# Patient Record
Sex: FEMALE | Race: WHITE | NOT HISPANIC OR LATINO | Employment: OTHER | URBAN - METROPOLITAN AREA
[De-identification: names, ages, dates, MRNs, and addresses within clinical notes are randomized per-mention and may not be internally consistent; named-entity substitution may affect disease eponyms.]

---

## 2017-01-03 ENCOUNTER — PATIENT MESSAGE (OUTPATIENT)
Dept: ENDOCRINOLOGY | Facility: CLINIC | Age: 52
End: 2017-01-03

## 2017-01-03 DIAGNOSIS — N18.30 CHRONIC KIDNEY DISEASE, STAGE III (MODERATE): Primary | ICD-10-CM

## 2017-01-24 ENCOUNTER — LAB VISIT (OUTPATIENT)
Dept: LAB | Facility: HOSPITAL | Age: 52
End: 2017-01-24
Attending: INTERNAL MEDICINE
Payer: COMMERCIAL

## 2017-01-24 DIAGNOSIS — N18.30 CHRONIC KIDNEY DISEASE, STAGE III (MODERATE): ICD-10-CM

## 2017-01-24 LAB
ALBUMIN SERPL BCP-MCNC: 3.4 G/DL
ANION GAP SERPL CALC-SCNC: 10 MMOL/L
BUN SERPL-MCNC: 21 MG/DL
CALCIUM SERPL-MCNC: 9.2 MG/DL
CHLORIDE SERPL-SCNC: 106 MMOL/L
CO2 SERPL-SCNC: 24 MMOL/L
CREAT SERPL-MCNC: 1.3 MG/DL
EST. GFR  (AFRICAN AMERICAN): 55 ML/MIN/1.73 M^2
EST. GFR  (NON AFRICAN AMERICAN): 48 ML/MIN/1.73 M^2
GLUCOSE SERPL-MCNC: 94 MG/DL
PHOSPHATE SERPL-MCNC: 3.2 MG/DL
POTASSIUM SERPL-SCNC: 4.3 MMOL/L
SODIUM SERPL-SCNC: 140 MMOL/L

## 2017-01-24 PROCEDURE — 80069 RENAL FUNCTION PANEL: CPT

## 2017-01-24 PROCEDURE — 36415 COLL VENOUS BLD VENIPUNCTURE: CPT

## 2017-02-01 ENCOUNTER — OFFICE VISIT (OUTPATIENT)
Dept: NEPHROLOGY | Facility: CLINIC | Age: 52
End: 2017-02-01
Payer: COMMERCIAL

## 2017-02-01 VITALS
HEART RATE: 106 BPM | HEIGHT: 68 IN | DIASTOLIC BLOOD PRESSURE: 60 MMHG | BODY MASS INDEX: 19.88 KG/M2 | OXYGEN SATURATION: 96 % | SYSTOLIC BLOOD PRESSURE: 110 MMHG | WEIGHT: 131.19 LBS

## 2017-02-01 DIAGNOSIS — E27.49 SECONDARY ADRENAL INSUFFICIENCY: ICD-10-CM

## 2017-02-01 DIAGNOSIS — E23.0 PANHYPOPITUITARISM: ICD-10-CM

## 2017-02-01 DIAGNOSIS — G40.909 SEIZURE DISORDER: ICD-10-CM

## 2017-02-01 DIAGNOSIS — E87.5 HYPERKALEMIA: ICD-10-CM

## 2017-02-01 DIAGNOSIS — N18.30 CHRONIC KIDNEY DISEASE (CKD), STAGE III (MODERATE): Primary | ICD-10-CM

## 2017-02-01 PROCEDURE — 99999 PR PBB SHADOW E&M-EST. PATIENT-LVL III: CPT | Mod: PBBFAC,,, | Performed by: INTERNAL MEDICINE

## 2017-02-01 PROCEDURE — 99213 OFFICE O/P EST LOW 20 MIN: CPT | Mod: S$GLB,,, | Performed by: INTERNAL MEDICINE

## 2017-02-01 NOTE — PROGRESS NOTES
Patient is here today for follow up evaluation of CKD III. Last seen in renal office 9/26/17.Baseline Cr 1.3-1.5 Her most recent lab (1/24/17) Cr 1.3 mg/dl eGFR 48 ml/min; potassium 4.3 mmol/L There is a hx of panhypopit and secondary adrenal insufficiency. Today she has no new complaints    ROS;   Mood and Affect;  Appropriate  Otherwise non-contributory      Exam  Pleasant woman; no acute distress; oriented x 3  HEENT; WNL  CHEST; Clear P&A  HEART; RR; S1&S2 no murmur rub gallop  ABD; Benign  EXT; (-)Edema    Impression  CKD III Stable  Repeat lab 3 mo    Plan  Return Visit 6 mo; pending above

## 2017-02-07 ENCOUNTER — OFFICE VISIT (OUTPATIENT)
Dept: PAIN MEDICINE | Facility: CLINIC | Age: 52
End: 2017-02-07
Attending: ANESTHESIOLOGY
Payer: COMMERCIAL

## 2017-02-07 VITALS
WEIGHT: 131 LBS | BODY MASS INDEX: 19.85 KG/M2 | TEMPERATURE: 100 F | SYSTOLIC BLOOD PRESSURE: 140 MMHG | DIASTOLIC BLOOD PRESSURE: 77 MMHG | HEIGHT: 68 IN | HEART RATE: 95 BPM | RESPIRATION RATE: 20 BRPM

## 2017-02-07 DIAGNOSIS — M79.18 MYOFASCIAL MUSCLE PAIN: ICD-10-CM

## 2017-02-07 DIAGNOSIS — M53.3 SACROILIAC JOINT PAIN: Primary | ICD-10-CM

## 2017-02-07 PROCEDURE — 99999 PR PBB SHADOW E&M-EST. PATIENT-LVL III: CPT | Mod: PBBFAC,,, | Performed by: ANESTHESIOLOGY

## 2017-02-07 PROCEDURE — 99213 OFFICE O/P EST LOW 20 MIN: CPT | Mod: S$GLB,,, | Performed by: ANESTHESIOLOGY
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
## 2017-02-22 ENCOUNTER — PATIENT MESSAGE (OUTPATIENT)
Dept: ENDOCRINOLOGY | Facility: CLINIC | Age: 52
End: 2017-02-22

## 2017-02-22 DIAGNOSIS — M81.0 OSTEOPOROSIS: ICD-10-CM

## 2017-03-08 RX ORDER — DIPHENOXYLATE HYDROCHLORIDE AND ATROPINE SULFATE 2.5; .025 MG/1; MG/1
TABLET ORAL
Qty: 30 TABLET | Refills: 3 | Status: SHIPPED | OUTPATIENT
Start: 2017-03-08 | End: 2019-06-10

## 2017-03-22 RX ORDER — ALENDRONATE SODIUM 70 MG/1
70 TABLET ORAL
Qty: 12 TABLET | Refills: 1 | Status: SHIPPED | OUTPATIENT
Start: 2017-03-22 | End: 2017-12-06 | Stop reason: SDUPTHER

## 2017-06-15 DIAGNOSIS — G40.909 EPILEPSY WITHOUT STATUS EPILEPTICUS, NOT INTRACTABLE: ICD-10-CM

## 2017-06-15 RX ORDER — PHENYTOIN SODIUM 100 MG/1
CAPSULE, EXTENDED RELEASE ORAL
Qty: 360 CAPSULE | Refills: 4 | Status: SHIPPED | OUTPATIENT
Start: 2017-06-15 | End: 2018-09-02 | Stop reason: SDUPTHER

## 2017-08-07 DIAGNOSIS — N18.30 CHRONIC KIDNEY DISEASE (CKD), STAGE III (MODERATE): Primary | ICD-10-CM

## 2017-08-08 ENCOUNTER — TELEPHONE (OUTPATIENT)
Dept: NEPHROLOGY | Facility: CLINIC | Age: 52
End: 2017-08-08

## 2017-08-08 RX ORDER — HYDROCORTISONE 10 MG/1
10 TABLET ORAL 2 TIMES DAILY
Qty: 60 TABLET | Refills: 6 | Status: SHIPPED | OUTPATIENT
Start: 2017-08-08 | End: 2018-01-11 | Stop reason: SDUPTHER

## 2017-08-08 NOTE — TELEPHONE ENCOUNTER
----- Message from Sharon Almaguer sent at 8/7/2017 10:28 AM CDT -----  Contact: Self [phone]  If needed, pls add orders for labs prior to Pt's appointment on 8/30 at [facility] Schedule and called pt

## 2017-08-14 RX ORDER — RIVAROXABAN 20 MG/1
TABLET, FILM COATED ORAL
Qty: 90 TABLET | Refills: 3 | Status: SHIPPED | OUTPATIENT
Start: 2017-08-14 | End: 2018-10-14 | Stop reason: SDUPTHER

## 2017-08-22 DIAGNOSIS — Z12.11 COLON CANCER SCREENING: ICD-10-CM

## 2017-08-28 ENCOUNTER — LAB VISIT (OUTPATIENT)
Dept: LAB | Facility: HOSPITAL | Age: 52
End: 2017-08-28
Attending: INTERNAL MEDICINE
Payer: COMMERCIAL

## 2017-08-28 DIAGNOSIS — N18.30 CHRONIC KIDNEY DISEASE (CKD), STAGE III (MODERATE): ICD-10-CM

## 2017-08-28 LAB
ALBUMIN SERPL BCP-MCNC: 3.2 G/DL
ANION GAP SERPL CALC-SCNC: 7 MMOL/L
BUN SERPL-MCNC: 33 MG/DL
CALCIUM SERPL-MCNC: 8.6 MG/DL
CHLORIDE SERPL-SCNC: 108 MMOL/L
CO2 SERPL-SCNC: 26 MMOL/L
CREAT SERPL-MCNC: 1.4 MG/DL
EST. GFR  (AFRICAN AMERICAN): 51 ML/MIN/1.73 M^2
EST. GFR  (NON AFRICAN AMERICAN): 44 ML/MIN/1.73 M^2
GLUCOSE SERPL-MCNC: 158 MG/DL
PHOSPHATE SERPL-MCNC: 2.3 MG/DL
POTASSIUM SERPL-SCNC: 4.2 MMOL/L
SODIUM SERPL-SCNC: 141 MMOL/L

## 2017-08-28 PROCEDURE — 36415 COLL VENOUS BLD VENIPUNCTURE: CPT

## 2017-08-28 PROCEDURE — 80069 RENAL FUNCTION PANEL: CPT

## 2017-08-30 ENCOUNTER — OFFICE VISIT (OUTPATIENT)
Dept: NEPHROLOGY | Facility: CLINIC | Age: 52
End: 2017-08-30
Payer: COMMERCIAL

## 2017-08-30 VITALS
OXYGEN SATURATION: 99 % | SYSTOLIC BLOOD PRESSURE: 120 MMHG | HEIGHT: 68 IN | HEART RATE: 102 BPM | WEIGHT: 128.5 LBS | DIASTOLIC BLOOD PRESSURE: 80 MMHG | BODY MASS INDEX: 19.48 KG/M2

## 2017-08-30 DIAGNOSIS — E23.0 PANHYPOPITUITARISM: ICD-10-CM

## 2017-08-30 DIAGNOSIS — G40.909 SEIZURE DISORDER: ICD-10-CM

## 2017-08-30 DIAGNOSIS — I10 ESSENTIAL HYPERTENSION: ICD-10-CM

## 2017-08-30 DIAGNOSIS — Z86.39: ICD-10-CM

## 2017-08-30 DIAGNOSIS — N18.30 CHRONIC KIDNEY DISEASE (CKD), STAGE III (MODERATE): Primary | ICD-10-CM

## 2017-08-30 PROCEDURE — 99213 OFFICE O/P EST LOW 20 MIN: CPT | Mod: S$GLB,,, | Performed by: INTERNAL MEDICINE

## 2017-08-30 PROCEDURE — 3008F BODY MASS INDEX DOCD: CPT | Mod: S$GLB,,, | Performed by: INTERNAL MEDICINE

## 2017-08-30 PROCEDURE — 99999 PR PBB SHADOW E&M-EST. PATIENT-LVL III: CPT | Mod: PBBFAC,,, | Performed by: INTERNAL MEDICINE

## 2017-08-30 NOTE — PROGRESS NOTES
Patient is here today for follow evaluation of CKD III. Last seen in renal office 2/1/17. Baseline Cr 1.3-1.5 mg/dl. Her most recent lab (8/28/17) Cr 1.4 mg/dl; eGFR 44 ml/min; potassium 4.2 mmol/LToday she has no new complaints    ROS;  Pleasant woman; no acute distress; oriented x 3  Mood and Affect; Appropriate  Otherwise non-contributory    Exam  HEENT; Grossly intact  CHEST; Clear P&A; no rales or rhonchi  HEART; RR; S1&S2 no murmur rub gallop  ABD; BS(+)  EXT; (-)Edema    Impression  CKD III. Stable  Hypertension Satisfactory control    Plan    Return Visit; 6 mo

## 2017-09-25 ENCOUNTER — OFFICE VISIT (OUTPATIENT)
Dept: NEUROLOGY | Facility: CLINIC | Age: 52
End: 2017-09-25
Payer: COMMERCIAL

## 2017-09-25 VITALS
HEIGHT: 68 IN | SYSTOLIC BLOOD PRESSURE: 125 MMHG | BODY MASS INDEX: 19.51 KG/M2 | WEIGHT: 128.75 LBS | HEART RATE: 94 BPM | DIASTOLIC BLOOD PRESSURE: 76 MMHG

## 2017-09-25 DIAGNOSIS — G40.909 SEIZURE DISORDER: Primary | ICD-10-CM

## 2017-09-25 DIAGNOSIS — C71.9 GLIOBLASTOMA MULTIFORME OF BRAIN: ICD-10-CM

## 2017-09-25 DIAGNOSIS — E23.0 PANHYPOPITUITARISM: ICD-10-CM

## 2017-09-25 PROCEDURE — 99999 PR PBB SHADOW E&M-EST. PATIENT-LVL III: CPT | Mod: PBBFAC,,, | Performed by: PSYCHIATRY & NEUROLOGY

## 2017-09-25 PROCEDURE — 3008F BODY MASS INDEX DOCD: CPT | Mod: S$GLB,,, | Performed by: PSYCHIATRY & NEUROLOGY

## 2017-09-25 PROCEDURE — 99215 OFFICE O/P EST HI 40 MIN: CPT | Mod: S$GLB,,, | Performed by: PSYCHIATRY & NEUROLOGY

## 2017-09-25 NOTE — ASSESSMENT & PLAN NOTE
50yo F with hx of seizures related to GBM (surgery in 80s)  No seizures > 20 years  - on PHT 400mg q pm    Check levels today and q yearly  DEXA scan (done by Endo)    Plan of care was discussed in detail with patient

## 2017-09-25 NOTE — PROGRESS NOTES
"EPILEPSY CLINIC:   FOLLOW UP VISIT    Name: Thelma Nguyen  MRN:[id number]   [facility]: [id number]  Date of service: 9/25/2017    Last (1st) clinic visit: 9/19/16    Age:51 y.o.   Gender:female     The patient is here today alone  History obtained from  The patient    CHIEF COMPLAINT:  - yearly follow-up for seizures    INTERVAL HISTORY (Since last visit):      This is a 51 y.o.  right handed female who presents for yearly follow-up  Chief Complaint   Patient presents with    Follow-up    who was last seen by me on 9/19/2016        Hx of seizures related to GBM (1985)     No seizures since last visit; last sz > 21 years ago; states she is at home with her  now - children in college   Compliant with AED: PHT 400mg q pm; no levels done    Doing well  Has Endo f/u for panhypopituitarism s/p surgery  MRI Brain due   - will refer to [facility] prn    Any other relevant history since last visit: none    SEIZURE HISTORY: Notes from last visit, 9/19/16  This is a 50 y.o.  year old right handed female who presents with a chief complaint evaluation of seizures  .     The history of seizures x 01/1985 prior diagnosis of GBM     No hx of seizures x 20 years     Onset as a teenager with "hearing voices" - a buzzing noise followed by her mother's voice in the left ear, lasting a few minutes with loss of awareness of unclear duration. Witnesses reportedly described patient as being 'zoned out' during the period. Events would occur randomly and after an event where she 'ran away' from a friend during the event that she was taken for evaluation. One episode of 'shaking' occurred (no details available - patient cannot recall) that triggered the visit to the [facility] and was referred to neurosurgeon.       Patient had brain surgery within a week after diagnosis: Left GBM (s/p XRT and Chemotx) - in [place] ([facility]) - and was started on PHT (no hx of sz post-op) and continued for 4 years and tapered off. She was " seizure-free for a total of 10 years and then had a GTC seizure with no triggers. She was re-started on PHT and after 2-3 years she reports starting to have 'petit mal' seizures: staring episodes with loss of awareness followed by slurred speech; unclear duration. She was then changed from PHT to PB - but she was having side-effects - cognitive difficulties and was changed to Neurontin. However, she developed significant sensation of pins and needles in her hands and feet and was changed back to PHT ~ 20 years ago and has been on it since that time with no further complaints or seizures.     Last seizure was ~ 20 years ago. Recalls one episode of waking up one morning ~ 17 years ago with tongue bite - nil after. Doing well.     Has panhypopituitarism s/p surgery and has endocrine f/u.   Recently saw NSG as her primary MSG is .     No new complaints at this time.       Any other relevant information:  Has Factor V Leiden syndrome as well as IBS.     PREVIOUS EVALUATIONS:     Previous EEGs: no recent    Previous MRI Brain:  Results for orders placed or performed during the hospital encounter of 16   MRI Brain Without Contrast    Narrative    MRI BRAIN WITHOUT CONTRAST    Date: 16 09:57:28    History:  Seizures., epilepsy and history of brain tumor    Technique:  Standard multiplanar noncontrast sequences of the brain no previous for comparison.    Findings:  The ventricles are nonenlarged.  There is minor periventricular and pontine white matter hyperintensity.    There are postoperative changes consistent with a left craniotomy site.    There is a small cystic area of encephalomalacia involving the posterior left temporal lobe cortex grossly measuring approximately 1.5 x 3.0 cm in size.  The marginal signal is hyperintense.  No restricted diffusion is seen.  Without contrast difficult to assess for recurrent or residual tumor mass.  Please correlate with surgical history.    No additional  findings.    Impression        No acute intracranial process.  Evidence of left craniotomy with focal area of postoperative encephalomalacia left posterior temporal lobe convexity.  Minor residual marginal T2 signal hyperintensity.    Minor periventricular and pontine white matter hyperintensity, usually related to small vessel ischemic degeneration.  ______________________________________     Electronically signed by: FRANSICO GRANT MD  Date:     05/19/16  Time:    11:08       Results for orders placed or performed during the hospital encounter of 05/19/16   MRI Brain Without Contrast    Narrative    MRI BRAIN WITHOUT CONTRAST    Date: 05/19/16 09:57:28    History:  Seizures., epilepsy and history of brain tumor    Technique:  Standard multiplanar noncontrast sequences of the brain no previous for comparison.    Findings:  The ventricles are nonenlarged.  There is minor periventricular and pontine white matter hyperintensity.    There are postoperative changes consistent with a left craniotomy site.    There is a small cystic area of encephalomalacia involving the posterior left temporal lobe cortex grossly measuring approximately 1.5 x 3.0 cm in size.  The marginal signal is hyperintense.  No restricted diffusion is seen.  Without contrast difficult to assess for recurrent or residual tumor mass.  Please correlate with surgical history.    No additional findings.    Impression        No acute intracranial process.  Evidence of left craniotomy with focal area of postoperative encephalomalacia left posterior temporal lobe convexity.  Minor residual marginal T2 signal hyperintensity.    Minor periventricular and pontine white matter hyperintensity, usually related to small vessel ischemic degeneration.  ______________________________________     Electronically signed by: FRANSICO GRANT MD  Date:     05/19/16  Time:    11:08       Additional tests:  1)CT Scan: no recent  2) EEG\Video Monitoring: no  3) PET Scan:  no  4) Neuropsychological evaluation: no  5) DEXA Scan: ~2 yrs ago; scheduled for one now  6) Others: no     RISK FACTORS FOR SEIZURES:    1. Head Trauma:  No    2. CNS Infections:  No  3. CNS Tumors: Yes     4. CNS Vascular Disease: No     5. Febrile Seizures: No    6. Developmental Delay: No       7. Family History of Seizures: No    8. Birth history:  FTNVD     Pregnancy/Labor/Delivery: 1 adult child; 1 child through IVF    CURRENT MEDICATIONS:   Current Outpatient Prescriptions   Medication Sig Dispense Refill    acetaminophen (TYLENOL) 325 MG tablet Take 325 mg by mouth every 6 (six) hours as needed for Pain.      acetaminophen-codeine 300-30mg (TYLENOL #3) 300-30 mg Tab Take 1 tablet by mouth every 6 (six) hours as needed.  0    alendronate (FOSAMAX) 70 MG tablet Take 1 tablet (70 mg total) by mouth every 7 days. 12 tablet 1    diclofenac sodium 1 % Gel Apply 2 g topically 4 (four) times daily. 1 Tube 2    diphenoxylate-atropine 2.5-0.025 mg (LOMOTIL) 2.5-0.025 mg per tablet TAKE 1 TABLET BY MOUTH AS NEEDED LOOSE STOOL 30 tablet 3    ERGOCALCIFEROL, VITAMIN D2, (VITAMIN D ORAL) Take 1 capsule by mouth once daily.      ferrous sulfate 324 mg (65 mg iron) TbEC Take 325 mg by mouth once daily.      hydrocortisone (CORTEF) 10 MG Tab Take 1 tablet (10 mg total) by mouth 2 (two) times daily. 60 tablet 6    levothyroxine (SYNTHROID) 137 MCG Tab tablet Take one tablet daily before breakfast except on sundays take 1 1/2 pills. 32 tablet 11    loratadine-pseudoephedrine  mg (CLARITIN-D 24-HOUR)  mg per 24 hr tablet Take 1 tablet by mouth as needed.       phenytoin (DILANTIN) 100 MG ER capsule TAKE 4 CAPSULES EVERY  capsule 4    XARELTO 20 mg Tab TAKE 1 TABLET BY MOUTH DAILY 90 tablet 3     No current facility-administered medications for this visit.         CURRENT ANTI EPILEPTIC MEDICATIONS: PHT 400mg q pm    VAGAL NERVE STIMULATOR: n/a     PRIOR ANTICONVULSANT HISTORY:   1) Acetazolamide  (Diamox, AZM): medication not used  2) Carbamazepine (Tegretol, CBZ): medication not used  3) Ethosuximide (Zarontin, ESM): medication not used  4) Gabapentin(Neurontin, GPN): tried, but had side-effects  5) Eslicarbazepine:  medication not used  6) Lacosamide (Vimpat, LCS): medication not used  7) Lamotrigine (Lamictal, LTG): medication not used  8) Levatiracetam (Keppra, LEV): medication not used  9) Methosuximide (Celontin, MSM): medication not used  10) Methylphenytoin (Mesantoin, MHT):medication not used  11) Oxcarbazepine (Trileptal, OXC): medication not used  12) Phenobarbital (PB): used but not effective  13) Phenytoin (Dilantin, PHT):  current  14) Pregabalin (Lyrica, PGB): medication not used  15) Primidone (Mysoline, PRM): medication not used  16) Retigabine (Potega, RTG): medication not used  17) Rufinamide (Banzel, RUF): medication not used  18) Tiagabine (Gabatril, TGB): medication not used  19) Topiramate (Topamax, TPM):  medication not used  20) Vigabatrin (Sabril, VGB): medication not used  21) Valproic acid (Depakote, VPA): medication not used  22) Zonisamide (Zonegran, ZNA): medication not used     Benzodiazepines:  1) Diazepam: Rectal (Diastat): medication not used  2) Diazepam: Oral (Valium, DZ): medication not used  3) Clorazepate (Tranxene, CLZ):  medication not used  4) Clobazem (Omfi, Frisium, CLB): medication not used     Vagal Nerve Stimulator: medication not used      PAST MEDICAL HISTORY:   Active Ambulatory Problems     Diagnosis Date Noted    Panhypopituitarism 03/25/2015    IBS (irritable bowel syndrome) 03/25/2015    Seizure disorder 03/25/2015    Allergic rhinitis due to pollen 04/28/2015    Peripheral polyneuropathy 03/16/2016    Osteoporosis 03/16/2016    Sacroiliac joint pain 04/25/2016    Myofascial muscle pain 04/25/2016    Secondary adrenal insufficiency 05/05/2016    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/2016     Resolved Ambulatory Problems     Diagnosis Date  Noted    Left elbow pain 2015    Left shoulder pain 2015    Thrombophilia 2015    Open wound of right great toe 2016    Secondary hypothyroidism 2016     Past Medical History:   Diagnosis Date    Anemia 1987    Arthritis     Broken ankle     Cancer 1985    Clotting disorder     Disorder of kidney and ureter     Fracture of elbow     Hypothyroidism     IBS (irritable bowel syndrome) 1975    Seizures     Thyroid disease       PAST PSYCHIATRIC HISTORY:  none    PAST SURGICAL HISTORY including Epilepsy surgery:   Past Surgical History:   Procedure Laterality Date    APPENDECTOMY      BRAIN SURGERY      for glioblastoma      SECTION      CHOLECYSTECTOMY      HYSTERECTOMY      TUBAL LIGATION          FAMILY HISTORY:   Family History   Problem Relation Age of Onset    Heart disease Mother          SOCIAL HISTORY:   Social History     Social History    Marital status:      Spouse name: N/A    Number of children: N/A    Years of education: N/A     Occupational History    Not on file.     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   HENT: Negative for dental problem and sore throat.    Eyes:  Negative for photophobia and pain.   Respiratory: Negative for cough and shortness of breath.    Cardiovascular: Negative for chest pain and palpitations.   Gastrointestinal: Negative for nausea and vomiting.   Endocrine: Negative for polydipsia and polyuria.   Genitourinary: Negative for menstrual problem and vaginal bleeding.   Musculoskeletal: Negative for arthralgias and joint swelling.   Skin: Negative for rash and wound.   Allergic/Immunologic: Negative for immunocompromised state.   All other systems reviewed and are negative.       GENERAL EXAMINATION:  LMP 10/23/1997 (Exact Date)      GENERAL EXAMINATION:  General Appearance:  This is a normal built slim female who appears well  HEENT: There are no dysmorphic features  Skin: There is  No obvious stigmata for neurocutaneous disorders. Has several skin moles (pt has dermatologist f/u)  Neck: supple  Cardiovascular:  regular rate and rhythm  Lungs: clear  Abdomen: deferred  The spine is non-tender.  Kyphosis:  NoScoliosis: No   Extremities: Warm and well perfused     NEUROLOGICAL EXAMINATION:  Mental status: Alert and oriented x 4; Pleasant and cooperative  Memory: Recent memory intact, Remote memory intact, Age correct, Month correct  Attention and concentration: intact  Fund of knowledge: appropriate  Speech: normal  Dysarthria: No   Eyes: PERRL  EOM intact  No nystagmus  The visual pursuits  were smooth with normal saccadic eye movements.  Fundoscopic Exam: deferred   Normal facial sensation bilaterally with no facial asymmetry  Intact hearing bilaterally to finger rub  Midline tongue and palate  Intact SCM and trapezii bilaterally     Motor examination:  Generalized decreased bulk with normal tone.  Muscle strength: No pronater drift; 5/5 bilaterally symmetric UE and LE  Abnormal movements: N.   Deep tendon reflexes: 2+ bilaterally symmetric UE and bilateral patellar.   Dysmetria: No      Sensory examination: reported intact bilaterally symmetric touch,  temp     Gait: normal    OTHER RELEVANT LABS AND TESTS: none    IMPRESSION:   Seizure disorder  52yo F with hx of seizures related to GBM (surgery in 80s)  No seizures > 20 years  - on PHT 400mg q pm    Check levels today and q yearly  DEXA scan (done by Endo)    Plan of care was discussed in detail with patient    Glioblastoma multiforme of brain  S/p tx - no NSGY follow-up currently    Yearly MRI - due now      Panhypopituitarism  - followed up by Endo    The patient was asked to call me/the clinic 1 week after the test(s) are done to obtain results.    More than 50% of the time with the patient (as well as family/caregiver(s) was spent on face-to-face counseling about:  1. Diagnosis, plans, prognosis, medications and possible side-effects, risks and benefits of treatment, other alternatives to AEDs.  2. Risks related to continued seizures, status epilepticus, SUDEP, driving restrictions and seizure precautions ( no baths but showers are ok, no swimming unsupervised, no use of heavy machinery, no use of sharp moving objects, avoid heights).   3. Issues related to pregnancy, OCP and breast feeding as it relates to epilepsy (in female patients).  4. The potential of teratogenicity (for female patients) and suicidal risks of anti-epileptic medications.  5.Avoid any activity that compromise patient safety related to seizures.    Questions and concerns raised by the patient and family/care-giver(s) were addressed and they indicated understanding of everything discussed and agreed to plans as above.    Return in 1 year or earlier kai Marie MD, MBA(), FACNS.  Neurology-Epilepsy.

## 2017-09-25 NOTE — LETTER
September 28, 2017      Isaías Muñiz MD  [address]  [address]  [place] [address]           [facility] - Neurology Epilepsy  [address]  [facility] [place] [address]  Phone: [phone]  Fax: [phone]          Patient: Thelma Nguyen   MR Number: [id number]   YOB: 1965   Date of Visit: 9/25/2017       Dear Dr. Isaías Muñiz:    Thank you for referring Thelma Nguyen to me for evaluation. Attached you will find relevant portions of my assessment and plan of care.    If you have questions, please do not hesitate to call me. I look forward to following Thelma Nguyen along with you.    Sincerely,        Enclosure  CC:  No Recipients    If you would like to receive this communication electronically, please contact [email or web address] or [phone] to request more information on [employer] Link access.    For providers and/or their staff who would like to refer a patient to [facility], please contact us through our one-stop-shop provider referral line, [facility], at [phone].    If you feel you have received this communication in error or would no longer like to receive these types of communications, please e-mail [email or web address]

## 2017-11-03 ENCOUNTER — PATIENT MESSAGE (OUTPATIENT)
Dept: NEUROLOGY | Facility: CLINIC | Age: 52
End: 2017-11-03

## 2017-11-09 ENCOUNTER — PATIENT MESSAGE (OUTPATIENT)
Dept: NEPHROLOGY | Facility: CLINIC | Age: 52
End: 2017-11-09

## 2017-11-17 DIAGNOSIS — N18.30 CHRONIC KIDNEY DISEASE, STAGE III (MODERATE): Primary | ICD-10-CM

## 2017-11-20 ENCOUNTER — PATIENT MESSAGE (OUTPATIENT)
Dept: NEPHROLOGY | Facility: CLINIC | Age: 52
End: 2017-11-20

## 2017-11-20 ENCOUNTER — HOSPITAL ENCOUNTER (OUTPATIENT)
Dept: RADIOLOGY | Facility: HOSPITAL | Age: 52
Discharge: HOME | End: 2017-11-20
Attending: PSYCHIATRY & NEUROLOGY
Payer: COMMERCIAL

## 2017-11-20 DIAGNOSIS — C71.9 GLIOBLASTOMA MULTIFORME OF BRAIN: ICD-10-CM

## 2017-11-20 PROCEDURE — 25500020 PHARM REV CODE 255: Mod: PO

## 2017-11-20 PROCEDURE — 70553 MRI BRAIN STEM W/O & W/DYE: CPT | Mod: TC,PO

## 2017-11-20 PROCEDURE — A9585 GADOBUTROL INJECTION: HCPCS | Mod: PO

## 2017-11-20 RX ORDER — GADOBUTROL 604.72 MG/ML
INJECTION INTRAVENOUS
Status: COMPLETED
Start: 2017-11-20 | End: 2017-11-20

## 2017-11-20 RX ORDER — GADOBUTROL 604.72 MG/ML
6 INJECTION INTRAVENOUS
Status: DISCONTINUED | OUTPATIENT
Start: 2017-11-20 | End: 2017-11-21 | Stop reason: HOSPADM

## 2017-11-20 RX ADMIN — GADOBUTROL 6 ML: 604.72 INJECTION INTRAVENOUS at 02:11

## 2017-12-02 RX ORDER — LEVOTHYROXINE SODIUM 137 UG/1
TABLET ORAL
Qty: 96 TABLET | Refills: 3 | Status: SHIPPED | OUTPATIENT
Start: 2017-12-02 | End: 2018-11-14 | Stop reason: SDUPTHER

## 2017-12-06 ENCOUNTER — OFFICE VISIT (OUTPATIENT)
Dept: NEPHROLOGY | Facility: CLINIC | Age: 52
End: 2017-12-06
Payer: COMMERCIAL

## 2017-12-06 VITALS
BODY MASS INDEX: 19.05 KG/M2 | HEIGHT: 68 IN | OXYGEN SATURATION: 100 % | HEART RATE: 106 BPM | WEIGHT: 125.69 LBS | DIASTOLIC BLOOD PRESSURE: 70 MMHG | SYSTOLIC BLOOD PRESSURE: 150 MMHG

## 2017-12-06 DIAGNOSIS — N18.30 CHRONIC KIDNEY DISEASE (CKD), STAGE III (MODERATE): Primary | ICD-10-CM

## 2017-12-06 DIAGNOSIS — M81.0 OSTEOPOROSIS: ICD-10-CM

## 2017-12-06 DIAGNOSIS — E87.5 HYPERKALEMIA: ICD-10-CM

## 2017-12-06 DIAGNOSIS — E23.0 PANHYPOPITUITARISM: ICD-10-CM

## 2017-12-06 PROCEDURE — 99999 PR PBB SHADOW E&M-EST. PATIENT-LVL III: CPT | Mod: PBBFAC,,, | Performed by: INTERNAL MEDICINE

## 2017-12-06 PROCEDURE — 99499 UNLISTED E&M SERVICE: CPT | Mod: S$GLB,,, | Performed by: INTERNAL MEDICINE

## 2017-12-06 RX ORDER — ALENDRONATE SODIUM 70 MG/1
70 TABLET ORAL
Qty: 12 TABLET | Refills: 1 | Status: SHIPPED | OUTPATIENT
Start: 2017-12-06 | End: 2018-08-13 | Stop reason: SDUPTHER

## 2017-12-06 NOTE — PROGRESS NOTES
Patient is here today for follow up evaluation oflab. Her most recent lab potassium 5.7  Repeat lab today; potassium 4.4 mmol/L    Plan    Return Visit; 3 mo

## 2018-01-12 RX ORDER — HYDROCORTISONE 10 MG/1
TABLET ORAL
Qty: 60 TABLET | Refills: 2 | Status: SHIPPED | OUTPATIENT
Start: 2018-01-12 | End: 2018-07-17 | Stop reason: SDUPTHER

## 2018-02-08 ENCOUNTER — TELEPHONE (OUTPATIENT)
Dept: ENDOCRINOLOGY | Facility: CLINIC | Age: 53
End: 2018-02-08

## 2018-03-01 ENCOUNTER — TELEPHONE (OUTPATIENT)
Dept: ENDOCRINOLOGY | Facility: CLINIC | Age: 53
End: 2018-03-01

## 2018-03-02 ENCOUNTER — TELEPHONE (OUTPATIENT)
Dept: ADMINISTRATIVE | Facility: HOSPITAL | Age: 53
End: 2018-03-02

## 2018-03-02 ENCOUNTER — PATIENT MESSAGE (OUTPATIENT)
Dept: ADMINISTRATIVE | Facility: HOSPITAL | Age: 53
End: 2018-03-02

## 2018-03-02 ENCOUNTER — TELEPHONE (OUTPATIENT)
Dept: ENDOCRINOLOGY | Facility: CLINIC | Age: 53
End: 2018-03-02

## 2018-03-04 DIAGNOSIS — E23.0 PANHYPOPITUITARISM: Primary | ICD-10-CM

## 2018-03-04 DIAGNOSIS — M81.0 OSTEOPOROSIS, UNSPECIFIED OSTEOPOROSIS TYPE, UNSPECIFIED PATHOLOGICAL FRACTURE PRESENCE: ICD-10-CM

## 2018-03-05 ENCOUNTER — TELEPHONE (OUTPATIENT)
Dept: ENDOCRINOLOGY | Facility: CLINIC | Age: 53
End: 2018-03-05

## 2018-03-05 ENCOUNTER — LAB VISIT (OUTPATIENT)
Dept: LAB | Facility: HOSPITAL | Age: 53
End: 2018-03-05
Attending: INTERNAL MEDICINE
Payer: COMMERCIAL

## 2018-03-05 DIAGNOSIS — E23.0 PANHYPOPITUITARISM: ICD-10-CM

## 2018-03-05 DIAGNOSIS — M81.0 OSTEOPOROSIS, UNSPECIFIED OSTEOPOROSIS TYPE, UNSPECIFIED PATHOLOGICAL FRACTURE PRESENCE: ICD-10-CM

## 2018-03-05 LAB
25(OH)D3+25(OH)D2 SERPL-MCNC: 27 NG/ML
ANION GAP SERPL CALC-SCNC: 7 MMOL/L
BUN SERPL-MCNC: 35 MG/DL
CALCIUM SERPL-MCNC: 9.2 MG/DL
CHLORIDE SERPL-SCNC: 104 MMOL/L
CO2 SERPL-SCNC: 25 MMOL/L
CREAT SERPL-MCNC: 1.2 MG/DL
EST. GFR  (AFRICAN AMERICAN): >60 ML/MIN/1.73 M^2
EST. GFR  (NON AFRICAN AMERICAN): 52 ML/MIN/1.73 M^2
GLUCOSE SERPL-MCNC: 121 MG/DL
POTASSIUM SERPL-SCNC: 5.7 MMOL/L
SODIUM SERPL-SCNC: 136 MMOL/L
T4 FREE SERPL-MCNC: 1.02 NG/DL

## 2018-03-05 PROCEDURE — 82523 COLLAGEN CROSSLINKS: CPT

## 2018-03-05 PROCEDURE — 80048 BASIC METABOLIC PNL TOTAL CA: CPT

## 2018-03-05 PROCEDURE — 36415 COLL VENOUS BLD VENIPUNCTURE: CPT

## 2018-03-05 PROCEDURE — 84439 ASSAY OF FREE THYROXINE: CPT

## 2018-03-05 PROCEDURE — 82306 VITAMIN D 25 HYDROXY: CPT

## 2018-03-05 NOTE — TELEPHONE ENCOUNTER
Called and spoke with patient.  Patient is requesting her labs scheduled for today prior to seeing Dr. Anglin on 3/7/18.  Labs scheduled to be done this afternoon at [facility] as ordered per Dr. Anglin.

## 2018-03-07 ENCOUNTER — OFFICE VISIT (OUTPATIENT)
Dept: ENDOCRINOLOGY | Facility: CLINIC | Age: 53
End: 2018-03-07
Payer: COMMERCIAL

## 2018-03-07 ENCOUNTER — PATIENT MESSAGE (OUTPATIENT)
Dept: ENDOCRINOLOGY | Facility: CLINIC | Age: 53
End: 2018-03-07

## 2018-03-07 ENCOUNTER — PATIENT MESSAGE (OUTPATIENT)
Dept: ADMINISTRATIVE | Facility: HOSPITAL | Age: 53
End: 2018-03-07

## 2018-03-07 VITALS
DIASTOLIC BLOOD PRESSURE: 80 MMHG | SYSTOLIC BLOOD PRESSURE: 140 MMHG | WEIGHT: 134.94 LBS | HEIGHT: 68 IN | BODY MASS INDEX: 20.45 KG/M2 | HEART RATE: 92 BPM

## 2018-03-07 DIAGNOSIS — E23.0 PANHYPOPITUITARISM: ICD-10-CM

## 2018-03-07 DIAGNOSIS — E27.49 SECONDARY ADRENAL INSUFFICIENCY: ICD-10-CM

## 2018-03-07 DIAGNOSIS — E55.9 VITAMIN D DEFICIENCY: ICD-10-CM

## 2018-03-07 DIAGNOSIS — M81.0 OSTEOPOROSIS, UNSPECIFIED OSTEOPOROSIS TYPE, UNSPECIFIED PATHOLOGICAL FRACTURE PRESENCE: Primary | ICD-10-CM

## 2018-03-07 LAB — COLLAGEN CTX SERPL-MCNC: 204 PG/ML

## 2018-03-07 PROCEDURE — 3079F DIAST BP 80-89 MM HG: CPT | Mod: S$GLB,,, | Performed by: INTERNAL MEDICINE

## 2018-03-07 PROCEDURE — 99214 OFFICE O/P EST MOD 30 MIN: CPT | Mod: S$GLB,,, | Performed by: INTERNAL MEDICINE

## 2018-03-07 PROCEDURE — 3077F SYST BP >= 140 MM HG: CPT | Mod: S$GLB,,, | Performed by: INTERNAL MEDICINE

## 2018-03-07 PROCEDURE — 99999 PR PBB SHADOW E&M-EST. PATIENT-LVL III: CPT | Mod: PBBFAC,,, | Performed by: INTERNAL MEDICINE

## 2018-03-07 NOTE — PROGRESS NOTES
Subjective:     Patient ID: Thelma Nguyen is a 52 y.o. female.    Chief Complaint: No chief complaint on file.    HPI:   Ms. Nguyen is a 52 y.o. female who is here for a follow-up visit for evaluation of panhypopituitarism, she was initially seen with Dr. Humphrey.   Diagnosed with a glioblastoma multiforme diagnosed [place] 1985, with subsequent panhypopituitarism.   Previous regimen included: decadron and dilantin.     Current regimen:   Hydrocortisone 15 mg daily, 5 mg in the afternoon. Skips several times a week, more often than not.    Taking fosamax 70 mg, weekly. She puts pill in her pill box.   Takes vitamin D daily.     Levothyroxine 137 mcg in the morning. Does not skip or miss, occasionally may take it closer to food because waking up late. Denies palpitations, increased sweating. occasinally has difficulty falling asleep --> this is unchanged. Denies tremulousness or irritability or anxiety. Reports increased frustrated.      Supplements:  Vitamin D tablet at night   No other supplements.      Dietary:  Almonds occasional  Lactose free milk  yogurt  Broccoli    2016: Fractured right ankle.     Review of Systems   Constitutional: Negative for chills and fever.   HENT: Positive for congestion. Negative for sinus pressure.    Eyes: Negative for visual disturbance.   Respiratory: Positive for cough. Negative for chest tightness and shortness of breath.    Cardiovascular: Negative for chest pain, palpitations and leg swelling.   Gastrointestinal: Positive for diarrhea ( IBS). Negative for abdominal pain and vomiting.   Genitourinary: Negative for dysuria.   Musculoskeletal: Negative for arthralgias.   Skin: Negative for rash.   Neurological: Negative for weakness.   Hematological: Does not bruise/bleed easily.   Psychiatric/Behavioral: Negative for sleep disturbance.        Objective:     Physical Exam   Constitutional: She appears well-developed and well-nourished.   HENT:   Head:  "Normocephalic and atraumatic.   Eyes: Conjunctivae and EOM are normal. Pupils are equal, round, and reactive to light.   Neck: No tracheal deviation present.   Small thyroid gland   Cardiovascular: Normal rate and regular rhythm.    Pulmonary/Chest: Effort normal and breath sounds normal.   Abdominal: Soft.   Lymphadenopathy:     She has no cervical adenopathy.   Skin: Skin is warm and dry.       Vitals:    03/07/18 1108   BP: (!) 140/80   BP Location: Left arm   Patient Position: Sitting   BP Method: Medium (Manual)   Pulse: 92   Weight: 61.2 kg (134 lb 14.7 oz)   Height: 5' 8" (1.727 m)     Results for BRENDA SANCHEZ (MRN [id number]) as of 3/7/2018 15:34   Ref. Range 3/5/2018 14:49   Sodium Latest Ref Range: 136 - 145 mmol/L 136   Potassium Latest Ref Range: 3.5 - 5.1 mmol/L 5.7 (H)   Chloride Latest Ref Range: 95 - 110 mmol/L 104   CO2 Latest Ref Range: 23 - 29 mmol/L 25   Anion Gap Latest Ref Range: 8 - 16 mmol/L 7 (L)   BUN, Bld Latest Ref Range: 6 - 20 mg/dL 35 (H)   Creatinine Latest Ref Range: 0.5 - 1.4 mg/dL 1.2   eGFR if non African American Latest Ref Range: >60 mL/min/1.73 m^2 52 (A)   eGFR if African American Latest Ref Range: >60 mL/min/1.73 m^2 >60   Glucose Latest Ref Range: 70 - 110 mg/dL 121 (H)   Calcium Latest Ref Range: 8.7 - 10.5 mg/dL 9.2   Vit D, 25-Hydroxy Latest Ref Range: 30 - 96 ng/mL 27 (L)   Free T4 Latest Ref Range: 0.71 - 1.51 ng/dL 1.02   C Telopeptide (CTX), Serum Latest Units: pg/mL 204       Assessment/Plan:     1. Osteoporosis, unspecified osteoporosis type, unspecified pathological fracture presence  - DXA Bone Density Spine And Hip; Future  - Vitamin D; Future  - walking and strength training  - if not improvement on improved compliance with fosamax consider IV reclast.     2. Panhypopituitarism  - free T4 wnl range   - on HC for replacement  - no E due to personal history of blood clots, early surgical menopause secondary to endometriosis    3. Secondary adrenal " insufficiency  Recommended medical alert tag for AI  Reviewed sick day rules.   represcribed dex  Recommended to use pill box, phone alert for afternoon dosing.     4. Vitamin D deficiency  - will notify us of dose of vitamin D, needs a little more. Would like to have levels closer to 32.   - Vitamin D; Future

## 2018-03-08 ENCOUNTER — PATIENT MESSAGE (OUTPATIENT)
Dept: ADMINISTRATIVE | Facility: HOSPITAL | Age: 53
End: 2018-03-08

## 2018-03-08 DIAGNOSIS — Z12.31 ENCOUNTER FOR SCREENING MAMMOGRAM FOR BREAST CANCER: Primary | ICD-10-CM

## 2018-03-20 ENCOUNTER — LAB VISIT (OUTPATIENT)
Dept: LAB | Facility: HOSPITAL | Age: 53
End: 2018-03-20
Attending: INTERNAL MEDICINE
Payer: COMMERCIAL

## 2018-03-20 DIAGNOSIS — N18.30 CHRONIC KIDNEY DISEASE, STAGE III (MODERATE): ICD-10-CM

## 2018-03-20 LAB
ALBUMIN SERPL BCP-MCNC: 4 G/DL
ANION GAP SERPL CALC-SCNC: 9 MMOL/L
BUN SERPL-MCNC: 34 MG/DL
CALCIUM SERPL-MCNC: 9 MG/DL
CHLORIDE SERPL-SCNC: 102 MMOL/L
CO2 SERPL-SCNC: 30 MMOL/L
CREAT SERPL-MCNC: 1.33 MG/DL
EST. GFR  (AFRICAN AMERICAN): 53.4 ML/MIN/1.73 M^2
EST. GFR  (NON AFRICAN AMERICAN): 46.3 ML/MIN/1.73 M^2
GLUCOSE SERPL-MCNC: 109 MG/DL
PHOSPHATE SERPL-MCNC: 3 MG/DL
POTASSIUM SERPL-SCNC: 4.9 MMOL/L
SODIUM SERPL-SCNC: 141 MMOL/L

## 2018-03-20 PROCEDURE — 36415 COLL VENOUS BLD VENIPUNCTURE: CPT | Mod: PO

## 2018-03-20 PROCEDURE — 80069 RENAL FUNCTION PANEL: CPT | Mod: PO

## 2018-03-27 ENCOUNTER — PATIENT MESSAGE (OUTPATIENT)
Dept: ADMINISTRATIVE | Facility: HOSPITAL | Age: 53
End: 2018-03-27

## 2018-03-28 ENCOUNTER — HOSPITAL ENCOUNTER (OUTPATIENT)
Dept: RADIOLOGY | Facility: HOSPITAL | Age: 53
Discharge: HOME | End: 2018-03-28
Attending: INTERNAL MEDICINE
Payer: COMMERCIAL

## 2018-03-28 ENCOUNTER — OFFICE VISIT (OUTPATIENT)
Dept: NEPHROLOGY | Facility: CLINIC | Age: 53
End: 2018-03-28
Payer: COMMERCIAL

## 2018-03-28 VITALS
BODY MASS INDEX: 34.08 KG/M2 | DIASTOLIC BLOOD PRESSURE: 80 MMHG | HEART RATE: 105 BPM | SYSTOLIC BLOOD PRESSURE: 120 MMHG | WEIGHT: 224.88 LBS | OXYGEN SATURATION: 99 % | HEIGHT: 68 IN

## 2018-03-28 DIAGNOSIS — N18.30 CHRONIC KIDNEY DISEASE (CKD), STAGE III (MODERATE): Primary | ICD-10-CM

## 2018-03-28 DIAGNOSIS — I10 ESSENTIAL HYPERTENSION: ICD-10-CM

## 2018-03-28 DIAGNOSIS — G40.909 SEIZURE DISORDER: ICD-10-CM

## 2018-03-28 DIAGNOSIS — Z12.31 ENCOUNTER FOR SCREENING MAMMOGRAM FOR BREAST CANCER: ICD-10-CM

## 2018-03-28 DIAGNOSIS — Z86.39: ICD-10-CM

## 2018-03-28 DIAGNOSIS — E23.0 PANHYPOPITUITARISM: ICD-10-CM

## 2018-03-28 PROCEDURE — 99213 OFFICE O/P EST LOW 20 MIN: CPT | Mod: S$GLB,,, | Performed by: INTERNAL MEDICINE

## 2018-03-28 PROCEDURE — 3079F DIAST BP 80-89 MM HG: CPT | Mod: CPTII,S$GLB,, | Performed by: INTERNAL MEDICINE

## 2018-03-28 PROCEDURE — 77067 SCR MAMMO BI INCL CAD: CPT | Mod: 26,,, | Performed by: RADIOLOGY

## 2018-03-28 PROCEDURE — 77067 SCR MAMMO BI INCL CAD: CPT | Mod: TC

## 2018-03-28 PROCEDURE — 99999 PR PBB SHADOW E&M-EST. PATIENT-LVL III: CPT | Mod: PBBFAC,,, | Performed by: INTERNAL MEDICINE

## 2018-03-28 PROCEDURE — 3074F SYST BP LT 130 MM HG: CPT | Mod: CPTII,S$GLB,, | Performed by: INTERNAL MEDICINE

## 2018-03-28 NOTE — PROGRESS NOTES
Patient is here today for evaluation of CKD III. Baseline Cr 1.3-1.5 mg/dl There is a hx of panhypopit and hypoadrenalism. She admits to non-compliance with the pm dose of fludrocortisone and has had recurrent episodes of hyperkalemia  She is followed by endocrine for this problem  Today she has no new  Complaints    Exam  Pleasant woman; no acute distress; oriented x 3  HEENT; Grossly Intact  CHEST; Clear P&A; no rales or rhonci  HEART; RR; S1&S2 no murmur rub gallop  ABD; BS(+) non-tender; (-)CVAT  EXT; (-)Edema    Impression  CKD III Lab today; Cr 1.3 mg/dl; eGFR; 48 ml/min; potassium 5.4 mmol/L   Hyperkalemia; see above      Plan  Return Visit; 3 mo

## 2018-04-05 ENCOUNTER — TELEPHONE (OUTPATIENT)
Dept: ADMINISTRATIVE | Facility: HOSPITAL | Age: 53
End: 2018-04-05

## 2018-04-05 NOTE — TELEPHONE ENCOUNTER
Pt. Called regarding Fit/kit follow up. Patient states she is out of town and will have to complete upon return.

## 2018-04-12 ENCOUNTER — OFFICE VISIT (OUTPATIENT)
Dept: ORTHOPEDICS | Facility: CLINIC | Age: 53
End: 2018-04-12
Payer: COMMERCIAL

## 2018-04-12 VITALS — WEIGHT: 135 LBS | HEIGHT: 68 IN | BODY MASS INDEX: 20.46 KG/M2

## 2018-04-12 DIAGNOSIS — M67.432 GANGLION CYST OF DORSUM OF LEFT WRIST: ICD-10-CM

## 2018-04-12 PROCEDURE — 20612 ASPIRATE/INJ GANGLION CYST: CPT | Mod: LT,S$GLB,, | Performed by: ORTHOPAEDIC SURGERY

## 2018-04-12 PROCEDURE — 99999 PR PBB SHADOW E&M-EST. PATIENT-LVL II: CPT | Mod: PBBFAC,,, | Performed by: ORTHOPAEDIC SURGERY

## 2018-04-12 PROCEDURE — 99203 OFFICE O/P NEW LOW 30 MIN: CPT | Mod: 25,S$GLB,, | Performed by: ORTHOPAEDIC SURGERY

## 2018-04-12 RX ORDER — MULTIVITAMIN
1 TABLET ORAL DAILY
COMMUNITY
End: 2018-09-27

## 2018-04-12 RX ORDER — TRIAMCINOLONE ACETONIDE 40 MG/ML
40 INJECTION, SUSPENSION INTRA-ARTICULAR; INTRAMUSCULAR
Status: COMPLETED | OUTPATIENT
Start: 2018-04-12 | End: 2018-04-12

## 2018-04-12 RX ADMIN — TRIAMCINOLONE ACETONIDE 40 MG: 40 INJECTION, SUSPENSION INTRA-ARTICULAR; INTRAMUSCULAR at 08:04

## 2018-04-12 NOTE — PROGRESS NOTES
INITIAL VISIT HISTORY:  A 52-year-old female presents for evaluation of painful   mass, left wrist.  Symptoms have been present for the past several months.  She   had a previous ganglion cyst in the same area, which was removed about 10 years   ago, but seems to have recurred recently.  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    It has gotten a little bit larger recently, a little bit painful.  No numbness   or tingling.    PAST MEDICAL HISTORY:  Significant for previous brain tumor, pituitary problems,   hypothyroidism, irritable bowel, seizures, thyroid disease and anemia.    PAST SURGICAL HISTORY:  Includes hysterectomy, brain surgery, appendectomy,   , tubal ligation, cholecystectomy.    FAMILY HISTORY:  Positive for heart disease.    SOCIAL HISTORY:  The patient does not smoke or drink.    REVIEW OF SYSTEMS:  Negative fever, chills, rashes.    CURRENT MEDICATIONS:  Reviewed on chart.  She is on Xarelto.    ALLERGIES:  Neurontin.    PHYSICAL EXAMINATION:  GENERAL:  Well-developed, well-nourished female in no acute distress, alert and   oriented x3.  MUSCULOSKELETAL:  Examination of upper extremities significant for the left hand   and wrist, demonstrating a small cyst dorsally over the wrist joint arising   from the midline near the scapholunate interval measuring about 1 x 1 cm in   size, a little bit tender to touch.  No involvement of the extensor tendons.    Range of motion of wrist is full, little bit more prominent cyst noted with   wrist flexion.    No x-rays.    IMPRESSION:  Ganglion cyst, recurrent left wrist dorsal.    PLAN:  We discussed options for treatment including injection versus surgery.    She would like to try an injection and aspiration.    After pause for timeout, the patient identified the left wrist.  The cyst was   injected with Xylocaine initially then aspirated, removing small amount of   gelatinous fluid  with decompression manually and followed by injection of   Kenalog 20 mg, 0.5 mL Xylocaine, sterile technique.  She tolerated the procedure   well.  A compressive bandage applied.  Recommended light activities, Tylenol by   mouth and follow up in 3-4 weeks if symptoms fail to resolve.      IVAN/KAMRON  dd: 04/12/2018 08:48:59 (CDT)  td: 04/13/2018 06:04:45 (CDT)  Doc ID   #[id number]  Job ID #[id number]    CC:

## 2018-04-12 NOTE — LETTER
April 12, 2018        Isaías Muñiz MD  [address] [facility] [address] [facility]  [address]  [facility] [address]             [facility]  [address] [place] [address] [address]  [place] [address]  Phone: [phone]   Patient: Thelma Nguyen   MR Number: [id number]   YOB: 1965   Date of Visit: 4/12/2018       Dear Dr. Muñiz:    Thank you for referring Thelma Nguyen to me for evaluation. Below are the relevant portions of my assessment and plan of care.            If you have questions, please do not hesitate to call me. I look forward to following Thelma along with you.    Sincerely,      Ross Drew Jr., MD           CC  No Recipients

## 2018-04-24 ENCOUNTER — PATIENT MESSAGE (OUTPATIENT)
Dept: INTERNAL MEDICINE | Facility: CLINIC | Age: 53
End: 2018-04-24

## 2018-04-30 ENCOUNTER — LAB VISIT (OUTPATIENT)
Dept: LAB | Facility: HOSPITAL | Age: 53
End: 2018-04-30
Attending: INTERNAL MEDICINE
Payer: COMMERCIAL

## 2018-04-30 DIAGNOSIS — N18.30 CHRONIC KIDNEY DISEASE, STAGE III (MODERATE): ICD-10-CM

## 2018-04-30 LAB
ALBUMIN SERPL BCP-MCNC: 4 G/DL
ANION GAP SERPL CALC-SCNC: 11 MMOL/L
BUN SERPL-MCNC: 36 MG/DL
CALCIUM SERPL-MCNC: 8.9 MG/DL
CHLORIDE SERPL-SCNC: 104 MMOL/L
CO2 SERPL-SCNC: 28 MMOL/L
CREAT SERPL-MCNC: 1.32 MG/DL
EST. GFR  (AFRICAN AMERICAN): 53.9 ML/MIN/1.73 M^2
EST. GFR  (NON AFRICAN AMERICAN): 46.7 ML/MIN/1.73 M^2
GLUCOSE SERPL-MCNC: 108 MG/DL
PHOSPHATE SERPL-MCNC: 3 MG/DL
POTASSIUM SERPL-SCNC: 5.4 MMOL/L
SODIUM SERPL-SCNC: 143 MMOL/L

## 2018-04-30 PROCEDURE — 80069 RENAL FUNCTION PANEL: CPT | Mod: PO

## 2018-04-30 PROCEDURE — 36415 COLL VENOUS BLD VENIPUNCTURE: CPT | Mod: PO

## 2018-05-01 ENCOUNTER — PATIENT MESSAGE (OUTPATIENT)
Dept: NEPHROLOGY | Facility: CLINIC | Age: 53
End: 2018-05-01

## 2018-05-17 NOTE — ASSESSMENT & PLAN NOTE
Fluoro time = 5.7 minutes. Total dose = 2525 mGy. S/p tx - no NSGY follow-up currently    Yearly MRI - due now

## 2018-05-31 ENCOUNTER — LAB VISIT (OUTPATIENT)
Dept: LAB | Facility: HOSPITAL | Age: 53
End: 2018-05-31
Attending: INTERNAL MEDICINE
Payer: COMMERCIAL

## 2018-05-31 DIAGNOSIS — N18.30 CHRONIC KIDNEY DISEASE, STAGE III (MODERATE): ICD-10-CM

## 2018-05-31 LAB
ALBUMIN SERPL BCP-MCNC: 3.5 G/DL
ANION GAP SERPL CALC-SCNC: 10 MMOL/L
BUN SERPL-MCNC: 29 MG/DL
CALCIUM SERPL-MCNC: 9.3 MG/DL
CHLORIDE SERPL-SCNC: 104 MMOL/L
CO2 SERPL-SCNC: 24 MMOL/L
CREAT SERPL-MCNC: 1.3 MG/DL
EST. GFR  (AFRICAN AMERICAN): 55 ML/MIN/1.73 M^2
EST. GFR  (NON AFRICAN AMERICAN): 48 ML/MIN/1.73 M^2
GLUCOSE SERPL-MCNC: 96 MG/DL
PHOSPHATE SERPL-MCNC: 3.3 MG/DL
POTASSIUM SERPL-SCNC: 5.3 MMOL/L
SODIUM SERPL-SCNC: 138 MMOL/L

## 2018-05-31 PROCEDURE — 80069 RENAL FUNCTION PANEL: CPT

## 2018-05-31 PROCEDURE — 36415 COLL VENOUS BLD VENIPUNCTURE: CPT

## 2018-07-16 ENCOUNTER — OFFICE VISIT (OUTPATIENT)
Dept: NEPHROLOGY | Facility: CLINIC | Age: 53
End: 2018-07-16
Payer: COMMERCIAL

## 2018-07-16 ENCOUNTER — LAB VISIT (OUTPATIENT)
Dept: LAB | Facility: HOSPITAL | Age: 53
End: 2018-07-16
Attending: INTERNAL MEDICINE
Payer: COMMERCIAL

## 2018-07-16 VITALS
OXYGEN SATURATION: 99 % | WEIGHT: 134.25 LBS | BODY MASS INDEX: 20.34 KG/M2 | DIASTOLIC BLOOD PRESSURE: 60 MMHG | SYSTOLIC BLOOD PRESSURE: 100 MMHG | HEIGHT: 68 IN | HEART RATE: 108 BPM

## 2018-07-16 DIAGNOSIS — N18.30 CHRONIC KIDNEY DISEASE (CKD), STAGE III (MODERATE): Primary | ICD-10-CM

## 2018-07-16 DIAGNOSIS — N18.30 CHRONIC KIDNEY DISEASE (CKD), STAGE III (MODERATE): ICD-10-CM

## 2018-07-16 DIAGNOSIS — I10 ESSENTIAL HYPERTENSION: ICD-10-CM

## 2018-07-16 DIAGNOSIS — G40.909 SEIZURE DISORDER: ICD-10-CM

## 2018-07-16 DIAGNOSIS — E23.0 PANHYPOPITUITARISM: ICD-10-CM

## 2018-07-16 LAB
ALBUMIN SERPL BCP-MCNC: 3.4 G/DL
ANION GAP SERPL CALC-SCNC: 8 MMOL/L
BUN SERPL-MCNC: 36 MG/DL
CALCIUM SERPL-MCNC: 8.9 MG/DL
CHLORIDE SERPL-SCNC: 106 MMOL/L
CO2 SERPL-SCNC: 25 MMOL/L
CREAT SERPL-MCNC: 1.2 MG/DL
EST. GFR  (AFRICAN AMERICAN): >60 ML/MIN/1.73 M^2
EST. GFR  (NON AFRICAN AMERICAN): 52.5 ML/MIN/1.73 M^2
GLUCOSE SERPL-MCNC: 86 MG/DL
PHOSPHATE SERPL-MCNC: 2.6 MG/DL
POTASSIUM SERPL-SCNC: 4.7 MMOL/L
SODIUM SERPL-SCNC: 139 MMOL/L

## 2018-07-16 PROCEDURE — 36415 COLL VENOUS BLD VENIPUNCTURE: CPT

## 2018-07-16 PROCEDURE — 3074F SYST BP LT 130 MM HG: CPT | Mod: CPTII,S$GLB,, | Performed by: INTERNAL MEDICINE

## 2018-07-16 PROCEDURE — 3078F DIAST BP <80 MM HG: CPT | Mod: CPTII,S$GLB,, | Performed by: INTERNAL MEDICINE

## 2018-07-16 PROCEDURE — 99213 OFFICE O/P EST LOW 20 MIN: CPT | Mod: S$GLB,,, | Performed by: INTERNAL MEDICINE

## 2018-07-16 PROCEDURE — 3008F BODY MASS INDEX DOCD: CPT | Mod: CPTII,S$GLB,, | Performed by: INTERNAL MEDICINE

## 2018-07-16 PROCEDURE — 99999 PR PBB SHADOW E&M-EST. PATIENT-LVL III: CPT | Mod: PBBFAC,,, | Performed by: INTERNAL MEDICINE

## 2018-07-16 PROCEDURE — 80069 RENAL FUNCTION PANEL: CPT

## 2018-07-16 NOTE — PROGRESS NOTES
Patient is here today for follow up evaluation of CKD III.  Last seen in renal office 3/28/18 Baseline Cr 1.3-1.5 mg/dl Her most recent lab shown below There is hx of recurrent hyperkalemia (panhypo-pit; hypoadrenalism) compliance issue with cortisone Today she has no new complaints  :  Lab Results   Component Value Date    K 5.3 (H) 05/31/2018    CREATININE 1.3 05/31/2018    ESTGFRAFRICA 55 (A) 05/31/2018    EGFRNONAA 48 (A) 05/31/2018       Physical Exam   Pleasant woman; no acute distress; oriented x 3  BP; 100/60  HEENT; Grossly Intact  CHEST; Clear P&A; no rales or rhonchi  HEART; RR; S1&S2 no murmur rub gallop  ABD; BS(+) non-tender (-)CVAT  EXT; (-)Edema    Impression  CKD III Lab today Cr 1.2 mg/dl; eGFR; 52 ml/min; potassium 4.7 mmol/L; At baseline Stable  Hyperkalemia Improved with compliance    Plan  Return Visit; 6 mo

## 2018-07-17 RX ORDER — HYDROCORTISONE 10 MG/1
TABLET ORAL
Qty: 60 TABLET | Refills: 11 | Status: SHIPPED | OUTPATIENT
Start: 2018-07-17 | End: 2019-07-14 | Stop reason: SDUPTHER

## 2018-08-13 DIAGNOSIS — M81.0 OSTEOPOROSIS: ICD-10-CM

## 2018-08-13 RX ORDER — ALENDRONATE SODIUM 70 MG/1
TABLET ORAL
Qty: 12 TABLET | Refills: 1 | Status: SHIPPED | OUTPATIENT
Start: 2018-08-13 | End: 2019-01-21 | Stop reason: SDUPTHER

## 2018-09-02 DIAGNOSIS — G40.909 EPILEPSY WITHOUT STATUS EPILEPTICUS, NOT INTRACTABLE: ICD-10-CM

## 2018-09-04 RX ORDER — PHENYTOIN SODIUM 100 MG/1
CAPSULE, EXTENDED RELEASE ORAL
Qty: 360 CAPSULE | Refills: 4 | Status: SHIPPED | OUTPATIENT
Start: 2018-09-04 | End: 2019-11-29 | Stop reason: SDUPTHER

## 2018-09-27 ENCOUNTER — LAB VISIT (OUTPATIENT)
Dept: LAB | Facility: HOSPITAL | Age: 53
End: 2018-09-27
Attending: INTERNAL MEDICINE
Payer: COMMERCIAL

## 2018-09-27 ENCOUNTER — OFFICE VISIT (OUTPATIENT)
Dept: INTERNAL MEDICINE | Facility: CLINIC | Age: 53
End: 2018-09-27
Payer: COMMERCIAL

## 2018-09-27 VITALS
DIASTOLIC BLOOD PRESSURE: 64 MMHG | WEIGHT: 134.25 LBS | BODY MASS INDEX: 20.34 KG/M2 | HEIGHT: 68 IN | HEART RATE: 92 BPM | SYSTOLIC BLOOD PRESSURE: 110 MMHG

## 2018-09-27 DIAGNOSIS — G40.909 SEIZURE DISORDER: ICD-10-CM

## 2018-09-27 DIAGNOSIS — E23.0 PANHYPOPITUITARISM: ICD-10-CM

## 2018-09-27 DIAGNOSIS — G62.9 PERIPHERAL POLYNEUROPATHY: ICD-10-CM

## 2018-09-27 DIAGNOSIS — D68.2 FACTOR V DEFICIENCY: ICD-10-CM

## 2018-09-27 DIAGNOSIS — Z00.00 ROUTINE GENERAL MEDICAL EXAMINATION AT A HEALTH CARE FACILITY: Primary | ICD-10-CM

## 2018-09-27 DIAGNOSIS — Z00.00 ROUTINE GENERAL MEDICAL EXAMINATION AT A HEALTH CARE FACILITY: ICD-10-CM

## 2018-09-27 DIAGNOSIS — M81.0 OSTEOPOROSIS, UNSPECIFIED OSTEOPOROSIS TYPE, UNSPECIFIED PATHOLOGICAL FRACTURE PRESENCE: ICD-10-CM

## 2018-09-27 LAB
ALBUMIN SERPL BCP-MCNC: 4.2 G/DL
ALP SERPL-CCNC: 74 U/L
ALT SERPL W/O P-5'-P-CCNC: 17 U/L
ANION GAP SERPL CALC-SCNC: 7 MMOL/L
AST SERPL-CCNC: 26 U/L
BASOPHILS # BLD AUTO: 0.01 K/UL
BASOPHILS NFR BLD: 0.2 %
BILIRUB SERPL-MCNC: 0.2 MG/DL
BUN SERPL-MCNC: 27 MG/DL
CALCIUM SERPL-MCNC: 8.8 MG/DL
CHLORIDE SERPL-SCNC: 105 MMOL/L
CHOLEST SERPL-MCNC: 232 MG/DL
CHOLEST/HDLC SERPL: 4.5 {RATIO}
CO2 SERPL-SCNC: 27 MMOL/L
CREAT SERPL-MCNC: 1.3 MG/DL
DIFFERENTIAL METHOD: ABNORMAL
EOSINOPHIL # BLD AUTO: 0 K/UL
EOSINOPHIL NFR BLD: 0.8 %
ERYTHROCYTE [DISTWIDTH] IN BLOOD BY AUTOMATED COUNT: 13 %
EST. GFR  (AFRICAN AMERICAN): 54.9 ML/MIN/1.73 M^2
EST. GFR  (NON AFRICAN AMERICAN): 47.6 ML/MIN/1.73 M^2
GLUCOSE SERPL-MCNC: 121 MG/DL
HCT VFR BLD AUTO: 35.3 %
HDLC SERPL-MCNC: 52 MG/DL
HDLC SERPL: 22.4 %
HGB BLD-MCNC: 11.3 G/DL
LDLC SERPL CALC-MCNC: 117 MG/DL
LYMPHOCYTES # BLD AUTO: 1.6 K/UL
LYMPHOCYTES NFR BLD: 30.9 %
MCH RBC QN AUTO: 29.8 PG
MCHC RBC AUTO-ENTMCNC: 32 G/DL
MCV RBC AUTO: 93 FL
MONOCYTES # BLD AUTO: 0.3 K/UL
MONOCYTES NFR BLD: 6.7 %
NEUTROPHILS # BLD AUTO: 3.1 K/UL
NEUTROPHILS NFR BLD: 61.4 %
NONHDLC SERPL-MCNC: 180 MG/DL
PLATELET # BLD AUTO: 239 K/UL
PMV BLD AUTO: 9.4 FL
POTASSIUM SERPL-SCNC: 5.3 MMOL/L
PROT SERPL-MCNC: 7.5 G/DL
RBC # BLD AUTO: 3.79 M/UL
SODIUM SERPL-SCNC: 139 MMOL/L
TRIGL SERPL-MCNC: 315 MG/DL
WBC # BLD AUTO: 5.11 K/UL

## 2018-09-27 PROCEDURE — 90471 IMMUNIZATION ADMIN: CPT | Mod: S$GLB,,, | Performed by: INTERNAL MEDICINE

## 2018-09-27 PROCEDURE — 80061 LIPID PANEL: CPT

## 2018-09-27 PROCEDURE — 3078F DIAST BP <80 MM HG: CPT | Mod: CPTII,S$GLB,, | Performed by: INTERNAL MEDICINE

## 2018-09-27 PROCEDURE — 90686 IIV4 VACC NO PRSV 0.5 ML IM: CPT | Mod: S$GLB,,, | Performed by: INTERNAL MEDICINE

## 2018-09-27 PROCEDURE — 99396 PREV VISIT EST AGE 40-64: CPT | Mod: 25,S$GLB,, | Performed by: INTERNAL MEDICINE

## 2018-09-27 PROCEDURE — 36415 COLL VENOUS BLD VENIPUNCTURE: CPT | Mod: PO

## 2018-09-27 PROCEDURE — 90670 PCV13 VACCINE IM: CPT | Mod: S$GLB,,, | Performed by: INTERNAL MEDICINE

## 2018-09-27 PROCEDURE — 90472 IMMUNIZATION ADMIN EACH ADD: CPT | Mod: S$GLB,,, | Performed by: INTERNAL MEDICINE

## 2018-09-27 PROCEDURE — 85025 COMPLETE CBC W/AUTO DIFF WBC: CPT | Mod: PO

## 2018-09-27 PROCEDURE — 3074F SYST BP LT 130 MM HG: CPT | Mod: CPTII,S$GLB,, | Performed by: INTERNAL MEDICINE

## 2018-09-27 PROCEDURE — 80053 COMPREHEN METABOLIC PANEL: CPT | Mod: PO

## 2018-09-27 PROCEDURE — 99999 PR PBB SHADOW E&M-EST. PATIENT-LVL III: CPT | Mod: PBBFAC,,, | Performed by: INTERNAL MEDICINE

## 2018-09-27 NOTE — PROGRESS NOTES
Subjective:       Patient ID: Thelma Nguyen is a 52 y.o. female.    Chief Complaint: Annual Exam    HPI  Wellness check.  Frequently lightheaded.  C/O neuropathic pain in feet L>R.  Still with some diarrhea.  No other complaints.  Review of Systems   All other systems reviewed and are negative.      Objective:      Physical Exam   Constitutional: She appears well-developed. No distress.   HENT:   Head: Normocephalic.   Eyes: EOM are normal.   Neck: Normal range of motion. No tracheal deviation present.   Cardiovascular: Normal rate, regular rhythm, normal heart sounds and intact distal pulses.   Pulmonary/Chest: Effort normal and breath sounds normal. No respiratory distress.   Abdominal: Soft. Bowel sounds are normal. She exhibits no distension.   Musculoskeletal: Normal range of motion. She exhibits no edema.   Neurological: She is alert. No cranial nerve deficit. She exhibits normal muscle tone. Coordination normal.   Skin: Skin is warm and dry. No rash noted. She is not diaphoretic. No erythema.   Psychiatric: She has a normal mood and affect. Her behavior is normal.   Vitals reviewed.      Assessment:       1. Routine general medical examination at a health care facility    2. Peripheral polyneuropathy    3. Seizure disorder    4. Factor V deficiency with DVT x 3    5. Panhypopituitarism    6. Osteoporosis, unspecified osteoporosis type, unspecified pathological fracture presence        Plan:       Thelma was seen today for annual exam.    Diagnoses and all orders for this visit:    Routine general medical examination at a health care facility  -     CBC auto differential; Future  -     Comprehensive metabolic panel; Future  -     Lipid panel; Future  -     Influenza - Quadrivalent (3 years & older) (PF)  -     Pneumococcal Conjugate Vaccine (13 Valent) (IM)    Peripheral polyneuropathy   Cont rx    Seizure disorder   Well-cont    Factor V deficiency with DVT x 3   Cont  rx    Panhypopituitarism   Cont rx    Osteoporosis, unspecified osteoporosis type, unspecified pathological fracture presence   Cont rx    Follow-up in about 1 year (around 9/27/2019).

## 2018-09-28 DIAGNOSIS — Z12.11 COLON CANCER SCREENING: ICD-10-CM

## 2018-10-15 ENCOUNTER — HOSPITAL ENCOUNTER (OUTPATIENT)
Dept: RADIOLOGY | Facility: HOSPITAL | Age: 53
Discharge: HOME | End: 2018-10-15
Attending: INTERNAL MEDICINE
Payer: COMMERCIAL

## 2018-10-15 ENCOUNTER — TELEPHONE (OUTPATIENT)
Dept: DERMATOLOGY | Facility: CLINIC | Age: 53
End: 2018-10-15

## 2018-10-15 DIAGNOSIS — M81.0 OSTEOPOROSIS, UNSPECIFIED OSTEOPOROSIS TYPE, UNSPECIFIED PATHOLOGICAL FRACTURE PRESENCE: ICD-10-CM

## 2018-10-15 PROCEDURE — 77080 DXA BONE DENSITY AXIAL: CPT | Mod: TC,PO

## 2018-10-15 RX ORDER — RIVAROXABAN 20 MG/1
TABLET, FILM COATED ORAL
Qty: 90 TABLET | Refills: 3 | Status: SHIPPED | OUTPATIENT
Start: 2018-10-15 | End: 2019-10-18 | Stop reason: SDUPTHER

## 2018-10-15 NOTE — TELEPHONE ENCOUNTER
Pt was informed of bx proven BCC left medial frontal scalp. Pt was scheduled for consult on 10/17/18, at 3:20. Pt verbally confirmed that this will only be a consult appointment.

## 2018-10-17 ENCOUNTER — INITIAL CONSULT (OUTPATIENT)
Dept: DERMATOLOGY | Facility: CLINIC | Age: 53
End: 2018-10-17
Payer: COMMERCIAL

## 2018-10-17 VITALS — BODY MASS INDEX: 20.31 KG/M2 | HEIGHT: 68 IN | WEIGHT: 134 LBS

## 2018-10-17 DIAGNOSIS — C44.41 BASAL CELL CARCINOMA, SCALP/NECK: Primary | ICD-10-CM

## 2018-10-17 PROCEDURE — 99242 OFF/OP CONSLTJ NEW/EST SF 20: CPT | Mod: S$GLB,,, | Performed by: DERMATOLOGY

## 2018-10-17 PROCEDURE — 99999 PR PBB SHADOW E&M-EST. PATIENT-LVL III: CPT | Mod: PBBFAC,,, | Performed by: DERMATOLOGY

## 2018-11-06 ENCOUNTER — PROCEDURE VISIT (OUTPATIENT)
Dept: DERMATOLOGY | Facility: CLINIC | Age: 53
End: 2018-11-06
Payer: COMMERCIAL

## 2018-11-06 VITALS
BODY MASS INDEX: 20.31 KG/M2 | SYSTOLIC BLOOD PRESSURE: 137 MMHG | DIASTOLIC BLOOD PRESSURE: 85 MMHG | WEIGHT: 134 LBS | HEIGHT: 68 IN | HEART RATE: 105 BPM

## 2018-11-06 DIAGNOSIS — C44.41 BASAL CELL CARCINOMA OF SCALP: Primary | ICD-10-CM

## 2018-11-06 PROCEDURE — 99499 UNLISTED E&M SERVICE: CPT | Mod: S$GLB,,, | Performed by: DERMATOLOGY

## 2018-11-06 PROCEDURE — 17311 MOHS 1 STAGE H/N/HF/G: CPT | Mod: S$GLB,,, | Performed by: DERMATOLOGY

## 2018-11-06 PROCEDURE — 17312 MOHS ADDL STAGE: CPT | Mod: S$GLB,,, | Performed by: DERMATOLOGY

## 2018-11-06 RX ORDER — OXYCODONE AND ACETAMINOPHEN 5; 325 MG/1; MG/1
1 TABLET ORAL
Qty: 20 TABLET | Refills: 0 | Status: ON HOLD | OUTPATIENT
Start: 2018-11-06 | End: 2018-11-08 | Stop reason: HOSPADM

## 2018-11-06 NOTE — PROGRESS NOTES
PROCEDURE: Mohs' Micrographic Surgery    INDICATION: Biopsy-proven skin cancer of cosmetically and functionally important areas, including head, neck, genital, hand, foot, or areas known for having difficulty in healing, such as the lower anterior legs. Tumors with aggressive clinical behavior (rapidly growing, greater than 1 cm in diameter). Tumor with ill-defined borders.    REFERRING MD: Carlos Wan M.D.    CASE NUMBER:     ANESTHETIC: 12 cc 0.5% Lidocaine with Epi 1:200,000 mixed 1:1 with 0.5% Bupivacaine    SURGICAL PREP: Hibiclens    SURGEON: Karie Rubin MD    ASSISTANTS: Alexandra Frye PA-C and Maria Fernanda Lemon, Surg Tech    PREOPERATIVE DIAGNOSIS: basal cell carcinoma    POSTOPERATIVE DIAGNOSIS: basal cell carcinoma    PATHOLOGIC DIAGNOSIS: basal cell carcinoma- nodular, infiltrating, superficial    HISTOLOGY OF SPECIMENS IN FIRST STAGE:   Tumor Type: Tumor seen. Nodular basal cell carcinoma: Nodular tumor in dermis composed of basaloid cells exhibiting peripheral palisading and retraction artifact.  Infiltrative basal cell carcinoma: Basaloid tumor in dermis characterized by thin elongated strands of basaloid cells infiltrating between dermal collagen bundles.   Depth of Invasion: epidermis, dermis and subcutaneous tissue  Perineural Invasion: No  Perivascular Invasion:  Perivascular disease present.    HISTOLOGY OF SPECIMENS IN SUBSEQUENT STAGES:  Tumor Type: Tumor seen. Same as in first stage.  Superficial basal cell carcinoma: Foci of basaloid cells with peripheral palisading and focal retraction artifact arising along the dermoepidermal junction and extending into the papillary dermis.   Depth of Invasion: epidermis, dermis and subcutaneous tissue  Perineural Invasion: No    STAGES OF MOHS' SURGERY PERFORMED: 5    TUMOR-FREE PLANE ACHIEVED: Yes. Perivascular disease noted on piece 2 of stage A and then cleared with deeper tissue removal in stage B (periosteum removal, down to bone).    HEMOSTASIS:  "electrocoagulation     SPECIMENS: 11 (2 in stage A, 3 in stage B, 3 in stage C, 2 in stage D and 1 in stage E)    LOCATION: left medial frontal scalp. Patient verified location by looking at photo taken prior to procedure.     INITIAL LESION SIZE: 1.0 x 1.1 cm    FINAL DEFECT SIZE: 2.5 x 3.5 cm    WOUND REPAIR/DISPOSITION: The patient tolerated Mohs' Micrographic Surgery for a basal cell carcinoma very well. When the tumor was completely removed, the patient was referred to Dr. Novak in Plastics for repair of the surgical defect. Patient was placed on Percocet 5 prn postop pain.    Vitals:    11/06/18 0739 11/06/18 1352   BP: 127/78 137/85   BP Location: Right arm    Pulse: 108 105   Weight: 60.8 kg (134 lb)    Height: 5' 8" (1.727 m)            "

## 2018-11-07 ENCOUNTER — ANESTHESIA EVENT (OUTPATIENT)
Dept: SURGERY | Facility: HOSPITAL | Age: 53
End: 2018-11-07
Payer: COMMERCIAL

## 2018-11-07 ENCOUNTER — OFFICE VISIT (OUTPATIENT)
Dept: PLASTIC SURGERY | Facility: CLINIC | Age: 53
End: 2018-11-07
Payer: COMMERCIAL

## 2018-11-07 VITALS
HEIGHT: 68 IN | HEART RATE: 101 BPM | SYSTOLIC BLOOD PRESSURE: 129 MMHG | TEMPERATURE: 98 F | DIASTOLIC BLOOD PRESSURE: 82 MMHG | BODY MASS INDEX: 20.37 KG/M2

## 2018-11-07 DIAGNOSIS — C44.41 BASAL CELL CARCINOMA, SCALP/NECK: Primary | ICD-10-CM

## 2018-11-07 DIAGNOSIS — Z41.1 ENCOUNTER FOR COSMETIC PROCEDURE: Primary | ICD-10-CM

## 2018-11-07 PROCEDURE — 3074F SYST BP LT 130 MM HG: CPT | Mod: CPTII,S$GLB,, | Performed by: SURGERY

## 2018-11-07 PROCEDURE — 99999 PR PBB SHADOW E&M-EST. PATIENT-LVL III: CPT | Mod: PBBFAC,,, | Performed by: SURGERY

## 2018-11-07 PROCEDURE — 3079F DIAST BP 80-89 MM HG: CPT | Mod: CPTII,S$GLB,, | Performed by: SURGERY

## 2018-11-07 PROCEDURE — 3008F BODY MASS INDEX DOCD: CPT | Mod: CPTII,S$GLB,, | Performed by: SURGERY

## 2018-11-07 PROCEDURE — 99202 OFFICE O/P NEW SF 15 MIN: CPT | Mod: S$GLB,,, | Performed by: SURGERY

## 2018-11-07 NOTE — ANESTHESIA PREPROCEDURE EVALUATION
[facility]  Anesthesia Pre-Operative Evaluation         Patient Name: Thelma Nguyen  YOB: 1965  MRN: [id number]    SUBJECTIVE:     Pre-operative evaluation for Procedure(s) (LRB):  CLOSURE, MOHS PROCEDURE DEFECT SCALP (N/A)     11/07/2018    Thelma Nguyen is a 52 y.o. 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    This morning patient reports taking her hydrocortisone and synthroid.  Denies any chest pain, dyspnea or reflux symptoms.  Last PO intake approximately 21:00 the previous night.     LDA: None documented.    Prev airway: None documented.    Drips: None documented.    Patient Active Problem List   Diagnosis    Panhypopituitarism    IBS (irritable bowel syndrome)    Seizure disorder    Allergic rhinitis due to pollen    Peripheral polyneuropathy    Osteoporosis    Sacroiliac joint pain    Myofascial muscle pain    Secondary adrenal insufficiency    Factor V deficiency with DVT x 3    Glioblastoma multiforme of brain    Ganglion cyst of dorsum of left wrist       Review of patient's allergies indicates:   Allergen Reactions    Neurontin [gabapentin]        Current Outpatient Medications:  No current facility-administered medications for this encounter.     Current Outpatient Medications:     acetaminophen (TYLENOL) 325 MG tablet, Take 650 mg by mouth every 6 (six) hours as needed for Pain. , Disp: , Rfl:     alendronate (FOSAMAX) 70 MG tablet, TAKE 1 TAB EVERY 7 DAYS (Patient taking differently: TAKE 1 TAB EVERY 7 DAYS, Takes on Mondays), Disp: 12 tablet, Rfl: 1    diphenoxylate-atropine 2.5-0.025 mg (LOMOTIL) 2.5-0.025 mg per tablet, TAKE  1 TABLET BY MOUTH AS NEEDED LOOSE STOOL, Disp: 30 tablet, Rfl: 3    ERGOCALCIFEROL, VITAMIN D2, (VITAMIN D ORAL), Take 1 capsule by mouth nightly. , Disp: , Rfl:     ferrous sulfate 324 mg (65 mg iron) TbEC, Take 325 mg by mouth nightly. , Disp: , Rfl:     hydrocortisone (CORTEF) 10 MG Tab, TAKE 1 TABLET TWICE A DAY, Disp: 60 tablet, Rfl: 11    levothyroxine (SYNTHROID) 137 MCG Tab tablet, TAKE 1 TABLET DAILY EXCEPT ON , TAKE 1 & 1/2 TABLET, Disp: 96 tablet, Rfl: 3    loratadine-pseudoephedrine  mg (CLARITIN-D 24-HOUR)  mg per 24 hr tablet, Take 1 tablet by mouth as needed. , Disp: , Rfl:     oxyCODONE-acetaminophen (PERCOCET) 5-325 mg per tablet, Take 1 tablet by mouth every 4 to 6 hours as needed for Pain., Disp: 20 tablet, Rfl: 0    phenytoin (DILANTIN) 100 MG ER capsule, TAKE 4 CAPSULES EVERY DAY (Patient taking differently: TAKE 4 CAPSULES (400 mg) EVERY bedtime), Disp: 360 capsule, Rfl: 4    XARELTO 20 mg Tab, TAKE 1 TABLET BY MOUTH DAILY (Patient taking differently: TAKE 1 TABLET BY MOUTH DAILY, bedtime), Disp: 90 tablet, Rfl: 3    Past Surgical History:   Procedure Laterality Date    APPENDECTOMY      BRAIN SURGERY      for glioblastoma      SECTION      CHOLECYSTECTOMY      HYSTERECTOMY      TUBAL LIGATION         Social History     Socioeconomic History    Marital status:      Spouse name: Not on file    Number of children: Not on file    Years of education: Not on file    Highest education level: Not on file   Social Needs    Financial resource strain: Not on file    Food insecurity - worry: Not on file    Food insecurity - inability: Not on file    Transportation needs - medical: Not on file    Transportation needs - non-medical: Not on file   Occupational History    Not on file   Tobacco Use    Smoking status: Never Smoker    Smokeless tobacco: Never Used   Substance and Sexual Activity    Alcohol use: No    Drug use: No     Sexual activity: Not Currently   Other Topics Concern    Are you pregnant or think you may be? Not Asked    Breast-feeding Not Asked   Social History Narrative    Not on file       OBJECTIVE:     Vital Signs Range (Last 24H):  Temp:  [36.6 °C (97.9 °F)]   Pulse:  [101]   BP: (129)/(82)       Significant Labs:  Lab Results   Component Value Date    WBC 5.11 09/27/2018    HGB 11.3 (L) 09/27/2018    HCT 35.3 (L) 09/27/2018     09/27/2018    CHOL 232 (H) 09/27/2018    TRIG 315 (H) 09/27/2018    HDL 52 09/27/2018    ALT 17 09/27/2018    AST 26 09/27/2018     09/27/2018    K 5.3 (H) 09/27/2018     09/27/2018    CREATININE 1.30 09/27/2018    BUN 27 (H) 09/27/2018    CO2 27 09/27/2018    TSH <0.010 (L) 10/27/2016       Diagnostic Studies: No relevant studies.    EKG: No recent studies available.    ASSESSMENT/PLAN:         Anesthesia Evaluation    I have reviewed the Patient Summary Reports.    I have reviewed the Nursing Notes.      Review of Systems  Anesthesia Hx:  History of prior surgery of interest to airway management or planning:   Hematology/Oncology:        Current/Recent Cancer. -- Cancer in past history:  Oncology Comments: Glioblastoma multiforme s/p resection with chemo/radiation     Cardiovascular:  Cardiovascular Normal Exercise tolerance: good     Renal/:   Chronic Renal Disease, CRI    Hepatic/GI:  Hepatic/GI Normal    Musculoskeletal:  Musculoskeletal Normal    Neurological:   Neuromuscular Disease, Seizures    Endocrine:   Panhypopituitarism    Dermatological:  Skin Normal    Psych:  Psychiatric Normal           Physical Exam  General:  Well nourished    Airway/Jaw/Neck:  Airway Findings: Mouth Opening: Small, but > 3cm General Airway Assessment: Adult  Mallampati: III  TM Distance: Normal, at least 6 cm  Jaw/Neck Findings:     Neck ROM: Normal ROM     Eyes/Ears/Nose:  Eyes/Ears/Nose Findings: EOMI, mucus membranes moist    Dental:  Dental Findings: In tact, molar caps    Chest/Lungs:  Chest/Lungs Findings: Clear to auscultation, Normal Respiratory Rate     Heart/Vascular:  Heart Findings: Rate: Normal  Rhythm: Regular Rhythm  Heart murmur: negative    Abdomen:  Abdomen Findings: Normal    Musculoskeletal:  Musculoskeletal Findings: Normal   Skin:  Skin Findings: Normal    Mental Status:  Mental Status Findings:  Cooperative, Alert and Oriented         Anesthesia Plan  Type of Anesthesia, risks & benefits discussed:  Anesthesia Type:  general  Patient's Preference:   Intra-op Monitoring Plan: standard ASA monitors  Intra-op Monitoring Plan Comments:   Post Op Pain Control Plan: per primary service following discharge from PACU, IV/PO Opioids PRN and multimodal analgesia  Post Op Pain Control Plan Comments:   Induction:   IV  Beta Blocker:  Patient is not currently on a Beta-Blocker (No further documentation required).       Informed Consent: Patient understands risks and agrees with Anesthesia plan.  Questions answered. Anesthesia consent signed with patient.  ASA Score: 2     Day of Surgery Review of History & Physical:    H&P update referred to the surgeon.     Anesthesia Plan Notes:       Secondary adrenal insufficiency (E27.49)     POA: Yes  Need to continue steroid & thyroid hormone supplementation; patient took hydrocortisone today      Peripheral polyneuropathy (G62.9)  Cognizance of autonomic instability; adjust induction dose     POA: Yes      Panhypopituitarism (E23.0)     POA: Yes  Prepare to supplement steroid intraop with IV hydrocortisone    Seizure disorder (G40.909)     POA: Yes  Plan to avoid hyperventilation; use ambu bag facilitated transfer to PACU      Resolved Hospital Problems  No resolved problems to display.            Ready For Surgery From Anesthesia Perspective.

## 2018-11-07 NOTE — PROGRESS NOTES
CC:  Consult for closure s/p moh's procedure    HPI:  52-year-old female s/p removal of basal cell carcinoma over left frontal scalp by Dr. Rubin on 18 now presenting to clinic for complex wound closure. Patient had tumor removed down to bone. No other issues. Denies fevers, chills, nausea, vomiting, or any other complaints. Wound has been dressed with bacitracin and a wrap. No active bleeding. Importantly, patient takes xarelto for factor V leiden deficiency and hydrocortisone.     ROS:  See HPI    PE:  Gen-well appearing adult  HENT- 2.5cmx3.5cm defect left frontal scalp down to bone with no active bleeding.     Past Medical History:   Diagnosis Date    Anemia     Arthritis     Basal cell carcinoma     Broken ankle     Cancer     Clotting disorder     Disorder of kidney and ureter     Fracture of elbow     left    Hypothyroidism     IBS (irritable bowel syndrome)     Seizures     Thyroid disease        Past Surgical History:   Procedure Laterality Date    APPENDECTOMY      BRAIN SURGERY      for glioblastoma      SECTION      CHOLECYSTECTOMY      HYSTERECTOMY      TUBAL LIGATION         Social History     Socioeconomic History    Marital status:      Spouse name: Not on file    Number of children: Not on file    Years of education: Not on file    Highest education level: Not on file   Social Needs    Financial resource strain: Not on file    Food insecurity - worry: Not on file    Food insecurity - inability: Not on file    Transportation needs - medical: Not on file    Transportation needs - non-medical: Not on file   Occupational History    Not on file   Tobacco Use    Smoking status: Never Smoker    Smokeless tobacco: Never Used   Substance and Sexual Activity    Alcohol use: No    Drug use: No    Sexual activity: Not Currently   Other Topics Concern    Are you pregnant or think you may be? Not Asked     Breast-feeding Not Asked   Social History Narrative    Not on file       Review of patient's allergies indicates:   Allergen Reactions    Neurontin [gabapentin]          Current Outpatient Medications:     acetaminophen (TYLENOL) 325 MG tablet, Take 325 mg by mouth every 6 (six) hours as needed for Pain., Disp: , Rfl:     alendronate (FOSAMAX) 70 MG tablet, TAKE 1 TAB EVERY 7 DAYS, Disp: 12 tablet, Rfl: 1    diphenoxylate-atropine 2.5-0.025 mg (LOMOTIL) 2.5-0.025 mg per tablet, TAKE 1 TABLET BY MOUTH AS NEEDED LOOSE STOOL, Disp: 30 tablet, Rfl: 3    ERGOCALCIFEROL, VITAMIN D2, (VITAMIN D ORAL), Take 1 capsule by mouth once daily., Disp: , Rfl:     ferrous sulfate 324 mg (65 mg iron) TbEC, Take 325 mg by mouth once daily., Disp: , Rfl:     hydrocortisone (CORTEF) 10 MG Tab, TAKE 1 TABLET TWICE A DAY, Disp: 60 tablet, Rfl: 11    levothyroxine (SYNTHROID) 137 MCG Tab tablet, TAKE 1 TABLET DAILY EXCEPT ON SUNDAY, TAKE 1 & 1/2 TABLET, Disp: 96 tablet, Rfl: 3    loratadine-pseudoephedrine  mg (CLARITIN-D 24-HOUR)  mg per 24 hr tablet, Take 1 tablet by mouth as needed. , Disp: , Rfl:     oxyCODONE-acetaminophen (PERCOCET) 5-325 mg per tablet, Take 1 tablet by mouth every 4 to 6 hours as needed for Pain., Disp: 20 tablet, Rfl: 0    phenytoin (DILANTIN) 100 MG ER capsule, TAKE 4 CAPSULES EVERY DAY, Disp: 360 capsule, Rfl: 4    XARELTO 20 mg Tab, TAKE 1 TABLET BY MOUTH DAILY, Disp: 90 tablet, Rfl: 3      A/P:  52-year-old female s/p moh's surgery for basal cell carcinoma done 11/06/18 now needing complex wound closure.   -scheduled for 7:30am tomorrow in OR for closure.   -will need doppler to map out arterial supply.   - advised to hold xarelto  - advised to hold steroids until the AM after speaking with anesthesia.

## 2018-11-07 NOTE — LETTER
November 8, 2018      Karie Rubin MD  [address]  [facility] [address]           [facility]  [address]  [facility] [address]  Phone: [phone]  Fax: [phone]          Patient: Thelma Nguyen   MR Number: [id number]   YOB: 1965   Date of Visit: 11/7/2018       Dear Dr. Karie Rubin:    Thank you for referring Thelma Nguyen to me for evaluation. Attached you will find relevant portions of my assessment and plan of care.    If you have questions, please do not hesitate to call me. I look forward to following Thelma Nguyen along with you.    Sincerely,    Yrn Novak MD    Enclosure  CC:  No Recipients    If you would like to receive this communication electronically, please contact [email or web address] or [phone] to request more information on [employer] Link access.    For providers and/or their staff who would like to refer a patient to [facility], please contact us through our one-stop-shop provider referral line, [facility], at [phone].    If you feel you have received this communication in error or would no longer like to receive these types of communications, please e-mail [email or web address]

## 2018-11-07 NOTE — PROGRESS NOTES
CC:  Repair from moh's surgery    HPI:  52-year-old female with recent basal cell carcinoma over left frontal scalp that was removed by Dr. Rubin on 18 and referred here for closure. Depth of invasion included epidermis, dermis, and subcutaneous tissue.     ROS:  See HPI    PE:  Gen-***  Abd- ***  Resp- ***  Cardiac- ***    Past Medical History:   Diagnosis Date    Anemia     Arthritis     Basal cell carcinoma     Broken ankle     Cancer 1985    Clotting disorder     Disorder of kidney and ureter     Fracture of elbow     left    Hypothyroidism     IBS (irritable bowel syndrome)     Seizures     Thyroid disease        Past Surgical History:   Procedure Laterality Date    APPENDECTOMY      BRAIN SURGERY      for glioblastoma      SECTION      CHOLECYSTECTOMY      HYSTERECTOMY      TUBAL LIGATION         Social History     Socioeconomic History    Marital status:      Spouse name: Not on file    Number of children: Not on file    Years of education: Not on file    Highest education level: Not on file   Social Needs    Financial resource strain: Not on file    Food insecurity - worry: Not on file    Food insecurity - inability: Not on file    Transportation needs - medical: Not on file    Transportation needs - non-medical: Not on file   Occupational History    Not on file   Tobacco Use    Smoking status: Never Smoker    Smokeless tobacco: Never Used   Substance and Sexual Activity    Alcohol use: No    Drug use: No    Sexual activity: Not Currently   Other Topics Concern    Are you pregnant or think you may be? Not Asked    Breast-feeding Not Asked   Social History Narrative    Not on file       Review of patient's allergies indicates:   Allergen Reactions    Neurontin [gabapentin]          Current Outpatient Medications:     acetaminophen (TYLENOL) 325 MG tablet, Take 325 mg by mouth every 6 (six) hours as needed  for Pain., Disp: , Rfl:     alendronate (FOSAMAX) 70 MG tablet, TAKE 1 TAB EVERY 7 DAYS, Disp: 12 tablet, Rfl: 1    diphenoxylate-atropine 2.5-0.025 mg (LOMOTIL) 2.5-0.025 mg per tablet, TAKE 1 TABLET BY MOUTH AS NEEDED LOOSE STOOL, Disp: 30 tablet, Rfl: 3    ERGOCALCIFEROL, VITAMIN D2, (VITAMIN D ORAL), Take 1 capsule by mouth once daily., Disp: , Rfl:     ferrous sulfate 324 mg (65 mg iron) TbEC, Take 325 mg by mouth once daily., Disp: , Rfl:     hydrocortisone (CORTEF) 10 MG Tab, TAKE 1 TABLET TWICE A DAY, Disp: 60 tablet, Rfl: 11    levothyroxine (SYNTHROID) 137 MCG Tab tablet, TAKE 1 TABLET DAILY EXCEPT ON SUNDAY, TAKE 1 & 1/2 TABLET, Disp: 96 tablet, Rfl: 3    loratadine-pseudoephedrine  mg (CLARITIN-D 24-HOUR)  mg per 24 hr tablet, Take 1 tablet by mouth as needed. , Disp: , Rfl:     oxyCODONE-acetaminophen (PERCOCET) 5-325 mg per tablet, Take 1 tablet by mouth every 4 to 6 hours as needed for Pain., Disp: 20 tablet, Rfl: 0    phenytoin (DILANTIN) 100 MG ER capsule, TAKE 4 CAPSULES EVERY DAY, Disp: 360 capsule, Rfl: 4    XARELTO 20 mg Tab, TAKE 1 TABLET BY MOUTH DAILY, Disp: 90 tablet, Rfl: 3    Imaging:  ***    Pathology:  ***    A/P:  ***        Narciso Ojeda, PGY1  General Surgery  [phone]    \

## 2018-11-07 NOTE — LETTER
[facility]  [address]  [facility] [address]  Phone: [phone]  Fax: [phone] November 12, 2018      Karie Rubin MD  [address]  [facility] [address]    Patient: Thelma Nguyen   MR Number: [id number]   YOB: 1965   Date of Visit: 11/7/2018     Dear Dr. Rubin:    Thank you for referring Thelma Nguyen to me for evaluation. Below are the relevant portions of my assessment and plan of care.    Ms. Nguyen is a 52-year-old female status post Moh's surgery for basal cell carcinoma done 11/06/18 now needing complex wound closure. She is scheduled for 7:30 a.m. tomorrow in OR for closure.  A doppler will be needed to map out arterial supply. She was advised to hold off on taking Xarelto and steroids until the morning after speaking with anesthesia.      If you have questions, please do not hesitate to call me. I look forward to greciaAvon along with you.    Sincerely,      Yrn Novak MD  Section of Plastic Surgery  Department of Surgery  [facility]     CRB/afw    CC  Isaías Muñiz MD

## 2018-11-07 NOTE — PROGRESS NOTES
Spoke to pt directly in clinic regarding preop instructions, call back info, location and arrival time for surgery with verbal understanding. Pt instructed per preop/ anesthesia to bring meds in the AM and hold until further instructed. ADRIANNE GOMEZ

## 2018-11-08 ENCOUNTER — HOSPITAL ENCOUNTER (OUTPATIENT)
Facility: HOSPITAL | Age: 53
Discharge: HOME | End: 2018-11-08
Attending: SURGERY | Admitting: SURGERY
Payer: COMMERCIAL

## 2018-11-08 ENCOUNTER — ANESTHESIA (OUTPATIENT)
Dept: SURGERY | Facility: HOSPITAL | Age: 53
End: 2018-11-08
Payer: COMMERCIAL

## 2018-11-08 VITALS
TEMPERATURE: 97 F | OXYGEN SATURATION: 99 % | HEIGHT: 68 IN | WEIGHT: 134 LBS | SYSTOLIC BLOOD PRESSURE: 136 MMHG | BODY MASS INDEX: 20.31 KG/M2 | HEART RATE: 105 BPM | DIASTOLIC BLOOD PRESSURE: 65 MMHG | RESPIRATION RATE: 20 BRPM

## 2018-11-08 DIAGNOSIS — M95.2 MOHS DEFECT OF LEFT SCALP: ICD-10-CM

## 2018-11-08 DIAGNOSIS — L98.8 MOHS DEFECT: Primary | ICD-10-CM

## 2018-11-08 DIAGNOSIS — Z98.890 MOHS DEFECT OF LEFT SCALP: ICD-10-CM

## 2018-11-08 DIAGNOSIS — Z98.890 MOHS DEFECT: Primary | ICD-10-CM

## 2018-11-08 PROCEDURE — 71000015 HC POSTOP RECOV 1ST HR: Performed by: SURGERY

## 2018-11-08 PROCEDURE — 94761 N-INVAS EAR/PLS OXIMETRY MLT: CPT

## 2018-11-08 PROCEDURE — 63600175 PHARM REV CODE 636 W HCPCS: Performed by: SURGERY

## 2018-11-08 PROCEDURE — 25000003 PHARM REV CODE 250: Performed by: SURGERY

## 2018-11-08 PROCEDURE — 37000008 HC ANESTHESIA 1ST 15 MINUTES: Performed by: SURGERY

## 2018-11-08 PROCEDURE — 63600175 PHARM REV CODE 636 W HCPCS: Performed by: STUDENT IN AN ORGANIZED HEALTH CARE EDUCATION/TRAINING PROGRAM

## 2018-11-08 PROCEDURE — 37000009 HC ANESTHESIA EA ADD 15 MINS: Performed by: SURGERY

## 2018-11-08 PROCEDURE — 15004 WOUND PREP F/N/HF/G: CPT | Mod: ,,, | Performed by: SURGERY

## 2018-11-08 PROCEDURE — 63600175 PHARM REV CODE 636 W HCPCS

## 2018-11-08 PROCEDURE — 36000707: Performed by: SURGERY

## 2018-11-08 PROCEDURE — D9220A PRA ANESTHESIA: Mod: ,,, | Performed by: ANESTHESIOLOGY

## 2018-11-08 PROCEDURE — 71000039 HC RECOVERY, EACH ADD'L HOUR: Performed by: SURGERY

## 2018-11-08 PROCEDURE — 27201423 OPTIME MED/SURG SUP & DEVICES STERILE SUPPLY: Performed by: SURGERY

## 2018-11-08 PROCEDURE — 71000016 HC POSTOP RECOV ADDL HR: Performed by: SURGERY

## 2018-11-08 PROCEDURE — 25000003 PHARM REV CODE 250: Performed by: STUDENT IN AN ORGANIZED HEALTH CARE EDUCATION/TRAINING PROGRAM

## 2018-11-08 PROCEDURE — 15733 MUSC MYOQ/FSCQ FLP H&N PEDCL: CPT | Mod: ,,, | Performed by: SURGERY

## 2018-11-08 PROCEDURE — 71000033 HC RECOVERY, INTIAL HOUR: Performed by: SURGERY

## 2018-11-08 PROCEDURE — 36000706: Performed by: SURGERY

## 2018-11-08 RX ORDER — HYDROCODONE BITARTRATE AND ACETAMINOPHEN 5; 325 MG/1; MG/1
1 TABLET ORAL EVERY 4 HOURS PRN
Status: DISCONTINUED | OUTPATIENT
Start: 2018-11-08 | End: 2018-11-08 | Stop reason: HOSPADM

## 2018-11-08 RX ORDER — FENTANYL CITRATE 50 UG/ML
25 INJECTION, SOLUTION INTRAMUSCULAR; INTRAVENOUS EVERY 5 MIN PRN
Status: DISCONTINUED | OUTPATIENT
Start: 2018-11-08 | End: 2018-11-08 | Stop reason: HOSPADM

## 2018-11-08 RX ORDER — ONDANSETRON 8 MG/1
8 TABLET, ORALLY DISINTEGRATING ORAL EVERY 8 HOURS PRN
Status: DISCONTINUED | OUTPATIENT
Start: 2018-11-08 | End: 2018-11-08 | Stop reason: HOSPADM

## 2018-11-08 RX ORDER — BACITRACIN ZINC 500 UNIT/G
OINTMENT (GRAM) TOPICAL
Status: DISCONTINUED | OUTPATIENT
Start: 2018-11-08 | End: 2018-11-08 | Stop reason: HOSPADM

## 2018-11-08 RX ORDER — LIDOCAINE HCL/PF 100 MG/5ML
SYRINGE (ML) INTRAVENOUS
Status: DISCONTINUED | OUTPATIENT
Start: 2018-11-08 | End: 2018-11-08

## 2018-11-08 RX ORDER — ROCURONIUM BROMIDE 10 MG/ML
INJECTION, SOLUTION INTRAVENOUS
Status: DISCONTINUED | OUTPATIENT
Start: 2018-11-08 | End: 2018-11-08

## 2018-11-08 RX ORDER — GLYCOPYRROLATE 0.2 MG/ML
INJECTION INTRAMUSCULAR; INTRAVENOUS
Status: DISCONTINUED | OUTPATIENT
Start: 2018-11-08 | End: 2018-11-08

## 2018-11-08 RX ORDER — ONDANSETRON 2 MG/ML
4 INJECTION INTRAMUSCULAR; INTRAVENOUS ONCE
Status: COMPLETED | OUTPATIENT
Start: 2018-11-08 | End: 2018-11-08

## 2018-11-08 RX ORDER — MIDAZOLAM HYDROCHLORIDE 1 MG/ML
INJECTION, SOLUTION INTRAMUSCULAR; INTRAVENOUS
Status: DISCONTINUED | OUTPATIENT
Start: 2018-11-08 | End: 2018-11-08

## 2018-11-08 RX ORDER — LIDOCAINE HYDROCHLORIDE 10 MG/ML
1 INJECTION, SOLUTION EPIDURAL; INFILTRATION; INTRACAUDAL; PERINEURAL ONCE
Status: DISCONTINUED | OUTPATIENT
Start: 2018-11-08 | End: 2018-11-08 | Stop reason: HOSPADM

## 2018-11-08 RX ORDER — DEXAMETHASONE SODIUM PHOSPHATE 4 MG/ML
INJECTION, SOLUTION INTRA-ARTICULAR; INTRALESIONAL; INTRAMUSCULAR; INTRAVENOUS; SOFT TISSUE
Status: DISCONTINUED | OUTPATIENT
Start: 2018-11-08 | End: 2018-11-08

## 2018-11-08 RX ORDER — OXYCODONE HYDROCHLORIDE 5 MG/1
TABLET ORAL
Status: DISCONTINUED
Start: 2018-11-08 | End: 2018-11-08 | Stop reason: HOSPADM

## 2018-11-08 RX ORDER — ONDANSETRON 2 MG/ML
INJECTION INTRAMUSCULAR; INTRAVENOUS
Status: COMPLETED
Start: 2018-11-08 | End: 2018-11-08

## 2018-11-08 RX ORDER — SODIUM CHLORIDE 0.9 % (FLUSH) 0.9 %
3 SYRINGE (ML) INJECTION
Status: DISCONTINUED | OUTPATIENT
Start: 2018-11-08 | End: 2018-11-08 | Stop reason: HOSPADM

## 2018-11-08 RX ORDER — MUPIROCIN 20 MG/G
OINTMENT TOPICAL
Status: DISCONTINUED | OUTPATIENT
Start: 2018-11-08 | End: 2018-11-08 | Stop reason: HOSPADM

## 2018-11-08 RX ORDER — CEFAZOLIN SODIUM 1 G/3ML
2 INJECTION, POWDER, FOR SOLUTION INTRAMUSCULAR; INTRAVENOUS
Status: COMPLETED | OUTPATIENT
Start: 2018-11-08 | End: 2018-11-08

## 2018-11-08 RX ORDER — NEOSTIGMINE METHYLSULFATE 1 MG/ML
INJECTION, SOLUTION INTRAVENOUS
Status: DISCONTINUED | OUTPATIENT
Start: 2018-11-08 | End: 2018-11-08

## 2018-11-08 RX ORDER — LIDOCAINE HYDROCHLORIDE AND EPINEPHRINE 10; 10 MG/ML; UG/ML
INJECTION, SOLUTION INFILTRATION; PERINEURAL
Status: DISCONTINUED | OUTPATIENT
Start: 2018-11-08 | End: 2018-11-08 | Stop reason: HOSPADM

## 2018-11-08 RX ORDER — LIDOCAINE HYDROCHLORIDE 10 MG/ML
1 INJECTION, SOLUTION EPIDURAL; INFILTRATION; INTRACAUDAL; PERINEURAL ONCE
Status: COMPLETED | OUTPATIENT
Start: 2018-11-08 | End: 2018-11-08

## 2018-11-08 RX ORDER — PROPOFOL 10 MG/ML
VIAL (ML) INTRAVENOUS
Status: DISCONTINUED | OUTPATIENT
Start: 2018-11-08 | End: 2018-11-08

## 2018-11-08 RX ORDER — PHENYLEPHRINE HYDROCHLORIDE 10 MG/ML
INJECTION INTRAVENOUS
Status: DISCONTINUED | OUTPATIENT
Start: 2018-11-08 | End: 2018-11-08

## 2018-11-08 RX ORDER — SODIUM CHLORIDE 9 MG/ML
INJECTION, SOLUTION INTRAVENOUS CONTINUOUS PRN
Status: DISCONTINUED | OUTPATIENT
Start: 2018-11-08 | End: 2018-11-08

## 2018-11-08 RX ORDER — FENTANYL CITRATE 50 UG/ML
INJECTION, SOLUTION INTRAMUSCULAR; INTRAVENOUS
Status: DISCONTINUED | OUTPATIENT
Start: 2018-11-08 | End: 2018-11-08

## 2018-11-08 RX ORDER — HYDROCODONE BITARTRATE AND ACETAMINOPHEN 5; 325 MG/1; MG/1
1 TABLET ORAL EVERY 6 HOURS PRN
Qty: 15 TABLET | Refills: 0 | Status: SHIPPED | OUTPATIENT
Start: 2018-11-08 | End: 2019-03-21

## 2018-11-08 RX ORDER — ONDANSETRON 2 MG/ML
INJECTION INTRAMUSCULAR; INTRAVENOUS
Status: DISCONTINUED | OUTPATIENT
Start: 2018-11-08 | End: 2018-11-08

## 2018-11-08 RX ORDER — SODIUM CHLORIDE 0.9 % (FLUSH) 0.9 %
5 SYRINGE (ML) INJECTION
Status: DISCONTINUED | OUTPATIENT
Start: 2018-11-08 | End: 2018-11-08 | Stop reason: HOSPADM

## 2018-11-08 RX ORDER — ACETAMINOPHEN 10 MG/ML
INJECTION, SOLUTION INTRAVENOUS
Status: DISCONTINUED | OUTPATIENT
Start: 2018-11-08 | End: 2018-11-08

## 2018-11-08 RX ORDER — OXYCODONE HYDROCHLORIDE 5 MG/1
10 TABLET ORAL EVERY 4 HOURS PRN
Status: DISCONTINUED | OUTPATIENT
Start: 2018-11-08 | End: 2018-11-08 | Stop reason: HOSPADM

## 2018-11-08 RX ADMIN — ONDANSETRON 4 MG: 2 INJECTION INTRAMUSCULAR; INTRAVENOUS at 09:11

## 2018-11-08 RX ADMIN — PHENYLEPHRINE HYDROCHLORIDE 200 MCG: 10 INJECTION INTRAVENOUS at 07:11

## 2018-11-08 RX ADMIN — FENTANYL CITRATE 50 MCG: 50 INJECTION, SOLUTION INTRAMUSCULAR; INTRAVENOUS at 07:11

## 2018-11-08 RX ADMIN — OXYCODONE HYDROCHLORIDE 10 MG: 5 TABLET ORAL at 09:11

## 2018-11-08 RX ADMIN — PROPOFOL 150 MG: 10 INJECTION, EMULSION INTRAVENOUS at 07:11

## 2018-11-08 RX ADMIN — MIDAZOLAM HYDROCHLORIDE 2 MG: 1 INJECTION, SOLUTION INTRAMUSCULAR; INTRAVENOUS at 07:11

## 2018-11-08 RX ADMIN — NEOSTIGMINE METHYLSULFATE 3 MG: 1 INJECTION INTRAVENOUS at 09:11

## 2018-11-08 RX ADMIN — LIDOCAINE HYDROCHLORIDE 100 MG: 20 INJECTION, SOLUTION INTRAVENOUS at 07:11

## 2018-11-08 RX ADMIN — HYDROCORTISONE SODIUM SUCCINATE 100 MG: 100 INJECTION, POWDER, FOR SOLUTION INTRAMUSCULAR; INTRAVENOUS at 08:11

## 2018-11-08 RX ADMIN — FENTANYL CITRATE 25 MCG: 50 INJECTION INTRAMUSCULAR; INTRAVENOUS at 10:11

## 2018-11-08 RX ADMIN — GLYCOPYRROLATE 0.6 MG: 0.2 INJECTION, SOLUTION INTRAMUSCULAR; INTRAVENOUS at 09:11

## 2018-11-08 RX ADMIN — FENTANYL CITRATE 25 MCG: 50 INJECTION, SOLUTION INTRAMUSCULAR; INTRAVENOUS at 08:11

## 2018-11-08 RX ADMIN — SODIUM CHLORIDE, SODIUM GLUCONATE, SODIUM ACETATE, POTASSIUM CHLORIDE, MAGNESIUM CHLORIDE, SODIUM PHOSPHATE, DIBASIC, AND POTASSIUM PHOSPHATE: .53; .5; .37; .037; .03; .012; .00082 INJECTION, SOLUTION INTRAVENOUS at 09:11

## 2018-11-08 RX ADMIN — PHENYLEPHRINE HYDROCHLORIDE 200 MCG: 10 INJECTION INTRAVENOUS at 08:11

## 2018-11-08 RX ADMIN — LIDOCAINE HYDROCHLORIDE 10 MG: 10 INJECTION, SOLUTION EPIDURAL; INFILTRATION; INTRACAUDAL; PERINEURAL at 07:11

## 2018-11-08 RX ADMIN — FENTANYL CITRATE 25 MCG: 50 INJECTION, SOLUTION INTRAMUSCULAR; INTRAVENOUS at 09:11

## 2018-11-08 RX ADMIN — MUPIROCIN: 20 OINTMENT TOPICAL at 07:11

## 2018-11-08 RX ADMIN — PROPOFOL 10 MG: 10 INJECTION, EMULSION INTRAVENOUS at 08:11

## 2018-11-08 RX ADMIN — PROMETHAZINE HYDROCHLORIDE 6.25 MG: 25 INJECTION INTRAMUSCULAR; INTRAVENOUS at 10:11

## 2018-11-08 RX ADMIN — SODIUM CHLORIDE: 0.9 INJECTION, SOLUTION INTRAVENOUS at 06:11

## 2018-11-08 RX ADMIN — ROCURONIUM BROMIDE 20 MG: 10 INJECTION, SOLUTION INTRAVENOUS at 07:11

## 2018-11-08 RX ADMIN — CEFAZOLIN 2 G: 330 INJECTION, POWDER, FOR SOLUTION INTRAMUSCULAR; INTRAVENOUS at 07:11

## 2018-11-08 RX ADMIN — ACETAMINOPHEN 1000 MG: 10 INJECTION, SOLUTION INTRAVENOUS at 07:11

## 2018-11-08 NOTE — SUBJECTIVE & OBJECTIVE
No current facility-administered medications on file prior to encounter.      Current Outpatient Medications on File Prior to Encounter   Medication Sig    acetaminophen (TYLENOL) 325 MG tablet Take 650 mg by mouth every 6 (six) hours as needed for Pain.     alendronate (FOSAMAX) 70 MG tablet TAKE 1 TAB EVERY 7 DAYS (Patient taking differently: TAKE 1 TAB EVERY 7 DAYS, Takes on Mondays)    diphenoxylate-atropine 2.5-0.025 mg (LOMOTIL) 2.5-0.025 mg per tablet TAKE 1 TABLET BY MOUTH AS NEEDED LOOSE STOOL    ERGOCALCIFEROL, VITAMIN D2, (VITAMIN D ORAL) Take 1 capsule by mouth nightly.     ferrous sulfate 324 mg (65 mg iron) TbEC Take 325 mg by mouth nightly.     hydrocortisone (CORTEF) 10 MG Tab TAKE 1 TABLET TWICE A DAY    levothyroxine (SYNTHROID) 137 MCG Tab tablet TAKE 1 TABLET DAILY EXCEPT ON SUNDAY, TAKE 1 & 1/2 TABLET (Patient taking differently: TAKE 1 TABLET DAILY EXCEPT ON SUNDAY, TAKE 1 & 1/2 TABLET, morning)    loratadine-pseudoephedrine  mg (CLARITIN-D 24-HOUR)  mg per 24 hr tablet Take 1 tablet by mouth as needed.     oxyCODONE-acetaminophen (PERCOCET) 5-325 mg per tablet Take 1 tablet by mouth every 4 to 6 hours as needed for Pain.    phenytoin (DILANTIN) 100 MG ER capsule TAKE 4 CAPSULES EVERY DAY (Patient taking differently: TAKE 4 CAPSULES (400 mg) EVERY bedtime)    XARELTO 20 mg Tab TAKE 1 TABLET BY MOUTH DAILY (Patient taking differently: TAKE 1 TABLET BY MOUTH DAILY, bedtime)       Review of patient's allergies indicates:   Allergen Reactions    Neurontin [gabapentin]        Past Medical History:   Diagnosis Date    Allergic rhinitis     Anemia 1987    Arthritis 2000    Basal cell carcinoma     Broken ankle     Cancer 1985    Clotting disorder 1993    Disorder of kidney and ureter     DVT (deep venous thrombosis)     Factor V deficiency     Fracture of elbow     left    Glioblastoma multiforme of brain     Hypothyroidism     IBS (irritable bowel syndrome) 1975     Osteoporosis     Panhypopituitarism     Peripheral polyneuropathy     Secondary adrenal insufficiency     Seizures     Thyroid disease      Past Surgical History:   Procedure Laterality Date    APPENDECTOMY      BRAIN SURGERY      for glioblastoma      SECTION      CHOLECYSTECTOMY      HYSTERECTOMY      TUBAL LIGATION       Family History     Problem Relation (Age of Onset)    Heart disease Mother        Tobacco Use    Smoking status: Never Smoker    Smokeless tobacco: Never Used   Substance and Sexual Activity    Alcohol use: No    Drug use: No    Sexual activity: Not Currently     Review of Systems   All other systems reviewed and are negative.    Objective:     Vital Signs (Most Recent):    Vital Signs (24h Range):  Temp:  [97.9 °F (36.6 °C)] 97.9 °F (36.6 °C)  Pulse:  [101] 101  BP: (129)/(82) 129/82        There is no height or weight on file to calculate BMI.    Physical Exam   Constitutional: She appears well-developed and well-nourished.   HENT:   Head: Normocephalic.   3.5x2.5cm open wound down to bone from moh's surgery   Eyes: EOM are normal.   Neck: Normal range of motion.   Cardiovascular: Normal rate.   Pulmonary/Chest: Effort normal.   Abdominal: Soft.   Neurological: She is alert.   Skin: Skin is warm and dry.   Psychiatric: She has a normal mood and affect. Her behavior is normal.   Nursing note and vitals reviewed.      Significant Labs:  none    Significant Diagnostics:  none

## 2018-11-08 NOTE — ANESTHESIA RELEASE NOTE
"Anesthesia Release from PACU Note    Patient: Thelma Nguyen    Procedure(s) Performed: Procedure(s) (LRB):  CLOSURE, MOHS PROCEDURE DEFECT SCALP  Flap closure (Left)    Anesthesia type: general    Post pain: Adequate analgesia    Post assessment: no apparent anesthetic complications    Last Vitals:   Visit Vitals  /60   Pulse 95   Temp 36.1 °C (97 °F) (Temporal)   Resp 18   Ht 5' 8" (1.727 m)   Wt 60.8 kg (134 lb)   LMP 10/23/1997 (Exact Date)   SpO2 100%   Breastfeeding? No   BMI 20.37 kg/m²       Post vital signs: stable    Level of consciousness: awake and comatose    Nausea/Vomiting: no nausea/no vomiting    Complications: none    Airway Patency: patent    Respiratory: unassisted    Cardiovascular: stable and blood pressure at baseline    Hydration: euvolemic  "

## 2018-11-08 NOTE — BRIEF OP NOTE
[facility]  Brief Operative Note     SUMMARY     Surgery Date: 11/8/2018     Surgeon(s) and Role:     * Yrn Novak MD - Primary    Assisting Surgeon: MD Narciso Lucia MD    Pre-op Diagnosis:  Basal cell carcinoma, scalp/neck [C44.41]    Post-op Diagnosis:  Post-Op Diagnosis Codes:     * Basal cell carcinoma, scalp/neck [C44.41]    Procedure(s) (LRB):  CLOSURE, MOHS PROCEDURE DEFECT SCALP  Flap closure (Left)    Anesthesia: General    Description of the findings of the procedure: moh's defect closed, bone burred    Findings/Key Components: moh's defect closed with yin-yang flap without complication    Estimated Blood Loss: minimal  Specimens:   Specimen (12h ago, onward)    None          Discharge Note    SUMMARY     Admit Date: 11/8/2018    Discharge Date and Time:  11/08/2018 9:59 AM    Hospital Course (synopsis of major diagnoses, care, treatment, and services provided during the course of the hospital stay): patient underwent above operation which she tolerated very well. She was advanced on a surgically progressive diet and discharged home on PO pain medication with close follow up.      Final Diagnosis: Post-Op Diagnosis Codes:     * Basal cell carcinoma, scalp/neck [C44.41]    Disposition: Home or Self Care    Medications:  Reconciled Home Medications:      Medication List      START taking these medications    HYDROcodone-acetaminophen 5-325 mg per tablet  Commonly known as:  NORCO  Take 1 tablet by mouth every 6 (six) hours as needed for Pain.        CHANGE how you take these medications    alendronate 70 MG tablet  Commonly known as:  FOSAMAX  TAKE 1 TAB EVERY 7 DAYS  What changed:  See the new instructions.     levothyroxine 137 MCG Tab tablet  Commonly known as:  SYNTHROID  TAKE 1 TABLET DAILY EXCEPT ON SUNDAY, TAKE 1 & 1/2 TABLET  What changed:  See the new instructions.     phenytoin 100 MG ER capsule  Commonly known as:  DILANTIN  TAKE 4 CAPSULES EVERY  DAY  What changed:    · how much to take  · how to take this  · when to take this     XARELTO 20 mg Tab  Generic drug:  rivaroxaban  TAKE 1 TABLET BY MOUTH DAILY  What changed:    · how much to take  · how to take this  · when to take this  · additional instructions        CONTINUE taking these medications    acetaminophen 325 MG tablet  Commonly known as:  TYLENOL  Take 650 mg by mouth every 6 (six) hours as needed for Pain.     diphenoxylate-atropine 2.5-0.025 mg 2.5-0.025 mg per tablet  Commonly known as:  LOMOTIL  TAKE 1 TABLET BY MOUTH AS NEEDED LOOSE STOOL     ferrous sulfate 324 mg (65 mg iron) Tbec  Take 325 mg by mouth nightly.     hydrocortisone 10 MG Tab  Commonly known as:  CORTEF  TAKE 1 TABLET TWICE A DAY     loratadine-pseudoephedrine  mg  mg per 24 hr tablet  Commonly known as:  CLARITIN-D 24-hour  Take 1 tablet by mouth as needed.     VITAMIN D ORAL  Take 1 capsule by mouth nightly.        STOP taking these medications    oxyCODONE-acetaminophen 5-325 mg per tablet  Commonly known as:  PERCOCET          Discharge Procedure Orders   Diet general     Other restrictions (specify):   Order Comments: Do not drive for 2 weeks and do not drive while taking narcotics.     Call MD for:  extreme fatigue     Call MD for:  persistent dizziness or light-headedness     Call MD for:  hives     Call MD for:  redness, tenderness, or signs of infection (pain, swelling, redness, odor or green/yellow discharge around incision site)     Call MD for:  difficulty breathing, headache or visual disturbances     Call MD for:  severe uncontrolled pain     Call MD for:  persistent nausea and vomiting     Call MD for:  temperature >100.4     Remove dressing in 48 hours   Order Comments: Keep the dressing on your head loose. The key is not to apply too much pressure to your wound. After 48 hours you can shower and let warm soapy water rinse over your wounds. You can apply bacitracin over the counter ointment over  your wounds thereafter.     Activity as tolerated     Follow-up Information     Yrn Novak MD In 1 week.    Specialty:  Plastic Surgery  Contact information:  [facility] [address] [facility] [address] [phone]

## 2018-11-08 NOTE — HPI
52-year-old female with recent basal cell carcinoma over left frontal scalp that was removed by Dr. Rubin on 11/06/18 and referred here for closure. Depth of invasion included epidermis, dermis, and subcutaneous tissue.

## 2018-11-08 NOTE — PLAN OF CARE
Problem: Patient Care Overview  Goal: Plan of Care Review  Outcome: Ongoing (interventions implemented as appropriate)  Vital signs stable. Afebrile. Alert, oriented and following commands. Pain controlled with PRN pain meds. Nausea resolved with PRN meds. Tolerating sips of water. Voiding spontaneously. Dressing to head remains loosely intact with small amount of drainage

## 2018-11-08 NOTE — ANESTHESIA POSTPROCEDURE EVALUATION
"Anesthesia Post Evaluation    Patient: Thelma Nguyen    Procedure(s) Performed: Procedure(s) (LRB):  CLOSURE, MOHS PROCEDURE DEFECT SCALP  Flap closure (Left)    Final Anesthesia Type: general  Patient location during evaluation: [facility]  Patient participation: Yes- Able to Participate  Level of consciousness: awake and alert  Post-procedure vital signs: reviewed and stable  Pain management: adequate  Airway patency: patent  PONV status at discharge: No PONV  Anesthetic complications: no      Cardiovascular status: blood pressure returned to baseline  Respiratory status: unassisted  Hydration status: euvolemic  Follow-up not needed.        Visit Vitals  /60   Pulse 95   Temp 36.1 °C (97 °F) (Temporal)   Resp 18   Ht 5' 8" (1.727 m)   Wt 60.8 kg (134 lb)   LMP 10/23/1997 (Exact Date)   SpO2 100%   Breastfeeding? No   BMI 20.37 kg/m²       Pain/Jose De Jesus Score: Pain Assessment Performed: Yes (11/8/2018 10:00 AM)  Presence of Pain: denies (11/8/2018 12:15 PM)  Pain Rating Prior to Med Admin: 6 (11/8/2018 10:20 AM)  Jose De Jesus Score: 9 (11/8/2018 10:00 AM)        "

## 2018-11-08 NOTE — H&P
[facility]  Plastic Surgery  History & Physical    Patient Name: Thelma Nguyen  MRN: [id number]  Admission Date: 11/8/2018  Attending Physician: Yrn Novak, *  Primary Care Provider: Isaías Muñiz MD    Patient information was obtained from patient.     Subjective:     Chief Complaint/Reason for Admission: moh's defect    History of Present Illness: 52-year-old female with recent basal cell carcinoma over left frontal scalp that was removed by Dr. Rubin on 11/06/18 and referred here for closure. Depth of invasion included epidermis, dermis, and subcutaneous tissue.      No current facility-administered medications on file prior to encounter.      Current Outpatient Medications on File Prior to Encounter   Medication Sig    acetaminophen (TYLENOL) 325 MG tablet Take 650 mg by mouth every 6 (six) hours as needed for Pain.     alendronate (FOSAMAX) 70 MG tablet TAKE 1 TAB EVERY 7 DAYS (Patient taking differently: TAKE 1 TAB EVERY 7 DAYS, Takes on Mondays)    diphenoxylate-atropine 2.5-0.025 mg (LOMOTIL) 2.5-0.025 mg per tablet TAKE 1 TABLET BY MOUTH AS NEEDED LOOSE STOOL    ERGOCALCIFEROL, VITAMIN D2, (VITAMIN D ORAL) Take 1 capsule by mouth nightly.     ferrous sulfate 324 mg (65 mg iron) TbEC Take 325 mg by mouth nightly.     hydrocortisone (CORTEF) 10 MG Tab TAKE 1 TABLET TWICE A DAY    levothyroxine (SYNTHROID) 137 MCG Tab tablet TAKE 1 TABLET DAILY EXCEPT ON SUNDAY, TAKE 1 & 1/2 TABLET (Patient taking differently: TAKE 1 TABLET DAILY EXCEPT ON SUNDAY, TAKE 1 & 1/2 TABLET, morning)    loratadine-pseudoephedrine  mg (CLARITIN-D 24-HOUR)  mg per 24 hr tablet Take 1 tablet by mouth as needed.     oxyCODONE-acetaminophen (PERCOCET) 5-325 mg per tablet Take 1 tablet by mouth every 4 to 6 hours as needed for Pain.    phenytoin (DILANTIN) 100 MG ER capsule TAKE 4 CAPSULES EVERY DAY (Patient taking differently: TAKE 4 CAPSULES (400 mg) EVERY bedtime)     XARELTO 20 mg Tab TAKE 1 TABLET BY MOUTH DAILY (Patient taking differently: TAKE 1 TABLET BY MOUTH DAILY, bedtime)       Review of patient's allergies indicates:   Allergen Reactions    Neurontin [gabapentin]        Past Medical History:   Diagnosis Date    Allergic rhinitis     Anemia     Arthritis     Basal cell carcinoma     Broken ankle     Cancer 1985    Clotting disorder     Disorder of kidney and ureter     DVT (deep venous thrombosis)     Factor V deficiency     Fracture of elbow     left    Glioblastoma multiforme of brain     Hypothyroidism     IBS (irritable bowel syndrome)     Osteoporosis     Panhypopituitarism     Peripheral polyneuropathy     Secondary adrenal insufficiency     Seizures     Thyroid disease      Past Surgical History:   Procedure Laterality Date    APPENDECTOMY      BRAIN SURGERY      for glioblastoma      SECTION      CHOLECYSTECTOMY      HYSTERECTOMY      TUBAL LIGATION       Family History     Problem Relation (Age of Onset)    Heart disease Mother        Tobacco Use    Smoking status: Never Smoker    Smokeless tobacco: Never Used   Substance and Sexual Activity    Alcohol use: No    Drug use: No    Sexual activity: Not Currently     Review of Systems   All other systems reviewed and are negative.    Objective:     Vital Signs (Most Recent):    Vital Signs (24h Range):  Temp:  [97.9 °F (36.6 °C)] 97.9 °F (36.6 °C)  Pulse:  [101] 101  BP: (129)/(82) 129/82        There is no height or weight on file to calculate BMI.    Physical Exam   Constitutional: She appears well-developed and well-nourished.   HENT:   Head: Normocephalic.   3.5x2.5cm open wound down to bone from moh's surgery   Eyes: EOM are normal.   Neck: Normal range of motion.   Cardiovascular: Normal rate.   Pulmonary/Chest: Effort normal.   Abdominal: Soft.   Neurological: She is alert.   Skin: Skin is warm and dry.   Psychiatric: She has  a normal mood and affect. Her behavior is normal.   Nursing note and vitals reviewed.      Significant Labs:  none    Significant Diagnostics:  none    Assessment/Plan:     Mohs defect    52-year-old female with moh's defect over left scalp approximately 3.5x2.5cm.   -plan to close today in OR.        VTE Risk Mitigation (From admission, onward)    None          Narciso Ojeda MD  Plastic Surgery  [facility]

## 2018-11-08 NOTE — TRANSFER OF CARE
"Anesthesia Transfer of Care Note    Patient: Thelma Nguyen    Procedure(s) Performed: Procedure(s) (LRB):  CLOSURE, MOHS PROCEDURE DEFECT SCALP  Flap closure (Left)    Patient location: PACU    Anesthesia Type: general    Transport from OR: Transported from OR on 6-10 L/min O2 by face mask with adequate spontaneous ventilation    Post pain: adequate analgesia    Post assessment: no apparent anesthetic complications    Post vital signs: stable    Level of consciousness: awake and alert    Nausea/Vomiting: no nausea/vomiting    Complications: none    Transfer of care protocol was followed      Last vitals:   Visit Vitals  BP (!) 144/65   Pulse 91   Temp 36.4 °C (97.5 °F) (Temporal)   Resp 17   Ht 5' 8" (1.727 m)   Wt 60.8 kg (134 lb)   LMP 10/23/1997 (Exact Date)   SpO2 100%   Breastfeeding? No   BMI 20.37 kg/m²     "

## 2018-11-08 NOTE — PRE-PROCEDURE INSTRUCTIONS
"Spoke with Patient.  NPO, medication, and pre-op instructions reviewed.  Denies previous problems with Anesthesia.  Stated that she has problems with her Pituitary Gland and that her Adrenal Gland is "damages."  Xarelto is on Hold. Has a scalp dressing.  Verbalized understanding of instructions.    "

## 2018-11-08 NOTE — DISCHARGE INSTRUCTIONS
Keep the dressing on your head loose. The key is not to apply too much pressure to your wound. After 48 hours you can shower and let warm soapy water rinse over your wounds. You can apply bacitracin over the counter ointment over your wounds thereafter.       Discharge Instructions: Caring for Your Incision  You are going home with stitches (sutures), surgical staples, special strips of surgical tape called Steri-Strips, or surgical skin glue. One of these items was used to close your incision, help stop bleeding, and speed healing. Follow the tips on this sheet to help your incision heal.  Home care  · Always wash your hands before touching your incision.  · Keep your incision clean and dry.  · Avoid doing things that could cause dirt or sweat to get on your incision.  · Dont pick at scabs. They help protect the wound.  · Keep your incision out of water.  · Take a sponge bath to avoid getting your incision wet, unless your healthcare provider tells you otherwise.  · Ask your provider when can you take a shower or bathe.  · Ask your provider about the best way to keep your incision dry when bathing or showering.  · Pat sutures dry if they get wet. Dont rub.  · Leave the dressing (bandage) in place until you are told to remove it or change it. Change it only as directed, using clean hands.  · After the first 12 hours, change your dressing every 24 hours, or as directed by your healthcare provider.  · Change your dressing if it gets wet or soiled.  Care for specific closures  Follow these guidelines unless your child's healthcare provider tells you otherwise:  · Sutures or staples. Once you no longer need to keep these dry, clean the incision or wound daily. First remove the bandage using clean hands. Then wash the area gently with soap and warm water. Use a wet cotton swab to loosen and remove any blood or crust that forms. After cleaning, put a thin layer of antibiotic ointment on. Then put on a new  bandage.  · Skin glue. Dont put liquid, ointment, or cream on your incision or wound while the glue is in place. Avoid activities that cause heavy sweating. Protect the incision or wound from sunlight. Do not scratch, rub, or pick at the glue. Do not put tape directly over the glue. The glue should peel off within 5 to 10 days.  · Surgical tape. Keep your incision or wound dry. If it gets wet, blot the area dry with a clean towel. Surgical tape usually falls off within 7 to 10 days. If it has not fallen off after 10 days, contact your healthcare provider before taking it off yourself. If you are told to remove the tape, put mineral oil or petroleum jelly on a cotton ball. Gently rub the tape until it is removed.  Follow-up care  Follow up with your healthcare provider to ask how long sutures or staples should be left in place. Be sure to return for suture or staple removal as directed. If dissolving stitches were used in your mouth, these will not need to be removed. They should fall out or dissolve on their own.  If tape closures were used, remove them yourself when your provider recommends if they have not fallen off on their own. If skin glue was used, the glue will wear off by itself.      When to seek medical care  Call your healthcare provider right away if you have any of the following:  · More pain, redness, swelling, bleeding, or foul-smelling discharge around the incision area  · Fever of 100.4°F (38°C) or higher, or as directed by your healthcare provider  · Shaking chills  · Vomiting or nausea that doesnt go away  · Numbness, coldness, or tingling around the incision area, or changes in skin color  · Opening of the sutures or wound  · Stitches or staples come apart or fall out or surgical tape falls off before 7 days, or as directed by your provider   Date Last Reviewed: 10/22/2014  © 7690-1688 [employer]. [address] [place] [address], [place] [address]. All rights reserved. This information  is not intended as a substitute for professional medical care. Always follow your healthcare professional's instructions.      Anesthesia: General Anesthesia     You are watched continuously during your procedure by your anesthesia provider.     Youre due to have surgery. During surgery, youll be given medicine called anesthesia or anesthetic. This will keep you comfortable and pain-free. Your anesthesia provider will use general anesthesia.  What is general anesthesia?  General anesthesia puts you into a state like deep sleep. It goes into the bloodstream (IV anesthetics), into the lungs (gas anesthetics), or both. You feel nothing during the procedure. You will not remember it. During the procedure, the anesthesia provider monitors you continuously. He or she checks your heart rate and rhythm, blood pressure, breathing, and blood oxygen.  · IV anesthetics. IV anesthetics are given through an IV line in your arm. Theyre often given first. This is so you are asleep before a gas anesthetic is started. Some kinds of IV anesthetics relieve pain. Others relax you. Your doctor will decide which kind is best in your case.  · Gas anesthetics. Gas anesthetics are breathed into the lungs. They are often used to keep you asleep. They can be given through a facemask or a tube placed in your larynx or trachea (breathing tube).  ¨ If you have a facemask, your anesthesia provider will most likely place it over your nose and mouth while youre still awake. Youll breathe oxygen through the mask as your IV anesthetic is started. Gas anesthetic may be added through the mask.  ¨ If you have a tube in the larynx or trachea, it will be inserted into your throat after youre asleep.  Anesthesia tools and medicines  You will likely have:  · IV anesthetics. These are put into an IV line into your bloodstream.  · Gas anesthetics. You breathe these anesthetics into your lungs, where they pass into your bloodstream.  · Pulse oximeter.  This is a small clip that is attached to the end of your finger. This measures your blood oxygen level.  · Electrocardiography leads (electrodes). These are small sticky pads that are placed on your chest. They record your heart rate and rhythm.  · Blood pressure cuff. This reads your blood pressure.  Risks and possible complications  General anesthesia has some risks. These include:  · Breathing problems  · Nausea and vomiting  · Sore throat or hoarseness (usually temporary)  · Allergic reaction to the anesthetic  · Irregular heartbeat (rare)  · Cardiac arrest (rare)   Anesthesia safety  · Follow all instructions you are given for how long not to eat or drink before your procedure.  · Be sure your doctor knows what medicines and drugs you take. This includes over-the-counter medicines, herbs, supplements, alcohol or other drugs. You will be asked when those were last taken.  · Have an adult family member or friend drive you home after the procedure.  · For the first 24 hours after your surgery:  ¨ Do not drive or use heavy equipment.  ¨ Do not make important decisions or sign legal documents. If important decisions or signing legal documents is necessary during the first 24 hours after surgery, have a trusted family member or spouse act on your behalf.  ¨ Avoid alcohol.  ¨ Have a responsible adult stay with you. He or she can watch for problems and help keep you safe.  Date Last Reviewed: 12/1/2016  © 8497-4488 [facility]. [address] [place] [address], [place] [address]. All rights reserved. This information is not intended as a substitute for professional medical care. Always follow your healthcare professional's instructions.

## 2018-11-08 NOTE — ASSESSMENT & PLAN NOTE
52-year-old female with moh's defect over right scalp approximately 3.5x2.5cm.   -plan to close today in OR.

## 2018-11-09 ENCOUNTER — TELEPHONE (OUTPATIENT)
Dept: INTERNAL MEDICINE | Facility: CLINIC | Age: 53
End: 2018-11-09

## 2018-11-09 NOTE — OP NOTE
DATE OF PROCEDURE:  11/08/2018    PREOPERATIVE DIAGNOSIS:  Mohs defect of the skull.    POSTOPERATIVE DIAGNOSIS:  Mohs defect of the scalp.    PROCEDURES PERFORMED:  1.  Burring of the outer table of the skull.  2.  Closure of a scalp wound using a fasciocutaneous flap.  3.  Closure of scalp wound using a fasciocutaneous flap based on the superficial   temporal artery.    SURGEON:  Yrn Novak M.D., FAriA.CAriS.    ANESTHESIA:  General.    DESCRIPTION OF PROCEDURE:  The patient was placed in the supine position after   adequate general endotracheal anesthesia, was prepped and draped in the normal   sterile fashion.  The tumor had invaded into the periosteum, but not through it.    Discussion with the dermatologist determined that we would johanne down the outer   table.  This was done.  Next, the defect was approximately after debriding the   edges was approximately 4 x 4 cm.  There was a central area of exposed bone.    Next, due to the fact that the patient had a previous brain surgery, there were   incisions in the area, which made the reconstruction much more difficult.  A   fasciocutaneous flap was marked in a type of Yin-Yang fashion.  The larger flap   on the left was a random fasciocutaneous flap.  This was elevated and rotated   into the defect.  On the other side anteriorly by the forehead, the patient had   a scar, which would be in the area of the base of the flaps  may be   approximately 4 to 5 cm.  We dopplered out a superficial temporal vessel all the   way out into the marked flap.  Thus, even though this scar was there, there was   actually a branch of the superficial temporal artery within this flap itself.    The flap was elevated, so the two fasciocutaneous flaps met in the midline was   closed under no tension using interrupted 3-0 nylon sutures.  There were no   complications with this procedure.  Blood loss was minimal.  The patient was   transferred to the Recovery Room in stable  condition.      CRB/IN  dd: 11/09/2018 10:31:55 (CST)  td: 11/09/2018 13:01:03 (CST)  Doc ID   #[id number]  Job ID #[id number]    CC:

## 2018-11-09 NOTE — TELEPHONE ENCOUNTER
----- Message from Amparo Larson RN sent at 11/9/2018  2:02 PM CST -----  Contact: Patient   AHSAN, patient stopped Xarelto on 11/5/18 for MOH's surgery and MOH's repair.  Patient would like to know when to re-start the medication.  Please advise.  Patient requesting a return call.  Thank you.    Amparo Larson RN      ----- Message -----  From: Boo Mendenhall  Sent: 11/9/2018   1:15 PM  To: Scarlet Mitchell    When does she needs to start her XARELTO 20 mg Tab again    Callback: [phone]

## 2018-11-14 ENCOUNTER — OFFICE VISIT (OUTPATIENT)
Dept: PLASTIC SURGERY | Facility: CLINIC | Age: 53
End: 2018-11-14
Payer: COMMERCIAL

## 2018-11-14 VITALS
DIASTOLIC BLOOD PRESSURE: 73 MMHG | HEART RATE: 115 BPM | SYSTOLIC BLOOD PRESSURE: 128 MMHG | WEIGHT: 136.69 LBS | TEMPERATURE: 98 F | BODY MASS INDEX: 20.78 KG/M2

## 2018-11-14 DIAGNOSIS — Z09 SURGERY FOLLOW-UP EXAMINATION: Primary | ICD-10-CM

## 2018-11-14 PROCEDURE — 99999 PR PBB SHADOW E&M-EST. PATIENT-LVL III: CPT | Mod: PBBFAC,,, | Performed by: SURGERY

## 2018-11-14 PROCEDURE — 99024 POSTOP FOLLOW-UP VISIT: CPT | Mod: S$GLB,,, | Performed by: SURGERY

## 2018-11-14 RX ORDER — LEVOTHYROXINE SODIUM 137 UG/1
TABLET ORAL
Qty: 96 TABLET | Refills: 3 | Status: SHIPPED | OUTPATIENT
Start: 2018-11-14 | End: 2019-11-08 | Stop reason: SDUPTHER

## 2018-11-14 NOTE — PROGRESS NOTES
History & Physical  Plastic Surgery Clinic    SUBJECTIVE:     Chief complaint: follow up scalp flap     History of Present Illness:  Patient is a 52 y.o. female presents for 1 week follow s/p pietro lowe flap closure of mohs defect. Denies fevers, chills, nausea or emesis.  Denies drainage from incisions.  Pain is well controlled.    Past Medical History:  Past Medical History:   Diagnosis Date    Allergic rhinitis     Anemia     Arthritis     Basal cell carcinoma     Broken ankle     Cancer     Clotting disorder     Disorder of kidney and ureter     DVT (deep venous thrombosis)     Factor V deficiency     Fracture of elbow     left    Glioblastoma multiforme of brain     Hypothyroidism     IBS (irritable bowel syndrome)     Osteoporosis     Panhypopituitarism     Peripheral polyneuropathy     Secondary adrenal insufficiency     Seizures     Thyroid disease        Past Surgical History:  Past Surgical History:   Procedure Laterality Date    APPENDECTOMY      BRAIN SURGERY      for glioblastoma      SECTION      CHOLECYSTECTOMY      CLOSURE OF DEFECT OF MOHS PROCEDURE Left 2018    Procedure: CLOSURE, MOHS PROCEDURE DEFECT SCALP  Flap closure;  Surgeon: Yrn Novak MD;  Location: [facility] OR [address] [place] [address];  Service: Plastics;  Laterality: Left;    CLOSURE, MOHS PROCEDURE DEFECT SCALP  Flap closure Left 2018    Performed by Yrn Novak MD at [facility] OR [address] [place] [address]    HYSTERECTOMY      TUBAL LIGATION         Family History:  Family History   Problem Relation Age of Onset    Heart disease Mother        Social History:  Social History     Socioeconomic History    Marital status:      Spouse name: None    Number of children: None    Years of education: None    Highest education level: None   Social Needs    Financial resource strain: None    Food insecurity - worry: None    Food insecurity - inability: None     Transportation needs - medical: None    Transportation needs - non-medical: None   Occupational History    None   Tobacco Use    Smoking status: Never Smoker    Smokeless tobacco: Never Used   Substance and Sexual Activity    Alcohol use: No    Drug use: No    Sexual activity: Not Currently   Other Topics Concern    Are you pregnant or think you may be? Not Asked    Breast-feeding Not Asked   Social History Narrative    None       Medications:  Current Outpatient Medications   Medication Sig Dispense Refill    alendronate (FOSAMAX) 70 MG tablet TAKE 1 TAB EVERY 7 DAYS (Patient taking differently: TAKE 1 TAB EVERY 7 DAYS, Takes on Mondays) 12 tablet 1    diphenoxylate-atropine 2.5-0.025 mg (LOMOTIL) 2.5-0.025 mg per tablet TAKE 1 TABLET BY MOUTH AS NEEDED LOOSE STOOL 30 tablet 3    ERGOCALCIFEROL, VITAMIN D2, (VITAMIN D ORAL) Take 1 capsule by mouth nightly.       ferrous sulfate 324 mg (65 mg iron) TbEC Take 325 mg by mouth nightly.       acetaminophen (TYLENOL) 325 MG tablet Take 650 mg by mouth every 6 (six) hours as needed for Pain.       HYDROcodone-acetaminophen (NORCO) 5-325 mg per tablet Take 1 tablet by mouth every 6 (six) hours as needed for Pain. 15 tablet 0    hydrocortisone (CORTEF) 10 MG Tab TAKE 1 TABLET TWICE A DAY 60 tablet 11    levothyroxine (SYNTHROID) 137 MCG Tab tablet TAKE 1 TABLET DAILY EXCEPT ON SUNDAY, TAKE 1 & 1/2 TABLET 96 tablet 3    loratadine-pseudoephedrine  mg (CLARITIN-D 24-HOUR)  mg per 24 hr tablet Take 1 tablet by mouth as needed.       phenytoin (DILANTIN) 100 MG ER capsule TAKE 4 CAPSULES EVERY DAY (Patient taking differently: TAKE 4 CAPSULES (400 mg) EVERY bedtime) 360 capsule 4    XARELTO 20 mg Tab TAKE 1 TABLET BY MOUTH DAILY (Patient taking differently: TAKE 1 TABLET BY MOUTH DAILY, bedtime) 90 tablet 3     No current facility-administered medications for this visit.        Allergies:  Review of patient's allergies indicates:   Allergen  Reactions    Neurontin [gabapentin]        Review of Systems:  Negative except for HPI.      OBJECTIVE:     /73   Pulse (!) 115   Temp 97.5 °F (36.4 °C)   Wt 62 kg (136 lb 11 oz)   LMP 10/23/1997 (Exact Date)   BMI 20.78 kg/m²     Physical Exam:  Constitutional: Oriented to person, place, and time. Appears well-developed and well-nourished.   Anterior flap with slight congestion but viable, posterior flap healthy and viable.          ASSESSMENT/PLAN:     52 y.o. female as above.  Flap closure of scalp defect is healing well.    --follow up in 2 weeks for partial suture removal  ROSEANN Jordan MD, FACS  Plastic Surgery Fellow

## 2018-11-28 ENCOUNTER — OFFICE VISIT (OUTPATIENT)
Dept: PLASTIC SURGERY | Facility: CLINIC | Age: 53
End: 2018-11-28
Payer: COMMERCIAL

## 2018-11-28 VITALS
HEIGHT: 68 IN | SYSTOLIC BLOOD PRESSURE: 121 MMHG | TEMPERATURE: 98 F | WEIGHT: 136 LBS | BODY MASS INDEX: 20.61 KG/M2 | HEART RATE: 130 BPM | DIASTOLIC BLOOD PRESSURE: 77 MMHG

## 2018-11-28 DIAGNOSIS — Z09 SURGERY FOLLOW-UP EXAMINATION: Primary | ICD-10-CM

## 2018-11-28 PROCEDURE — 99024 POSTOP FOLLOW-UP VISIT: CPT | Mod: S$GLB,,, | Performed by: SURGERY

## 2018-11-28 PROCEDURE — 99999 PR PBB SHADOW E&M-EST. PATIENT-LVL III: CPT | Mod: PBBFAC,,, | Performed by: SURGERY

## 2018-11-28 NOTE — PROGRESS NOTES
History & Physical  Plastic Surgery Clinic    SUBJECTIVE:     Chief complaint: s/p mohs defect reconstruction of the scalp      History of Present Illness:  Patient is a 52 y.o. female is 3 weeks s/p pietro yang flap reconstruction of a left sided scalp defect.  Doing well.  No issues at this time.    Past Medical History:  Past Medical History:   Diagnosis Date    Allergic rhinitis     Anemia     Arthritis     Basal cell carcinoma     Broken ankle     Cancer     Clotting disorder     Disorder of kidney and ureter     DVT (deep venous thrombosis)     Factor V deficiency     Fracture of elbow     left    Glioblastoma multiforme of brain     Hypothyroidism     IBS (irritable bowel syndrome)     Osteoporosis     Panhypopituitarism     Peripheral polyneuropathy     Secondary adrenal insufficiency     Seizures     Thyroid disease        Past Surgical History:  Past Surgical History:   Procedure Laterality Date    APPENDECTOMY      BRAIN SURGERY      for glioblastoma      SECTION      CHOLECYSTECTOMY      CLOSURE OF DEFECT OF MOHS PROCEDURE Left 2018    Procedure: CLOSURE, MOHS PROCEDURE DEFECT SCALP  Flap closure;  Surgeon: Yrn Novak MD;  Location: [facility] OR [address] [place] [address];  Service: Plastics;  Laterality: Left;    CLOSURE, MOHS PROCEDURE DEFECT SCALP  Flap closure Left 2018    Performed by Yrn Novak MD at [facility] OR [address] [place] [address]    HYSTERECTOMY      TUBAL LIGATION         Family History:  Family History   Problem Relation Age of Onset    Heart disease Mother        Social History:  Social History     Socioeconomic History    Marital status:      Spouse name: None    Number of children: None    Years of education: None    Highest education level: None   Social Needs    Financial resource strain: None    Food insecurity - worry: None    Food insecurity - inability: None    Transportation needs - medical:  None    Transportation needs - non-medical: None   Occupational History    None   Tobacco Use    Smoking status: Never Smoker    Smokeless tobacco: Never Used   Substance and Sexual Activity    Alcohol use: No    Drug use: No    Sexual activity: Not Currently   Other Topics Concern    Are you pregnant or think you may be? Not Asked    Breast-feeding Not Asked   Social History Narrative    None       Medications:  Current Outpatient Medications   Medication Sig Dispense Refill    acetaminophen (TYLENOL) 325 MG tablet Take 650 mg by mouth every 6 (six) hours as needed for Pain.       alendronate (FOSAMAX) 70 MG tablet TAKE 1 TAB EVERY 7 DAYS (Patient taking differently: TAKE 1 TAB EVERY 7 DAYS, Takes on Mondays) 12 tablet 1    diphenoxylate-atropine 2.5-0.025 mg (LOMOTIL) 2.5-0.025 mg per tablet TAKE 1 TABLET BY MOUTH AS NEEDED LOOSE STOOL 30 tablet 3    ERGOCALCIFEROL, VITAMIN D2, (VITAMIN D ORAL) Take 1 capsule by mouth nightly.       ferrous sulfate 324 mg (65 mg iron) TbEC Take 325 mg by mouth nightly.       hydrocortisone (CORTEF) 10 MG Tab TAKE 1 TABLET TWICE A DAY 60 tablet 11    levothyroxine (SYNTHROID) 137 MCG Tab tablet TAKE 1 TABLET DAILY EXCEPT ON SUNDAY, TAKE 1 & 1/2 TABLET 96 tablet 3    loratadine-pseudoephedrine  mg (CLARITIN-D 24-HOUR)  mg per 24 hr tablet Take 1 tablet by mouth as needed.       phenytoin (DILANTIN) 100 MG ER capsule TAKE 4 CAPSULES EVERY DAY (Patient taking differently: TAKE 4 CAPSULES (400 mg) EVERY bedtime) 360 capsule 4    XARELTO 20 mg Tab TAKE 1 TABLET BY MOUTH DAILY (Patient taking differently: TAKE 1 TABLET BY MOUTH DAILY, bedtime) 90 tablet 3    HYDROcodone-acetaminophen (NORCO) 5-325 mg per tablet Take 1 tablet by mouth every 6 (six) hours as needed for Pain. 15 tablet 0     No current facility-administered medications for this visit.        Allergies:  Review of patient's allergies indicates:   Allergen Reactions    Neurontin [gabapentin]   "      Review of Systems:  Negative except for HPI.      OBJECTIVE:     /77   Pulse (!) 130   Temp 97.9 °F (36.6 °C)   Ht 5' 8" (1.727 m)   Wt 61.7 kg (136 lb 0.4 oz)   LMP 10/23/1997 (Exact Date)   BMI 20.68 kg/m²     Physical Exam:  Constitutional: Oriented to person, place, and time. Appears well-developed and well-nourished.   Flaps are viable and healthy appearing.          ASSESSMENT/PLAN:     52 y.o. female overall doing well.  --1/2 of the sutures were removed today  --follow up in one week for removal of the remainder of the sutures.     ROSEANN Jordan MD, FACS  Plastic Surgery Fellow  .      "

## 2018-12-05 ENCOUNTER — OFFICE VISIT (OUTPATIENT)
Dept: PLASTIC SURGERY | Facility: CLINIC | Age: 53
End: 2018-12-05
Payer: COMMERCIAL

## 2018-12-05 VITALS
HEART RATE: 103 BPM | WEIGHT: 136.13 LBS | DIASTOLIC BLOOD PRESSURE: 88 MMHG | BODY MASS INDEX: 20.63 KG/M2 | HEIGHT: 68 IN | SYSTOLIC BLOOD PRESSURE: 149 MMHG

## 2018-12-05 DIAGNOSIS — Z09 SURGERY FOLLOW-UP EXAMINATION: Primary | ICD-10-CM

## 2018-12-05 PROCEDURE — 99999 PR PBB SHADOW E&M-EST. PATIENT-LVL III: CPT | Mod: PBBFAC,,, | Performed by: PHYSICIAN ASSISTANT

## 2018-12-05 PROCEDURE — 99024 POSTOP FOLLOW-UP VISIT: CPT | Mod: S$GLB,,, | Performed by: PHYSICIAN ASSISTANT

## 2018-12-05 NOTE — PROGRESS NOTES
Thelma Nguyen presents to Plastic Surgery Clinic on 12/5/2018 for a follow up visit status post repair of mohs defect of scalp with fasciocutaneous flap on 11/08/2018. Doing well today with no issues since her last follow up with Dr. Yrn Novak. She denies pain, fever, chills, N/V or other systemic signs of infection.     Review of patient's allergies indicates:   Allergen Reactions    Neurontin [gabapentin]        Current Outpatient Medications on File Prior to Visit   Medication Sig Dispense Refill    acetaminophen (TYLENOL) 325 MG tablet Take 650 mg by mouth every 6 (six) hours as needed for Pain.       alendronate (FOSAMAX) 70 MG tablet TAKE 1 TAB EVERY 7 DAYS (Patient taking differently: TAKE 1 TAB EVERY 7 DAYS, Takes on Mondays) 12 tablet 1    diphenoxylate-atropine 2.5-0.025 mg (LOMOTIL) 2.5-0.025 mg per tablet TAKE 1 TABLET BY MOUTH AS NEEDED LOOSE STOOL 30 tablet 3    ERGOCALCIFEROL, VITAMIN D2, (VITAMIN D ORAL) Take 1 capsule by mouth nightly.       ferrous sulfate 324 mg (65 mg iron) TbEC Take 325 mg by mouth nightly.       HYDROcodone-acetaminophen (NORCO) 5-325 mg per tablet Take 1 tablet by mouth every 6 (six) hours as needed for Pain. 15 tablet 0    hydrocortisone (CORTEF) 10 MG Tab TAKE 1 TABLET TWICE A DAY 60 tablet 11    levothyroxine (SYNTHROID) 137 MCG Tab tablet TAKE 1 TABLET DAILY EXCEPT ON SUNDAY, TAKE 1 & 1/2 TABLET 96 tablet 3    loratadine-pseudoephedrine  mg (CLARITIN-D 24-HOUR)  mg per 24 hr tablet Take 1 tablet by mouth as needed.       phenytoin (DILANTIN) 100 MG ER capsule TAKE 4 CAPSULES EVERY DAY (Patient taking differently: TAKE 4 CAPSULES (400 mg) EVERY bedtime) 360 capsule 4    XARELTO 20 mg Tab TAKE 1 TABLET BY MOUTH DAILY (Patient taking differently: TAKE 1 TABLET BY MOUTH DAILY, bedtime) 90 tablet 3     No current facility-administered medications on file prior to visit.      Patient Active Problem List   Diagnosis     Panhypopituitarism    IBS (irritable bowel syndrome)    Seizure disorder    Allergic rhinitis due to pollen    Peripheral polyneuropathy    Osteoporosis    Sacroiliac joint pain    Myofascial muscle pain    Secondary adrenal insufficiency    Factor V deficiency with DVT x 3    Glioblastoma multiforme of brain    Ganglion cyst of dorsum of left wrist    Mohs defect    Mohs defect of left scalp     Past Surgical History:   Procedure Laterality Date    APPENDECTOMY      BRAIN SURGERY      for glioblastoma      SECTION      CHOLECYSTECTOMY      CLOSURE OF DEFECT OF MOHS PROCEDURE Left 2018    Procedure: CLOSURE, MOHS PROCEDURE DEFECT SCALP  Flap closure;  Surgeon: Yrn Novak MD;  Location: [facility] OR [address] [place] [address];  Service: Plastics;  Laterality: Left;    CLOSURE, MOHS PROCEDURE DEFECT SCALP  Flap closure Left 2018    Performed by Yrn Novak MD at [facility] OR [address] [place] [address]    HYSTERECTOMY      TUBAL LIGATION       Social History     Socioeconomic History    Marital status:      Spouse name: Not on file    Number of children: Not on file    Years of education: Not on file    Highest education level: Not on file   Social Needs    Financial resource strain: Not on file    Food insecurity - worry: Not on file    Food insecurity - inability: Not on file    Transportation needs - medical: Not on file    Transportation needs - non-medical: Not on file   Occupational History    Not on file   Tobacco Use    Smoking status: Never Smoker    Smokeless tobacco: Never Used   Substance and Sexual Activity    Alcohol use: No    Drug use: No    Sexual activity: Not Currently   Other Topics Concern    Are you pregnant or think you may be? Not Asked    Breast-feeding Not Asked   Social History Narrative    Not on file     PHYSICAL EXAMINATION  Vitals:    18 1202   BP: (!) 149/88   Pulse: 103     WD WN NAD  VSS  Normal reps effort  Scalp -  flaps viable, incision CDI, no erythema/drainage, sutures intact    ASSESSMENT/PLAN  52 y.o. F s/p repair of mohs defect of scalp  - Doing well, no issues. Denies pain.   - All remaining sutures were removed.   - Advised to RTC x 6 weeks, appointment scheduled    All questions were answered. The patient was advised to call the clinic with any questions or concerns prior to their next visit.

## 2019-01-16 ENCOUNTER — OFFICE VISIT (OUTPATIENT)
Dept: PLASTIC SURGERY | Facility: CLINIC | Age: 54
End: 2019-01-16
Payer: COMMERCIAL

## 2019-01-16 VITALS
BODY MASS INDEX: 20.47 KG/M2 | WEIGHT: 135.06 LBS | HEART RATE: 111 BPM | HEIGHT: 68 IN | SYSTOLIC BLOOD PRESSURE: 136 MMHG | DIASTOLIC BLOOD PRESSURE: 60 MMHG

## 2019-01-16 DIAGNOSIS — Z09 SURGERY FOLLOW-UP EXAMINATION: Primary | ICD-10-CM

## 2019-01-16 PROCEDURE — 99024 POSTOP FOLLOW-UP VISIT: CPT | Mod: S$GLB,,, | Performed by: PHYSICIAN ASSISTANT

## 2019-01-16 PROCEDURE — 99999 PR PBB SHADOW E&M-EST. PATIENT-LVL III: ICD-10-PCS | Mod: PBBFAC,,, | Performed by: PHYSICIAN ASSISTANT

## 2019-01-16 PROCEDURE — 99999 PR PBB SHADOW E&M-EST. PATIENT-LVL III: CPT | Mod: PBBFAC,,, | Performed by: PHYSICIAN ASSISTANT

## 2019-01-16 PROCEDURE — 99024 PR POST-OP FOLLOW-UP VISIT: ICD-10-PCS | Mod: S$GLB,,, | Performed by: PHYSICIAN ASSISTANT

## 2019-01-16 NOTE — PROGRESS NOTES
Subjective:      Thelma Nguyen is a 53 y.o. year old female who presents [facility] on 01/16/2019 for follow up visit status post repair of Mohs defect of scalp on 11/08/2018. Denies fever, chills, nausea, vomiting, or other systemic signs of infection.    Vitals:    01/16/19 0911   BP: 136/60   Pulse: (!) 111        Review of patient's allergies indicates:   Allergen Reactions    Neurontin [gabapentin]        Current Outpatient Medications on File Prior to Visit   Medication Sig Dispense Refill    acetaminophen (TYLENOL) 325 MG tablet Take 650 mg by mouth every 6 (six) hours as needed for Pain.       alendronate (FOSAMAX) 70 MG tablet TAKE 1 TAB EVERY 7 DAYS (Patient taking differently: TAKE 1 TAB EVERY 7 DAYS, Takes on Mondays) 12 tablet 1    diphenoxylate-atropine 2.5-0.025 mg (LOMOTIL) 2.5-0.025 mg per tablet TAKE 1 TABLET BY MOUTH AS NEEDED LOOSE STOOL 30 tablet 3    ERGOCALCIFEROL, VITAMIN D2, (VITAMIN D ORAL) Take 1 capsule by mouth nightly.       ferrous sulfate 324 mg (65 mg iron) TbEC Take 325 mg by mouth nightly.       HYDROcodone-acetaminophen (NORCO) 5-325 mg per tablet Take 1 tablet by mouth every 6 (six) hours as needed for Pain. 15 tablet 0    hydrocortisone (CORTEF) 10 MG Tab TAKE 1 TABLET TWICE A DAY 60 tablet 11    levothyroxine (SYNTHROID) 137 MCG Tab tablet TAKE 1 TABLET DAILY EXCEPT ON SUNDAY, TAKE 1 & 1/2 TABLET 96 tablet 3    loratadine-pseudoephedrine  mg (CLARITIN-D 24-HOUR)  mg per 24 hr tablet Take 1 tablet by mouth as needed.       phenytoin (DILANTIN) 100 MG ER capsule TAKE 4 CAPSULES EVERY DAY (Patient taking differently: TAKE 4 CAPSULES (400 mg) EVERY bedtime) 360 capsule 4    XARELTO 20 mg Tab TAKE 1 TABLET BY MOUTH DAILY (Patient taking differently: TAKE 1 TABLET BY MOUTH DAILY, bedtime) 90 tablet 3     No current facility-administered medications on file prior to visit.        Patient Active Problem List   Diagnosis     Panhypopituitarism    IBS (irritable bowel syndrome)    Seizure disorder    Allergic rhinitis due to pollen    Peripheral polyneuropathy    Osteoporosis    Sacroiliac joint pain    Myofascial muscle pain    Secondary adrenal insufficiency    Factor V deficiency with DVT x 3    Glioblastoma multiforme of brain    Ganglion cyst of dorsum of left wrist    Mohs defect    Mohs defect of left scalp       Past Surgical History:   Procedure Laterality Date    APPENDECTOMY      BRAIN SURGERY      for glioblastoma      SECTION      CHOLECYSTECTOMY  2007    CLOSURE, MOHS PROCEDURE DEFECT SCALP  Flap closure Left 2018    Performed by Yrn Novak MD at [facility] OR [address] [place] [address]    HYSTERECTOMY      TUBAL LIGATION           Social History     Socioeconomic History    Marital status:      Spouse name: Not on file    Number of children: Not on file    Years of education: Not on file    Highest education level: Not on file   Social Needs    Financial resource strain: Not on file    Food insecurity - worry: Not on file    Food insecurity - inability: Not on file    Transportation needs - medical: Not on file    Transportation needs - non-medical: Not on file   Occupational History    Not on file   Tobacco Use    Smoking status: Never Smoker    Smokeless tobacco: Never Used   Substance and Sexual Activity    Alcohol use: No    Drug use: No    Sexual activity: Not Currently   Other Topics Concern    Are you pregnant or think you may be? Not Asked    Breast-feeding Not Asked   Social History Narrative    Not on file           Review of Systems: negative    Objective:     Physical Exam:  Vitals:    19 0911   BP: 136/60   Pulse: (!) 111       WD WN NAD  VSS  Normal resp effort  Scalp - incision healed, no erythema/drainage, flaps viable        Assessment:       1. Surgery follow-up examination        Plan:   53 y.o. female status post repair of Mohs defect  of scalp  - Doing well, no issues  - Scalp incision completely healed  - Recommend she follow up with Dermatology on a regular basis.     All questions were answered. The patient was advised to call the clinic with any questions or concerns prior to their next visit.

## 2019-01-21 DIAGNOSIS — M81.0 OSTEOPOROSIS: ICD-10-CM

## 2019-01-21 RX ORDER — ALENDRONATE SODIUM 70 MG/1
TABLET ORAL
Qty: 12 TABLET | Refills: 1 | Status: SHIPPED | OUTPATIENT
Start: 2019-01-21 | End: 2019-09-07 | Stop reason: SDUPTHER

## 2019-02-04 ENCOUNTER — PATIENT MESSAGE (OUTPATIENT)
Dept: PAIN MEDICINE | Facility: CLINIC | Age: 54
End: 2019-02-04

## 2019-02-15 ENCOUNTER — LAB VISIT (OUTPATIENT)
Dept: LAB | Facility: HOSPITAL | Age: 54
End: 2019-02-15
Attending: INTERNAL MEDICINE
Payer: COMMERCIAL

## 2019-02-15 DIAGNOSIS — Z12.11 COLON CANCER SCREENING: ICD-10-CM

## 2019-02-15 PROCEDURE — 82274 ASSAY TEST FOR BLOOD FECAL: CPT

## 2019-02-16 LAB — HEMOCCULT STL QL IA: POSITIVE

## 2019-02-18 ENCOUNTER — TELEPHONE (OUTPATIENT)
Dept: INTERNAL MEDICINE | Facility: CLINIC | Age: 54
End: 2019-02-18

## 2019-02-18 DIAGNOSIS — Z12.11 COLON CANCER SCREENING: Primary | ICD-10-CM

## 2019-02-19 ENCOUNTER — TELEPHONE (OUTPATIENT)
Dept: GASTROENTEROLOGY | Facility: CLINIC | Age: 54
End: 2019-02-19

## 2019-02-21 ENCOUNTER — TELEPHONE (OUTPATIENT)
Dept: GASTROENTEROLOGY | Facility: CLINIC | Age: 54
End: 2019-02-21

## 2019-02-22 ENCOUNTER — TELEPHONE (OUTPATIENT)
Dept: GASTROENTEROLOGY | Facility: CLINIC | Age: 54
End: 2019-02-22

## 2019-02-22 NOTE — TELEPHONE ENCOUNTER
Spoke with patient about scheduling a Colonoscopy. Patient stated that she has Anemia during the screening questions. Informed patient that due to her Anemia an office visit is needed first before scheduling procedure. Patient scheduled an appointment with Isabelle Morales on 3/21/2019.

## 2019-03-07 ENCOUNTER — OFFICE VISIT (OUTPATIENT)
Dept: INTERNAL MEDICINE | Facility: CLINIC | Age: 54
End: 2019-03-07
Payer: COMMERCIAL

## 2019-03-07 VITALS
DIASTOLIC BLOOD PRESSURE: 78 MMHG | SYSTOLIC BLOOD PRESSURE: 122 MMHG | BODY MASS INDEX: 20.48 KG/M2 | HEART RATE: 97 BPM | OXYGEN SATURATION: 98 % | HEIGHT: 68 IN | WEIGHT: 135.13 LBS

## 2019-03-07 DIAGNOSIS — G40.909 SEIZURE DISORDER: ICD-10-CM

## 2019-03-07 DIAGNOSIS — C71.9 GLIOBLASTOMA MULTIFORME OF BRAIN: ICD-10-CM

## 2019-03-07 DIAGNOSIS — M81.0 OSTEOPOROSIS, UNSPECIFIED OSTEOPOROSIS TYPE, UNSPECIFIED PATHOLOGICAL FRACTURE PRESENCE: ICD-10-CM

## 2019-03-07 DIAGNOSIS — N30.01 ACUTE CYSTITIS WITH HEMATURIA: ICD-10-CM

## 2019-03-07 DIAGNOSIS — Z00.00 ROUTINE GENERAL MEDICAL EXAMINATION AT A HEALTH CARE FACILITY: Primary | ICD-10-CM

## 2019-03-07 DIAGNOSIS — D68.2 FACTOR V DEFICIENCY: ICD-10-CM

## 2019-03-07 DIAGNOSIS — E23.0 PANHYPOPITUITARISM: ICD-10-CM

## 2019-03-07 LAB
BILIRUB SERPL-MCNC: NEGATIVE MG/DL
BLOOD URINE, POC: ABNORMAL
COLOR, POC UA: YELLOW
GLUCOSE UR QL STRIP: NEGATIVE
KETONES UR QL STRIP: NEGATIVE
LEUKOCYTE ESTERASE URINE, POC: ABNORMAL
NITRITE, POC UA: NEGATIVE
PH, POC UA: ABNORMAL
PROTEIN, POC: NEGATIVE
SPECIFIC GRAVITY, POC UA: 1.01
UROBILINOGEN, POC UA: 0.2

## 2019-03-07 PROCEDURE — 99396 PREV VISIT EST AGE 40-64: CPT | Mod: 25,S$GLB,, | Performed by: INTERNAL MEDICINE

## 2019-03-07 PROCEDURE — 99999 PR PBB SHADOW E&M-EST. PATIENT-LVL III: ICD-10-PCS | Mod: PBBFAC,,, | Performed by: INTERNAL MEDICINE

## 2019-03-07 PROCEDURE — 99396 PR PREVENTIVE VISIT,EST,40-64: ICD-10-PCS | Mod: 25,S$GLB,, | Performed by: INTERNAL MEDICINE

## 2019-03-07 PROCEDURE — 3074F PR MOST RECENT SYSTOLIC BLOOD PRESSURE < 130 MM HG: ICD-10-PCS | Mod: CPTII,S$GLB,, | Performed by: INTERNAL MEDICINE

## 2019-03-07 PROCEDURE — 3078F PR MOST RECENT DIASTOLIC BLOOD PRESSURE < 80 MM HG: ICD-10-PCS | Mod: CPTII,S$GLB,, | Performed by: INTERNAL MEDICINE

## 2019-03-07 PROCEDURE — 81002 URINALYSIS NONAUTO W/O SCOPE: CPT | Mod: S$GLB,,, | Performed by: INTERNAL MEDICINE

## 2019-03-07 PROCEDURE — 90471 TDAP VACCINE GREATER THAN OR EQUAL TO 7YO IM: ICD-10-PCS | Mod: S$GLB,,, | Performed by: INTERNAL MEDICINE

## 2019-03-07 PROCEDURE — 99999 PR PBB SHADOW E&M-EST. PATIENT-LVL III: CPT | Mod: PBBFAC,,, | Performed by: INTERNAL MEDICINE

## 2019-03-07 PROCEDURE — 3074F SYST BP LT 130 MM HG: CPT | Mod: CPTII,S$GLB,, | Performed by: INTERNAL MEDICINE

## 2019-03-07 PROCEDURE — 90715 TDAP VACCINE 7 YRS/> IM: CPT | Mod: S$GLB,,, | Performed by: INTERNAL MEDICINE

## 2019-03-07 PROCEDURE — 90471 IMMUNIZATION ADMIN: CPT | Mod: S$GLB,,, | Performed by: INTERNAL MEDICINE

## 2019-03-07 PROCEDURE — 3078F DIAST BP <80 MM HG: CPT | Mod: CPTII,S$GLB,, | Performed by: INTERNAL MEDICINE

## 2019-03-07 PROCEDURE — 81002 POCT URINE DIPSTICK WITHOUT MICROSCOPE: ICD-10-PCS | Mod: S$GLB,,, | Performed by: INTERNAL MEDICINE

## 2019-03-07 PROCEDURE — 90715 TDAP VACCINE GREATER THAN OR EQUAL TO 7YO IM: ICD-10-PCS | Mod: S$GLB,,, | Performed by: INTERNAL MEDICINE

## 2019-03-07 RX ORDER — SULFAMETHOXAZOLE AND TRIMETHOPRIM 800; 160 MG/1; MG/1
1 TABLET ORAL 2 TIMES DAILY
Qty: 6 TABLET | Refills: 0 | Status: SHIPPED | OUTPATIENT
Start: 2019-03-07 | End: 2019-03-10

## 2019-03-07 NOTE — PROGRESS NOTES
Subjective:       Patient ID: Thelma Nguyen is a 53 y.o. female.    Chief Complaint: Urinary Tract Infection (possible)    HPI  Wellness check.  Pt c/o urinary urgency, no dysuria.  No bleeding, no T-c activity. Colonoscopy pending.  Review of Systems   All other systems reviewed and are negative.      Objective:      Physical Exam   Constitutional: She appears well-developed. No distress.   HENT:   Head: Normocephalic.   Eyes: EOM are normal.   Neck: Normal range of motion. No tracheal deviation present.   Cardiovascular: Normal rate, regular rhythm, normal heart sounds and intact distal pulses.   Pulmonary/Chest: Effort normal and breath sounds normal. No respiratory distress.   Abdominal: Soft. Bowel sounds are normal. She exhibits no distension. There is no tenderness.   Musculoskeletal: Normal range of motion. She exhibits no edema.   Neurological: She is alert. No cranial nerve deficit. She exhibits normal muscle tone. Coordination normal.   Skin: Skin is warm and dry. No rash noted. She is not diaphoretic. No erythema.   Psychiatric: She has a normal mood and affect. Her behavior is normal.   Vitals reviewed.      Assessment:       1. Routine general medical examination at a health care facility    2. Acute cystitis with hematuria    3. Seizure disorder    4. Factor V deficiency with DVT x 3    5. Panhypopituitarism    6. Osteoporosis, unspecified osteoporosis type, unspecified pathological fracture presence    7. Glioblastoma multiforme of brain        Plan:       Thelma was seen today for urinary tract infection.    Diagnoses and all orders for this visit:    Routine general medical examination at a health care facility  -     Tdap Vaccine    Acute cystitis with hematuria  -     POCT urine dipstick without microscope  -     sulfamethoxazole-trimethoprim 800-160mg (BACTRIM DS) 800-160 mg Tab; Take 1 tablet by mouth 2 (two) times daily. for 3 days    Seizure disorder   Quiescent    Factor V  deficiency with DVT x 3   Cont rx    Panhypopituitarism   Cont rx    Osteoporosis, unspecified osteoporosis type, unspecified pathological fracture presence   Cont rx    Glioblastoma multiforme of brain   In long-term remission    Follow-up if symptoms worsen or fail to improve.

## 2019-03-11 ENCOUNTER — TELEPHONE (OUTPATIENT)
Dept: INTERNAL MEDICINE | Facility: CLINIC | Age: 54
End: 2019-03-11

## 2019-03-11 DIAGNOSIS — N30.00 ACUTE CYSTITIS WITHOUT HEMATURIA: Primary | ICD-10-CM

## 2019-03-11 RX ORDER — CIPROFLOXACIN 500 MG/1
500 TABLET ORAL 2 TIMES DAILY
Qty: 6 TABLET | Refills: 0 | Status: SHIPPED | OUTPATIENT
Start: 2019-03-11 | End: 2019-03-14

## 2019-03-11 NOTE — TELEPHONE ENCOUNTER
----- Message from Yury Sunshine sent at 3/11/2019 10:18 AM CDT -----  Contact: self/[phone]  Patient called to speak with the nurse or the doctor about her current UTI.      She has taken all her medication and symptoms are still present as well as she is now cramping in her abdomen.    Please call and advise.    Calling in Cipro

## 2019-03-19 ENCOUNTER — TELEPHONE (OUTPATIENT)
Dept: GASTROENTEROLOGY | Facility: CLINIC | Age: 54
End: 2019-03-19

## 2019-03-19 NOTE — TELEPHONE ENCOUNTER
----- Message from Jen Berg sent at 3/19/2019 11:02 AM CDT -----  Contact: Pamela [phone]  Pamela would like to speak with you about a the labs not being in the patient's chart and the patient is there now. Please advise

## 2019-03-21 ENCOUNTER — TELEPHONE (OUTPATIENT)
Dept: GASTROENTEROLOGY | Facility: CLINIC | Age: 54
End: 2019-03-21

## 2019-03-21 ENCOUNTER — OFFICE VISIT (OUTPATIENT)
Dept: GASTROENTEROLOGY | Facility: CLINIC | Age: 54
End: 2019-03-21
Payer: COMMERCIAL

## 2019-03-21 VITALS
DIASTOLIC BLOOD PRESSURE: 69 MMHG | BODY MASS INDEX: 20 KG/M2 | SYSTOLIC BLOOD PRESSURE: 101 MMHG | HEIGHT: 68 IN | WEIGHT: 132 LBS

## 2019-03-21 DIAGNOSIS — R10.13 ABDOMINAL PAIN, EPIGASTRIC: ICD-10-CM

## 2019-03-21 DIAGNOSIS — D64.9 ANEMIA, UNSPECIFIED TYPE: ICD-10-CM

## 2019-03-21 DIAGNOSIS — R11.0 NAUSEA: ICD-10-CM

## 2019-03-21 DIAGNOSIS — R19.5 POSITIVE FIT (FECAL IMMUNOCHEMICAL TEST): Primary | ICD-10-CM

## 2019-03-21 PROCEDURE — 3074F SYST BP LT 130 MM HG: CPT | Mod: CPTII,S$GLB,, | Performed by: NURSE PRACTITIONER

## 2019-03-21 PROCEDURE — 3078F DIAST BP <80 MM HG: CPT | Mod: CPTII,S$GLB,, | Performed by: NURSE PRACTITIONER

## 2019-03-21 PROCEDURE — 3008F BODY MASS INDEX DOCD: CPT | Mod: CPTII,S$GLB,, | Performed by: NURSE PRACTITIONER

## 2019-03-21 PROCEDURE — 3074F PR MOST RECENT SYSTOLIC BLOOD PRESSURE < 130 MM HG: ICD-10-PCS | Mod: CPTII,S$GLB,, | Performed by: NURSE PRACTITIONER

## 2019-03-21 PROCEDURE — 99999 PR PBB SHADOW E&M-EST. PATIENT-LVL III: ICD-10-PCS | Mod: PBBFAC,,, | Performed by: NURSE PRACTITIONER

## 2019-03-21 PROCEDURE — 3078F PR MOST RECENT DIASTOLIC BLOOD PRESSURE < 80 MM HG: ICD-10-PCS | Mod: CPTII,S$GLB,, | Performed by: NURSE PRACTITIONER

## 2019-03-21 PROCEDURE — 99204 PR OFFICE/OUTPT VISIT, NEW, LEVL IV, 45-59 MIN: ICD-10-PCS | Mod: S$GLB,,, | Performed by: NURSE PRACTITIONER

## 2019-03-21 PROCEDURE — 3008F PR BODY MASS INDEX (BMI) DOCUMENTED: ICD-10-PCS | Mod: CPTII,S$GLB,, | Performed by: NURSE PRACTITIONER

## 2019-03-21 PROCEDURE — 99204 OFFICE O/P NEW MOD 45 MIN: CPT | Mod: S$GLB,,, | Performed by: NURSE PRACTITIONER

## 2019-03-21 PROCEDURE — 99999 PR PBB SHADOW E&M-EST. PATIENT-LVL III: CPT | Mod: PBBFAC,,, | Performed by: NURSE PRACTITIONER

## 2019-03-21 RX ORDER — DICYCLOMINE HYDROCHLORIDE 20 MG/1
20 TABLET ORAL EVERY 6 HOURS
Qty: 120 TABLET | Refills: 6 | Status: SHIPPED | OUTPATIENT
Start: 2019-03-21 | End: 2019-04-20

## 2019-03-21 RX ORDER — SODIUM, POTASSIUM,MAG SULFATES 17.5-3.13G
SOLUTION, RECONSTITUTED, ORAL ORAL
Qty: 1 KIT | Refills: 0 | Status: ON HOLD | OUTPATIENT
Start: 2019-03-21 | End: 2019-04-23 | Stop reason: HOSPADM

## 2019-03-21 NOTE — PATIENT INSTRUCTIONS
SUPREP Instructions    You are scheduled for a colonoscopy with Dr. Mosher on 4/23/19 at [facility] located at [address] [place] [address].  Check in at the admit desk, first floor of the hospital (which is the building on the left).     You will receive a call 2-3 days before your colonoscopy to tell you the time to arrive.  If you have not received a call by the day before your procedure, call the Endoscopy Lab at [phone].    To ensure that your test is accurate and complete, you MUST follow these instructions listed below.  If you have any questions, please call our office at [phone].  Plan on being at the hospital for your procedure for 3-4 hours.    1.  Follow a CLEAR LIQUID DIET for the entire day before your scheduled colonoscopy.  This means no solid food the entire day starting when you wake.  You may have as much of the clear liquids as you want throughout the day.   CLEAR LIQUID DIET:   - Avoid Red, Orange, Purple, and/or Blue food coloring   - NO DAIRY   - You can have:  Coffee with sugar (no creamer), tea, water, soda, apple or white grape juice, chicken or beef broth/bouillon (no meat, noodles, or veggies), green/yellow popsicles, green/yellow Jell-O, lemonade.    2.  AT 5 pm the evening before your colonoscopy, POUR ONE (1) BOTTLE OF SUPREP INTO THE MIXING CONTAINER, PROVIDED INSIDE THE BOX.  ADD WATER TO THE LINE ON THE CONTAINER AND MIX IT WELL.  DRINK THE ENTIRE CONTAINER AND THEN DRINK TWO (2) MORE CONTAINERS OF WATER OVER THE NEXT 1 HOUR.  This is sometimes easier to drink if this solution is cold, so you can mix the solution a few hours ahead of time and place in the refrigerator prior to drinking.  You have to drink the solution within 24 hours of mixing it.  Do NOT put this solution over ice.  It IS ok to drink with a straw.    3.  The endoscopy department will call you 2 days before your colonoscopy to tell you the exact time to arrive, AND to tell you the exact time to  drink the 2nd portion of your prep (which will be FIVE HOURS BEFORE YOUR ARRIVAL TIME).  At this time given to you, POUR ONE (1) BOTTLE OF SUPREP INTO THE MIXING CONTAINER, PROVIDED INSIDE THE BOX.  ADD WATER TO THE LINE ON THE CONTAINER AND MIX IT WELL.  DRINK THE ENTIRE CONTAINER AND THEN DRINK TWO (2) MORE CONTAINERS OF WATER OVER THE NEXT 1 HOUR.  This is sometimes easier to drink if this solution is cold, so you can mix the solution a few hours ahead of time and place in the refrigerator prior to drinking.  You have to drink the solution within 24 hours of mixing it.  Do NOT put this solution over ice.  It IS ok to drink with a straw. Once this is complete, you may not have ANYTHING else by mouth!    4.  You must have someone with you to DRIVE YOU HOME since you will be receiving IV sedation for the colonoscopy.    5.  It is ok to take your heart, blood pressure, and seizure medications in the morning of your test with a SIP of water.  Hold other medications until after your procedure.  Do NOT have anything else to eat or drink the morning of your colonoscopy.  It is ok to brush your teeth.    6.  If you are on blood thinners THAT YOU HAVE BEEN INSTRUCTED TO HOLD BY YOUR DOCTOR FOR THIS PROCEDURE, then do NOT take this the morning of your colonoscopy.  Do NOT stop these medications on your own, they must be approved to be held by your doctor.  Your colonoscopy can NOT be done if you are on these medications.  Examples of blood thinners include: Coumadin, Aggrenox, Plavix, Pradaxa, Reapro, Pletal, Xarelto, Ticagrelor, Brilinta, Eliquis, and high dose aspirin (325 mg).  You do not have to stop baby aspirin 81 mg.    7.  IF YOU ARE DIABETIC:  NO INSULIN OR ORAL MEDICATIONS THE MORNING OF THE COLONOSCOPY.  TAKE ONLY HALF THE DOSE OF YOUR INSULIN THE DAY BEFORE THE COLONOSCOPY.  DO NOT TAKE ANY ORAL DIABETIC MEDICATIONS THE DAY BEFORE THE COLONOSCOPY.  IF YOU ARE AN INSULIN DEPENDENT DIABETIC WITH UNSTABLE BLOOD  MARY, NOTIFY YOUR PRIMARY CARE PHYSICIAN FOR INSTRUCTIONS.

## 2019-03-21 NOTE — PROGRESS NOTES
Subjective:       Patient ID: Thelma Nguyen is a 53 y.o. female.    Chief Complaint: Colonoscopy (Positive FIT test) and Anemia    HPI  Recent positive FIT test.  She denies obvious blood with bowel movements or black stools.  She has no family history of colon cancer or colon polyps.  She has had colonoscopy in the distant past and reports diagnosis of IBS  In 1993.  She describes need for bowel movement within one hour of eating.  Can have associated urgency.  At times urgent bowel movement will occur without eating and can occur at night.  Stool may be loose or formed.  Has increased gas and bloating and at times lower abdominal pain bilaterally.  Certain foods are sure to cause complaints including spicy, beans and dairy.  Has used levsin in the past without results.  Uses lomotil prn if she is travelling or needs to have some assurance that she will not need a bathroom.  Reports this pattern affects her social activities, eating and travelling.  She had EGD in the distant past also.  Reports current complaints of frequent nausea.  Occasional reflux.  Denies dysphagia.     Has had chronic anemia.  Cancer treatment with chemo and radiation in 1985 for glioblastoma. Skin cancer on scalp last year.  History of DVT with Factor V deficiency and takes xarelto.    Review of Systems   Constitutional: Negative.  Negative for activity change, appetite change, fatigue, fever and unexpected weight change.   HENT: Negative for trouble swallowing.    Respiratory: Negative for shortness of breath.    Cardiovascular: Negative for chest pain.   Gastrointestinal: Positive for abdominal distention, abdominal pain and nausea. Negative for blood in stool.   Genitourinary: Positive for dysuria (recent UTI treated).   Musculoskeletal: Positive for back pain.   Skin: Negative.    Neurological: Positive for light-headedness (at times with standing too fast).   Psychiatric/Behavioral: Negative.        Objective:       Physical Exam   Constitutional: She is oriented to person, place, and time. She appears well-developed and well-nourished. No distress.   HENT:   Head: Normocephalic.   Eyes: No scleral icterus.   Neck: Neck supple.   Cardiovascular: Normal rate.   Pulmonary/Chest: Effort normal. No respiratory distress.   Abdominal: Soft. Bowel sounds are normal. She exhibits no distension and no mass. There is tenderness in the epigastric area, left upper quadrant and left lower quadrant. There is no guarding.   Musculoskeletal: Normal range of motion.   Neurological: She is alert and oriented to person, place, and time.   Skin: Skin is warm and dry. She is not diaphoretic.   Psychiatric: She has a normal mood and affect. Her behavior is normal. Judgment and thought content normal.   Vitals reviewed.      Assessment:       1. Positive FIT (fecal immunochemical test)    2. Nausea    3. Anemia, unspecified type    4. Abdominal pain, epigastric        Plan:     Thelma was seen today for colonoscopy and anemia.    Diagnoses and all orders for this visit:    Positive FIT (fecal immunochemical test)  -     Case request GI: COLONOSCOPY Suprep, EGD (ESOPHAGOGASTRODUODENOSCOPY)    Nausea  -     Case request GI: COLONOSCOPY Suprep, EGD (ESOPHAGOGASTRODUODENOSCOPY)    Anemia, unspecified type  -     Case request GI: COLONOSCOPY Suprep, EGD (ESOPHAGOGASTRODUODENOSCOPY)    Abdominal pain, epigastric  -     Case request GI: COLONOSCOPY Suprep, EGD (ESOPHAGOGASTRODUODENOSCOPY)    Other orders  -     dicyclomine (BENTYL) 20 mg tablet; Take 1 tablet (20 mg total) by mouth every 6 (six) hours.  -     sodium,potassium,mag sulfates (SUPREP BOWEL PREP KIT) 17.5-3.13-1.6 gram SolR; Use as directed         Add probiotic.  Can use bentyl prn.

## 2019-03-25 ENCOUNTER — TELEPHONE (OUTPATIENT)
Dept: NEPHROLOGY | Facility: CLINIC | Age: 54
End: 2019-03-25

## 2019-03-25 DIAGNOSIS — N18.30 CHRONIC KIDNEY DISEASE, STAGE III (MODERATE): Primary | ICD-10-CM

## 2019-03-27 ENCOUNTER — LAB VISIT (OUTPATIENT)
Dept: LAB | Facility: HOSPITAL | Age: 54
End: 2019-03-27
Attending: INTERNAL MEDICINE
Payer: COMMERCIAL

## 2019-03-27 DIAGNOSIS — N18.30 CHRONIC KIDNEY DISEASE, STAGE III (MODERATE): ICD-10-CM

## 2019-03-27 LAB
ALBUMIN SERPL BCP-MCNC: 3.6 G/DL (ref 3.5–5.2)
ANION GAP SERPL CALC-SCNC: 9 MMOL/L (ref 8–16)
BACTERIA #/AREA URNS HPF: NORMAL /HPF
BILIRUB UR QL STRIP: NEGATIVE
BUN SERPL-MCNC: 30 MG/DL (ref 6–20)
CALCIUM SERPL-MCNC: 8.9 MG/DL (ref 8.7–10.5)
CHLORIDE SERPL-SCNC: 108 MMOL/L (ref 95–110)
CLARITY UR: ABNORMAL
CO2 SERPL-SCNC: 24 MMOL/L (ref 23–29)
COLOR UR: YELLOW
CREAT SERPL-MCNC: 1.4 MG/DL (ref 0.5–1.4)
CREAT UR-MCNC: 260.9 MG/DL (ref 15–325)
EST. GFR  (AFRICAN AMERICAN): 50 ML/MIN/1.73 M^2
EST. GFR  (NON AFRICAN AMERICAN): 43 ML/MIN/1.73 M^2
GLUCOSE SERPL-MCNC: 114 MG/DL (ref 70–110)
GLUCOSE UR QL STRIP: NEGATIVE
HGB UR QL STRIP: NEGATIVE
HYALINE CASTS #/AREA URNS LPF: 0 /LPF
KETONES UR QL STRIP: ABNORMAL
LEUKOCYTE ESTERASE UR QL STRIP: NEGATIVE
MICROSCOPIC COMMENT: NORMAL
NITRITE UR QL STRIP: NEGATIVE
PH UR STRIP: 6 [PH] (ref 5–8)
PHOSPHATE SERPL-MCNC: 2.2 MG/DL (ref 2.7–4.5)
POTASSIUM SERPL-SCNC: 4.1 MMOL/L (ref 3.5–5.1)
PROT UR QL STRIP: ABNORMAL
PROT UR-MCNC: 38 MG/DL (ref 0–15)
PROT/CREAT UR: 0.15 MG/G{CREAT} (ref 0–0.2)
RBC #/AREA URNS HPF: 0 /HPF (ref 0–4)
SODIUM SERPL-SCNC: 141 MMOL/L (ref 136–145)
SP GR UR STRIP: 1.02 (ref 1–1.03)
URN SPEC COLLECT METH UR: ABNORMAL
UROBILINOGEN UR STRIP-ACNC: NEGATIVE EU/DL
WBC #/AREA URNS HPF: 2 /HPF (ref 0–5)

## 2019-03-27 PROCEDURE — 36415 COLL VENOUS BLD VENIPUNCTURE: CPT

## 2019-03-27 PROCEDURE — 80069 RENAL FUNCTION PANEL: CPT

## 2019-03-27 PROCEDURE — 81000 URINALYSIS NONAUTO W/SCOPE: CPT

## 2019-03-27 PROCEDURE — 82570 ASSAY OF URINE CREATININE: CPT

## 2019-03-28 ENCOUNTER — OFFICE VISIT (OUTPATIENT)
Dept: NEPHROLOGY | Facility: CLINIC | Age: 54
End: 2019-03-28
Payer: COMMERCIAL

## 2019-03-28 VITALS
SYSTOLIC BLOOD PRESSURE: 116 MMHG | BODY MASS INDEX: 20.38 KG/M2 | WEIGHT: 134.5 LBS | DIASTOLIC BLOOD PRESSURE: 62 MMHG | HEART RATE: 97 BPM | HEIGHT: 68 IN | OXYGEN SATURATION: 99 %

## 2019-03-28 DIAGNOSIS — N18.30 CKD (CHRONIC KIDNEY DISEASE), STAGE III: ICD-10-CM

## 2019-03-28 PROCEDURE — 99999 PR PBB SHADOW E&M-EST. PATIENT-LVL IV: ICD-10-PCS | Mod: PBBFAC,,, | Performed by: INTERNAL MEDICINE

## 2019-03-28 PROCEDURE — 3074F PR MOST RECENT SYSTOLIC BLOOD PRESSURE < 130 MM HG: ICD-10-PCS | Mod: CPTII,S$GLB,, | Performed by: INTERNAL MEDICINE

## 2019-03-28 PROCEDURE — 99213 PR OFFICE/OUTPT VISIT, EST, LEVL III, 20-29 MIN: ICD-10-PCS | Mod: S$GLB,,, | Performed by: INTERNAL MEDICINE

## 2019-03-28 PROCEDURE — 99213 OFFICE O/P EST LOW 20 MIN: CPT | Mod: S$GLB,,, | Performed by: INTERNAL MEDICINE

## 2019-03-28 PROCEDURE — 3008F PR BODY MASS INDEX (BMI) DOCUMENTED: ICD-10-PCS | Mod: CPTII,S$GLB,, | Performed by: INTERNAL MEDICINE

## 2019-03-28 PROCEDURE — 99999 PR PBB SHADOW E&M-EST. PATIENT-LVL IV: CPT | Mod: PBBFAC,,, | Performed by: INTERNAL MEDICINE

## 2019-03-28 PROCEDURE — 3078F PR MOST RECENT DIASTOLIC BLOOD PRESSURE < 80 MM HG: ICD-10-PCS | Mod: CPTII,S$GLB,, | Performed by: INTERNAL MEDICINE

## 2019-03-28 PROCEDURE — 3078F DIAST BP <80 MM HG: CPT | Mod: CPTII,S$GLB,, | Performed by: INTERNAL MEDICINE

## 2019-03-28 PROCEDURE — 3074F SYST BP LT 130 MM HG: CPT | Mod: CPTII,S$GLB,, | Performed by: INTERNAL MEDICINE

## 2019-03-28 PROCEDURE — 3008F BODY MASS INDEX DOCD: CPT | Mod: CPTII,S$GLB,, | Performed by: INTERNAL MEDICINE

## 2019-03-28 NOTE — PATIENT INSTRUCTIONS
1) no NSAIDs  2) follow up 8-10 months with labs as ordered  3) stool noted to be positive for blood, please follow up with GI / PCP as ordered for work up / management

## 2019-03-28 NOTE — ASSESSMENT & PLAN NOTE
1. CKD: stable over at least the past 5 years, stage IIIa w/o protein, underlying etiology probably related to significant NSAID use several years ago, she has now stopped this     Lab Results   Component Value Date    CREATININE 1.4 03/27/2019     Protein Creatinine Ratios: current UA done right after getting over a UTI, repeating for next time, but likely negative, based on previous results    Prot/Creat Ratio, Ur   Date Value Ref Range Status   03/27/2019 0.15 0.00 - 0.20 Final   04/30/2018 Unable to calculate 0.00 - 0.20 Final   03/20/2018 Unable to calculate 0.00 - 0.20 Final     ·   ·   Acid-Base: reviewed  Lab Results   Component Value Date     03/27/2019    K 4.1 03/27/2019    CO2 24 03/27/2019     2. HTN: Blood pressures controlled    3. Renal osteodystrophy: reviewed  Lab Results   Component Value Date    CALCIUM 8.9 03/27/2019    PHOS 2.2 (L) 03/27/2019       4. Anemia:   Lab Results   Component Value Date    HGB 11.3 (L) 09/27/2018        5. DM:  Not a diabetic    6. Lipid management: reviewed  Lab Results   Component Value Date    LDLCALC 117.0 09/27/2018       7. ESRD planing: do not anticipate at this time    Follow up in 8 months    Patient seen with Dr escamilla

## 2019-03-28 NOTE — PROGRESS NOTES
Subjective:       Patient ID: Thelma Nguyen is a 53 y.o. White female who presents for follow-up evaluation of Chronic Kidney Disease    Patient is here today for follow up evaluation of CKD III.  Last seen in renal office 3/28/18 Baseline Cr 1.3-1.5 mg/dl Her most recent lab shown below There is hx of recurrent hyperkalemia (panhypo-pit; hypoadrenalism) compliance issue with cortisone Today she has no new complaints    Review of Systems   Constitutional: Negative for chills, fatigue and fever.   Respiratory: Negative for chest tightness and shortness of breath.    Cardiovascular: Negative for chest pain and leg swelling.   Gastrointestinal: Negative for abdominal distention, abdominal pain, diarrhea and nausea.   Genitourinary: Negative for decreased urine volume, difficulty urinating, flank pain, frequency, hematuria and urgency.   Skin: Negative for rash.   Neurological: Negative for tremors, speech difficulty and weakness.       Objective:      Physical Exam   Constitutional: She is oriented to person, place, and time.   HENT:   Head: Normocephalic and atraumatic.   Eyes: EOM are normal.   Neck: No JVD present.   Cardiovascular: Normal rate and regular rhythm.   Pulmonary/Chest: Effort normal and breath sounds normal.   Abdominal: Soft. She exhibits no distension.   Musculoskeletal: She exhibits no edema or tenderness.   Neurological: She is alert and oriented to person, place, and time.   Skin: Skin is warm.   Psychiatric: She has a normal mood and affect. Her behavior is normal.       Assessment & Plan:       CKD (chronic kidney disease), stage III  1. CKD: stable over at least the past 5 years, stage IIIa w/o protein, underlying etiology probably related to significant NSAID use several years ago, she has now stopped this     Lab Results   Component Value Date    CREATININE 1.4 03/27/2019     Protein Creatinine Ratios: current UA done right after getting over a UTI, repeating for next time, but  likely negative, based on previous results    Prot/Creat Ratio, Ur   Date Value Ref Range Status   03/27/2019 0.15 0.00 - 0.20 Final   04/30/2018 Unable to calculate 0.00 - 0.20 Final   03/20/2018 Unable to calculate 0.00 - 0.20 Final     ·   ·   Acid-Base: reviewed  Lab Results   Component Value Date     03/27/2019    K 4.1 03/27/2019    CO2 24 03/27/2019     2. HTN: Blood pressures controlled    3. Renal osteodystrophy: reviewed  Lab Results   Component Value Date    CALCIUM 8.9 03/27/2019    PHOS 2.2 (L) 03/27/2019       4. Anemia:   Lab Results   Component Value Date    HGB 11.3 (L) 09/27/2018        5. DM:  Not a diabetic    6. Lipid management: reviewed  Lab Results   Component Value Date    LDLCALC 117.0 09/27/2018       7. ESRD planing: do not anticipate at this time    Follow up in 8 months    Patient seen with Dr escamilla

## 2019-04-03 NOTE — PROGRESS NOTES
[facility]    The note from the fellow/resident was reviewed. I have personally interviewed and examined the patient. There were no additional findings with regards to the history or physical exam.    I agree with the assessment and plan of  Dr. Jairo Hugo

## 2019-04-18 ENCOUNTER — TELEPHONE (OUTPATIENT)
Dept: ENDOSCOPY | Facility: HOSPITAL | Age: 54
End: 2019-04-18

## 2019-04-18 NOTE — TELEPHONE ENCOUNTER
Spoke with patient about arrival time @.1015     Prep instructions reviewed: the day before the procedure, follow a clear liquid diet all day, then start the first 1/2 of prep at 5pm and take 2nd 1/2 of prep @. 0415 Pt must be completely NPO when prep completed @. 0515             Medications: Do not take Insulin or oral diabetic medications the day of the procedure.  Take as prescribed: heart, seizure and blood pressure medication in the morning with a sip of water (less than an ounce).  Take any breathing medications and bring inhalers to hospital with you Leave all valuables and jewelry at home.     Wear comfortable clothes to procedure to change into hospital gown You cannot drive for 24 hours after your procedure because you will receive sedation for your procedure to make you comfortable.  A ride must be provided at discharge.

## 2019-04-23 ENCOUNTER — HOSPITAL ENCOUNTER (OUTPATIENT)
Facility: HOSPITAL | Age: 54
Discharge: HOME | End: 2019-04-23
Attending: INTERNAL MEDICINE | Admitting: INTERNAL MEDICINE
Payer: COMMERCIAL

## 2019-04-23 ENCOUNTER — ANESTHESIA (OUTPATIENT)
Dept: ENDOSCOPY | Facility: HOSPITAL | Age: 54
End: 2019-04-23
Payer: COMMERCIAL

## 2019-04-23 ENCOUNTER — ANESTHESIA EVENT (OUTPATIENT)
Dept: ENDOSCOPY | Facility: HOSPITAL | Age: 54
End: 2019-04-23
Payer: COMMERCIAL

## 2019-04-23 VITALS
SYSTOLIC BLOOD PRESSURE: 136 MMHG | RESPIRATION RATE: 18 BRPM | TEMPERATURE: 97 F | BODY MASS INDEX: 20.46 KG/M2 | DIASTOLIC BLOOD PRESSURE: 69 MMHG | OXYGEN SATURATION: 100 % | HEIGHT: 68 IN | HEART RATE: 88 BPM | WEIGHT: 135 LBS

## 2019-04-23 DIAGNOSIS — R19.5 POSITIVE FIT (FECAL IMMUNOCHEMICAL TEST): ICD-10-CM

## 2019-04-23 PROCEDURE — 43239 PR EGD, FLEX, W/BIOPSY, SGL/MULTI: ICD-10-PCS | Mod: 51,,, | Performed by: INTERNAL MEDICINE

## 2019-04-23 PROCEDURE — 88305 TISSUE EXAM BY PATHOLOGIST: CPT | Mod: 26,,, | Performed by: PATHOLOGY

## 2019-04-23 PROCEDURE — 25000003 PHARM REV CODE 250: Performed by: INTERNAL MEDICINE

## 2019-04-23 PROCEDURE — 43239 EGD BIOPSY SINGLE/MULTIPLE: CPT | Mod: 51,,, | Performed by: INTERNAL MEDICINE

## 2019-04-23 PROCEDURE — 63600175 PHARM REV CODE 636 W HCPCS: Performed by: NURSE ANESTHETIST, CERTIFIED REGISTERED

## 2019-04-23 PROCEDURE — 43239 EGD BIOPSY SINGLE/MULTIPLE: CPT | Performed by: INTERNAL MEDICINE

## 2019-04-23 PROCEDURE — 88305 TISSUE SPECIMEN TO PATHOLOGY - SURGERY: ICD-10-PCS | Mod: 26,,, | Performed by: PATHOLOGY

## 2019-04-23 PROCEDURE — 45385 PR COLONOSCOPY,REMV LESN,SNARE: ICD-10-PCS | Mod: ,,, | Performed by: INTERNAL MEDICINE

## 2019-04-23 PROCEDURE — 27201012 HC FORCEPS, HOT/COLD, DISP: Performed by: INTERNAL MEDICINE

## 2019-04-23 PROCEDURE — 37000009 HC ANESTHESIA EA ADD 15 MINS: Performed by: INTERNAL MEDICINE

## 2019-04-23 PROCEDURE — 45385 COLONOSCOPY W/LESION REMOVAL: CPT | Mod: ,,, | Performed by: INTERNAL MEDICINE

## 2019-04-23 PROCEDURE — 45380 COLONOSCOPY AND BIOPSY: CPT | Performed by: INTERNAL MEDICINE

## 2019-04-23 PROCEDURE — 27201089 HC SNARE, DISP (ANY): Performed by: INTERNAL MEDICINE

## 2019-04-23 PROCEDURE — 88305 TISSUE EXAM BY PATHOLOGIST: CPT | Performed by: PATHOLOGY

## 2019-04-23 PROCEDURE — 37000008 HC ANESTHESIA 1ST 15 MINUTES: Performed by: INTERNAL MEDICINE

## 2019-04-23 PROCEDURE — 45385 COLONOSCOPY W/LESION REMOVAL: CPT | Performed by: INTERNAL MEDICINE

## 2019-04-23 RX ORDER — LIDOCAINE HCL/PF 100 MG/5ML
SYRINGE (ML) INTRAVENOUS
Status: DISCONTINUED | OUTPATIENT
Start: 2019-04-23 | End: 2019-04-23

## 2019-04-23 RX ORDER — PROPOFOL 10 MG/ML
VIAL (ML) INTRAVENOUS
Status: DISCONTINUED | OUTPATIENT
Start: 2019-04-23 | End: 2019-04-23

## 2019-04-23 RX ORDER — PROPOFOL 10 MG/ML
VIAL (ML) INTRAVENOUS CONTINUOUS PRN
Status: DISCONTINUED | OUTPATIENT
Start: 2019-04-23 | End: 2019-04-23

## 2019-04-23 RX ORDER — SODIUM CHLORIDE 0.9 % (FLUSH) 0.9 %
10 SYRINGE (ML) INJECTION
Status: DISCONTINUED | OUTPATIENT
Start: 2019-04-23 | End: 2019-04-23 | Stop reason: HOSPADM

## 2019-04-23 RX ORDER — SODIUM CHLORIDE 9 MG/ML
INJECTION, SOLUTION INTRAVENOUS CONTINUOUS
Status: DISCONTINUED | OUTPATIENT
Start: 2019-04-23 | End: 2019-04-23 | Stop reason: HOSPADM

## 2019-04-23 RX ADMIN — PROPOFOL 100 MG: 10 INJECTION, EMULSION INTRAVENOUS at 11:04

## 2019-04-23 RX ADMIN — LIDOCAINE HYDROCHLORIDE 60 MG: 20 INJECTION, SOLUTION INTRAVENOUS at 11:04

## 2019-04-23 RX ADMIN — SODIUM CHLORIDE: 0.9 INJECTION, SOLUTION INTRAVENOUS at 10:04

## 2019-04-23 RX ADMIN — PROPOFOL 200 MCG/KG/MIN: 10 INJECTION, EMULSION INTRAVENOUS at 11:04

## 2019-04-23 NOTE — ANESTHESIA POSTPROCEDURE EVALUATION
Anesthesia Post Evaluation    Patient: Thelma Nguyen    Procedure(s) Performed: Procedure(s) (LRB):  EGD/colon (N/A)  COLONOSCOPY Suprep (N/A)    Final Anesthesia Type: MAC  Patient location during evaluation: GI PACU  Patient participation: Yes- Able to Participate  Level of consciousness: awake and alert and oriented  Post-procedure vital signs: reviewed and stable  Pain management: adequate  Airway patency: patent  PONV status at discharge: No PONV  Anesthetic complications: no      Cardiovascular status: blood pressure returned to baseline and hemodynamically stable  Respiratory status: unassisted, room air and spontaneous ventilation  Hydration status: euvolemic  Follow-up not needed.          Vitals Value Taken Time   /65 4/23/2019 10:35 AM   Temp 36.5 °C (97.7 °F) 4/23/2019 10:35 AM   Pulse 93 4/23/2019 10:35 AM   Resp 17 4/23/2019 10:35 AM   SpO2 100 % 4/23/2019 10:35 AM         No case tracking events are documented in the log.      Pain/Jose De Jesus Score: No data recorded

## 2019-04-23 NOTE — PROVATION PATIENT INSTRUCTIONS
Discharge Summary/Instructions after an Endoscopic Procedure  Patient Name: Thelma Nguyen  Patient MRN: [id number]  Patient YOB: 1965 Tuesday, April 23, 2019  Tony Mosher MD  RESTRICTIONS:  During your procedure today, you received medications for sedation.  These   medications may affect your judgment, balance and coordination.  Therefore,   for 24 hours, you have the following restrictions:   - DO NOT drive a car, operate machinery, make legal/financial decisions,   sign important papers or drink alcohol.    ACTIVITY:  Today: no heavy lifting, straining or running due to procedural   sedation/anesthesia.  The following day: return to full activity including work.  DIET:  Eat and drink normally unless instructed otherwise.     TREATMENT FOR COMMON SIDE EFFECTS:  - Mild abdominal pain, nausea, belching, bloating or excessive gas:  rest,   eat lightly and use a heating pad.  - Sore Throat: treat with throat lozenges and/or gargle with warm salt   water.  - Because air was used during the procedure, expelling large amounts of air   from your rectum or belching is normal.  - If a bowel prep was taken, you may not have a bowel movement for 1-3 days.    This is normal.  SYMPTOMS TO WATCH FOR AND REPORT TO YOUR PHYSICIAN:  1. Abdominal pain or bloating, other than gas cramps.  2. Chest pain.  3. Back pain.  4. Signs of infection such as: chills or fever occurring within 24 hours   after the procedure.  5. Rectal bleeding, which would show as bright red, maroon, or black stools.   (A tablespoon of blood from the rectum is not serious, especially if   hemorrhoids are present.)  6. Vomiting.  7. Weakness or dizziness.  GO DIRECTLY TO THE NEAREST EMERGENCY ROOM IF YOU HAVE ANY OF THE FOLLOWING:      Difficulty breathing              Chills and/or fever over 101 F   Persistent vomiting and/or vomiting blood   Severe abdominal pain   Severe chest pain   Black, tarry stools   Bleeding- more than one  tablespoon   Any other symptom or condition that you feel may need urgent attention  Your doctor recommends these additional instructions:  If any biopsies were taken, your doctors clinic will contact you in 1 to 2   weeks with any results.  - Discharge patient to home (via wheelchair).   - Patient has a contact number available for emergencies.  The signs and   symptoms of potential delayed complications were discussed with the   patient.  Return to normal activities tomorrow.  Written discharge   instructions were provided to the patient.   - Resume previous diet.   - Resume Xarelto (rivaroxaban) at prior dose in 3 days.   - Await pathology results.   - Repeat colonoscopy date to be determined after pending pathology results   are reviewed for surveillance.  For questions, problems or results please call your physician - Tony Mosher MD at Work:  [phone].  EMERGENCY PHONE NUMBER: [phone],  LAB RESULTS: [phone]  IF A COMPLICATION OR EMERGENCY SITUATION ARISES AND YOU ARE UNABLE TO REACH   YOUR PHYSICIAN - GO DIRECTLY TO THE EMERGENCY ROOM.  Tony Mosher MD  4/23/2019 12:38:43 PM  This report has been verified and signed electronically.  PROVATION

## 2019-04-23 NOTE — TRANSFER OF CARE
"Anesthesia Transfer of Care Note    Patient: Thelma Nguyen    Procedure(s) Performed: Procedure(s) (LRB):  EGD/colon (N/A)  COLONOSCOPY Suprep (N/A)    Patient location: GI    Anesthesia Type: MAC    Transport from OR: Transported from OR on room air with adequate spontaneous ventilation    Post pain: adequate analgesia    Post assessment: no apparent anesthetic complications and tolerated procedure well    Post vital signs: stable    Level of consciousness: awake, alert and oriented    Nausea/Vomiting: no nausea/vomiting    Complications: none    Transfer of care protocol was followed      Last vitals:   Visit Vitals  /65 (BP Location: Left arm, Patient Position: Lying)   Pulse 93   Temp 36.5 °C (97.7 °F) (Temporal)   Resp 17   Ht 5' 8" (1.727 m)   Wt 61.2 kg (135 lb)   LMP 10/23/1997 (Exact Date)   SpO2 100%   Breastfeeding? No   BMI 20.53 kg/m²     "

## 2019-04-23 NOTE — ANESTHESIA PREPROCEDURE EVALUATION
[facility]  Anesthesia Pre-Operative Evaluation         Patient Name: Thelma Nguyen  YOB: 1965  MRN: [id number]    SUBJECTIVE:       Drips: None documented.    Patient Active Problem List   Diagnosis    Panhypopituitarism    IBS (irritable bowel syndrome)    Seizure disorder    Allergic rhinitis due to pollen    Peripheral polyneuropathy    Osteoporosis    Sacroiliac joint pain    Myofascial muscle pain    Secondary adrenal insufficiency    Factor V deficiency with DVT x 3    Glioblastoma multiforme of brain    Ganglion cyst of dorsum of left wrist    Mohs defect    Mohs defect of left scalp    CKD (chronic kidney disease), stage III    Positive FIT (fecal immunochemical test)       Review of patient's allergies indicates:   Allergen Reactions    Neurontin [gabapentin]        Current Outpatient Medications:  No current facility-administered medications for this visit.   No current outpatient medications on file.    Facility-Administered Medications Ordered in Other Visits:     0.9%  NaCl infusion, , Intravenous, Continuous, Tony Mosher MD    sodium chloride 0.9% flush 10 mL, 10 mL, Intravenous, PRN, Tony Mosher MD    Past Surgical History:   Procedure Laterality Date    APPENDECTOMY      BRAIN SURGERY      for glioblastoma      SECTION      CHOLECYSTECTOMY  2007    CLOSURE, MOHS PROCEDURE DEFECT SCALP  Flap closure Left 2018    Performed by [facility] at [facility] OR [address] [place] [address]    HYSTERECTOMY      TUBAL LIGATION         Social History     Socioeconomic History    Marital status:      Spouse name: Not on file    Number of children: Not on file    Years of education: Not on file    Highest education level: Not on file   Occupational History    Not on file   Social Needs    Financial resource strain: Not on file    Food  insecurity:     Worry: Not on file     Inability: Not on file    Transportation needs:     Medical: Not on file     Non-medical: Not on file   Tobacco Use    Smoking status: Never Smoker    Smokeless tobacco: Never Used   Substance and Sexual Activity    Alcohol use: No    Drug use: No    Sexual activity: Not Currently   Lifestyle    Physical activity:     Days per week: Not on file     Minutes per session: Not on file    Stress: Not on file   Relationships    Social connections:     Talks on phone: Not on file     Gets together: Not on file     Attends [employer] service: Not on file     Active member of club or organization: Not on file     Attends meetings of clubs or organizations: Not on file     Relationship status: Not on file   Other Topics Concern    Are you pregnant or think you may be? Not Asked    Breast-feeding Not Asked   Social History Narrative    Not on file       OBJECTIVE:     Vital Signs Range (Last 24H):  Temp:  [36.5 °C (97.7 °F)]   Pulse:  [93]   Resp:  [17]   BP: (127)/(65)   SpO2:  [100 %]       Significant Labs:  Lab Results   Component Value Date    WBC 5.11 09/27/2018    HGB 11.3 (L) 09/27/2018    HCT 35.3 (L) 09/27/2018     09/27/2018    CHOL 232 (H) 09/27/2018    TRIG 315 (H) 09/27/2018    HDL 52 09/27/2018    ALT 17 09/27/2018    AST 26 09/27/2018     03/27/2019    K 4.1 03/27/2019     03/27/2019    CREATININE 1.4 03/27/2019    BUN 30 (H) 03/27/2019    CO2 24 03/27/2019    TSH <0.010 (L) 10/27/2016       Diagnostic Studies: No relevant studies.    EKG: No recent studies available.    ASSESSMENT/PLAN:         Pre-op Assessment    I have reviewed the Patient Summary Reports.     I have reviewed the Nursing Notes.      Review of Systems  Anesthesia Hx:  History of prior surgery of interest to airway management or planning:   Hematology/Oncology:        Current/Recent Cancer. -- Cancer in past history:  Oncology Comments: Glioblastoma multiforme s/p resection  with chemo/radiation     Cardiovascular:  Cardiovascular Normal Exercise tolerance: good     Renal/:   Chronic Renal Disease, CRI    Hepatic/GI:  Hepatic/GI Normal    Musculoskeletal:  Musculoskeletal Normal    Neurological:   Neuromuscular Disease, Seizures    Endocrine:   Panhypopituitarism    Dermatological:  Skin Normal    Psych:  Psychiatric Normal           Physical Exam  General:  Well nourished    Airway/Jaw/Neck:  Airway Findings: Mouth Opening: Small, but > 3cm General Airway Assessment: Adult  Mallampati: III  TM Distance: Normal, at least 6 cm  Jaw/Neck Findings:     Neck ROM: Normal ROM     Eyes/Ears/Nose:  Eyes/Ears/Nose Findings: EOMI, mucus membranes moist    Dental:  Dental Findings: In tact, molar caps   Chest/Lungs:  Chest/Lungs Findings: Clear to auscultation, Normal Respiratory Rate     Heart/Vascular:  Heart Findings: Rate: Normal  Rhythm: Regular Rhythm  Heart murmur: negative    Abdomen:  Abdomen Findings: Normal    Musculoskeletal:  Musculoskeletal Findings: Normal   Skin:  Skin Findings: Normal    Mental Status:  Mental Status Findings:  Cooperative, Alert and Oriented         Anesthesia Plan  Type of Anesthesia, risks & benefits discussed:  Anesthesia Type:  general  Patient's Preference:   Intra-op Monitoring Plan: standard ASA monitors  Intra-op Monitoring Plan Comments:   Post Op Pain Control Plan: per primary service following discharge from PACU, IV/PO Opioids PRN and multimodal analgesia  Post Op Pain Control Plan Comments:   Induction:   IV  Beta Blocker:  Patient is not currently on a Beta-Blocker (No further documentation required).       Informed Consent: Patient understands risks and agrees with Anesthesia plan.  Questions answered. Anesthesia consent signed with patient.  ASA Score: 3     Day of Surgery Review of History & Physical:    H&P update referred to the surgeon.     Anesthesia Plan Notes:       Secondary adrenal insufficiency (E27.49)     POA: Yes  Need to continue  steroid & thyroid hormone supplementation; patient took hydrocortisone today      Peripheral polyneuropathy (G62.9)  Cognizance of autonomic instability; adjust induction dose     POA: Yes      Panhypopituitarism (E23.0)     POA: Yes  Prepare to supplement steroid intraop with IV hydrocortisone    Seizure disorder (G40.909)     POA: Yes  Plan to avoid hyperventilation; use ambu bag facilitated transfer to PACU      Resolved Hospital Problems  No resolved problems to display.            Ready For Surgery From Anesthesia Perspective.

## 2019-04-23 NOTE — H&P
[facility]  Gastroenterology  H&P    Patient Name: Thelma Nguyen  MRN: [id number]  Admission Date: 2019  Code Status: Full Code    Attending Provider: Tony Mosher MD   Primary Care Physician: Isaías Muñiz MD  Principal Problem:<principal problem not specified>    Subjective:     History of Present Illness: Positive Fit    Past Medical History:   Diagnosis Date    Allergic rhinitis     Anemia     Arthritis     Basal cell carcinoma     Broken ankle     Cancer 1985    Clotting disorder     Disorder of kidney and ureter     DVT (deep venous thrombosis)     Factor V deficiency     Fracture of elbow     left    Glioblastoma multiforme of brain     Hypothyroidism     IBS (irritable bowel syndrome)     Osteoporosis     Panhypopituitarism     Peripheral polyneuropathy     Secondary adrenal insufficiency     Seizures     Thyroid disease        Past Surgical History:   Procedure Laterality Date    APPENDECTOMY      BRAIN SURGERY      for glioblastoma      SECTION      CHOLECYSTECTOMY  2007    CLOSURE, MOHS PROCEDURE DEFECT SCALP  Flap closure Left 2018    Performed [facility] at [facility] OR [address] [place] [address]    HYSTERECTOMY      TUBAL LIGATION         Review of patient's allergies indicates:   Allergen Reactions    Neurontin [gabapentin]      Family History     Problem Relation (Age of Onset)    Heart disease Mother        Tobacco Use    Smoking status: Never Smoker    Smokeless tobacco: Never Used   Substance and Sexual Activity    Alcohol use: No    Drug use: No    Sexual activity: Not Currently     Review of Systems   Constitutional: Negative for appetite change and unexpected weight change.   Respiratory: Negative for apnea and chest tightness.    Cardiovascular: Negative for chest pain and palpitations.   Gastrointestinal: Positive for blood in stool. Negative for abdominal distention.      Objective:     Vital Signs (Most Recent):  Temp: 97.7 °F (36.5 °C) (04/23/19 1035)  Pulse: 93 (04/23/19 1035)  Resp: 17 (04/23/19 1035)  BP: 127/65 (04/23/19 1035)  SpO2: 100 % (04/23/19 1035) Vital Signs (24h Range):  Temp:  [97.7 °F (36.5 °C)] 97.7 °F (36.5 °C)  Pulse:  [93] 93  Resp:  [17] 17  SpO2:  [100 %] 100 %  BP: (127)/(65) 127/65     Weight: 61.2 kg (135 lb) (04/23/19 1035)  Body mass index is 20.53 kg/m².    No intake or output data in the 24 hours ending 04/23/19 1058    Lines/Drains/Airways     Peripheral Intravenous Line                 Peripheral IV - Single Lumen 04/23/19 1056 22 G Right Hand less than 1 day                Physical Exam   Constitutional: She is oriented to person, place, and time. She appears well-developed and well-nourished. No distress.   HENT:   Head: Normocephalic and atraumatic.   Eyes: Conjunctivae are normal. No scleral icterus.   Neck: Normal range of motion. Neck supple. No tracheal deviation present. No thyromegaly present.   Cardiovascular: Normal rate and regular rhythm. Exam reveals no gallop and no friction rub.   No murmur heard.  Pulmonary/Chest: Effort normal and breath sounds normal. No respiratory distress. She has no wheezes.   Abdominal: Soft. Bowel sounds are normal. She exhibits no distension. There is no tenderness.   Musculoskeletal:        Right wrist: She exhibits normal range of motion and no tenderness.        Left wrist: She exhibits normal range of motion and no tenderness.   Lymphadenopathy:        Head (right side): No submental and no submandibular adenopathy present.        Head (left side): No submental and no submandibular adenopathy present.   Neurological: She is alert and oriented to person, place, and time.   Skin: Skin is warm and dry. No rash noted. She is not diaphoretic. No erythema.   Psychiatric: She has a normal mood and affect. Her behavior is normal.   Nursing note and vitals reviewed.        Assessment/Plan:     Active  Diagnoses:    Diagnosis Date Noted POA    Positive FIT (fecal immunochemical test) [R19.5] 04/23/2019 Yes      Problems Resolved During this Admission:     Nausea/Positive Fit - EGD/Colonoscopy    Tony Mosher MD  Gastroenterology  [facility]

## 2019-04-23 NOTE — PROVATION PATIENT INSTRUCTIONS
Discharge Summary/Instructions after an Endoscopic Procedure  Patient Name: Thelma Nguyen  Patient MRN: [id number]  Patient YOB: 1965 Tuesday, April 23, 2019  Tony Mosher MD  RESTRICTIONS:  During your procedure today, you received medications for sedation.  These   medications may affect your judgment, balance and coordination.  Therefore,   for 24 hours, you have the following restrictions:   - DO NOT drive a car, operate machinery, make legal/financial decisions,   sign important papers or drink alcohol.    ACTIVITY:  Today: no heavy lifting, straining or running due to procedural   sedation/anesthesia.  The following day: return to full activity including work.  DIET:  Eat and drink normally unless instructed otherwise.     TREATMENT FOR COMMON SIDE EFFECTS:  - Mild abdominal pain, nausea, belching, bloating or excessive gas:  rest,   eat lightly and use a heating pad.  - Sore Throat: treat with throat lozenges and/or gargle with warm salt   water.  - Because air was used during the procedure, expelling large amounts of air   from your rectum or belching is normal.  - If a bowel prep was taken, you may not have a bowel movement for 1-3 days.    This is normal.  SYMPTOMS TO WATCH FOR AND REPORT TO YOUR PHYSICIAN:  1. Abdominal pain or bloating, other than gas cramps.  2. Chest pain.  3. Back pain.  4. Signs of infection such as: chills or fever occurring within 24 hours   after the procedure.  5. Rectal bleeding, which would show as bright red, maroon, or black stools.   (A tablespoon of blood from the rectum is not serious, especially if   hemorrhoids are present.)  6. Vomiting.  7. Weakness or dizziness.  GO DIRECTLY TO THE NEAREST EMERGENCY ROOM IF YOU HAVE ANY OF THE FOLLOWING:      Difficulty breathing              Chills and/or fever over 101 F   Persistent vomiting and/or vomiting blood   Severe abdominal pain   Severe chest pain   Black, tarry stools   Bleeding- more than one  tablespoon   Any other symptom or condition that you feel may need urgent attention  Your doctor recommends these additional instructions:  If any biopsies were taken, your doctors clinic will contact you in 1 to 2   weeks with any results.  - Discharge patient to home (via wheelchair).   - Patient has a contact number available for emergencies.  The signs and   symptoms of potential delayed complications were discussed with the   patient.  Return to normal activities tomorrow.  Written discharge   instructions were provided to the patient.   - Resume previous diet.   - Continue present medications.   - Await pathology results.  For questions, problems or results please call your physician - Tony Mosher MD at Work:  ( [phone].  EMERGENCY PHONE NUMBER: [phone],  LAB RESULTS: [phone]  IF A COMPLICATION OR EMERGENCY SITUATION ARISES AND YOU ARE UNABLE TO REACH   YOUR PHYSICIAN - GO DIRECTLY TO THE EMERGENCY ROOM.  Tony Mosher MD  4/23/2019 11:58:27 AM  This report has been verified and signed electronically.  [employer]ATION

## 2019-05-04 ENCOUNTER — PATIENT MESSAGE (OUTPATIENT)
Dept: ADMINISTRATIVE | Facility: HOSPITAL | Age: 54
End: 2019-05-04

## 2019-05-06 ENCOUNTER — TELEPHONE (OUTPATIENT)
Dept: GASTROENTEROLOGY | Facility: CLINIC | Age: 54
End: 2019-05-06

## 2019-05-06 NOTE — TELEPHONE ENCOUNTER
----- Message from Tony Mosher MD sent at 5/2/2019 10:42 AM CDT -----  Biopsies benign, polyps benign 10 year recall colonoscopy; I suspect her Fit was positive do to the stomach findings (blood was visualized). I would continue PPI x 4 weeks. I would f/u with her in 6-8 weeks, I'd like to check her labs at that point

## 2019-06-10 ENCOUNTER — PATIENT MESSAGE (OUTPATIENT)
Dept: INTERNAL MEDICINE | Facility: CLINIC | Age: 54
End: 2019-06-10

## 2019-06-10 RX ORDER — SCOLOPAMINE TRANSDERMAL SYSTEM 1 MG/1
1 PATCH, EXTENDED RELEASE TRANSDERMAL
Qty: 4 PATCH | Refills: 0 | Status: SHIPPED | OUTPATIENT
Start: 2019-06-10 | End: 2019-09-18

## 2019-06-11 ENCOUNTER — PATIENT MESSAGE (OUTPATIENT)
Dept: INTERNAL MEDICINE | Facility: CLINIC | Age: 54
End: 2019-06-11

## 2019-06-13 ENCOUNTER — HOSPITAL ENCOUNTER (OUTPATIENT)
Dept: RADIOLOGY | Facility: HOSPITAL | Age: 54
Discharge: HOME | End: 2019-06-13
Attending: ORTHOPAEDIC SURGERY
Payer: COMMERCIAL

## 2019-06-13 ENCOUNTER — OFFICE VISIT (OUTPATIENT)
Dept: ORTHOPEDICS | Facility: CLINIC | Age: 54
End: 2019-06-13
Payer: COMMERCIAL

## 2019-06-13 VITALS — HEIGHT: 68 IN | BODY MASS INDEX: 20.46 KG/M2 | WEIGHT: 135 LBS

## 2019-06-13 DIAGNOSIS — G89.29 WRIST PAIN, CHRONIC, LEFT: Primary | ICD-10-CM

## 2019-06-13 DIAGNOSIS — M25.532 WRIST PAIN, CHRONIC, LEFT: ICD-10-CM

## 2019-06-13 DIAGNOSIS — M25.532 LEFT WRIST PAIN: ICD-10-CM

## 2019-06-13 DIAGNOSIS — M25.532 WRIST PAIN, CHRONIC, LEFT: Primary | ICD-10-CM

## 2019-06-13 DIAGNOSIS — G89.29 WRIST PAIN, CHRONIC, LEFT: ICD-10-CM

## 2019-06-13 PROCEDURE — 73100 XR WRIST 2 VIEW LEFT: ICD-10-PCS | Mod: 26,LT,, | Performed by: RADIOLOGY

## 2019-06-13 PROCEDURE — 99999 PR PBB SHADOW E&M-EST. PATIENT-LVL III: CPT | Mod: PBBFAC,,, | Performed by: ORTHOPAEDIC SURGERY

## 2019-06-13 PROCEDURE — 73100 X-RAY EXAM OF WRIST: CPT | Mod: TC,PN,LT

## 2019-06-13 PROCEDURE — 99999 PR PBB SHADOW E&M-EST. PATIENT-LVL III: ICD-10-PCS | Mod: PBBFAC,,, | Performed by: ORTHOPAEDIC SURGERY

## 2019-06-13 PROCEDURE — 73100 X-RAY EXAM OF WRIST: CPT | Mod: 26,LT,, | Performed by: RADIOLOGY

## 2019-06-13 PROCEDURE — 99213 OFFICE O/P EST LOW 20 MIN: CPT | Mod: S$GLB,,, | Performed by: ORTHOPAEDIC SURGERY

## 2019-06-13 PROCEDURE — 99213 PR OFFICE/OUTPT VISIT, EST, LEVL III, 20-29 MIN: ICD-10-PCS | Mod: S$GLB,,, | Performed by: ORTHOPAEDIC SURGERY

## 2019-06-13 PROCEDURE — 3008F PR BODY MASS INDEX (BMI) DOCUMENTED: ICD-10-PCS | Mod: CPTII,S$GLB,, | Performed by: ORTHOPAEDIC SURGERY

## 2019-06-13 PROCEDURE — 3008F BODY MASS INDEX DOCD: CPT | Mod: CPTII,S$GLB,, | Performed by: ORTHOPAEDIC SURGERY

## 2019-06-13 NOTE — PROGRESS NOTES
Subjective:      Patient ID: Thelma Nguyen is a 53 y.o. female.  Chief Complaint: Pain of the Left Wrist and Wrist Pain (left )      HPI  Thelma Nguyen is a  53 y.o. female presenting today for follow up of left wrist pain.  She reports that she is having a flare-up recently in left wrist  She was seen about a year ago for a recurrent ganglion cyst of the left wrist. At that time the ganglion was injected and it seemed to resolved for the most part and she did well for about 6 or 8 months  Then recently symptoms flared up again interestingly enough she has not had a recurrence of the cyst mainly just pain in the left wrist.  Symptoms are worse with flexion and extension  She has been using her hand quite a bit recently  No history of trauma  No numbness or.    Review of patient's allergies indicates:   Allergen Reactions    Neurontin [gabapentin]          Current Outpatient Medications   Medication Sig Dispense Refill    acetaminophen (TYLENOL) 325 MG tablet Take 650 mg by mouth every 6 (six) hours as needed for Pain.       alendronate (FOSAMAX) 70 MG tablet TAKE 1 TAB EVERY 7 DAYS 12 tablet 1    ERGOCALCIFEROL, VITAMIN D2, (VITAMIN D ORAL) Take 1 capsule by mouth nightly.       ferrous sulfate 324 mg (65 mg iron) TbEC Take 325 mg by mouth nightly.       hydrocortisone (CORTEF) 10 MG Tab TAKE 1 TABLET TWICE A DAY 60 tablet 11    levothyroxine (SYNTHROID) 137 MCG Tab tablet TAKE 1 TABLET DAILY EXCEPT ON SUNDAY, TAKE 1 & 1/2 TABLET 96 tablet 3    loratadine-pseudoephedrine  mg (CLARITIN-D 24-HOUR)  mg per 24 hr tablet Take 1 tablet by mouth as needed.       phenytoin (DILANTIN) 100 MG ER capsule TAKE 4 CAPSULES EVERY DAY (Patient taking differently: TAKE 4 CAPSULES (400 mg) EVERY bedtime) 360 capsule 4    XARELTO 20 mg Tab TAKE 1 TABLET BY MOUTH DAILY (Patient taking differently: TAKE 1 TABLET BY MOUTH DAILY, bedtime) 90 tablet 3    scopolamine (TRANSDERM-SCOP)  "1.3-1.5 mg (1 mg over 3 days) Place 1 patch onto the skin every 72 hours. 4 patch 0     No current facility-administered medications for this visit.        Past Medical History:   Diagnosis Date    Allergic rhinitis     Anemia     Arthritis     Basal cell carcinoma     Broken ankle     Cancer 1985    Clotting disorder     Disorder of kidney and ureter     DVT (deep venous thrombosis)     Factor V deficiency     Fracture of elbow     left    Glioblastoma multiforme of brain     Hypothyroidism     IBS (irritable bowel syndrome)     Osteoporosis     Panhypopituitarism     Peripheral polyneuropathy     Secondary adrenal insufficiency     Seizures     Thyroid disease        Past Surgical History:   Procedure Laterality Date    APPENDECTOMY      BRAIN SURGERY      for glioblastoma      SECTION      CHOLECYSTECTOMY  2007    CLOSURE, MOHS PROCEDURE DEFECT SCALP  Flap closure Left 2018    [facility] 2ND [facility]    COLONOSCOPY Suprep N/A 2019    [facility]    EGD/colon N/A 2019    Performed by [facility] at [facility]    HYSTERECTOMY      TUBAL LIGATION         OBJECTIVE:   PHYSICAL EXAM:  Height: 5' 8" (172.7 cm) Weight: 61.2 kg (135 lb)  Vitals:    19 1543 19 1545   Weight: 61.2 kg (135 lb)    Height: 5' 8" (1.727 m)    PainSc:   3   3     Ortho/SPM Exam  Tingling reported examination left hand and wrist there is some tenderness dorsally over the scapholunate interval  A previous well-healed incision is noted  There is no obvious cyst  Range of motion wrist is full but she has pain on extreme extension of the wrist  Range of motion fingers full  Tinel sign negative      RADIOGRAPHS:  AP and lateral x-rays left wrist demonstrate no significant abnormalities  Comments: I have personally reviewed the imaging and I agree with the above " radiologist's report.    ASSESSMENT/PLAN:     IMPRESSION:  1.  Left wrist pain.  2.  Possible occult ganglion cyst or scapholunate ligament tear    PLAN:  I have ordered an MRI scan left wrist better assess the wrist.  Meantime continue with use of the wrist brace  She can't take anti-inflammatory medications I recommended Voltaren gel top    FOLLOW UP:  After the MRI is complete    Disclaimer: This note has been generated using voice-recognition software. There may be typographical errors that have been missed during proof-reading.

## 2019-06-20 ENCOUNTER — HOSPITAL ENCOUNTER (OUTPATIENT)
Dept: RADIOLOGY | Facility: HOSPITAL | Age: 54
Discharge: HOME | End: 2019-06-20
Attending: ORTHOPAEDIC SURGERY
Payer: COMMERCIAL

## 2019-06-20 DIAGNOSIS — M25.532 WRIST PAIN, CHRONIC, LEFT: ICD-10-CM

## 2019-06-20 DIAGNOSIS — G89.29 WRIST PAIN, CHRONIC, LEFT: ICD-10-CM

## 2019-06-20 PROCEDURE — 73221 MRI JOINT UPR EXTREM W/O DYE: CPT | Mod: TC,LT

## 2019-06-20 PROCEDURE — 73221 MRI WRIST WITHOUT CONTRAST LEFT: ICD-10-PCS | Mod: 26,LT,, | Performed by: RADIOLOGY

## 2019-06-20 PROCEDURE — 73221 MRI JOINT UPR EXTREM W/O DYE: CPT | Mod: 26,LT,, | Performed by: RADIOLOGY

## 2019-07-02 ENCOUNTER — OFFICE VISIT (OUTPATIENT)
Dept: ORTHOPEDICS | Facility: CLINIC | Age: 54
End: 2019-07-02
Payer: COMMERCIAL

## 2019-07-02 VITALS — BODY MASS INDEX: 20.46 KG/M2 | HEIGHT: 68 IN | WEIGHT: 135 LBS

## 2019-07-02 DIAGNOSIS — M25.532 LEFT WRIST PAIN: Primary | ICD-10-CM

## 2019-07-02 PROCEDURE — 20605 DRAIN/INJ JOINT/BURSA W/O US: CPT | Mod: LT,S$GLB,, | Performed by: ORTHOPAEDIC SURGERY

## 2019-07-02 PROCEDURE — 3008F PR BODY MASS INDEX (BMI) DOCUMENTED: ICD-10-PCS | Mod: CPTII,S$GLB,, | Performed by: ORTHOPAEDIC SURGERY

## 2019-07-02 PROCEDURE — 99999 PR PBB SHADOW E&M-EST. PATIENT-LVL III: ICD-10-PCS | Mod: PBBFAC,,, | Performed by: ORTHOPAEDIC SURGERY

## 2019-07-02 PROCEDURE — 3008F BODY MASS INDEX DOCD: CPT | Mod: CPTII,S$GLB,, | Performed by: ORTHOPAEDIC SURGERY

## 2019-07-02 PROCEDURE — 20605 PR DRAIN/INJECT INTERMEDIATE JOINT/BURSA: ICD-10-PCS | Mod: LT,S$GLB,, | Performed by: ORTHOPAEDIC SURGERY

## 2019-07-02 PROCEDURE — 99999 PR PBB SHADOW E&M-EST. PATIENT-LVL III: CPT | Mod: PBBFAC,,, | Performed by: ORTHOPAEDIC SURGERY

## 2019-07-02 PROCEDURE — 99213 PR OFFICE/OUTPT VISIT, EST, LEVL III, 20-29 MIN: ICD-10-PCS | Mod: 25,S$GLB,, | Performed by: ORTHOPAEDIC SURGERY

## 2019-07-02 PROCEDURE — 99213 OFFICE O/P EST LOW 20 MIN: CPT | Mod: 25,S$GLB,, | Performed by: ORTHOPAEDIC SURGERY

## 2019-07-02 RX ORDER — TRIAMCINOLONE ACETONIDE 40 MG/ML
20 INJECTION, SUSPENSION INTRA-ARTICULAR; INTRAMUSCULAR
Status: COMPLETED | OUTPATIENT
Start: 2019-07-02 | End: 2019-07-02

## 2019-07-02 RX ADMIN — TRIAMCINOLONE ACETONIDE 20 MG: 40 INJECTION, SUSPENSION INTRA-ARTICULAR; INTRAMUSCULAR at 10:07

## 2019-07-02 NOTE — PROGRESS NOTES
Subjective:      Patient ID: Thelma Nguyen is a 53 y.o. female.  Chief Complaint: Pain and Follow-up of the Left Wrist      HPI  Thelma Nguyen is a  53 y.o. female presenting today for follow up of left wrist pain chronic.  She reports that she is doing about the same  She recently had an MRI scan of the left wrist.  The results of the MRI shows some mild degeneration of the TFCC and scapholunate ligament without discrete tears  I went over that with her today also no evidence of recurrent ganglion cyst.    Review of patient's allergies indicates:   Allergen Reactions    Neurontin [gabapentin]          Current Outpatient Medications   Medication Sig Dispense Refill    acetaminophen (TYLENOL) 325 MG tablet Take 650 mg by mouth every 6 (six) hours as needed for Pain.       alendronate (FOSAMAX) 70 MG tablet TAKE 1 TAB EVERY 7 DAYS 12 tablet 1    ERGOCALCIFEROL, VITAMIN D2, (VITAMIN D ORAL) Take 1 capsule by mouth nightly.       ferrous sulfate 324 mg (65 mg iron) TbEC Take 325 mg by mouth nightly.       hydrocortisone (CORTEF) 10 MG Tab TAKE 1 TABLET TWICE A DAY 60 tablet 11    levothyroxine (SYNTHROID) 137 MCG Tab tablet TAKE 1 TABLET DAILY EXCEPT ON SUNDAY, TAKE 1 & 1/2 TABLET 96 tablet 3    loratadine-pseudoephedrine  mg (CLARITIN-D 24-HOUR)  mg per 24 hr tablet Take 1 tablet by mouth as needed.       phenytoin (DILANTIN) 100 MG ER capsule TAKE 4 CAPSULES EVERY DAY (Patient taking differently: TAKE 4 CAPSULES (400 mg) EVERY bedtime) 360 capsule 4    scopolamine (TRANSDERM-SCOP) 1.3-1.5 mg (1 mg over 3 days) Place 1 patch onto the skin every 72 hours. 4 patch 0    XARELTO 20 mg Tab TAKE 1 TABLET BY MOUTH DAILY (Patient taking differently: TAKE 1 TABLET BY MOUTH DAILY, bedtime) 90 tablet 3     No current facility-administered medications for this visit.        Past Medical History:   Diagnosis Date    Allergic rhinitis     Anemia 1987    Arthritis 2000    Basal  "cell carcinoma     Broken ankle     Cancer 1985    Clotting disorder     Disorder of kidney and ureter     DVT (deep venous thrombosis)     Factor V deficiency     Fracture of elbow     left    Glioblastoma multiforme of brain     Hypothyroidism     IBS (irritable bowel syndrome)     Osteoporosis     Panhypopituitarism     Peripheral polyneuropathy     Secondary adrenal insufficiency     Seizures     Thyroid disease        Past Surgical History:   Procedure Laterality Date    APPENDECTOMY      BRAIN SURGERY      for glioblastoma      SECTION      CHOLECYSTECTOMY  2007    CLOSURE, MOHS PROCEDURE DEFECT SCALP  Flap closure Left 2018    [facility]    COLONOSCOPY Suprep N/A 2019    [facility] at [facility]    EGD/colon N/A 2019    [facility]    HYSTERECTOMY      TUBAL LIGATION         OBJECTIVE:   PHYSICAL EXAM:  Height: 5' 8" (172.7 cm) Weight: 61.2 kg (135 lb)  Vitals:    19 1029   Weight: 61.2 kg (135 lb)   Height: 5' 8" (1.727 m)   PainSc:   2   PainLoc: Wrist     Ortho/SPM Exam  Examination left wrist there is no swelling there is some mild tenderness dorsally and also over the distal TFC and ulnar carpal joint  Range of motion fingers full range of motion wrist full    RADIOGRAPHS:  None  Comments: I have personally reviewed the imaging and I agree with the above radiologist's report.    ASSESSMENT/PLAN:     IMPRESSION:  Left wrist pain ulnar-sided chronic probably related to degenerative TFC    PLAN:  I recommended injection today.  After pause for time-out identified the left wrist injected ulnar carpal joint combination Kenalog 20 mg 0.5 cc xylocaine sterile technique  Tolerated the procedure well without complication    FOLLOW UP:  1-2 months if symptoms persist we may consider wrist arthroscopy    Disclaimer: This note has " been generated using voice-recognition software. There may be typographical errors that have been missed during proof-reading.

## 2019-07-03 ENCOUNTER — CLINICAL SUPPORT (OUTPATIENT)
Dept: AUDIOLOGY | Facility: CLINIC | Age: 54
End: 2019-07-03
Payer: COMMERCIAL

## 2019-07-03 DIAGNOSIS — H90.3 SENSORINEURAL HEARING LOSS, BILATERAL: Primary | ICD-10-CM

## 2019-07-03 PROCEDURE — 92557 PR COMPREHENSIVE HEARING TEST: ICD-10-PCS | Mod: S$GLB,,, | Performed by: AUDIOLOGIST

## 2019-07-03 PROCEDURE — 92557 COMPREHENSIVE HEARING TEST: CPT | Mod: S$GLB,,, | Performed by: AUDIOLOGIST

## 2019-07-11 ENCOUNTER — PATIENT MESSAGE (OUTPATIENT)
Dept: GASTROENTEROLOGY | Facility: CLINIC | Age: 54
End: 2019-07-11

## 2019-07-15 RX ORDER — HYDROCORTISONE 10 MG/1
TABLET ORAL
Qty: 180 TABLET | Refills: 3 | Status: SHIPPED | OUTPATIENT
Start: 2019-07-15 | End: 2020-08-03

## 2019-07-22 ENCOUNTER — TELEPHONE (OUTPATIENT)
Dept: FAMILY MEDICINE | Facility: CLINIC | Age: 54
End: 2019-07-22

## 2019-07-24 ENCOUNTER — OFFICE VISIT (OUTPATIENT)
Dept: GASTROENTEROLOGY | Facility: CLINIC | Age: 54
End: 2019-07-24
Payer: COMMERCIAL

## 2019-07-24 VITALS
BODY MASS INDEX: 20.58 KG/M2 | WEIGHT: 135.38 LBS | HEART RATE: 99 BPM | SYSTOLIC BLOOD PRESSURE: 142 MMHG | DIASTOLIC BLOOD PRESSURE: 64 MMHG

## 2019-07-24 DIAGNOSIS — K58.0 IRRITABLE BOWEL SYNDROME WITH DIARRHEA: Primary | ICD-10-CM

## 2019-07-24 DIAGNOSIS — R10.9 ABDOMINAL PAIN, UNSPECIFIED ABDOMINAL LOCATION: ICD-10-CM

## 2019-07-24 PROCEDURE — 3078F DIAST BP <80 MM HG: CPT | Mod: CPTII,S$GLB,, | Performed by: INTERNAL MEDICINE

## 2019-07-24 PROCEDURE — 3008F PR BODY MASS INDEX (BMI) DOCUMENTED: ICD-10-PCS | Mod: CPTII,S$GLB,, | Performed by: INTERNAL MEDICINE

## 2019-07-24 PROCEDURE — 99999 PR PBB SHADOW E&M-EST. PATIENT-LVL III: ICD-10-PCS | Mod: PBBFAC,,, | Performed by: INTERNAL MEDICINE

## 2019-07-24 PROCEDURE — 3077F PR MOST RECENT SYSTOLIC BLOOD PRESSURE >= 140 MM HG: ICD-10-PCS | Mod: CPTII,S$GLB,, | Performed by: INTERNAL MEDICINE

## 2019-07-24 PROCEDURE — 3078F PR MOST RECENT DIASTOLIC BLOOD PRESSURE < 80 MM HG: ICD-10-PCS | Mod: CPTII,S$GLB,, | Performed by: INTERNAL MEDICINE

## 2019-07-24 PROCEDURE — 99999 PR PBB SHADOW E&M-EST. PATIENT-LVL III: CPT | Mod: PBBFAC,,, | Performed by: INTERNAL MEDICINE

## 2019-07-24 PROCEDURE — 3077F SYST BP >= 140 MM HG: CPT | Mod: CPTII,S$GLB,, | Performed by: INTERNAL MEDICINE

## 2019-07-24 PROCEDURE — 3008F BODY MASS INDEX DOCD: CPT | Mod: CPTII,S$GLB,, | Performed by: INTERNAL MEDICINE

## 2019-07-24 PROCEDURE — 99214 PR OFFICE/OUTPT VISIT, EST, LEVL IV, 30-39 MIN: ICD-10-PCS | Mod: S$GLB,,, | Performed by: INTERNAL MEDICINE

## 2019-07-24 PROCEDURE — 99214 OFFICE O/P EST MOD 30 MIN: CPT | Mod: S$GLB,,, | Performed by: INTERNAL MEDICINE

## 2019-07-24 NOTE — PROGRESS NOTES
Subjective:       Patient ID: Thelma Nguyen is a 53 y.o. female.    Chief Complaint: Irritable Bowel Syndrome and Follow-up    This is a 53-year-old female here for a follow-up visit regarding diarrhea predominant irritable bowel syndrome.  She notes continued issues with abdominal cramping associated with a change in stool consistency and frequency having multiple bowel movements.  Often times can be postprandial but without particular dietary relation.  She is status post remote cholecystectomy and reports having tried cholestyramine in the past without significant effect.  She denies any bleeding, unintentional weight loss.  No night sweats, weight loss.  She does note significant nausea which is unchanged.  No response to PPI therapy.  No other exacerbating or relieving factors or other associated symptoms.    The following portions of the patient's history were reviewed and updated as appropriate: allergies, current medications, past family history, past medical history, past social history, past surgical history and problem list.    (Portions of this note were dictated using voice recognition software and may contain dictation related errors in spelling/grammar/syntax not found on text review)  HPI  Review of Systems   Constitutional: Negative for appetite change and unexpected weight change.   Respiratory: Negative for apnea and chest tightness.    Cardiovascular: Negative for chest pain and palpitations.   Gastrointestinal: Positive for abdominal pain and diarrhea.       Objective:      Physical Exam   Constitutional: She is oriented to person, place, and time. She appears well-developed and well-nourished. No distress.   HENT:   Head: Normocephalic and atraumatic.   Eyes: Conjunctivae are normal. No scleral icterus.   Neck: Normal range of motion. Neck supple.   Cardiovascular: Normal rate.   Pulmonary/Chest: Effort normal. No respiratory distress.   Abdominal: Soft. Bowel sounds are normal.  She exhibits no distension and no mass.   Musculoskeletal: Normal range of motion.   Neurological: She is alert and oriented to person, place, and time.   Skin: Skin is warm and dry. She is not diaphoretic.   Psychiatric: She has a normal mood and affect. Her behavior is normal. Judgment and thought content normal.   Vitals reviewed.      Labs; reviewed  Assessment:       1. Irritable bowel syndrome with diarrhea    2. Abdominal pain, unspecified abdominal location        Plan:   1. Trial of rifaximin  2. 6-8 week f/u

## 2019-08-27 ENCOUNTER — OFFICE VISIT (OUTPATIENT)
Dept: ORTHOPEDICS | Facility: CLINIC | Age: 54
End: 2019-08-27
Payer: COMMERCIAL

## 2019-08-27 VITALS — BODY MASS INDEX: 20.52 KG/M2 | HEIGHT: 68 IN | WEIGHT: 135.38 LBS

## 2019-08-27 DIAGNOSIS — M25.532 LEFT WRIST PAIN: Primary | ICD-10-CM

## 2019-08-27 PROCEDURE — 99213 OFFICE O/P EST LOW 20 MIN: CPT | Mod: S$GLB,,, | Performed by: ORTHOPAEDIC SURGERY

## 2019-08-27 PROCEDURE — 3008F BODY MASS INDEX DOCD: CPT | Mod: CPTII,S$GLB,, | Performed by: ORTHOPAEDIC SURGERY

## 2019-08-27 PROCEDURE — 99213 PR OFFICE/OUTPT VISIT, EST, LEVL III, 20-29 MIN: ICD-10-PCS | Mod: S$GLB,,, | Performed by: ORTHOPAEDIC SURGERY

## 2019-08-27 PROCEDURE — 3008F PR BODY MASS INDEX (BMI) DOCUMENTED: ICD-10-PCS | Mod: CPTII,S$GLB,, | Performed by: ORTHOPAEDIC SURGERY

## 2019-08-27 PROCEDURE — 99999 PR PBB SHADOW E&M-EST. PATIENT-LVL III: CPT | Mod: PBBFAC,,, | Performed by: ORTHOPAEDIC SURGERY

## 2019-08-27 PROCEDURE — 99999 PR PBB SHADOW E&M-EST. PATIENT-LVL III: ICD-10-PCS | Mod: PBBFAC,,, | Performed by: ORTHOPAEDIC SURGERY

## 2019-08-27 NOTE — PROGRESS NOTES
Subjective:      Patient ID: Thelma Nguyen is a 53 y.o. female.  Chief Complaint: Follow-up (left wrist)      HPI  Thelma Nguyen is a  53 y.o. female presenting today for follow up of left wrist pain.  She reports that she is doing much better since the injection last visit occasional pain with hyperextension of the wrist and prolonged lifting.    Review of patient's allergies indicates:   Allergen Reactions    Neurontin [gabapentin]          Current Outpatient Medications   Medication Sig Dispense Refill    acetaminophen (TYLENOL) 325 MG tablet Take 650 mg by mouth every 6 (six) hours as needed for Pain.       alendronate (FOSAMAX) 70 MG tablet TAKE 1 TAB EVERY 7 DAYS 12 tablet 1    ERGOCALCIFEROL, VITAMIN D2, (VITAMIN D ORAL) Take 1 capsule by mouth nightly.       ferrous sulfate 324 mg (65 mg iron) TbEC Take 325 mg by mouth nightly.       hydrocortisone (CORTEF) 10 MG Tab TAKE 1 TABLET BY MOUTH TWICE A  tablet 3    levothyroxine (SYNTHROID) 137 MCG Tab tablet TAKE 1 TABLET DAILY EXCEPT ON SUNDAY, TAKE 1 & 1/2 TABLET 96 tablet 3    loratadine-pseudoephedrine  mg (CLARITIN-D 24-HOUR)  mg per 24 hr tablet Take 1 tablet by mouth as needed.       phenytoin (DILANTIN) 100 MG ER capsule TAKE 4 CAPSULES EVERY DAY (Patient taking differently: TAKE 4 CAPSULES (400 mg) EVERY bedtime) 360 capsule 4    rifAXIMin (XIFAXAN) 550 mg Tab Take 1 tablet (550 mg total) by mouth 3 (three) times daily. 42 tablet 2    XARELTO 20 mg Tab TAKE 1 TABLET BY MOUTH DAILY (Patient taking differently: TAKE 1 TABLET BY MOUTH DAILY, bedtime) 90 tablet 3    scopolamine (TRANSDERM-SCOP) 1.3-1.5 mg (1 mg over 3 days) Place 1 patch onto the skin every 72 hours. 4 patch 0     No current facility-administered medications for this visit.        Past Medical History:   Diagnosis Date    Allergic rhinitis     Anemia 1987    Arthritis 2000    Basal cell carcinoma     Broken ankle     Cancer  "1985    Clotting disorder     Disorder of kidney and ureter     DVT (deep venous thrombosis)     Factor V deficiency     Fracture of elbow     left    Glioblastoma multiforme of brain     Hypothyroidism     IBS (irritable bowel syndrome)     Osteoporosis     Panhypopituitarism     Peripheral polyneuropathy     Secondary adrenal insufficiency     Seizures     Thyroid disease        Past Surgical History:   Procedure Laterality Date    APPENDECTOMY      BRAIN SURGERY      for glioblastoma      SECTION      CHOLECYSTECTOMY  2007    CLOSURE, MOHS PROCEDURE DEFECT SCALP  Flap closure Left 2018    [facility]    COLONOSCOPY Suprep N/A 2019    [facility]    EGD/colon N/A 2019    Per[facility] by [facility] at [facility]    HYSTERECTOMY      TUBAL LIGATION         OBJECTIVE:   PHYSICAL EXAM:  Height: 5' 8" (172.7 cm) Weight: 61.4 kg (135 lb 5.8 oz)  Vitals:    19 1025   Weight: 61.4 kg (135 lb 5.8 oz)   Height: 5' 8" (1.727 m)   PainSc:   2   PainLoc: Wrist     Ortho/SPM Exam  Examination left wrist there is no tenderness  Full range of motion  Some mild pain with ulnar deviation and hyperextension of the wrist  Sensation intact distally  Tinel sign negative    RADIOGRAPHS:  None  Comments: I have personally reviewed the imaging and I agree with the above radiologist's report.    ASSESSMENT/PLAN:     IMPRESSION:  Ulnar-sided left wrist pain improved    PLAN:  Continue part-time use wrist brace Advil or Motrin as needed  Follow-up 2-3 months or as needed    FOLLOW UP:  2-3 months or as needed    Disclaimer: This note has been generated using voice-recognition software. There may be typographical errors that have been missed during proof-reading.  "

## 2019-08-29 ENCOUNTER — LAB VISIT (OUTPATIENT)
Dept: LAB | Facility: HOSPITAL | Age: 54
End: 2019-08-29
Attending: INTERNAL MEDICINE
Payer: COMMERCIAL

## 2019-08-29 ENCOUNTER — OFFICE VISIT (OUTPATIENT)
Dept: INTERNAL MEDICINE | Facility: CLINIC | Age: 54
End: 2019-08-29
Payer: COMMERCIAL

## 2019-08-29 ENCOUNTER — TELEPHONE (OUTPATIENT)
Dept: FAMILY MEDICINE | Facility: CLINIC | Age: 54
End: 2019-08-29

## 2019-08-29 VITALS
HEART RATE: 120 BPM | HEIGHT: 68 IN | BODY MASS INDEX: 20.39 KG/M2 | SYSTOLIC BLOOD PRESSURE: 126 MMHG | WEIGHT: 134.56 LBS | DIASTOLIC BLOOD PRESSURE: 82 MMHG | OXYGEN SATURATION: 99 %

## 2019-08-29 DIAGNOSIS — G40.909 SEIZURE DISORDER: ICD-10-CM

## 2019-08-29 DIAGNOSIS — J06.9 UPPER RESPIRATORY TRACT INFECTION, UNSPECIFIED TYPE: Primary | ICD-10-CM

## 2019-08-29 DIAGNOSIS — Z23 NEED FOR PROPHYLACTIC VACCINATION AND INOCULATION AGAINST INFLUENZA: ICD-10-CM

## 2019-08-29 DIAGNOSIS — R00.0 TACHYCARDIA: ICD-10-CM

## 2019-08-29 LAB — PHENYTOIN SERPL-MCNC: 16.9 UG/ML (ref 10–20)

## 2019-08-29 PROCEDURE — 3008F BODY MASS INDEX DOCD: CPT | Mod: CPTII,S$GLB,, | Performed by: INTERNAL MEDICINE

## 2019-08-29 PROCEDURE — 3074F PR MOST RECENT SYSTOLIC BLOOD PRESSURE < 130 MM HG: ICD-10-PCS | Mod: CPTII,S$GLB,, | Performed by: INTERNAL MEDICINE

## 2019-08-29 PROCEDURE — 90471 FLU VACCINE (QUAD) GREATER THAN OR EQUAL TO 3YO PRESERVATIVE FREE IM: ICD-10-PCS | Mod: S$GLB,,, | Performed by: INTERNAL MEDICINE

## 2019-08-29 PROCEDURE — 3008F PR BODY MASS INDEX (BMI) DOCUMENTED: ICD-10-PCS | Mod: CPTII,S$GLB,, | Performed by: INTERNAL MEDICINE

## 2019-08-29 PROCEDURE — 36415 COLL VENOUS BLD VENIPUNCTURE: CPT | Mod: PO

## 2019-08-29 PROCEDURE — 3079F DIAST BP 80-89 MM HG: CPT | Mod: CPTII,S$GLB,, | Performed by: INTERNAL MEDICINE

## 2019-08-29 PROCEDURE — 3074F SYST BP LT 130 MM HG: CPT | Mod: CPTII,S$GLB,, | Performed by: INTERNAL MEDICINE

## 2019-08-29 PROCEDURE — 99214 OFFICE O/P EST MOD 30 MIN: CPT | Mod: 25,S$GLB,, | Performed by: INTERNAL MEDICINE

## 2019-08-29 PROCEDURE — 3079F PR MOST RECENT DIASTOLIC BLOOD PRESSURE 80-89 MM HG: ICD-10-PCS | Mod: CPTII,S$GLB,, | Performed by: INTERNAL MEDICINE

## 2019-08-29 PROCEDURE — 90686 FLU VACCINE (QUAD) GREATER THAN OR EQUAL TO 3YO PRESERVATIVE FREE IM: ICD-10-PCS | Mod: S$GLB,,, | Performed by: INTERNAL MEDICINE

## 2019-08-29 PROCEDURE — 90471 IMMUNIZATION ADMIN: CPT | Mod: S$GLB,,, | Performed by: INTERNAL MEDICINE

## 2019-08-29 PROCEDURE — 99999 PR PBB SHADOW E&M-EST. PATIENT-LVL III: CPT | Mod: PBBFAC,,, | Performed by: INTERNAL MEDICINE

## 2019-08-29 PROCEDURE — 90686 IIV4 VACC NO PRSV 0.5 ML IM: CPT | Mod: S$GLB,,, | Performed by: INTERNAL MEDICINE

## 2019-08-29 PROCEDURE — 80185 ASSAY OF PHENYTOIN TOTAL: CPT | Mod: PO

## 2019-08-29 PROCEDURE — 99214 PR OFFICE/OUTPT VISIT, EST, LEVL IV, 30-39 MIN: ICD-10-PCS | Mod: 25,S$GLB,, | Performed by: INTERNAL MEDICINE

## 2019-08-29 PROCEDURE — 99999 PR PBB SHADOW E&M-EST. PATIENT-LVL III: ICD-10-PCS | Mod: PBBFAC,,, | Performed by: INTERNAL MEDICINE

## 2019-08-29 RX ORDER — PREDNISONE 10 MG/1
10 TABLET ORAL DAILY
Qty: 7 TABLET | Refills: 0 | Status: SHIPPED | OUTPATIENT
Start: 2019-08-29 | End: 2019-09-05

## 2019-08-29 NOTE — TELEPHONE ENCOUNTER
Before leaving office,  Patient stated that she forgot to request a cough medicine to be sent to her pharmacy as well. Please advise.

## 2019-08-29 NOTE — PROGRESS NOTES
Subjective:       Patient ID: Thelma Nguyen is a 53 y.o. female.    Chief Complaint: Fatigue (Started about a month ago after taking Xifaxan.) and Sore Throat    HPI  Pt c/o fatigue, malaise x 1 month since starting abx.  Still with occ loose stools.  C/O nasal congestion, facial pain x 3 days, no resp to Claritin-D.  No orthostasis.  No T-c activity.  Review of Systems   All other systems reviewed and are negative.      Objective:      Physical Exam   Constitutional: She appears well-developed.   HENT:   Head: Normocephalic.   Mouth/Throat: Oropharynx is clear and moist.   Max sinuses tender   Eyes: EOM are normal.   Neck: Normal range of motion.   Cardiovascular: Regular rhythm, normal heart sounds and intact distal pulses.   tachycardic   Pulmonary/Chest: Effort normal and breath sounds normal.   Abdominal: Bowel sounds are normal.   Musculoskeletal: Normal range of motion.   Lymphadenopathy:     She has no cervical adenopathy.   Neurological: She is alert. No cranial nerve deficit. She exhibits normal muscle tone. Coordination normal.   Skin: Skin is warm and dry.   Psychiatric: She has a normal mood and affect. Her behavior is normal.   Vitals reviewed.      Assessment:       1. Upper respiratory tract infection, unspecified type    2. Seizure disorder    3. Tachycardia    4. Need for prophylactic vaccination and inoculation against influenza        Plan:       Thelma was seen today for fatigue and sore throat.    Diagnoses and all orders for this visit:    Upper respiratory tract infection, unspecified type  -     predniSONE (DELTASONE) 10 MG tablet; Take 1 tablet (10 mg total) by mouth once daily. for 7 days    Seizure disorder  -     Phenytoin level, total; Future    Tachycardia   D/C Claritin-D    Need for prophylactic vaccination and inoculation against influenza  -     Influenza - Quadrivalent (3 years & older) (PF)      Follow up if symptoms worsen or fail to improve.

## 2019-09-07 DIAGNOSIS — M81.0 OSTEOPOROSIS: ICD-10-CM

## 2019-09-07 RX ORDER — ALENDRONATE SODIUM 70 MG/1
TABLET ORAL
Qty: 12 TABLET | Refills: 1 | Status: SHIPPED | OUTPATIENT
Start: 2019-09-07 | End: 2021-05-25 | Stop reason: SDUPTHER

## 2019-09-18 ENCOUNTER — OFFICE VISIT (OUTPATIENT)
Dept: GASTROENTEROLOGY | Facility: CLINIC | Age: 54
End: 2019-09-18
Payer: COMMERCIAL

## 2019-09-18 VITALS
BODY MASS INDEX: 20.98 KG/M2 | DIASTOLIC BLOOD PRESSURE: 69 MMHG | HEART RATE: 102 BPM | WEIGHT: 138 LBS | SYSTOLIC BLOOD PRESSURE: 110 MMHG

## 2019-09-18 DIAGNOSIS — R14.0 ABDOMINAL DISTENSION: ICD-10-CM

## 2019-09-18 DIAGNOSIS — K58.0 IRRITABLE BOWEL SYNDROME WITH DIARRHEA: Primary | ICD-10-CM

## 2019-09-18 PROCEDURE — 99213 OFFICE O/P EST LOW 20 MIN: CPT | Mod: S$GLB,,, | Performed by: INTERNAL MEDICINE

## 2019-09-18 PROCEDURE — 3074F PR MOST RECENT SYSTOLIC BLOOD PRESSURE < 130 MM HG: ICD-10-PCS | Mod: CPTII,S$GLB,, | Performed by: INTERNAL MEDICINE

## 2019-09-18 PROCEDURE — 3074F SYST BP LT 130 MM HG: CPT | Mod: CPTII,S$GLB,, | Performed by: INTERNAL MEDICINE

## 2019-09-18 PROCEDURE — 99999 PR PBB SHADOW E&M-EST. PATIENT-LVL III: ICD-10-PCS | Mod: PBBFAC,,, | Performed by: INTERNAL MEDICINE

## 2019-09-18 PROCEDURE — 3008F PR BODY MASS INDEX (BMI) DOCUMENTED: ICD-10-PCS | Mod: CPTII,S$GLB,, | Performed by: INTERNAL MEDICINE

## 2019-09-18 PROCEDURE — 3078F DIAST BP <80 MM HG: CPT | Mod: CPTII,S$GLB,, | Performed by: INTERNAL MEDICINE

## 2019-09-18 PROCEDURE — 3008F BODY MASS INDEX DOCD: CPT | Mod: CPTII,S$GLB,, | Performed by: INTERNAL MEDICINE

## 2019-09-18 PROCEDURE — 99999 PR PBB SHADOW E&M-EST. PATIENT-LVL III: CPT | Mod: PBBFAC,,, | Performed by: INTERNAL MEDICINE

## 2019-09-18 PROCEDURE — 3078F PR MOST RECENT DIASTOLIC BLOOD PRESSURE < 80 MM HG: ICD-10-PCS | Mod: CPTII,S$GLB,, | Performed by: INTERNAL MEDICINE

## 2019-09-18 PROCEDURE — 99213 PR OFFICE/OUTPT VISIT, EST, LEVL III, 20-29 MIN: ICD-10-PCS | Mod: S$GLB,,, | Performed by: INTERNAL MEDICINE

## 2019-09-18 NOTE — PROGRESS NOTES
Subjective:       Patient ID: Thelma Nguyen is a 53 y.o. female.    Chief Complaint: Gas; Nausea; and Follow-up    This is a 53-year-old female here for a follow-up visit regarding diarrhea predominant irritable bowel syndrome.  We gave her a course of rifaximin which she took for 14 days and noted significant fatigue during the treatment but noted excellent results afterwards.  She reports normalization of her bowel habits.  Less abdominal cramping is noted but she does note some increased gas and flatulence, predominantly in the evenings.  No other exacerbating or relieving factors or other associated symptoms.  Previously she would note significant loose stools on a daily basis associated with abdominal cramping.    The following portions of the patient's history were reviewed and updated as appropriate: allergies, current medications, past family history, past medical history, past social history, past surgical history and problem list.    (Portions of this note were dictated using voice recognition software and may contain dictation related errors in spelling/grammar/syntax not found on text review)  HPI  Review of Systems   Constitutional: Positive for fatigue. Negative for appetite change and unexpected weight change.   Respiratory: Negative for chest tightness and wheezing.    Cardiovascular: Negative for chest pain and palpitations.   Gastrointestinal: Positive for abdominal distention. Negative for abdominal pain.         Objective:      Physical Exam   Constitutional: She is oriented to person, place, and time. She appears well-developed and well-nourished. No distress.   HENT:   Head: Normocephalic and atraumatic.   Eyes: Conjunctivae are normal. No scleral icterus.   Neck: Normal range of motion. Neck supple.   Cardiovascular: Normal rate. Exam reveals no friction rub.   Pulmonary/Chest: Effort normal. No respiratory distress.   Abdominal: Soft. Bowel sounds are normal. She exhibits no  distension and no mass.   Musculoskeletal: Normal range of motion.   Neurological: She is alert and oriented to person, place, and time.   Skin: Skin is warm and dry. She is not diaphoretic.   Psychiatric: She has a normal mood and affect. Her behavior is normal. Judgment and thought content normal.   Nursing note and vitals reviewed.        Labs/imaging; reviewed  Assessment:       1. Irritable bowel syndrome with diarrhea    2. Abdominal distension        Plan:   1. Responded to xifaxan, though fatigue noted during treatment. Monitor for symptom recurrence  2. Discussed dietary modifications rgarding gas/bloating

## 2019-10-18 RX ORDER — RIVAROXABAN 20 MG/1
TABLET, FILM COATED ORAL
Qty: 90 TABLET | Refills: 4 | Status: SHIPPED | OUTPATIENT
Start: 2019-10-18 | End: 2020-11-10 | Stop reason: SDUPTHER

## 2019-11-06 RX ORDER — LEVOTHYROXINE SODIUM 137 UG/1
TABLET ORAL
Qty: 96 TABLET | Refills: 3 | OUTPATIENT
Start: 2019-11-06

## 2019-11-07 ENCOUNTER — PATIENT MESSAGE (OUTPATIENT)
Dept: ORTHOPEDICS | Facility: CLINIC | Age: 54
End: 2019-11-07

## 2019-11-08 ENCOUNTER — PATIENT MESSAGE (OUTPATIENT)
Dept: INTERNAL MEDICINE | Facility: CLINIC | Age: 54
End: 2019-11-08

## 2019-11-08 RX ORDER — LEVOTHYROXINE SODIUM 137 UG/1
TABLET ORAL
Qty: 96 TABLET | Refills: 4 | Status: SHIPPED | OUTPATIENT
Start: 2019-11-08 | End: 2020-03-16 | Stop reason: SDUPTHER

## 2019-11-29 DIAGNOSIS — G40.909 EPILEPSY WITHOUT STATUS EPILEPTICUS, NOT INTRACTABLE: ICD-10-CM

## 2019-12-02 RX ORDER — PHENYTOIN SODIUM 100 MG/1
CAPSULE, EXTENDED RELEASE ORAL
Qty: 360 CAPSULE | Refills: 4 | Status: SHIPPED | OUTPATIENT
Start: 2019-12-02 | End: 2021-03-03 | Stop reason: SDUPTHER

## 2019-12-17 ENCOUNTER — OFFICE VISIT (OUTPATIENT)
Dept: OTOLARYNGOLOGY | Facility: CLINIC | Age: 54
End: 2019-12-17
Payer: COMMERCIAL

## 2019-12-17 ENCOUNTER — TELEPHONE (OUTPATIENT)
Dept: OTOLARYNGOLOGY | Facility: CLINIC | Age: 54
End: 2019-12-17

## 2019-12-17 VITALS — HEIGHT: 68 IN | BODY MASS INDEX: 20.92 KG/M2 | WEIGHT: 138 LBS

## 2019-12-17 DIAGNOSIS — H91.90 HEARING LOSS, UNSPECIFIED HEARING LOSS TYPE, UNSPECIFIED LATERALITY: Primary | ICD-10-CM

## 2019-12-17 DIAGNOSIS — H91.13 PRESBYCUSIS OF BOTH EARS: ICD-10-CM

## 2019-12-17 DIAGNOSIS — H90.3 BILATERAL HIGH FREQUENCY SENSORINEURAL HEARING LOSS: ICD-10-CM

## 2019-12-17 DIAGNOSIS — H90.3 SENSORINEURAL HEARING LOSS, BILATERAL: Primary | ICD-10-CM

## 2019-12-17 PROCEDURE — 99203 PR OFFICE/OUTPT VISIT, NEW, LEVL III, 30-44 MIN: ICD-10-PCS | Mod: S$GLB,,, | Performed by: OTOLARYNGOLOGY

## 2019-12-17 PROCEDURE — 99203 OFFICE O/P NEW LOW 30 MIN: CPT | Mod: S$GLB,,, | Performed by: OTOLARYNGOLOGY

## 2019-12-17 PROCEDURE — 99999 PR PBB SHADOW E&M-EST. PATIENT-LVL II: ICD-10-PCS | Mod: PBBFAC,,, | Performed by: OTOLARYNGOLOGY

## 2019-12-17 PROCEDURE — 99999 PR PBB SHADOW E&M-EST. PATIENT-LVL II: CPT | Mod: PBBFAC,,, | Performed by: OTOLARYNGOLOGY

## 2019-12-17 PROCEDURE — 3008F PR BODY MASS INDEX (BMI) DOCUMENTED: ICD-10-PCS | Mod: CPTII,S$GLB,, | Performed by: OTOLARYNGOLOGY

## 2019-12-17 PROCEDURE — 3008F BODY MASS INDEX DOCD: CPT | Mod: CPTII,S$GLB,, | Performed by: OTOLARYNGOLOGY

## 2019-12-17 NOTE — PROGRESS NOTES
Subjective:       Patient ID: Thelma Nguyen is a 53 y.o. female.    Chief Complaint: Hearing Loss    Hearing Loss:   Chronicity:  Chronic  Onset:  More than 1 month ago  Progression since onset:  Gradually worsening  Frequency:  Constantly  Severity:  Mild  Hearing loss characteristics:  Worse background noise and impaired select discrimination   Associated symptoms: tinnitus.  No fever and no headaches.  Risk factors: cancer treatment CXRT, son with SNHL.No ear surgery and no ear infections.    Review of Systems   Constitutional: Negative for chills, fever and unexpected weight change.   HENT: Positive for hearing loss and tinnitus. Negative for sore throat and trouble swallowing.    Eyes: Negative for pain and visual disturbance.   Respiratory: Negative for apnea and shortness of breath.    Cardiovascular: Negative for chest pain and palpitations.   Gastrointestinal: Negative for abdominal pain and nausea.   Endocrine: Negative for cold intolerance and heat intolerance.   Musculoskeletal: Negative for joint swelling and neck stiffness.   Skin: Negative for color change and rash.   Neurological: Negative for facial asymmetry and headaches.   Hematological: Negative for adenopathy. Does not bruise/bleed easily.   Psychiatric/Behavioral: Negative for agitation. The patient is not nervous/anxious.        Objective:      Physical Exam   Constitutional: She is oriented to person, place, and time. She appears well-developed and well-nourished. No distress.   HENT:   Head: Normocephalic and atraumatic.   Right Ear: Tympanic membrane, external ear and ear canal normal.   Left Ear: Tympanic membrane, external ear and ear canal normal.   Nose: Nose normal.   Mouth/Throat: Uvula is midline, oropharynx is clear and moist and mucous membranes are normal.   Jin: Midline  Rinne: AC > BC @ 512Hz   Eyes: Pupils are equal, round, and reactive to light. Conjunctivae and EOM are normal.   Neck: Normal range of motion.  No tracheal deviation present. No thyromegaly present.   Cardiovascular: Normal rate and regular rhythm.   Pulmonary/Chest: Effort normal. No respiratory distress.   Musculoskeletal: Normal range of motion.   Lymphadenopathy:        Head (right side): No submental, no submandibular and no tonsillar adenopathy present.        Head (left side): No submental, no submandibular and no tonsillar adenopathy present.     She has no cervical adenopathy.   Neurological: She is alert and oriented to person, place, and time.   Psychiatric: She has a normal mood and affect. Her behavior is normal.   Nursing note and vitals reviewed.      Data:      Audiogram tracings independently reviewed and discussed with patient shows high freq sensorineural hearing loss  Assessment:       1. Sensorineural hearing loss, bilateral    2. Presbycusis of both ears    3. Bilateral high frequency sensorineural hearing loss        Plan:       -HEARING LOSS-  I spent a considerable amount of time educating the patient on hearing and hearing loss.  We discussed the basic characteristics of conductive hearing loss versus sensorineural hearing loss and the significant differences in treatment options between the two categories.      We discussed that in cases of conductive hearing loss, this suggests a mechanical disorder that sometimes can be improved with medications &/or surgery.  It may occur secondary to external ear pathology (atresia, otitis externa, etc), tympanic membrane disorders (large perforations, immobility due to scarring or eustachian tube dysfunction), and middle ear disorders (effusions, ossicular disorders).    Sensorineural hearing loss is the expected hearing loss pattern with aging, but some disorders such as Meniere's may accelerate this process.  Additionally, amplification with hearing aids is generally the best option for hearing rehabilitation, except where the hearing loss is profound.  We discussed that this generally does  not represent a dangerous condition, but in cases where there is a large discrepancy between the two ears in terms of nerve function, more investigation is often necessary due to the possiblity of vestibular schwannomas or meningiomas at the cerebellopontine angle.  The definitive test for this is an MRI with gadolinium.  However, these masses are usually very slow growing (1-2mm/year), so patients may elect to repeat an audiogram in about 6 months or obtain an ABR so long as they understand that this may result in a delay in diagnosis (although very unlikely that this would have a significant clinical impact on their outcome due to the slow growing nature of these masses) with the understanding that if ABR is abnormal or asymmetry increases, an MRI would then be required.    Thelma  presents with what appears to be sensorineural hearing loss.  Based on this, my recommendation is   1.  Hearing aid evaluation  2.  Hearing exams yearly

## 2019-12-19 ENCOUNTER — OFFICE VISIT (OUTPATIENT)
Dept: ORTHOPEDICS | Facility: CLINIC | Age: 54
End: 2019-12-19
Payer: COMMERCIAL

## 2019-12-19 VITALS — WEIGHT: 138 LBS | HEIGHT: 68 IN | BODY MASS INDEX: 20.92 KG/M2

## 2019-12-19 DIAGNOSIS — M67.40 GANGLION CYST: Primary | ICD-10-CM

## 2019-12-19 PROCEDURE — 99999 PR PBB SHADOW E&M-EST. PATIENT-LVL III: CPT | Mod: PBBFAC,,, | Performed by: ORTHOPAEDIC SURGERY

## 2019-12-19 PROCEDURE — 99214 PR OFFICE/OUTPT VISIT, EST, LEVL IV, 30-39 MIN: ICD-10-PCS | Mod: S$GLB,,, | Performed by: ORTHOPAEDIC SURGERY

## 2019-12-19 PROCEDURE — 3008F BODY MASS INDEX DOCD: CPT | Mod: CPTII,S$GLB,, | Performed by: ORTHOPAEDIC SURGERY

## 2019-12-19 PROCEDURE — 99214 OFFICE O/P EST MOD 30 MIN: CPT | Mod: S$GLB,,, | Performed by: ORTHOPAEDIC SURGERY

## 2019-12-19 PROCEDURE — 99999 PR PBB SHADOW E&M-EST. PATIENT-LVL III: ICD-10-PCS | Mod: PBBFAC,,, | Performed by: ORTHOPAEDIC SURGERY

## 2019-12-19 PROCEDURE — 3008F PR BODY MASS INDEX (BMI) DOCUMENTED: ICD-10-PCS | Mod: CPTII,S$GLB,, | Performed by: ORTHOPAEDIC SURGERY

## 2019-12-19 NOTE — H&P (VIEW-ONLY)
CC:  Soft tissue mass left wrist        HPI:  Thelma Nguyen is a very pleasant 53 y.o. female with enlarging mass on her left wrist for the past month or so  She did have previous excision of a ganglion cyst in the same area many years ago  No history of trauma  It has been causing some pain           PAST MEDICAL HISTORY:   Past Medical History:   Diagnosis Date    Allergic rhinitis     Anemia     Arthritis     Basal cell carcinoma     Broken ankle     Cancer     Clotting disorder     Disorder of kidney and ureter     DVT (deep venous thrombosis)     Factor V deficiency     Fracture of elbow     left    Glioblastoma multiforme of brain     Hypothyroidism     IBS (irritable bowel syndrome)     Osteoporosis     Panhypopituitarism     Peripheral polyneuropathy     Secondary adrenal insufficiency     Seizures     Thyroid disease      PAST SURGICAL HISTORY:   Past Surgical History:   Procedure Laterality Date    APPENDECTOMY      BRAIN SURGERY      for glioblastoma      SECTION      CHOLECYSTECTOMY      CLOSURE OF DEFECT OF MOHS PROCEDURE Left 2018    Procedure: CLOSURE, MOHS PROCEDURE DEFECT SCALP  Flap closure;  Surgeon: Yrn Novak MD;  Location: [address];  Service: Plastics;  Laterality: Left;    COLONOSCOPY N/A 2019    Procedure: COLONOSCOPY Suprep;  Surgeon: Tony Mosher MD;  Location: [facility];  Service: Endoscopy;  Laterality: N/A;    ESOPHAGOGASTRODUODENOSCOPY N/A 2019    Procedure: EGD/colon;  Surgeon: Tony Mosher MD;  Location: [facility];  Service: Endoscopy;  Laterality: N/A;    HYSTERECTOMY      TUBAL LIGATION       FAMILY HISTORY:   Family History   Problem Relation Age of Onset    Heart disease Mother      SOCIAL HISTORY:   Social History     Socioeconomic History    Marital status:      Spouse name: Not on file    Number of children: Not on file     Years of education: Not on file    Highest education level: Not on file   Occupational History    Not on file   Social Needs    Financial resource strain: Not on file    Food insecurity:     Worry: Not on file     Inability: Not on file    Transportation needs:     Medical: Not on file     Non-medical: Not on file   Tobacco Use    Smoking status: Never Smoker    Smokeless tobacco: Never Used   Substance and Sexual Activity    Alcohol use: No    Drug use: No    Sexual activity: Not Currently   Lifestyle    Physical activity:     Days per week: Not on file     Minutes per session: Not on file    Stress: Not on file   Relationships    Social connections:     Talks on phone: Not on file     Gets together: Not on file     Attends Caodaism service: Not on file     Active member of club or organization: Not on file     Attends meetings of clubs or organizations: Not on file     Relationship status: Not on file   Other Topics Concern    Are you pregnant or think you may be? Not Asked    Breast-feeding Not Asked   Social History Narrative    Not on file       MEDICATIONS:   Current Outpatient Medications:     acetaminophen (TYLENOL) 325 MG tablet, Take 650 mg by mouth every 6 (six) hours as needed for Pain. , Disp: , Rfl:     alendronate (FOSAMAX) 70 MG tablet, TAKE 1 TAB EVERY 7 DAYS, Disp: 12 tablet, Rfl: 1    ERGOCALCIFEROL, VITAMIN D2, (VITAMIN D ORAL), Take 1 capsule by mouth nightly. , Disp: , Rfl:     ferrous sulfate 324 mg (65 mg iron) TbEC, Take 325 mg by mouth nightly. , Disp: , Rfl:     hydrocortisone (CORTEF) 10 MG Tab, TAKE 1 TABLET BY MOUTH TWICE A DAY, Disp: 180 tablet, Rfl: 3    levothyroxine (SYNTHROID) 137 MCG Tab tablet, TAKE 1 TABLET DAILY EXCEPT ON SUNDAY, TAKE 1 & 1/2 TABLET, Disp: 96 tablet, Rfl: 4    loratadine-pseudoephedrine  mg (CLARITIN-D 24-HOUR)  mg per 24 hr tablet, Take 1 tablet by mouth as needed. , Disp: , Rfl:     phenytoin (DILANTIN) 100 MG ER capsule,  "TAKE 4 CAPSULES (400 mg) EVERY bedtime, Disp: 360 capsule, Rfl: 4    XARELTO 20 mg Tab, TAKE 1 TABLET BY MOUTH EVERY DAY, Disp: 90 tablet, Rfl: 4  ALLERGIES:   Review of patient's allergies indicates:   Allergen Reactions    Neurontin [gabapentin]        Review of Systems:  Constitutional: no fever or chills  ENT: no nasal congestion or sore throat  Respiratory: no cough or shortness of breath  Cardiovascular: no chest pain or palpitations  Gastrointestinal: no nausea or vomiting, PUD, GERD, NSAID intolerance  Genitourinary: no hematuria or dysuria  Integument/Breast: no rash or pruritis  Hematologic/Lymphatic: no easy bruising or lymphadenopathy  Musculoskeletal: see HPI  Neurological: no seizures or tremors  Behavioral/Psych: no auditory or visual hallucinations      Physical Exam   Vitals:    12/19/19 1438   Weight: 62.6 kg (138 lb)   Height: 5' 8" (1.727 m)   PainSc:   6       Constitutional: Oriented to person, place, and time. Appears well-developed and well-nourished.   HENT:   Head: Normocephalic and atraumatic.   Nose: Nose normal.   Eyes: No scleral icterus.   Neck: Normal range of motion.   Cardiovascular: Normal rate and regular rhythm.    Pulses:       Radial pulses are 2+ on the right side, and 2+ on the left side.   Pulmonary/Chest: Effort normal and breath sounds normal.   Abdominal: Soft.   Neurological: Alert and oriented to person, place, and time.   Skin: Skin is warm.   Psychiatric: Normal mood and affect.     MUSCULOSKELETAL UPPER EXTREMITY:  Examination left wrist demonstrates a soft tissue mass dorsally directly underneath the previous surgical excision scar  Slightly tender to touch consistent with a ganglion cyst measuring about 1 x 1 cm in size  Little bit more prominent the wrist flexion and some tenderness to deep palpation  No evidence of infection            Diagnostic Studies:  None        Assessment:  Soft tissue mass left wrist probable recurrent ganglion cyst    Plan:  Patient " "would like to have this removed surgically  Will set up outpatient surgery for excision mass/cyst left wrist the risks and benefits of surgery explained she understands      The risks and benefits of surgery were discussed with the patient today and they understand.  The consent was signed in the office for surgery.      Ross Drew MD (Jay)  [facility]  Orthopedic Upper Extremity Surgery    "

## 2019-12-19 NOTE — PROGRESS NOTES
CC:  Soft tissue mass left wrist        HPI:  Thelma Nguyen is a very pleasant 53 y.o. female with enlarging mass on her left wrist for the past month or so  She did have previous excision of a ganglion cyst in the same area many years ago  No history of trauma  It has been causing some pain           PAST MEDICAL HISTORY:   Past Medical History:   Diagnosis Date    Allergic rhinitis     Anemia     Arthritis     Basal cell carcinoma     Broken ankle     Cancer     Clotting disorder     Disorder of kidney and ureter     DVT (deep venous thrombosis)     Factor V deficiency     Fracture of elbow     left    Glioblastoma multiforme of brain     Hypothyroidism     IBS (irritable bowel syndrome)     Osteoporosis     Panhypopituitarism     Peripheral polyneuropathy     Secondary adrenal insufficiency     Seizures     Thyroid disease      PAST SURGICAL HISTORY:   Past Surgical History:   Procedure Laterality Date    APPENDECTOMY      BRAIN SURGERY      for glioblastoma      SECTION      CHOLECYSTECTOMY      CLOSURE OF DEFECT OF MOHS PROCEDURE Left 2018    Procedure: CLOSURE, MOHS PROCEDURE DEFECT SCALP  Flap closure;  Surgeon: Yrn Novak MD;  Location: [address];  Service: Plastics;  Laterality: Left;    COLONOSCOPY N/A 2019    Procedure: COLONOSCOPY Suprep;  Surgeon: Tony Mosher MD;  Location: [facility];  Service: Endoscopy;  Laterality: N/A;    ESOPHAGOGASTRODUODENOSCOPY N/A 2019    Procedure: EGD/colon;  Surgeon: Tony Mosher MD;  Location: [facility];  Service: Endoscopy;  Laterality: N/A;    HYSTERECTOMY      TUBAL LIGATION       FAMILY HISTORY:   Family History   Problem Relation Age of Onset    Heart disease Mother      SOCIAL HISTORY:   Social History     Socioeconomic History    Marital status:      Spouse name: Not on file    Number of children: Not on file     Years of education: Not on file    Highest education level: Not on file   Occupational History    Not on file   Social Needs    Financial resource strain: Not on file    Food insecurity:     Worry: Not on file     Inability: Not on file    Transportation needs:     Medical: Not on file     Non-medical: Not on file   Tobacco Use    Smoking status: Never Smoker    Smokeless tobacco: Never Used   Substance and Sexual Activity    Alcohol use: No    Drug use: No    Sexual activity: Not Currently   Lifestyle    Physical activity:     Days per week: Not on file     Minutes per session: Not on file    Stress: Not on file   Relationships    Social connections:     Talks on phone: Not on file     Gets together: Not on file     Attends Latter day service: Not on file     Active member of club or organization: Not on file     Attends meetings of clubs or organizations: Not on file     Relationship status: Not on file   Other Topics Concern    Are you pregnant or think you may be? Not Asked    Breast-feeding Not Asked   Social History Narrative    Not on file       MEDICATIONS:   Current Outpatient Medications:     acetaminophen (TYLENOL) 325 MG tablet, Take 650 mg by mouth every 6 (six) hours as needed for Pain. , Disp: , Rfl:     alendronate (FOSAMAX) 70 MG tablet, TAKE 1 TAB EVERY 7 DAYS, Disp: 12 tablet, Rfl: 1    ERGOCALCIFEROL, VITAMIN D2, (VITAMIN D ORAL), Take 1 capsule by mouth nightly. , Disp: , Rfl:     ferrous sulfate 324 mg (65 mg iron) TbEC, Take 325 mg by mouth nightly. , Disp: , Rfl:     hydrocortisone (CORTEF) 10 MG Tab, TAKE 1 TABLET BY MOUTH TWICE A DAY, Disp: 180 tablet, Rfl: 3    levothyroxine (SYNTHROID) 137 MCG Tab tablet, TAKE 1 TABLET DAILY EXCEPT ON SUNDAY, TAKE 1 & 1/2 TABLET, Disp: 96 tablet, Rfl: 4    loratadine-pseudoephedrine  mg (CLARITIN-D 24-HOUR)  mg per 24 hr tablet, Take 1 tablet by mouth as needed. , Disp: , Rfl:     phenytoin (DILANTIN) 100 MG ER capsule,  "TAKE 4 CAPSULES (400 mg) EVERY bedtime, Disp: 360 capsule, Rfl: 4    XARELTO 20 mg Tab, TAKE 1 TABLET BY MOUTH EVERY DAY, Disp: 90 tablet, Rfl: 4  ALLERGIES:   Review of patient's allergies indicates:   Allergen Reactions    Neurontin [gabapentin]        Review of Systems:  Constitutional: no fever or chills  ENT: no nasal congestion or sore throat  Respiratory: no cough or shortness of breath  Cardiovascular: no chest pain or palpitations  Gastrointestinal: no nausea or vomiting, PUD, GERD, NSAID intolerance  Genitourinary: no hematuria or dysuria  Integument/Breast: no rash or pruritis  Hematologic/Lymphatic: no easy bruising or lymphadenopathy  Musculoskeletal: see HPI  Neurological: no seizures or tremors  Behavioral/Psych: no auditory or visual hallucinations      Physical Exam   Vitals:    12/19/19 1438   Weight: 62.6 kg (138 lb)   Height: 5' 8" (1.727 m)   PainSc:   6       Constitutional: Oriented to person, place, and time. Appears well-developed and well-nourished.   HENT:   Head: Normocephalic and atraumatic.   Nose: Nose normal.   Eyes: No scleral icterus.   Neck: Normal range of motion.   Cardiovascular: Normal rate and regular rhythm.    Pulses:       Radial pulses are 2+ on the right side, and 2+ on the left side.   Pulmonary/Chest: Effort normal and breath sounds normal.   Abdominal: Soft.   Neurological: Alert and oriented to person, place, and time.   Skin: Skin is warm.   Psychiatric: Normal mood and affect.     MUSCULOSKELETAL UPPER EXTREMITY:  Examination left wrist demonstrates a soft tissue mass dorsally directly underneath the previous surgical excision scar  Slightly tender to touch consistent with a ganglion cyst measuring about 1 x 1 cm in size  Little bit more prominent the wrist flexion and some tenderness to deep palpation  No evidence of infection            Diagnostic Studies:  None        Assessment:  Soft tissue mass left wrist probable recurrent ganglion cyst    Plan:  Patient " "would like to have this removed surgically  Will set up outpatient surgery for excision mass/cyst left wrist the risks and benefits of surgery explained she understands      The risks and benefits of surgery were discussed with the patient today and they understand.  The consent was signed in the office for surgery.      Ross Drew MD (Jay)  [facility]  Orthopedic Upper Extremity Surgery    "

## 2019-12-25 ENCOUNTER — PATIENT MESSAGE (OUTPATIENT)
Dept: SURGERY | Facility: HOSPITAL | Age: 54
End: 2019-12-25

## 2019-12-27 ENCOUNTER — PATIENT MESSAGE (OUTPATIENT)
Dept: SURGERY | Facility: HOSPITAL | Age: 54
End: 2019-12-27

## 2020-01-03 ENCOUNTER — ANESTHESIA EVENT (OUTPATIENT)
Dept: SURGERY | Facility: HOSPITAL | Age: 55
End: 2020-01-03
Payer: COMMERCIAL

## 2020-01-03 ENCOUNTER — TELEPHONE (OUTPATIENT)
Dept: INTERNAL MEDICINE | Facility: CLINIC | Age: 55
End: 2020-01-03

## 2020-01-03 ENCOUNTER — HOSPITAL ENCOUNTER (OUTPATIENT)
Dept: PREADMISSION TESTING | Facility: HOSPITAL | Age: 55
Discharge: HOME | End: 2020-01-03
Attending: ORTHOPAEDIC SURGERY
Payer: COMMERCIAL

## 2020-01-03 VITALS
TEMPERATURE: 98 F | HEART RATE: 97 BPM | WEIGHT: 139.75 LBS | RESPIRATION RATE: 20 BRPM | BODY MASS INDEX: 21.18 KG/M2 | DIASTOLIC BLOOD PRESSURE: 62 MMHG | HEIGHT: 68 IN | SYSTOLIC BLOOD PRESSURE: 133 MMHG | OXYGEN SATURATION: 100 %

## 2020-01-03 DIAGNOSIS — M25.532 LEFT WRIST PAIN: Primary | ICD-10-CM

## 2020-01-03 RX ORDER — LIDOCAINE HYDROCHLORIDE 10 MG/ML
1 INJECTION, SOLUTION EPIDURAL; INFILTRATION; INTRACAUDAL; PERINEURAL ONCE
Status: CANCELLED | OUTPATIENT
Start: 2020-01-03 | End: 2020-01-03

## 2020-01-03 RX ORDER — SODIUM CHLORIDE, SODIUM LACTATE, POTASSIUM CHLORIDE, CALCIUM CHLORIDE 600; 310; 30; 20 MG/100ML; MG/100ML; MG/100ML; MG/100ML
INJECTION, SOLUTION INTRAVENOUS CONTINUOUS
Status: CANCELLED | OUTPATIENT
Start: 2020-01-03

## 2020-01-03 NOTE — TELEPHONE ENCOUNTER
Please advise.  Pt having sx on Tuesday w/ dr barone  Need to d/c xarelto   No available slots on today

## 2020-01-03 NOTE — DISCHARGE INSTRUCTIONS
Your surgery is scheduled for 1/7    Please report to Procedure Check In Room on the 2nd FLOOR at 1100 a.m.     We will call you 1/6 after 2pm to verify your arrival time.       INSTRUCTIONS IMPORTANT!!!  ¨ Do not eat or drink after 12 midnight-including water. OK to brush teeth, no   gum, candy or mints!    ¨ Take only these medicines with a small swallow of water-morning of surgery.  Synthroid        ____  Proceed to [facility] on *** for additional testing.        ____  Do not wear makeup, including mascara.  ____  No powder, lotions or creams to surgical area.  ____  Please remove all jewelry, including piercings and leave at home.  ____  No money or valuables needed. Please leave at home.  ____  Please bring any documents given by your doctor.  ____  If going home the same day, arrange for a ride home. You will not be able to             drive if Anesthesia was used.  ____  Children under 18 years require a parent / guardian present the entire time             they are in surgery / recovery.  ____  Wear loose fitting clothing. Allow for dressings, bandages.  ____  Stop Aspirin, Ibuprofen, Motrin and Aleve at least 3-5 days before surgery, unless otherwise instructed by your doctor, or the nurse.   You MAY use Tylenol/acetaminophen until day of surgery.  ____  Wash the surgical area with Hibiclens the night before surgery, and again the             morning of surgery.  Be sure to rinse hibiclens off completely (if instructed by   nurse).  ____  If you take diabetic medication, do not take am of surgery unless instructed by Doctor.  ____  Call MD for temperature above 101 degrees or any other signs of infection such as Urinary (bladder) infection, Upper respiratory infection, skin boils, etc.  ____ Stop taking any Fish Oil supplement or any Vitamins that contain Vitamin E at least 5 days prior to surgery.  ____ Do Not wear your contact lenses the day of your procedure.  You may wear your glasses.       ____Do not shave surgical site for 3 days prior to surgery.  ____ Practice Good hand washing before, during, and after procedure.      I have read or had read and explained to me, and understand the above information.  Additional comments or instructions:  For additional questions call [phone]      ANESTHESIA SIDE EFFECTS  -For the first 24 hours after surgery:  Do not drive, use heavy equipment, make important decisions, or drink alcohol  -It is normal to feel sleepy for several hours.  Rest until you are more awake.  -Have someone stay with you, if needed.  They can watch for problems and help keep you safe.  -Some possible post anesthesia side effects include: nausea and vomiting, sore throat and hoarseness, sleepiness, and dizziness.        Pre-Op Bathing Instructions    Before surgery, you can play an important role in your own health.    Because skin is not sterile, we need to be sure that your skin is as free of germs as possible. By following the instructions below, you can reduce the number of germs on your skin before surgery.    IMPORTANT: You will need to shower with a special soap called Hibiclens*, available at any pharmacy.  If you are allergic to Chlorhexidine (the antiseptic in Hibiclens), use an antibacterial soap such as Dial Soap for your preoperative shower.  You will shower with Hibiclens both the night before your surgery and the morning of your surgery.  Do not use Hibiclens on the head, face or genitals to avoid injury to those areas.    STEP #1: THE NIGHT BEFORE YOUR SURGERY     1. Do not shave the area of your body where your surgery will be performed.  2. Shower and wash your hair and body as usual with your normal soap and shampoo.  3. Rinse your hair and body thoroughly after you shower to remove all soap residue.  4. With your hand, apply one packet of Hibiclens soap to the surgical site.   5. Wash the site gently for five (5) minutes. Do not scrub your skin too hard.   6. Do not  wash with your regular soap after Hibiclens is used.  7. Rinse your body thoroughly.  8. Pat yourself dry with a clean, soft towel.  9. Do not use lotion, cream, or powder.  10. Wear clean clothes.    STEP #2: THE MORNING OF YOUR SURGERY     1. Repeat Step #1.    * Not to be used by people allergic to Chlorhexidine.            Ganglion Cyst: Hand    A ganglion cyst is a firm, fluid-filled lump that can suddenly appear on the front or back of the wrist or at the base of a finger. These cysts grow from normal tissue in the wrist and fingers, and range in size from a pea to a peach pit. Although ganglion cysts are common, they dont spread, and they dont become cancerous. They can occur after an injury, but many times it isnt known why they grow. Ganglion cysts can change in size, and may go away on their own.  Symptoms  A ganglion cyst is sometimes painful, especially when it first occurs. Constantly using your hand or wrist can make the cyst enlarge and hurt more. Some hand and wrist movements, such as grasping things, may also be difficult.  How a ganglion cyst develops  Your wrist and hand are made up of many small bones that meet at joints. Tendons attach muscles to the bones at the joints. The tendons allow the joints to bend and straighten. Both tendons and joints are lined with tissue called synovium. This tissue makes a thick fluid that keeps the joints and tendons moving easily. Sometimes the tissue balloons out from the joint or tendons and forms a cyst. As the cyst fills with fluid and grows, it appears as a lump you can feel.  Where ganglion cysts occur  A ganglion cyst can occur anywhere on the hand near a joint. Cysts most commonly appear on the back or palm side of the wrist, or on the palm at the base of a finger. Your doctor can usually diagnose a cyst by examining the lump. He or she may draw off a little fluid or order an X-ray to rule out other problems.  Treating a ganglion cyst  Your healthcare  provider may just watch your ganglion cyst. Many shrink and become painless without treatment. Some disappear altogether. If the cyst is unsightly or painful, or makes it hard for you to use your hand, your healthcare provider can treat it or, if needed, remove it surgically.  Nonsurgical treatment  To shrink the cyst, your provider may remove (aspirate) the fluid with a needle. If the cyst hurts, your provider may also give you an injection of an anti-inflammatory, such as cortisone, to relieve the irritation. Your hand may then be wrapped to help keep the cyst from recurring.  Surgery  If the cyst reappears after treatment, your healthcare provider may remove it surgically. A section of the tissue that lines the joint or tendon is removed along with the cyst. This helps prevent another cyst from forming, although recurrence of the cyst is still possible after surgery. Usually, only your hand or arm is numbed, and you can go home a few hours after surgery. Your hand may be in a splint for several days.  Date Last Reviewed: 9/10/2015  © 5676-3959 [employer]. [address] [place] [address]. All rights reserved. This information is not intended as a substitute for professional medical care. Always follow your healthcare professional's instructions.        Types of Anesthesia  Your anesthesiologist is a key member of your surgical team. He or she gives you anesthetics (medications to keep you comfortable and decrease your awareness of surgery) and monitors your condition to keep you safe during surgery. You will have 1 of 3 kinds of anesthesia during your surgery.  Monitored anesthesia care (MAC)  · Often used for surgery that is short or not too invasive.  · Sedatives (medicines to relax you) are given through an IV (intravenous) line.  · The area around the surgical site is usually numbed with a local anesthetic.  · You may choose to remain awake or sleep lightly.  Regional anesthesia  (sometimes called spinal epidural or tamiko block)  · Often used for surgery on the arms, legs, and abdomen. It is also used during childbirth.  · A specific region of your body is numbed by injecting anesthetic near nerves, near your spine, or near the operative site.  · You may also be given sedatives through an IV line to relax you.  · With regional anesthesia, you may choose to remain awake or sleep lightly.  General anesthesia  · Often used for extensive surgery, such as on the heart, brain, or abdominal operation.  · Also used when the patient wants to be totally asleep.  · May be given as a gas that you breathe and as medicines that are injected through an IV line.  · Because you are asleep, you feel no pain and remember nothing of the surgery.  The risks and complications of anesthesia depend on your overall health. If you are healthy, the risks are low. The risks are higher for patients with heart or lung problems. Your anesthesiologist or nurse anesthetist will discuss the risks with you.   Date Last Reviewed: 12/1/2016 © 2000-2017 [employer]. [address] [place] [address], [place] [address]. All rights reserved. This information is not intended as a substitute for professional medical care. Always follow your healthcare professional's instructions.

## 2020-01-03 NOTE — PRE-PROCEDURE INSTRUCTIONS
[place] [phone]    Allergies, medical, surgical, family and psychosocial histories reviewed with patient. Periop plan of care reviewed. Preop instructions given, including medications to take and to hold. Hibiclens soap and instructions on use given. Time allotted for questions to be addressed.  Patient verbalized understanding.

## 2020-01-03 NOTE — TELEPHONE ENCOUNTER
----- Message from Eli Hobbs LPN sent at 1/3/2020 10:31 AM CST -----  Good morning,     This patient is scheduled to have surgery on Tuesday 1/7 with Dr. Drew for an excision of a ganglion cyst. Please advise on when/if this pt. Should d/c xarelto prior to surgery.     Thank you!

## 2020-01-03 NOTE — ANESTHESIA PREPROCEDURE EVALUATION
2020  Thelma Nguyen is a 54 y.o., female on chronic steroids scheduled for ganglion cyst removal from left hand under local/MAC on 20.    Past Medical History:   Diagnosis Date    Allergic rhinitis     Anemia     Arthritis     Basal cell carcinoma     Broken ankle     Cancer 1985    Clotting disorder     Disorder of kidney and ureter     DVT (deep venous thrombosis)     Factor V deficiency     Fracture of elbow     left    Glioblastoma multiforme of brain     Hypothyroidism     IBS (irritable bowel syndrome)     Osteoporosis     Panhypopituitarism     Peripheral polyneuropathy     Secondary adrenal insufficiency     Seizures     Thyroid disease      Past Surgical History:   Procedure Laterality Date    APPENDECTOMY      BRAIN SURGERY      for glioblastoma      SECTION      CHOLECYSTECTOMY      CLOSURE OF DEFECT OF MOHS PROCEDURE Left 2018    Procedure: CLOSURE, MOHS PROCEDURE DEFECT SCALP  Flap closure;  Surgeon: Yrn Novak MD;  Location: [facility] OR [address] [place] [address];  Service: Plastics;  Laterality: Left;    COLONOSCOPY N/A 2019    Procedure: COLONOSCOPY Suprep;  Surgeon: Tony Mosher MD;  Location: [facility];  Service: Endoscopy;  Laterality: N/A;    ESOPHAGOGASTRODUODENOSCOPY N/A 2019    Procedure: EGD/colon;  Surgeon: Tony Mosher MD;  Location: [facility];  Service: Endoscopy;  Laterality: N/A;    HYSTERECTOMY      TUBAL LIGATION         Anesthesia Evaluation    I have reviewed the Patient Summary Reports.    I have reviewed the Nursing Notes.   I have reviewed the Medications.     Review of Systems  Anesthesia Hx:  No problems with previous Anesthesia  Denies Family Hx of Anesthesia complications.    Social:  Non-Smoker, No Alcohol Use    Hematology/Oncology:        Hematology  Comments: Factor V deficiency, on Xarelto --  Cancer in past history (glioblastoma):  chemotherapy, radiation and surgery    Cardiovascular:    Denies Angina.  Deep Venous Thrombosis (DVT) (last 5 yrs ago, on Xarelto)    Pulmonary:  Pulmonary Normal  Denies Shortness of breath.    Renal/:   Chronic Renal Disease, CRI    Hepatic/GI:  Hepatic/GI Normal    Neurological:   Seizures, well controlled  Brain Tumor, Glioblastoma   Endocrine:  Pituitary Disease, Panhypopituitarism (on daily hydrocortisone)        Physical Exam  General:  Well nourished    Airway/Jaw/Neck:  Airway Findings: Mouth Opening: Normal Mallampati: II       Chest/Lungs:  Chest/Lungs Findings: Clear to auscultation, Normal Respiratory Rate     Heart/Vascular:  Heart Findings: Rate: Normal  Rhythm: Regular Rhythm  Sounds: Normal        Mental Status:  Mental Status Findings:  Cooperative, Alert and Oriented         Anesthesia Plan  Type of Anesthesia, risks & benefits discussed:  Anesthesia Type:  MAC  Patient's Preference:   Intra-op Monitoring Plan: standard ASA monitors  Intra-op Monitoring Plan Comments:   Post Op Pain Control Plan: multimodal analgesia  Post Op Pain Control Plan Comments:   Induction:   IV  Beta Blocker:  Patient is not currently on a Beta-Blocker (No further documentation required).       Informed Consent: Patient understands risks and agrees with Anesthesia plan.  Questions answered. Anesthesia consent signed with patient.  ASA Score: 3     Day of Surgery Review of History & Physical:  There are no significant changes.      Anesthesia Plan Notes: Anesthesia consent to be signed prior to procedure on 1/7/20  Dr. Drew's office notified that Xarelto instructions come from surgeon/PCP          Ready For Surgery From Anesthesia Perspective.

## 2020-01-07 ENCOUNTER — HOSPITAL ENCOUNTER (OUTPATIENT)
Facility: HOSPITAL | Age: 55
Discharge: HOME | End: 2020-01-07
Attending: ORTHOPAEDIC SURGERY | Admitting: ORTHOPAEDIC SURGERY
Payer: COMMERCIAL

## 2020-01-07 ENCOUNTER — ANESTHESIA (OUTPATIENT)
Dept: SURGERY | Facility: HOSPITAL | Age: 55
End: 2020-01-07
Payer: COMMERCIAL

## 2020-01-07 VITALS
SYSTOLIC BLOOD PRESSURE: 125 MMHG | RESPIRATION RATE: 18 BRPM | TEMPERATURE: 98 F | BODY MASS INDEX: 19.7 KG/M2 | DIASTOLIC BLOOD PRESSURE: 81 MMHG | HEART RATE: 69 BPM | HEIGHT: 68 IN | WEIGHT: 130 LBS | OXYGEN SATURATION: 96 %

## 2020-01-07 VITALS — HEART RATE: 91 BPM | SYSTOLIC BLOOD PRESSURE: 126 MMHG | OXYGEN SATURATION: 100 % | DIASTOLIC BLOOD PRESSURE: 63 MMHG

## 2020-01-07 DIAGNOSIS — M67.40 GANGLION CYST: ICD-10-CM

## 2020-01-07 PROCEDURE — 88304 TISSUE EXAM BY PATHOLOGIST: CPT | Mod: 26,,, | Performed by: PATHOLOGY

## 2020-01-07 PROCEDURE — 71000015 HC POSTOP RECOV 1ST HR: Performed by: ORTHOPAEDIC SURGERY

## 2020-01-07 PROCEDURE — 63600175 PHARM REV CODE 636 W HCPCS: Performed by: NURSE ANESTHETIST, CERTIFIED REGISTERED

## 2020-01-07 PROCEDURE — S0020 INJECTION, BUPIVICAINE HYDRO: HCPCS | Performed by: ORTHOPAEDIC SURGERY

## 2020-01-07 PROCEDURE — 71000016 HC POSTOP RECOV ADDL HR: Performed by: ORTHOPAEDIC SURGERY

## 2020-01-07 PROCEDURE — 37000009 HC ANESTHESIA EA ADD 15 MINS: Performed by: ORTHOPAEDIC SURGERY

## 2020-01-07 PROCEDURE — 88304 PR  SURG PATH,LEVEL III: ICD-10-PCS | Mod: 26,,, | Performed by: PATHOLOGY

## 2020-01-07 PROCEDURE — 88304 TISSUE EXAM BY PATHOLOGIST: CPT | Performed by: PATHOLOGY

## 2020-01-07 PROCEDURE — 63600175 PHARM REV CODE 636 W HCPCS: Performed by: ORTHOPAEDIC SURGERY

## 2020-01-07 PROCEDURE — 25112 PR EXCIS RECURRENT GANGLION WRIST: ICD-10-PCS | Mod: LT,,, | Performed by: ORTHOPAEDIC SURGERY

## 2020-01-07 PROCEDURE — 37000008 HC ANESTHESIA 1ST 15 MINUTES: Performed by: ORTHOPAEDIC SURGERY

## 2020-01-07 PROCEDURE — 63600175 PHARM REV CODE 636 W HCPCS: Performed by: NURSE PRACTITIONER

## 2020-01-07 PROCEDURE — 25000003 PHARM REV CODE 250: Performed by: ORTHOPAEDIC SURGERY

## 2020-01-07 PROCEDURE — 36000706: Performed by: ORTHOPAEDIC SURGERY

## 2020-01-07 PROCEDURE — 25112 REREMOVE WRIST TENDON LESION: CPT | Mod: LT,,, | Performed by: ORTHOPAEDIC SURGERY

## 2020-01-07 PROCEDURE — 36000707: Performed by: ORTHOPAEDIC SURGERY

## 2020-01-07 RX ORDER — LIDOCAINE HCL/PF 100 MG/5ML
SYRINGE (ML) INTRAVENOUS
Status: DISCONTINUED | OUTPATIENT
Start: 2020-01-07 | End: 2020-01-07

## 2020-01-07 RX ORDER — LIDOCAINE HYDROCHLORIDE 10 MG/ML
1 INJECTION, SOLUTION EPIDURAL; INFILTRATION; INTRACAUDAL; PERINEURAL ONCE
Status: DISCONTINUED | OUTPATIENT
Start: 2020-01-07 | End: 2020-01-07 | Stop reason: HOSPADM

## 2020-01-07 RX ORDER — KETOROLAC TROMETHAMINE 30 MG/ML
15 INJECTION, SOLUTION INTRAMUSCULAR; INTRAVENOUS ONCE
Status: DISCONTINUED | OUTPATIENT
Start: 2020-01-07 | End: 2020-01-07 | Stop reason: HOSPADM

## 2020-01-07 RX ORDER — OXYCODONE HYDROCHLORIDE 5 MG/1
10 TABLET ORAL EVERY 4 HOURS PRN
Status: DISCONTINUED | OUTPATIENT
Start: 2020-01-07 | End: 2020-01-07 | Stop reason: HOSPADM

## 2020-01-07 RX ORDER — PROPOFOL 10 MG/ML
VIAL (ML) INTRAVENOUS
Status: DISCONTINUED | OUTPATIENT
Start: 2020-01-07 | End: 2020-01-07

## 2020-01-07 RX ORDER — CEFAZOLIN SODIUM 2 G/50ML
2 SOLUTION INTRAVENOUS
Status: DISCONTINUED | OUTPATIENT
Start: 2020-01-07 | End: 2020-01-07 | Stop reason: HOSPADM

## 2020-01-07 RX ORDER — ACETAMINOPHEN 325 MG/1
650 TABLET ORAL EVERY 4 HOURS PRN
Status: DISCONTINUED | OUTPATIENT
Start: 2020-01-07 | End: 2020-01-07 | Stop reason: HOSPADM

## 2020-01-07 RX ORDER — BUPIVACAINE HYDROCHLORIDE 5 MG/ML
INJECTION, SOLUTION EPIDURAL; INTRACAUDAL
Status: DISCONTINUED | OUTPATIENT
Start: 2020-01-07 | End: 2020-01-07 | Stop reason: HOSPADM

## 2020-01-07 RX ORDER — HYDROCODONE BITARTRATE AND ACETAMINOPHEN 5; 325 MG/1; MG/1
1 TABLET ORAL EVERY 4 HOURS PRN
Qty: 20 TABLET | Refills: 0 | Status: SHIPPED | OUTPATIENT
Start: 2020-01-07 | End: 2021-05-25

## 2020-01-07 RX ORDER — PROPOFOL 10 MG/ML
INJECTION, EMULSION INTRAVENOUS CONTINUOUS PRN
Status: DISCONTINUED | OUTPATIENT
Start: 2020-01-07 | End: 2020-01-07

## 2020-01-07 RX ORDER — ONDANSETRON 8 MG/1
8 TABLET, ORALLY DISINTEGRATING ORAL EVERY 8 HOURS PRN
Status: DISCONTINUED | OUTPATIENT
Start: 2020-01-07 | End: 2020-01-07 | Stop reason: HOSPADM

## 2020-01-07 RX ORDER — BACITRACIN 50000 [IU]/1
INJECTION, POWDER, FOR SOLUTION INTRAMUSCULAR
Status: DISCONTINUED | OUTPATIENT
Start: 2020-01-07 | End: 2020-01-07 | Stop reason: HOSPADM

## 2020-01-07 RX ORDER — MIDAZOLAM HYDROCHLORIDE 1 MG/ML
INJECTION, SOLUTION INTRAMUSCULAR; INTRAVENOUS
Status: DISCONTINUED | OUTPATIENT
Start: 2020-01-07 | End: 2020-01-07

## 2020-01-07 RX ORDER — SODIUM CHLORIDE, SODIUM LACTATE, POTASSIUM CHLORIDE, CALCIUM CHLORIDE 600; 310; 30; 20 MG/100ML; MG/100ML; MG/100ML; MG/100ML
INJECTION, SOLUTION INTRAVENOUS CONTINUOUS
Status: DISCONTINUED | OUTPATIENT
Start: 2020-01-07 | End: 2020-01-07 | Stop reason: HOSPADM

## 2020-01-07 RX ADMIN — PROPOFOL 25 MG: 10 INJECTION, EMULSION INTRAVENOUS at 01:01

## 2020-01-07 RX ADMIN — SODIUM CHLORIDE, SODIUM LACTATE, POTASSIUM CHLORIDE, AND CALCIUM CHLORIDE: .6; .31; .03; .02 INJECTION, SOLUTION INTRAVENOUS at 01:01

## 2020-01-07 RX ADMIN — LIDOCAINE HYDROCHLORIDE 75 MG: 20 INJECTION, SOLUTION INTRAVENOUS at 01:01

## 2020-01-07 RX ADMIN — CEFAZOLIN SODIUM 2 G: 2 SOLUTION INTRAVENOUS at 01:01

## 2020-01-07 RX ADMIN — PROPOFOL 125 MCG/KG/MIN: 10 INJECTION, EMULSION INTRAVENOUS at 01:01

## 2020-01-07 RX ADMIN — MIDAZOLAM 2 MG: 1 INJECTION INTRAMUSCULAR; INTRAVENOUS at 01:01

## 2020-01-07 NOTE — INTERVAL H&P NOTE
The patient has been examined and the H&P has been reviewed:    I concur with the findings and no changes have occurred since H&P was written.    Anesthesia/Surgery risks, benefits and alternative options discussed and understood by patient/family.          Active Hospital Problems    Diagnosis  POA    Ganglion cyst [M67.40]  Yes      Resolved Hospital Problems   No resolved problems to display.

## 2020-01-07 NOTE — TRANSFER OF CARE
"Anesthesia Transfer of Care Note    Patient: Thelma Nguyen    Procedure(s) Performed: Procedure(s) (LRB):  EXCISION, GANGLION CYST, HAND (Left)    Patient location: [facility]    Anesthesia Type: MAC    Transport from OR: Transported from OR on room air with adequate spontaneous ventilation    Post pain: adequate analgesia    Post assessment: no apparent anesthetic complications and tolerated procedure well    Post vital signs: stable    Level of consciousness: awake, alert and oriented    Nausea/Vomiting: no nausea/vomiting    Complications: none    Transfer of care protocol was followed      Last vitals:   Visit Vitals  /60 (BP Location: Right arm, Patient Position: Lying)   Pulse 110   Temp 36.5 °C (97.7 °F)   Resp 19   Ht 5' 8" (1.727 m)   Wt 59 kg (130 lb)   LMP 10/23/1997 (Exact Date)   SpO2 100%   Breastfeeding? No   BMI 19.77 kg/m²     "

## 2020-01-07 NOTE — BRIEF OP NOTE
[facility]  Brief Operative Note    Surgery Date: 1/7/2020     Surgeon(s) and Role:     * Ross Drew Jr., MD - Primary    Assisting Surgeon: None    Pre-op Diagnosis:  Ganglion cyst [M67.40]    Post-op Diagnosis:  Post-Op Diagnosis Codes:     * Ganglion cyst [M67.40]    Procedure(s) (LRB):  EXCISION, GANGLION CYST, HAND (Left)    Anesthesia: Local MAC    Description of the findings of the procedure(s): cyst left wrist    Estimated Blood Loss: * No values recorded between 1/7/2020  1:59 PM and 1/7/2020  2:21 PM *         Specimens:   Specimen (12h ago, onward)    None            Discharge Note    OUTCOME: Patient tolerated treatment/procedure well without complication and is now ready for discharge.    DISPOSITION: Home or Self Care    FINAL DIAGNOSIS:  Ganglion cyst    FOLLOWUP: In clinic

## 2020-01-07 NOTE — ANESTHESIA POSTPROCEDURE EVALUATION
Anesthesia Post Evaluation    Patient: Thelma Nguyen    Procedure(s) Performed: Procedure(s) (LRB):  EXCISION, GANGLION CYST, HAND (Left)    Final Anesthesia Type: MAC    Patient location during evaluation: [facility]  Patient participation: Yes- Able to Participate  Level of consciousness: awake and alert and oriented  Post-procedure vital signs: reviewed and stable  Pain management: adequate  Airway patency: patent    PONV status at discharge: No PONV  Anesthetic complications: no      Cardiovascular status: blood pressure returned to baseline, hemodynamically stable and stable  Respiratory status: room air, unassisted and spontaneous ventilation  Hydration status: euvolemic  Follow-up not needed.          Vitals Value Taken Time   /63 1/7/2020  2:12 PM   Temp 36.5 °C (97.7 °F) 1/7/2020 11:57 AM   Pulse 91 1/7/2020  2:13 PM   Resp 19 1/7/2020 11:57 AM   SpO2 100 % 1/7/2020  2:13 PM         No case tracking events are documented in the log.      Pain/Jose De Jesus Score: No data recorded

## 2020-01-07 NOTE — DISCHARGE INSTRUCTIONS
After Hand Surgery  After surgery, the better you take care of yourself--especially your hand--the sooner it will heal. Follow your surgeons instructions. Try not to bump your hand, and dont move or lift anything while youre still wearing bandages, a splint, or a cast.  Care for your hand    · Keep your hand elevated above heart level as much as possible for the first several days after surgery. This helps reduce swelling and pain.  · To help prevent infection and speed healing, take care not to get your cast or bandages wet.  Relieve pain as directed  Your surgeon may prescribe pain medicine or suggest you take an anti-inflammatory medicine. You might also be instructed to apply ice (or another cold source) to your hand. If you use ice cubes, put them in a plastic bag and rest it on top of your bandages. Leave the cold source on your hand for as long as its comfortable. Do this several times a day for the first few days after surgery. It may take several minutes before you can feel the cold through the cast or bandages.  Follow up with your surgeon  During a follow-up visit after surgery, your surgeon will check your progress. The stitches, bandages, splint, or cast may be removed. A new cast or splint may be placed. If your hand has healed enough, your surgeon may prescribe exercises.  Do prescribed hand exercises  Your surgeon may recommend that you do exercises. These may be done under the guidance of a physical or occupational therapist. The exercises strengthen your hand, help you regain flexibility, and restore proper function. Do the exercises as advised.  Call your surgeon if you have...  · A fever higher than 100.4°F (38.0°C) taken by mouth  · Side effects from your medicine, such as prolonged nausea  · A wet or loose dressing, or a dressing that is too tight  · Excessive bleeding  · Increased, ongoing pain or numbness  · Signs of infection (such as drainage, warmth, or redness) at the incision site    Date Last Reviewed: 11/11/2015  © 7669-1949 [employer], [employer]. [address] [place] [address], [place] [address]. All rights reserved. This information is not intended as a substitute for professional medical care. Always follow your healthcare professional's instructions.  ANESTHESIA  -For the first 24 hours after surgery:  Do not drive, use heavy equipment, make important decisions, or drink alcohol  -It is normal to feel sleepy for several hours.  Rest until you are more awake.  -Have someone stay with you, if needed.  They can watch for problems and help keep you safe.  -Some possible post anesthesia side effects include: nausea and vomiting, sore throat and hoarseness, sleepiness, and dizziness.    PAIN  -If you have pain after surgery, pain medicine will help you feel better.  Take it as directed, before pain becomes severe.  Most pain relievers taken by mouth need at least 20-30 minutes to start working.  -Do not drive or drink alcohol while taking pain medicine.  -Pain medication can upset your stomach.  Taking them with a little food may help.  -Other ways to help control pain: elevation, ice, and relaxation  -Call your surgeon if still having unmanageable pain an hour after taking pain medicine.  -Pain medicine can cause constipation.  Taking an over-the counter stool softener while on prescription pain medicine and drinking plenty of fluids can prevent this side effect.  -Call your surgeon if you have severe side effects like: breathing problems, trouble waking up, dizziness, confusion, or severe constipation.    NAUSEA  -Some people have nausea after surgery.  This is often because of anesthesia, pain, pain medicine, or the stress of surgery.  -Do not push yourself to eat.  Start off with clear liquids and soup.  Slowly move to solid foods.  Don't eat fatty, rich, spicy foods at first.  Eat smaller amounts.  -If you develop persistent nausea and vomiting please notify your surgeon  immediately.    BLEEDING  -Different types of surgery require different types of care and dressing changes.  It is important to follow all instructions and advice from your surgeon.  Change dressing as directed.  Call your surgeon for any concerns regarding postop bleeding.    SIGNS OF INFECTION  -Signs of infection include: fever, swelling, drainage, and redness  -Notify your surgeon if you have a fever of 100.4 F (38.0 C) or higher.  -Notify your surgeon if you notice redness, swelling, increased pain, pus, or a foul smell at the incision site.    Acetaminophen; Hydrocodone tablets or capsules  What is this medicine?  ACETAMINOPHEN; HYDROCODONE (a set a CASEY merle fen; emy droe KOE done) is a pain reliever. It is used to treat moderate to severe pain.  How should I use this medicine?  Take this medicine by mouth with a glass of water. Follow the directions on the prescription label. You can take it with or without food. If it upsets your stomach, take it with food. Do not take your medicine more often than directed.  A special MedGuide will be given to you by the pharmacist with each prescription and refill. Be sure to read this information carefully each time.  Talk to your pediatrician regarding the use of this medicine in children. Special care may be needed.  What side effects may I notice from receiving this medicine?  Side effects that you should report to your doctor or health care professional as soon as possible:  · allergic reactions like skin rash, itching or hives, swelling of the face, lips, or tongue  · breathing problems  · confusion  · redness, blistering, peeling or loosening of the skin, including inside the mouth  · signs and symptoms of low blood pressure like dizziness; feeling faint or lightheaded, falls; unusually weak or tired  · trouble passing urine or change in the amount of urine  · yellowing of the eyes or skin  Side effects that usually do not require medical attention (report to your  doctor or health care professional if they continue or are bothersome):  · constipation  · dry mouth  · nausea, vomiting  · tiredness  What may interact with this medicine?  This medicine may interact with the following medications:  · alcohol  · antiviral medicines for HIV or AIDS  · atropine  · antihistamines for allergy, cough and cold  · certain antibiotics like erythromycin, clarithromycin  · certain medicines for anxiety or sleep  · certain medicines for bladder problems like oxybutynin, tolterodine  · certain medicines for depression like amitriptyline, fluoxetine, sertraline  · certain medicines for fungal infections like ketoconazole and itraconazole  · certain medicines for Parkinson's disease like benztropine, trihexyphenidyl  · certain medicines for seizures like carbamazepine, phenobarbital, phenytoin, primidone  · certain medicines for stomach problems like dicyclomine, hyoscyamine  · certain medicines for travel sickness like scopolamine  · general anesthetics like halothane, isoflurane, methoxyflurane, propofol  · ipratropium  · local anesthetics like lidocaine, pramoxine, tetracaine  · MAOIs like Carbex, Eldepryl, Marplan, Nardil, and Parnate  · medicines that relax muscles for surgery  · other medicines with acetaminophen  · other narcotic medicines for pain or cough  · phenothiazines like chlorpromazine, mesoridazine, prochlorperazine, thioridazine  · rifampin  What if I miss a dose?  If you miss a dose, take it as soon as you can. If it is almost time for your next dose, take only that dose. Do not take double or extra doses.  Where should I keep my medicine?  Keep out of the reach of children. This medicine can be abused. Keep your medicine in a safe place to protect it from theft. Do not share this medicine with anyone. Selling or giving away this medicine is dangerous and against the law.  This medicine may cause accidental overdose and death if it taken by other adults, children, or pets. Mix  any unused medicine with a substance like cat litter or coffee grounds. Then throw the medicine away in a sealed container like a sealed bag or a coffee can with a lid. Do not use the medicine after the expiration date.  Store at room temperature between 15 and 30 degrees C (59 and 86 degrees F).  What should I tell my health care provider before I take this medicine?  They need to know if you have any of these conditions:  · brain tumor  · Crohn's disease, inflammatory bowel disease, or ulcerative colitis  · drug abuse or addiction  · head injury  · heart or circulation problems  · if you often drink alcohol  · kidney disease or problems going to the bathroom  · liver disease  · lung disease, asthma, or breathing problems  · an unusual or allergic reaction to acetaminophen, hydrocodone, other opioid analgesics, other medicines, foods, dyes, or preservatives  · pregnant or trying to get pregnant  · breast-feeding  What should I watch for while using this medicine?  Tell your doctor or health care professional if your pain does not go away, if it gets worse, or if you have new or a different type of pain. You may develop tolerance to the medicine. Tolerance means that you will need a higher dose of the medicine for pain relief. Tolerance is normal and is expected if you take the medicine for a long time.  Do not suddenly stop taking your medicine because you may develop a severe reaction. Your body becomes used to the medicine. This does NOT mean you are addicted. Addiction is a behavior related to getting and using a drug for a non-medical reason. If you have pain, you have a medical reason to take pain medicine. Your doctor will tell you how much medicine to take. If your doctor wants you to stop the medicine, the dose will be slowly lowered over time to avoid any side effects.  There are different types of narcotic medicines (opiates). If you take more than one type at the same time or if you are taking another  medicine that also causes drowsiness, you may have more side effects. Give your health care provider a list of all medicines you use. Your doctor will tell you how much medicine to take. Do not take more medicine than directed. Call emergency for help if you have problems breathing or unusual sleepiness.  Do not take other medicines that contain acetaminophen with this medicine. Always read labels carefully. If you have questions, ask your doctor or pharmacist.  If you take too much acetaminophen get medical help right away. Too much acetaminophen can be very dangerous and cause liver damage. Even if you do not have symptoms, it is important to get help right away.  You may get drowsy or dizzy. Do not drive, use machinery, or do anything that needs mental alertness until you know how this medicine affects you. Do not stand or sit up quickly, especially if you are an older patient. This reduces the risk of dizzy or fainting spells. Alcohol may interfere with the effect of this medicine. Avoid alcoholic drinks.  The medicine will cause constipation. Try to have a bowel movement at least every 2 to 3 days. If you do not have a bowel movement for 3 days, call your doctor or health care professional.  Your mouth may get dry. Chewing sugarless gum or sucking hard candy, and drinking plenty of water may help. Contact your doctor if the problem does not go away or is severe.  NOTE:This sheet is a summary. It may not cover all possible information. If you have questions about this medicine, talk to your doctor, pharmacist, or health care provider. Copyright© 2017 [employer]

## 2020-01-07 NOTE — OP NOTE
Certification of Assistant at Surgery       Surgery Date: 1/7/2020     Participating Surgeons:  Surgeon(s) and Role:     * Ross Drew Jr., MD - Primary    Procedures:  Procedure(s) (LRB):  EXCISION, GANGLION CYST, HAND (Left)    Assistant Surgeon's Certification of Necessity:  I understand that section 1842 (b) (6) (d) of the Social Security Act generally prohibits Medicare Part B reasonable charge payment for the services of assistants at surgery in teaching hospitals when qualified residents are available to furnish such services. I certify that the services for which payment is claimed were medically necessary, and that no qualified resident was available to perform the services. I further understand that these services are subject to post-payment review by the Medicare carrier.      Nikko Kaur PA-C    01/07/2020  2:18 PM

## 2020-01-08 NOTE — OP NOTE
DATE OF PROCEDURE:  01/07/2020.    PREOPERATIVE DIAGNOSIS:  Recurrent Ganglion cyst, left wrist, dorsal.    POSTOPERATIVE DIAGNOSIS:  Recurrent Ganglion cyst, left wrist, dorsal.    OPERATIVE PROCEDURE:  Excisional biopsy, ganglion cyst, left wrist, dorsal.    SURGEON:  Ross Drew.    FIRST ASSISTANT:  Nikko Kaur.    ANESTHESIA:  MAC.    ESTIMATED BLOOD LOSS:  Minimal.    COMPLICATIONS:  None.    SPECIMENS:  Ganglion cyst.    BRIEF INDICATIONS:  A 54-year-old female with recurrent cyst, left wrist,   symptomatic, taken to surgery for the above procedure.    OPERATIVE PROCEDURE IN DETAIL:  After operative consent was obtained, the   patient was brought to the Operating Room, placed supine on the operating room   table.  Anesthesia by IV sedation followed by injection of Xylocaine, Marcaine   combination operative site, left hand dorsal.  A tourniquet applied to left arm.    Left upper extremity prepped and draped out in the normal sterile fashion.    The Esmarch used to exsanguinate the limb and the tourniquet inflated to 225   mmHg.    Following this, a dorsal transverse incision was made with a #15 blade through   the previous surgical scar.  Careful dissection was used to excise scar tissue   and subcutaneous tissue down to a small cyst, which was arising from the joint   itself.  It was carefully traced down through the extensor tendons to its   capsular attachment where a capsulectomy was performed removing the base of the   cyst as it entered the scapholunate ligament, which was carefully debrided.    Hemostasis was achieved with Bovie.  The wound was irrigated and the skin closed   with a running 5-0 nylon horizontal mattress suture.  Sterile dressing applied   followed by light wrap.  Tourniquet deflated.  The patient was brought to the   Recovery Room in stable condition.  All sponge and needle counts reported as   correct.  No complications.      IVAN/KAMRON  dd: 01/07/2020 14:24:01 (CST)  td: 01/07/2020  20:11:25 ([facility])  Doc ID   #[id number]  Job ID #[id number]    CC:

## 2020-01-10 LAB
FINAL PATHOLOGIC DIAGNOSIS: NORMAL
GROSS: NORMAL

## 2020-01-13 ENCOUNTER — TELEPHONE (OUTPATIENT)
Dept: ORTHOPEDICS | Facility: CLINIC | Age: 55
End: 2020-01-13

## 2020-01-13 NOTE — TELEPHONE ENCOUNTER
----- Message from Carmen Caldwell sent at 1/13/2020 11:30 AM CST -----  PT IS RETURNING A MISSED CALL.  PLEASE CALL AND ADVISE            THANK YOU

## 2020-01-21 ENCOUNTER — CLINICAL SUPPORT (OUTPATIENT)
Dept: AUDIOLOGY | Facility: CLINIC | Age: 55
End: 2020-01-21
Payer: COMMERCIAL

## 2020-01-21 ENCOUNTER — OFFICE VISIT (OUTPATIENT)
Dept: ORTHOPEDICS | Facility: CLINIC | Age: 55
End: 2020-01-21
Payer: COMMERCIAL

## 2020-01-21 VITALS
SYSTOLIC BLOOD PRESSURE: 115 MMHG | BODY MASS INDEX: 19.77 KG/M2 | WEIGHT: 130 LBS | HEART RATE: 110 BPM | DIASTOLIC BLOOD PRESSURE: 78 MMHG

## 2020-01-21 DIAGNOSIS — Z98.890 S/P EXCISION OF GANGLION CYST: Primary | ICD-10-CM

## 2020-01-21 DIAGNOSIS — H90.3 SENSORINEURAL HEARING LOSS, BILATERAL: Primary | ICD-10-CM

## 2020-01-21 PROCEDURE — 99999 PR PBB SHADOW E&M-EST. PATIENT-LVL III: ICD-10-PCS | Mod: PBBFAC,,, | Performed by: PHYSICIAN ASSISTANT

## 2020-01-21 PROCEDURE — 99499 UNLISTED E&M SERVICE: CPT | Mod: S$GLB,,, | Performed by: AUDIOLOGIST

## 2020-01-21 PROCEDURE — 99024 POSTOP FOLLOW-UP VISIT: CPT | Mod: S$GLB,,, | Performed by: PHYSICIAN ASSISTANT

## 2020-01-21 PROCEDURE — 99499 NO LOS: ICD-10-PCS | Mod: S$GLB,,, | Performed by: AUDIOLOGIST

## 2020-01-21 PROCEDURE — 99999 PR PBB SHADOW E&M-EST. PATIENT-LVL III: CPT | Mod: PBBFAC,,, | Performed by: PHYSICIAN ASSISTANT

## 2020-01-21 PROCEDURE — 99024 PR POST-OP FOLLOW-UP VISIT: ICD-10-PCS | Mod: S$GLB,,, | Performed by: PHYSICIAN ASSISTANT

## 2020-01-21 NOTE — PROGRESS NOTES
Thelma Nguyen was seen today for a hearing aid consult. Based on her lifestyle it was recommended she be fit with the followin [employer] [phone] - R  Dark gray   Size 2 MP Receivers  Medium Open Domes    Thelma Nguyen will return for a hearing aid pick-up on 2020.

## 2020-01-21 NOTE — PROGRESS NOTES
Subjective:      Patient ID: Thelma Nguyen is a 54 y.o. female.    Chief Complaint: Post-op Evaluation of the Left Wrist      HPI: Thelma Nguyen is here for a postop visit. she is 14 days s/p ganglion cyst removal and is doing well. Pt reports pain has been controlled. She states she has been doing her normal daily activities except for sewing, but feels she is ready to resume that. Post surgical complaints include: none.     Past Medical History:   Diagnosis Date    Allergic rhinitis     Anemia 1987    Arthritis 2000    Basal cell carcinoma     Broken ankle     Cancer 1985    Clotting disorder 1993    Disorder of kidney and ureter     DVT (deep venous thrombosis)     Factor V deficiency     Fracture of elbow     left    Glioblastoma multiforme of brain     Hypothyroidism     IBS (irritable bowel syndrome) 1975    Osteoporosis     Panhypopituitarism     Peripheral polyneuropathy     Secondary adrenal insufficiency     Seizures 1995    Thyroid disease 1987       Current Outpatient Medications:     acetaminophen (TYLENOL) 325 MG tablet, Take 650 mg by mouth every 6 (six) hours as needed for Pain. , Disp: , Rfl:     alendronate (FOSAMAX) 70 MG tablet, TAKE 1 TAB EVERY 7 DAYS, Disp: 12 tablet, Rfl: 1    ERGOCALCIFEROL, VITAMIN D2, (VITAMIN D ORAL), Take 1 capsule by mouth nightly. , Disp: , Rfl:     ferrous sulfate 324 mg (65 mg iron) TbEC, Take 325 mg by mouth nightly. , Disp: , Rfl:     hydrocortisone (CORTEF) 10 MG Tab, TAKE 1 TABLET BY MOUTH TWICE A DAY, Disp: 180 tablet, Rfl: 3    levothyroxine (SYNTHROID) 137 MCG Tab tablet, TAKE 1 TABLET DAILY EXCEPT ON SUNDAY, TAKE 1 & 1/2 TABLET, Disp: 96 tablet, Rfl: 4    loratadine-pseudoephedrine  mg (CLARITIN-D 24-HOUR)  mg per 24 hr tablet, Take 1 tablet by mouth as needed. , Disp: , Rfl:     phenytoin (DILANTIN) 100 MG ER capsule, TAKE 4 CAPSULES (400 mg) EVERY bedtime, Disp: 360 capsule, Rfl: 4     XARELTO 20 mg Tab, TAKE 1 TABLET BY MOUTH EVERY DAY, Disp: 90 tablet, Rfl: 4    HYDROcodone-acetaminophen (NORCO) 5-325 mg per tablet, Take 1 tablet by mouth every 4 (four) hours as needed for Pain. (Patient not taking: Reported on 1/21/2020), Disp: 20 tablet, Rfl: 0  Review of patient's allergies indicates:   Allergen Reactions    Neurontin [gabapentin]        /78 (BP Location: Right arm, Patient Position: Sitting)   Pulse 110   Wt 59 kg (130 lb)   LMP 10/23/1997 (Exact Date)   BMI 19.77 kg/m²     Review of Systems   Constitution: Negative for chills and fever.   HENT: Negative for congestion and hoarse voice.    Eyes: Negative for discharge and redness.   Cardiovascular: Negative for chest pain and palpitations.   Respiratory: Negative for cough and wheezing.    Skin: Negative for itching and rash.   Musculoskeletal: Negative for arthritis, joint pain, joint swelling and stiffness.   Gastrointestinal: Negative for diarrhea, nausea and vomiting.   Neurological: Negative for dizziness and numbness.   Psychiatric/Behavioral: Negative for altered mental status.   Allergic/Immunologic: Negative for environmental allergies and persistent infections.           Objective:    Ortho Exam   Left wrist:  Small incision over the dorsal wrist. Sutures intact. Wound margins are well approximated and healing nicely. No redness, warmth, drainage, or other signs of infection. mild swelling. ROM wrist and fingers full.   strength normal.  Sensation intact. Pulses present.    GEN: Well developed, well nourished female. AAOX3. No acute distress.   Breathing unlabored.  Mood and affect appropriate.         Assessment:     Imaging:  None        1. S/P excision of ganglion cyst          Plan:       Sutures removed today  Regular wound care explained with soap and water.  Start using hand for light activities.    Neosporin to the incision for the next few days, then patient can start scar massage with cocoa butter or  vitamin-E oil.  Brace is no longer except she may wear it when doing strenuous activities with her hands       Follow up in about 4 weeks (around 2/18/2020).

## 2020-01-31 DIAGNOSIS — N18.30 CHRONIC KIDNEY DISEASE, STAGE III (MODERATE): Primary | ICD-10-CM

## 2020-02-05 ENCOUNTER — CLINICAL SUPPORT (OUTPATIENT)
Dept: AUDIOLOGY | Facility: CLINIC | Age: 55
End: 2020-02-05
Payer: COMMERCIAL

## 2020-02-05 DIAGNOSIS — H90.3 SENSORINEURAL HEARING LOSS, BILATERAL: Primary | ICD-10-CM

## 2020-02-05 PROCEDURE — 99499 NO LOS: ICD-10-PCS | Mod: S$GLB,,, | Performed by: AUDIOLOGIST

## 2020-02-05 PROCEDURE — 99499 UNLISTED E&M SERVICE: CPT | Mod: S$GLB,,, | Performed by: AUDIOLOGIST

## 2020-02-05 NOTE — PROGRESS NOTES
Thelma Nguyen was seen today for a hearing aid fitting. Thelma Nguyen was fit with the following:     Re[employer] Quattro 9   Lt SN [id number]   Rt SN [id number]   : 2MP   Dome: Small Open   Warranty Exp 2/23/23      SN [id number]       Thelma Nguyen was counseled on care, use and maintenance of the hearing aids. The hearing aids were also paired to the iPhone and Thelma Nguyen was counseled on the [employer] Smart 3D nii. Thelma Nguyen  will return after An[facility] Deal for a follow-up.

## 2020-02-27 ENCOUNTER — CLINICAL SUPPORT (OUTPATIENT)
Dept: AUDIOLOGY | Facility: CLINIC | Age: 55
End: 2020-02-27
Payer: COMMERCIAL

## 2020-02-27 DIAGNOSIS — H90.3 SENSORINEURAL HEARING LOSS, BILATERAL: Primary | ICD-10-CM

## 2020-02-27 PROCEDURE — 99499 UNLISTED E&M SERVICE: CPT | Mod: S$GLB,,, | Performed by: AUDIOLOGIST

## 2020-02-27 PROCEDURE — 99499 NO LOS: ICD-10-PCS | Mod: S$GLB,,, | Performed by: AUDIOLOGIST

## 2020-02-27 NOTE — PROGRESS NOTES
"Ms. Nguyen was seen today for a hearing aid follow-up. Ms. Nguyen reported excellent feedback. She's noticed she can understand others next to her in the car and at restaurants. She did say sometimes high frequency sounds like "f" and "s" sound sharp, because of this I changed her from MP speakers to LP speakers and attention a sports lock for extra retention. She will follow-up PRN.   "

## 2020-02-28 ENCOUNTER — TELEPHONE (OUTPATIENT)
Dept: NEPHROLOGY | Facility: CLINIC | Age: 55
End: 2020-02-28

## 2020-02-28 ENCOUNTER — LAB VISIT (OUTPATIENT)
Dept: LAB | Facility: HOSPITAL | Age: 55
End: 2020-02-28
Attending: INTERNAL MEDICINE
Payer: COMMERCIAL

## 2020-02-28 DIAGNOSIS — N18.30 CHRONIC KIDNEY DISEASE, STAGE III (MODERATE): ICD-10-CM

## 2020-02-28 LAB
ANION GAP SERPL CALC-SCNC: 8 MMOL/L (ref 8–16)
BASOPHILS # BLD AUTO: 0.01 K/UL (ref 0–0.2)
BASOPHILS NFR BLD: 0.2 % (ref 0–1.9)
BUN SERPL-MCNC: 36 MG/DL (ref 6–20)
CALCIUM SERPL-MCNC: 9.3 MG/DL (ref 8.7–10.5)
CHLORIDE SERPL-SCNC: 102 MMOL/L (ref 95–110)
CO2 SERPL-SCNC: 27 MMOL/L (ref 23–29)
CREAT SERPL-MCNC: 1.5 MG/DL (ref 0.5–1.4)
DIFFERENTIAL METHOD: ABNORMAL
EOSINOPHIL # BLD AUTO: 0.1 K/UL (ref 0–0.5)
EOSINOPHIL NFR BLD: 1.6 % (ref 0–8)
ERYTHROCYTE [DISTWIDTH] IN BLOOD BY AUTOMATED COUNT: 12.1 % (ref 11.5–14.5)
EST. GFR  (AFRICAN AMERICAN): 46 ML/MIN/1.73 M^2
EST. GFR  (NON AFRICAN AMERICAN): 39 ML/MIN/1.73 M^2
GLUCOSE SERPL-MCNC: 123 MG/DL (ref 70–110)
HCT VFR BLD AUTO: 34.8 % (ref 37–48.5)
HGB BLD-MCNC: 11.3 G/DL (ref 12–16)
IMM GRANULOCYTES # BLD AUTO: 0.02 K/UL (ref 0–0.04)
IMM GRANULOCYTES NFR BLD AUTO: 0.3 % (ref 0–0.5)
LYMPHOCYTES # BLD AUTO: 1.8 K/UL (ref 1–4.8)
LYMPHOCYTES NFR BLD: 28.2 % (ref 18–48)
MCH RBC QN AUTO: 30.3 PG (ref 27–31)
MCHC RBC AUTO-ENTMCNC: 32.5 G/DL (ref 32–36)
MCV RBC AUTO: 93 FL (ref 82–98)
MONOCYTES # BLD AUTO: 0.4 K/UL (ref 0.3–1)
MONOCYTES NFR BLD: 6.3 % (ref 4–15)
NEUTROPHILS # BLD AUTO: 4 K/UL (ref 1.8–7.7)
NEUTROPHILS NFR BLD: 63.4 % (ref 38–73)
NRBC BLD-RTO: 0 /100 WBC
PLATELET # BLD AUTO: 271 K/UL (ref 150–350)
PMV BLD AUTO: 9.3 FL (ref 9.2–12.9)
POTASSIUM SERPL-SCNC: 4.9 MMOL/L (ref 3.5–5.1)
RBC # BLD AUTO: 3.73 M/UL (ref 4–5.4)
SODIUM SERPL-SCNC: 137 MMOL/L (ref 136–145)
WBC # BLD AUTO: 6.35 K/UL (ref 3.9–12.7)

## 2020-02-28 PROCEDURE — 36415 COLL VENOUS BLD VENIPUNCTURE: CPT

## 2020-02-28 PROCEDURE — 85025 COMPLETE CBC W/AUTO DIFF WBC: CPT

## 2020-02-28 PROCEDURE — 80048 BASIC METABOLIC PNL TOTAL CA: CPT

## 2020-03-02 ENCOUNTER — OFFICE VISIT (OUTPATIENT)
Dept: NEPHROLOGY | Facility: CLINIC | Age: 55
End: 2020-03-02
Payer: COMMERCIAL

## 2020-03-02 VITALS
DIASTOLIC BLOOD PRESSURE: 64 MMHG | HEIGHT: 68 IN | WEIGHT: 137.13 LBS | HEART RATE: 102 BPM | BODY MASS INDEX: 20.78 KG/M2 | OXYGEN SATURATION: 100 % | SYSTOLIC BLOOD PRESSURE: 116 MMHG

## 2020-03-02 DIAGNOSIS — I10 ESSENTIAL HYPERTENSION: ICD-10-CM

## 2020-03-02 DIAGNOSIS — N18.30 CKD (CHRONIC KIDNEY DISEASE), STAGE III: Primary | ICD-10-CM

## 2020-03-02 DIAGNOSIS — E23.0 PANHYPOPITUITARISM: ICD-10-CM

## 2020-03-02 PROCEDURE — 3008F PR BODY MASS INDEX (BMI) DOCUMENTED: ICD-10-PCS | Mod: CPTII,S$GLB,, | Performed by: INTERNAL MEDICINE

## 2020-03-02 PROCEDURE — 99999 PR PBB SHADOW E&M-EST. PATIENT-LVL III: ICD-10-PCS | Mod: PBBFAC,,, | Performed by: INTERNAL MEDICINE

## 2020-03-02 PROCEDURE — 3078F PR MOST RECENT DIASTOLIC BLOOD PRESSURE < 80 MM HG: ICD-10-PCS | Mod: CPTII,S$GLB,, | Performed by: INTERNAL MEDICINE

## 2020-03-02 PROCEDURE — 3008F BODY MASS INDEX DOCD: CPT | Mod: CPTII,S$GLB,, | Performed by: INTERNAL MEDICINE

## 2020-03-02 PROCEDURE — 99214 PR OFFICE/OUTPT VISIT, EST, LEVL IV, 30-39 MIN: ICD-10-PCS | Mod: S$GLB,,, | Performed by: INTERNAL MEDICINE

## 2020-03-02 PROCEDURE — 99214 OFFICE O/P EST MOD 30 MIN: CPT | Mod: S$GLB,,, | Performed by: INTERNAL MEDICINE

## 2020-03-02 PROCEDURE — 3078F DIAST BP <80 MM HG: CPT | Mod: CPTII,S$GLB,, | Performed by: INTERNAL MEDICINE

## 2020-03-02 PROCEDURE — 3074F PR MOST RECENT SYSTOLIC BLOOD PRESSURE < 130 MM HG: ICD-10-PCS | Mod: CPTII,S$GLB,, | Performed by: INTERNAL MEDICINE

## 2020-03-02 PROCEDURE — 3074F SYST BP LT 130 MM HG: CPT | Mod: CPTII,S$GLB,, | Performed by: INTERNAL MEDICINE

## 2020-03-02 PROCEDURE — 99999 PR PBB SHADOW E&M-EST. PATIENT-LVL III: CPT | Mod: PBBFAC,,, | Performed by: INTERNAL MEDICINE

## 2020-03-02 NOTE — PROGRESS NOTES
Progress Note  Nephrology      Referring physician: Isaías Muñiz MD    Reason for visit: CKD     SUBJECTIVE:   54 y.o. female new to me  has a past medical history of Allergic rhinitis, Anemia (1987), Arthritis (2000), Basal cell carcinoma, Broken ankle, Cancer (1985), Clotting disorder (1993), Disorder of kidney and ureter, DVT (deep venous thrombosis), Factor V deficiency, Fracture of elbow, Glioblastoma multiforme of brain, Hypothyroidism, IBS (irritable bowel syndrome) (1975), Osteoporosis, Panhypopituitarism, Peripheral polyneuropathy, Secondary adrenal insufficiency, Seizures (1995), and Thyroid disease (1987). who has been following up in renal clinic for ckd. Patient feels well today, no urinary or cardiac symptoms, no NSAIDs intake. Her renal function has been stable for many years.         OBJECTIVE:     Vitals:    03/02/20 1424   BP: 116/64   Pulse: 102          Physical Exam:  General: no distress, well nourished  HENT: PERRLA, Normal mouth nose and ears.  Neck: no JVD and thyroid not enlarged, symmetric, no tenderness/mass/nodules  Lungs: clear to auscultation bilaterally and normal respiratory effort  Cardiovascular: regular rate and rhythm, S1, S2 normal, no murmur, click, rub or gallop.   Abdomen: soft, non-tender non-distented; bowel sounds normal  Skin: No rashes or lesions  Musculoskeletal:no edema in LE, no deformities.   Lymph Nodes: No cervical or supraclavicular adenopathy  Neurologic: Normal strength and tone. No focal numbness or weakness    Dialysis Access: Not applicable.        Medications:    Current Outpatient Medications:     acetaminophen (TYLENOL) 325 MG tablet, Take 650 mg by mouth every 6 (six) hours as needed for Pain. , Disp: , Rfl:     alendronate (FOSAMAX) 70 MG tablet, TAKE 1 TAB EVERY 7 DAYS, Disp: 12 tablet, Rfl: 1    ERGOCALCIFEROL, VITAMIN D2, (VITAMIN D ORAL), Take 1 capsule by mouth nightly. , Disp: , Rfl:     ferrous sulfate 324 mg (65 mg iron) TbEC, Take 325 mg by  mouth nightly. , Disp: , Rfl:     hydrocortisone (CORTEF) 10 MG Tab, TAKE 1 TABLET BY MOUTH TWICE A DAY, Disp: 180 tablet, Rfl: 3    levothyroxine (SYNTHROID) 137 MCG Tab tablet, TAKE 1 TABLET DAILY EXCEPT ON SUNDAY, TAKE 1 & 1/2 TABLET, Disp: 96 tablet, Rfl: 4    loratadine-pseudoephedrine  mg (CLARITIN-D 24-HOUR)  mg per 24 hr tablet, Take 1 tablet by mouth as needed. , Disp: , Rfl:     phenytoin (DILANTIN) 100 MG ER capsule, TAKE 4 CAPSULES (400 mg) EVERY bedtime, Disp: 360 capsule, Rfl: 4    XARELTO 20 mg Tab, TAKE 1 TABLET BY MOUTH EVERY DAY, Disp: 90 tablet, Rfl: 4    HYDROcodone-acetaminophen (NORCO) 5-325 mg per tablet, Take 1 tablet by mouth every 4 (four) hours as needed for Pain. (Patient not taking: Reported on 3/2/2020), Disp: 20 tablet, Rfl: 0         Laboratory:  Lab Results   Component Value Date    CREATININE 1.5 (H) 02/28/2020       Prot/Creat Ratio, Ur   Date Value Ref Range Status   02/28/2020 Unable to calculate 0.00 - 0.20 Final   03/27/2019 0.15 0.00 - 0.20 Final   04/30/2018 Unable to calculate 0.00 - 0.20 Final       Lab Results   Component Value Date     02/28/2020    K 4.9 02/28/2020    CO2 27 02/28/2020       last PTH   Lab Results   Component Value Date    CALCIUM 9.3 02/28/2020    PHOS 2.2 (L) 03/27/2019       Lab Results   Component Value Date    HGB 11.3 (L) 02/28/2020        No results found for: HGBA1C    Lab Results   Component Value Date    LDLCALC 117.0 09/27/2018       Other Labs were reviewed      ASSESSMENT/PLAN:     CKD IIIB/A1  -likely from previous NSAIDs intake  -Cr baseline ~ 1.3 - 1.5 with eGFR ~ 40-45 ml/min, stable  -UPCR none  -Renal US ckd      Anemia  -from ckd  -Hgb goal ~ 10  -Iron stores not available    Secondary Hyperparathyroidism  -Phos/Ca acceptable  -PTH na  -Vit D na    Acid/Base  -No Metabolic acidosis      Hyperkalemia  -likely from adrenal insuffeciency due to hypopituitarism from brain tumor resection 1987  -controlled on Cortef                PLAN:  -Monitor renal function  -Avoid NSAIDs intake        RTC in 6 months with labs      BRENNON RUSSELL MD  NEPHROLOGY ATTENDING

## 2020-03-04 ENCOUNTER — CLINICAL SUPPORT (OUTPATIENT)
Dept: AUDIOLOGY | Facility: CLINIC | Age: 55
End: 2020-03-04
Payer: COMMERCIAL

## 2020-03-04 DIAGNOSIS — H90.3 SENSORINEURAL HEARING LOSS, BILATERAL: Primary | ICD-10-CM

## 2020-03-04 PROCEDURE — 99499 NO LOS: ICD-10-PCS | Mod: S$GLB,,, | Performed by: AUDIOLOGIST

## 2020-03-04 PROCEDURE — 99499 UNLISTED E&M SERVICE: CPT | Mod: S$GLB,,, | Performed by: AUDIOLOGIST

## 2020-03-04 NOTE — PROGRESS NOTES
Ms. Nguyen was seen today for a hearing aid follow-up. She reported the right hearing aid was too tight with the retention lock, but without it the hearing aid doesn't stay in her ear. We tried a larger dome, but she felt it blocked her ear and wasn't comfortable. Finally we decided on a Length 3 LP  with retention lock - without retention lock the  is too long, this is needed if she wants to stay with a length 3 for the right ear.

## 2020-03-16 ENCOUNTER — OFFICE VISIT (OUTPATIENT)
Dept: ENDOCRINOLOGY | Facility: CLINIC | Age: 55
End: 2020-03-16
Payer: COMMERCIAL

## 2020-03-16 DIAGNOSIS — E27.49 SECONDARY ADRENAL INSUFFICIENCY: Primary | ICD-10-CM

## 2020-03-16 DIAGNOSIS — E03.8 SECONDARY HYPOTHYROIDISM: ICD-10-CM

## 2020-03-16 DIAGNOSIS — M81.8 OTHER OSTEOPOROSIS WITHOUT CURRENT PATHOLOGICAL FRACTURE: ICD-10-CM

## 2020-03-16 DIAGNOSIS — E23.0 PANHYPOPITUITARISM: ICD-10-CM

## 2020-03-16 PROCEDURE — 99214 PR OFFICE/OUTPT VISIT, EST, LEVL IV, 30-39 MIN: ICD-10-PCS | Mod: GT,,, | Performed by: INTERNAL MEDICINE

## 2020-03-16 PROCEDURE — 99214 OFFICE O/P EST MOD 30 MIN: CPT | Mod: GT,,, | Performed by: INTERNAL MEDICINE

## 2020-03-16 RX ORDER — SYRINGE, DISPOSABLE, 3 ML
1 SYRINGE, EMPTY DISPOSABLE MISCELLANEOUS DAILY PRN
Refills: 0 | COMMUNITY
Start: 2020-03-16 | End: 2021-05-25

## 2020-03-16 RX ORDER — LEVOTHYROXINE SODIUM 137 UG/1
TABLET ORAL
Qty: 96 TABLET | Refills: 4 | Status: SHIPPED | OUTPATIENT
Start: 2020-03-16 | End: 2021-05-18

## 2020-03-16 RX ORDER — DEXAMETHASONE SODIUM PHOSPHATE 4 MG/ML
4 INJECTION, SOLUTION INTRA-ARTICULAR; INTRALESIONAL; INTRAMUSCULAR; INTRAVENOUS; SOFT TISSUE DAILY PRN
Qty: 1 ML | Refills: 1 | Status: SHIPPED | OUTPATIENT
Start: 2020-03-16 | End: 2021-05-25

## 2020-03-16 RX ORDER — HYDROCORTISONE 10 MG/1
TABLET ORAL
Qty: 75 TABLET | Refills: 6 | Status: SHIPPED | OUTPATIENT
Start: 2020-03-16 | End: 2021-10-19 | Stop reason: SDUPTHER

## 2020-03-16 NOTE — ASSESSMENT & PLAN NOTE
Needs medic alert tag or bracelet  Has lost her previous   Discussed importance of HC daily and increased with illness, double the dosage for two days or triple for three days.   Would need injection for GI illness - explained rationale.

## 2020-03-16 NOTE — PROGRESS NOTES
Subjective:      Patient ID: Thelma Nguyen is a 54 y.o. female.    Chief Complaint:  No chief complaint on file.      History of Present Illness  Ms. Nguyen is a 54 y.o. female who is here for a follow-up visit for evaluation of panhypopituitarism, she was initially seen with Dr. Humphrey. last visit with me in 3/2018.  Diagnosed with a glioblastoma multiforme in [facility] 1985, treated with surgery and radiation and developed panhypopituitarism.   Previous regimen included: decadron and dilantin.      Current regimen:   Hydrocortisone 15 mg daily, 5 mg in the afternoon. Reports taking regularly (has memory issues in the past)  Levothyroxine 137 mcg in the morning.   Taking appropriately, does not miss medications regularly.   Denies palpitations, increased sweating. Denies tremulousness or irritability or anxiety.      Has osteoporosis, T score of -2.6 at FN  Denies falls or fractures. Does report neuropathy of unknown etiology.    Taking fosamax 70 mg, weekly. She puts pill in her pill box. Tolerating well, denies GERD symptoms.  Takes vitamin D daily.    2016: Fractured right ankle.     Review of Systems   Constitutional: Negative for fever.   HENT: Negative for congestion.    Eyes: Negative for visual disturbance.   Respiratory: Negative for shortness of breath.    Cardiovascular: Negative for chest pain.   Gastrointestinal: Negative for abdominal pain.   Genitourinary: Negative for dysuria.   Musculoskeletal: Negative for arthralgias.   Skin: Negative for rash.   Neurological: Negative for weakness.   Hematological: Does not bruise/bleed easily.   Psychiatric/Behavioral: Negative for sleep disturbance.       Objective:   Physical Exam  There were no vitals filed for this visit.    BP Readings from Last 3 Encounters:   03/02/20 116/64   01/21/20 115/78   01/07/20 125/81     Wt Readings from Last 1 Encounters:   03/02/20 1424 62.2 kg (137 lb 2 oz)         There is no height or weight on  file to calculate BMI.    Lab Review:   No results found for: HGBA1C  Lab Results   Component Value Date    CHOL 232 (H) 09/27/2018    HDL 52 09/27/2018    LDLCALC 117.0 09/27/2018    TRIG 315 (H) 09/27/2018    CHOLHDL 22.4 09/27/2018     Lab Results   Component Value Date     02/28/2020    K 4.9 02/28/2020     02/28/2020    CO2 27 02/28/2020     (H) 02/28/2020    BUN 36 (H) 02/28/2020    CREATININE 1.5 (H) 02/28/2020    CALCIUM 9.3 02/28/2020    PROT 7.5 09/27/2018    ALBUMIN 3.6 03/27/2019    BILITOT 0.2 09/27/2018    ALKPHOS 74 09/27/2018    AST 26 09/27/2018    ALT 17 09/27/2018    ANIONGAP 8 02/28/2020    ESTGFRAFRICA 46 (A) 02/28/2020    EGFRNONAA 39 (A) 02/28/2020    TSH <0.010 (L) 10/27/2016   Results for QUINN SANCHEZ (MRN [id number]) as of 3/16/2020 11:26   Ref. Range 3/27/2019 09:42 8/29/2019 11:49 1/3/2020 10:42 2/28/2020 09:44   Creatinine Latest Ref Range: 0.5 - 1.4 mg/dL 1.4  1.3 1.5 (H)   eGFR if non African American Latest Ref Range: >60 mL/min/1.73 m^2 43 (A)  47 (A) 39 (A)   eGFR if African American Latest Ref Range: >60 mL/min/1.73 m^2 50 (A)  54 (A) 46 (A)   Glucose Latest Ref Range: 70 - 110 mg/dL 114 (H)  103 123 (H)   Calcium Latest Ref Range: 8.7 - 10.5 mg/dL 8.9  8.8 9.3         Assessment and Plan     Panhypopituitarism  Secondary to treatment for malignancy       Other osteoporosis without current pathological fracture  Denies falls or fractures   Tolerating alendronate, reviewed administration   Updated DXA 10/2020    Secondary adrenal insufficiency  Needs medic alert tag or bracelet  Has lost her previous   Discussed importance of HC daily and increased with illness, double the dosage for two days or triple for three days.   Would need injection for GI illness - explained rationale.     Secondary hypothyroidism  Continue thyroid hormone replacement  Needs labs in 6 weeks have ordered      The patient location is: home  The chief complaint leading to  consultation is: panhypopituitiarism  Visit type: Virtual visit with synchronous audio and video  Total time spent with patient: 20minutes  Each patient to whom he or she provides medical services by telemedicine is:  (1) informed of the relationship between the physician and patient and the respective role of any other health care provider with respect to management of the patient; and (2) notified that he or she may decline to receive medical services by telemedicine and may withdraw from such care at any time.

## 2020-03-23 ENCOUNTER — PATIENT OUTREACH (OUTPATIENT)
Dept: ADMINISTRATIVE | Facility: HOSPITAL | Age: 55
End: 2020-03-23

## 2020-05-08 DIAGNOSIS — Z12.39 BREAST CANCER SCREENING: ICD-10-CM

## 2020-06-01 ENCOUNTER — HOSPITAL ENCOUNTER (OUTPATIENT)
Dept: RADIOLOGY | Facility: HOSPITAL | Age: 55
Discharge: HOME | End: 2020-06-01
Attending: INTERNAL MEDICINE
Payer: COMMERCIAL

## 2020-06-01 ENCOUNTER — LAB VISIT (OUTPATIENT)
Dept: LAB | Facility: HOSPITAL | Age: 55
End: 2020-06-01
Attending: INTERNAL MEDICINE
Payer: COMMERCIAL

## 2020-06-01 ENCOUNTER — OFFICE VISIT (OUTPATIENT)
Dept: INTERNAL MEDICINE | Facility: CLINIC | Age: 55
End: 2020-06-01
Payer: COMMERCIAL

## 2020-06-01 VITALS
HEART RATE: 104 BPM | DIASTOLIC BLOOD PRESSURE: 74 MMHG | BODY MASS INDEX: 20.96 KG/M2 | OXYGEN SATURATION: 98 % | HEIGHT: 68 IN | WEIGHT: 138.31 LBS | SYSTOLIC BLOOD PRESSURE: 114 MMHG

## 2020-06-01 DIAGNOSIS — E23.0 PANHYPOPITUITARISM: ICD-10-CM

## 2020-06-01 DIAGNOSIS — G40.909 SEIZURE DISORDER: ICD-10-CM

## 2020-06-01 DIAGNOSIS — M81.0 OSTEOPOROSIS, UNSPECIFIED OSTEOPOROSIS TYPE, UNSPECIFIED PATHOLOGICAL FRACTURE PRESENCE: ICD-10-CM

## 2020-06-01 DIAGNOSIS — D68.2 FACTOR V DEFICIENCY: ICD-10-CM

## 2020-06-01 DIAGNOSIS — Z00.00 ROUTINE GENERAL MEDICAL EXAMINATION AT A HEALTH CARE FACILITY: Primary | ICD-10-CM

## 2020-06-01 DIAGNOSIS — M81.8 OTHER OSTEOPOROSIS WITHOUT CURRENT PATHOLOGICAL FRACTURE: ICD-10-CM

## 2020-06-01 DIAGNOSIS — K58.9 IRRITABLE BOWEL SYNDROME, UNSPECIFIED TYPE: ICD-10-CM

## 2020-06-01 LAB
ALBUMIN SERPL BCP-MCNC: 4.1 G/DL (ref 3.5–5.2)
ALP SERPL-CCNC: 74 U/L (ref 38–126)
ALT SERPL W/O P-5'-P-CCNC: 15 U/L (ref 10–44)
ANION GAP SERPL CALC-SCNC: 6 MMOL/L (ref 8–16)
AST SERPL-CCNC: 22 U/L (ref 15–46)
BASOPHILS # BLD AUTO: 0.03 K/UL (ref 0–0.2)
BASOPHILS NFR BLD: 0.5 % (ref 0–1.9)
BILIRUB SERPL-MCNC: 0.3 MG/DL (ref 0.1–1)
BUN SERPL-MCNC: 32 MG/DL (ref 7–17)
CALCIUM SERPL-MCNC: 8.9 MG/DL (ref 8.7–10.5)
CHLORIDE SERPL-SCNC: 107 MMOL/L (ref 95–110)
CO2 SERPL-SCNC: 26 MMOL/L (ref 23–29)
CREAT SERPL-MCNC: 1.44 MG/DL (ref 0.5–1.4)
DIFFERENTIAL METHOD: ABNORMAL
EOSINOPHIL # BLD AUTO: 0 K/UL (ref 0–0.5)
EOSINOPHIL NFR BLD: 0.7 % (ref 0–8)
ERYTHROCYTE [DISTWIDTH] IN BLOOD BY AUTOMATED COUNT: 13 % (ref 11.5–14.5)
EST. GFR  (AFRICAN AMERICAN): 47.8 ML/MIN/1.73 M^2
EST. GFR  (NON AFRICAN AMERICAN): 41.5 ML/MIN/1.73 M^2
GLUCOSE SERPL-MCNC: 114 MG/DL (ref 70–110)
HCT VFR BLD AUTO: 33.9 % (ref 37–48.5)
HGB BLD-MCNC: 10.9 G/DL (ref 12–16)
IMM GRANULOCYTES # BLD AUTO: 0.02 K/UL (ref 0–0.04)
IMM GRANULOCYTES NFR BLD AUTO: 0.3 % (ref 0–0.5)
LYMPHOCYTES # BLD AUTO: 1.3 K/UL (ref 1–4.8)
LYMPHOCYTES NFR BLD: 21 % (ref 18–48)
MCH RBC QN AUTO: 31.1 PG (ref 27–31)
MCHC RBC AUTO-ENTMCNC: 32.2 G/DL (ref 32–36)
MCV RBC AUTO: 97 FL (ref 82–98)
MONOCYTES # BLD AUTO: 0.3 K/UL (ref 0.3–1)
MONOCYTES NFR BLD: 4.3 % (ref 4–15)
NEUTROPHILS # BLD AUTO: 4.4 K/UL (ref 1.8–7.7)
NEUTROPHILS NFR BLD: 73.2 % (ref 38–73)
NRBC BLD-RTO: 0 /100 WBC
PLATELET # BLD AUTO: 228 K/UL (ref 150–350)
PMV BLD AUTO: 8.9 FL (ref 9.2–12.9)
POTASSIUM SERPL-SCNC: 4.6 MMOL/L (ref 3.5–5.1)
PROT SERPL-MCNC: 7.1 G/DL (ref 6–8.4)
RBC # BLD AUTO: 3.5 M/UL (ref 4–5.4)
SODIUM SERPL-SCNC: 139 MMOL/L (ref 136–145)
T4 FREE SERPL-MCNC: 1.1 NG/DL (ref 0.71–1.51)
WBC # BLD AUTO: 6.05 K/UL (ref 3.9–12.7)

## 2020-06-01 PROCEDURE — 3074F SYST BP LT 130 MM HG: CPT | Mod: CPTII,S$GLB,, | Performed by: INTERNAL MEDICINE

## 2020-06-01 PROCEDURE — 99999 PR PBB SHADOW E&M-EST. PATIENT-LVL III: CPT | Mod: PBBFAC,,, | Performed by: INTERNAL MEDICINE

## 2020-06-01 PROCEDURE — 85025 COMPLETE CBC W/AUTO DIFF WBC: CPT | Mod: PO

## 2020-06-01 PROCEDURE — 36415 COLL VENOUS BLD VENIPUNCTURE: CPT | Mod: PO

## 2020-06-01 PROCEDURE — 99396 PR PREVENTIVE VISIT,EST,40-64: ICD-10-PCS | Mod: S$GLB,,, | Performed by: INTERNAL MEDICINE

## 2020-06-01 PROCEDURE — 77080 DXA BONE DENSITY AXIAL: CPT | Mod: TC,PO

## 2020-06-01 PROCEDURE — 3074F PR MOST RECENT SYSTOLIC BLOOD PRESSURE < 130 MM HG: ICD-10-PCS | Mod: CPTII,S$GLB,, | Performed by: INTERNAL MEDICINE

## 2020-06-01 PROCEDURE — 84439 ASSAY OF FREE THYROXINE: CPT

## 2020-06-01 PROCEDURE — 3078F DIAST BP <80 MM HG: CPT | Mod: CPTII,S$GLB,, | Performed by: INTERNAL MEDICINE

## 2020-06-01 PROCEDURE — 3078F PR MOST RECENT DIASTOLIC BLOOD PRESSURE < 80 MM HG: ICD-10-PCS | Mod: CPTII,S$GLB,, | Performed by: INTERNAL MEDICINE

## 2020-06-01 PROCEDURE — 99396 PREV VISIT EST AGE 40-64: CPT | Mod: S$GLB,,, | Performed by: INTERNAL MEDICINE

## 2020-06-01 PROCEDURE — 80053 COMPREHEN METABOLIC PANEL: CPT | Mod: PO

## 2020-06-01 PROCEDURE — 99999 PR PBB SHADOW E&M-EST. PATIENT-LVL III: ICD-10-PCS | Mod: PBBFAC,,, | Performed by: INTERNAL MEDICINE

## 2020-06-01 RX ORDER — DICYCLOMINE HYDROCHLORIDE 20 MG/1
20 TABLET ORAL 2 TIMES DAILY
Qty: 30 TABLET | Refills: 6 | Status: SHIPPED | OUTPATIENT
Start: 2020-06-01 | End: 2020-08-27 | Stop reason: SDUPTHER

## 2020-06-01 NOTE — PROGRESS NOTES
Subjective:       Patient ID: Thelma Nguyen is a 54 y.o. female.    Chief Complaint: Dizziness (andn Hipotention); Fall; and Shortness of Breath    HPI  Wellness check.  Chart reviewed. Weight stable.  Frequently lightheaded, fell last week.  C/O weakness, MAGANA.  Often with abd pain, postprandal diarrhea.  No bleeding.  No T-C activity.  Review of Systems   Constitutional: Negative for fever.   HENT: Negative for ear pain, rhinorrhea and sore throat.    Respiratory: Positive for shortness of breath and wheezing.    Cardiovascular: Positive for chest pain. Negative for leg swelling.   Gastrointestinal: Negative for abdominal pain and vomiting.   Musculoskeletal: Negative for neck pain.   Skin: Negative for rash.   Neurological: Positive for headaches.   All other systems reviewed and are negative.      Objective:      Physical Exam   Constitutional: She appears well-developed.   HENT:   Head: Normocephalic.   Eyes: EOM are normal.   Neck: Normal range of motion.   Cardiovascular: Normal rate, regular rhythm, normal heart sounds and intact distal pulses.   Pulmonary/Chest: Effort normal and breath sounds normal.   Abdominal: Soft. Bowel sounds are normal. She exhibits no distension.   Musculoskeletal: Normal range of motion. She exhibits no edema.   Lymphadenopathy:     She has no cervical adenopathy.   Neurological: She is alert. No cranial nerve deficit. She exhibits normal muscle tone. Coordination normal.   Skin: Skin is warm and dry.   Psychiatric: She has a normal mood and affect. Her behavior is normal.   Vitals reviewed.      Assessment:       1. Routine general medical examination at a health care facility    2. Seizure disorder    3. Factor V deficiency with DVT x 3    4. Panhypopituitarism    5. Osteoporosis, unspecified osteoporosis type, unspecified pathological fracture presence    6. Irritable bowel syndrome, unspecified type        Plan:       Thelma was seen today for dizziness, fall and  shortness of breath.    Diagnoses and all orders for this visit:    Routine general medical examination at a health care facility    Seizure disorder   Well-cont    Factor V deficiency with DVT x 3  -     CBC auto differential; Future    Panhypopituitarism  -     Comprehensive metabolic panel; Future  -     T4, free; Future    Osteoporosis, unspecified osteoporosis type, unspecified pathological fracture presence   Await DEXA    Irritable bowel syndrome, unspecified type  -     dicyclomine (BENTYL) 20 mg tablet; Take 1 tablet (20 mg total) by mouth 2 (two) times daily.      Follow up if symptoms worsen or fail to improve.

## 2020-06-02 ENCOUNTER — HOSPITAL ENCOUNTER (OUTPATIENT)
Dept: RADIOLOGY | Facility: HOSPITAL | Age: 55
Discharge: HOME | End: 2020-06-02
Attending: INTERNAL MEDICINE
Payer: COMMERCIAL

## 2020-06-02 DIAGNOSIS — Z12.39 BREAST CANCER SCREENING: ICD-10-CM

## 2020-06-02 PROCEDURE — 77067 SCR MAMMO BI INCL CAD: CPT | Mod: TC,PO

## 2020-06-03 ENCOUNTER — PATIENT MESSAGE (OUTPATIENT)
Dept: ENDOCRINOLOGY | Facility: CLINIC | Age: 55
End: 2020-06-03

## 2020-08-21 ENCOUNTER — TELEPHONE (OUTPATIENT)
Dept: NEPHROLOGY | Facility: CLINIC | Age: 55
End: 2020-08-21

## 2020-08-21 ENCOUNTER — PATIENT MESSAGE (OUTPATIENT)
Dept: INTERNAL MEDICINE | Facility: CLINIC | Age: 55
End: 2020-08-21

## 2020-08-21 DIAGNOSIS — R19.7 DIARRHEA, UNSPECIFIED TYPE: Primary | ICD-10-CM

## 2020-08-21 RX ORDER — DIPHENOXYLATE HYDROCHLORIDE AND ATROPINE SULFATE 2.5; .025 MG/1; MG/1
1 TABLET ORAL 4 TIMES DAILY PRN
Qty: 20 TABLET | Refills: 1 | Status: SHIPPED | OUTPATIENT
Start: 2020-08-21 | End: 2020-08-31

## 2020-09-04 ENCOUNTER — PATIENT OUTREACH (OUTPATIENT)
Dept: ADMINISTRATIVE | Facility: OTHER | Age: 55
End: 2020-09-04

## 2020-09-09 ENCOUNTER — OFFICE VISIT (OUTPATIENT)
Dept: NEPHROLOGY | Facility: CLINIC | Age: 55
End: 2020-09-09
Payer: COMMERCIAL

## 2020-09-09 VITALS
DIASTOLIC BLOOD PRESSURE: 82 MMHG | HEIGHT: 68 IN | WEIGHT: 141.13 LBS | HEART RATE: 96 BPM | SYSTOLIC BLOOD PRESSURE: 124 MMHG | OXYGEN SATURATION: 99 % | BODY MASS INDEX: 21.39 KG/M2

## 2020-09-09 DIAGNOSIS — E23.0 PANHYPOPITUITARISM: ICD-10-CM

## 2020-09-09 DIAGNOSIS — I10 ESSENTIAL HYPERTENSION: Primary | ICD-10-CM

## 2020-09-09 DIAGNOSIS — N18.30 CKD (CHRONIC KIDNEY DISEASE), STAGE III: ICD-10-CM

## 2020-09-09 PROCEDURE — 99999 PR PBB SHADOW E&M-EST. PATIENT-LVL IV: CPT | Mod: PBBFAC,,, | Performed by: INTERNAL MEDICINE

## 2020-09-09 PROCEDURE — 3074F SYST BP LT 130 MM HG: CPT | Mod: CPTII,S$GLB,, | Performed by: INTERNAL MEDICINE

## 2020-09-09 PROCEDURE — 3008F PR BODY MASS INDEX (BMI) DOCUMENTED: ICD-10-PCS | Mod: CPTII,S$GLB,, | Performed by: INTERNAL MEDICINE

## 2020-09-09 PROCEDURE — 99213 PR OFFICE/OUTPT VISIT, EST, LEVL III, 20-29 MIN: ICD-10-PCS | Mod: S$GLB,,, | Performed by: INTERNAL MEDICINE

## 2020-09-09 PROCEDURE — 99213 OFFICE O/P EST LOW 20 MIN: CPT | Mod: S$GLB,,, | Performed by: INTERNAL MEDICINE

## 2020-09-09 PROCEDURE — 3079F PR MOST RECENT DIASTOLIC BLOOD PRESSURE 80-89 MM HG: ICD-10-PCS | Mod: CPTII,S$GLB,, | Performed by: INTERNAL MEDICINE

## 2020-09-09 PROCEDURE — 3008F BODY MASS INDEX DOCD: CPT | Mod: CPTII,S$GLB,, | Performed by: INTERNAL MEDICINE

## 2020-09-09 PROCEDURE — 99999 PR PBB SHADOW E&M-EST. PATIENT-LVL IV: ICD-10-PCS | Mod: PBBFAC,,, | Performed by: INTERNAL MEDICINE

## 2020-09-09 PROCEDURE — 3079F DIAST BP 80-89 MM HG: CPT | Mod: CPTII,S$GLB,, | Performed by: INTERNAL MEDICINE

## 2020-09-09 PROCEDURE — 3074F PR MOST RECENT SYSTOLIC BLOOD PRESSURE < 130 MM HG: ICD-10-PCS | Mod: CPTII,S$GLB,, | Performed by: INTERNAL MEDICINE

## 2020-09-09 NOTE — PROGRESS NOTES
Progress Note  Nephrology      Referring physician: Isaías Muñiz MD    Reason for visit: CKD     SUBJECTIVE:   54 y.o. female   has a past medical history of Allergic rhinitis, Anemia (1987), Arthritis (2000), Basal cell carcinoma, Broken ankle, Cancer (1985), Clotting disorder (1993), Disorder of kidney and ureter, DVT (deep venous thrombosis), Factor V deficiency, Fracture of elbow, Glioblastoma multiforme of brain, Hypothyroidism, IBS (irritable bowel syndrome) (1975), Osteoporosis, Panhypopituitarism, Peripheral polyneuropathy, Secondary adrenal insufficiency, Seizures (1995), and Thyroid disease (1987). who has been following up in renal clinic for ckd. Patient feels well today, no urinary or cardiac symptoms, no NSAIDs intake. Her renal function has been stable for many years.         OBJECTIVE:     There were no vitals filed for this visit.       Physical Exam:  General: no distress, well nourished  HENT: PERRLA, Normal mouth nose and ears.  Neck: no JVD and thyroid not enlarged, symmetric, no tenderness/mass/nodules  Lungs: clear to auscultation bilaterally and normal respiratory effort  Cardiovascular: regular rate and rhythm, S1, S2 normal, no murmur, click, rub or gallop.   Abdomen: soft, non-tender non-distented; bowel sounds normal  Skin: No rashes or lesions  Musculoskeletal:no edema in LE, no deformities.   Lymph Nodes: No cervical or supraclavicular adenopathy  Neurologic: Normal strength and tone. No focal numbness or weakness    Dialysis Access: Not applicable.        Medications:    Current Outpatient Medications:     acetaminophen (TYLENOL) 325 MG tablet, Take 650 mg by mouth every 6 (six) hours as needed for Pain. , Disp: , Rfl:     alendronate (FOSAMAX) 70 MG tablet, TAKE 1 TAB EVERY 7 DAYS, Disp: 12 tablet, Rfl: 1    dexamethasone (DECADRON) 4 mg/mL injection, Inject 1 mL (4 mg total) into the muscle daily as needed (Signs and symtpoms of severe adrenal insufficiency). (Patient not taking:  Reported on 6/1/2020), Disp: 1 mL, Rfl: 1    dicyclomine (BENTYL) 20 mg tablet, TAKE 1 TABLET BY MOUTH TWICE A DAY, Disp: 180 tablet, Rfl: 1    ERGOCALCIFEROL, VITAMIN D2, (VITAMIN D ORAL), Take 1 capsule by mouth nightly. , Disp: , Rfl:     ferrous sulfate 324 mg (65 mg iron) TbEC, Take 325 mg by mouth nightly. , Disp: , Rfl:     HYDROcodone-acetaminophen (NORCO) 5-325 mg per tablet, Take 1 tablet by mouth every 4 (four) hours as needed for Pain. (Patient not taking: Reported on 3/2/2020), Disp: 20 tablet, Rfl: 0    hydrocortisone (CORTEF) 10 MG Tab, Take one and half tablets int he morning and half a tablet in the afternoon. Double the dose in case of illness., Disp: 75 tablet, Rfl: 6    hydrocortisone (CORTEF) 10 MG Tab, TAKE 1 TABLET BY MOUTH TWICE A DAY, Disp: 180 tablet, Rfl: 3    levothyroxine (SYNTHROID) 137 MCG Tab tablet, TAKE 1 TABLET DAILY EXCEPT ON SUNDAY, TAKE 1 & 1/2 TABLET, Disp: 96 tablet, Rfl: 4    loratadine-pseudoephedrine  mg (CLARITIN-D 24-HOUR)  mg per 24 hr tablet, Take 1 tablet by mouth as needed. , Disp: , Rfl:     phenytoin (DILANTIN) 100 MG ER capsule, TAKE 4 CAPSULES (400 mg) EVERY bedtime, Disp: 360 capsule, Rfl: 4    syringe, disposable, 3 mL Syrg, 1 mL by Misc.(Non-Drug; Combo Route) route daily as needed (signs and symptoms of adrenal insufficiency). (Patient not taking: Reported on 6/1/2020), Disp: , Rfl: 0    XARELTO 20 mg Tab, TAKE 1 TABLET BY MOUTH EVERY DAY, Disp: 90 tablet, Rfl: 4         Laboratory:  Lab Results   Component Value Date    CREATININE 1.50 (H) 09/02/2020       Prot/Creat Ratio, Ur   Date Value Ref Range Status   09/02/2020 0.07 0.00 - 0.20 Final   02/28/2020 Unable to calculate 0.00 - 0.20 Final   03/27/2019 0.15 0.00 - 0.20 Final       Lab Results   Component Value Date     09/02/2020    K 4.5 09/02/2020    CO2 27 09/02/2020       last PTH   Lab Results   Component Value Date    .0 (H) 09/02/2020    CALCIUM 8.9 09/02/2020     PHOS 3.2 09/02/2020       Lab Results   Component Value Date    HGB 11.2 (L) 09/02/2020        No results found for: HGBA1C    Lab Results   Component Value Date    LDLCALC 117.0 09/27/2018       Other Labs were reviewed      ASSESSMENT/PLAN:     CKD IIIB/A1  -likely from previous NSAIDs intake  -Cr baseline ~ 1.3 - 1.5 with eGFR ~ 40-45 ml/min, stable  -UPCR 70 mg/g  -Renal US ckd      Anemia  -from ckd  -Hgb goal ~ 10  -Iron stores not available    Secondary Hyperparathyroidism  -Phos/Ca acceptable  -PTH acceptable  -Vit D acceptable    Acid/Base  -No Metabolic acidosis      Hyperkalemia  -likely from adrenal insuffeciency due to hypopituitarism from brain tumor resection 1987  -controlled on Cortef               PLAN:  -Monitor renal function  -Avoid NSAIDs intake        RTC in 8 months with labs      BRENNON RUSSELL MD  NEPHROLOGY ATTENDING

## 2020-09-17 ENCOUNTER — CLINICAL SUPPORT (OUTPATIENT)
Dept: INTERNAL MEDICINE | Facility: CLINIC | Age: 55
End: 2020-09-17
Payer: COMMERCIAL

## 2020-09-17 DIAGNOSIS — Z00.00 ROUTINE GENERAL MEDICAL EXAMINATION AT A HEALTH CARE FACILITY: Primary | ICD-10-CM

## 2020-09-17 PROCEDURE — 90471 FLU VACCINE (QUAD) GREATER THAN OR EQUAL TO 3YO PRESERVATIVE FREE IM: ICD-10-PCS | Mod: S$GLB,,, | Performed by: INTERNAL MEDICINE

## 2020-09-17 PROCEDURE — 90686 FLU VACCINE (QUAD) GREATER THAN OR EQUAL TO 3YO PRESERVATIVE FREE IM: ICD-10-PCS | Mod: S$GLB,,, | Performed by: INTERNAL MEDICINE

## 2020-09-17 PROCEDURE — 90686 IIV4 VACC NO PRSV 0.5 ML IM: CPT | Mod: S$GLB,,, | Performed by: INTERNAL MEDICINE

## 2020-09-17 PROCEDURE — 90471 IMMUNIZATION ADMIN: CPT | Mod: S$GLB,,, | Performed by: INTERNAL MEDICINE

## 2020-09-17 NOTE — PROGRESS NOTES
Flu shot given in RD  No redness or swelling noted  Pt tolerated injection well.  Left clinic with no distress

## 2020-09-22 ENCOUNTER — OFFICE VISIT (OUTPATIENT)
Dept: ORTHOPEDICS | Facility: CLINIC | Age: 55
End: 2020-09-22
Payer: COMMERCIAL

## 2020-09-22 VITALS — BODY MASS INDEX: 21.39 KG/M2 | WEIGHT: 141.13 LBS | HEIGHT: 68 IN

## 2020-09-22 DIAGNOSIS — Z98.890 S/P EXCISION OF GANGLION CYST: Primary | ICD-10-CM

## 2020-09-22 PROCEDURE — 99999 PR PBB SHADOW E&M-EST. PATIENT-LVL III: CPT | Mod: PBBFAC,,, | Performed by: PHYSICIAN ASSISTANT

## 2020-09-22 PROCEDURE — 99499 UNLISTED E&M SERVICE: CPT | Mod: S$GLB,,, | Performed by: PHYSICIAN ASSISTANT

## 2020-09-22 PROCEDURE — 99999 PR PBB SHADOW E&M-EST. PATIENT-LVL III: ICD-10-PCS | Mod: PBBFAC,,, | Performed by: PHYSICIAN ASSISTANT

## 2020-09-22 PROCEDURE — 99499 NO LOS: ICD-10-PCS | Mod: S$GLB,,, | Performed by: PHYSICIAN ASSISTANT

## 2020-09-24 NOTE — PROGRESS NOTES
Subjective:      Patient ID: Thelma Nguyen is a 54 y.o. female.    Chief Complaint: Follow-up (L wrist Sx)      HPI: Thelma Nguyen is a 54-year-old female in clinic today for postoperative follow-up.  Patient is 8.5 months status post ganglion cyst removal of the left wrist.  Patient is doing very well this time and is experiencing no problems associated with surgery.  Patient was lost to follow-up due to COVID-19 outbreak and just wanted to have one last follow-up to make sure there is nothing else she needed today    Past Medical History:   Diagnosis Date    Allergic rhinitis     Anemia 1987    Arthritis 2000    Basal cell carcinoma     Broken ankle     Cancer 1985    Clotting disorder 1993    Disorder of kidney and ureter     DVT (deep venous thrombosis)     Factor V deficiency     Fracture of elbow     left    Glioblastoma multiforme of brain     Hypothyroidism     IBS (irritable bowel syndrome) 1975    Osteoporosis     Panhypopituitarism     Peripheral polyneuropathy     Secondary adrenal insufficiency     Seizures 1995    Thyroid disease 1987       Current Outpatient Medications:     acetaminophen (TYLENOL) 325 MG tablet, Take 650 mg by mouth every 6 (six) hours as needed for Pain. , Disp: , Rfl:     alendronate (FOSAMAX) 70 MG tablet, TAKE 1 TAB EVERY 7 DAYS, Disp: 12 tablet, Rfl: 1    dexamethasone (DECADRON) 4 mg/mL injection, Inject 1 mL (4 mg total) into the muscle daily as needed (Signs and symtpoms of severe adrenal insufficiency)., Disp: 1 mL, Rfl: 1    ERGOCALCIFEROL, VITAMIN D2, (VITAMIN D ORAL), Take 1 capsule by mouth nightly. , Disp: , Rfl:     ferrous sulfate 324 mg (65 mg iron) TbEC, Take 325 mg by mouth nightly. , Disp: , Rfl:     hydrocortisone (CORTEF) 10 MG Tab, Take one and half tablets int he morning and half a tablet in the afternoon. Double the dose in case of illness., Disp: 75 tablet, Rfl: 6    levothyroxine (SYNTHROID) 137 MCG Tab tablet,  "TAKE 1 TABLET DAILY EXCEPT ON SUNDAY, TAKE 1 & 1/2 TABLET, Disp: 96 tablet, Rfl: 4    loratadine-pseudoephedrine  mg (CLARITIN-D 24-HOUR)  mg per 24 hr tablet, Take 1 tablet by mouth as needed. , Disp: , Rfl:     phenytoin (DILANTIN) 100 MG ER capsule, TAKE 4 CAPSULES (400 mg) EVERY bedtime, Disp: 360 capsule, Rfl: 4    XARELTO 20 mg Tab, TAKE 1 TABLET BY MOUTH EVERY DAY, Disp: 90 tablet, Rfl: 4    dicyclomine (BENTYL) 20 mg tablet, TAKE 1 TABLET BY MOUTH TWICE A DAY, Disp: 180 tablet, Rfl: 1    HYDROcodone-acetaminophen (NORCO) 5-325 mg per tablet, Take 1 tablet by mouth every 4 (four) hours as needed for Pain. (Patient not taking: Reported on 3/2/2020), Disp: 20 tablet, Rfl: 0    hydrocortisone (CORTEF) 10 MG Tab, TAKE 1 TABLET BY MOUTH TWICE A DAY, Disp: 180 tablet, Rfl: 3    syringe, disposable, 3 mL Syrg, 1 mL by Misc.(Non-Drug; Combo Route) route daily as needed (signs and symptoms of adrenal insufficiency). (Patient not taking: Reported on 9/22/2020), Disp: , Rfl: 0  Review of patient's allergies indicates:   Allergen Reactions    Neurontin [gabapentin]        Ht 5' 8" (1.727 m)   Wt 64 kg (141 lb 1.5 oz)   LMP 10/23/1997 (Exact Date)   BMI 21.45 kg/m²     ROS      Objective:    Ortho Exam   Left wrist:  Incision is well healed, no signs of infection  Wrist ROM is full  No TTP noted   strength full  Sensation and pulses intact    GEN: Well developed, well nourished female. AAOX3. No acute distress.   Normocephalic, atraumatic.   SHEREE  Breathing unlabored.  Mood and affect appropriate.        Assessment:     Imaging:  No new imaging obtained today        1. S/P excision of ganglion cyst          Plan:       Patient should follow up as needed in the future.  I injured her there is nothing else that needs to be done in relation to her surgery as it is fully healed without complication at this point.     Follow up if symptoms worsen or fail to improve.        "

## 2020-12-01 ENCOUNTER — OFFICE VISIT (OUTPATIENT)
Dept: INTERNAL MEDICINE | Facility: CLINIC | Age: 55
End: 2020-12-01
Payer: COMMERCIAL

## 2020-12-01 VITALS
HEIGHT: 68 IN | WEIGHT: 140.13 LBS | DIASTOLIC BLOOD PRESSURE: 70 MMHG | HEART RATE: 91 BPM | BODY MASS INDEX: 21.24 KG/M2 | OXYGEN SATURATION: 95 % | SYSTOLIC BLOOD PRESSURE: 120 MMHG

## 2020-12-01 DIAGNOSIS — M81.0 OSTEOPOROSIS, UNSPECIFIED OSTEOPOROSIS TYPE, UNSPECIFIED PATHOLOGICAL FRACTURE PRESENCE: ICD-10-CM

## 2020-12-01 DIAGNOSIS — G40.909 SEIZURE DISORDER: ICD-10-CM

## 2020-12-01 DIAGNOSIS — M54.31 RIGHT SIDED SCIATICA: Primary | ICD-10-CM

## 2020-12-01 DIAGNOSIS — E23.0 PANHYPOPITUITARISM: ICD-10-CM

## 2020-12-01 PROCEDURE — 3008F PR BODY MASS INDEX (BMI) DOCUMENTED: ICD-10-PCS | Mod: CPTII,S$GLB,, | Performed by: INTERNAL MEDICINE

## 2020-12-01 PROCEDURE — 1125F PR PAIN SEVERITY QUANTIFIED, PAIN PRESENT: ICD-10-PCS | Mod: S$GLB,,, | Performed by: INTERNAL MEDICINE

## 2020-12-01 PROCEDURE — 99999 PR PBB SHADOW E&M-EST. PATIENT-LVL IV: CPT | Mod: PBBFAC,,, | Performed by: INTERNAL MEDICINE

## 2020-12-01 PROCEDURE — 3074F PR MOST RECENT SYSTOLIC BLOOD PRESSURE < 130 MM HG: ICD-10-PCS | Mod: CPTII,S$GLB,, | Performed by: INTERNAL MEDICINE

## 2020-12-01 PROCEDURE — 1125F AMNT PAIN NOTED PAIN PRSNT: CPT | Mod: S$GLB,,, | Performed by: INTERNAL MEDICINE

## 2020-12-01 PROCEDURE — 3074F SYST BP LT 130 MM HG: CPT | Mod: CPTII,S$GLB,, | Performed by: INTERNAL MEDICINE

## 2020-12-01 PROCEDURE — 99214 PR OFFICE/OUTPT VISIT, EST, LEVL IV, 30-39 MIN: ICD-10-PCS | Mod: S$GLB,,, | Performed by: INTERNAL MEDICINE

## 2020-12-01 PROCEDURE — 99999 PR PBB SHADOW E&M-EST. PATIENT-LVL IV: ICD-10-PCS | Mod: PBBFAC,,, | Performed by: INTERNAL MEDICINE

## 2020-12-01 PROCEDURE — 3078F DIAST BP <80 MM HG: CPT | Mod: CPTII,S$GLB,, | Performed by: INTERNAL MEDICINE

## 2020-12-01 PROCEDURE — 99214 OFFICE O/P EST MOD 30 MIN: CPT | Mod: S$GLB,,, | Performed by: INTERNAL MEDICINE

## 2020-12-01 PROCEDURE — 3008F BODY MASS INDEX DOCD: CPT | Mod: CPTII,S$GLB,, | Performed by: INTERNAL MEDICINE

## 2020-12-01 PROCEDURE — 3078F PR MOST RECENT DIASTOLIC BLOOD PRESSURE < 80 MM HG: ICD-10-PCS | Mod: CPTII,S$GLB,, | Performed by: INTERNAL MEDICINE

## 2020-12-01 RX ORDER — PREDNISONE 20 MG/1
TABLET ORAL
Qty: 21 TABLET | Refills: 0 | Status: SHIPPED | OUTPATIENT
Start: 2020-12-01 | End: 2021-05-25

## 2020-12-01 NOTE — PROGRESS NOTES
Subjective:       Patient ID: Thelma Nguyen is a 54 y.o. female.    Chief Complaint: Follow-up (nerve in rt leg going up towards her buttocks), Hip Pain (lt hip on and off for a few days), and Abdominal Cramping (IBS)    HPI  Pt with 1 week of pain from R buttock to dorsum of foot, hurts to walk but no weakness, falls.  No incontinence.  No trauma.  No T-C activity.  Review of Systems   All other systems reviewed and are negative.      Objective:      Physical Exam  Vitals signs reviewed.   Constitutional:       General: She is not in acute distress.     Appearance: She is well-developed and normal weight.   HENT:      Head: Normocephalic.      Mouth/Throat:      Mouth: Mucous membranes are moist.   Eyes:      General: No scleral icterus.     Extraocular Movements: Extraocular movements intact.      Conjunctiva/sclera: Conjunctivae normal.   Neck:      Musculoskeletal: Normal range of motion.   Cardiovascular:      Rate and Rhythm: Normal rate and regular rhythm.      Pulses: Normal pulses.      Heart sounds: Normal heart sounds.   Pulmonary:      Effort: Pulmonary effort is normal.      Breath sounds: Normal breath sounds.   Abdominal:      General: There is no distension.   Musculoskeletal: Normal range of motion.         General: Tenderness (R SI joint) present.      Right lower leg: No edema.      Left lower leg: No edema.   Lymphadenopathy:      Cervical: No cervical adenopathy.   Skin:     General: Skin is warm and dry.   Neurological:      General: No focal deficit present.      Mental Status: She is alert.      Cranial Nerves: No cranial nerve deficit.      Motor: No abnormal muscle tone.      Coordination: Coordination normal.      Comments: R SLR at 45 deg   Psychiatric:         Mood and Affect: Mood normal.         Behavior: Behavior normal.         Assessment:       1. Right sided sciatica    2. Osteoporosis, unspecified osteoporosis type, unspecified pathological fracture presence    3.  Seizure disorder    4. Panhypopituitarism        Plan:       Thelma was seen today for follow-up, hip pain and abdominal cramping.    Diagnoses and all orders for this visit:    Right sided sciatica  -     predniSONE (DELTASONE) 20 MG tablet; 3 tabs po qd x 7 days    Osteoporosis, unspecified osteoporosis type, unspecified pathological fracture presence   Cont rx    Seizure disorder   Well-cont    Panhypopituitarism   Cont rx    No follow-ups on file.

## 2021-03-02 ENCOUNTER — PATIENT MESSAGE (OUTPATIENT)
Dept: INTERNAL MEDICINE | Facility: CLINIC | Age: 56
End: 2021-03-02

## 2021-03-11 ENCOUNTER — IMMUNIZATION (OUTPATIENT)
Dept: PHARMACY | Facility: CLINIC | Age: 56
End: 2021-03-11
Payer: COMMERCIAL

## 2021-03-11 DIAGNOSIS — Z23 NEED FOR VACCINATION: Primary | ICD-10-CM

## 2021-04-08 ENCOUNTER — IMMUNIZATION (OUTPATIENT)
Dept: PHARMACY | Facility: CLINIC | Age: 56
End: 2021-04-08
Payer: COMMERCIAL

## 2021-04-08 DIAGNOSIS — Z23 NEED FOR VACCINATION: Primary | ICD-10-CM

## 2021-05-23 ENCOUNTER — PATIENT MESSAGE (OUTPATIENT)
Dept: NEPHROLOGY | Facility: CLINIC | Age: 56
End: 2021-05-23

## 2021-05-23 ENCOUNTER — PATIENT MESSAGE (OUTPATIENT)
Dept: AUDIOLOGY | Facility: CLINIC | Age: 56
End: 2021-05-23

## 2021-05-24 ENCOUNTER — TELEPHONE (OUTPATIENT)
Dept: AUDIOLOGY | Facility: CLINIC | Age: 56
End: 2021-05-24

## 2021-05-24 DIAGNOSIS — H90.3 SENSORINEURAL HEARING LOSS, BILATERAL: Primary | ICD-10-CM

## 2021-05-25 ENCOUNTER — OFFICE VISIT (OUTPATIENT)
Dept: FAMILY MEDICINE | Facility: CLINIC | Age: 56
End: 2021-05-25
Payer: COMMERCIAL

## 2021-05-25 VITALS
WEIGHT: 128.44 LBS | TEMPERATURE: 97 F | SYSTOLIC BLOOD PRESSURE: 118 MMHG | BODY MASS INDEX: 19.47 KG/M2 | OXYGEN SATURATION: 93 % | DIASTOLIC BLOOD PRESSURE: 76 MMHG | HEART RATE: 105 BPM | HEIGHT: 68 IN

## 2021-05-25 DIAGNOSIS — R42 DIZZINESS: ICD-10-CM

## 2021-05-25 DIAGNOSIS — E23.0 PANHYPOPITUITARISM: Primary | ICD-10-CM

## 2021-05-25 DIAGNOSIS — M81.0 OSTEOPOROSIS WITHOUT CURRENT PATHOLOGICAL FRACTURE, UNSPECIFIED OSTEOPOROSIS TYPE: ICD-10-CM

## 2021-05-25 DIAGNOSIS — N18.31 STAGE 3A CHRONIC KIDNEY DISEASE: ICD-10-CM

## 2021-05-25 DIAGNOSIS — C71.9 MALIGNANT NEOPLASM OF BRAIN, UNSPECIFIED LOCATION: ICD-10-CM

## 2021-05-25 DIAGNOSIS — Z85.841 PERSONAL HISTORY OF BRAIN CANCER: ICD-10-CM

## 2021-05-25 PROCEDURE — 99214 OFFICE O/P EST MOD 30 MIN: CPT | Mod: S$GLB,,, | Performed by: STUDENT IN AN ORGANIZED HEALTH CARE EDUCATION/TRAINING PROGRAM

## 2021-05-25 PROCEDURE — 1126F AMNT PAIN NOTED NONE PRSNT: CPT | Mod: S$GLB,,, | Performed by: STUDENT IN AN ORGANIZED HEALTH CARE EDUCATION/TRAINING PROGRAM

## 2021-05-25 PROCEDURE — 3008F BODY MASS INDEX DOCD: CPT | Mod: CPTII,S$GLB,, | Performed by: STUDENT IN AN ORGANIZED HEALTH CARE EDUCATION/TRAINING PROGRAM

## 2021-05-25 PROCEDURE — 1126F PR PAIN SEVERITY QUANTIFIED, NO PAIN PRESENT: ICD-10-PCS | Mod: S$GLB,,, | Performed by: STUDENT IN AN ORGANIZED HEALTH CARE EDUCATION/TRAINING PROGRAM

## 2021-05-25 PROCEDURE — 99214 PR OFFICE/OUTPT VISIT, EST, LEVL IV, 30-39 MIN: ICD-10-PCS | Mod: S$GLB,,, | Performed by: STUDENT IN AN ORGANIZED HEALTH CARE EDUCATION/TRAINING PROGRAM

## 2021-05-25 PROCEDURE — 3008F PR BODY MASS INDEX (BMI) DOCUMENTED: ICD-10-PCS | Mod: CPTII,S$GLB,, | Performed by: STUDENT IN AN ORGANIZED HEALTH CARE EDUCATION/TRAINING PROGRAM

## 2021-05-25 RX ORDER — ALENDRONATE SODIUM 70 MG/1
TABLET ORAL
Qty: 12 TABLET | Refills: 1 | Status: SHIPPED | OUTPATIENT
Start: 2021-05-25 | End: 2021-11-29 | Stop reason: SDUPTHER

## 2021-05-25 RX ORDER — ALENDRONATE SODIUM 70 MG/1
TABLET ORAL
Qty: 12 TABLET | Refills: 1 | Status: CANCELLED | OUTPATIENT
Start: 2021-05-25

## 2021-05-26 ENCOUNTER — OFFICE VISIT (OUTPATIENT)
Dept: NEPHROLOGY | Facility: CLINIC | Age: 56
End: 2021-05-26
Payer: COMMERCIAL

## 2021-05-26 VITALS
BODY MASS INDEX: 19.38 KG/M2 | HEART RATE: 96 BPM | WEIGHT: 127.88 LBS | DIASTOLIC BLOOD PRESSURE: 72 MMHG | HEIGHT: 68 IN | OXYGEN SATURATION: 100 % | SYSTOLIC BLOOD PRESSURE: 116 MMHG

## 2021-05-26 DIAGNOSIS — E23.0 PANHYPOPITUITARISM: ICD-10-CM

## 2021-05-26 DIAGNOSIS — I10 ESSENTIAL HYPERTENSION: Primary | ICD-10-CM

## 2021-05-26 DIAGNOSIS — N18.32 STAGE 3B CHRONIC KIDNEY DISEASE: ICD-10-CM

## 2021-05-26 PROCEDURE — 99999 PR PBB SHADOW E&M-EST. PATIENT-LVL IV: CPT | Mod: PBBFAC,,, | Performed by: INTERNAL MEDICINE

## 2021-05-26 PROCEDURE — 1126F AMNT PAIN NOTED NONE PRSNT: CPT | Mod: S$GLB,,, | Performed by: INTERNAL MEDICINE

## 2021-05-26 PROCEDURE — 99999 PR PBB SHADOW E&M-EST. PATIENT-LVL IV: ICD-10-PCS | Mod: PBBFAC,,, | Performed by: INTERNAL MEDICINE

## 2021-05-26 PROCEDURE — 3074F PR MOST RECENT SYSTOLIC BLOOD PRESSURE < 130 MM HG: ICD-10-PCS | Mod: CPTII,S$GLB,, | Performed by: INTERNAL MEDICINE

## 2021-05-26 PROCEDURE — 3078F PR MOST RECENT DIASTOLIC BLOOD PRESSURE < 80 MM HG: ICD-10-PCS | Mod: CPTII,S$GLB,, | Performed by: INTERNAL MEDICINE

## 2021-05-26 PROCEDURE — 3008F BODY MASS INDEX DOCD: CPT | Mod: CPTII,S$GLB,, | Performed by: INTERNAL MEDICINE

## 2021-05-26 PROCEDURE — 99214 PR OFFICE/OUTPT VISIT, EST, LEVL IV, 30-39 MIN: ICD-10-PCS | Mod: S$GLB,,, | Performed by: INTERNAL MEDICINE

## 2021-05-26 PROCEDURE — 3074F SYST BP LT 130 MM HG: CPT | Mod: CPTII,S$GLB,, | Performed by: INTERNAL MEDICINE

## 2021-05-26 PROCEDURE — 3008F PR BODY MASS INDEX (BMI) DOCUMENTED: ICD-10-PCS | Mod: CPTII,S$GLB,, | Performed by: INTERNAL MEDICINE

## 2021-05-26 PROCEDURE — 99214 OFFICE O/P EST MOD 30 MIN: CPT | Mod: S$GLB,,, | Performed by: INTERNAL MEDICINE

## 2021-05-26 PROCEDURE — 1126F PR PAIN SEVERITY QUANTIFIED, NO PAIN PRESENT: ICD-10-PCS | Mod: S$GLB,,, | Performed by: INTERNAL MEDICINE

## 2021-05-26 PROCEDURE — 3078F DIAST BP <80 MM HG: CPT | Mod: CPTII,S$GLB,, | Performed by: INTERNAL MEDICINE

## 2021-05-28 ENCOUNTER — HOSPITAL ENCOUNTER (OUTPATIENT)
Dept: RADIOLOGY | Facility: HOSPITAL | Age: 56
Discharge: HOME | End: 2021-05-28
Attending: STUDENT IN AN ORGANIZED HEALTH CARE EDUCATION/TRAINING PROGRAM
Payer: COMMERCIAL

## 2021-05-28 ENCOUNTER — TELEPHONE (OUTPATIENT)
Dept: FAMILY MEDICINE | Facility: CLINIC | Age: 56
End: 2021-05-28

## 2021-05-28 DIAGNOSIS — Z85.841 PERSONAL HISTORY OF BRAIN CANCER: ICD-10-CM

## 2021-05-28 DIAGNOSIS — C71.9 MALIGNANT NEOPLASM OF BRAIN, UNSPECIFIED LOCATION: ICD-10-CM

## 2021-05-28 PROCEDURE — 70553 MRI BRAIN STEM W/O & W/DYE: CPT | Mod: TC

## 2021-05-28 PROCEDURE — 70553 MRI BRAIN STEM W/O & W/DYE: CPT | Mod: 26,,, | Performed by: RADIOLOGY

## 2021-05-28 PROCEDURE — A9585 GADOBUTROL INJECTION: HCPCS | Performed by: STUDENT IN AN ORGANIZED HEALTH CARE EDUCATION/TRAINING PROGRAM

## 2021-05-28 PROCEDURE — 70553 MRI BRAIN W WO CONTRAST: ICD-10-PCS | Mod: 26,,, | Performed by: RADIOLOGY

## 2021-05-28 PROCEDURE — 25500020 PHARM REV CODE 255: Performed by: STUDENT IN AN ORGANIZED HEALTH CARE EDUCATION/TRAINING PROGRAM

## 2021-05-28 RX ORDER — GADOBUTROL 604.72 MG/ML
6 INJECTION INTRAVENOUS
Status: COMPLETED | OUTPATIENT
Start: 2021-05-28 | End: 2021-05-28

## 2021-05-28 RX ADMIN — GADOBUTROL 6 ML: 604.72 INJECTION INTRAVENOUS at 01:05

## 2021-06-02 DIAGNOSIS — E87.5 HYPERKALEMIA: Primary | ICD-10-CM

## 2021-06-04 ENCOUNTER — TELEPHONE (OUTPATIENT)
Dept: NEPHROLOGY | Facility: CLINIC | Age: 56
End: 2021-06-04

## 2021-06-09 ENCOUNTER — CLINICAL SUPPORT (OUTPATIENT)
Dept: AUDIOLOGY | Facility: CLINIC | Age: 56
End: 2021-06-09
Payer: COMMERCIAL

## 2021-06-09 DIAGNOSIS — H90.3 SENSORINEURAL HEARING LOSS, BILATERAL: Primary | ICD-10-CM

## 2021-06-09 DIAGNOSIS — H90.3 SENSORINEURAL HEARING LOSS, BILATERAL: ICD-10-CM

## 2021-06-09 PROCEDURE — 99499 UNLISTED E&M SERVICE: CPT | Mod: S$GLB,,, | Performed by: AUDIOLOGIST

## 2021-06-09 PROCEDURE — 99999 PR PBB SHADOW E&M-EST. PATIENT-LVL I: CPT | Mod: PBBFAC,,, | Performed by: AUDIOLOGIST

## 2021-06-09 PROCEDURE — 99999 PR PBB SHADOW E&M-EST. PATIENT-LVL I: ICD-10-PCS | Mod: PBBFAC,,, | Performed by: AUDIOLOGIST

## 2021-06-09 PROCEDURE — 92557 PR COMPREHENSIVE HEARING TEST: ICD-10-PCS | Mod: S$GLB,,, | Performed by: AUDIOLOGIST

## 2021-06-09 PROCEDURE — 99499 NO LOS: ICD-10-PCS | Mod: S$GLB,,, | Performed by: AUDIOLOGIST

## 2021-06-09 PROCEDURE — 92557 COMPREHENSIVE HEARING TEST: CPT | Mod: S$GLB,,, | Performed by: AUDIOLOGIST

## 2021-06-11 ENCOUNTER — PATIENT MESSAGE (OUTPATIENT)
Dept: ENDOCRINOLOGY | Facility: CLINIC | Age: 56
End: 2021-06-11

## 2021-06-16 ENCOUNTER — LAB VISIT (OUTPATIENT)
Dept: LAB | Facility: HOSPITAL | Age: 56
End: 2021-06-16
Attending: INTERNAL MEDICINE
Payer: COMMERCIAL

## 2021-06-16 ENCOUNTER — OFFICE VISIT (OUTPATIENT)
Dept: ENDOCRINOLOGY | Facility: CLINIC | Age: 56
End: 2021-06-16
Payer: COMMERCIAL

## 2021-06-16 VITALS
WEIGHT: 123.44 LBS | OXYGEN SATURATION: 98 % | DIASTOLIC BLOOD PRESSURE: 68 MMHG | HEART RATE: 111 BPM | BODY MASS INDEX: 18.71 KG/M2 | HEIGHT: 68 IN | SYSTOLIC BLOOD PRESSURE: 126 MMHG

## 2021-06-16 DIAGNOSIS — E03.8 SECONDARY HYPOTHYROIDISM: ICD-10-CM

## 2021-06-16 DIAGNOSIS — E27.49 SECONDARY ADRENAL INSUFFICIENCY: Primary | ICD-10-CM

## 2021-06-16 LAB — T4 FREE SERPL-MCNC: 1.49 NG/DL (ref 0.71–1.51)

## 2021-06-16 PROCEDURE — 1125F PR PAIN SEVERITY QUANTIFIED, PAIN PRESENT: ICD-10-PCS | Mod: S$GLB,,, | Performed by: INTERNAL MEDICINE

## 2021-06-16 PROCEDURE — 99999 PR PBB SHADOW E&M-EST. PATIENT-LVL IV: ICD-10-PCS | Mod: PBBFAC,,, | Performed by: INTERNAL MEDICINE

## 2021-06-16 PROCEDURE — 36415 COLL VENOUS BLD VENIPUNCTURE: CPT | Performed by: INTERNAL MEDICINE

## 2021-06-16 PROCEDURE — 99214 OFFICE O/P EST MOD 30 MIN: CPT | Mod: S$GLB,,, | Performed by: INTERNAL MEDICINE

## 2021-06-16 PROCEDURE — 3008F PR BODY MASS INDEX (BMI) DOCUMENTED: ICD-10-PCS | Mod: CPTII,S$GLB,, | Performed by: INTERNAL MEDICINE

## 2021-06-16 PROCEDURE — 99999 PR PBB SHADOW E&M-EST. PATIENT-LVL IV: CPT | Mod: PBBFAC,,, | Performed by: INTERNAL MEDICINE

## 2021-06-16 PROCEDURE — 3008F BODY MASS INDEX DOCD: CPT | Mod: CPTII,S$GLB,, | Performed by: INTERNAL MEDICINE

## 2021-06-16 PROCEDURE — 84439 ASSAY OF FREE THYROXINE: CPT | Performed by: INTERNAL MEDICINE

## 2021-06-16 PROCEDURE — 1125F AMNT PAIN NOTED PAIN PRSNT: CPT | Mod: S$GLB,,, | Performed by: INTERNAL MEDICINE

## 2021-06-16 PROCEDURE — 99214 PR OFFICE/OUTPT VISIT, EST, LEVL IV, 30-39 MIN: ICD-10-PCS | Mod: S$GLB,,, | Performed by: INTERNAL MEDICINE

## 2021-06-16 RX ORDER — NICOTINE POLACRILEX 2 MG
1 GUM BUCCAL DAILY
Status: ON HOLD | COMMUNITY

## 2021-06-16 RX ORDER — LEVOTHYROXINE SODIUM 100 UG/1
100 TABLET ORAL
Qty: 30 TABLET | Refills: 11 | Status: SHIPPED | OUTPATIENT
Start: 2021-06-16 | End: 2022-05-28

## 2021-06-20 ENCOUNTER — PATIENT MESSAGE (OUTPATIENT)
Dept: ENDOCRINOLOGY | Facility: CLINIC | Age: 56
End: 2021-06-20

## 2021-07-19 DIAGNOSIS — E23.0 PANHYPOPITUITARISM: Primary | ICD-10-CM

## 2021-07-22 ENCOUNTER — PATIENT OUTREACH (OUTPATIENT)
Dept: ADMINISTRATIVE | Facility: OTHER | Age: 56
End: 2021-07-22

## 2021-07-27 ENCOUNTER — OFFICE VISIT (OUTPATIENT)
Dept: ENDOCRINOLOGY | Facility: CLINIC | Age: 56
End: 2021-07-27
Payer: COMMERCIAL

## 2021-07-27 ENCOUNTER — LAB VISIT (OUTPATIENT)
Dept: LAB | Facility: HOSPITAL | Age: 56
End: 2021-07-27
Payer: COMMERCIAL

## 2021-07-27 VITALS
BODY MASS INDEX: 17.13 KG/M2 | DIASTOLIC BLOOD PRESSURE: 62 MMHG | WEIGHT: 112.63 LBS | SYSTOLIC BLOOD PRESSURE: 112 MMHG | RESPIRATION RATE: 16 BRPM | OXYGEN SATURATION: 99 % | HEART RATE: 100 BPM

## 2021-07-27 DIAGNOSIS — M81.8 OTHER OSTEOPOROSIS WITHOUT CURRENT PATHOLOGICAL FRACTURE: ICD-10-CM

## 2021-07-27 DIAGNOSIS — E23.0 PANHYPOPITUITARISM: ICD-10-CM

## 2021-07-27 DIAGNOSIS — E87.5 HYPERKALEMIA: Primary | ICD-10-CM

## 2021-07-27 DIAGNOSIS — E87.5 HYPERKALEMIA: ICD-10-CM

## 2021-07-27 PROCEDURE — 3078F DIAST BP <80 MM HG: CPT | Mod: CPTII,S$GLB,, | Performed by: INTERNAL MEDICINE

## 2021-07-27 PROCEDURE — 1159F PR MEDICATION LIST DOCUMENTED IN MEDICAL RECORD: ICD-10-PCS | Mod: CPTII,S$GLB,, | Performed by: INTERNAL MEDICINE

## 2021-07-27 PROCEDURE — 3074F PR MOST RECENT SYSTOLIC BLOOD PRESSURE < 130 MM HG: ICD-10-PCS | Mod: CPTII,S$GLB,, | Performed by: INTERNAL MEDICINE

## 2021-07-27 PROCEDURE — 3008F PR BODY MASS INDEX (BMI) DOCUMENTED: ICD-10-PCS | Mod: CPTII,S$GLB,, | Performed by: INTERNAL MEDICINE

## 2021-07-27 PROCEDURE — 99999 PR PBB SHADOW E&M-EST. PATIENT-LVL III: CPT | Mod: PBBFAC,,, | Performed by: INTERNAL MEDICINE

## 2021-07-27 PROCEDURE — 1125F PR PAIN SEVERITY QUANTIFIED, PAIN PRESENT: ICD-10-PCS | Mod: CPTII,S$GLB,, | Performed by: INTERNAL MEDICINE

## 2021-07-27 PROCEDURE — 1160F PR REVIEW ALL MEDS BY PRESCRIBER/CLIN PHARMACIST DOCUMENTED: ICD-10-PCS | Mod: CPTII,S$GLB,, | Performed by: INTERNAL MEDICINE

## 2021-07-27 PROCEDURE — 99214 PR OFFICE/OUTPT VISIT, EST, LEVL IV, 30-39 MIN: ICD-10-PCS | Mod: S$GLB,,, | Performed by: INTERNAL MEDICINE

## 2021-07-27 PROCEDURE — 1160F RVW MEDS BY RX/DR IN RCRD: CPT | Mod: CPTII,S$GLB,, | Performed by: INTERNAL MEDICINE

## 2021-07-27 PROCEDURE — 84305 ASSAY OF SOMATOMEDIN: CPT | Performed by: INTERNAL MEDICINE

## 2021-07-27 PROCEDURE — 36415 COLL VENOUS BLD VENIPUNCTURE: CPT | Performed by: INTERNAL MEDICINE

## 2021-07-27 PROCEDURE — 1159F MED LIST DOCD IN RCRD: CPT | Mod: CPTII,S$GLB,, | Performed by: INTERNAL MEDICINE

## 2021-07-27 PROCEDURE — 99214 OFFICE O/P EST MOD 30 MIN: CPT | Mod: S$GLB,,, | Performed by: INTERNAL MEDICINE

## 2021-07-27 PROCEDURE — 3008F BODY MASS INDEX DOCD: CPT | Mod: CPTII,S$GLB,, | Performed by: INTERNAL MEDICINE

## 2021-07-27 PROCEDURE — 99999 PR PBB SHADOW E&M-EST. PATIENT-LVL III: ICD-10-PCS | Mod: PBBFAC,,, | Performed by: INTERNAL MEDICINE

## 2021-07-27 PROCEDURE — 84244 ASSAY OF RENIN: CPT | Performed by: INTERNAL MEDICINE

## 2021-07-27 PROCEDURE — 3078F PR MOST RECENT DIASTOLIC BLOOD PRESSURE < 80 MM HG: ICD-10-PCS | Mod: CPTII,S$GLB,, | Performed by: INTERNAL MEDICINE

## 2021-07-27 PROCEDURE — 3074F SYST BP LT 130 MM HG: CPT | Mod: CPTII,S$GLB,, | Performed by: INTERNAL MEDICINE

## 2021-07-27 PROCEDURE — 1125F AMNT PAIN NOTED PAIN PRSNT: CPT | Mod: CPTII,S$GLB,, | Performed by: INTERNAL MEDICINE

## 2021-07-27 RX ORDER — MUPIROCIN 20 MG/G
OINTMENT TOPICAL 2 TIMES DAILY
COMMUNITY
Start: 2021-04-21 | End: 2024-04-01

## 2021-07-29 LAB
IGF-I SERPL-MCNC: 165 NG/ML (ref 40–217)
IGF-I Z-SCORE SERPL: 1.28 SD

## 2021-07-30 ENCOUNTER — PATIENT MESSAGE (OUTPATIENT)
Dept: ENDOCRINOLOGY | Facility: CLINIC | Age: 56
End: 2021-07-30

## 2021-07-30 LAB — RENIN PLAS-CCNC: 1 NG/ML/H

## 2021-08-01 PROBLEM — E87.5 HYPERKALEMIA: Status: ACTIVE | Noted: 2021-08-01

## 2021-08-03 RX ORDER — DEXAMETHASONE SODIUM PHOSPHATE 4 MG/ML
4 INJECTION, SOLUTION INTRA-ARTICULAR; INTRALESIONAL; INTRAMUSCULAR; INTRAVENOUS; SOFT TISSUE DAILY PRN
Qty: 1 ML | Refills: 1 | Status: SHIPPED | OUTPATIENT
Start: 2021-08-03 | End: 2022-02-15

## 2021-08-05 ENCOUNTER — PATIENT MESSAGE (OUTPATIENT)
Dept: ENDOCRINOLOGY | Facility: CLINIC | Age: 56
End: 2021-08-05

## 2021-08-05 DIAGNOSIS — N18.31 STAGE 3A CHRONIC KIDNEY DISEASE: Primary | ICD-10-CM

## 2021-08-06 ENCOUNTER — PATIENT MESSAGE (OUTPATIENT)
Dept: ENDOCRINOLOGY | Facility: CLINIC | Age: 56
End: 2021-08-06

## 2021-08-07 ENCOUNTER — PATIENT MESSAGE (OUTPATIENT)
Dept: ENDOCRINOLOGY | Facility: CLINIC | Age: 56
End: 2021-08-07

## 2021-08-07 DIAGNOSIS — N18.31 STAGE 3A CHRONIC KIDNEY DISEASE: Primary | ICD-10-CM

## 2021-08-18 ENCOUNTER — PATIENT MESSAGE (OUTPATIENT)
Dept: FAMILY MEDICINE | Facility: CLINIC | Age: 56
End: 2021-08-18

## 2021-08-19 ENCOUNTER — PATIENT MESSAGE (OUTPATIENT)
Dept: FAMILY MEDICINE | Facility: CLINIC | Age: 56
End: 2021-08-19

## 2021-09-12 ENCOUNTER — PATIENT MESSAGE (OUTPATIENT)
Dept: ENDOCRINOLOGY | Facility: CLINIC | Age: 56
End: 2021-09-12

## 2021-09-12 DIAGNOSIS — E03.8 SECONDARY HYPOTHYROIDISM: ICD-10-CM

## 2021-09-12 DIAGNOSIS — E27.49 SECONDARY ADRENAL INSUFFICIENCY: Primary | ICD-10-CM

## 2021-09-14 ENCOUNTER — PATIENT MESSAGE (OUTPATIENT)
Dept: ENDOCRINOLOGY | Facility: CLINIC | Age: 56
End: 2021-09-14

## 2021-09-16 ENCOUNTER — PATIENT MESSAGE (OUTPATIENT)
Dept: ENDOCRINOLOGY | Facility: CLINIC | Age: 56
End: 2021-09-16

## 2021-09-16 LAB
ALBUMIN SERPL-MCNC: 4.5 G/DL (ref 3.8–4.9)
BUN SERPL-MCNC: 26 MG/DL (ref 6–24)
BUN/CREAT SERPL: 19 (ref 9–23)
CALCIUM SERPL-MCNC: 9.8 MG/DL (ref 8.7–10.2)
CHLORIDE SERPL-SCNC: 101 MMOL/L (ref 96–106)
CO2 SERPL-SCNC: 23 MMOL/L (ref 20–29)
CREAT SERPL-MCNC: 1.4 MG/DL (ref 0.57–1)
GLUCOSE SERPL-MCNC: 94 MG/DL (ref 65–99)
PHOSPHATE SERPL-MCNC: 2.8 MG/DL (ref 3–4.3)
POTASSIUM SERPL-SCNC: 4.6 MMOL/L (ref 3.5–5.2)
SODIUM SERPL-SCNC: 137 MMOL/L (ref 134–144)
T4 FREE SERPL-MCNC: 1.41 NG/DL (ref 0.82–1.77)

## 2021-10-04 ENCOUNTER — PATIENT MESSAGE (OUTPATIENT)
Dept: FAMILY MEDICINE | Facility: CLINIC | Age: 56
End: 2021-10-04

## 2021-10-19 DIAGNOSIS — E27.49 SECONDARY ADRENAL INSUFFICIENCY: ICD-10-CM

## 2021-10-20 RX ORDER — HYDROCORTISONE 10 MG/1
TABLET ORAL
Qty: 75 TABLET | Refills: 6 | Status: SHIPPED | OUTPATIENT
Start: 2021-10-20 | End: 2022-10-03

## 2021-11-29 ENCOUNTER — HOSPITAL ENCOUNTER (OUTPATIENT)
Dept: RADIOLOGY | Facility: HOSPITAL | Age: 56
Discharge: HOME | End: 2021-11-29
Attending: STUDENT IN AN ORGANIZED HEALTH CARE EDUCATION/TRAINING PROGRAM
Payer: COMMERCIAL

## 2021-11-29 ENCOUNTER — PATIENT MESSAGE (OUTPATIENT)
Dept: FAMILY MEDICINE | Facility: CLINIC | Age: 56
End: 2021-11-29

## 2021-11-29 ENCOUNTER — OFFICE VISIT (OUTPATIENT)
Dept: FAMILY MEDICINE | Facility: CLINIC | Age: 56
End: 2021-11-29
Payer: COMMERCIAL

## 2021-11-29 VITALS
DIASTOLIC BLOOD PRESSURE: 63 MMHG | WEIGHT: 119.38 LBS | SYSTOLIC BLOOD PRESSURE: 118 MMHG | HEART RATE: 97 BPM | TEMPERATURE: 98 F | OXYGEN SATURATION: 100 % | BODY MASS INDEX: 18.09 KG/M2 | HEIGHT: 68 IN

## 2021-11-29 DIAGNOSIS — R06.02 SHORTNESS OF BREATH: ICD-10-CM

## 2021-11-29 DIAGNOSIS — R06.02 SHORTNESS OF BREATH: Primary | ICD-10-CM

## 2021-11-29 DIAGNOSIS — M81.0 OSTEOPOROSIS WITHOUT CURRENT PATHOLOGICAL FRACTURE, UNSPECIFIED OSTEOPOROSIS TYPE: ICD-10-CM

## 2021-11-29 PROCEDURE — 3066F PR DOCUMENTATION OF TREATMENT FOR NEPHROPATHY: ICD-10-PCS | Mod: CPTII,S$GLB,, | Performed by: STUDENT IN AN ORGANIZED HEALTH CARE EDUCATION/TRAINING PROGRAM

## 2021-11-29 PROCEDURE — 99214 OFFICE O/P EST MOD 30 MIN: CPT | Mod: S$GLB,,, | Performed by: STUDENT IN AN ORGANIZED HEALTH CARE EDUCATION/TRAINING PROGRAM

## 2021-11-29 PROCEDURE — 71046 X-RAY EXAM CHEST 2 VIEWS: CPT | Mod: TC,FY,PO

## 2021-11-29 PROCEDURE — 99214 PR OFFICE/OUTPT VISIT, EST, LEVL IV, 30-39 MIN: ICD-10-PCS | Mod: S$GLB,,, | Performed by: STUDENT IN AN ORGANIZED HEALTH CARE EDUCATION/TRAINING PROGRAM

## 2021-11-29 PROCEDURE — 3066F NEPHROPATHY DOC TX: CPT | Mod: CPTII,S$GLB,, | Performed by: STUDENT IN AN ORGANIZED HEALTH CARE EDUCATION/TRAINING PROGRAM

## 2021-11-29 RX ORDER — ALENDRONATE SODIUM 70 MG/1
TABLET ORAL
Qty: 12 TABLET | Refills: 1 | Status: SHIPPED | OUTPATIENT
Start: 2021-11-29 | End: 2022-05-30

## 2021-11-30 ENCOUNTER — OFFICE VISIT (OUTPATIENT)
Dept: CARDIOLOGY | Facility: CLINIC | Age: 56
End: 2021-11-30
Payer: COMMERCIAL

## 2021-11-30 VITALS
DIASTOLIC BLOOD PRESSURE: 56 MMHG | BODY MASS INDEX: 18.14 KG/M2 | SYSTOLIC BLOOD PRESSURE: 100 MMHG | OXYGEN SATURATION: 95 % | HEIGHT: 68 IN | HEART RATE: 106 BPM | WEIGHT: 119.69 LBS

## 2021-11-30 DIAGNOSIS — R06.02 SHORTNESS OF BREATH: ICD-10-CM

## 2021-11-30 DIAGNOSIS — N18.30 STAGE 3 CHRONIC KIDNEY DISEASE, UNSPECIFIED WHETHER STAGE 3A OR 3B CKD: ICD-10-CM

## 2021-11-30 DIAGNOSIS — Z82.49 FAMILY HISTORY OF PREMATURE CAD: ICD-10-CM

## 2021-11-30 DIAGNOSIS — R06.02 SOB (SHORTNESS OF BREATH): ICD-10-CM

## 2021-11-30 DIAGNOSIS — R07.89 OTHER CHEST PAIN: Primary | ICD-10-CM

## 2021-11-30 DIAGNOSIS — C71.9 GLIOBLASTOMA MULTIFORME OF BRAIN: ICD-10-CM

## 2021-11-30 DIAGNOSIS — E78.1 PURE HYPERTRIGLYCERIDEMIA: ICD-10-CM

## 2021-11-30 DIAGNOSIS — D68.2 FACTOR V DEFICIENCY: ICD-10-CM

## 2021-11-30 PROCEDURE — 3066F NEPHROPATHY DOC TX: CPT | Mod: CPTII,S$GLB,, | Performed by: INTERNAL MEDICINE

## 2021-11-30 PROCEDURE — 3066F PR DOCUMENTATION OF TREATMENT FOR NEPHROPATHY: ICD-10-PCS | Mod: CPTII,S$GLB,, | Performed by: INTERNAL MEDICINE

## 2021-11-30 PROCEDURE — 99999 PR PBB SHADOW E&M-EST. PATIENT-LVL IV: ICD-10-PCS | Mod: PBBFAC,,, | Performed by: INTERNAL MEDICINE

## 2021-11-30 PROCEDURE — 99204 OFFICE O/P NEW MOD 45 MIN: CPT | Mod: S$GLB,,, | Performed by: INTERNAL MEDICINE

## 2021-11-30 PROCEDURE — 99999 PR PBB SHADOW E&M-EST. PATIENT-LVL IV: CPT | Mod: PBBFAC,,, | Performed by: INTERNAL MEDICINE

## 2021-11-30 PROCEDURE — 99204 PR OFFICE/OUTPT VISIT, NEW, LEVL IV, 45-59 MIN: ICD-10-PCS | Mod: S$GLB,,, | Performed by: INTERNAL MEDICINE

## 2021-11-30 PROCEDURE — 93000 EKG 12-LEAD: ICD-10-PCS | Mod: S$GLB,,, | Performed by: INTERNAL MEDICINE

## 2021-11-30 PROCEDURE — 93000 ELECTROCARDIOGRAM COMPLETE: CPT | Mod: S$GLB,,, | Performed by: INTERNAL MEDICINE

## 2021-12-01 DIAGNOSIS — R53.83 FATIGUE, UNSPECIFIED TYPE: Primary | ICD-10-CM

## 2021-12-03 DIAGNOSIS — R06.02 SOB (SHORTNESS OF BREATH): Primary | ICD-10-CM

## 2021-12-06 ENCOUNTER — HOSPITAL ENCOUNTER (OUTPATIENT)
Dept: CARDIOLOGY | Facility: HOSPITAL | Age: 56
Discharge: HOME | End: 2021-12-06
Attending: INTERNAL MEDICINE
Payer: COMMERCIAL

## 2021-12-06 VITALS — HEIGHT: 68 IN | WEIGHT: 119 LBS | BODY MASS INDEX: 18.04 KG/M2

## 2021-12-06 DIAGNOSIS — Z82.49 FAMILY HISTORY OF PREMATURE CAD: ICD-10-CM

## 2021-12-06 DIAGNOSIS — R07.89 OTHER CHEST PAIN: ICD-10-CM

## 2021-12-06 DIAGNOSIS — R06.02 SHORTNESS OF BREATH: ICD-10-CM

## 2021-12-06 LAB
AORTIC ROOT ANNULUS: 2.77 CM
ASCENDING AORTA: 2.14 CM
AV INDEX (PROSTH): 0.89
AV MEAN GRADIENT: 3 MMHG
AV PEAK GRADIENT: 4 MMHG
AV VALVE AREA: 2.59 CM2
AV VELOCITY RATIO: 0.83
BSA FOR ECHO PROCEDURE: 1.61 M2
CV ECHO LV RWT: 0.42 CM
CV STRESS BASE HR: 84 BPM
DIASTOLIC BLOOD PRESSURE: 78 MMHG
DOP CALC AO PEAK VEL: 1.01 M/S
DOP CALC AO VTI: 20.36 CM
DOP CALC LVOT AREA: 2.9 CM2
DOP CALC LVOT DIAMETER: 1.93 CM
DOP CALC LVOT PEAK VEL: 0.84 M/S
DOP CALC LVOT STROKE VOLUME: 52.69 CM3
DOP CALCLVOT PEAK VEL VTI: 18.02 CM
E WAVE DECELERATION TIME: 181.38 MSEC
E/A RATIO: 0.73
E/E' RATIO: 8.12 M/S
ECHO LV POSTERIOR WALL: 0.69 CM (ref 0.6–1.1)
EJECTION FRACTION: 65 %
FRACTIONAL SHORTENING: 33 % (ref 28–44)
INTERVENTRICULAR SEPTUM: 0.61 CM (ref 0.6–1.1)
IVRT: 114.18 MSEC
LA MAJOR: 3.71 CM
LA MINOR: 4.44 CM
LA WIDTH: 3.22 CM
LEFT ATRIUM SIZE: 2.49 CM
LEFT ATRIUM VOLUME INDEX MOD: 15.3 ML/M2
LEFT ATRIUM VOLUME INDEX: 16.8 ML/M2
LEFT ATRIUM VOLUME MOD: 25.11 CM3
LEFT ATRIUM VOLUME: 27.55 CM3
LEFT INTERNAL DIMENSION IN SYSTOLE: 2.18 CM (ref 2.1–4)
LEFT VENTRICLE DIASTOLIC VOLUME INDEX: 26.02 ML/M2
LEFT VENTRICLE DIASTOLIC VOLUME: 42.68 ML
LEFT VENTRICLE MASS INDEX: 31 G/M2
LEFT VENTRICLE SYSTOLIC VOLUME INDEX: 9.7 ML/M2
LEFT VENTRICLE SYSTOLIC VOLUME: 15.86 ML
LEFT VENTRICULAR INTERNAL DIMENSION IN DIASTOLE: 3.25 CM (ref 3.5–6)
LEFT VENTRICULAR MASS: 50.41 G
LV LATERAL E/E' RATIO: 6.27 M/S
LV SEPTAL E/E' RATIO: 11.5 M/S
MV A" WAVE DURATION": 8.85 MSEC
MV PEAK A VEL: 0.94 M/S
MV PEAK E VEL: 0.69 M/S
MV STENOSIS PRESSURE HALF TIME: 52.6 MS
MV VALVE AREA P 1/2 METHOD: 4.18 CM2
OHS CV CPX 1 MINUTE RECOVERY HEART RATE: 122 BPM
OHS CV CPX 85 PERCENT MAX PREDICTED HEART RATE MALE: 135
OHS CV CPX ESTIMATED METS: 6
OHS CV CPX MAX PREDICTED HEART RATE: 158
OHS CV CPX PATIENT IS FEMALE: 1
OHS CV CPX PATIENT IS MALE: 0
OHS CV CPX PEAK DIASTOLIC BLOOD PRESSURE: 46 MMHG
OHS CV CPX PEAK HEAR RATE: 141 BPM
OHS CV CPX PEAK RATE PRESSURE PRODUCT: ABNORMAL
OHS CV CPX PEAK SYSTOLIC BLOOD PRESSURE: 152 MMHG
OHS CV CPX PERCENT MAX PREDICTED HEART RATE ACHIEVED: 89
OHS CV CPX RATE PRESSURE PRODUCT PRESENTING: ABNORMAL
PISA TR MAX VEL: 2.43 M/S
PULM VEIN S/D RATIO: 1.26
PV PEAK D VEL: 0.34 M/S
PV PEAK S VEL: 0.43 M/S
RA MAJOR: 3.68 CM
RA WIDTH: 2.44 CM
RIGHT VENTRICULAR END-DIASTOLIC DIMENSION: 1.83 CM
RV TISSUE DOPPLER FREE WALL SYSTOLIC VELOCITY 1 (APICAL 4 CHAMBER VIEW): 8.72 CM/S
STJ: 2.2 CM
STRESS ECHO POST EXERCISE DUR MIN: 4 MINUTES
STRESS ECHO POST EXERCISE DUR SEC: 2 SECONDS
SYSTOLIC BLOOD PRESSURE: 146 MMHG
TDI LATERAL: 0.11 M/S
TDI SEPTAL: 0.06 M/S
TDI: 0.09 M/S
TR MAX PG: 24 MMHG
TRICUSPID ANNULAR PLANE SYSTOLIC EXCURSION: 1.62 CM

## 2021-12-06 PROCEDURE — 93351 STRESS TTE COMPLETE: CPT

## 2021-12-06 PROCEDURE — 93351 STRESS TTE COMPLETE: CPT | Mod: 26,,, | Performed by: INTERNAL MEDICINE

## 2021-12-06 PROCEDURE — 93325 DOPPLER ECHO COLOR FLOW MAPG: CPT | Mod: 26,,, | Performed by: INTERNAL MEDICINE

## 2021-12-06 PROCEDURE — 93320 DOPPLER ECHO COMPLETE: CPT | Mod: 26,,, | Performed by: INTERNAL MEDICINE

## 2021-12-06 PROCEDURE — 93325 STRESS ECHO (CUPID ONLY): ICD-10-PCS | Mod: 26,,, | Performed by: INTERNAL MEDICINE

## 2021-12-06 PROCEDURE — 93351 STRESS ECHO (CUPID ONLY): ICD-10-PCS | Mod: 26,,, | Performed by: INTERNAL MEDICINE

## 2021-12-06 PROCEDURE — 93320 STRESS ECHO (CUPID ONLY): ICD-10-PCS | Mod: 26,,, | Performed by: INTERNAL MEDICINE

## 2021-12-08 ENCOUNTER — PATIENT MESSAGE (OUTPATIENT)
Dept: CARDIOLOGY | Facility: CLINIC | Age: 56
End: 2021-12-08
Payer: COMMERCIAL

## 2021-12-08 ENCOUNTER — PATIENT MESSAGE (OUTPATIENT)
Dept: FAMILY MEDICINE | Facility: CLINIC | Age: 56
End: 2021-12-08
Payer: COMMERCIAL

## 2021-12-23 ENCOUNTER — PATIENT MESSAGE (OUTPATIENT)
Dept: FAMILY MEDICINE | Facility: CLINIC | Age: 56
End: 2021-12-23
Payer: COMMERCIAL

## 2021-12-27 ENCOUNTER — OFFICE VISIT (OUTPATIENT)
Dept: FAMILY MEDICINE | Facility: CLINIC | Age: 56
End: 2021-12-27
Payer: COMMERCIAL

## 2021-12-27 VITALS
DIASTOLIC BLOOD PRESSURE: 51 MMHG | OXYGEN SATURATION: 97 % | HEART RATE: 106 BPM | SYSTOLIC BLOOD PRESSURE: 120 MMHG | WEIGHT: 117.81 LBS | TEMPERATURE: 98 F | BODY MASS INDEX: 17.86 KG/M2 | HEIGHT: 68 IN

## 2021-12-27 DIAGNOSIS — R09.81 SINUS CONGESTION: ICD-10-CM

## 2021-12-27 DIAGNOSIS — R05.9 COUGH: ICD-10-CM

## 2021-12-27 DIAGNOSIS — J32.9 SINUSITIS, UNSPECIFIED CHRONICITY, UNSPECIFIED LOCATION: Primary | ICD-10-CM

## 2021-12-27 LAB
CTP QC/QA: YES
CTP QC/QA: YES
POC MOLECULAR INFLUENZA A AGN: NEGATIVE
POC MOLECULAR INFLUENZA B AGN: NEGATIVE
SARS-COV-2 RDRP RESP QL NAA+PROBE: NEGATIVE

## 2021-12-27 PROCEDURE — 87502 POCT INFLUENZA A/B MOLECULAR: ICD-10-PCS | Mod: QW,,, | Performed by: STUDENT IN AN ORGANIZED HEALTH CARE EDUCATION/TRAINING PROGRAM

## 2021-12-27 PROCEDURE — 3066F NEPHROPATHY DOC TX: CPT | Mod: CPTII,S$GLB,, | Performed by: STUDENT IN AN ORGANIZED HEALTH CARE EDUCATION/TRAINING PROGRAM

## 2021-12-27 PROCEDURE — U0002: ICD-10-PCS | Mod: QW,S$GLB,, | Performed by: STUDENT IN AN ORGANIZED HEALTH CARE EDUCATION/TRAINING PROGRAM

## 2021-12-27 PROCEDURE — 1160F PR REVIEW ALL MEDS BY PRESCRIBER/CLIN PHARMACIST DOCUMENTED: ICD-10-PCS | Mod: CPTII,S$GLB,, | Performed by: STUDENT IN AN ORGANIZED HEALTH CARE EDUCATION/TRAINING PROGRAM

## 2021-12-27 PROCEDURE — 1160F RVW MEDS BY RX/DR IN RCRD: CPT | Mod: CPTII,S$GLB,, | Performed by: STUDENT IN AN ORGANIZED HEALTH CARE EDUCATION/TRAINING PROGRAM

## 2021-12-27 PROCEDURE — 3074F PR MOST RECENT SYSTOLIC BLOOD PRESSURE < 130 MM HG: ICD-10-PCS | Mod: CPTII,S$GLB,, | Performed by: STUDENT IN AN ORGANIZED HEALTH CARE EDUCATION/TRAINING PROGRAM

## 2021-12-27 PROCEDURE — 3008F PR BODY MASS INDEX (BMI) DOCUMENTED: ICD-10-PCS | Mod: CPTII,S$GLB,, | Performed by: STUDENT IN AN ORGANIZED HEALTH CARE EDUCATION/TRAINING PROGRAM

## 2021-12-27 PROCEDURE — 3008F BODY MASS INDEX DOCD: CPT | Mod: CPTII,S$GLB,, | Performed by: STUDENT IN AN ORGANIZED HEALTH CARE EDUCATION/TRAINING PROGRAM

## 2021-12-27 PROCEDURE — 3078F DIAST BP <80 MM HG: CPT | Mod: CPTII,S$GLB,, | Performed by: STUDENT IN AN ORGANIZED HEALTH CARE EDUCATION/TRAINING PROGRAM

## 2021-12-27 PROCEDURE — 99214 OFFICE O/P EST MOD 30 MIN: CPT | Mod: S$GLB,,, | Performed by: STUDENT IN AN ORGANIZED HEALTH CARE EDUCATION/TRAINING PROGRAM

## 2021-12-27 PROCEDURE — U0002 COVID-19 LAB TEST NON-CDC: HCPCS | Mod: QW,S$GLB,, | Performed by: STUDENT IN AN ORGANIZED HEALTH CARE EDUCATION/TRAINING PROGRAM

## 2021-12-27 PROCEDURE — 99214 PR OFFICE/OUTPT VISIT, EST, LEVL IV, 30-39 MIN: ICD-10-PCS | Mod: S$GLB,,, | Performed by: STUDENT IN AN ORGANIZED HEALTH CARE EDUCATION/TRAINING PROGRAM

## 2021-12-27 PROCEDURE — 3078F PR MOST RECENT DIASTOLIC BLOOD PRESSURE < 80 MM HG: ICD-10-PCS | Mod: CPTII,S$GLB,, | Performed by: STUDENT IN AN ORGANIZED HEALTH CARE EDUCATION/TRAINING PROGRAM

## 2021-12-27 PROCEDURE — 1159F MED LIST DOCD IN RCRD: CPT | Mod: CPTII,S$GLB,, | Performed by: STUDENT IN AN ORGANIZED HEALTH CARE EDUCATION/TRAINING PROGRAM

## 2021-12-27 PROCEDURE — 1159F PR MEDICATION LIST DOCUMENTED IN MEDICAL RECORD: ICD-10-PCS | Mod: CPTII,S$GLB,, | Performed by: STUDENT IN AN ORGANIZED HEALTH CARE EDUCATION/TRAINING PROGRAM

## 2021-12-27 PROCEDURE — 87502 INFLUENZA DNA AMP PROBE: CPT | Mod: QW,,, | Performed by: STUDENT IN AN ORGANIZED HEALTH CARE EDUCATION/TRAINING PROGRAM

## 2021-12-27 PROCEDURE — 3074F SYST BP LT 130 MM HG: CPT | Mod: CPTII,S$GLB,, | Performed by: STUDENT IN AN ORGANIZED HEALTH CARE EDUCATION/TRAINING PROGRAM

## 2021-12-27 PROCEDURE — 3066F PR DOCUMENTATION OF TREATMENT FOR NEPHROPATHY: ICD-10-PCS | Mod: CPTII,S$GLB,, | Performed by: STUDENT IN AN ORGANIZED HEALTH CARE EDUCATION/TRAINING PROGRAM

## 2021-12-27 RX ORDER — PROMETHAZINE HYDROCHLORIDE AND CODEINE PHOSPHATE 6.25; 1 MG/5ML; MG/5ML
5 SOLUTION ORAL EVERY 6 HOURS PRN
Qty: 118 ML | Refills: 0 | Status: SHIPPED | OUTPATIENT
Start: 2021-12-27 | End: 2022-01-06

## 2021-12-27 RX ORDER — CEFDINIR 300 MG/1
300 CAPSULE ORAL 2 TIMES DAILY
Qty: 10 CAPSULE | Refills: 0 | Status: SHIPPED | OUTPATIENT
Start: 2021-12-27 | End: 2022-01-01

## 2021-12-30 ENCOUNTER — PATIENT OUTREACH (OUTPATIENT)
Dept: ADMINISTRATIVE | Facility: OTHER | Age: 56
End: 2021-12-30
Payer: COMMERCIAL

## 2022-01-05 ENCOUNTER — OFFICE VISIT (OUTPATIENT)
Dept: ENDOCRINOLOGY | Facility: CLINIC | Age: 57
End: 2022-01-05
Payer: COMMERCIAL

## 2022-01-05 VITALS
OXYGEN SATURATION: 96 % | BODY MASS INDEX: 18.74 KG/M2 | DIASTOLIC BLOOD PRESSURE: 94 MMHG | HEART RATE: 108 BPM | SYSTOLIC BLOOD PRESSURE: 130 MMHG | RESPIRATION RATE: 16 BRPM | WEIGHT: 119.38 LBS | TEMPERATURE: 97 F | HEIGHT: 67 IN

## 2022-01-05 DIAGNOSIS — R29.6 FALLS FREQUENTLY: ICD-10-CM

## 2022-01-05 DIAGNOSIS — E03.8 SECONDARY HYPOTHYROIDISM: ICD-10-CM

## 2022-01-05 DIAGNOSIS — R06.02 SHORTNESS OF BREATH: ICD-10-CM

## 2022-01-05 DIAGNOSIS — M81.8 OTHER OSTEOPOROSIS WITHOUT CURRENT PATHOLOGICAL FRACTURE: ICD-10-CM

## 2022-01-05 DIAGNOSIS — R42 DIZZINESS: ICD-10-CM

## 2022-01-05 DIAGNOSIS — E27.49 SECONDARY ADRENAL INSUFFICIENCY: Primary | ICD-10-CM

## 2022-01-05 DIAGNOSIS — E23.0 PANHYPOPITUITARISM: ICD-10-CM

## 2022-01-05 PROCEDURE — 1160F PR REVIEW ALL MEDS BY PRESCRIBER/CLIN PHARMACIST DOCUMENTED: ICD-10-PCS | Mod: CPTII,S$GLB,, | Performed by: INTERNAL MEDICINE

## 2022-01-05 PROCEDURE — 3008F BODY MASS INDEX DOCD: CPT | Mod: CPTII,S$GLB,, | Performed by: INTERNAL MEDICINE

## 2022-01-05 PROCEDURE — 99999 PR PBB SHADOW E&M-EST. PATIENT-LVL V: CPT | Mod: PBBFAC,,, | Performed by: INTERNAL MEDICINE

## 2022-01-05 PROCEDURE — 3008F PR BODY MASS INDEX (BMI) DOCUMENTED: ICD-10-PCS | Mod: CPTII,S$GLB,, | Performed by: INTERNAL MEDICINE

## 2022-01-05 PROCEDURE — 3080F DIAST BP >= 90 MM HG: CPT | Mod: CPTII,S$GLB,, | Performed by: INTERNAL MEDICINE

## 2022-01-05 PROCEDURE — 3075F SYST BP GE 130 - 139MM HG: CPT | Mod: CPTII,S$GLB,, | Performed by: INTERNAL MEDICINE

## 2022-01-05 PROCEDURE — 99214 OFFICE O/P EST MOD 30 MIN: CPT | Mod: S$GLB,,, | Performed by: INTERNAL MEDICINE

## 2022-01-05 PROCEDURE — 3075F PR MOST RECENT SYSTOLIC BLOOD PRESS GE 130-139MM HG: ICD-10-PCS | Mod: CPTII,S$GLB,, | Performed by: INTERNAL MEDICINE

## 2022-01-05 PROCEDURE — 99214 PR OFFICE/OUTPT VISIT, EST, LEVL IV, 30-39 MIN: ICD-10-PCS | Mod: S$GLB,,, | Performed by: INTERNAL MEDICINE

## 2022-01-05 PROCEDURE — 99999 PR PBB SHADOW E&M-EST. PATIENT-LVL V: ICD-10-PCS | Mod: PBBFAC,,, | Performed by: INTERNAL MEDICINE

## 2022-01-05 PROCEDURE — 3080F PR MOST RECENT DIASTOLIC BLOOD PRESSURE >= 90 MM HG: ICD-10-PCS | Mod: CPTII,S$GLB,, | Performed by: INTERNAL MEDICINE

## 2022-01-05 PROCEDURE — 1159F MED LIST DOCD IN RCRD: CPT | Mod: CPTII,S$GLB,, | Performed by: INTERNAL MEDICINE

## 2022-01-05 PROCEDURE — 1159F PR MEDICATION LIST DOCUMENTED IN MEDICAL RECORD: ICD-10-PCS | Mod: CPTII,S$GLB,, | Performed by: INTERNAL MEDICINE

## 2022-01-05 PROCEDURE — 1160F RVW MEDS BY RX/DR IN RCRD: CPT | Mod: CPTII,S$GLB,, | Performed by: INTERNAL MEDICINE

## 2022-01-05 NOTE — PROGRESS NOTES
Subjective:    01/05/2022   The patient's last visit with me was on 7/27/2021.     Patient ID: Thelma Nguyen is a 56 y.o. female.    Chief Complaint:  Follow-up, Osteoporosis, Hypothyroidism, Adrenal Insufficiency, and panhypopituraism  (Has had falls in the past two weeks. )    History of Present Illness  Ms. Nguyen is a 56 y.o. female who is here for a follow-up visit for evaluation of panhypopituitarism    Diagnosed with a glioblastoma multiforme in [facility] 1985 (pathology report not available), treated with surgery and radiation and developed panhypopituitarism.  Note she also has seizure disorder.  Previous regimen included: decadron and dilantin.     Imaging:  Brain MRI 5/2021 - no residual tumor, normal pituitary     Interval hx:  + falls with episodes of weakness (4x in past couple weeks)  Having periods of losing balance  Also with worsening MAGANA making normal ambulation difficult    On LT4 100 mcg daily as this is her wt based dose.     Has CKD 3 and hyperkalemia followed by nephrology  Has chronic orthostatic hypotension, sometimes also lightheaded when walking.  No hyperpigmentation.  Recent labs also with anemia    Regarding secondary adrenal insufficiency   On Hydrocortisone 15 mg daily, 5 mg in the afternoon (sometimes misses afternoon dose w/o symptoms) .   Has IBS w/ abd pain  Aware of sick day rules  has medical alert tag on smart watch    Regarding secondary hypothyroidism:  On Levothyroxine 100 mcg in the morning.Taking appropriately, does not miss medications regularly.     Lab Results   Component Value Date    TSH <0.026 (L) 11/29/2021    FREET4 1.04 11/29/2021       Thyroid Symptoms    Weight change:  []  Gain []  Loss  [x]  Denies   Wt Readings from Last 3 Encounters:   01/05/22 54.2 kg (119 lb 6.1 oz)   12/27/21 53.4 kg (117 lb 13.4 oz)   12/06/21 54 kg (119 lb)      Temperature intolerance:  [x]  Cold (chronic) []  Hot   []  Denies     GI:  [x]  Diarrhea [x]   Constipation []  Denies   both with IBS    Integument:  []  Hair loss [x]  Dry skin  []  Denies    Other:  []  Palpitation []  Tremor    []  Increased anxiety    [x]  Denies          Has osteoporosis, T score of -2.6 at FN  Denies fractures. Does report neuropathy of unknown etiology.    Taking fosamax 70 mg, weekly. She puts pill in her pill box. Tolerating well, denies GERD symptoms.  Takes vitamin D daily.     2016: Fractured right ankle.     Lab Results   Component Value Date    CALCIUM 8.2 (L) 11/29/2021    ALBUMIN 4.1 11/29/2021    ESTGFRAFRICA 48.3 (A) 11/29/2021    EGFRNONAA 41.9 (A) 11/29/2021    PHOS 2.8 (L) 09/15/2021    ALKPHOS 89 11/29/2021    WLBBBVZX71NY 40 05/25/2021    .0 (H) 05/25/2021      No symptoms of DI (denies polyuria/polydipsia).       Has never been on growth hormone, not interested in daily injection.      Last IGF-1:   Lab Results   Component Value Date    SOMATMDN 165 07/27/2021      Objective:     There were no vitals filed for this visit.  BP: reports hx of low BP  BP Readings from Last 3 Encounters:   12/27/21 (!) 120/51   11/30/21 (!) 100/56   11/29/21 118/63     Wt Readings from Last 1 Encounters:   12/27/21 1609 53.4 kg (117 lb 13.4 oz)     Constitutional:  Pleasant,  in no acute distress.   HENT:   Eyes:     No scleral icterus.   Respiratory:   Effort normal   Neurological:  normal speech  Psych:  Normal mood and affect.      There is no height or weight on file to calculate BMI.    Lab Review:   No results found for: HGBA1C  Lab Results   Component Value Date    CHOL 232 (H) 09/27/2018    HDL 52 09/27/2018    LDLCALC 117.0 09/27/2018    TRIG 315 (H) 09/27/2018    CHOLHDL 22.4 09/27/2018     Lab Results   Component Value Date     11/29/2021    K 5.1 11/29/2021     11/29/2021    CO2 27 11/29/2021     11/29/2021    BUN 23 (H) 11/29/2021    CREATININE 1.42 (H) 11/29/2021    CALCIUM 8.2 (L) 11/29/2021    PROT 7.4 11/29/2021    ALBUMIN 4.1 11/29/2021     BILITOT 0.4 11/29/2021    ALKPHOS 89 11/29/2021    AST 21 11/29/2021    ALT 14 11/29/2021    ANIONGAP 11 11/29/2021    ESTGFRAFRICA 48.3 (A) 11/29/2021    EGFRNONAA 41.9 (A) 11/29/2021    TSH <0.026 (L) 11/29/2021       Assessment and Plan     Problem List Items Addressed This Visit        2     Panhypopituitarism     Secondary adrenal insufficiency- continue HC 15/5, will send emergency dexamethasone injection to pharmacy.  Patient encouraged to get new medical alert tag as current one on her watch may not be sufficient    Secondary hypothyroidism-continue levothyroxine 100 mcg daily as FT4 is at goal in the upper half of the normal range and she has no symptoms of over replacement.    No indication for estrogen replacement given H. She is not interested in growth hormone replacement at this time either.              Other osteoporosis without current pathological fracture     Denies falls or fractures   Tolerating alendronate, reviewed administration   Updated DXA 10/2020            3     Falls frequently     No endocrine eitiology.  Needs to be evaluated by Neurology            Unprioritized    Secondary adrenal insufficiency - Primary    Secondary hypothyroidism    Shortness of breath     Referral placed to pulmonology         Relevant Orders    Ambulatory referral/consult to Pulmonology      Other Visit Diagnoses     Dizziness        Relevant Orders    Ambulatory referral/consult to Neurology        RTC in 6 mo general endo clinic    Joan Serrano MD

## 2022-01-11 ENCOUNTER — IMMUNIZATION (OUTPATIENT)
Dept: PHARMACY | Facility: CLINIC | Age: 57
End: 2022-01-11
Payer: COMMERCIAL

## 2022-01-11 DIAGNOSIS — Z23 NEED FOR VACCINATION: Primary | ICD-10-CM

## 2022-01-14 ENCOUNTER — OFFICE VISIT (OUTPATIENT)
Dept: PULMONOLOGY | Facility: CLINIC | Age: 57
End: 2022-01-14
Payer: COMMERCIAL

## 2022-01-14 VITALS
DIASTOLIC BLOOD PRESSURE: 80 MMHG | HEIGHT: 67 IN | WEIGHT: 120.38 LBS | BODY MASS INDEX: 18.89 KG/M2 | SYSTOLIC BLOOD PRESSURE: 130 MMHG | HEART RATE: 94 BPM | OXYGEN SATURATION: 98 %

## 2022-01-14 DIAGNOSIS — R06.02 SHORTNESS OF BREATH: ICD-10-CM

## 2022-01-14 PROBLEM — R29.6 FALLS FREQUENTLY: Status: ACTIVE | Noted: 2022-01-14

## 2022-01-14 PROCEDURE — 99999 PR PBB SHADOW E&M-EST. PATIENT-LVL IV: CPT | Mod: PBBFAC,,, | Performed by: INTERNAL MEDICINE

## 2022-01-14 PROCEDURE — 99999 PR PBB SHADOW E&M-EST. PATIENT-LVL IV: ICD-10-PCS | Mod: PBBFAC,,, | Performed by: INTERNAL MEDICINE

## 2022-01-14 PROCEDURE — 3075F SYST BP GE 130 - 139MM HG: CPT | Mod: CPTII,S$GLB,, | Performed by: INTERNAL MEDICINE

## 2022-01-14 PROCEDURE — 99203 OFFICE O/P NEW LOW 30 MIN: CPT | Mod: S$GLB,,, | Performed by: INTERNAL MEDICINE

## 2022-01-14 PROCEDURE — 3008F BODY MASS INDEX DOCD: CPT | Mod: CPTII,S$GLB,, | Performed by: INTERNAL MEDICINE

## 2022-01-14 PROCEDURE — 3079F PR MOST RECENT DIASTOLIC BLOOD PRESSURE 80-89 MM HG: ICD-10-PCS | Mod: CPTII,S$GLB,, | Performed by: INTERNAL MEDICINE

## 2022-01-14 PROCEDURE — 3075F PR MOST RECENT SYSTOLIC BLOOD PRESS GE 130-139MM HG: ICD-10-PCS | Mod: CPTII,S$GLB,, | Performed by: INTERNAL MEDICINE

## 2022-01-14 PROCEDURE — 3008F PR BODY MASS INDEX (BMI) DOCUMENTED: ICD-10-PCS | Mod: CPTII,S$GLB,, | Performed by: INTERNAL MEDICINE

## 2022-01-14 PROCEDURE — 3079F DIAST BP 80-89 MM HG: CPT | Mod: CPTII,S$GLB,, | Performed by: INTERNAL MEDICINE

## 2022-01-14 PROCEDURE — 99203 PR OFFICE/OUTPT VISIT, NEW, LEVL III, 30-44 MIN: ICD-10-PCS | Mod: S$GLB,,, | Performed by: INTERNAL MEDICINE

## 2022-01-14 NOTE — PROGRESS NOTES
Subjective:       Patient ID: Thelma Nguyen is a 56 y.o. female.    Chief Complaint: Shortness of Breath    HPI   Thelma Nguyen 56 y.o. female    has a past medical history of Allergic rhinitis, Anemia (), Arthritis (), Basal cell carcinoma, Broken ankle, Cancer (), Clotting disorder (), Disorder of kidney and ureter, DVT (deep venous thrombosis), Factor V deficiency, Fracture of elbow, Glioblastoma multiforme of brain, Hypothyroidism, IBS (irritable bowel syndrome) (), Osteoporosis, Panhypopituitarism, Peripheral polyneuropathy, Secondary adrenal insufficiency, Seizures (), and Thyroid disease ().    has a past surgical history that includes Hysterectomy (); Brain surgery (); Appendectomy;  section (); Cholecystectomy (); Tubal ligation (); Closure of defect of Mohs procedure (Left, 2018); Esophagogastroduodenoscopy (N/A, 2019); Colonoscopy (N/A, 2019); and Excision of ganglion cyst of hand (Left, 2020).   reports that she has never smoked. She has never used smokeless tobacco. She reports that she does not drink alcohol and does not use drugs.  Referred by: Dr. Joan Serrano  Who had concerns including Shortness of Breath.  The patient's last visit with me was on Visit date not found.  LAST VISIT    Interval History  Feeling sob and fatigue since 2021 but actually started prior to that   Late spring, unable to stand, ok with sitting  Legs also giving out if got up suddenly,   Never super energetic  Works as a   Sob with walking even short distances  Bending over helps with sob- tripod   Fell 3 days ago- 9th time since last summer  Legs will just give out, balance is poor but now if she gets up in the middle of the night, will     ?orthostatic  Has IBS- both constipation and diarrhea  Factor V  Adrenal insufficiency  + weight loss- not    had dvt, since had been on asa, and then changed  to xarelto 2013    No fever chills, ns, wt changes, nausea, vomiting, diarrhea, constipation, chest pain, tightness, pressure. Remainder of review of systems is negative.  Otherwise asymptomatic from pulmonary standpoint.    Review of Systems    Objective:      Physical Exam   Constitutional: She is oriented to person, place, and time. She appears well-developed and well-nourished. She appears not cachectic. No distress.   HENT:   Head: Normocephalic.   Right Ear: External ear normal.   Left Ear: External ear normal.   Neck: No thyromegaly present.   Cardiovascular: Normal rate, regular rhythm, normal heart sounds and intact distal pulses. Exam reveals no gallop and no friction rub.   No murmur heard.  Pulmonary/Chest: Normal expansion, symmetric chest wall expansion, effort normal and breath sounds normal. No stridor. No respiratory distress. She has no decreased breath sounds. She has no wheezes. She has no rhonchi. She has no rales. Chest wall is not dull to percussion. She exhibits no tenderness. Negative for egophony. Negative for tactile fremitus.   Abdominal: She exhibits no distension.   Musculoskeletal:         General: No tenderness, deformity or edema.      Cervical back: Normal range of motion.   Lymphadenopathy:     She has no cervical adenopathy.   Neurological: She is alert and oriented to person, place, and time. No cranial nerve deficit. Gait normal.   Skin: Skin is warm and dry. No rash noted. She is not diaphoretic. No cyanosis or erythema. No pallor. Nails show no clubbing.   Psychiatric: She has a normal mood and affect. Her behavior is normal. Judgment and thought content normal.   Vitals reviewed.    Personal Diagnostic Review    No flowsheet data found.      Assessment:       1. Shortness of breath        Outpatient Encounter Medications as of 1/14/2022   Medication Sig Dispense Refill    acetaminophen (TYLENOL) 325 MG tablet Take 650 mg by mouth every 6 (six) hours as needed for Pain.        alendronate (FOSAMAX) 70 MG tablet Take 1 tablet every 7 days 12 tablet 1    biotin 1 mg Cap Take by mouth.      dexamethasone (DECADRON) 4 mg/mL injection Inject 1 mL (4 mg total) into the muscle daily as needed (Signs and symtpoms of severe adrenal insufficiency). 1 mL 1    ERGOCALCIFEROL, VITAMIN D2, (VITAMIN D ORAL) Take 1 capsule by mouth nightly.       ferrous sulfate 324 mg (65 mg iron) TbEC Take 325 mg by mouth nightly.       hydrocortisone (CORTEF) 10 MG Tab Take one and half tablets int he morning and half a tablet in the afternoon. Double the dose in case of illness. 75 tablet 6    levothyroxine (SYNTHROID) 100 MCG tablet Take 1 tablet (100 mcg total) by mouth before breakfast. 30 tablet 11    loratadine-pseudoephedrine  mg (CLARITIN-D 24-HOUR)  mg per 24 hr tablet Take 1 tablet by mouth as needed.       mupirocin (BACTROBAN) 2 % ointment 2 (two) times daily. Apply to affected area      phenytoin (DILANTIN) 100 MG ER capsule TAKE 4 CAPSULES (400 MG) EVERY DAY AT BEDTIME 360 capsule 4    sars-cov-2, covid-19, (MODERNA COVID-19) 100 mcg/0.5 ml injection Inject into the muscle. 0.5 mL 0    XARELTO 20 mg Tab TAKE 1 TABLET BY MOUTH EVERY DAY 90 tablet 4     No facility-administered encounter medications on file as of 1/14/2022.     Orders Placed This Encounter   Procedures    Lung Volumes     Standing Status:   Future     Standing Expiration Date:   1/14/2023     Order Specific Question:   Release to patient     Answer:   Immediate    Spirometry without Bronchodilator     Standing Status:   Future     Standing Expiration Date:   1/14/2023     Order Specific Question:   Release to patient     Answer:   Immediate    DLCO-Carbon Monoxide Diffusing Capacity     Standing Status:   Future     Standing Expiration Date:   1/14/2023     Order Specific Question:   Release to patient     Answer:   Immediate    Stress test, pulmonary     Standing Status:   Future     Standing Expiration  Date:   1/14/2023     Order Specific Question:   Reason for study     Answer:   Functional status     Order Specific Question:   Release to patient     Answer:   Immediate       Plan:               Assessment:  Thelma was seen today for shortness of breath.    Diagnoses and all orders for this visit:    Shortness of breath  -     Ambulatory referral/consult to Pulmonology  -     Lung Volumes; Future  -     Spirometry without Bronchodilator; Future  -     DLCO-Carbon Monoxide Diffusing Capacity; Future  -     Stress test, pulmonary; Future        Plan:  Problem List Items Addressed This Visit     Shortness of breath    Relevant Orders    Lung Volumes    Spirometry without Bronchodilator    DLCO-Carbon Monoxide Diffusing Capacity    Stress test, pulmonary          Etiology of dyspnea unclear- echo/cxr wnl  Initiate workup and followup in 1 month    Follow up in about 4 weeks (around 2/11/2022).    There are no Patient Instructions on file for this visit.    Immunization History   Administered Date(s) Administered    COVID-19 MRNA, LN-S PF (MODERNA HALF 0.25 ML DOSE) 01/11/2022    COVID-19, MRNA, LN-S, PF (MODERNA FULL 0.5 ML DOSE) 03/11/2021, 04/08/2021    Influenza - Quadrivalent 11/19/2015    Influenza - Quadrivalent - PF *Preferred* (6 months and older) 09/27/2018, 08/29/2019, 09/17/2020, 11/12/2021    Influenza A (H1N1) 2009 Monovalent - IM 11/11/2009    Pneumococcal Conjugate - 13 Valent 09/27/2018    Tdap 03/07/2019

## 2022-01-14 NOTE — ASSESSMENT & PLAN NOTE
Secondary adrenal insufficiency- continue HC 15/5, will send emergency dexamethasone injection to pharmacy.  Patient encouraged to get new medical alert tag as current one on her watch may not be sufficient    Secondary hypothyroidism-continue levothyroxine 100 mcg daily as FT4 is at goal in the upper half of the normal range and she has no symptoms of over replacement.    No indication for estrogen replacement given H. She is not interested in growth hormone replacement at this time either.

## 2022-01-20 ENCOUNTER — TELEPHONE (OUTPATIENT)
Dept: SLEEP MEDICINE | Facility: CLINIC | Age: 57
End: 2022-01-20
Payer: COMMERCIAL

## 2022-01-20 NOTE — TELEPHONE ENCOUNTER
Staff reached out to pt to see if she wanted to convert her appointment to a VV. She confirmed . 1/21/22 for 8:00 am.

## 2022-01-21 ENCOUNTER — TELEPHONE (OUTPATIENT)
Dept: NEUROLOGY | Facility: CLINIC | Age: 57
End: 2022-01-21
Payer: COMMERCIAL

## 2022-01-21 ENCOUNTER — PATIENT MESSAGE (OUTPATIENT)
Dept: NEUROLOGY | Facility: CLINIC | Age: 57
End: 2022-01-21
Payer: COMMERCIAL

## 2022-01-21 ENCOUNTER — OFFICE VISIT (OUTPATIENT)
Dept: SLEEP MEDICINE | Facility: CLINIC | Age: 57
End: 2022-01-21
Payer: COMMERCIAL

## 2022-01-21 DIAGNOSIS — R53.83 FATIGUE, UNSPECIFIED TYPE: ICD-10-CM

## 2022-01-21 PROCEDURE — 99203 OFFICE O/P NEW LOW 30 MIN: CPT | Mod: 95,,, | Performed by: NURSE PRACTITIONER

## 2022-01-21 PROCEDURE — 99203 PR OFFICE/OUTPT VISIT, NEW, LEVL III, 30-44 MIN: ICD-10-PCS | Mod: 95,,, | Performed by: NURSE PRACTITIONER

## 2022-01-21 NOTE — TELEPHONE ENCOUNTER
----- Message from Olinda Umana sent at 1/21/2022  8:26 AM CST -----  Regarding: pt appt request  Pt looking to schedule an  appt . Imbalance and falling     [phone] ([place])

## 2022-01-21 NOTE — PROGRESS NOTES
"Referred by Dr. Mariano  CHIEF COMPLAINT: Fatigue    HISTORY OF PRESENT ILLNESS:She has never had a sleep study. Progressively worsening of chronic fatigue since April-May 2021. +imbalance and several falls recent months. Has not seen neuro, needs to find new provider. Occasionsal sleep talking. Snoring only with sinus/cold symptoms. Denies witnessed apneic pauses. "legs just give out" not associated with strong emotion or transient. Denies sleep paralysis or hypnagogic or hypnopompic hallucations. Denies daytime napping. Disrupted sleep 1x. Has ample sleep time. +lacks energy and + easily fatigued and short of breath with exertion    Seen cards, negative stress echo 12/6/21 EF 65%  Seen pulm Dr. Auguste  MRI 5/2021 no residual tumor, normal pituitary    Infrequent symptoms of restless legs  On todays [place] Sleepiness Scale the patient scores a 424.       FAMILY HISTORY: No known sleep disorders.   SOCIAL HISTORY: . , pres [employer]    PHYSICAL EXAM:   There were no vitals taken for this visit.  NEURO/PSYCH: Oriented to time, place and person. Normal attention span and concentration. Affect is full. Mood is normal.       ASSESSMENT:   Unspecified Sleep Apnea, with symptoms of infrequent snoring, disrupted sleep, dyspnea and progressively worsening excessive daytime fatigue/tiredness, with medical comorbidities of secondary aderenal insufficiency, hx glioblastoma multiforme, orthostatic hypotension.  Warrants further investigation for untreated sleep apnea.     PLAN:   1. Defers Home Sleep Study at this time   2. Discussed etiology of KAMILA   3. Schedule neuro appt as advised    Thank you for allowing me the opportunity to participate in the care of your patient      "

## 2022-01-25 ENCOUNTER — PATIENT MESSAGE (OUTPATIENT)
Dept: NEUROLOGY | Facility: CLINIC | Age: 57
End: 2022-01-25
Payer: COMMERCIAL

## 2022-01-28 ENCOUNTER — HOSPITAL ENCOUNTER (OUTPATIENT)
Dept: PULMONOLOGY | Facility: CLINIC | Age: 57
Discharge: HOME | End: 2022-01-28
Payer: COMMERCIAL

## 2022-01-28 VITALS — WEIGHT: 119.5 LBS | BODY MASS INDEX: 18.75 KG/M2 | HEIGHT: 67 IN

## 2022-01-28 DIAGNOSIS — R06.02 SHORTNESS OF BREATH: ICD-10-CM

## 2022-01-28 DIAGNOSIS — Z13.9 SCREENING PROCEDURE: Primary | ICD-10-CM

## 2022-01-28 LAB
CTP QC/QA: YES
SARS-COV-2 AG RESP QL IA.RAPID: NEGATIVE

## 2022-01-28 PROCEDURE — 87811 SARS CORONAVIRUS 2 ANTIGEN POCT, MANUAL READ: ICD-10-PCS | Mod: S$GLB,,, | Performed by: INTERNAL MEDICINE

## 2022-01-28 PROCEDURE — 87811 SARS-COV-2 COVID19 W/OPTIC: CPT | Mod: S$GLB,,, | Performed by: INTERNAL MEDICINE

## 2022-01-28 PROCEDURE — 94729 DIFFUSING CAPACITY: CPT | Mod: S$GLB,,, | Performed by: INTERNAL MEDICINE

## 2022-01-28 PROCEDURE — 94618 PULMONARY STRESS TESTING: CPT | Mod: S$GLB,,, | Performed by: INTERNAL MEDICINE

## 2022-01-28 PROCEDURE — 94729 PR C02/MEMBANE DIFFUSE CAPACITY: ICD-10-PCS | Mod: S$GLB,,, | Performed by: INTERNAL MEDICINE

## 2022-01-28 PROCEDURE — 94727 PR PULM FUNCTION TEST BY GAS: ICD-10-PCS | Mod: S$GLB,,, | Performed by: INTERNAL MEDICINE

## 2022-01-28 PROCEDURE — 94010 BREATHING CAPACITY TEST: ICD-10-PCS | Mod: S$GLB,,, | Performed by: INTERNAL MEDICINE

## 2022-01-28 PROCEDURE — 94727 GAS DIL/WSHOT DETER LNG VOL: CPT | Mod: S$GLB,,, | Performed by: INTERNAL MEDICINE

## 2022-01-28 PROCEDURE — 94618 PULMONARY STRESS TESTING: ICD-10-PCS | Mod: S$GLB,,, | Performed by: INTERNAL MEDICINE

## 2022-01-28 PROCEDURE — 94010 BREATHING CAPACITY TEST: CPT | Mod: S$GLB,,, | Performed by: INTERNAL MEDICINE

## 2022-02-02 NOTE — PROCEDURES
Thelma Nguyen is a 56 y.o.  female patient, who presents for a 6 minute walk test ordered by MD Molina.  The diagnosis is Shortness of Breath.  The patient's BMI is 18.7 kg/m2.  Predicted distance (lower limit of normal) is 440.66 meters.      Test Results:    The test was completed without stopping.  The total time walked was 360 seconds.  During walking, the patient reported:  Dyspnea.  The patient used no assistive devices during testing.     01/28/2022---------Distance: 320.04 meters (1050 feet)     O2 Sat % Supplemental Oxygen Heart Rate Blood Pressure Barby Scale   Pre-exercise  (Resting) 100 % Room Air 101 bpm 121/56 mmHg 2   During Exercise 100 % Room Air 110 bpm 126/66 mmHg 4   Post-exercise  (Recovery) 100 % Room Air  102 bpm       Recovery Time: 163 seconds    Performing nurse/tech: Gabriele MALDONADO      PREVIOUS STUDY:   The patient has not had a previous study.      CLINICAL INTERPRETATION:  Six minute walk distance is 320.04 meters (1050 feet) with somewhat heavy dyspnea.  During exercise, there was no desaturation while breathing room air.  Both blood pressure and heart rate remained stable with walking.  Tachycardia was present prior to exercise.  The patient did not report non-pulmonary symptoms during exercise.  No previous study performed.  Based upon age and body mass index, exercise capacity is less than predicted.

## 2022-02-03 ENCOUNTER — PATIENT MESSAGE (OUTPATIENT)
Dept: FAMILY MEDICINE | Facility: CLINIC | Age: 57
End: 2022-02-03
Payer: COMMERCIAL

## 2022-02-04 ENCOUNTER — PATIENT MESSAGE (OUTPATIENT)
Dept: PULMONOLOGY | Facility: CLINIC | Age: 57
End: 2022-02-04
Payer: COMMERCIAL

## 2022-02-04 ENCOUNTER — PATIENT MESSAGE (OUTPATIENT)
Dept: FAMILY MEDICINE | Facility: CLINIC | Age: 57
End: 2022-02-04
Payer: COMMERCIAL

## 2022-02-06 NOTE — TELEPHONE ENCOUNTER
To stop xarelto 2 days prior to surgery  Pt notified  She verbalized complete understanding.   normal...

## 2022-02-14 ENCOUNTER — PATIENT MESSAGE (OUTPATIENT)
Dept: NEPHROLOGY | Facility: CLINIC | Age: 57
End: 2022-02-14
Payer: COMMERCIAL

## 2022-02-14 ENCOUNTER — PATIENT OUTREACH (OUTPATIENT)
Dept: ADMINISTRATIVE | Facility: OTHER | Age: 57
End: 2022-02-14
Payer: COMMERCIAL

## 2022-02-14 NOTE — PROGRESS NOTES
Health Maintenance Due   Topic Date Due    Hepatitis C Screening  Never done    Pneumococcal Vaccines (Age 0-64) (2 of 4 - PPSV23) 11/22/2018     Updates were requested from care everywhere.  Chart was reviewed for overdue Proactive [facility] Encounters (DAVEY) topics (CRS, Breast Cancer Screening, Eye exam)  Health Maintenance has been updated.  LINKS immunization registry triggered.  Immunizations were reconciled.

## 2022-02-15 ENCOUNTER — OFFICE VISIT (OUTPATIENT)
Dept: NEUROLOGY | Facility: CLINIC | Age: 57
End: 2022-02-15
Payer: COMMERCIAL

## 2022-02-15 VITALS
SYSTOLIC BLOOD PRESSURE: 92 MMHG | HEIGHT: 67 IN | BODY MASS INDEX: 18.71 KG/M2 | HEART RATE: 115 BPM | DIASTOLIC BLOOD PRESSURE: 61 MMHG

## 2022-02-15 DIAGNOSIS — I95.1 ORTHOSTATIC HYPOTENSION: ICD-10-CM

## 2022-02-15 DIAGNOSIS — R42 DIZZINESS: ICD-10-CM

## 2022-02-15 DIAGNOSIS — E03.8 SECONDARY HYPOTHYROIDISM: ICD-10-CM

## 2022-02-15 DIAGNOSIS — M81.8 OTHER OSTEOPOROSIS WITHOUT CURRENT PATHOLOGICAL FRACTURE: ICD-10-CM

## 2022-02-15 DIAGNOSIS — F06.30 MOOD DISORDER DUE TO MEDICAL CONDITION: ICD-10-CM

## 2022-02-15 DIAGNOSIS — E27.49 SECONDARY ADRENAL INSUFFICIENCY: ICD-10-CM

## 2022-02-15 DIAGNOSIS — R29.6 FALLS FREQUENTLY: ICD-10-CM

## 2022-02-15 DIAGNOSIS — E23.0 PANHYPOPITUITARISM: ICD-10-CM

## 2022-02-15 DIAGNOSIS — N18.32 STAGE 3B CHRONIC KIDNEY DISEASE: Primary | ICD-10-CM

## 2022-02-15 DIAGNOSIS — C71.9 GLIOBLASTOMA MULTIFORME OF BRAIN: ICD-10-CM

## 2022-02-15 DIAGNOSIS — R63.6 LOW BODY WEIGHT DUE TO INADEQUATE CALORIC INTAKE: ICD-10-CM

## 2022-02-15 DIAGNOSIS — N18.30 STAGE 3 CHRONIC KIDNEY DISEASE, UNSPECIFIED WHETHER STAGE 3A OR 3B CKD: ICD-10-CM

## 2022-02-15 DIAGNOSIS — G40.209 PARTIAL SYMPTOMATIC EPILEPSY WITH COMPLEX PARTIAL SEIZURES, NOT INTRACTABLE, WITHOUT STATUS EPILEPTICUS: Primary | ICD-10-CM

## 2022-02-15 DIAGNOSIS — R06.02 SHORTNESS OF BREATH: ICD-10-CM

## 2022-02-15 DIAGNOSIS — D68.2 FACTOR V DEFICIENCY: ICD-10-CM

## 2022-02-15 DIAGNOSIS — G62.9 PERIPHERAL POLYNEUROPATHY: ICD-10-CM

## 2022-02-15 DIAGNOSIS — K58.9 IRRITABLE BOWEL SYNDROME, UNSPECIFIED TYPE: ICD-10-CM

## 2022-02-15 PROCEDURE — 3078F DIAST BP <80 MM HG: CPT | Mod: CPTII,S$GLB,, | Performed by: PSYCHIATRY & NEUROLOGY

## 2022-02-15 PROCEDURE — 3078F PR MOST RECENT DIASTOLIC BLOOD PRESSURE < 80 MM HG: ICD-10-PCS | Mod: CPTII,S$GLB,, | Performed by: PSYCHIATRY & NEUROLOGY

## 2022-02-15 PROCEDURE — 99215 PR OFFICE/OUTPT VISIT, EST, LEVL V, 40-54 MIN: ICD-10-PCS | Mod: S$GLB,,, | Performed by: PSYCHIATRY & NEUROLOGY

## 2022-02-15 PROCEDURE — 1159F PR MEDICATION LIST DOCUMENTED IN MEDICAL RECORD: ICD-10-PCS | Mod: CPTII,S$GLB,, | Performed by: PSYCHIATRY & NEUROLOGY

## 2022-02-15 PROCEDURE — 1160F PR REVIEW ALL MEDS BY PRESCRIBER/CLIN PHARMACIST DOCUMENTED: ICD-10-PCS | Mod: CPTII,S$GLB,, | Performed by: PSYCHIATRY & NEUROLOGY

## 2022-02-15 PROCEDURE — 3074F PR MOST RECENT SYSTOLIC BLOOD PRESSURE < 130 MM HG: ICD-10-PCS | Mod: CPTII,S$GLB,, | Performed by: PSYCHIATRY & NEUROLOGY

## 2022-02-15 PROCEDURE — 1160F RVW MEDS BY RX/DR IN RCRD: CPT | Mod: CPTII,S$GLB,, | Performed by: PSYCHIATRY & NEUROLOGY

## 2022-02-15 PROCEDURE — 1159F MED LIST DOCD IN RCRD: CPT | Mod: CPTII,S$GLB,, | Performed by: PSYCHIATRY & NEUROLOGY

## 2022-02-15 PROCEDURE — 99999 PR PBB SHADOW E&M-EST. PATIENT-LVL III: CPT | Mod: PBBFAC,,, | Performed by: PSYCHIATRY & NEUROLOGY

## 2022-02-15 PROCEDURE — 99215 OFFICE O/P EST HI 40 MIN: CPT | Mod: S$GLB,,, | Performed by: PSYCHIATRY & NEUROLOGY

## 2022-02-15 PROCEDURE — 3008F BODY MASS INDEX DOCD: CPT | Mod: CPTII,S$GLB,, | Performed by: PSYCHIATRY & NEUROLOGY

## 2022-02-15 PROCEDURE — 99999 PR PBB SHADOW E&M-EST. PATIENT-LVL III: ICD-10-PCS | Mod: PBBFAC,,, | Performed by: PSYCHIATRY & NEUROLOGY

## 2022-02-15 PROCEDURE — 3074F SYST BP LT 130 MM HG: CPT | Mod: CPTII,S$GLB,, | Performed by: PSYCHIATRY & NEUROLOGY

## 2022-02-15 PROCEDURE — 3008F PR BODY MASS INDEX (BMI) DOCUMENTED: ICD-10-PCS | Mod: CPTII,S$GLB,, | Performed by: PSYCHIATRY & NEUROLOGY

## 2022-02-15 RX ORDER — PHENYTOIN SODIUM 100 MG/1
CAPSULE, EXTENDED RELEASE ORAL
Qty: 360 CAPSULE | Refills: 3 | Status: SHIPPED | OUTPATIENT
Start: 2022-02-15 | End: 2023-02-02

## 2022-02-15 RX ORDER — SERTRALINE HYDROCHLORIDE 50 MG/1
50 TABLET, FILM COATED ORAL DAILY
Qty: 30 TABLET | Refills: 11 | Status: SHIPPED | OUTPATIENT
Start: 2022-02-15 | End: 2022-08-01

## 2022-02-15 NOTE — PROGRESS NOTES
Name: Thelma Nguyen  MRN:[id number]   CSN: [id number]  Date of service: 2/15/2022  Age:56 y.o.   Gender:female   Referring Physician/Service: Joan Serrano MD  [address] [place] [address]   The patient is here today with:  very good friend, Alicia      Neurology Clinic: Initial Visit    CHIEF COMPLAINT:  Remote history of GBM resection with secondary seizures    HPI 2/15/2022:     Age of first seizure:  (17yo)  Seizure Risk Factors: Left GBM (s/p XRT and Chemo) - in [place] ([facility]) (), no CNS infections, no family history of sz, term  with no prolonged hospitalization   Time of Last Seizure: ?, last nocturnal lip bite around [place]   # of lifetime Seizures: ?maybe 25   Frequency of Seizures: at most 3 seizures in one day, at the worst 1-2 monthly, currently none for years   Seizure Triggers: GBM initially, now unclear   Injuries/Hospitalization for seizures? No injuries, hospitalization twice -  with GBM, [facility]  [id number]  Driving? Yes, no issues   Pregnancy? 2 pregnancies, one baby born at 28 weeks   Bone Health: Osteopenia on Dexa at 2020      Auras:  Hearing voices, buzzing noise followed by someone's voice (no auditory hallucinations since surgery)     Seizure Events:   1.  Loss of awareness, zoning out, staring, decreased responsiveness, standing still or will run away   2. Generalized convulsion (-> gets put back on seizure medication)  3. New episodes, after sitting or lying, when she gets up, tingling of the feet which rises up her body, her legs give out and she might fall, this has been happening almost three years, hit ground, legs like noodles, but no LOC    4. Nocturnal episodes when she will wake up with a lip bite no other signs of seizures -> none since around       Current AED/SEs:  1. Phenytoin 400 mg q.h.s. SE no major issues but does have osteopenia on 2020 scan    Previous AED/SEs or reason for DC.   1. Phenobarbital - cognitive slowing  "  2. Gabapentin - hands and feet tingle     EEG:  No EEG in the system, does not recall ever having one of these    MRI: 05/2021 brain:  Small left temporoparietal resection  Surveillance scans: no plan currently     Other Allergies: gabapentin caused tingling      AED compliance, adherence: no missed doses     Recent Labs   Lab 08/29/19  1149   Phenytoin Lvl 16.9      ROS 2/15/2022:  Endo f/u for panhypopituitarism s/p surgery. Factor V Leiden on xarelto syndrome as well as IBS. 1 adult child; 1 child through IVF. Pulled muscle in chest. Some word hesitancy. Hearing aids. Generalized weakness. +nauseated. No vomiting. IBS. Hard to eat. No sense of smell or taste for 30 years. Two years after surgery -> under 100lbs. Unable to eat. Currently cold all the time. Alternating diarrhea and constipations.  Tired with SOB. Started last spring. Anemia. Disoriented in the dark.  Lightheadedness with position changes sometimes resulting in falls.  No changes in vision.  No problems swallowing.  No focal weakness.  Generalized weakness.    EXAM:   - Vitals: BP 92/61   Pulse (!) 115   Ht 5' 7" (1.702 m)   LMP 10/23/1997 (Exact Date)   BMI 18.71 kg/m²    - General: Awake, cooperative, NAD, slim  - HEENT: NC/AT  - Neck:  Decreased range of motion  - Pulmonary: no increased WOB  - Cardiac: well perfused   - Abdomen: soft, nontender, nondistended  - Extremities: no edema  - Skin: no rashes or lesions noted.     NEURO EXAM:   - Mental Status: Awake, alert, oriented x 3. Able to relate history without difficulty. Attentive to examiner. Language is fluent with intact repetition and comprehension. Normal prosody. There were no paraphasic errors. Able to name both high and low frequency objects. Speech was not dysarthric. Able to follow both midline and appendicular commands. There was no evidence of apraxia or neglect.    - Cranial Nerves:  VFF to confrontation. EOMI with nystagmus and intermittent dysconjugate gaze. No facial " droop.  Facial fullness.  Hearing intact to finger-rub bilaterally (with hearing aids in). 5/5 strength in trapezii and SCM bilaterally. Tongue protrudes in midline and to either side with no evidence of atrophy or weakness.    - Motor:  Decreased bulk and tone throughout. No pronator drift bilaterally. No adventitious movements such as tremor or asterixis noted.     Delt Bic Tri WrE WrF  FFl FE IO IP Quad Ham TA Gastroc    R   5     5    5    5    5        5   5    5   5    5        5     5      5            L   5      5    5   5    5        5    5   5    5    5       5     5      5              - Sensory: No deficits to light touch. No extinction to DSS.  - Coordination: No dysmetria on FNF .  - Gait:  Slow and somewhat labored, mildly wide-based. Romberg positive.    PLAN:  56-year-old woman with a GBM discovered at 19yo with subsequent resection without recurrence with secondary seizures well controlled with phenytoin 400 mg q.h.s. Multiple complicated comorbid medical disorders including adrenal insufficiency, renal dysfunction, orthostatic hypotension, IBS, factor 5 requiring rivaroxaban, low body weight, and osteopenia.  Certified dietitian.  Phenytoin level.  Trial with sertraline.  Exercise.  If she can build up some reserve, consider rapid transition to lacosamide in the EMU because of high risk of injury from breakthrough seizures in this vulnerable patient. Follow up in about 6 months (around 8/15/2022).     Patient Instructions   You came to Epilepsy Clinic because of your seizure disorder.  Your seizures are well controlled on dilantin 400mg at night. This medication has lots of side effects like bone breakdown and interacts with many medications. However, it has worked for you for a long time. We could try to change medications to get off the dilantin but any time you change you put yourself at risk for breakthrough seizures. I would like you to be a little stronger before we try this.     You have  many complicated dietary requirements. Please look up a certified dietician to come up with goal calories and daily menus. You are deconditioned. Please come up with an exercise regimen. You should be exercising at least 4 days a week. I am a big fan of yoga, you should try this. You are having trouble enjoying things that you used to enjoy. I would like you to try a new medication called sertraline. Please take 1/2 tablet for 14 days then increase to 1 tablet daily. You can take this medication in the morning or at night, with or without food. You will have some side effects as you start this medication like GI upset, changes in appetite, changes in sleep, possibly more anxiety. Please push through, your body will adjust.  Please do a self inventory in two months to see how you are doing with the medication and give me an update through the portal at that time.     Do not miss any doses of your medication. If you do miss a dose, take it as soon as you remember even if that means doubling up on the dose. Please get a lab test to check out the blood level of your medication. Please get regular sleep. Go to sleep at the same time and wake up at the same time every day. People with epilepsy require more sleep than people without epilepsy.  Sleeping 10-12 hours a day can be normal for a person with epilepsy.  Give yourself the time you need to get enough sleep. Every seizure you have makes it harder to prevent the next seizure. Epilepsy is associated with SUDEP, or sudden unexpected death in epilepsy.  The risk is significantly higher if your convulsive seizures are not well controlled. For more information, check out these websites: https://www.epilepsyallianceamerica.org/, www.[place]om[place]-epilepsy.org, www.womenandepilepsy.org.      Per [place] law, no episodes of loss of consciousness for 6 months before you may drive.  Please avoid dangerous situations.  For example, no baths/pools by yourself, no heights, no power tools.   Please wear a bike helmet.  If you have a single breakthrough seizure, please patient portal me or call the office and we will decide the next steps. If you have multiple seizures in a row and do not return back to baseline, 911 needs to be called.     You frequently get lightheaded with position changes which is likely related to dehydration, low blood pressure, adrenal insufficiency, and autonomic dysfunction. I would like you to drink lots of electrolyte drinks and exercise can help with this too. You must take plenty of time between lying and sitting and sitting to standing in order to allow your body to adjust. This can take over 10 minutes in some people. Compression stockings help some people with this too. You are anemic. Consider taking vitamin b12 and folic acid to help with this.     Return to clinic in 6 months or sooner with issues.  Please patient portal with any questions or concerns.    Lisa Roca MD PhD  Neurology-Epilepsy  [facility].         Problem List Items Addressed This Visit     Panhypopituitarism    IBS (irritable bowel syndrome)    Nonintractable epilepsy without status epilepticus - Primary    Relevant Medications    phenytoin (DILANTIN) 100 MG ER capsule    Other Relevant Orders    Phenytoin level, total and free    Peripheral polyneuropathy    Other osteoporosis without current pathological fracture    Secondary adrenal insufficiency    Secondary hypothyroidism    Factor V deficiency with DVT x 3    Glioblastoma multiforme of brain    CKD (chronic kidney disease), stage III    Falls frequently    Shortness of breath    Dizziness    Mood disorder due to medical condition    Relevant Medications    sertraline (ZOLOFT) 50 MG tablet    Orthostatic hypotension    Low body weight due to inadequate caloric intake          More than 50% of the 77 minutes spent with the patient (as well as family/caregiver(s) was spent on face-to-face counseling. Total time spent on  encounter:  97 minutes    Disclaimer: This note has been generated using voice-recognition software. There may be typographical errors that were missed during proof-reading.     LABS:  Recent Labs   Lab 05/25/21  1028 06/02/21  1008 08/06/21  1030 08/06/21  1030 09/15/21  1322 11/29/21  1119   WBC 6.54  --   --   --   --  6.92   Hemoglobin 10.6 L  --   --   --   --  10.6 L   Hematocrit 32.6 L  --   --   --   --  31.9 L   Platelets 251  --   --   --   --  341   Sodium 142   < > 142   < > 137 143   Potassium 5.2 H   < > 5.0   < > 4.6 5.1   BUN 39 H   < > 32 H   < > 26 H 23 H   Creatinine 1.79 H   < > 1.61 H   < > 1.40 H 1.42 H   eGFR if  36.5 A   < > 41.5 A  --   --  48.3 A   eGFR if non  31.7 A   < > 36.0 A   < > 43 L 41.9 A   TSH  --   --   --   --   --  <0.026 L    < > = values in this interval not displayed.       Recent Labs   Lab 08/29/19  1149   Phenytoin Lvl 16.9        IMAGING:  Recent imaging is personally reviewed with the patient.    Results for orders placed during the hospital encounter of 05/19/16    MRI Brain Without Contrast    Impression  No acute intracranial process.  Evidence of left craniotomy with focal area of postoperative encephalomalacia left posterior temporal lobe convexity.  Minor residual marginal T2 signal hyperintensity.    Minor periventricular and pontine white matter hyperintensity, usually related to small vessel ischemic degeneration.  ______________________________________    Electronically signed by: FRANSICO GRANT MD  Date:     05/19/16  Time:    11:08    Results for orders placed during the hospital encounter of 05/28/21    MRI Brain W WO Contrast    Impression  Stable postsurgical change.  There is no evidence of recurrent or residual neoplasm.      Electronically signed by: Juan Guthrie MD  Date:    05/28/2021  Time:    14:07    Results for orders placed during the hospital encounter of 06/01/20    DXA Bone Density Spine And  Hip    Impression  Osteopenia is present.  Slight increase in bone density involving the spine and femoral necks bilaterally.      Electronically signed by: Sonam Valdovinos MD  Date:    06/01/2020  Time:    15:01    PAST MEDICAL HISTORY:   Active Ambulatory Problems     Diagnosis Date Noted    Panhypopituitarism 03/25/2015    IBS (irritable bowel syndrome) 03/25/2015    Nonintractable epilepsy without status epilepticus 03/25/2015    Allergic rhinitis due to pollen 04/28/2015    Peripheral polyneuropathy 03/16/2016    Other osteoporosis without current pathological fracture 03/16/2016    Sacroiliac joint pain 04/25/2016    Myofascial muscle pain 04/25/2016    Secondary adrenal insufficiency 05/05/2016    Secondary hypothyroidism 05/05/2016    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/2016    Glioblastoma multiforme of brain 09/25/2017    Ganglion cyst of dorsum of left wrist 04/12/2018    Mohs defect 11/08/2018    Mohs defect of left scalp 11/08/2018    CKD (chronic kidney disease), stage III 03/28/2019    Positive FIT (fecal immunochemical test) 04/23/2019    Left wrist pain 06/13/2019    Ganglion cyst 01/07/2020    Hyperkalemia 08/01/2021    Falls frequently 01/14/2022    Shortness of breath 01/14/2022    Dizziness 02/15/2022    Mood disorder due to medical condition 02/15/2022    Orthostatic hypotension 02/15/2022    Low body weight due to inadequate caloric intake 02/15/2022     Resolved Ambulatory Problems     Diagnosis Date Noted    Left elbow pain 03/25/2015    Left shoulder pain 03/25/2015    Thrombophilia 03/25/2015    Open wound of right great toe 03/16/2016     Past Medical History:   Diagnosis Date    Allergic rhinitis     Anemia 1987    Arthritis 2000    Basal cell carcinoma     Broken ankle     Cancer 1985    Clotting disorder 1993    Disorder of kidney and ureter     DVT (deep venous thrombosis)     Fracture of elbow     Hypothyroidism     Osteoporosis      Seizures     Thyroid disease         PAST SURGICAL HISTORY:   Past Surgical History:   Procedure Laterality Date    APPENDECTOMY      BRAIN SURGERY      for glioblastoma      SECTION      CHOLECYSTECTOMY  2007    CLOSURE OF DEFECT OF MOHS PROCEDURE Left 2018    Procedure: CLOSURE, MOHS PROCEDURE DEFECT SCALP  Flap closure;  Surgeon: Yrn Novak MD;  [address]  Service: [address]astics;  Laterality: Left;    COLONOSCOPY N/A 2019    Procedure: COLONOSCOPY Suprep;  Surgeon: Tony Mosher MD;  Location: [facility] ENDO;  Service: Endoscopy;  Laterality: N/A;    ESOPHAGOGASTRODUODENOSCOPY N/A 2019    Procedure: EGD/colon;  Surgeon: Tony Mosher MD;  Location: [facility] ENDO;  Service: Endoscopy;  Laterality: N/A;    EXCISION OF GANGLION CYST OF HAND Left 2020    Procedure: EXCISION, GANGLION CYST, HAND;  Surgeon: Ross Drew Jr., MD;  Location: [facility] OR;  Service: Orthopedics;  Laterality: Left;    HYSTERECTOMY      TUBAL LIGATION          ALLERGIES: Neurontin [gabapentin], Lactose, and Soap   CURRENT MEDICATIONS:   Current Outpatient Medications   Medication Sig Dispense Refill    acetaminophen (TYLENOL) 325 MG tablet Take 650 mg by mouth every 6 (six) hours as needed for Pain.       alendronate (FOSAMAX) 70 MG tablet Take 1 tablet every 7 days 12 tablet 1    biotin 1 mg Cap Take by mouth.      ERGOCALCIFEROL, VITAMIN D2, (VITAMIN D ORAL) Take 1 capsule by mouth nightly.       ferrous sulfate 324 mg (65 mg iron) TbEC Take 325 mg by mouth nightly.       hydrocortisone (CORTEF) 10 MG Tab Take one and half tablets int he morning and half a tablet in the afternoon. Double the dose in case of illness. 75 tablet 6    levothyroxine (SYNTHROID) 100 MCG tablet Take 1 tablet (100 mcg total) by mouth before breakfast. 30 tablet 11    loratadine-pseudoephedrine  mg (CLARITIN-D 24-HOUR)  mg per 24 hr tablet Take 1 tablet by mouth  as needed.       mupirocin (BACTROBAN) 2 % ointment 2 (two) times daily. Apply to affected area      rivaroxaban (XARELTO) 20 mg Tab Take 1 tablet (20 mg total) by mouth once daily. 90 tablet 4    phenytoin (DILANTIN) 100 MG ER capsule TAKE 4 CAPSULES (400 MG) EVERY DAY AT BEDTIME 360 capsule 3    sertraline (ZOLOFT) 50 MG tablet Take 1 tablet (50 mg total) by mouth once daily. 30 tablet 11     No current facility-administered medications for this visit.        FAMILY HISTORY:   Family History   Problem Relation Age of Onset    Heart disease Mother          SOCIAL HISTORY:   Social History     Socioeconomic History    Marital status:    Tobacco Use    Smoking status: Never Smoker    Smokeless tobacco: Never Used   Substance and Sexual Activity    Alcohol use: No    Drug use: No    Sexual activity: Not Currently   Social History Narrative     30 years, lives at home with .      Social Determinants of Health     Financial Resource Strain: Low Risk     Difficulty of Paying Living Expenses: Not hard at all   Food Insecurity: No Food Insecurity    Worried About Running Out of Food in the Last Year: Never true    Ran Out of Food in the Last Year: Never true   Transportation Needs: Unmet Transportation Needs    Lack of Transportation (Medical): Yes    Lack of Transportation (Non-Medical): Yes   Physical Activity: Inactive    Days of Exercise per Week: 0 days    Minutes of Exercise per Session: 0 min   Stress: Stress Concern Present    Feeling of Stress : To some extent   Social Connections: Unknown    Frequency of Communication with Friends and Family: More than three times a week    Frequency of Social Gatherings with Friends and Family: Once a week    Active Member of Clubs or Organizations: Yes    Attends Club or Organization Meetings: More than 4 times per year    Marital Status:    Housing Stability: Low Risk     Unable to Pay for Housing in the Last Year: No     Number of Places Lived in the Last Year: 1    Unstable Housing in the Last Year: No         Questions and concerns raised by the patient and family/care-giver(s) were addressed and they indicated understanding of everything discussed and agreed to plans as above.    Lisa Roca MD PhD  Neurology-Epilepsy  [facility].

## 2022-02-15 NOTE — PATIENT INSTRUCTIONS
You came to Epilepsy Clinic because of your seizure disorder.  Your seizures are well controlled on dilantin 400mg at night. This medication has lots of side effects like bone breakdown and interacts with many medications. However, it has worked for you for a long time. We could try to change medications to get off the dilantin but any time you change you put yourself at risk for breakthrough seizures. I would like you to be a little stronger before we try this.     You have many complicated dietary requirements. Please look up a certified dietician to come up with goal calories and daily menus. You are deconditioned. Please come up with an exercise regimen. You should be exercising at least 4 days a week. I am a big fan of yoga, you should try this. You are having trouble enjoying things that you used to enjoy. I would like you to try a new medication called sertraline. Please take 1/2 tablet for 14 days then increase to 1 tablet daily. You can take this medication in the morning or at night, with or without food. You will have some side effects as you start this medication like GI upset, changes in appetite, changes in sleep, possibly more anxiety. Please push through, your body will adjust.  Please do a self inventory in two months to see how you are doing with the medication and give me an update through the portal at that time.     Do not miss any doses of your medication. If you do miss a dose, take it as soon as you remember even if that means doubling up on the dose. Please get a lab test to check out the blood level of your medication. Please get regular sleep. Go to sleep at the same time and wake up at the same time every day. People with epilepsy require more sleep than people without epilepsy.  Sleeping 10-12 hours a day can be normal for a person with epilepsy.  Give yourself the time you need to get enough sleep. Every seizure you have makes it harder to prevent the next seizure. Epilepsy is  associated with SUDEP, or sudden unexpected death in epilepsy.  The risk is significantly higher if your convulsive seizures are not well controlled. For more information, check out these websites: https://www.epilepsyallianceamerica.org/, www.[place]-epilepsy.org, www.womenandepilepsy.org.      Per [place] law, no episodes of loss of consciousness for 6 months before you may drive.  Please avoid dangerous situations.  For example, no baths/pools by yourself, no heights, no power tools.  Please wear a bike helmet.  If you have a single breakthrough seizure, please patient portal me or call the office and we will decide the next steps. If you have multiple seizures in a row and do not return back to baseline, 911 needs to be called.     You frequently get lightheaded with position changes which is likely related to dehydration, low blood pressure, adrenal insufficiency, and autonomic dysfunction. I would like you to drink lots of electrolyte drinks and exercise can help with this too. You must take plenty of time between lying and sitting and sitting to standing in order to allow your body to adjust. This can take over 10 minutes in some people. Compression stockings help some people with this too. You are anemic. Consider taking vitamin b12 and folic acid to help with this.     Return to clinic in 6 months or sooner with issues.  Please patient portal with any questions or concerns.    Lisa Roca MD PhD  Neurology-Epilepsy  [facility].

## 2022-03-15 ENCOUNTER — PATIENT MESSAGE (OUTPATIENT)
Dept: PULMONOLOGY | Facility: CLINIC | Age: 57
End: 2022-03-15
Payer: COMMERCIAL

## 2022-03-16 ENCOUNTER — OFFICE VISIT (OUTPATIENT)
Dept: PULMONOLOGY | Facility: CLINIC | Age: 57
End: 2022-03-16
Payer: COMMERCIAL

## 2022-03-16 VITALS
SYSTOLIC BLOOD PRESSURE: 100 MMHG | HEART RATE: 105 BPM | BODY MASS INDEX: 18.44 KG/M2 | WEIGHT: 117.5 LBS | HEIGHT: 67 IN | DIASTOLIC BLOOD PRESSURE: 60 MMHG | OXYGEN SATURATION: 97 %

## 2022-03-16 DIAGNOSIS — R07.9 ACUTE CHEST PAIN: ICD-10-CM

## 2022-03-16 DIAGNOSIS — R06.02 SOB (SHORTNESS OF BREATH): Primary | ICD-10-CM

## 2022-03-16 PROCEDURE — 1159F PR MEDICATION LIST DOCUMENTED IN MEDICAL RECORD: ICD-10-PCS | Mod: CPTII,S$GLB,, | Performed by: INTERNAL MEDICINE

## 2022-03-16 PROCEDURE — 1159F MED LIST DOCD IN RCRD: CPT | Mod: CPTII,S$GLB,, | Performed by: INTERNAL MEDICINE

## 2022-03-16 PROCEDURE — 3074F PR MOST RECENT SYSTOLIC BLOOD PRESSURE < 130 MM HG: ICD-10-PCS | Mod: CPTII,S$GLB,, | Performed by: INTERNAL MEDICINE

## 2022-03-16 PROCEDURE — 3008F PR BODY MASS INDEX (BMI) DOCUMENTED: ICD-10-PCS | Mod: CPTII,S$GLB,, | Performed by: INTERNAL MEDICINE

## 2022-03-16 PROCEDURE — 99999 PR PBB SHADOW E&M-EST. PATIENT-LVL IV: ICD-10-PCS | Mod: PBBFAC,,, | Performed by: INTERNAL MEDICINE

## 2022-03-16 PROCEDURE — 1160F RVW MEDS BY RX/DR IN RCRD: CPT | Mod: CPTII,S$GLB,, | Performed by: INTERNAL MEDICINE

## 2022-03-16 PROCEDURE — 99214 OFFICE O/P EST MOD 30 MIN: CPT | Mod: S$GLB,,, | Performed by: INTERNAL MEDICINE

## 2022-03-16 PROCEDURE — 3078F DIAST BP <80 MM HG: CPT | Mod: CPTII,S$GLB,, | Performed by: INTERNAL MEDICINE

## 2022-03-16 PROCEDURE — 1160F PR REVIEW ALL MEDS BY PRESCRIBER/CLIN PHARMACIST DOCUMENTED: ICD-10-PCS | Mod: CPTII,S$GLB,, | Performed by: INTERNAL MEDICINE

## 2022-03-16 PROCEDURE — 99214 PR OFFICE/OUTPT VISIT, EST, LEVL IV, 30-39 MIN: ICD-10-PCS | Mod: S$GLB,,, | Performed by: INTERNAL MEDICINE

## 2022-03-16 PROCEDURE — 3078F PR MOST RECENT DIASTOLIC BLOOD PRESSURE < 80 MM HG: ICD-10-PCS | Mod: CPTII,S$GLB,, | Performed by: INTERNAL MEDICINE

## 2022-03-16 PROCEDURE — 3008F BODY MASS INDEX DOCD: CPT | Mod: CPTII,S$GLB,, | Performed by: INTERNAL MEDICINE

## 2022-03-16 PROCEDURE — 3074F SYST BP LT 130 MM HG: CPT | Mod: CPTII,S$GLB,, | Performed by: INTERNAL MEDICINE

## 2022-03-16 PROCEDURE — 99999 PR PBB SHADOW E&M-EST. PATIENT-LVL IV: CPT | Mod: PBBFAC,,, | Performed by: INTERNAL MEDICINE

## 2022-03-16 NOTE — PROGRESS NOTES
Subjective:       Patient ID: Thelma Nguyen is a 56 y.o. female.    Chief Complaint: Shortness of Breath    HPI   Thelma Nguyen 56 y.o. female    has a past medical history of Allergic rhinitis, Anemia (), Arthritis (), Basal cell carcinoma, Broken ankle, Cancer (), Clotting disorder (), Disorder of kidney and ureter, DVT (deep venous thrombosis), Factor V deficiency, Fracture of elbow, Glioblastoma multiforme of brain, Hypothyroidism, IBS (irritable bowel syndrome) (), Osteoporosis, Panhypopituitarism, Peripheral polyneuropathy, Secondary adrenal insufficiency, Seizures (), and Thyroid disease ().    has a past surgical history that includes Hysterectomy (); Brain surgery (); Appendectomy;  section (); Cholecystectomy (); Tubal ligation (); Closure of defect of Mohs procedure (Left, 2018); Esophagogastroduodenoscopy (N/A, 2019); Colonoscopy (N/A, 2019); and Excision of ganglion cyst of hand (Left, 2020).   reports that she has never smoked. She has never used smokeless tobacco. She reports that she does not drink alcohol and does not use drugs.  Referred by: No ref. provider found  Who had concerns including Shortness of Breath.  The patient's last visit with me was on 2022.  LAST VISIT    Interval History  doing ok, no acute changes. Workup negative- nl echo, 6mwd, pfts except dlco is decreased to 44%  Patient has history of pe in the past.   No current findings consistent and no history consistent  No fever chills, ns, wt changes, nausea, vomiting, diarrhea, constipation, chest pain, tightness, pressure. Remainder of review of systems is negative.  Otherwise asymptomatic from pulmonary standpoint.    Review of Systems    Objective:      Physical Exam   Constitutional: She is oriented to person, place, and time. She appears well-developed and well-nourished. She appears not cachectic. No distress. She is not  obese.   HENT:   Head: Normocephalic.   Nose: Nose normal. No mucosal edema.   Mouth/Throat: Normal dentition. No oropharyngeal exudate.   Neck: No tracheal deviation present.   Cardiovascular: Normal rate, regular rhythm, normal heart sounds and intact distal pulses. Exam reveals no gallop and no friction rub.   No murmur heard.  Pulmonary/Chest: Normal expansion, symmetric chest wall expansion, effort normal and breath sounds normal. No stridor.   Abdominal: Soft. Bowel sounds are normal.   Musculoskeletal:         General: No tenderness, deformity or edema. Normal range of motion.      Cervical back: Normal range of motion and neck supple.   Lymphadenopathy: No supraclavicular adenopathy is present.     She has no cervical adenopathy.   Neurological: She is alert and oriented to person, place, and time. No cranial nerve deficit. Gait normal.   Skin: Skin is warm and dry. No rash noted. She is not diaphoretic. No cyanosis or erythema. No pallor. Nails show no clubbing.   Psychiatric: She has a normal mood and affect. Her behavior is normal. Judgment and thought content normal.   Vitals reviewed.    Personal Diagnostic Review    No flowsheet data found.      Assessment:       1. SOB (shortness of breath)    2. Acute chest pain        Outpatient Encounter Medications as of 3/16/2022   Medication Sig Dispense Refill    acetaminophen (TYLENOL) 325 MG tablet Take 650 mg by mouth every 6 (six) hours as needed for Pain.       alendronate (FOSAMAX) 70 MG tablet Take 1 tablet every 7 days 12 tablet 1    biotin 1 mg Cap Take by mouth.      ERGOCALCIFEROL, VITAMIN D2, (VITAMIN D ORAL) Take 1 capsule by mouth nightly.       ferrous sulfate 324 mg (65 mg iron) TbEC Take 325 mg by mouth nightly.       hydrocortisone (CORTEF) 10 MG Tab Take one and half tablets int he morning and half a tablet in the afternoon. Double the dose in case of illness. 75 tablet 6    levothyroxine (SYNTHROID) 100 MCG tablet Take 1 tablet (100  mcg total) by mouth before breakfast. 30 tablet 11    loratadine-pseudoephedrine  mg (CLARITIN-D 24-HOUR)  mg per 24 hr tablet Take 1 tablet by mouth as needed.       mupirocin (BACTROBAN) 2 % ointment 2 (two) times daily. Apply to affected area      phenytoin (DILANTIN) 100 MG ER capsule TAKE 4 CAPSULES (400 MG) EVERY DAY AT BEDTIME 360 capsule 3    rivaroxaban (XARELTO) 20 mg Tab Take 1 tablet (20 mg total) by mouth once daily. 90 tablet 4    sertraline (ZOLOFT) 50 MG tablet Take 1 tablet (50 mg total) by mouth once daily. 30 tablet 11     No facility-administered encounter medications on file as of 3/16/2022.     Orders Placed This Encounter   Procedures    CTA Chest Non Coronary     Standing Status:   Future     Standing Expiration Date:   3/16/2023     Order Specific Question:   Is the patient pregnant?     Answer:   No     Order Specific Question:   Is the patient allergic to iodine or contrast?     Answer:   No     Order Specific Question:   Is the patient on ANY Metformin drug such as Glucophage/Glucovance?           Should be off drug 48 hours after contrast. Check renal function before restart.     Answer:   No     Order Specific Question:   History of Kidney Disease - including: decreased kidney function, dialysis, kidney transplay, single kidney, kidney cancer, kidney surgery?     Answer:   Decreased Kidney Function     Order Specific Question:   Does the patient have high blood pressure requiring medical treatment?     Answer:   No     Order Specific Question:   Diabetes?     Answer:   No     Order Specific Question:   May the Radiologist modify the order per protocol to meet the clinical needs of the patient?     Answer:   Yes    DLCO-Carbon Monoxide Diffusing Capacity     Standing Status:   Future     Standing Expiration Date:   3/16/2023     Order Specific Question:   Release to patient     Answer:   Immediate       Plan:               Assessment:  Thelma was seen today for  shortness of breath.    Diagnoses and all orders for this visit:    SOB (shortness of breath)  -     DLCO-Carbon Monoxide Diffusing Capacity; Future    Acute chest pain  -     CTA Chest Non Coronary; Future  -     DLCO-Carbon Monoxide Diffusing Capacity; Future        Plan:  Problem List Items Addressed This Visit    None     Visit Diagnoses     SOB (shortness of breath)    -  Primary    Relevant Orders    DLCO-Carbon Monoxide Diffusing Capacity    Acute chest pain        Relevant Orders    CTA Chest Non Coronary    DLCO-Carbon Monoxide Diffusing Capacity          Repeat dlco and check cta for further evaluation    No follow-ups on file.    There are no Patient Instructions on file for this visit.    Immunization History   Administered Date(s) Administered    COVID-19 MRNA, LN-S PF (MODERNA HALF 0.25 ML DOSE) 01/11/2022    COVID-19, MRNA, LN-S, PF (MODERNA FULL 0.5 ML DOSE) 03/11/2021, 04/08/2021    Influenza - Quadrivalent 11/19/2015    Influenza - Quadrivalent - PF *Preferred* (6 months and older) 09/27/2018, 08/29/2019, 09/17/2020, 11/12/2021    Influenza A (H1N1) 2009 Monovalent - IM 11/11/2009    Pneumococcal Conjugate - 13 Valent 09/27/2018    Tdap 03/07/2019

## 2022-03-17 ENCOUNTER — HOSPITAL ENCOUNTER (OUTPATIENT)
Dept: PULMONOLOGY | Facility: CLINIC | Age: 57
Discharge: HOME | End: 2022-03-17
Payer: COMMERCIAL

## 2022-03-17 ENCOUNTER — PATIENT MESSAGE (OUTPATIENT)
Dept: PULMONOLOGY | Facility: CLINIC | Age: 57
End: 2022-03-17
Payer: COMMERCIAL

## 2022-03-17 DIAGNOSIS — R06.02 SHORTNESS OF BREATH: Primary | ICD-10-CM

## 2022-03-17 DIAGNOSIS — R07.9 ACUTE CHEST PAIN: ICD-10-CM

## 2022-03-17 DIAGNOSIS — R06.02 SOB (SHORTNESS OF BREATH): ICD-10-CM

## 2022-03-17 LAB
DLCO SINGLE BREATH LLN: 19.61
DLCO SINGLE BREATH PRE REF: 54.5 %
DLCO SINGLE BREATH REF: 25.34
DLCOC SBVA LLN: 3.3
DLCOC SBVA REF: 4.66
DLCOC SINGLE BREATH LLN: 19.61
DLCOC SINGLE BREATH REF: 25.34
DLCOCSBVAULN: 6.01
DLCOCSINGLEBREATHULN: 31.07
DLCOSINGLEBREATHULN: 31.07
DLCOVA LLN: 3.3
DLCOVA PRE REF: 69.9 %
DLCOVA PRE: 3.26 ML/(MIN*MMHG*L) (ref 3.3–6.01)
DLCOVA REF: 4.66
DLCOVAULN: 6.01
FEF 25 75 LLN: 1.42
FEF 25 75 PRE REF: 52.7 %
FEF 25 75 REF: 2.65
FEV05 LLN: 1.2
FEV05 REF: 2.06
FEV1 FVC LLN: 68
FEV1 FVC PRE REF: 91.4 %
FEV1 FVC REF: 79
FEV1 LLN: 2.23
FEV1 PRE REF: 70.4 %
FEV1 REF: 2.9
FVC LLN: 2.83
FVC PRE REF: 76.4 %
FVC REF: 3.67
IVC PRE: 2.64 L (ref 2.83–4.55)
IVC SINGLE BREATH LLN: 2.83
IVC SINGLE BREATH PRE REF: 71.9 %
IVC SINGLE BREATH REF: 3.67
IVCSINGLEBREATHULN: 4.55
PEF LLN: 5.14
PEF PRE REF: 77.4 %
PEF REF: 7.01
PHYSICIAN COMMENT: ABNORMAL
PRE DLCO: 13.82 ML/(MIN*MMHG) (ref 19.61–31.07)
PRE FEF 25 75: 1.4 L/S (ref 1.42–4.26)
PRE FET 100: 5.85 SEC
PRE FEV05 REF: 79 %
PRE FEV1 FVC: 72.64 % (ref 68.02–89.23)
PRE FEV1: 2.04 L (ref 2.23–3.55)
PRE FEV5: 1.63 L (ref 1.2–2.91)
PRE FVC: 2.81 L (ref 2.83–4.55)
PRE PEF: 5.43 L/S (ref 5.14–8.89)
VA PRE: 4.24 L (ref 5.29–5.29)
VA SINGLE BREATH PRE REF: 80.2 %
VA SINGLE BREATH REF: 5.29

## 2022-03-17 PROCEDURE — 94729 DIFFUSING CAPACITY: CPT | Mod: S$GLB,,, | Performed by: INTERNAL MEDICINE

## 2022-03-17 PROCEDURE — 94010 BREATHING CAPACITY TEST: CPT | Mod: S$GLB,,, | Performed by: INTERNAL MEDICINE

## 2022-03-17 PROCEDURE — 94729 PR C02/MEMBANE DIFFUSE CAPACITY: ICD-10-PCS | Mod: S$GLB,,, | Performed by: INTERNAL MEDICINE

## 2022-03-17 PROCEDURE — 94010 BREATHING CAPACITY TEST: ICD-10-PCS | Mod: S$GLB,,, | Performed by: INTERNAL MEDICINE

## 2022-03-22 ENCOUNTER — PATIENT MESSAGE (OUTPATIENT)
Dept: ENDOCRINOLOGY | Facility: CLINIC | Age: 57
End: 2022-03-22
Payer: COMMERCIAL

## 2022-03-23 ENCOUNTER — TELEPHONE (OUTPATIENT)
Dept: PULMONOLOGY | Facility: CLINIC | Age: 57
End: 2022-03-23
Payer: COMMERCIAL

## 2022-03-23 DIAGNOSIS — R06.02 SOB (SHORTNESS OF BREATH): Primary | ICD-10-CM

## 2022-03-23 NOTE — TELEPHONE ENCOUNTER
----- Message from Poppy Sunshine sent at 3/23/2022  9:23 AM CDT -----  Regarding: speak with nurse  Contact: hugo  [phone] please call ms. arias calling from [facility] need to speak with the nurse on above patient concerning a test patient being referred to them waiting on a call back thanks.

## 2022-03-23 NOTE — TELEPHONE ENCOUNTER
Spoke with  in regards to additional orders and testing for patient Nguyen call was transfer to  to verify.   verbalized she understands.

## 2022-03-23 NOTE — TELEPHONE ENCOUNTER
Pt rowdy herself via my chart with Dr. Anglin. Pt would like to know if she should see you or Dr. Anglin for her f/u?

## 2022-03-24 ENCOUNTER — PATIENT MESSAGE (OUTPATIENT)
Dept: ENDOCRINOLOGY | Facility: CLINIC | Age: 57
End: 2022-03-24
Payer: COMMERCIAL

## 2022-03-25 ENCOUNTER — TELEPHONE (OUTPATIENT)
Dept: NEPHROLOGY | Facility: CLINIC | Age: 57
End: 2022-03-25
Payer: COMMERCIAL

## 2022-03-28 ENCOUNTER — PATIENT OUTREACH (OUTPATIENT)
Dept: ADMINISTRATIVE | Facility: OTHER | Age: 57
End: 2022-03-28
Payer: COMMERCIAL

## 2022-03-28 NOTE — PROGRESS NOTES
Health Maintenance Due   Topic Date Due    Hepatitis C Screening  Never done    Pneumococcal Vaccines (Age 0-64) (2 of 4 - PPSV23) 11/22/2018    COVID-19 Vaccine (3 - Moderna risk 4-dose series) 02/08/2022     Updates were requested from care everywhere.  Chart was reviewed for overdue Proactive [facility] Encounters (DAVEY) topics (CRS, Breast Cancer Screening, Eye exam)  Health Maintenance has been updated.  LINKS immunization registry triggered.  Immunizations were reconciled.

## 2022-03-29 ENCOUNTER — LAB VISIT (OUTPATIENT)
Dept: LAB | Facility: HOSPITAL | Age: 57
End: 2022-03-29
Attending: INTERNAL MEDICINE
Payer: COMMERCIAL

## 2022-03-29 ENCOUNTER — OFFICE VISIT (OUTPATIENT)
Dept: NEPHROLOGY | Facility: CLINIC | Age: 57
End: 2022-03-29
Payer: COMMERCIAL

## 2022-03-29 VITALS
HEIGHT: 67 IN | HEART RATE: 89 BPM | WEIGHT: 117.5 LBS | OXYGEN SATURATION: 99 % | DIASTOLIC BLOOD PRESSURE: 68 MMHG | BODY MASS INDEX: 18.44 KG/M2 | SYSTOLIC BLOOD PRESSURE: 132 MMHG

## 2022-03-29 DIAGNOSIS — D63.1 ANEMIA IN STAGE 3B CHRONIC KIDNEY DISEASE: ICD-10-CM

## 2022-03-29 DIAGNOSIS — N18.32 STAGE 3B CHRONIC KIDNEY DISEASE: ICD-10-CM

## 2022-03-29 DIAGNOSIS — N18.32 ANEMIA IN STAGE 3B CHRONIC KIDNEY DISEASE: ICD-10-CM

## 2022-03-29 DIAGNOSIS — N18.32 STAGE 3B CHRONIC KIDNEY DISEASE: Primary | ICD-10-CM

## 2022-03-29 LAB
ERYTHROCYTE [DISTWIDTH] IN BLOOD BY AUTOMATED COUNT: 12.5 % (ref 11.5–14.5)
FERRITIN SERPL-MCNC: 448 NG/ML (ref 20–300)
HCT VFR BLD AUTO: 33.1 % (ref 37–48.5)
HGB BLD-MCNC: 10.8 G/DL (ref 12–16)
IRON SERPL-MCNC: 126 UG/DL (ref 30–160)
MCH RBC QN AUTO: 30.9 PG (ref 27–31)
MCHC RBC AUTO-ENTMCNC: 32.6 G/DL (ref 32–36)
MCV RBC AUTO: 95 FL (ref 82–98)
PLATELET # BLD AUTO: 290 K/UL (ref 150–450)
PMV BLD AUTO: 9.1 FL (ref 9.2–12.9)
RBC # BLD AUTO: 3.49 M/UL (ref 4–5.4)
SATURATED IRON: 40 % (ref 20–50)
TOTAL IRON BINDING CAPACITY: 314 UG/DL (ref 250–450)
TRANSFERRIN SERPL-MCNC: 212 MG/DL (ref 200–375)
WBC # BLD AUTO: 4.77 K/UL (ref 3.9–12.7)

## 2022-03-29 PROCEDURE — 3078F DIAST BP <80 MM HG: CPT | Mod: CPTII,S$GLB,, | Performed by: INTERNAL MEDICINE

## 2022-03-29 PROCEDURE — 85027 COMPLETE CBC AUTOMATED: CPT | Performed by: INTERNAL MEDICINE

## 2022-03-29 PROCEDURE — 3078F PR MOST RECENT DIASTOLIC BLOOD PRESSURE < 80 MM HG: ICD-10-PCS | Mod: CPTII,S$GLB,, | Performed by: INTERNAL MEDICINE

## 2022-03-29 PROCEDURE — 3075F SYST BP GE 130 - 139MM HG: CPT | Mod: CPTII,S$GLB,, | Performed by: INTERNAL MEDICINE

## 2022-03-29 PROCEDURE — 3075F PR MOST RECENT SYSTOLIC BLOOD PRESS GE 130-139MM HG: ICD-10-PCS | Mod: CPTII,S$GLB,, | Performed by: INTERNAL MEDICINE

## 2022-03-29 PROCEDURE — 99214 OFFICE O/P EST MOD 30 MIN: CPT | Mod: S$GLB,,, | Performed by: INTERNAL MEDICINE

## 2022-03-29 PROCEDURE — 3008F PR BODY MASS INDEX (BMI) DOCUMENTED: ICD-10-PCS | Mod: CPTII,S$GLB,, | Performed by: INTERNAL MEDICINE

## 2022-03-29 PROCEDURE — 36415 COLL VENOUS BLD VENIPUNCTURE: CPT | Performed by: INTERNAL MEDICINE

## 2022-03-29 PROCEDURE — 84466 ASSAY OF TRANSFERRIN: CPT | Performed by: INTERNAL MEDICINE

## 2022-03-29 PROCEDURE — 99999 PR PBB SHADOW E&M-EST. PATIENT-LVL IV: CPT | Mod: PBBFAC,,, | Performed by: INTERNAL MEDICINE

## 2022-03-29 PROCEDURE — 3066F PR DOCUMENTATION OF TREATMENT FOR NEPHROPATHY: ICD-10-PCS | Mod: CPTII,S$GLB,, | Performed by: INTERNAL MEDICINE

## 2022-03-29 PROCEDURE — 99214 PR OFFICE/OUTPT VISIT, EST, LEVL IV, 30-39 MIN: ICD-10-PCS | Mod: S$GLB,,, | Performed by: INTERNAL MEDICINE

## 2022-03-29 PROCEDURE — 1159F PR MEDICATION LIST DOCUMENTED IN MEDICAL RECORD: ICD-10-PCS | Mod: CPTII,S$GLB,, | Performed by: INTERNAL MEDICINE

## 2022-03-29 PROCEDURE — 1159F MED LIST DOCD IN RCRD: CPT | Mod: CPTII,S$GLB,, | Performed by: INTERNAL MEDICINE

## 2022-03-29 PROCEDURE — 3066F NEPHROPATHY DOC TX: CPT | Mod: CPTII,S$GLB,, | Performed by: INTERNAL MEDICINE

## 2022-03-29 PROCEDURE — 82728 ASSAY OF FERRITIN: CPT | Performed by: INTERNAL MEDICINE

## 2022-03-29 PROCEDURE — 99999 PR PBB SHADOW E&M-EST. PATIENT-LVL IV: ICD-10-PCS | Mod: PBBFAC,,, | Performed by: INTERNAL MEDICINE

## 2022-03-29 PROCEDURE — 3008F BODY MASS INDEX DOCD: CPT | Mod: CPTII,S$GLB,, | Performed by: INTERNAL MEDICINE

## 2022-03-31 ENCOUNTER — HOSPITAL ENCOUNTER (OUTPATIENT)
Dept: RADIOLOGY | Facility: HOSPITAL | Age: 57
Discharge: HOME | End: 2022-03-31
Attending: STUDENT IN AN ORGANIZED HEALTH CARE EDUCATION/TRAINING PROGRAM
Payer: COMMERCIAL

## 2022-03-31 ENCOUNTER — OFFICE VISIT (OUTPATIENT)
Dept: PODIATRY | Facility: CLINIC | Age: 57
End: 2022-03-31
Payer: COMMERCIAL

## 2022-03-31 VITALS — BODY MASS INDEX: 18.4 KG/M2 | HEIGHT: 67 IN

## 2022-03-31 DIAGNOSIS — L84 CORN OR CALLUS: Primary | ICD-10-CM

## 2022-03-31 DIAGNOSIS — M79.674 PAIN OF TOE OF RIGHT FOOT: ICD-10-CM

## 2022-03-31 DIAGNOSIS — L84 CORN OR CALLUS: ICD-10-CM

## 2022-03-31 PROCEDURE — 3008F PR BODY MASS INDEX (BMI) DOCUMENTED: ICD-10-PCS | Mod: CPTII,S$GLB,, | Performed by: STUDENT IN AN ORGANIZED HEALTH CARE EDUCATION/TRAINING PROGRAM

## 2022-03-31 PROCEDURE — 3066F NEPHROPATHY DOC TX: CPT | Mod: CPTII,S$GLB,, | Performed by: STUDENT IN AN ORGANIZED HEALTH CARE EDUCATION/TRAINING PROGRAM

## 2022-03-31 PROCEDURE — 1159F PR MEDICATION LIST DOCUMENTED IN MEDICAL RECORD: ICD-10-PCS | Mod: CPTII,S$GLB,, | Performed by: STUDENT IN AN ORGANIZED HEALTH CARE EDUCATION/TRAINING PROGRAM

## 2022-03-31 PROCEDURE — 99203 PR OFFICE/OUTPT VISIT, NEW, LEVL III, 30-44 MIN: ICD-10-PCS | Mod: 25,S$GLB,, | Performed by: STUDENT IN AN ORGANIZED HEALTH CARE EDUCATION/TRAINING PROGRAM

## 2022-03-31 PROCEDURE — 99203 OFFICE O/P NEW LOW 30 MIN: CPT | Mod: 25,S$GLB,, | Performed by: STUDENT IN AN ORGANIZED HEALTH CARE EDUCATION/TRAINING PROGRAM

## 2022-03-31 PROCEDURE — 3066F PR DOCUMENTATION OF TREATMENT FOR NEPHROPATHY: ICD-10-PCS | Mod: CPTII,S$GLB,, | Performed by: STUDENT IN AN ORGANIZED HEALTH CARE EDUCATION/TRAINING PROGRAM

## 2022-03-31 PROCEDURE — 3008F BODY MASS INDEX DOCD: CPT | Mod: CPTII,S$GLB,, | Performed by: STUDENT IN AN ORGANIZED HEALTH CARE EDUCATION/TRAINING PROGRAM

## 2022-03-31 PROCEDURE — 1159F MED LIST DOCD IN RCRD: CPT | Mod: CPTII,S$GLB,, | Performed by: STUDENT IN AN ORGANIZED HEALTH CARE EDUCATION/TRAINING PROGRAM

## 2022-03-31 PROCEDURE — 99999 PR PBB SHADOW E&M-EST. PATIENT-LVL III: CPT | Mod: PBBFAC,,, | Performed by: STUDENT IN AN ORGANIZED HEALTH CARE EDUCATION/TRAINING PROGRAM

## 2022-03-31 PROCEDURE — 99999 PR PBB SHADOW E&M-EST. PATIENT-LVL III: ICD-10-PCS | Mod: PBBFAC,,, | Performed by: STUDENT IN AN ORGANIZED HEALTH CARE EDUCATION/TRAINING PROGRAM

## 2022-03-31 PROCEDURE — 73630 X-RAY EXAM OF FOOT: CPT | Mod: TC,FY,PO,RT

## 2022-03-31 NOTE — PROCEDURES
Wound Debridement    Date/Time: 3/31/2022 10:30 AM  Performed by: Christine Boyd DPM  Authorized by: Christine Boyd DPM     Consent Done?:  Yes (Verbal)  Local anesthesia used?: No      Wound Details:    Location:  Right foot    Location:  Right 5th Toe    Type of Debridement:  Non-excisional       Length (cm):  0       Area (sq cm):  0       Width (cm):  0       Percent Debrided (%):  100       Depth (cm):  0       Total Area Debrided (sq cm):  0    Depth of debridement:  Epidermis/Dermis    Tissue debrided:  Epidermis    Devitalized tissue debrided:  Callus    Instruments:  Curette    Bleeding:  None  Patient tolerance:  Patient tolerated the procedure well with no immediate complications     No cultures were taken during this visit

## 2022-03-31 NOTE — PROGRESS NOTES
Subjective:      Patient ID: Thelma Nguyen is a 56 y.o. female.    Chief Complaint: Foot Problem (Right ft., 5th toe is giving her a problem, has neuropathy, no xray, 5th toe is discolored a little bit), Toe Pain (5th toe right ft., ), and Ankle Injury (6 years ago she fractured it in 3 places)    Thelma is a 56 y.o. female who presents to the podiatry clinic  with complaint of  right foot pain. Relates to pain in between right 4th and 5th digit.Onset of the symptoms was several weeks ago. Precipitating event: none known and patient relates she is sits on her legs while working and has sores occasionally on her ankles. Kurt has a new scab on her right ankle, denies infection or drainage. . Current symptoms include: occasaionl pain to right 4th interdigital space. Aggravating factors: any weight bearing. Symptoms have stabilized. Patient has had prior foot problems. History of right ankle fracture. Evaluation to date: none. Treatment to date: none. Patients rates pain  mild/moderate on pain scale.        Review of Systems   Constitutional: Negative for chills, decreased appetite, diaphoresis and fever.   HENT: Negative for congestion and hearing loss.    Cardiovascular: Negative for chest pain, claudication, leg swelling and syncope.   Respiratory: Negative for cough and shortness of breath.    Skin: Positive for dry skin and nail changes. Negative for color change, flushing, itching, poor wound healing and rash.   Musculoskeletal: Negative for back pain and joint swelling.   Gastrointestinal: Negative for nausea and vomiting.   Neurological: Positive for numbness. Negative for focal weakness, paresthesias and weakness.   Psychiatric/Behavioral: Negative for altered mental status. The patient is not nervous/anxious.            Objective:      Physical Exam  Constitutional:       General: She is not in acute distress.     Appearance: She is well-developed. She is not diaphoretic.   Cardiovascular:       Comments: Dorsalis pedis and posterior tibial pulses are within normal limits. Skin temperature is within normal limits. Toes are cool to touch and feet are warm proximally. Hair growth is within normal limits. Skin is normotrophic and without hyperpigmentation. No edema noted. No spider veins or varicosities noted, bilaterally.     Musculoskeletal:         General: Tenderness present.      Comments: Adequate joint range of motion without pain, limitation, nor crepitation to bilateral feet and ankle joints.   Lymphadenopathy:      Comments: Negative lymphangitic streaking    Skin:     General: Skin is warm and dry.      Findings: No lesion.      Comments: Skin is warm and dry, no acute signs of infection noted. No open wounds, macerations or hyperkeratotic lesions, bilaterally.     Toenails are well trimmed and of normal morphology, bilaterally.     Hyperkeratosis noted to 4th interdigital space of left foot. Upon debridement, with no open wound or signs of infection    Neurological:      Mental Status: She is alert and oriented to person, place, and time.      Sensory: Sensory deficit present.      Motor: No abnormal muscle tone.      Comments: Light touch is diminished. [place] 5.07 monofilamant testing is diminished. Vibratory sensation  is diminished, bilaterally    Psychiatric:         Behavior: Behavior normal.         Thought Content: Thought content normal.         Judgment: Judgment normal.               Assessment:       Encounter Diagnoses   Name Primary?    Corn or callus Yes    Pain of toe of right foot          Plan:       Thelma was seen today for foot problem, toe pain and ankle injury.    Diagnoses and all orders for this visit:    Corn or callus  -     X-Ray Foot Complete Right; Future  -     Wound Debridement    Pain of toe of right foot  -     Wound Debridement      I counseled the patient on her conditions, their implications and medical management.    -Xray ordered.   -Debridement  of hyperkeratotic lesion performed, no open wounds or signs of infection. Recommend dressing site with neosporin and bandage 2x per day. Recommend follow up with Dermatology for consideration of skin biopsy of lesion.   -Continue to monitor scab to right ankle, no drainage or signs of infection today, RTC if does not improve or worsens in appearance.   -Recommend open toed shoes or shoes with wide toe box to reduce pressure  -RICE, OTC NSAIDs PRN pain    RTC PRN

## 2022-05-20 ENCOUNTER — TELEPHONE (OUTPATIENT)
Dept: AUDIOLOGY | Facility: CLINIC | Age: 57
End: 2022-05-20
Payer: COMMERCIAL

## 2022-05-30 ENCOUNTER — OFFICE VISIT (OUTPATIENT)
Dept: FAMILY MEDICINE | Facility: CLINIC | Age: 57
End: 2022-05-30
Payer: COMMERCIAL

## 2022-05-30 ENCOUNTER — PATIENT MESSAGE (OUTPATIENT)
Dept: NEUROLOGY | Facility: CLINIC | Age: 57
End: 2022-05-30
Payer: COMMERCIAL

## 2022-05-30 VITALS
OXYGEN SATURATION: 98 % | BODY MASS INDEX: 17.54 KG/M2 | WEIGHT: 111.75 LBS | DIASTOLIC BLOOD PRESSURE: 76 MMHG | HEART RATE: 92 BPM | TEMPERATURE: 98 F | SYSTOLIC BLOOD PRESSURE: 122 MMHG | HEIGHT: 67 IN

## 2022-05-30 DIAGNOSIS — F41.9 ANXIETY: ICD-10-CM

## 2022-05-30 DIAGNOSIS — R53.83 FATIGUE, UNSPECIFIED TYPE: ICD-10-CM

## 2022-05-30 DIAGNOSIS — Z12.39 ENCOUNTER FOR SCREENING FOR MALIGNANT NEOPLASM OF BREAST, UNSPECIFIED SCREENING MODALITY: Primary | ICD-10-CM

## 2022-05-30 DIAGNOSIS — M54.30 SCIATICA, UNSPECIFIED LATERALITY: ICD-10-CM

## 2022-05-30 PROCEDURE — 1159F PR MEDICATION LIST DOCUMENTED IN MEDICAL RECORD: ICD-10-PCS | Mod: CPTII,S$GLB,, | Performed by: STUDENT IN AN ORGANIZED HEALTH CARE EDUCATION/TRAINING PROGRAM

## 2022-05-30 PROCEDURE — 3008F BODY MASS INDEX DOCD: CPT | Mod: CPTII,S$GLB,, | Performed by: STUDENT IN AN ORGANIZED HEALTH CARE EDUCATION/TRAINING PROGRAM

## 2022-05-30 PROCEDURE — 99214 PR OFFICE/OUTPT VISIT, EST, LEVL IV, 30-39 MIN: ICD-10-PCS | Mod: 25,S$GLB,, | Performed by: STUDENT IN AN ORGANIZED HEALTH CARE EDUCATION/TRAINING PROGRAM

## 2022-05-30 PROCEDURE — 1160F RVW MEDS BY RX/DR IN RCRD: CPT | Mod: CPTII,S$GLB,, | Performed by: STUDENT IN AN ORGANIZED HEALTH CARE EDUCATION/TRAINING PROGRAM

## 2022-05-30 PROCEDURE — 1159F MED LIST DOCD IN RCRD: CPT | Mod: CPTII,S$GLB,, | Performed by: STUDENT IN AN ORGANIZED HEALTH CARE EDUCATION/TRAINING PROGRAM

## 2022-05-30 PROCEDURE — 90471 PNEUMOCOCCAL POLYSACCHARIDE VACCINE 23-VALENT =>2YO SQ IM: ICD-10-PCS | Mod: S$GLB,,, | Performed by: STUDENT IN AN ORGANIZED HEALTH CARE EDUCATION/TRAINING PROGRAM

## 2022-05-30 PROCEDURE — 3066F NEPHROPATHY DOC TX: CPT | Mod: CPTII,S$GLB,, | Performed by: STUDENT IN AN ORGANIZED HEALTH CARE EDUCATION/TRAINING PROGRAM

## 2022-05-30 PROCEDURE — 90471 IMMUNIZATION ADMIN: CPT | Mod: S$GLB,,, | Performed by: STUDENT IN AN ORGANIZED HEALTH CARE EDUCATION/TRAINING PROGRAM

## 2022-05-30 PROCEDURE — 90732 PNEUMOCOCCAL POLYSACCHARIDE VACCINE 23-VALENT =>2YO SQ IM: ICD-10-PCS | Mod: S$GLB,,, | Performed by: STUDENT IN AN ORGANIZED HEALTH CARE EDUCATION/TRAINING PROGRAM

## 2022-05-30 PROCEDURE — 1160F PR REVIEW ALL MEDS BY PRESCRIBER/CLIN PHARMACIST DOCUMENTED: ICD-10-PCS | Mod: CPTII,S$GLB,, | Performed by: STUDENT IN AN ORGANIZED HEALTH CARE EDUCATION/TRAINING PROGRAM

## 2022-05-30 PROCEDURE — 3008F PR BODY MASS INDEX (BMI) DOCUMENTED: ICD-10-PCS | Mod: CPTII,S$GLB,, | Performed by: STUDENT IN AN ORGANIZED HEALTH CARE EDUCATION/TRAINING PROGRAM

## 2022-05-30 PROCEDURE — 3074F PR MOST RECENT SYSTOLIC BLOOD PRESSURE < 130 MM HG: ICD-10-PCS | Mod: CPTII,S$GLB,, | Performed by: STUDENT IN AN ORGANIZED HEALTH CARE EDUCATION/TRAINING PROGRAM

## 2022-05-30 PROCEDURE — 99214 OFFICE O/P EST MOD 30 MIN: CPT | Mod: 25,S$GLB,, | Performed by: STUDENT IN AN ORGANIZED HEALTH CARE EDUCATION/TRAINING PROGRAM

## 2022-05-30 PROCEDURE — 3074F SYST BP LT 130 MM HG: CPT | Mod: CPTII,S$GLB,, | Performed by: STUDENT IN AN ORGANIZED HEALTH CARE EDUCATION/TRAINING PROGRAM

## 2022-05-30 PROCEDURE — 3078F PR MOST RECENT DIASTOLIC BLOOD PRESSURE < 80 MM HG: ICD-10-PCS | Mod: CPTII,S$GLB,, | Performed by: STUDENT IN AN ORGANIZED HEALTH CARE EDUCATION/TRAINING PROGRAM

## 2022-05-30 PROCEDURE — 3066F PR DOCUMENTATION OF TREATMENT FOR NEPHROPATHY: ICD-10-PCS | Mod: CPTII,S$GLB,, | Performed by: STUDENT IN AN ORGANIZED HEALTH CARE EDUCATION/TRAINING PROGRAM

## 2022-05-30 PROCEDURE — 3078F DIAST BP <80 MM HG: CPT | Mod: CPTII,S$GLB,, | Performed by: STUDENT IN AN ORGANIZED HEALTH CARE EDUCATION/TRAINING PROGRAM

## 2022-05-30 PROCEDURE — 90732 PPSV23 VACC 2 YRS+ SUBQ/IM: CPT | Mod: S$GLB,,, | Performed by: STUDENT IN AN ORGANIZED HEALTH CARE EDUCATION/TRAINING PROGRAM

## 2022-05-30 NOTE — PROGRESS NOTES
Patient ID: Thelma Nguyen is a 56 y.o. female.     Chief Complaint: Follow-up (6 month)    HPI   epilepsy - denies recent seizures    Fatigue- has ongoing fatigue. Does not have much energy during the day.     Sciatica- has a history of sciatica. Reports similar pain. Is doing exercises at home.     Patient would also like a referral to psychology. She states her  recently starting seeing a psychologist and due to things that came up in his visits, she now feels like she needs to be seen by a therapist.       Review of Systems  Review of Systems   Constitutional: Negative for fever.   HENT: Negative for ear pain and sinus pain.    Eyes: Negative for discharge.   Respiratory: Negative for cough and shortness of breath.    Cardiovascular: Negative for chest pain and leg swelling.   Gastrointestinal: Negative for diarrhea, nausea and vomiting.   Genitourinary: Negative for urgency.   Musculoskeletal: Negative for myalgias.   Skin: Negative for rash.   Neurological: Negative for weakness and headaches.   Psychiatric/Behavioral: Negative for depression.   All other systems reviewed and are negative.      Currently Medications  Current Outpatient Medications on File Prior to Visit   Medication Sig Dispense Refill    acetaminophen (TYLENOL) 325 MG tablet Take 650 mg by mouth every 6 (six) hours as needed for Pain.       biotin 1 mg Cap Take by mouth.      ERGOCALCIFEROL, VITAMIN D2, (VITAMIN D ORAL) Take 1 capsule by mouth nightly.       ferrous sulfate 324 mg (65 mg iron) TbEC Take 325 mg by mouth nightly.       hydrocortisone (CORTEF) 10 MG Tab Take one and half tablets int he morning and half a tablet in the afternoon. Double the dose in case of illness. 75 tablet 6    levothyroxine (SYNTHROID) 100 MCG tablet TAKE 1 TABLET (100 MCG TOTAL) BY MOUTH BEFORE BREAKFAST. 90 tablet 1    loratadine-pseudoephedrine  mg (CLARITIN-D 24-HOUR)  mg per 24 hr tablet Take 1 tablet by mouth as  "needed.       mupirocin (BACTROBAN) 2 % ointment 2 (two) times daily. Apply to affected area      phenytoin (DILANTIN) 100 MG ER capsule TAKE 4 CAPSULES (400 MG) EVERY DAY AT BEDTIME 360 capsule 3    rivaroxaban (XARELTO) 20 mg Tab Take 1 tablet (20 mg total) by mouth once daily. 90 tablet 4    sertraline (ZOLOFT) 50 MG tablet Take 1 tablet (50 mg total) by mouth once daily. 30 tablet 11    [DISCONTINUED] alendronate (FOSAMAX) 70 MG tablet Take 1 tablet every 7 days 12 tablet 1    alendronate (FOSAMAX) 70 MG tablet TAKE 1 TABLET BY MOUTH EVERY 7 DAYS 12 tablet 1     No current facility-administered medications on file prior to visit.       Physical  Exam  Vitals:    05/30/22 0852   BP: 122/76   BP Location: Right arm   Patient Position: Sitting   Pulse: 92   Temp: 97.8 °F (36.6 °C)   SpO2: 98%   Weight: 50.7 kg (111 lb 12.4 oz)   Height: 5' 7" (1.702 m)      Body mass index is 17.51 kg/m².    Physical Exam  Vitals and nursing note reviewed.   Constitutional:       General: She is not in acute distress.     Appearance: She is not ill-appearing.   HENT:      Head: Normocephalic and atraumatic.      Right Ear: External ear normal.      Left Ear: External ear normal.      Nose: Nose normal.      Mouth/Throat:      Mouth: Mucous membranes are moist.   Eyes:      Extraocular Movements: Extraocular movements intact.      Conjunctiva/sclera: Conjunctivae normal.   Cardiovascular:      Rate and Rhythm: Normal rate and regular rhythm.      Pulses: Normal pulses.      Heart sounds: No murmur heard.  Pulmonary:      Effort: Pulmonary effort is normal. No respiratory distress.      Breath sounds: No wheezing.   Abdominal:      General: There is no distension.      Palpations: Abdomen is soft. There is no mass.      Tenderness: There is no abdominal tenderness.   Musculoskeletal:         General: No swelling.      Cervical back: Normal range of motion.   Skin:     Coloration: Skin is not jaundiced.      Findings: No rash. "   Neurological:      General: No focal deficit present.      Mental Status: She is alert and oriented to person, place, and time.   Psychiatric:         Mood and Affect: Mood normal.         Thought Content: Thought content normal.         Labs:    Complete Blood Count  Lab Results   Component Value Date    RBC 3.49 (L) 03/29/2022    HGB 10.8 (L) 03/29/2022    HCT 33.1 (L) 03/29/2022    MCV 95 03/29/2022    MCH 30.9 03/29/2022    MCHC 32.6 03/29/2022    RDW 12.5 03/29/2022     03/29/2022    MPV 9.1 (L) 03/29/2022    GRAN 5.1 11/29/2021    GRAN 73.8 (H) 11/29/2021    LYMPH 1.4 11/29/2021    LYMPH 20.5 11/29/2021    MONO 0.3 11/29/2021    MONO 4.0 11/29/2021    EOS 0.1 11/29/2021    BASO 0.04 11/29/2021    EOSINOPHIL 0.7 11/29/2021    BASOPHIL 0.6 11/29/2021    DIFFMETHOD Automated 11/29/2021       Comprehensive Metabolic Panel  Lab Results   Component Value Date     03/28/2022    BUN 29 (H) 03/28/2022    CREATININE 1.51 (H) 03/28/2022     03/28/2022    K 4.9 03/28/2022     03/28/2022    PROT 7.4 11/29/2021    ALBUMIN 3.9 03/28/2022    BILITOT 0.4 11/29/2021    AST 21 11/29/2021    ALKPHOS 89 11/29/2021    CO2 23 03/28/2022    ALT 14 11/29/2021    ANIONGAP 10 03/28/2022    EGFRNONAA 38.6 (A) 03/28/2022    ESTGFRAFRICA 44.5 (A) 03/28/2022       TSH  Lab Results   Component Value Date    TSH <0.026 (L) 11/29/2021       Imaging:  X-Ray Foot Complete Right  Narrative: EXAMINATION:  XR FOOT COMPLETE 3 VIEW RIGHT    CLINICAL HISTORY:  weightbearing;.  Right foot pain.    COMPARISON:  None    FINDINGS:  No acute fracture or dislocation.  No osseous lesion.  No significant degenerative changes.Soft tissues are normal.  Impression: Negative for acute fracture or dislocation.    Electronically signed by: Carlos Carr  Date:    03/31/2022  Time:    11:54      Assessment/Plan:    Problem List Items Addressed This Visit    None     Visit Diagnoses     Encounter for screening for malignant neoplasm  of breast, unspecified screening modality    -  Primary    Relevant Orders    Mammo Digital Screening Bilat w/ Bonilla    Anxiety        Relevant Orders    Ambulatory referral/consult to Psychology    Sciatica, unspecified laterality        Fatigue, unspecified type         discussed sleep study with patient. she is not intersted at this time.            Discussed how to stay healthy including: diet, exercise, refraining from smoking and discussed screening exams / tests needed for age, sex and family Hx.    RTC 1 year    Chandra Mariano MD

## 2022-05-31 ENCOUNTER — PATIENT MESSAGE (OUTPATIENT)
Dept: FAMILY MEDICINE | Facility: CLINIC | Age: 57
End: 2022-05-31
Payer: COMMERCIAL

## 2022-06-10 ENCOUNTER — DOCUMENTATION ONLY (OUTPATIENT)
Dept: AUDIOLOGY | Facility: CLINIC | Age: 57
End: 2022-06-10
Payer: COMMERCIAL

## 2022-06-10 NOTE — PROGRESS NOTES
Mrs. Shaw came by the hearing aid desk with her son and mentioned that her hearing aid  is only lasting until around 5 PM. She reported she usually puts the hearing aid in around 8:30 AM, and she doesn't stream excessively.    She is scheduled for her annual hearing aid appointment in a couple of weeks.. I let her know that the device likely needs to go off for an in warranty repair. I suggested she plug her hearing aid in for 30 minutes during the day to extend battery life until the device is repaired. I mentioned that we can offer her a loaner if she feels she needs one.

## 2022-06-14 ENCOUNTER — PATIENT MESSAGE (OUTPATIENT)
Dept: NEUROLOGY | Facility: CLINIC | Age: 57
End: 2022-06-14
Payer: COMMERCIAL

## 2022-06-14 DIAGNOSIS — G40.209 PARTIAL SYMPTOMATIC EPILEPSY WITH COMPLEX PARTIAL SEIZURES, NOT INTRACTABLE, WITHOUT STATUS EPILEPTICUS: Primary | ICD-10-CM

## 2022-06-14 DIAGNOSIS — R42 DIZZINESS: ICD-10-CM

## 2022-06-14 DIAGNOSIS — R29.6 FALLS FREQUENTLY: ICD-10-CM

## 2022-06-15 ENCOUNTER — PATIENT MESSAGE (OUTPATIENT)
Dept: NEUROLOGY | Facility: CLINIC | Age: 57
End: 2022-06-15
Payer: COMMERCIAL

## 2022-06-15 ENCOUNTER — PATIENT MESSAGE (OUTPATIENT)
Dept: ELECTROPHYSIOLOGY | Facility: CLINIC | Age: 57
End: 2022-06-15
Payer: COMMERCIAL

## 2022-06-15 NOTE — TELEPHONE ENCOUNTER
Frequent collapses    Tilt-table with EEG  Phenytoin level  Will schedule follow-up in clinic    Lisa Roca MD PhD  Neurology-Epilepsy  [facility].

## 2022-06-21 ENCOUNTER — PATIENT MESSAGE (OUTPATIENT)
Dept: NEUROLOGY | Facility: HOSPITAL | Age: 57
End: 2022-06-21
Payer: COMMERCIAL

## 2022-06-21 NOTE — TELEPHONE ENCOUNTER
Recent Labs   Lab 08/29/19  1149 02/16/22  1158 06/16/22  0814   Phenytoin Lvl 16.9  --   --    Phenytoin, Free  --  2.5 HH 2.5 HH   Phenytoin, Total  --  29.6 H 20.5 H      Called Ms. Nguyen.  We discussed her lab values.  Free phenytoin is still 2.5. She is still lightheaded and dizzy and having troubles with ambulation.  Currently, she is taking phenytoin 300 mg extended release every night.  I asked her to decrease to 200 mg nightly.  She will patient portal with any issues.    Lisa Roca MD PhD  Neurology-Epilepsy  [facility].

## 2022-06-30 ENCOUNTER — PATIENT MESSAGE (OUTPATIENT)
Dept: NEUROLOGY | Facility: CLINIC | Age: 57
End: 2022-06-30
Payer: COMMERCIAL

## 2022-06-30 DIAGNOSIS — R29.6 FALLS FREQUENTLY: ICD-10-CM

## 2022-06-30 DIAGNOSIS — R42 DIZZINESS: Primary | ICD-10-CM

## 2022-06-30 DIAGNOSIS — G40.209 PARTIAL SYMPTOMATIC EPILEPSY WITH COMPLEX PARTIAL SEIZURES, NOT INTRACTABLE, WITHOUT STATUS EPILEPTICUS: ICD-10-CM

## 2022-06-30 NOTE — TELEPHONE ENCOUNTER
Phenytoin level, free and total    Lisa Roca MD PhD  Neurology-Epilepsy  [facility].

## 2022-07-05 ENCOUNTER — PATIENT MESSAGE (OUTPATIENT)
Dept: NEUROLOGY | Facility: CLINIC | Age: 57
End: 2022-07-05
Payer: COMMERCIAL

## 2022-07-05 NOTE — PROGRESS NOTES
HEARING AID FOLLOW-UP    Thelma Nguyen was seen today for a hearing aid follow-up and annual audiogram.    New audiogram was input into [facility] and hearing aids were adjusted accordingly.  Patient reported that her small dome falls off often so we placed medium open domes on both ears.  Feedback measures were taken and increased gain in the left ear to match target.  Mrs. Nguyen reported that she could hear well.    She noted that her right hearing aid only lasts about 8 hours a day and has to go charge it. Sending in right hearing aid for in-warranty repair due to excess battery drain.  Mrs. Nguyen is scheduled for July 29 to be fit with repair.    She also requested a bag of medium open domes to have at home.      Hearing Aid Details    ReSound Quattro 9   Lt SN [id number]   Rt SN [id number]   : 2MP   Dome: Medium Open   Warranty Exp 2/23/23      SN [id number]

## 2022-07-06 ENCOUNTER — CLINICAL SUPPORT (OUTPATIENT)
Dept: AUDIOLOGY | Facility: CLINIC | Age: 57
End: 2022-07-06
Payer: COMMERCIAL

## 2022-07-06 DIAGNOSIS — H90.3 SENSORINEURAL HEARING LOSS, BILATERAL: Primary | ICD-10-CM

## 2022-07-06 PROCEDURE — 92557 PR COMPREHENSIVE HEARING TEST: ICD-10-PCS | Mod: S$GLB,,, | Performed by: AUDIOLOGIST

## 2022-07-06 PROCEDURE — 92567 PR TYMPA2METRY: ICD-10-PCS | Mod: S$GLB,,, | Performed by: AUDIOLOGIST

## 2022-07-06 PROCEDURE — 92567 TYMPANOMETRY: CPT | Mod: S$GLB,,, | Performed by: AUDIOLOGIST

## 2022-07-06 PROCEDURE — 99999 PR PBB SHADOW E&M-EST. PATIENT-LVL I: ICD-10-PCS | Mod: PBBFAC,,, | Performed by: AUDIOLOGIST

## 2022-07-06 PROCEDURE — 92557 COMPREHENSIVE HEARING TEST: CPT | Mod: S$GLB,,, | Performed by: AUDIOLOGIST

## 2022-07-06 PROCEDURE — 99499 UNLISTED E&M SERVICE: CPT | Mod: S$GLB,,, | Performed by: AUDIOLOGIST

## 2022-07-06 PROCEDURE — 99999 PR PBB SHADOW E&M-EST. PATIENT-LVL I: CPT | Mod: PBBFAC,,, | Performed by: AUDIOLOGIST

## 2022-07-06 PROCEDURE — 99499 NO LOS: ICD-10-PCS | Mod: S$GLB,,, | Performed by: AUDIOLOGIST

## 2022-07-06 NOTE — PROGRESS NOTES
Thelma Nguyen was seen today in the clinic for an audiologic evaluation and hearing aid follow-up.    Tympanometry revealed Type A in the right ear and Type A in the left ear.     Audiogram results revealed a mild to moderate sensorineural hearing loss (SNHL) in the right and left ear.      Speech reception thresholds were noted at 30 dB in the right ear and 20 dB in the left ear.    Speech discrimination scores were 92% in the right ear and 100% in the left ear.    Recommendations:  1. Otologic evaluation  2. Annual audiogram  3. Hearing protection when in noise  4. Daily use of amplification with hearing aid follow-ups as needed

## 2022-07-11 ENCOUNTER — PATIENT MESSAGE (OUTPATIENT)
Dept: NEUROLOGY | Facility: CLINIC | Age: 57
End: 2022-07-11
Payer: COMMERCIAL

## 2022-07-11 DIAGNOSIS — R29.6 FALLS FREQUENTLY: Primary | ICD-10-CM

## 2022-07-11 DIAGNOSIS — G40.209 PARTIAL SYMPTOMATIC EPILEPSY WITH COMPLEX PARTIAL SEIZURES, NOT INTRACTABLE, WITHOUT STATUS EPILEPTICUS: ICD-10-CM

## 2022-07-11 DIAGNOSIS — R42 DIZZINESS: ICD-10-CM

## 2022-07-12 ENCOUNTER — PATIENT MESSAGE (OUTPATIENT)
Dept: NEUROLOGY | Facility: CLINIC | Age: 57
End: 2022-07-12
Payer: COMMERCIAL

## 2022-07-12 NOTE — TELEPHONE ENCOUNTER
Recent Labs   Lab 08/29/19  1149 02/16/22  1158 06/16/22  0814 07/01/22  1542   Phenytoin Lvl 16.9  --   --   --    Phenytoin, Free  --  2.5 HH 2.5 HH 1.1   Phenytoin, Total  --  29.6 H 20.5 H 9.7 L      Multiple additional falls mostly with changes in positions  Free and total phenytoin  Tilt-table planned for Friday    Lisa Roca MD PhD  Neurology-Epilepsy  [facility].

## 2022-07-14 ENCOUNTER — TELEPHONE (OUTPATIENT)
Dept: ELECTROPHYSIOLOGY | Facility: CLINIC | Age: 57
End: 2022-07-14
Payer: COMMERCIAL

## 2022-07-14 NOTE — TELEPHONE ENCOUNTER
Spoke to Thelma Nguyen    CONFIRMED procedure arrival time of 12:30pm tomorrow July 15, 2022    Reiterated instructions including:  -Directions to check in desk 3rd floor   -NPO 4 hours prior to procedure  -May take am medications with sips of water  -Must have ride home  -Plan for at least 3 hours for test.     Patient verbalizes understanding of above and appreciates call.

## 2022-07-15 ENCOUNTER — HOSPITAL ENCOUNTER (OUTPATIENT)
Dept: NEUROLOGY | Facility: CLINIC | Age: 57
Discharge: HOME | End: 2022-07-15
Payer: COMMERCIAL

## 2022-07-15 ENCOUNTER — HOSPITAL ENCOUNTER (OUTPATIENT)
Facility: HOSPITAL | Age: 57
Discharge: HOME | End: 2022-07-15
Attending: INTERNAL MEDICINE | Admitting: INTERNAL MEDICINE
Payer: COMMERCIAL

## 2022-07-15 ENCOUNTER — PATIENT MESSAGE (OUTPATIENT)
Dept: NEUROLOGY | Facility: CLINIC | Age: 57
End: 2022-07-15
Payer: COMMERCIAL

## 2022-07-15 VITALS — SYSTOLIC BLOOD PRESSURE: 112 MMHG | DIASTOLIC BLOOD PRESSURE: 56 MMHG | HEART RATE: 86 BPM

## 2022-07-15 DIAGNOSIS — R42 DIZZINESS: ICD-10-CM

## 2022-07-15 DIAGNOSIS — I95.1 ORTHOSTATIC HYPOTENSION: ICD-10-CM

## 2022-07-15 DIAGNOSIS — G40.209 PARTIAL SYMPTOMATIC EPILEPSY WITH COMPLEX PARTIAL SEIZURES, NOT INTRACTABLE, WITHOUT STATUS EPILEPTICUS: ICD-10-CM

## 2022-07-15 DIAGNOSIS — R29.6 FALLS FREQUENTLY: ICD-10-CM

## 2022-07-15 PROCEDURE — 93660 TILT TABLE EVALUATION: CPT | Mod: 26,,, | Performed by: INTERNAL MEDICINE

## 2022-07-15 PROCEDURE — 95813 PR EEG, EXTENDED, 61-119 MINS: ICD-10-PCS | Mod: 26,,, | Performed by: PSYCHIATRY & NEUROLOGY

## 2022-07-15 PROCEDURE — 95813 EEG EXTND MNTR 61-119 MIN: CPT | Mod: 26,,, | Performed by: PSYCHIATRY & NEUROLOGY

## 2022-07-15 PROCEDURE — 93660 PR TILT TABLE EVALUATION: ICD-10-PCS | Mod: 26,,, | Performed by: INTERNAL MEDICINE

## 2022-07-15 PROCEDURE — 93660 TILT TABLE EVALUATION: CPT | Performed by: INTERNAL MEDICINE

## 2022-07-15 NOTE — Clinical Note
ID band present and verified. Family is in the lobby. Contact # : [phone] Alicia Kaiser. Patient identified x 2. Ambulated without difficulty.    NPO since 6 30 this am, she took her routine meds this am with sips of water.  She is wearing one hearing aid.  She reports having a headache at present 6/10 pain scale.  Headache in the frontal area.   Patient voided x 2 and states she has IBS. Neurology dept notified of patient's arrival for EEG portion on the test.

## 2022-07-15 NOTE — Clinical Note
Protocol completed, patient without loss of consciousness or seizure activity.   EEG continuous.   HR and BP changes discussed with Dr. Kohler, reviewed procedure log.   No further orders received.  OK to discharge patient.   Notified Neuro dept.    Patient remains awake alert and oriented.

## 2022-07-15 NOTE — Clinical Note
Patient escorted to the lobby to meet up with her friend.   Instructed to follow up with her neurologist for plan going forward.   Dr. Kohler should have report in Epic within 24 hours.   May resume normal activity, diet and medications for now.   Ambulated without dizziness.   Refused water or nourishment, she said she was going to have lunch.   Patient had no further questions and verbalized understanding of her discharge instructions.

## 2022-07-15 NOTE — PROCEDURES
Date of service  07/15/2022    Introduction  Electroencephalographic (EEG) recording is performed with electrodes placed according to the International 10-20 placement system. Thirty two (32) channels of digital signal (sampling rate of 512/sec) including T1 and T2 was simultaneously recorded from the scalp and may include EKG, EMG, and/or eye monitors. Recording band pass was 0.1 to 512 Hz. Digital video recording of the patient is simultaneously recorded with the EEG. The patient is instructed to report clinical symptoms which may occur during the recording session. EEG and video recording is stored and archived in digital format. Activation procedures which include photic stimulation, hyperventilation and instructing patients to perform simple tasks are done in selected patients.    The EEG is displayed on a monitor screen and can be reviewed using different montages. Computer assisted analysis is employed to detect spike and electrographic seizure activity. The entire record is submitted for computer analysis. The entire recording is visually reviewed and, the times identified by computer analysis as being spikes or seizures are reviewed again.     Compressed spectral analysis (CSA) is also performed on the activity recorded from each individual channel. This is displayed as a power display of frequencies from 0 to 30 Hz over time. The CSA is reviewed looking for asymmetries in power between homologous areas of the scalp and then compared with the original EEG recording.     Recording Times  7/15/22 13:43-14:51  1 hour and 5 minutes recorded    EEG was performed during tilt table test.     Findings  The patient's background consists of a posteriorly dominant 10 Hz alpha rhythm, which is well-formed, well-modulated, and abolishes with eye opening. There was intermittent 5-7 Hz theta slowing diffusely.     During the course of the recording, the patient is noted to be awake, and drowsy at times.  Normal sleep  architecture is not appreciated.    Hyperventilation and photic stimulation were not performed. At 13:58 it was noted she was tilted to 30 degrees and at 14:01 to 60 degrees. With tilting, the blood pressure was noted to drop  tto 70s-90s systolic. With this, there was diffuse theta slowing of the background. Patient reported symptoms of feeling tired, legs feeling heavy, and feeling numb. Heart rate was noted to increase during this time as well to 120s-130s.     There is no focal slowing.  There are no focal, lateralized, or epileptiform transients.  No electrographic seizures are seen.    EKG demonstrates regular rhythm.    Interpretation  This is a EEG during tilt table testing shows intermittent mild nonspecific slowing. No potentially epileptiform activity was seen.    Please see the tilt table test report for details of the cardiac interpretation of the test.

## 2022-07-15 NOTE — Clinical Note
The ECG showed sinus tachycardia. 12 L EKG completed at bedside  .   12 L EKG will be continuous through procedure. BP left arm laying on right side 128/49,   Right arm supine 132/62.  Patient assisted to BR again.   Neurology dept notified of patient's arrival. Safety Straps in place x 4 for procedure.

## 2022-07-23 ENCOUNTER — PATIENT MESSAGE (OUTPATIENT)
Dept: ENDOCRINOLOGY | Facility: CLINIC | Age: 57
End: 2022-07-23
Payer: COMMERCIAL

## 2022-07-29 ENCOUNTER — CLINICAL SUPPORT (OUTPATIENT)
Dept: AUDIOLOGY | Facility: CLINIC | Age: 57
End: 2022-07-29
Payer: COMMERCIAL

## 2022-07-29 DIAGNOSIS — H90.3 SENSORINEURAL HEARING LOSS, BILATERAL: Primary | ICD-10-CM

## 2022-07-29 PROCEDURE — 99499 NO LOS: ICD-10-PCS | Mod: S$GLB,,, | Performed by: AUDIOLOGIST

## 2022-07-29 PROCEDURE — 99499 UNLISTED E&M SERVICE: CPT | Mod: S$GLB,,, | Performed by: AUDIOLOGIST

## 2022-07-29 NOTE — PROGRESS NOTES
HEARING AID FOLLOW-UP    Thelma Nguyen was seen today for a hearing aid follow-up to  a repair of her right hearing aid.  She arrived and mentioned that her right ear has had some pain towards the front of her ear canal and seems to radiate down the neck and back of the ear.    Action taken: Replaced all components and housing and replaced .    Hearing aids were connected to [employer] and repaired with cell phone. Patient confirmed hearing aids were clear and set to her normal programming.    Gave patient more domes to change out at home.       Hearing Aid Details  ReSound Quattro 9   Lt SN [id number]   Rt SN [id number]   : 2LP  Dome: Medium Open   Warranty Exp 2/23/23      SN [id number]       Recommendations:  1. Annual Audiogram  2. Daily use of binaural amplification  3. Hearing aid follow-ups as needed

## 2022-08-01 ENCOUNTER — LAB VISIT (OUTPATIENT)
Dept: LAB | Facility: HOSPITAL | Age: 57
End: 2022-08-01
Payer: COMMERCIAL

## 2022-08-01 ENCOUNTER — OFFICE VISIT (OUTPATIENT)
Dept: NEUROLOGY | Facility: CLINIC | Age: 57
End: 2022-08-01
Payer: COMMERCIAL

## 2022-08-01 VITALS
HEART RATE: 103 BPM | DIASTOLIC BLOOD PRESSURE: 86 MMHG | SYSTOLIC BLOOD PRESSURE: 146 MMHG | HEIGHT: 67 IN | WEIGHT: 114.88 LBS | BODY MASS INDEX: 18.03 KG/M2

## 2022-08-01 DIAGNOSIS — G62.0 DRUG-INDUCED PERIPHERAL NEUROPATHY: ICD-10-CM

## 2022-08-01 DIAGNOSIS — M54.2 CERVICALGIA: ICD-10-CM

## 2022-08-01 DIAGNOSIS — G40.209 PARTIAL SYMPTOMATIC EPILEPSY WITH COMPLEX PARTIAL SEIZURES, NOT INTRACTABLE, WITHOUT STATUS EPILEPTICUS: Primary | ICD-10-CM

## 2022-08-01 DIAGNOSIS — I95.1 ORTHOSTATIC HYPOTENSION: ICD-10-CM

## 2022-08-01 DIAGNOSIS — G40.209 PARTIAL SYMPTOMATIC EPILEPSY WITH COMPLEX PARTIAL SEIZURES, NOT INTRACTABLE, WITHOUT STATUS EPILEPTICUS: ICD-10-CM

## 2022-08-01 DIAGNOSIS — G62.9 PERIPHERAL POLYNEUROPATHY: ICD-10-CM

## 2022-08-01 DIAGNOSIS — F06.30 MOOD DISORDER DUE TO MEDICAL CONDITION: ICD-10-CM

## 2022-08-01 PROCEDURE — 3077F SYST BP >= 140 MM HG: CPT | Mod: CPTII,S$GLB,,

## 2022-08-01 PROCEDURE — 1159F PR MEDICATION LIST DOCUMENTED IN MEDICAL RECORD: ICD-10-PCS | Mod: CPTII,S$GLB,,

## 2022-08-01 PROCEDURE — 80186 ASSAY OF PHENYTOIN FREE: CPT

## 2022-08-01 PROCEDURE — 3066F NEPHROPATHY DOC TX: CPT | Mod: CPTII,S$GLB,,

## 2022-08-01 PROCEDURE — 3079F DIAST BP 80-89 MM HG: CPT | Mod: CPTII,S$GLB,,

## 2022-08-01 PROCEDURE — 3077F PR MOST RECENT SYSTOLIC BLOOD PRESSURE >= 140 MM HG: ICD-10-PCS | Mod: CPTII,S$GLB,,

## 2022-08-01 PROCEDURE — 3008F BODY MASS INDEX DOCD: CPT | Mod: CPTII,S$GLB,,

## 2022-08-01 PROCEDURE — 1159F MED LIST DOCD IN RCRD: CPT | Mod: CPTII,S$GLB,,

## 2022-08-01 PROCEDURE — 36415 COLL VENOUS BLD VENIPUNCTURE: CPT

## 2022-08-01 PROCEDURE — 99215 OFFICE O/P EST HI 40 MIN: CPT | Mod: S$GLB,,,

## 2022-08-01 PROCEDURE — 99999 PR PBB SHADOW E&M-EST. PATIENT-LVL IV: ICD-10-PCS | Mod: PBBFAC,,,

## 2022-08-01 PROCEDURE — 3079F PR MOST RECENT DIASTOLIC BLOOD PRESSURE 80-89 MM HG: ICD-10-PCS | Mod: CPTII,S$GLB,,

## 2022-08-01 PROCEDURE — 3008F PR BODY MASS INDEX (BMI) DOCUMENTED: ICD-10-PCS | Mod: CPTII,S$GLB,,

## 2022-08-01 PROCEDURE — 99999 PR PBB SHADOW E&M-EST. PATIENT-LVL IV: CPT | Mod: PBBFAC,,,

## 2022-08-01 PROCEDURE — 3066F PR DOCUMENTATION OF TREATMENT FOR NEPHROPATHY: ICD-10-PCS | Mod: CPTII,S$GLB,,

## 2022-08-01 PROCEDURE — 99215 PR OFFICE/OUTPT VISIT, EST, LEVL V, 40-54 MIN: ICD-10-PCS | Mod: S$GLB,,,

## 2022-08-01 RX ORDER — SERTRALINE HYDROCHLORIDE 100 MG/1
100 TABLET, FILM COATED ORAL DAILY
Qty: 90 TABLET | Refills: 3 | Status: SHIPPED | OUTPATIENT
Start: 2022-08-01 | End: 2023-02-02

## 2022-08-01 RX ORDER — PREGABALIN 25 MG/1
25 CAPSULE ORAL 2 TIMES DAILY
Qty: 60 CAPSULE | Refills: 5 | Status: SHIPPED | OUTPATIENT
Start: 2022-08-01 | End: 2023-02-02

## 2022-08-01 NOTE — PATIENT INSTRUCTIONS
You came to [facility] because of your seizure disorder and new episodes of light-headedness, legs give out, and falls. Your seizures are well controlled on dilantin 200mg at night.    Do not miss any doses of your medication. If you do miss a dose, take it as soon as you remember even if that means doubling up on the dose. Please get a lab test to check out the blood level of your medication. Please get regular sleep. Go to sleep at the same time and wake up at the same time every day. People with epilepsy require more sleep than people without epilepsy.  Sleeping 10-12 hours a day can be normal for a person with epilepsy.  Give yourself the time you need to get enough sleep.     The tilt table test revealed orthostatic hypotension but no seizures. Your body has a hard time getting the blood up to your brain especially when you change positions to upright.  You need to stay hydrated with sugar free electrolyte drinks.  Exercise daily.  Compression stockings up to the thighs can be helpful for this.  You need to take position changes very slowly so that you do not faint and have a catastrophic fall.  There are medications that can help, but these are prescribed by the cardiologist, not me.  If you decide you would like to try them, you can just make an appointment with a general cardiologist.       We will try a new medication called lyrica 25mg in the morning and 25mg at night to try and help with the neuropathy.     We will increase the sertraline (zoloft) to 100mg daily to see if this provides additional mood benefits.     For posterior head and neck pain, try a rolled towel underneath your neck while you sleep for comfort. Ice/heat/massage. Physical Therapy for neck muscle spasms. For pain, you can take over-the-counter an acetaminophen formulation like Excedrin, Tylenol, or Goody's powder (max dose 1 g every 8 hr). It is important that you do not take any of these medications more than one full day every 4  days (7 days a month).  If you take them more frequently than that, it can contribute to a medication overuse headache as well as other issues such as a GI upset and/or kidney/liver injury.           Per [place] law, no episodes of loss of consciousness for 6 months before you may drive.  Please avoid dangerous situations.  For example, no baths/pools by yourself, no heights, no power tools.  Please wear a bike helmet.  If you have a single breakthrough seizure, please patient portal me or call the office and we will decide the next steps. If you have multiple seizures in a row and do not return back to baseline, 911 needs to be called.     Return to clinic in 6 months or sooner with issues.  Please patient portal with any questions or concerns.    Cecile Walton PA-C   Neurology-Epilepsy  [facility]

## 2022-08-01 NOTE — PROGRESS NOTES
Name: Thelma Nguyen  MRN:[id number]   CSN: [id number]  Date of service: 2022  Age:56 y.o.   Gender:female   Referring Physician/Service: No referring provider defined for this encounter.   The patient is here today with:  , Arie    Neurology Clinic: Follow Up Visit    CHIEF COMPLAINT:  Remote history of GBM resection with secondary seizures    Interval Events/ROS 2022:    Recent Labs   Lab 19  1149 22  1158 22  0814 22  1542 22  0951   Phenytoin Lvl 16.9  --   --   --   --    Phenytoin, Free  --  2.5 HH 2.5 HH 1.1 0.9 L   Phenytoin, Total  --  29.6 H 20.5 H 9.7 L 8.2 L      Tilt table test 7/15 notable for orthostatic hypotension w/ no epileptiform abnormalities or electrographic seizures on EEG. No seizures on phenytoin 200mg qhs. Feels orthostatic symptoms have somewhat improved w/ electrolyte drinks and slow changes in position. No recent falls. Restless leg and peripheral neuropathy have been worse lately. Difficulty sleeping, increased sleep talking. Has noticed mild improvement in mood w/ sertraline. Reports neck pain described as soreness, does not radiate. Has not tried anything for pain. Otherwise, no fever, no cold symptoms, no headache, no changes in vision, no new weakness, no chest pain, no shortness of breath, no nausea, no vomiting, no diarrhea, no problems walking.    HPI 2/15/2022:     Age of first seizure:  (19yo)  Seizure Risk Factors: Left GBM (s/p XRT and Chemo) - in [place] ([facility]) (), no CNS infections, no family history of sz, term  with no prolonged hospitalization   Time of Last Seizure: ?, last nocturnal lip bite around [place]   # of lifetime Seizures: ?maybe 25   Frequency of Seizures: at most 3 seizures in one day, at the worst 1-2 monthly, currently none for years   Seizure Triggers: GBM initially, now unclear   Injuries/Hospitalization for seizures? No injuries, hospitalization twice -  with GBM, [facility]   [id number]  Driving? Yes, no issues   Pregnancy? 2 pregnancies, one baby born at 28 weeks   Bone Health: Osteopenia on Dexa at 2020      Auras:  Hearing voices, buzzing noise followed by someone's voice (no auditory hallucinations since surgery)     Seizure Events:   1.  Loss of awareness, zoning out, staring, decreased responsiveness, standing still or will run away   2. Generalized convulsion (1995-> gets put back on seizure medication)  3. New episodes, after sitting or lying, when she gets up, tingling of the feet which rises up her body, her legs give out and she might fall, this has been happening almost three years, hit ground, legs like noodles, but no LOC    4. Nocturnal episodes when she will wake up with a lip bite no other signs of seizures -> none since around 2005      Current AED/SEs:  1. Phenytoin 400 mg q.h.s. SE no major issues but does have osteopenia on 2020 scan    Previous AED/SEs or reason for DC.   1. Phenobarbital - cognitive slowing   2. Gabapentin - hands and feet tingle     EEG:  No EEG in the system, does not recall ever having one of these    MRI: 05/2021 brain:  Small left temporoparietal resection  Surveillance scans: no plan currently     Other Allergies: gabapentin caused tingling      AED compliance, adherence: no missed doses     Recent Labs   Lab 08/29/19  1149   Phenytoin Lvl 16.9      ROS 2/15/2022:  Endo f/u for panhypopituitarism s/p surgery. Factor V Leiden on xarelto syndrome as well as IBS. 1 adult child; 1 child through IVF. Pulled muscle in chest. Some word hesitancy. Hearing aids. Generalized weakness. +nauseated. No vomiting. IBS. Hard to eat. No sense of smell or taste for 30 years. Two years after surgery -> under 100lbs. Unable to eat. Currently cold all the time. Alternating diarrhea and constipations.  Tired with SOB. Started last spring. Anemia. Disoriented in the dark.  Lightheadedness with position changes sometimes resulting in falls.  No changes in vision.  No  "problems swallowing.  No focal weakness.  Generalized weakness.    EXAM:   - Vitals: BP (!) 146/86   Pulse 103   Ht 5' 7" (1.702 m)   Wt 52.1 kg (114 lb 13.8 oz)   LMP 10/23/1997 (Exact Date)   BMI 17.99 kg/m²    - General: Awake, cooperative, NAD, slim  - HEENT: NC/AT  - Neck:  Decreased range of motion. Dense muscle spasms.   - Pulmonary: no increased WOB  - Cardiac: well perfused   - Abdomen: soft, nontender, nondistended  - Extremities: no edema  - Skin: no rashes or lesions noted.     NEURO EXAM:   - Mental Status: Awake, alert, oriented x 3. Able to relate history without difficulty. Attentive to examiner. Language is fluent with intact repetition and comprehension. Normal prosody. There were no paraphasic errors. Able to name both high and low frequency objects. Speech was not dysarthric. Able to follow both midline and appendicular commands. There was no evidence of apraxia or neglect.    - Cranial Nerves:  VFF to confrontation. EOMI with nystagmus and intermittent dysconjugate gaze. No facial droop.  Facial fullness.  Hearing intact to finger-rub bilaterally (with hearing aids in). 5/5 strength in trapezii and SCM bilaterally. Tongue protrudes in midline and to either side with no evidence of atrophy or weakness.    - Motor:  Decreased bulk and tone throughout. No pronator drift bilaterally. No adventitious movements such as tremor or asterixis noted.     Delt Bic Tri WrE WrF  FFl FE IO IP Quad Ham TA Gastroc    R   5     5    5    5    5        5   5    5   5    5        5     5      5            L   5      5    5   5    5        5    5   5    5    5       5     5      5              - Sensory: No deficits to light touch. No extinction to DSS.  - Coordination: No dysmetria on FNF .  - Gait:  Slow and somewhat labored, mildly wide-based. Romberg positive.    PLAN:  56-year-old woman with a GBM discovered at 17yo with subsequent resection without recurrence with secondary seizures well controlled with " phenytoin 200mg q.h.s. Level today. Multiple complicated comorbid medical disorders including adrenal insufficiency, renal dysfunction, orthostatic hypotension, IBS, factor 5 requiring rivaroxaban, low body weight, and osteopenia. Tilt table test 7/15 positive for orthostatic hypotension, no epileptiform abnormalities on EEG. Exercise, electrolyte drinks, slow changes in position. Certified dietitian. Increase sertraline to 100mg daily. Trial of lyrica 25mg BID for peripheral neuropathy. Physical therapy referral for cervicalgia. If she can build up some reserve, consider rapid transition to lacosamide in the EMU because of high risk of injury from breakthrough seizures in this vulnerable patient. Follow up in about 6 months (around 2/1/2023).     Patient Instructions   You came to Epilepsy Clinic because of your seizure disorder and new episodes of light-headedness, legs give out, and falls. Your seizures are well controlled on dilantin 200mg at night.    Do not miss any doses of your medication. If you do miss a dose, take it as soon as you remember even if that means doubling up on the dose. Please get a lab test to check out the blood level of your medication. Please get regular sleep. Go to sleep at the same time and wake up at the same time every day. People with epilepsy require more sleep than people without epilepsy.  Sleeping 10-12 hours a day can be normal for a person with epilepsy.  Give yourself the time you need to get enough sleep.     The tilt table test revealed orthostatic hypotension but no seizures. Your body has a hard time getting the blood up to your brain especially when you change positions to upright.  You need to stay hydrated with sugar free electrolyte drinks.  Exercise daily.  Compression stockings up to the thighs can be helpful for this.  You need to take position changes very slowly so that you do not faint and have a catastrophic fall.  There are medications that can help, but  these are prescribed by the cardiologist, not me.  If you decide you would like to try them, you can just make an appointment with a general cardiologist.       We will try a new medication called lyrica 25mg in the morning and 25mg at night to try and help with the neuropathy.     We will increase the sertraline (zoloft) to 100mg daily to see if this provides additional mood benefits.     For posterior head and neck pain, try a rolled towel underneath your neck while you sleep for comfort. Ice/heat/massage. Physical Therapy for neck muscle spasms. For pain, you can take over-the-counter an acetaminophen formulation like Excedrin, Tylenol, or Goody's powder (max dose 1 g every 8 hr). It is important that you do not take any of these medications more than one full day every 4 days (7 days a month).  If you take them more frequently than that, it can contribute to a medication overuse headache as well as other issues such as a GI upset and/or kidney/liver injury.           Per [place] law, no episodes of loss of consciousness for 6 months before you may drive.  Please avoid dangerous situations.  For example, no baths/pools by yourself, no heights, no power tools.  Please wear a bike helmet.  If you have a single breakthrough seizure, please patient portal me or call the office and we will decide the next steps. If you have multiple seizures in a row and do not return back to baseline, 911 needs to be called.     Return to clinic in 6 months or sooner with issues.  Please patient portal with any questions or concerns.    Cecile Walton PA-C   Neurology-Epilepsy  [facility]       Problem List Items Addressed This Visit        Neuro    Nonintractable epilepsy without status epilepticus - Primary    Relevant Orders    PHENYTOIN LEVEL, TOTAL AND FREE    Peripheral polyneuropathy    Relevant Medications    pregabalin (LYRICA) 25 MG capsule       Psychiatric    Mood disorder due to medical condition     Relevant Medications    sertraline (ZOLOFT) 100 MG tablet       Cardiac/Vascular    Orthostatic hypotension      Other Visit Diagnoses     Drug-induced peripheral neuropathy        Relevant Medications    pregabalin (LYRICA) 25 MG capsule    Cervicalgia        Relevant Orders    Ambulatory referral/consult to Physical/Occupational Therapy        Disclaimer: This note has been generated using voice-recognition software. There may be typographical errors that were missed during proof-reading.     LABS:  Recent Labs   Lab 11/29/21  1119 03/28/22  1311 03/29/22  1120   WBC 6.92 4.63 4.77   Hemoglobin 10.6 L 10.0 L 10.8 L   Hematocrit 31.9 L 31.0 L 33.1 L   Platelets 341 273 290   Sodium 143 141  --    Potassium 5.1 4.9  --    BUN 23 H 29 H  --    Creatinine 1.42 H 1.51 H  --    eGFR if  48.3 A 44.5 A  --    eGFR if non  41.9 A 38.6 A  --    TSH <0.026 L  --   --        Recent Labs   Lab 08/29/19  1149   Phenytoin Lvl 16.9        IMAGING:  Recent imaging is personally reviewed with the patient.    Results for orders placed during the hospital encounter of 05/19/16    MRI Brain Without Contrast    Impression  No acute intracranial process.  Evidence of left craniotomy with focal area of postoperative encephalomalacia left posterior temporal lobe convexity.  Minor residual marginal T2 signal hyperintensity.    Minor periventricular and pontine white matter hyperintensity, usually related to small vessel ischemic degeneration.  ______________________________________    Electronically signed by: FRANSICO GRANT MD  Date:     05/19/16  Time:    11:08    Results for orders placed during the hospital encounter of 05/28/21    MRI Brain W WO Contrast    Impression  Stable postsurgical change.  There is no evidence of recurrent or residual neoplasm.      Electronically signed by: Juan Guthrie MD  Date:    05/28/2021  Time:    14:07    Results for orders placed during the hospital encounter of  06/01/20    DXA Bone Density Spine And Hip    Impression  Osteopenia is present.  Slight increase in bone density involving the spine and femoral necks bilaterally.      Electronically signed by: Sonam Valdovinos MD  Date:    06/01/2020  Time:    15:01    PAST MEDICAL HISTORY:   Active Ambulatory Problems     Diagnosis Date Noted    Panhypopituitarism 03/25/2015    IBS (irritable bowel syndrome) 03/25/2015    Nonintractable epilepsy without status epilepticus 03/25/2015    Allergic rhinitis due to pollen 04/28/2015    Peripheral polyneuropathy 03/16/2016    Other osteoporosis without current pathological fracture 03/16/2016    Sacroiliac joint pain 04/25/2016    Myofascial muscle pain 04/25/2016    Secondary adrenal insufficiency 05/05/2016    Secondary hypothyroidism 05/05/2016    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/2016    Glioblastoma multiforme of brain 09/25/2017    Ganglion cyst of dorsum of left wrist 04/12/2018    Mohs defect 11/08/2018    Mohs defect of left scalp 11/08/2018    CKD (chronic kidney disease), stage III 03/28/2019    Positive FIT (fecal immunochemical test) 04/23/2019    Left wrist pain 06/13/2019    Ganglion cyst 01/07/2020    Hyperkalemia 08/01/2021    Falls frequently 01/14/2022    Shortness of breath 01/14/2022    Dizziness 02/15/2022    Mood disorder due to medical condition 02/15/2022    Orthostatic hypotension 02/15/2022    Low body weight due to inadequate caloric intake 02/15/2022     Resolved Ambulatory Problems     Diagnosis Date Noted    Left elbow pain 03/25/2015    Left shoulder pain 03/25/2015    Thrombophilia 03/25/2015    Open wound of right great toe 03/16/2016     Past Medical History:   Diagnosis Date    Allergic rhinitis     Anemia 1987    Arthritis 2000    Basal cell carcinoma     Broken ankle     Cancer 1985    Clotting disorder 1993    Disorder of kidney and ureter     DVT (deep venous thrombosis)     Fracture of elbow      Hypothyroidism     Osteoporosis     Seizures     Thyroid disease         PAST SURGICAL HISTORY:   Past Surgical History:   Procedure Laterality Date    APPENDECTOMY      BRAIN SURGERY      for glioblastoma      SECTION      CHOLECYSTECTOMY  2007    CLOSURE OF DEFECT OF MOHS PROCEDURE Left 2018    Procedure: CLOSURE, MOHS PROCEDURE DEFECT SCALP  Flap closure;  Surgeon: Yrn Novak MD;  Location: [facility] 2ND FLR;  Service: Plastics;  Laterality: Left;    COLONOSCOPY N/A 2019    Procedure: COLONOSCOPY Suprep;  Surgeon: Tony Mosher MD;  Location: [facility] ENDO;  Service: Endoscopy;  Laterality: N/A;    ESOPHAGOGASTRODUODENOSCOPY N/A 2019    Procedure: EGD/colon;  Surgeon: Tony Mosher MD;  Location: [facility] ENDO;  Service: Endoscopy;  Laterality: N/A;    EXCISION OF GANGLION CYST OF HAND Left 2020    Procedure: EXCISION, GANGLION CYST, HAND;  Surgeon: Ross Drwe Jr., MD;  Location: [facility] OR;  Service: Orthopedics;  Laterality: Left;    HYSTERECTOMY      TILT TABLE TEST N/A 7/15/2022    Procedure: TILT TABLE TEST;  Surgeon: Amol Kohler MD;  Location: [facility] EP LAB;  Service: Cardiology;  Laterality: N/A;  Orthostatic hypotension, dizziness, Tilt Table Test with EEG, Dr.Tiffany Roca (neuro), DM    TUBAL LIGATION          ALLERGIES: Neurontin [gabapentin], Lactose, and Soap   CURRENT MEDICATIONS:   Current Outpatient Medications   Medication Sig Dispense Refill    acetaminophen (TYLENOL) 325 MG tablet Take 650 mg by mouth every 6 (six) hours as needed for Pain.       alendronate (FOSAMAX) 70 MG tablet TAKE 1 TABLET BY MOUTH EVERY 7 DAYS 12 tablet 1    biotin 1 mg Cap Take by mouth.      ERGOCALCIFEROL, VITAMIN D2, (VITAMIN D ORAL) Take 1 capsule by mouth nightly.       ferrous sulfate 324 mg (65 mg iron) TbEC Take 325 mg by mouth nightly.       hydrocortisone (CORTEF) 10 MG Tab Take one and half tablets int he morning and half a  tablet in the afternoon. Double the dose in case of illness. 75 tablet 6    levothyroxine (SYNTHROID) 100 MCG tablet TAKE 1 TABLET (100 MCG TOTAL) BY MOUTH BEFORE BREAKFAST. 90 tablet 1    loratadine-pseudoephedrine  mg (CLARITIN-D 24-HOUR)  mg per 24 hr tablet Take 1 tablet by mouth as needed.       mupirocin (BACTROBAN) 2 % ointment 2 (two) times daily. Apply to affected area      phenytoin (DILANTIN) 100 MG ER capsule TAKE 4 CAPSULES (400 MG) EVERY DAY AT BEDTIME 360 capsule 3    rivaroxaban (XARELTO) 20 mg Tab Take 1 tablet (20 mg total) by mouth once daily. 90 tablet 4    sertraline (ZOLOFT) 50 MG tablet Take 1 tablet (50 mg total) by mouth once daily. 30 tablet 11     No current facility-administered medications for this visit.        FAMILY HISTORY:   Family History   Problem Relation Age of Onset    Heart disease Mother          SOCIAL HISTORY:   Social History     Socioeconomic History    Marital status:    Tobacco Use    Smoking status: Never Smoker    Smokeless tobacco: Never Used   Substance and Sexual Activity    Alcohol use: No    Drug use: No    Sexual activity: Not Currently   Social History Narrative     30 years, lives at home with .      Social Determinants of Health     Financial Resource Strain: Low Risk     Difficulty of Paying Living Expenses: Not hard at all   Food Insecurity: No Food Insecurity    Worried About Running Out of Food in the Last Year: Never true    Ran Out of Food in the Last Year: Never true   Transportation Needs: No Transportation Needs    Lack of Transportation (Medical): No    Lack of Transportation (Non-Medical): No   Physical Activity: Insufficiently Active    Days of Exercise per Week: 1 day    Minutes of Exercise per Session: 10 min   Stress: Stress Concern Present    Feeling of Stress : To some extent   Social Connections: Unknown    Frequency of Communication with Friends and Family: Once a week    Frequency of  Social Gatherings with Friends and Family: Once a week    Active Member of Clubs or Organizations: Yes    Attends Club or Organization Meetings: 1 to 4 times per year    Marital Status:    Housing Stability: Low Risk     Unable to Pay for Housing in the Last Year: No    Number of Places Lived in the Last Year: 1    Unstable Housing in the Last Year: No         Questions and concerns raised by the patient and family/care-giver(s) were addressed and they indicated understanding of everything discussed and agreed to plans as above.    Cecile Walton PA-C   Neurology-Epilepsy  [facility]    Collaborating physician, Dr. Lisa Roca, was available during today's encounter.     I spent approximately 60 minutes on the day of this encounter preparing to see the patient, obtaining and reviewing history and results, performing a medically appropriate exam, counseling and educating the patient/family/caregiver, documenting clinical information, coordinating care, and ordering medications, tests, procedures, and referrals.

## 2022-08-03 LAB
PHENYTOIN FREE SERPL-MCNC: 1 MCG/ML (ref 1–2)
PHENYTOIN, TOTAL: 7.5 MCG/ML (ref 10–20)

## 2022-08-08 ENCOUNTER — PATIENT MESSAGE (OUTPATIENT)
Dept: ENDOCRINOLOGY | Facility: CLINIC | Age: 57
End: 2022-08-08

## 2022-08-08 ENCOUNTER — OFFICE VISIT (OUTPATIENT)
Dept: ENDOCRINOLOGY | Facility: CLINIC | Age: 57
End: 2022-08-08
Payer: COMMERCIAL

## 2022-08-08 ENCOUNTER — HOSPITAL ENCOUNTER (OUTPATIENT)
Dept: RADIOLOGY | Facility: HOSPITAL | Age: 57
Discharge: HOME | End: 2022-08-08
Attending: INTERNAL MEDICINE
Payer: COMMERCIAL

## 2022-08-08 DIAGNOSIS — R29.6 FALLS FREQUENTLY: ICD-10-CM

## 2022-08-08 DIAGNOSIS — M81.8 OTHER OSTEOPOROSIS WITHOUT CURRENT PATHOLOGICAL FRACTURE: ICD-10-CM

## 2022-08-08 DIAGNOSIS — I95.9 HYPOTENSION, UNSPECIFIED HYPOTENSION TYPE: ICD-10-CM

## 2022-08-08 DIAGNOSIS — E23.0 PANHYPOPITUITARISM: ICD-10-CM

## 2022-08-08 DIAGNOSIS — E23.0 PANHYPOPITUITARISM: Primary | ICD-10-CM

## 2022-08-08 PROCEDURE — 3066F NEPHROPATHY DOC TX: CPT | Mod: CPTII,95,, | Performed by: INTERNAL MEDICINE

## 2022-08-08 PROCEDURE — 3066F PR DOCUMENTATION OF TREATMENT FOR NEPHROPATHY: ICD-10-PCS | Mod: CPTII,95,, | Performed by: INTERNAL MEDICINE

## 2022-08-08 PROCEDURE — 99214 OFFICE O/P EST MOD 30 MIN: CPT | Mod: 95,,, | Performed by: INTERNAL MEDICINE

## 2022-08-08 PROCEDURE — 1159F PR MEDICATION LIST DOCUMENTED IN MEDICAL RECORD: ICD-10-PCS | Mod: CPTII,95,, | Performed by: INTERNAL MEDICINE

## 2022-08-08 PROCEDURE — 1159F MED LIST DOCD IN RCRD: CPT | Mod: CPTII,95,, | Performed by: INTERNAL MEDICINE

## 2022-08-08 PROCEDURE — 77080 DXA BONE DENSITY AXIAL: CPT | Mod: TC,PO

## 2022-08-08 PROCEDURE — 1160F RVW MEDS BY RX/DR IN RCRD: CPT | Mod: CPTII,95,, | Performed by: INTERNAL MEDICINE

## 2022-08-08 PROCEDURE — 99214 PR OFFICE/OUTPT VISIT, EST, LEVL IV, 30-39 MIN: ICD-10-PCS | Mod: 95,,, | Performed by: INTERNAL MEDICINE

## 2022-08-08 PROCEDURE — 1160F PR REVIEW ALL MEDS BY PRESCRIBER/CLIN PHARMACIST DOCUMENTED: ICD-10-PCS | Mod: CPTII,95,, | Performed by: INTERNAL MEDICINE

## 2022-08-08 RX ORDER — DEXAMETHASONE SODIUM PHOSPHATE 4 MG/ML
4 INJECTION, SOLUTION INTRA-ARTICULAR; INTRALESIONAL; INTRAMUSCULAR; INTRAVENOUS; SOFT TISSUE
Qty: 1 ML | Refills: 2 | Status: ON HOLD | OUTPATIENT
Start: 2022-08-08

## 2022-08-08 NOTE — PROGRESS NOTES
Stable BMD in osteopenia range after fosamax for 7 years of fosamax so will start drug holiday 8/2022.

## 2022-08-08 NOTE — PATIENT INSTRUCTIONS
For calcium intake:  I advise you get 1200 mg per day of calcium, split up over the day into 2 to 4 divided doses.  This amount includes calcium from both dietary sources and/or calcium supplements, and it is best to get smaller amounts throughout the day rather than all of the calcium at one time; this allows for better absorption of the calcium.     To estimate calcium content from a nutrition label, the label lists the % calcium in that food.   For example, the label for an 8 oz glass of milk lists the calcium content as 30%.  This is equivalent to 300 mg of calcium (multiply the % by 10 to get the mg of calcium per serving).  It is best to try to get the majority of calcium intake from the diet.  I've included a table below of some calcium-rich foods.    If you are not getting enough calcium in the diet, then you can add low doses of calcium supplements.  For calcium supplements, your body can only absorb about 500-600 mg of calcium at one time.  Thus, it is best to split the tablets up over the course of a day.  It is also best to avoid taking calcium supplements at the same time as a calcium-rich food to maximize your calcium absorption.  Calcium carbonate is best taken with or after a meal.  Calcium citrate can be taken on an empty stomach or with/after a meal.  Please look at any multivitamin you are taking as these often usually contain calcium as well.    Estimated Calcium Content of Foods:  Produce  Serving Size Estimated Calcium*    [place] greens, frozen 8 oz 360 mg   Broccoli gus 8 oz 200 mg   Kale, frozen 8 oz 180 mg   Soy Beans, green, boiled 8 oz 175 mg   Bok Yosef, cooked, boiled 8 oz 160 mg   Figs, dried 2 figs 65 mg   Broccoli, fresh, cooked 8 oz 60 mg   Oranges 1 whole 55 mg   Seafood Serving Size Estimated Calcium*    Sardines, canned with bones 3 oz 325 mg   [place], canned with bones 3 oz 180 mg   Shrimp, canned 3 oz 125 mg   Dairy Serving Size Estimated Calcium*    Ricotta, part-skim 4 oz  335 mg   Yogurt, plain, low-fat 6 oz 310 mg   Milk, skim, low-fat, whole 8 oz 300 mg   Yogurt with fruit, low-fat 6 oz 260 mg   Mozzarella, part-skim 1 oz 210 mg   Cheddar 1 oz 205 mg   Yogurt, Greek 6 oz 200 mg   American Cheese 1 oz 195 mg   Feta Cheese 4 oz 140 mg   Cottage Cheese, 2% 4 oz 105 mg   Frozen yogurt, vanilla 8 oz 105 mg   Ice Cream, vanilla 8 oz 85 mg   Parmesan 1 tbsp 55 mg   Fortified Food Serving Size Estimated Calcium*   [place] milk, rice milk or soy milk, fortified 8 oz 300 mg   Orange juice and other fruit juices, fortified 8 oz 300 mg   Tofu, prepared with calcium 4 oz 205 mg   Waffle, frozen, fortified 2 pieces 200 mg   Oatmeal, fortified 1 packet 140 mg   English muffin, fortified 1 muffin 100 mg   Cereal, fortified 8 oz 100-1,000 mg   Other Serving Size Estimated Calcium*   Mac & cheese, frozen 1 package 325 mg   Pizza, cheese, frozen 1 serving 115 mg   Pudding, chocolate, prepared with 2% milk 4 oz 160 mg   Beans, baked, canned 4 oz 160 mg   *The calcium content listed for most foods is estimated and can vary due to multiple factors. Check the food label to determine how much calcium is in a particular product.  If you read the nutrition label for a food source, it lists the % calcium in that food.  For an 8 oz glass of milk, for example, the label states calcium 30%.  This is equivalent to 300 mg of calcium (multiply the listed number by 10).   **Table from the National Osteoporosis Foundation       Adrenal insufficiency self-care instructions and sick day rules     Adrenal insufficiency, which involves a deficiency in steroid hormones that are normally produced by the body, can result in symptoms that include generalized and severe fatigue, malaise, dizziness, and low blood pressure. Should you develop any of these symptoms, please call us immediately or go to the ER if severe symptoms develop. You may also take your emergency intramuscular dexamethasone injection if you have this  "available    Please refer to the following instructions for patients with adrenal insufficiency:  1. Please get an alert bracelet that says "Adrenal insufficiency. Give stress doses of steroids in case of illness."     2. If you become nauseous and are unable to keep hydrocortisone down, you need to go to an emergency room to get this medication via IV.     3. If you are ill with a low-grade fever of  F, please double your dose of hydrocortisone. If you have a fever of >100 F, please triple your hydrocortisone dose for 3 days or until fever resolves.     4. If you are undergoing a planned medical procedure (such as minor surgery, colonoscopy) or a major dental procedure (tooth extraction, root canal etc) you should double your dose the day before and the day of and the day after the procedure.    5. If a major surgery requiring general anesthesia is being planned, please notify your endocrinologist so that we can give instructions to the anesthesia team that will be taking care of you with regards to the amount of hydrocortisone to be given during surgery.    Please call us with any questions and to notify us that you are ill at home or are heading to ER/hospital so that we can help you through the situation and communicate with the physicians taking care of you.   "

## 2022-08-08 NOTE — ASSESSMENT & PLAN NOTE
Risks include low weight,  race, menopause, CKD with secondary hyperparathyroidism, anti-epileptic medication    Reviewed basics of quantity versus quality  Reassuring she is not fracturing   Secondary workup neg     RDA of calcium (7101-1201 mg /day in divided doses) and vitamin D 2000iu a day  discussed  Calcium from food sources preferred  Fall precautions emphasized  +weight bearing exercise    Has been on fosamax since 2015.      Low risk for ONJ and atypical fractures reviewed and discussed. Alerted that if dental work needs to be done it should be done prior to initiating therapy   Discussed optimal duration of bisphosphonate use is unknown - drug holiday after 5-10 po versus drug hold ay after 3 reclast treatment       Will update dexa now and determine if we can start drug holiday or if we need to escalate therapy.

## 2022-08-08 NOTE — ASSESSMENT & PLAN NOTE
Secondary adrenal insufficiency- continue HC 15/5, will send emergency dexamethasone injection to pharmacy.  She is aware of sick day rules    Secondary hypothyroidism-continue levothyroxine 100 mcg daily as FT4 is at goal in the upper half of the normal range and she has no symptoms of over replacement. Recheck level now    No indication for estrogen replacement given she is postmenopausal. She is not interested in growth hormone replacement at this time either.

## 2022-08-16 DIAGNOSIS — M54.2 CERVICALGIA: ICD-10-CM

## 2022-08-16 DIAGNOSIS — R26.81 GAIT INSTABILITY: Primary | ICD-10-CM

## 2022-08-16 DIAGNOSIS — H83.2X9 VESTIBULAR DISEQUILIBRIUM, UNSPECIFIED LATERALITY: ICD-10-CM

## 2022-08-16 PROBLEM — R29.898 DECREASED STRENGTH OF UPPER EXTREMITY: Status: ACTIVE | Noted: 2022-08-16

## 2022-08-16 PROBLEM — Z74.09 IMPAIRED FUNCTIONAL MOBILITY, BALANCE, GAIT, AND ENDURANCE: Status: RESOLVED | Noted: 2022-08-16 | Resolved: 2022-08-16

## 2022-08-16 PROBLEM — Z74.09 IMPAIRED MOBILITY AND ACTIVITIES OF DAILY LIVING: Status: ACTIVE | Noted: 2022-08-16

## 2022-08-16 PROBLEM — R29.898 DECREASED STRENGTH OF UPPER EXTREMITY: Status: RESOLVED | Noted: 2022-08-16 | Resolved: 2022-08-16

## 2022-08-16 PROBLEM — Z78.9 IMPAIRED MOBILITY AND ACTIVITIES OF DAILY LIVING: Status: ACTIVE | Noted: 2022-08-16

## 2022-08-16 PROBLEM — Z74.09 IMPAIRED FUNCTIONAL MOBILITY, BALANCE, GAIT, AND ENDURANCE: Status: ACTIVE | Noted: 2022-08-16

## 2022-08-22 ENCOUNTER — PATIENT MESSAGE (OUTPATIENT)
Dept: NEUROLOGY | Facility: CLINIC | Age: 57
End: 2022-08-22
Payer: COMMERCIAL

## 2022-09-21 ENCOUNTER — PATIENT MESSAGE (OUTPATIENT)
Dept: ENDOCRINOLOGY | Facility: CLINIC | Age: 57
End: 2022-09-21
Payer: COMMERCIAL

## 2022-09-21 ENCOUNTER — PATIENT MESSAGE (OUTPATIENT)
Dept: NEUROLOGY | Facility: CLINIC | Age: 57
End: 2022-09-21
Payer: COMMERCIAL

## 2022-10-04 PROBLEM — Z74.09 IMPAIRED FUNCTIONAL MOBILITY, BALANCE, GAIT, AND ENDURANCE: Status: RESOLVED | Noted: 2022-08-16 | Resolved: 2022-10-04

## 2022-10-04 PROBLEM — Z74.09 IMPAIRED MOBILITY AND ACTIVITIES OF DAILY LIVING: Status: RESOLVED | Noted: 2022-08-16 | Resolved: 2022-10-04

## 2022-10-04 PROBLEM — M54.2 NECK PAIN, BILATERAL: Status: RESOLVED | Noted: 2022-08-16 | Resolved: 2022-10-04

## 2022-10-04 PROBLEM — Z78.9 IMPAIRED MOBILITY AND ACTIVITIES OF DAILY LIVING: Status: RESOLVED | Noted: 2022-08-16 | Resolved: 2022-10-04

## 2022-12-05 ENCOUNTER — PATIENT MESSAGE (OUTPATIENT)
Dept: NEUROLOGY | Facility: CLINIC | Age: 57
End: 2022-12-05
Payer: COMMERCIAL

## 2023-01-30 ENCOUNTER — PATIENT MESSAGE (OUTPATIENT)
Dept: NEUROLOGY | Facility: CLINIC | Age: 58
End: 2023-01-30
Payer: COMMERCIAL

## 2023-02-02 ENCOUNTER — OFFICE VISIT (OUTPATIENT)
Dept: NEUROLOGY | Facility: CLINIC | Age: 58
End: 2023-02-02
Payer: COMMERCIAL

## 2023-02-02 ENCOUNTER — LAB VISIT (OUTPATIENT)
Dept: LAB | Facility: HOSPITAL | Age: 58
End: 2023-02-02
Payer: COMMERCIAL

## 2023-02-02 VITALS
DIASTOLIC BLOOD PRESSURE: 76 MMHG | WEIGHT: 130.06 LBS | HEIGHT: 68 IN | BODY MASS INDEX: 19.71 KG/M2 | SYSTOLIC BLOOD PRESSURE: 122 MMHG | HEART RATE: 123 BPM

## 2023-02-02 DIAGNOSIS — G62.9 PERIPHERAL POLYNEUROPATHY: ICD-10-CM

## 2023-02-02 DIAGNOSIS — G40.209 PARTIAL SYMPTOMATIC EPILEPSY WITH COMPLEX PARTIAL SEIZURES, NOT INTRACTABLE, WITHOUT STATUS EPILEPTICUS: Primary | ICD-10-CM

## 2023-02-02 DIAGNOSIS — G40.209 PARTIAL SYMPTOMATIC EPILEPSY WITH COMPLEX PARTIAL SEIZURES, NOT INTRACTABLE, WITHOUT STATUS EPILEPTICUS: ICD-10-CM

## 2023-02-02 DIAGNOSIS — F06.30 MOOD DISORDER DUE TO MEDICAL CONDITION: ICD-10-CM

## 2023-02-02 DIAGNOSIS — I95.1 ORTHOSTATIC HYPOTENSION: ICD-10-CM

## 2023-02-02 LAB — PHENYTOIN SERPL-MCNC: 6.2 UG/ML (ref 10–20)

## 2023-02-02 PROCEDURE — 3008F PR BODY MASS INDEX (BMI) DOCUMENTED: ICD-10-PCS | Mod: CPTII,S$GLB,,

## 2023-02-02 PROCEDURE — 3074F PR MOST RECENT SYSTOLIC BLOOD PRESSURE < 130 MM HG: ICD-10-PCS | Mod: CPTII,S$GLB,,

## 2023-02-02 PROCEDURE — 1159F PR MEDICATION LIST DOCUMENTED IN MEDICAL RECORD: ICD-10-PCS | Mod: CPTII,S$GLB,,

## 2023-02-02 PROCEDURE — 99999 PR PBB SHADOW E&M-EST. PATIENT-LVL III: ICD-10-PCS | Mod: PBBFAC,,,

## 2023-02-02 PROCEDURE — 3008F BODY MASS INDEX DOCD: CPT | Mod: CPTII,S$GLB,,

## 2023-02-02 PROCEDURE — 3078F DIAST BP <80 MM HG: CPT | Mod: CPTII,S$GLB,,

## 2023-02-02 PROCEDURE — 99999 PR PBB SHADOW E&M-EST. PATIENT-LVL III: CPT | Mod: PBBFAC,,,

## 2023-02-02 PROCEDURE — 1159F MED LIST DOCD IN RCRD: CPT | Mod: CPTII,S$GLB,,

## 2023-02-02 PROCEDURE — 3074F SYST BP LT 130 MM HG: CPT | Mod: CPTII,S$GLB,,

## 2023-02-02 PROCEDURE — 3078F PR MOST RECENT DIASTOLIC BLOOD PRESSURE < 80 MM HG: ICD-10-PCS | Mod: CPTII,S$GLB,,

## 2023-02-02 PROCEDURE — 36415 COLL VENOUS BLD VENIPUNCTURE: CPT

## 2023-02-02 PROCEDURE — 99215 OFFICE O/P EST HI 40 MIN: CPT | Mod: S$GLB,,,

## 2023-02-02 PROCEDURE — 99215 PR OFFICE/OUTPT VISIT, EST, LEVL V, 40-54 MIN: ICD-10-PCS | Mod: S$GLB,,,

## 2023-02-02 PROCEDURE — 80185 ASSAY OF PHENYTOIN TOTAL: CPT

## 2023-02-02 RX ORDER — GABAPENTIN 100 MG/1
100 CAPSULE ORAL NIGHTLY
Qty: 30 CAPSULE | Refills: 11 | Status: SHIPPED | OUTPATIENT
Start: 2023-02-02 | End: 2023-05-18 | Stop reason: SINTOL

## 2023-02-02 RX ORDER — SERTRALINE HYDROCHLORIDE 100 MG/1
50 TABLET, FILM COATED ORAL DAILY
Qty: 45 TABLET | Refills: 3 | Status: SHIPPED | OUTPATIENT
Start: 2023-02-02 | End: 2023-05-18 | Stop reason: ALTCHOICE

## 2023-02-02 RX ORDER — GABAPENTIN 100 MG/1
100 CAPSULE ORAL 3 TIMES DAILY
Qty: 90 CAPSULE | Refills: 11 | Status: SHIPPED | OUTPATIENT
Start: 2023-02-02 | End: 2023-02-02

## 2023-02-02 RX ORDER — PHENYTOIN SODIUM 100 MG/1
200 CAPSULE, EXTENDED RELEASE ORAL NIGHTLY
Qty: 60 CAPSULE | Refills: 11 | Status: SHIPPED | OUTPATIENT
Start: 2023-02-02 | End: 2023-12-28 | Stop reason: SDUPTHER

## 2023-02-02 NOTE — PATIENT INSTRUCTIONS
You came to [facility] because of your seizure disorder. Your seizures are well controlled on dilantin 200mg at night.  Please continue the same medication at the same dose.     Do not miss any doses of medication. If a dose of medication is missed, take it as soon as it is remembered even if that means doubling up on the dose. Please get a lab test to check out the blood level of medication. Get regular sleep. Go to sleep at the same time and wake up at the same time every day. People with epilepsy require more sleep than people without epilepsy.  Sleeping 10-12 hours a day can be normal for a person with epilepsy.      We will try gabapentin 100mg at night before bed to try and help with the restless leg. If you have any problems, please send us a message over the portal. We will also schedule you for an EMG to see how the neuropathy has progressed. The team will call you to schedule. Please ask them what they recommend to help with the neuropathy.      You can go ahead and decrease the sertraline to 50mg (1/2 tablet) daily and see how you do.     Per [place] law, no episodes of loss of consciousness for 6 months before driving.  Avoid dangerous situations.  For example, no baths/pools alone, no heights, no power tools.  Wear a bike helmet.  If breakthrough seizures occur that are different in character, frequency, or duration from normal episodes, please patient portal me or call the office and we will decide the next steps. If multiple seizures occur in a row without return back to baseline, 911 needs to be called.     Return to clinic in 6 months or sooner with issues.  Please patient portal with any questions or concerns.    Cecile Walton PA-C   Neurology-Epilepsy  [facility]

## 2023-02-02 NOTE — PROGRESS NOTES
Name: Thelma Nguyen  MRN:[id number]   CSN: [id number]  Date of service: 2/2/2023  Age:57 y.o.   Gender:female   Referring Physician/Service: No referring provider defined for this encounter.   The patient is here today with: self    Neurology Clinic: Follow Up Visit    CHIEF COMPLAINT:  Remote history of GBM resection with secondary seizures    Interval Events/ROS 2/2/2023:    Current ASM/SEs: phenytoin 200mg qhs; SE peripheral neuropathy   Breakthrough seizures/events: none  Driving: yes  Folic acid: no  Sleep: fair; restless legs at night   Mood: good, would like to try decreasing sertraline to 50mg qhs     Last fall was 10/2022 at a football game, felt like she got overheated. No subsequent injury. Otherwise, no fever, no cold symptoms, no headache, no changes in vision, no new weakness, no chest pain, no shortness of breath, no nausea, no vomiting, no diarrhea, no constipation, no problems walking.    Recent Labs   Lab 02/16/22  1158 06/16/22  0814 07/01/22  1542 07/14/22  0951 08/01/22  1028   Phenytoin, Free 2.5 HH 2.5 HH 1.1 0.9 L 1.0   Phenytoin, Total 29.6 H 20.5 H 9.7 L 8.2 L 7.5 L      Interval Events/ROS 8/1/2022:    Recent Labs   Lab 02/16/22  1158 06/16/22  0814 07/01/22  1542 07/14/22  0951 08/01/22  1028   Phenytoin, Free 2.5 HH 2.5 HH 1.1 0.9 L 1.0   Phenytoin, Total 29.6 H 20.5 H 9.7 L 8.2 L 7.5 L      Tilt table test 7/15 notable for orthostatic hypotension w/ no epileptiform abnormalities or electrographic seizures on EEG. No seizures on phenytoin 200mg qhs. Feels orthostatic symptoms have somewhat improved w/ electrolyte drinks and slow changes in position. No recent falls. Restless leg and peripheral neuropathy have been worse lately. Difficulty sleeping, increased sleep talking. Has noticed mild improvement in mood w/ sertraline. Reports neck pain described as soreness, does not radiate. Has not tried anything for pain. Otherwise, no fever, no cold symptoms, no headache, no changes  in vision, no new weakness, no chest pain, no shortness of breath, no nausea, no vomiting, no diarrhea, no problems walking.    HPI 2/15/2022:     Age of first seizure:  (17yo)  Seizure Risk Factors: Left GBM (s/p XRT and Chemo) - in [place] ([facility]) (), no CNS infections, no family history of sz, term  with no prolonged hospitalization   Time of Last Seizure: ?, last nocturnal lip bite around [place]   # of lifetime Seizures: ?maybe 25   Frequency of Seizures: at most 3 seizures in one day, at the worst 1-2 monthly, currently none for years   Seizure Triggers: GBM initially, now unclear   Injuries/Hospitalization for seizures? No injuries, hospitalization twice -  with GBM, [facility]  [id number]  Driving? Yes, no issues   Pregnancy? 2 pregnancies, one baby born at 28 weeks   Bone Health: Osteopenia on Dexa at 2020      Auras:  Hearing voices, buzzing noise followed by someone's voice (no auditory hallucinations since surgery)     Seizure Events:   1.  Loss of awareness, zoning out, staring, decreased responsiveness, standing still or will run away   2. Generalized convulsion (-> gets put back on seizure medication)  3. New episodes, after sitting or lying, when she gets up, tingling of the feet which rises up her body, her legs give out and she might fall, this has been happening almost three years, hit ground, legs like noodles, but no LOC    4. Nocturnal episodes when she will wake up with a lip bite no other signs of seizures -> none since around       Current AED/SEs:  1. Phenytoin 400 mg q.h.s. SE no major issues but does have osteopenia on 2020 scan    Previous AED/SEs or reason for DC.   1. Phenobarbital - cognitive slowing   2. Gabapentin - hands and feet tingle     EEG:  No EEG in the system, does not recall ever having one of these    MRI: 2021 brain:  Small left temporoparietal resection  Surveillance scans: no plan currently     Other Allergies: gabapentin caused tingling  "     AED compliance, adherence: no missed doses            ROS 2/15/2022:  Endo f/u for panhypopituitarism s/p surgery. Factor V Leiden on xarelto syndrome as well as IBS. 1 adult child; 1 child through IVF. Pulled muscle in chest. Some word hesitancy. Hearing aids. Generalized weakness. +nauseated. No vomiting. IBS. Hard to eat. No sense of smell or taste for 30 years. Two years after surgery -> under 100lbs. Unable to eat. Currently cold all the time. Alternating diarrhea and constipations.  Tired with SOB. Started last spring. Anemia. Disoriented in the dark.  Lightheadedness with position changes sometimes resulting in falls.  No changes in vision.  No problems swallowing.  No focal weakness.  Generalized weakness.    EXAM:   - Vitals: /76   Pulse (!) 123   Ht 5' 8" (1.727 m)   Wt 59 kg (130 lb 1.1 oz)   LMP 10/23/1997 (Exact Date)   BMI 19.78 kg/m²    - General: Awake, cooperative, NAD, slim  - HEENT: NC/AT  - Neck:  Decreased range of motion. Dense muscle spasms.   - Pulmonary: no increased WOB  - Cardiac: well perfused   - Abdomen: soft, nontender, nondistended  - Extremities: no edema  - Skin: no rashes or lesions noted.     NEURO EXAM:   - Mental Status: Awake, alert, oriented x 3. Able to relate history without difficulty. Attentive to examiner. Language is fluent with intact repetition and comprehension. Normal prosody. There were no paraphasic errors. Able to name both high and low frequency objects. Speech was not dysarthric. Able to follow both midline and appendicular commands. There was no evidence of apraxia or neglect.    - Cranial Nerves:  VFF to confrontation. EOMI with nystagmus and intermittent dysconjugate gaze. No facial droop.  Facial fullness.  Hearing intact to finger-rub bilaterally (with hearing aids in). 5/5 strength in trapezii and SCM bilaterally. Tongue protrudes in midline and to either side with no evidence of atrophy or weakness.    - Motor:  Decreased bulk and tone " throughout. No pronator drift bilaterally. No adventitious movements such as tremor or asterixis noted.     Delt Bic Tri WrE WrF  FFl FE IO IP Quad Ham TA Gastroc    R   5     5    5    5    5        5   5    5   5    5        5     5      5            L   5      5    5   5    5        5    5   5    5    5       5     5      5              - Sensory: No deficits to light touch. No extinction to DSS.  - Coordination: No dysmetria on FNF .  - Gait:  Slow and somewhat labored, mildly wide-based. Romberg positive.    PLAN:  57-year-old woman with a GBM discovered at 17yo with subsequent resection without recurrence with secondary seizures well controlled with phenytoin 200mg q.h.s.. Level today. Multiple complicated comorbid medical disorders including adrenal insufficiency, renal dysfunction, orthostatic hypotension, IBS, factor 5 requiring rivaroxaban, low body weight, and osteopenia. Tilt table test 7/15 positive for orthostatic hypotension, no epileptiform abnormalities on EEG. Exercise, electrolyte drinks, slow changes in position. Sertraline to 50mg daily. Trial of gabapentin 100mg qhs for peripheral neuropathy/restless leg. EMG. If she can build up some reserve, consider rapid transition to lacosamide in the EMU because of high risk of injury from breakthrough seizures in this vulnerable patient. Advised to reach out over the portal with any concerns. Patient is comfortable with plan. All questions and concerns are addressed at this time. Follow up in about 6 months (around 8/2/2023).     Patient Instructions   You came to Epilepsy Clinic because of your seizure disorder. Your seizures are well controlled on dilantin 200mg at night.  Please continue the same medication at the same dose.     Do not miss any doses of medication. If a dose of medication is missed, take it as soon as it is remembered even if that means doubling up on the dose. Please get a lab test to check out the blood level of medication. Get  regular sleep. Go to sleep at the same time and wake up at the same time every day. People with epilepsy require more sleep than people without epilepsy.  Sleeping 10-12 hours a day can be normal for a person with epilepsy.      We will try gabapentin 100mg at night before bed to try and help with the restless leg. If you have any problems, please send us a message over the portal. We will also schedule you for an EMG to see how the neuropathy has progressed. The team will call you to schedule. Please ask them what they recommend to help with the neuropathy.      You can go ahead and decrease the sertraline to 50mg (1/2 tablet) daily and see how you do.     Per [place] law, no episodes of loss of consciousness for 6 months before driving.  Avoid dangerous situations.  For example, no baths/pools alone, no heights, no power tools.  Wear a bike helmet.  If breakthrough seizures occur that are different in character, frequency, or duration from normal episodes, please patient portal me or call the office and we will decide the next steps. If multiple seizures occur in a row without return back to baseline, 911 needs to be called.     Return to clinic in 6 months or sooner with issues.  Please patient portal with any questions or concerns.    Cecile Walton PA-C   Neurology-Epilepsy  [facility]     Problem List Items Addressed This Visit          Neuro    Nonintractable epilepsy without status epilepticus - Primary    Relevant Medications    phenytoin (DILANTIN) 100 MG ER capsule    Other Relevant Orders    Phenytoin level, total (Completed)    Peripheral polyneuropathy    Relevant Medications    gabapentin (NEURONTIN) 100 MG capsule    Other Relevant Orders    EMG W/ ULTRASOUND AND NERVE CONDUCTION TEST 2 Extremities       Psychiatric    Mood disorder due to medical condition    Relevant Medications    sertraline (ZOLOFT) 100 MG tablet       Cardiac/Vascular    Orthostatic hypotension      Disclaimer: This note has been generated using voice-recognition software. There may be typographical errors that were missed during proof-reading.     LABS:  Recent Labs   Lab 11/29/21  1119 03/28/22  1311 03/29/22  1120 08/08/22  1057   WBC 6.92 4.63 4.77  --    Hemoglobin 10.6 L 10.0 L 10.8 L  --    Hematocrit 31.9 L 31.0 L 33.1 L  --    Platelets 341 273 290  --    Sodium 143 141  --  140   Potassium 5.1 4.9  --  5.0   BUN 23 H 29 H  --  31 H   Creatinine 1.42 H 1.51 H  --  1.39   eGFR if  48.3 A 44.5 A  --   --    eGFR if non  41.9 A 38.6 A  --   --    TSH <0.026 L  --   --   --                 IMAGING:  Recent imaging is personally reviewed with the patient.    Results for orders placed during the hospital encounter of 05/19/16    MRI Brain Without Contrast    Impression  No acute intracranial process.  Evidence of left craniotomy with focal area of postoperative encephalomalacia left posterior temporal lobe convexity.  Minor residual marginal T2 signal hyperintensity.    Minor periventricular and pontine white matter hyperintensity, usually related to small vessel ischemic degeneration.  ______________________________________    Electronically signed by: FRANSICO GRANT MD  Date:     05/19/16  Time:    11:08    Results for orders placed during the hospital encounter of 05/28/21    MRI Brain W WO Contrast    Impression  Stable postsurgical change.  There is no evidence of recurrent or residual neoplasm.      Electronically signed by: Juan Guthrie MD  Date:    05/28/2021  Time:    14:07    Results for orders placed during the hospital encounter of 06/01/20    DXA Bone Density Spine And Hip    Impression  Osteopenia is present.  Slight increase in bone density involving the spine and femoral necks bilaterally.      Electronically signed by: Sonam Valdovinos MD  Date:    06/01/2020  Time:    15:01    PAST MEDICAL HISTORY:   Active Ambulatory Problems     Diagnosis Date Noted     Panhypopituitarism 03/25/2015    IBS (irritable bowel syndrome) 03/25/2015    Nonintractable epilepsy without status epilepticus 03/25/2015    Allergic rhinitis due to pollen 04/28/2015    Peripheral polyneuropathy 03/16/2016    Other osteoporosis without current pathological fracture 03/16/2016    Sacroiliac joint pain 04/25/2016    Myofascial muscle pain 04/25/2016    Secondary adrenal insufficiency 05/05/2016    Secondary hypothyroidism 05/05/2016    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/2016    Glioblastoma multiforme of brain 09/25/2017    Ganglion cyst of dorsum of left wrist 04/12/2018    Mohs defect 11/08/2018    Mohs defect of left scalp 11/08/2018    CKD (chronic kidney disease), stage III 03/28/2019    Positive FIT (fecal immunochemical test) 04/23/2019    Left wrist pain 06/13/2019    Ganglion cyst 01/07/2020    Hyperkalemia 08/01/2021    Falls frequently 01/14/2022    Shortness of breath 01/14/2022    Dizziness 02/15/2022    Mood disorder due to medical condition 02/15/2022    Orthostatic hypotension 02/15/2022    Low body weight due to inadequate caloric intake 02/15/2022     Resolved Ambulatory Problems     Diagnosis Date Noted    Left elbow pain 03/25/2015    Left shoulder pain 03/25/2015    Thrombophilia 03/25/2015    Open wound of right great toe 03/16/2016    Impaired functional mobility, balance, gait, and endurance 08/16/2022    Decreased strength of upper extremity 08/16/2022    Neck pain, bilateral 08/16/2022    Impaired mobility and activities of daily living 08/16/2022    Neck pain, bilateral 08/16/2022     Past Medical History:   Diagnosis Date    Allergic rhinitis     Anemia 1987    Arthritis 2000    Basal cell carcinoma     Broken ankle     Cancer 1985    Clotting disorder 1993    Disorder of kidney and ureter     DVT (deep venous thrombosis)     Fracture of elbow     Hypothyroidism     Osteoporosis     Seizures 1995    Thyroid disease 1987        PAST SURGICAL HISTORY:   Past  Surgical History:   Procedure Laterality Date    APPENDECTOMY      BRAIN SURGERY      for glioblastoma      SECTION      CHOLECYSTECTOMY  2007    CLOSURE OF DEFECT OF MOHS PROCEDURE Left 2018    Procedure: CLOSURE, MOHS PROCEDURE DEFECT SCALP  Flap closure;  Surgeon: Yrn Novak MD;  Location: [facility] 2ND FLR;  Service: Plastics;  Laterality: Left;    COLONOSCOPY N/A 2019    Procedure: COLONOSCOPY Suprep;  Surgeon: Tony Mosher MD;  Location: [facility] ENDO;  Service: Endoscopy;  Laterality: N/A;    ESOPHAGOGASTRODUODENOSCOPY N/A 2019    Procedure: EGD/colon;  Surgeon: Tony Mosher MD;  Location: [facility] ENDO;  Service: Endoscopy;  Laterality: N/A;    EXCISION OF GANGLION CYST OF HAND Left 2020    Procedure: EXCISION, GANGLION CYST, HAND;  Surgeon: Ross Drew Jr., MD;  Location: [facility] OR;  Service: Orthopedics;  Laterality: Left;    HYSTERECTOMY      TILT TABLE TEST N/A 7/15/2022    Procedure: TILT TABLE TEST;  Surgeon: Amol Kohler MD;  Location: [facility] EP LAB;  Service: Cardiology;  Laterality: N/A;  Orthostatic hypotension, dizziness, Tilt Table Test with EEG, Dr.Tiffany Roca (neuro), DM    TUBAL LIGATION          ALLERGIES: Neurontin [gabapentin], Lactose, and Soap   CURRENT MEDICATIONS:   Current Outpatient Medications   Medication Sig Dispense Refill    acetaminophen (TYLENOL) 325 MG tablet Take 650 mg by mouth every 6 (six) hours as needed for Pain.       biotin 1 mg Cap Take by mouth.      dexamethasone (DECADRON) 4 mg/mL injection Inject 1 mL (4 mg total) into the muscle as needed (Signs and symtpoms of severe adrenal insufficiency). 3 ml syringe with 18 g needle  to draw  X 10 21 g 1 inch needle to inject x 10 1 mL 2    ERGOCALCIFEROL, VITAMIN D2, (VITAMIN D ORAL) Take 1 capsule by mouth nightly.       ferrous sulfate 324 mg (65 mg iron) TbEC Take 325 mg by mouth nightly.       hydrocortisone (CORTEF) 10 MG Tab TAKE 1& 1/2 TABLETS BY MOUTH IN  THE MORNING AND 1/2 TABLET IN THE AFTERNOON. DOUBLE THE DOSE IN CASE OF ILLNESS. 75 tablet 6    levothyroxine (SYNTHROID) 100 MCG tablet TAKE 1 TABLET BY MOUTH BEFORE BREAKFAST. 90 tablet 1    loratadine-pseudoephedrine  mg (CLARITIN-D 24-HOUR)  mg per 24 hr tablet Take 1 tablet by mouth as needed.       mupirocin (BACTROBAN) 2 % ointment 2 (two) times daily. Apply to affected area      phenytoin (DILANTIN) 100 MG ER capsule TAKE 4 CAPSULES (400 MG) EVERY DAY AT BEDTIME (Patient taking differently: 200 mg once daily. TAKE 4 CAPSULES (400 MG) EVERY DAY AT BEDTIME) 360 capsule 3    pregabalin (LYRICA) 25 MG capsule Take 1 capsule (25 mg total) by mouth 2 (two) times daily. 60 capsule 5    rivaroxaban (XARELTO) 20 mg Tab Take 1 tablet (20 mg total) by mouth once daily. 90 tablet 4    sertraline (ZOLOFT) 100 MG tablet Take 1 tablet (100 mg total) by mouth once daily. 90 tablet 3     No current facility-administered medications for this visit.        FAMILY HISTORY:   Family History   Problem Relation Age of Onset    Heart disease Mother          SOCIAL HISTORY:   Social History     Socioeconomic History    Marital status:    Tobacco Use    Smoking status: Never    Smokeless tobacco: Never   Substance and Sexual Activity    Alcohol use: No    Drug use: No    Sexual activity: Not Currently   Social History Narrative     30 years, lives at home with .      Social Determinants of Health     Financial Resource Strain: Low Risk     Difficulty of Paying Living Expenses: Not hard at all   Food Insecurity: No Food Insecurity    Worried About Running Out of Food in the Last Year: Never true    Ran Out of Food in the Last Year: Never true   Transportation Needs: No Transportation Needs    Lack of Transportation (Medical): No    Lack of Transportation (Non-Medical): No   Physical Activity: Insufficiently Active    Days of Exercise per Week: 2 days    Minutes of Exercise per Session: 10 min   Stress:  No Stress Concern Present    Feeling of Stress : Only a little   Social Connections: Unknown    Frequency of Communication with Friends and Family: More than three times a week    Frequency of Social Gatherings with Friends and Family: Once a week    Active Member of Clubs or Organizations: Yes    Attends Club or Organization Meetings: More than 4 times per year    Marital Status:    Housing Stability: Low Risk     Unable to Pay for Housing in the Last Year: No    Number of Places Lived in the Last Year: 1    Unstable Housing in the Last Year: No         Questions and concerns raised by the patient and family/care-giver(s) were addressed and they indicated understanding of everything discussed and agreed to plans as above.    Cecile Walton PA-C   Neurology-Epilepsy  [facility]    Collaborating physician, Dr. Lisa Roca, was available during today's encounter.     I spent approximately 60 minutes on the day of this encounter preparing to see the patient, obtaining and reviewing history and results, performing a medically appropriate exam, counseling and educating the patient/family/caregiver, documenting clinical information, coordinating care, and ordering medications, tests, procedures, and referrals.

## 2023-02-22 ENCOUNTER — PATIENT MESSAGE (OUTPATIENT)
Dept: NEUROLOGY | Facility: CLINIC | Age: 58
End: 2023-02-22
Payer: COMMERCIAL

## 2023-02-22 ENCOUNTER — PATIENT MESSAGE (OUTPATIENT)
Dept: FAMILY MEDICINE | Facility: CLINIC | Age: 58
End: 2023-02-22
Payer: COMMERCIAL

## 2023-02-22 RX ORDER — DIPHENOXYLATE HYDROCHLORIDE AND ATROPINE SULFATE 2.5; .025 MG/1; MG/1
1 TABLET ORAL 4 TIMES DAILY PRN
Qty: 45 TABLET | Refills: 0 | Status: SHIPPED | OUTPATIENT
Start: 2023-02-22 | End: 2023-03-04

## 2023-04-06 ENCOUNTER — PROCEDURE VISIT (OUTPATIENT)
Dept: NEUROLOGY | Facility: CLINIC | Age: 58
End: 2023-04-06
Payer: COMMERCIAL

## 2023-04-06 ENCOUNTER — PATIENT MESSAGE (OUTPATIENT)
Dept: NEUROLOGY | Facility: CLINIC | Age: 58
End: 2023-04-06
Payer: COMMERCIAL

## 2023-04-06 DIAGNOSIS — G62.9 PERIPHERAL POLYNEUROPATHY: ICD-10-CM

## 2023-04-06 PROCEDURE — 95910 PR NERVE CONDUCTION STUDY; 7-8 STUDIES: ICD-10-PCS | Mod: S$GLB,,, | Performed by: PSYCHIATRY & NEUROLOGY

## 2023-04-06 PROCEDURE — 95886 MUSC TEST DONE W/N TEST COMP: CPT | Mod: S$GLB,,, | Performed by: PSYCHIATRY & NEUROLOGY

## 2023-04-06 PROCEDURE — 95886 PR EMG COMPLETE, W/ NERVE CONDUCTION STUDIES, 5+ MUSCLES: ICD-10-PCS | Mod: S$GLB,,, | Performed by: PSYCHIATRY & NEUROLOGY

## 2023-04-06 PROCEDURE — 95910 NRV CNDJ TEST 7-8 STUDIES: CPT | Mod: S$GLB,,, | Performed by: PSYCHIATRY & NEUROLOGY

## 2023-05-01 RX ORDER — RIVAROXABAN 20 MG/1
TABLET, FILM COATED ORAL
Qty: 90 TABLET | Refills: 4 | Status: ON HOLD | OUTPATIENT
Start: 2023-05-01

## 2023-05-11 ENCOUNTER — PATIENT MESSAGE (OUTPATIENT)
Dept: NEUROLOGY | Facility: CLINIC | Age: 58
End: 2023-05-11
Payer: COMMERCIAL

## 2023-05-11 DIAGNOSIS — G62.9 PERIPHERAL POLYNEUROPATHY: Primary | ICD-10-CM

## 2023-05-11 NOTE — TELEPHONE ENCOUNTER
Discussed EMG results.  Order in for an updated B12 level.  We will call to make a follow-up appointment.     Lisa Roca MD PhD FACNS  Neurology-Epilepsy  [facility].

## 2023-05-18 ENCOUNTER — HOSPITAL ENCOUNTER (OUTPATIENT)
Dept: RADIOLOGY | Facility: HOSPITAL | Age: 58
Discharge: HOME | End: 2023-05-18
Attending: PHYSICAL MEDICINE & REHABILITATION
Payer: COMMERCIAL

## 2023-05-18 ENCOUNTER — OFFICE VISIT (OUTPATIENT)
Dept: PHYSICAL MEDICINE AND REHAB | Facility: CLINIC | Age: 58
End: 2023-05-18
Payer: COMMERCIAL

## 2023-05-18 VITALS
BODY MASS INDEX: 20.68 KG/M2 | HEIGHT: 67 IN | SYSTOLIC BLOOD PRESSURE: 106 MMHG | HEART RATE: 114 BPM | WEIGHT: 131.75 LBS | DIASTOLIC BLOOD PRESSURE: 67 MMHG

## 2023-05-18 DIAGNOSIS — M54.2 CHRONIC NECK PAIN: ICD-10-CM

## 2023-05-18 DIAGNOSIS — M54.42 CHRONIC BILATERAL LOW BACK PAIN WITH BILATERAL SCIATICA: ICD-10-CM

## 2023-05-18 DIAGNOSIS — M47.816 LUMBAR FACET ARTHROPATHY: ICD-10-CM

## 2023-05-18 DIAGNOSIS — M54.42 CHRONIC BILATERAL LOW BACK PAIN WITH BILATERAL SCIATICA: Primary | ICD-10-CM

## 2023-05-18 DIAGNOSIS — G89.29 CHRONIC NECK PAIN: ICD-10-CM

## 2023-05-18 DIAGNOSIS — G62.9 PERIPHERAL POLYNEUROPATHY: ICD-10-CM

## 2023-05-18 DIAGNOSIS — M51.36 DDD (DEGENERATIVE DISC DISEASE), LUMBAR: ICD-10-CM

## 2023-05-18 DIAGNOSIS — G89.29 CHRONIC BILATERAL LOW BACK PAIN WITH BILATERAL SCIATICA: Primary | ICD-10-CM

## 2023-05-18 DIAGNOSIS — M54.41 CHRONIC BILATERAL LOW BACK PAIN WITH BILATERAL SCIATICA: Primary | ICD-10-CM

## 2023-05-18 DIAGNOSIS — M54.41 CHRONIC BILATERAL LOW BACK PAIN WITH BILATERAL SCIATICA: ICD-10-CM

## 2023-05-18 DIAGNOSIS — G89.29 CHRONIC BILATERAL LOW BACK PAIN WITH BILATERAL SCIATICA: ICD-10-CM

## 2023-05-18 PROCEDURE — 3074F PR MOST RECENT SYSTOLIC BLOOD PRESSURE < 130 MM HG: ICD-10-PCS | Mod: CPTII,S$GLB,, | Performed by: PHYSICAL MEDICINE & REHABILITATION

## 2023-05-18 PROCEDURE — 99999 PR PBB SHADOW E&M-EST. PATIENT-LVL IV: CPT | Mod: PBBFAC,,, | Performed by: PHYSICAL MEDICINE & REHABILITATION

## 2023-05-18 PROCEDURE — 3008F PR BODY MASS INDEX (BMI) DOCUMENTED: ICD-10-PCS | Mod: CPTII,S$GLB,, | Performed by: PHYSICAL MEDICINE & REHABILITATION

## 2023-05-18 PROCEDURE — 72110 X-RAY EXAM L-2 SPINE 4/>VWS: CPT | Mod: 26,,, | Performed by: RADIOLOGY

## 2023-05-18 PROCEDURE — 72050 X-RAY EXAM NECK SPINE 4/5VWS: CPT | Mod: TC

## 2023-05-18 PROCEDURE — 99205 OFFICE O/P NEW HI 60 MIN: CPT | Mod: S$GLB,,, | Performed by: PHYSICAL MEDICINE & REHABILITATION

## 2023-05-18 PROCEDURE — 1159F PR MEDICATION LIST DOCUMENTED IN MEDICAL RECORD: ICD-10-PCS | Mod: CPTII,S$GLB,, | Performed by: PHYSICAL MEDICINE & REHABILITATION

## 2023-05-18 PROCEDURE — 3078F PR MOST RECENT DIASTOLIC BLOOD PRESSURE < 80 MM HG: ICD-10-PCS | Mod: CPTII,S$GLB,, | Performed by: PHYSICAL MEDICINE & REHABILITATION

## 2023-05-18 PROCEDURE — 72110 XR LUMBAR SPINE AP AND LAT WITH FLEX/EXT: ICD-10-PCS | Mod: 26,,, | Performed by: RADIOLOGY

## 2023-05-18 PROCEDURE — 72050 XR CERVICAL SPINE AP LAT WITH FLEX EXTEN: ICD-10-PCS | Mod: 26,,, | Performed by: RADIOLOGY

## 2023-05-18 PROCEDURE — 3078F DIAST BP <80 MM HG: CPT | Mod: CPTII,S$GLB,, | Performed by: PHYSICAL MEDICINE & REHABILITATION

## 2023-05-18 PROCEDURE — 99999 PR PBB SHADOW E&M-EST. PATIENT-LVL IV: ICD-10-PCS | Mod: PBBFAC,,, | Performed by: PHYSICAL MEDICINE & REHABILITATION

## 2023-05-18 PROCEDURE — 72110 X-RAY EXAM L-2 SPINE 4/>VWS: CPT | Mod: TC

## 2023-05-18 PROCEDURE — 1159F MED LIST DOCD IN RCRD: CPT | Mod: CPTII,S$GLB,, | Performed by: PHYSICAL MEDICINE & REHABILITATION

## 2023-05-18 PROCEDURE — 3008F BODY MASS INDEX DOCD: CPT | Mod: CPTII,S$GLB,, | Performed by: PHYSICAL MEDICINE & REHABILITATION

## 2023-05-18 PROCEDURE — 3074F SYST BP LT 130 MM HG: CPT | Mod: CPTII,S$GLB,, | Performed by: PHYSICAL MEDICINE & REHABILITATION

## 2023-05-18 PROCEDURE — 99205 PR OFFICE/OUTPT VISIT, NEW, LEVL V, 60-74 MIN: ICD-10-PCS | Mod: S$GLB,,, | Performed by: PHYSICAL MEDICINE & REHABILITATION

## 2023-05-18 PROCEDURE — 72050 X-RAY EXAM NECK SPINE 4/5VWS: CPT | Mod: 26,,, | Performed by: RADIOLOGY

## 2023-05-18 RX ORDER — DICLOFENAC SODIUM 10 MG/G
2 GEL TOPICAL 3 TIMES DAILY PRN
Status: ON HOLD
Start: 2023-05-18

## 2023-05-18 RX ORDER — ACETAMINOPHEN 500 MG
500-1000 TABLET ORAL 3 TIMES DAILY PRN
Refills: 0 | COMMUNITY
Start: 2023-05-18 | End: 2024-04-01 | Stop reason: SDUPTHER

## 2023-05-18 RX ORDER — DULOXETIN HYDROCHLORIDE 30 MG/1
30 CAPSULE, DELAYED RELEASE ORAL DAILY
Qty: 30 CAPSULE | Refills: 1 | Status: SHIPPED | OUTPATIENT
Start: 2023-05-18 | End: 2023-06-19

## 2023-05-18 NOTE — PATIENT INSTRUCTIONS
Neck/Back Pain [General]    Both neck and back pain are usually caused by injury to the muscles or ligaments of the spine. Sometimes the disks that separate each bone of the spine may cause pain by putting pressure on a nearby nerve. Back and neck pain may appear after a sudden twisting/bending force (such as in a car accident), or sometimes after a simple awkward movement. In either case, muscle spasm is often present and adds to the pain.  Acute neck and back pain usually gets better in one to two weeks. Pain related to disk disease, arthritis in the spinal joints or spinal stenosis (narrowing of the spinal canal) can become chronic and last for months or years.  Home Care:  FOR NECK PAIN: Use a comfortable pillow that supports the head and keeps the spine in a neutral position. The position of the head should not be tilted forward or backward.  FOR BACK PAIN: You may need to stay in bed the first few days. But, as soon as possible, begin sitting or walking to avoid problems with prolonged bed rest (muscle weakness, worsening back stiffness and pain, blood clots in the legs).  When in bed, try to find a position of comfort. A firm mattress is best. Try lying flat on your back with pillows under your knees. You can also try lying on your side with your knees bent up towards your chest and a pillow between your knees.  Avoid prolonged sitting. This puts more stress on the lower back than standing or walking.  During the first two days after injury, apply an ICE PACK to the painful area for 20 minutes every 2-4 hours. This will reduce swelling and pain. HEAT (hot shower, hot bath or heating pad) works well for muscle spasm. You can start with ice, then switch to heat after two days. Some patients feel best alternating ice and heat treatments. Use the one method that feels the best to you.  You may use acetaminophen (Tylenol) or ibuprofen (Motrin, Advil) to control pain, unless another pain medicine was prescribed.  [NOTE: If you have chronic liver or kidney disease or ever had a stomach ulcer or GI bleeding, talk with your doctor before using these medicines.]  Be aware of safe lifting methods and do not lift anything over 15 pounds until all the pain is gone.  Follow Up  with your physician or this facility if your symptoms do not start to improve after one week. Physical therapy or further tests may be needed.  [NOTE: If X-rays were taken, they will be reviewed by a radiologist. You will be notified of any new findings that may affect your care.]  Get Prompt Medical Attention  if any of the following occur:  Pain becomes worse or spreads into your arms or legs  Weakness, numbness or pain in one or both arms or legs  Loss of bowel or bladder control  Numbness in the groin area  Difficulty walking  Fever of 100.4ºF (38ºC) or higher, or as directed by your healthcare provider  © 4939-8909 [place] [address]. All rights reserved. This information is not intended as a substitute for professional medical care. Always follow your healthcare professional's instructions.    Back Exercises: Back Press  Do this exercise on your hands and knees. Keep your knees under your hips and your hands under your shoulders. Keep your spine in a neutral position (not arched or sagging). Be sure to maintain your necks natural curve.  Tighten your abdominal and buttocks muscles to press your back upward. Let your head drop slightly.  Hold for 5 seconds. Return to starting position.  Repeat 5 times.     © 0051-7170 [facility], [address] [place] [address]. All rights reserved. This information is not intended as a substitute for professional medical care. Always follow your healthcare professional's instructions.    Back Exercises: Back Release  Do this exercise on your hands and knees. Keep your knees under your hips and your hands under your shoulders. Keep your spine in a neutral position  (not arched or sagging). Be sure to maintain your necks natural curve.  Relax your abdominal and buttocks muscles, lift your head, and let your back sag. Be sure to keep your weight evenly distributed. Dont sit back on your hips.   Hold for 5 seconds.  Return to starting position.  Repeat 5 times.  © 5156-1992 [place] [address]. All rights reserved. This information is not intended as a substitute for professional medical care. Always follow your healthcare professional's instructions.    Back Exercises: Bridge  The Bridge exercise strengthens your abdominal, buttocks, and hamstring muscles. This helps keep your back stable and aligned when you walk.  Lie on the floor with your back and palms flat. Bend your knees. Keep your feet flat on the floor.  Contract your abdominal and buttocks muscles. Slowly lift your buttocks off the floor until there is a straight line from your knees to your shoulders.  Hold for 5  seconds. Repeat 10 times.    © 6869-5552 [place] [address]. All rights reserved. This information is not intended as a substitute for professional medical care. Always follow your healthcare professional's instructions.    Back Exercises: Elbow Press    To start, lie face down on your stomach, feet slightly apart, forehead on the floor. Breathe deeply. You should feel comfortable and relaxed in this position.  Press up on your forearms. Keep your abdomen and hips on the floor.  Hold for 20 seconds. Lower slowly.  Repeat 2 times.  Return to starting position.  © 6417-8934 [place] [address]. All rights reserved. This information is not intended as a substitute for professional medical care. Always follow your healthcare professional's instructions.    Back Exercises: Pelvic Tilt  To start, lie on your back with your knees bent and feet flat on the floor. Dont press your neck or lower  back to the floor. Breathe deeply. You should feel comfortable and relaxed in this position.  Tighten your abdomen and buttocks, and press your lower back toward the floor. This should be a small, subtle movement.  Hold for 5 seconds. Release.  Repeat 5 times.         © 2149-3871 [facility], [address] [place] [address]. All rights reserved. This information is not intended as a substitute for professional medical care. Always follow your healthcare professional's instructions.    Back Exercises: Partial Curl-Ups          To start, lie on your back with your knees bent and feet flat on the floor. Dont press your neck or lower back to the floor. Breathe deeply. You should feel comfortable and relaxed in this position.  Cross your arms loosely.  Tighten your abdomen and curl senior care up, keeping your head in line with your shoulders.  Hold for 5 seconds. Uncurl to lie down.  Repeat 5 times.   © 1311-1433 [facility], [address] [place] [address]. All rights reserved. This information is not intended as a substitute for professional medical care. Always follow your healthcare professional's instructions.    Back Exercises: Lower Back Stretch                            To start, sit in a chair with your feet flat on the floor. Shift your weight slightly forward to avoid rounding your back. Relax, and keep your ears, shoulders, and hips aligned.  Sit with your feet well apart.  Bend forward and touch the floor with the backs of your hands. Relax and let your body drop.  Hold for 20 seconds. Return to starting position.  Repeat 2 times.   © 5580-0760 [facility], [address] [place] [address]. All rights reserved. This information is not intended as a substitute for professional medical care. Always follow your healthcare professional's instructions.    Back Exercises: Seated Rotation      To start, sit in a chair with your feet flat on the floor. Shift your weight  slightly forward to avoid rounding your back. Relax, and keep your ears, shoulders, and hips aligned.  Fold your arms, elbows just below shoulder height.  Turn from the waist with hips forward. Turn your head last.  Hold for a count of 5. Return to starting position.  Repeat 5 times on one side. Then switch sides.  © 1699-1336 [facility], [address] [place] [address]. All rights reserved. This information is not intended as a substitute for professional medical care. Always follow your healthcare professional's instructions.    Back Exercises: Side Stretch  To start, sit in a chair with your feet flat on the floor. Shift your weight slightly forward to avoid rounding your back. Relax. Keep your ears, shoulders, and hips aligned.  Stretch your right arm overhead.  Slowly bend to the left. Dont twist your torso.  Hold for 20 seconds. Return to starting position.  Repeat 2 times. Then, switch to the other side.  © 6321-8213 Sylvain [address] [address]. All rights reserved. This information is not intended as a substitute for professional medical care. Always follow your healthcare professional's instructions        Neck/Back Pain [General]    Both neck and back pain are usually caused by injury to the muscles or ligaments of the spine. Sometimes the disks that separate each bone of the spine may cause pain by putting pressure on a nearby nerve. Back and neck pain may appear after a sudden twisting/bending force (such as in a car accident), or sometimes after a simple awkward movement. In either case, muscle spasm is often present and adds to the pain.  Acute neck and back pain usually gets better in one to two weeks. Pain related to disk disease, arthritis in the spinal joints or spinal stenosis (narrowing of the spinal canal) can become chronic and last for months or years.  Home Care:  FOR NECK PAIN: Use a comfortable pillow that supports the head and keeps the spine  in a neutral position. The position of the head should not be tilted forward or backward.  FOR BACK PAIN: You may need to stay in bed the first few days. But, as soon as possible, begin sitting or walking to avoid problems with prolonged bed rest (muscle weakness, worsening back stiffness and pain, blood clots in the legs).  When in bed, try to find a position of comfort. A firm mattress is best. Try lying flat on your back with pillows under your knees. You can also try lying on your side with your knees bent up towards your chest and a pillow between your knees.  Avoid prolonged sitting. This puts more stress on the lower back than standing or walking.  During the first two days after injury, apply an ICE PACK to the painful area for 20 minutes every 2-4 hours. This will reduce swelling and pain. HEAT (hot shower, hot bath or heating pad) works well for muscle spasm. You can start with ice, then switch to heat after two days. Some patients feel best alternating ice and heat treatments. Use the one method that feels the best to you.  You may use acetaminophen (Tylenol) or ibuprofen (Motrin, Advil) to control pain, unless another pain medicine was prescribed. [NOTE: If you have chronic liver or kidney disease or ever had a stomach ulcer or GI bleeding, talk with your doctor before using these medicines.]  Be aware of safe lifting methods and do not lift anything over 15 pounds until all the pain is gone.  Follow Up  with your physician or this facility if your symptoms do not start to improve after one week. Physical therapy or further tests may be needed.  [NOTE: If X-rays were taken, they will be reviewed by a radiologist. You will be notified of any new findings that may affect your care.]  Get Prompt Medical Attention  if any of the following occur:  Pain becomes worse or spreads into your arms or legs  Weakness, numbness or pain in one or both arms or legs  Loss of bowel or bladder control  Numbness in the  groin area  Difficulty walking  Fever of 100.4ºF (38ºC) or higher, or as directed by your healthcare provider  © 6040-9766 [facility], [address] [place] [address]. All rights reserved. This information is not intended as a substitute for professional medical care. Always follow your healthcare professional's instructions.    Neck Exercises: Passive Neck Rotation          To start, lie on your back, knees bent and feet flat on the floor. Keep your ears, shoulders, and hips aligned, but dont press your lower back to the floor. Rest your hands on your pelvis. Breathe deeply and relax.  With your neck relaxed, place the palm of one hand on your forehead. Use your hand to turn your head to one side until you feel a stretch in the neck muscles. Do not push through pain.  Hold for 5 seconds. Then turn to the other side.  Repeat 5 times on each side.   Note: Keep your shoulders on the floor. Dont lift your chin as you turn your head.  © 1563-0198 [facility], [address] [place] [address]. All rights reserved. This information is not intended as a substitute for professional medical care. Always follow your healthcare professional's instructions.    Exercises: Neck Isometrics  To start, sit in a chair with your feet flat on the floor. Your weight should be slightly forward so that youre balanced evenly on your buttocks. Relax your shoulders and keep your head level. Using a chair with arms may help you keep your balance.  Press your palm against your forehead. Resist with your neck muscles. Hold for 10 seconds. Relax. Repeat 5 times.  Do the exercise again, pressing on the side of your head. Repeat 5 times. Switch sides.  Do the exercise again, pressing on the back of your head. Repeat 5 times.          For your safety, check with your healthcare provider before starting an exercise program.    © 9646-6360 [facility], [address] [place] [address], [place] [address]. All rights reserved.  This information is not intended as a substitute for professional medical care. Always follow your healthcare professional's instructions.    Shoulder and Upper Back Stretch  To start, stand tall with your ears, shoulders, and hips in line. Your feet should be slightly apart, positioned just under your hips. Focus your eyes directly in front of you.  this position for a few seconds before starting your exercise. This helps increase your awareness of proper posture.  Reach overhead and slightly back with both arms. Keep your shoulders and neck aligned and your elbows behind your shoulders.  With your palms facing the ceiling, turn your fingers inward.  Take a deep breath. Breathe out and lower your elbows toward your buttocks. Hold for 5 seconds, then return to starting position.  Repeat 3 times.          © 2295-0848 [facility], [address] [place] [address], [place] [address]. All rights reserved. This information is not intended as a substitute for professional medical care. Always follow your healthcare professional's instructions.
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
## 2023-05-22 ENCOUNTER — LAB VISIT (OUTPATIENT)
Dept: LAB | Facility: HOSPITAL | Age: 58
End: 2023-05-22
Payer: COMMERCIAL

## 2023-05-22 ENCOUNTER — OFFICE VISIT (OUTPATIENT)
Dept: NEUROLOGY | Facility: CLINIC | Age: 58
End: 2023-05-22
Payer: COMMERCIAL

## 2023-05-22 VITALS
HEART RATE: 93 BPM | SYSTOLIC BLOOD PRESSURE: 133 MMHG | HEIGHT: 67 IN | DIASTOLIC BLOOD PRESSURE: 71 MMHG | BODY MASS INDEX: 20.41 KG/M2 | WEIGHT: 130.06 LBS

## 2023-05-22 DIAGNOSIS — K58.9 IRRITABLE BOWEL SYNDROME, UNSPECIFIED TYPE: ICD-10-CM

## 2023-05-22 DIAGNOSIS — G43.909 MIGRAINE WITHOUT STATUS MIGRAINOSUS, NOT INTRACTABLE, UNSPECIFIED MIGRAINE TYPE: ICD-10-CM

## 2023-05-22 DIAGNOSIS — G89.29 CHRONIC BILATERAL LOW BACK PAIN WITH BILATERAL SCIATICA: ICD-10-CM

## 2023-05-22 DIAGNOSIS — G62.9 PERIPHERAL POLYNEUROPATHY: ICD-10-CM

## 2023-05-22 DIAGNOSIS — I95.1 ORTHOSTATIC HYPOTENSION: ICD-10-CM

## 2023-05-22 DIAGNOSIS — M54.42 CHRONIC BILATERAL LOW BACK PAIN WITH BILATERAL SCIATICA: ICD-10-CM

## 2023-05-22 DIAGNOSIS — M54.41 CHRONIC BILATERAL LOW BACK PAIN WITH BILATERAL SCIATICA: ICD-10-CM

## 2023-05-22 DIAGNOSIS — G40.209 PARTIAL SYMPTOMATIC EPILEPSY WITH COMPLEX PARTIAL SEIZURES, NOT INTRACTABLE, WITHOUT STATUS EPILEPTICUS: Primary | ICD-10-CM

## 2023-05-22 DIAGNOSIS — F06.30 MOOD DISORDER DUE TO MEDICAL CONDITION: ICD-10-CM

## 2023-05-22 LAB
PHENYTOIN SERPL-MCNC: 5.9 UG/ML (ref 10–20)
VIT B12 SERPL-MCNC: >2000 PG/ML (ref 210–950)

## 2023-05-22 PROCEDURE — 1159F MED LIST DOCD IN RCRD: CPT | Mod: CPTII,S$GLB,,

## 2023-05-22 PROCEDURE — 3075F SYST BP GE 130 - 139MM HG: CPT | Mod: CPTII,S$GLB,,

## 2023-05-22 PROCEDURE — 3078F PR MOST RECENT DIASTOLIC BLOOD PRESSURE < 80 MM HG: ICD-10-PCS | Mod: CPTII,S$GLB,,

## 2023-05-22 PROCEDURE — 99215 PR OFFICE/OUTPT VISIT, EST, LEVL V, 40-54 MIN: ICD-10-PCS | Mod: S$GLB,,,

## 2023-05-22 PROCEDURE — 1159F PR MEDICATION LIST DOCUMENTED IN MEDICAL RECORD: ICD-10-PCS | Mod: CPTII,S$GLB,,

## 2023-05-22 PROCEDURE — 3078F DIAST BP <80 MM HG: CPT | Mod: CPTII,S$GLB,,

## 2023-05-22 PROCEDURE — 99999 PR PBB SHADOW E&M-EST. PATIENT-LVL IV: CPT | Mod: PBBFAC,,,

## 2023-05-22 PROCEDURE — 3008F PR BODY MASS INDEX (BMI) DOCUMENTED: ICD-10-PCS | Mod: CPTII,S$GLB,,

## 2023-05-22 PROCEDURE — 82607 VITAMIN B-12: CPT

## 2023-05-22 PROCEDURE — 36415 COLL VENOUS BLD VENIPUNCTURE: CPT

## 2023-05-22 PROCEDURE — 99999 PR PBB SHADOW E&M-EST. PATIENT-LVL IV: ICD-10-PCS | Mod: PBBFAC,,,

## 2023-05-22 PROCEDURE — 99215 OFFICE O/P EST HI 40 MIN: CPT | Mod: S$GLB,,,

## 2023-05-22 PROCEDURE — 80185 ASSAY OF PHENYTOIN TOTAL: CPT

## 2023-05-22 PROCEDURE — 3075F PR MOST RECENT SYSTOLIC BLOOD PRESS GE 130-139MM HG: ICD-10-PCS | Mod: CPTII,S$GLB,,

## 2023-05-22 PROCEDURE — 3008F BODY MASS INDEX DOCD: CPT | Mod: CPTII,S$GLB,,

## 2023-05-22 NOTE — PATIENT INSTRUCTIONS
[facility] came [facility] because of your seizure disorder. Your seizures are well controlled on phenytoin 200mg nightly. Please continue the same medication at the same dose.     Do not miss any doses of medication. If a dose of medication is missed, take it as soon as it is remembered even if that means doubling up on the dose. Please get a lab test to check out the blood level of medication. Get regular sleep. Go to sleep at the same time and wake up at the same time every day. People with epilepsy require more sleep than people without epilepsy.  Sleeping 10-12 hours a day can be normal for a person with epilepsy.  Every seizure makes it harder to prevent the next seizure. Epilepsy is associated with SUDEP, or sudden unexpected death in epilepsy.  The risk is significantly higher if convulsive seizures are not well controlled. For more information, check out these websites: https://www.epilepsy.com/, https://www.epilepsyalliance[place].org/, www.[place]-epilepsy.org, www.womenandepilepsy.org.     It is possible the eye pain you describe associated with light sensitivity may be migraine. For migraines, please write a headache diary with special attention to headache symptoms, durations, triggers, medications used and bring this to the next clinic visit. For posterior head and neck pain, try a rolled towel underneath your neck while you sleep for comfort. Ice/heat/massage. For pain, you can take over-the-counter an acetaminophen formulation like Excedrin, Tylenol, or Goody's powder (max dose 1 g every 8 hr). It is important that you do not take any of these medications more than one full day every 4 days (7 days a month).  If you take them more frequently than that, it can contribute to a medication overuse headache as well as other issues such as a GI upset and/or kidney/liver injury.           Per [place] law, no episodes of loss of consciousness for 6 months before driving.  Avoid dangerous situations.  For  example, no baths/pools alone, no heights, no power tools.  Wear a bike helmet.  If breakthrough seizures occur that are different in character, frequency, or duration from normal episodes, please patient portal me or call the office and we will decide the next steps. If multiple seizures occur in a row without return back to baseline, 911 needs to be called.     Return to clinic in 6 months or sooner with issues.  Please patient portal with any questions or concerns.    Cecile Walton PA-C   Neurology-Epilepsy  [facility]    Forms/Letters/Disability/DMV Paperwork: We understand the importance of filling out forms and providing letters in a timely manner.  However, many of these forms have very tricky language and once an official form is submitted as part of the medical record, it can not be modified or erased.  Please work with us in order to get these forms filled out in the most complete, accurate, and efficient way. 1.  Once you are aware that a form will need to be completed, please make an appointment.  A virtual appointment with Dr. Roca or Misti is perfectly fine. 2.  Please fill out the form as much as you can.  There are many questions that we do not have an answer for.  Please bring a blank copy of the form and your partially filled out form to the clinic visit or send them to us over the portal.  We will complete the form together with you during the clinic visit, sign it, and either return it to you or send it to the correct destination.  Every form will require an appointment however we can fill out multiple forms at once if needed.  Please do not hesitate to reach out with any questions or concerns about this policy.  We are trying to make sure that we have a system in place to meet this need which works for everyone involved.  Thank you for your understanding.

## 2023-05-22 NOTE — PROGRESS NOTES
Name: Thelma Nguyen  MRN:[id number]   CSN: [id number]  Date of service: 5/22/2023  Age:57 y.o.   Gender:female   Referring Physician/Service: No referring provider defined for this encounter.   The patient is here today with: self    Neurology Clinic: Follow Up Visit    CHIEF COMPLAINT:  Remote history of GBM resection with secondary seizures    Interval Events/ROS 5/22/2023:    Current ASM/SEs: phenytoin 200mg qhs; no SE   Breakthrough seizures/events: none  Driving: yes  Folic acid: no  Sleep: fair  Mood: good, some anxiety, switched to cymbalta via PMR    Reports a sharp pain behind bilateral eyes w/ upward gaze + sensitive to light. Once triggered, lasts around 1-2 hours. Sometimes relieved with tylenol. Saw ophthalmology 1/2023 with reportedly no issues apart from cataract. IBS. PCP appt 7/2023. Chronic neuropathy. Otherwise, no fever, no cold symptoms, no changes in vision, no new weakness, no chest pain, no shortness of breath, no nausea, no vomiting, no diarrhea, no constipation, no problems walking.    Recent Labs   Lab 02/16/22  1158 06/16/22  0814 07/01/22  1542 07/14/22  0951 08/01/22  1028 02/02/23  1618   Phenytoin Lvl  --   --   --   --   --  6.2 L   Phenytoin, Free 2.5 HH 2.5 HH 1.1 0.9 L 1.0  --    Phenytoin, Total 29.6 H 20.5 H 9.7 L 8.2 L 7.5 L  --       Interval Events/ROS 2/2/2023:    Current ASM/SEs: phenytoin 200mg qhs; SE peripheral neuropathy   Breakthrough seizures/events: none  Driving: yes  Folic acid: no  Sleep: fair; restless legs at night   Mood: good, would like to try decreasing sertraline to 50mg qhs     Last fall was 10/2022 at a football game, felt like she got overheated. No subsequent injury. Otherwise, no fever, no cold symptoms, no headache, no changes in vision, no new weakness, no chest pain, no shortness of breath, no nausea, no vomiting, no diarrhea, no constipation, no problems walking.     Interval Events/ROS 8/1/2022:     Tilt table test 7/15 notable for  orthostatic hypotension w/ no epileptiform abnormalities or electrographic seizures on EEG. No seizures on phenytoin 200mg qhs. Feels orthostatic symptoms have somewhat improved w/ electrolyte drinks and slow changes in position. No recent falls. Restless leg and peripheral neuropathy have been worse lately. Difficulty sleeping, increased sleep talking. Has noticed mild improvement in mood w/ sertraline. Reports neck pain described as soreness, does not radiate. Has not tried anything for pain. Otherwise, no fever, no cold symptoms, no headache, no changes in vision, no new weakness, no chest pain, no shortness of breath, no nausea, no vomiting, no diarrhea, no problems walking.    HPI 2/15/2022:     Age of first seizure:  (19yo)  Seizure Risk Factors: Left GBM (s/p XRT and Chemo) - in [place] [facility] (), no CNS infections, no family history of sz, term  with no prolonged hospitalization   Time of Last Seizure: ?, last nocturnal lip bite around [place]   # of lifetime Seizures: ?maybe 25   Frequency of Seizures: at most 3 seizures in one day, at the worst 1-2 monthly, currently none for years   Seizure Triggers: GBM initially, now unclear   Injuries/Hospitalization for seizures? No injuries, hospitalization twice -  with GBM, [facility]  [id number]  Driving? Yes, no issues   Pregnancy? 2 pregnancies, one baby born at 28 weeks   Bone Health: Osteopenia on Dexa at 2020      Auras:  Hearing voices, buzzing noise followed by someone's voice (no auditory hallucinations since surgery)     Seizure Events:   1.  Loss of awareness, zoning out, staring, decreased responsiveness, standing still or will run away   2. Generalized convulsion (-> gets put back on seizure medication)  3. New episodes, after sitting or lying, when she gets up, tingling of the feet which rises up her body, her legs give out and she might fall, this has been happening almost three years, hit ground, legs like noodles, but no LOC  "   4. Nocturnal episodes when she will wake up with a lip bite no other signs of seizures -> none since around 2005      Current AED/SEs:  1. Phenytoin 400 mg q.h.s. SE no major issues but does have osteopenia on 2020 scan    Previous AED/SEs or reason for DC.   1. Phenobarbital - cognitive slowing   2. Gabapentin - hands and feet tingle     EEG:  No EEG in the system, does not recall ever having one of these    MRI: 05/2021 brain:  Small left temporoparietal resection  Surveillance scans: no plan currently     Other Allergies: gabapentin caused tingling      AED compliance, adherence: no missed doses     ROS 2/15/2022:  Endo f/u for panhypopituitarism s/p surgery. Factor V Leiden on xarelto syndrome as well as IBS. 1 adult child; 1 child through IVF. Pulled muscle in chest. Some word hesitancy. Hearing aids. Generalized weakness. +nauseated. No vomiting. IBS. Hard to eat. No sense of smell or taste for 30 years. Two years after surgery -> under 100lbs. Unable to eat. Currently cold all the time. Alternating diarrhea and constipations.  Tired with SOB. Started last spring. Anemia. Disoriented in the dark.  Lightheadedness with position changes sometimes resulting in falls.  No changes in vision.  No problems swallowing.  No focal weakness.  Generalized weakness.    EXAM:   - Vitals: /71   Pulse 93   Ht 5' 7" (1.702 m)   Wt 59 kg (130 lb 1.1 oz)   LMP 10/23/1997 (Exact Date)   BMI 20.37 kg/m²    - General: Awake, cooperative, NAD, slim  - HEENT: NC/AT  - Neck:  Decreased range of motion. Dense muscle spasms.   - Pulmonary: no increased WOB  - Cardiac: well perfused   - Abdomen: soft, nontender, nondistended  - Extremities: no edema  - Skin: no rashes or lesions noted.     NEURO EXAM:   - Mental Status: Awake, alert, oriented x 3. Able to relate history without difficulty. Attentive to examiner. Language is fluent with intact repetition and comprehension. Normal prosody. There were no paraphasic errors. " Able to name both high and low frequency objects. Speech was not dysarthric. Able to follow both midline and appendicular commands. There was no evidence of apraxia or neglect.    - Cranial Nerves:  VFF to confrontation. EOMI with nystagmus and intermittent dysconjugate gaze. No facial droop.  Facial fullness.  Hearing intact to finger-rub bilaterally (with hearing aids in). 5/5 strength in trapezii and SCM bilaterally. Tongue protrudes in midline and to either side with no evidence of atrophy or weakness.    - Motor:  Decreased bulk and tone throughout. No pronator drift bilaterally. No adventitious movements such as tremor or asterixis noted.     Delt Bic Tri WrE WrF  FFl FE IO IP Quad Ham TA Gastroc    R   5     5    5    5    5        5   5    5   5    5        5     5      5            L   5      5    5   5    5        5    5   5    5    5       5     5      5              - Sensory: No deficits to light touch. No extinction to DSS.  - Coordination: No dysmetria on FNF .  - Gait:  Slow and somewhat labored, mildly wide-based. Romberg positive.    PLAN:  57-year-old woman with a GBM discovered at 17yo with subsequent resection without recurrence with secondary seizures well controlled with phenytoin 200mg q.h.s.. Labs/level today. Multiple complicated comorbid medical disorders including adrenal insufficiency, renal dysfunction, orthostatic hypotension, IBS, factor 5 requiring rivaroxaban, low body weight, and osteopenia. Tilt table test positive for orthostatic hypotension, no epileptiform abnormalities on EEG. Exercise, electrolyte drinks, slow changes in position. Continue to follow with PMR for management of chronic neuropathy. Declines physical therapy referral at this time. If she can build up some reserve, consider rapid transition to lacosamide in the EMU because of high risk of injury from breakthrough seizures in this vulnerable patient. Advised to reach out over the portal with any concerns. Patient  is comfortable with plan. All questions and concerns are addressed at this time. Follow up in about 6 months (around 11/22/2023).     Patient Instructions   You came to Epilepsy Clinic because of your seizure disorder. Your seizures are well controlled on phenytoin 200mg nightly. Please continue the same medication at the same dose.     Do not miss any doses of medication. If a dose of medication is missed, take it as soon as it is remembered even if that means doubling up on the dose. Please get a lab test to check out the blood level of medication. Get regular sleep. Go to sleep at the same time and wake up at the same time every day. People with epilepsy require more sleep than people without epilepsy.  Sleeping 10-12 hours a day can be normal for a person with epilepsy.  Every seizure makes it harder to prevent the next seizure. Epilepsy is associated with SUDEP, or sudden unexpected death in epilepsy.  The risk is significantly higher if convulsive seizures are not well controlled. For more information, check out these websites: https://www.epilepsy.com/, https://www.epilepsyallianceamer[place].org/, www.[place]-epilepsy.org, www.womenandepilepsy.org.     It is possible the eye pain you describe associated with light sensitivity may be migraine. For migraines, please write a headache diary with special attention to headache symptoms, durations, triggers, medications used and bring this to the next clinic visit. For posterior head and neck pain, try a rolled towel underneath your neck while you sleep for comfort. Ice/heat/massage. For pain, you can take over-the-counter an acetaminophen formulation like Excedrin, Tylenol, or Goody's powder (max dose 1 g every 8 hr). It is important that you do not take any of these medications more than one full day every 4 days (7 days a month).  If you take them more frequently than that, it can contribute to a medication overuse headache as well as other issues such as a GI upset  and/or kidney/liver injury.           Per [place] law, no episodes of loss of consciousness for 6 months before driving.  Avoid dangerous situations.  For example, no baths/pools alone, no heights, no power tools.  Wear a bike helmet.  If breakthrough seizures occur that are different in character, frequency, or duration from normal episodes, please patient portal me or call the office and we will decide the next steps. If multiple seizures occur in a row without return back to baseline, 911 needs to be called.     Return to clinic in 6 months or sooner with issues.  Please patient portal with any questions or concerns.    Cecile Walton PA-C   Neurology-Epilepsy  [facility]    Forms/Letters/Disability/DMV Paperwork: We understand the importance of filling out forms and providing letters in a timely manner.  However, many of these forms have very tricky language and once an official form is submitted as part of the medical record, it can not be modified or erased.  Please work with us in order to get these forms filled out in the most complete, accurate, and efficient way. 1.  Once you are aware that a form will need to be completed, please make an appointment.  A virtual appointment with Dr. Roca or Misti is perfectly fine. 2.  Please fill out the form as much as you can.  There are many questions that we do not have an answer for.  Please bring a blank copy of the form and your partially filled out form to the clinic visit or send them to us over the portal.  We will complete the form together with you during the clinic visit, sign it, and either return it to you or send it to the correct destination.  Every form will require an appointment however we can fill out multiple forms at once if needed.  Please do not hesitate to reach out with any questions or concerns about this policy.  We are trying to make sure that we have a system in place to meet this need which works for everyone  involved.  Thank you for your understanding.       Problem List Items Addressed This Visit          Neuro    Nonintractable epilepsy without status epilepticus - Primary    Peripheral polyneuropathy    Relevant Orders    VITAMIN B12    Phenytoin level, total       Psychiatric    Mood disorder due to medical condition       Cardiac/Vascular    Orthostatic hypotension       GI    IBS (irritable bowel syndrome)     Other Visit Diagnoses       Chronic bilateral low back pain with bilateral sciatica        Migraine without status migrainosus, not intractable, unspecified migraine type              Disclaimer: This note has been generated using voice-recognition software. There may be typographical errors that were missed during proof-reading.     LABS:  Recent Labs   Lab 11/29/21  1119 03/28/22  1311 03/29/22  1120 08/08/22  1057   WBC 6.92 4.63 4.77  --    Hemoglobin 10.6 L 10.0 L 10.8 L  --    Hematocrit 31.9 L 31.0 L 33.1 L  --    Platelets 341 273 290  --    Sodium 143 141  --  140   Potassium 5.1 4.9  --  5.0   BUN 23 H 29 H  --  31 H   Creatinine 1.42 H 1.51 H  --  1.39   eGFR if  48.3 A 44.5 A  --   --    eGFR if non  41.9 A 38.6 A  --   --    TSH <0.026 L  --   --   --        Recent Labs   Lab 02/02/23  1618   Phenytoin Lvl 6.2 L          IMAGING:  Recent imaging is personally reviewed with the patient.    Results for orders placed during the hospital encounter of 05/19/16    MRI Brain Without Contrast    Impression  No acute intracranial process.  Evidence of left craniotomy with focal area of postoperative encephalomalacia left posterior temporal lobe convexity.  Minor residual marginal T2 signal hyperintensity.    Minor periventricular and pontine white matter hyperintensity, usually related to small vessel ischemic degeneration.  ______________________________________    Electronically signed by: FRANSICO GRANT MD  Date:     05/19/16  Time:    11:08    Results for orders  placed during the hospital encounter of 05/28/21    MRI Brain W WO Contrast    Impression  Stable postsurgical change.  There is no evidence of recurrent or residual neoplasm.      Electronically signed by: Juan Guhtrie MD  Date:    05/28/2021  Time:    14:07    Results for orders placed during the hospital encounter of 06/01/20    DXA Bone Density Spine And Hip    Impression  Osteopenia is present.  Slight increase in bone density involving the spine and femoral necks bilaterally.      Electronically signed by: Sonam Valdovinos MD  Date:    06/01/2020  Time:    15:01    PAST MEDICAL HISTORY:   Active Ambulatory Problems     Diagnosis Date Noted    Panhypopituitarism 03/25/2015    IBS (irritable bowel syndrome) 03/25/2015    Nonintractable epilepsy without status epilepticus 03/25/2015    Allergic rhinitis due to pollen 04/28/2015    Peripheral polyneuropathy 03/16/2016    Other osteoporosis without current pathological fracture 03/16/2016    Sacroiliac joint pain 04/25/2016    Myofascial muscle pain 04/25/2016    Secondary adrenal insufficiency 05/05/2016    Secondary hypothyroidism 05/05/2016    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/2016    Glioblastoma multiforme of brain 09/25/2017    Ganglion cyst of dorsum of left wrist 04/12/2018    Mohs defect 11/08/2018    Mohs defect of left scalp 11/08/2018    CKD (chronic kidney disease), stage III 03/28/2019    Positive FIT (fecal immunochemical test) 04/23/2019    Left wrist pain 06/13/2019    Ganglion cyst 01/07/2020    Hyperkalemia 08/01/2021    Falls frequently 01/14/2022    Shortness of breath 01/14/2022    Dizziness 02/15/2022    Mood disorder due to medical condition 02/15/2022    Orthostatic hypotension 02/15/2022    Low body weight due to inadequate caloric intake 02/15/2022     Resolved Ambulatory Problems     Diagnosis Date Noted    Left elbow pain 03/25/2015    Left shoulder pain 03/25/2015    Thrombophilia 03/25/2015    Open wound of right great toe  2016    Impaired functional mobility, balance, gait, and endurance 2022    Decreased strength of upper extremity 2022    Neck pain, bilateral 2022    Impaired mobility and activities of daily living 2022    Neck pain, bilateral 2022     Past Medical History:   Diagnosis Date    Allergic rhinitis     Anemia     Arthritis     Basal cell carcinoma     Broken ankle     Cancer 1985    Clotting disorder     Disorder of kidney and ureter     DVT (deep venous thrombosis)     Fracture of elbow     Hypothyroidism     Osteoporosis     Seizures     Thyroid disease         PAST SURGICAL HISTORY:   Past Surgical History:   Procedure Laterality Date    APPENDECTOMY      BRAIN SURGERY      for glioblastoma      SECTION      CHOLECYSTECTOMY  2007    CLOSURE OF DEFECT OF MOHS PROCEDURE Left 2018    Procedure: CLOSURE, MOHS PROCEDURE DEFECT SCALP  Flap closure;  Surgeon: Yrn Novak MD;  [address];  Service: Plastics;  Laterality: Left;    COLONOSCOPY N/A 2019    Procedure: COLONOSCOPY Suprep;  Surgeon: Tony Mosher MD;  Location: [facility];  Service: Endoscopy;  Laterality: N/A;    ESOPHAGOGASTRODUODENOSCOPY N/A 2019    Procedure: EGD/colon;  Surgeon: Tony Mosher MD;  Location: [facility] ENDO;  Service: Endoscopy;  Laterality: N/A;    EXCISION OF GANGLION CYST OF HAND Left 2020    Procedure: EXCISION, GANGLION CYST, HAND;  Surgeon: Ross Drew Jr., MD;  Location: [facility] OR;  Service: Orthopedics;  Laterality: Left;    HYSTERECTOMY      TILT TABLE TEST N/A 7/15/2022    Procedure: TILT TABLE TEST;  Surgeon: Amol Kohler MD;  Location: [facility] EP LAB;  Service: Cardiology;  Laterality: N/A;  Orthostatic hypotension, dizziness, Tilt Table Test with EEG, Dr.Tiffany Roca (neuro), DM    TUBAL LIGATION          ALLERGIES: Neurontin [gabapentin], Lactose, and Soap   CURRENT MEDICATIONS:   Current Outpatient  Medications   Medication Sig Dispense Refill    acetaminophen (TYLENOL) 500 MG tablet Take 1-2 tablets (500-1,000 mg total) by mouth 3 (three) times daily as needed for Pain.  0    biotin 1 mg Cap Take by mouth.      DULoxetine (CYMBALTA) 30 MG capsule Take 1 capsule (30 mg total) by mouth once daily. In 1-2 weeks, if no relief, may increase dose to 2 capsules once daily. Call for refills. 30 capsule 1    ERGOCALCIFEROL, VITAMIN D2, (VITAMIN D ORAL) Take 1 capsule by mouth nightly.       ferrous sulfate 324 mg (65 mg iron) TbEC Take 325 mg by mouth nightly.       hydrocortisone (CORTEF) 10 MG Tab TAKE 1& 1/2 TABLETS BY MOUTH IN THE MORNING AND 1/2 TABLET IN THE AFTERNOON. DOUBLE THE DOSE IN CASE OF ILLNESS. 75 tablet 6    levothyroxine (SYNTHROID) 100 MCG tablet TAKE 1 TABLET BY MOUTH BEFORE BREAKFAST. 90 tablet 1    mupirocin (BACTROBAN) 2 % ointment 2 (two) times daily. Apply to affected area      phenytoin (DILANTIN) 100 MG ER capsule Take 2 capsules (200 mg total) by mouth nightly. 60 capsule 11    XARELTO 20 mg Tab TAKE 1 TABLET BY MOUTH EVERY DAY 90 tablet 4    dexamethasone (DECADRON) 4 mg/mL injection Inject 1 mL (4 mg total) into the muscle as needed (Signs and symtpoms of severe adrenal insufficiency). 3 ml syringe with 18 g needle  to draw  X 10 21 g 1 inch needle to inject x 10 1 mL 2    diclofenac sodium (VOLTAREN) 1 % Gel Apply 2 g topically 3 (three) times daily as needed (apply to affected joint).      loratadine-pseudoephedrine  mg (CLARITIN-D 24-HOUR)  mg per 24 hr tablet Take 1 tablet by mouth as needed.        No current facility-administered medications for this visit.        FAMILY HISTORY:   Family History   Problem Relation Age of Onset    Heart disease Mother          SOCIAL HISTORY:   Social History     Socioeconomic History    Marital status:    Tobacco Use    Smoking status: Never    Smokeless tobacco: Never   Substance and Sexual Activity    Alcohol use: No    Drug  use: No    Sexual activity: Not Currently   Social History Narrative     30 years, lives at home with .      Social Determinants of Health     Financial Resource Strain: Low Risk     Difficulty of Paying Living Expenses: Not hard at all   Food Insecurity: No Food Insecurity    Worried About Running Out of Food in the Last Year: Never true    Ran Out of Food in the Last Year: Never true   Transportation Needs: No Transportation Needs    Lack of Transportation (Medical): No    Lack of Transportation (Non-Medical): No   Physical Activity: Insufficiently Active    Days of Exercise per Week: 2 days    Minutes of Exercise per Session: 10 min   Stress: No Stress Concern Present    Feeling of Stress : Only a little   Social Connections: Unknown    Frequency of Communication with Friends and Family: More than three times a week    Frequency of Social Gatherings with Friends and Family: Once a week    Active Member of Clubs or Organizations: Yes    Attends Club or Organization Meetings: More than 4 times per year    Marital Status:    Housing Stability: Low Risk     Unable to Pay for Housing in the Last Year: No    Number of Places Lived in the Last Year: 1    Unstable Housing in the Last Year: No      Questions and concerns raised by the patient and family/care-giver(s) were addressed and they indicated understanding of everything discussed and agreed to plans as above.    Cecile Walton PA-C   Neurology-Epilepsy  [facility]    Collaborating physician, Dr. Lisa Roca, was available during today's encounter.     I spent approximately 60 minutes on the day of this encounter preparing to see the patient, obtaining and reviewing history and results, performing a medically appropriate exam, counseling and educating the patient/family/caregiver, documenting clinical information, coordinating care, and ordering medications, tests, procedures, and referrals.

## 2023-05-26 ENCOUNTER — PATIENT MESSAGE (OUTPATIENT)
Dept: NEUROLOGY | Facility: CLINIC | Age: 58
End: 2023-05-26
Payer: COMMERCIAL

## 2023-06-18 DIAGNOSIS — G62.9 PERIPHERAL POLYNEUROPATHY: ICD-10-CM

## 2023-06-18 DIAGNOSIS — G89.29 CHRONIC BILATERAL LOW BACK PAIN WITH BILATERAL SCIATICA: ICD-10-CM

## 2023-06-18 DIAGNOSIS — G89.29 CHRONIC NECK PAIN: ICD-10-CM

## 2023-06-18 DIAGNOSIS — M54.42 CHRONIC BILATERAL LOW BACK PAIN WITH BILATERAL SCIATICA: ICD-10-CM

## 2023-06-18 DIAGNOSIS — M54.41 CHRONIC BILATERAL LOW BACK PAIN WITH BILATERAL SCIATICA: ICD-10-CM

## 2023-06-18 DIAGNOSIS — M54.2 CHRONIC NECK PAIN: ICD-10-CM

## 2023-06-19 RX ORDER — DULOXETIN HYDROCHLORIDE 30 MG/1
CAPSULE, DELAYED RELEASE ORAL
Qty: 30 CAPSULE | Refills: 1 | Status: SHIPPED | OUTPATIENT
Start: 2023-06-19 | End: 2023-06-29 | Stop reason: SDUPTHER

## 2023-06-29 DIAGNOSIS — M54.2 CHRONIC NECK PAIN: ICD-10-CM

## 2023-06-29 DIAGNOSIS — M54.42 CHRONIC BILATERAL LOW BACK PAIN WITH BILATERAL SCIATICA: ICD-10-CM

## 2023-06-29 DIAGNOSIS — G89.29 CHRONIC BILATERAL LOW BACK PAIN WITH BILATERAL SCIATICA: ICD-10-CM

## 2023-06-29 DIAGNOSIS — G62.9 PERIPHERAL POLYNEUROPATHY: ICD-10-CM

## 2023-06-29 DIAGNOSIS — G89.29 CHRONIC NECK PAIN: ICD-10-CM

## 2023-06-29 DIAGNOSIS — M54.41 CHRONIC BILATERAL LOW BACK PAIN WITH BILATERAL SCIATICA: ICD-10-CM

## 2023-06-29 RX ORDER — DULOXETIN HYDROCHLORIDE 30 MG/1
CAPSULE, DELAYED RELEASE ORAL
Qty: 30 CAPSULE | Refills: 1 | Status: SHIPPED | OUTPATIENT
Start: 2023-06-29 | End: 2023-07-21 | Stop reason: SDUPTHER

## 2023-06-30 ENCOUNTER — PATIENT MESSAGE (OUTPATIENT)
Dept: PHYSICAL MEDICINE AND REHAB | Facility: CLINIC | Age: 58
End: 2023-06-30
Payer: COMMERCIAL

## 2023-07-14 ENCOUNTER — OFFICE VISIT (OUTPATIENT)
Dept: INTERNAL MEDICINE | Facility: CLINIC | Age: 58
End: 2023-07-14
Payer: COMMERCIAL

## 2023-07-14 VITALS
SYSTOLIC BLOOD PRESSURE: 122 MMHG | HEIGHT: 67 IN | HEART RATE: 111 BPM | WEIGHT: 132.5 LBS | BODY MASS INDEX: 20.8 KG/M2 | OXYGEN SATURATION: 98 % | DIASTOLIC BLOOD PRESSURE: 72 MMHG

## 2023-07-14 DIAGNOSIS — Z01.419 WELL WOMAN EXAM: ICD-10-CM

## 2023-07-14 DIAGNOSIS — D68.2 FACTOR V DEFICIENCY: ICD-10-CM

## 2023-07-14 DIAGNOSIS — Z85.841 PERSONAL HISTORY OF BRAIN TUMOR, MALIGNANT: ICD-10-CM

## 2023-07-14 DIAGNOSIS — K58.9 IRRITABLE BOWEL SYNDROME, UNSPECIFIED TYPE: ICD-10-CM

## 2023-07-14 DIAGNOSIS — N18.31 STAGE 3A CHRONIC KIDNEY DISEASE: ICD-10-CM

## 2023-07-14 DIAGNOSIS — G89.4 CHRONIC PAIN SYNDROME: ICD-10-CM

## 2023-07-14 DIAGNOSIS — I95.1 ORTHOSTATIC HYPOTENSION: ICD-10-CM

## 2023-07-14 DIAGNOSIS — Z00.00 ANNUAL PHYSICAL EXAM: Primary | ICD-10-CM

## 2023-07-14 PROCEDURE — 1160F PR REVIEW ALL MEDS BY PRESCRIBER/CLIN PHARMACIST DOCUMENTED: ICD-10-PCS | Mod: CPTII,S$GLB,, | Performed by: INTERNAL MEDICINE

## 2023-07-14 PROCEDURE — 3074F SYST BP LT 130 MM HG: CPT | Mod: CPTII,S$GLB,, | Performed by: INTERNAL MEDICINE

## 2023-07-14 PROCEDURE — 3008F PR BODY MASS INDEX (BMI) DOCUMENTED: ICD-10-PCS | Mod: CPTII,S$GLB,, | Performed by: INTERNAL MEDICINE

## 2023-07-14 PROCEDURE — 3078F PR MOST RECENT DIASTOLIC BLOOD PRESSURE < 80 MM HG: ICD-10-PCS | Mod: CPTII,S$GLB,, | Performed by: INTERNAL MEDICINE

## 2023-07-14 PROCEDURE — 99999 PR PBB SHADOW E&M-EST. PATIENT-LVL V: CPT | Mod: PBBFAC,,, | Performed by: INTERNAL MEDICINE

## 2023-07-14 PROCEDURE — 1159F MED LIST DOCD IN RCRD: CPT | Mod: CPTII,S$GLB,, | Performed by: INTERNAL MEDICINE

## 2023-07-14 PROCEDURE — 1159F PR MEDICATION LIST DOCUMENTED IN MEDICAL RECORD: ICD-10-PCS | Mod: CPTII,S$GLB,, | Performed by: INTERNAL MEDICINE

## 2023-07-14 PROCEDURE — 99999 PR PBB SHADOW E&M-EST. PATIENT-LVL V: ICD-10-PCS | Mod: PBBFAC,,, | Performed by: INTERNAL MEDICINE

## 2023-07-14 PROCEDURE — 1160F RVW MEDS BY RX/DR IN RCRD: CPT | Mod: CPTII,S$GLB,, | Performed by: INTERNAL MEDICINE

## 2023-07-14 PROCEDURE — 99396 PR PREVENTIVE VISIT,EST,40-64: ICD-10-PCS | Mod: S$GLB,,, | Performed by: INTERNAL MEDICINE

## 2023-07-14 PROCEDURE — 99396 PREV VISIT EST AGE 40-64: CPT | Mod: S$GLB,,, | Performed by: INTERNAL MEDICINE

## 2023-07-14 PROCEDURE — 3078F DIAST BP <80 MM HG: CPT | Mod: CPTII,S$GLB,, | Performed by: INTERNAL MEDICINE

## 2023-07-14 PROCEDURE — 3008F BODY MASS INDEX DOCD: CPT | Mod: CPTII,S$GLB,, | Performed by: INTERNAL MEDICINE

## 2023-07-14 PROCEDURE — 3074F PR MOST RECENT SYSTOLIC BLOOD PRESSURE < 130 MM HG: ICD-10-PCS | Mod: CPTII,S$GLB,, | Performed by: INTERNAL MEDICINE

## 2023-07-14 RX ORDER — HYOSCYAMINE SULFATE 0.12 MG/1
0.12 TABLET SUBLINGUAL EVERY 4 HOURS PRN
Qty: 120 TABLET | Refills: 3 | Status: SHIPPED | OUTPATIENT
Start: 2023-07-14 | End: 2023-08-28

## 2023-07-14 NOTE — PROGRESS NOTES
"    CHIEF COMPLAINT     Chief Complaint   Patient presents with    Annual Exam       HPI     Thelma Nguyen is a 57 y.o. female history of glioblastoma s/p resection, complicated by panhypopituitarism and neuropathy, IBS, CKD 3, depression, hx of skin cancer and factor 5 leiden here today for new patient visit      Personally Reviewed Patient's Medical, surgical, family and social hx. Changes updated in [facility].  Care Team updated in Epic    Review of Systems:  Review of Systems   Constitutional:  Positive for fatigue.   Gastrointestinal:  Positive for diarrhea.   Musculoskeletal:  Positive for arthralgias and gait problem.   Neurological:  Positive for light-headedness and headaches.     Health Maintenance:   Reviewed with patient  Due for the following:      PHYSICAL EXAM     /72 (BP Location: Right arm, Patient Position: Sitting, BP Method: Medium (Manual))   Pulse (!) 111   Ht 5' 7" (1.702 m)   Wt 60.1 kg (132 lb 7.9 oz)   LMP 10/23/1997 (Exact Date)   SpO2 98%   BMI 20.75 kg/m²     Gen: Well Appearing, NAD  HEENT: PERR, EOMI  Neck: FROM, no thyromegaly, no cervical adenopathy  CVD: RRR, no M/R/G  Pulm: Normal work of breathing, CTAB, no wheezing  Abd:  Soft, NT, ND non TTP, no mass  MSK: no LE edema  Neuro: A&Ox3, gait normal, speech normal  Mood; Mood normal, behavior normal, thought process linear       LABS     Labs reviewed;   Lab Results   Component Value Date    WBC 4.77 03/29/2022    HGB 10.8 (L) 03/29/2022    HCT 33.1 (L) 03/29/2022    MCV 95 03/29/2022     03/29/2022         Chemistry        Component Value Date/Time     08/08/2022 1057    K 5.0 08/08/2022 1057     08/08/2022 1057    CO2 28 08/08/2022 1057    BUN 31 (H) 08/08/2022 1057    BUN 25 (H) 12/01/2014 1325    CREATININE 1.39 08/08/2022 1057     08/08/2022 1057        Component Value Date/Time    CALCIUM 8.1 (L) 08/08/2022 1057    ALKPHOS 89 11/29/2021 1119    ALKPHOS 124 12/01/2014 1325    AST 21 " 11/29/2021 1119    AST 31 03/16/2016 1535    ALT 14 11/29/2021 1119    BILITOT 0.4 11/29/2021 1119    ESTGFRAFRICA 44.5 (A) 03/28/2022 1311    EGFRNONAA 38.6 (A) 03/28/2022 1311        No results found for: LABA1C, HGBA1C  Lab Results   Component Value Date    LDLCALC 116.0 03/28/2022       ASSESSMENT     1. Annual physical exam  Ambulatory referral/consult to Obstetrics / Gynecology    Ambulatory referral/consult to Nutrition Services    hyoscyamine (LEVSIN/SL) 0.125 mg Subl    [facility]ulatory referral[facility]ult to [facility]      2. Personal history of brain tumor, malignant      Glioblastoma      3. Factor V deficiency with DVT x 3        4. Well woman exam  Ambulatory referral/consult to Obstetrics / Gynecology      5. Orthostatic hypotension        6. Irritable bowel syndrome, unspecified type  Ambulatory referral/consult to Nutrition Services    hyoscyamine (LEVSIN/SL) 0.125 mg Subl      7. Stage 3a chronic kidney disease  Ambulatory referral/consult to Nutrition Services      8. Chronic pain syndrome  Ambulatory referral[facility]consult to [facility]              Plan     Thelma Nguyen is a 57 y.o. female with istory of glioblastoma s/p resection, complicated by panhypopituitarism and neuropathy, IBS, CKD 3, depression, hx of skin cancer and factor 5 leiden here today for annual exam    1. Annual physical exam  Updated problem list, medical history, care team and discussed HM.     2. Personal history of brain tumor, malignant  Not actively being treated.  Complications of seizure and panhypopituitarism treated by Neurology and Endocrinology    3. Factor V deficiency with DVT x 3   On Xarelto    4. Well woman exam  - Ambulatory referral/consult to Obstetrics / Gynecology; Future    5. Orthostatic hypotension   On Cortef    6. Irritable bowel syndrome, unspecified type   Will sending nutrition is in therapeutic trial of Levsin  - Ambulatory referral/consult to Nutrition  Services; Future  - hyoscyamine (LEVSIN/SL) 0.125 mg Subl; Place 1 tablet (0.125 mg total) under the tongue every 4 (four) hours as needed.  Dispense: 120 tablet; Refill: 3    7. Stage 3a chronic kidney disease   Follows with nephrology  - Ambulatory referral/consult to Nutrition Services; Future    8. Chronic pain syndrome  Think she'd be a good candidate for [facility] clinic to improve functional status 2/2 comorbid conditions  - Ambulatory referral/consult to [facility]; Future      Zachary Bender MD

## 2023-07-19 ENCOUNTER — OFFICE VISIT (OUTPATIENT)
Dept: OBSTETRICS AND GYNECOLOGY | Facility: CLINIC | Age: 58
End: 2023-07-19
Payer: COMMERCIAL

## 2023-07-19 VITALS
SYSTOLIC BLOOD PRESSURE: 101 MMHG | WEIGHT: 133.19 LBS | DIASTOLIC BLOOD PRESSURE: 61 MMHG | BODY MASS INDEX: 20.86 KG/M2

## 2023-07-19 DIAGNOSIS — Z00.00 ANNUAL PHYSICAL EXAM: ICD-10-CM

## 2023-07-19 DIAGNOSIS — N95.2 ATROPHIC VAGINITIS: Primary | ICD-10-CM

## 2023-07-19 DIAGNOSIS — Z01.419 WELL WOMAN EXAM: ICD-10-CM

## 2023-07-19 PROCEDURE — 3074F SYST BP LT 130 MM HG: CPT | Mod: CPTII,S$GLB,, | Performed by: OBSTETRICS & GYNECOLOGY

## 2023-07-19 PROCEDURE — 3074F PR MOST RECENT SYSTOLIC BLOOD PRESSURE < 130 MM HG: ICD-10-PCS | Mod: CPTII,S$GLB,, | Performed by: OBSTETRICS & GYNECOLOGY

## 2023-07-19 PROCEDURE — 1160F RVW MEDS BY RX/DR IN RCRD: CPT | Mod: CPTII,S$GLB,, | Performed by: OBSTETRICS & GYNECOLOGY

## 2023-07-19 PROCEDURE — 3078F DIAST BP <80 MM HG: CPT | Mod: CPTII,S$GLB,, | Performed by: OBSTETRICS & GYNECOLOGY

## 2023-07-19 PROCEDURE — 99999 PR PBB SHADOW E&M-EST. PATIENT-LVL IV: ICD-10-PCS | Mod: PBBFAC,,, | Performed by: OBSTETRICS & GYNECOLOGY

## 2023-07-19 PROCEDURE — 3008F BODY MASS INDEX DOCD: CPT | Mod: CPTII,S$GLB,, | Performed by: OBSTETRICS & GYNECOLOGY

## 2023-07-19 PROCEDURE — 99386 PREV VISIT NEW AGE 40-64: CPT | Mod: S$GLB,,, | Performed by: OBSTETRICS & GYNECOLOGY

## 2023-07-19 PROCEDURE — 3078F PR MOST RECENT DIASTOLIC BLOOD PRESSURE < 80 MM HG: ICD-10-PCS | Mod: CPTII,S$GLB,, | Performed by: OBSTETRICS & GYNECOLOGY

## 2023-07-19 PROCEDURE — 1159F PR MEDICATION LIST DOCUMENTED IN MEDICAL RECORD: ICD-10-PCS | Mod: CPTII,S$GLB,, | Performed by: OBSTETRICS & GYNECOLOGY

## 2023-07-19 PROCEDURE — 88175 CYTOPATH C/V AUTO FLUID REDO: CPT | Performed by: OBSTETRICS & GYNECOLOGY

## 2023-07-19 PROCEDURE — 1159F MED LIST DOCD IN RCRD: CPT | Mod: CPTII,S$GLB,, | Performed by: OBSTETRICS & GYNECOLOGY

## 2023-07-19 PROCEDURE — 1160F PR REVIEW ALL MEDS BY PRESCRIBER/CLIN PHARMACIST DOCUMENTED: ICD-10-PCS | Mod: CPTII,S$GLB,, | Performed by: OBSTETRICS & GYNECOLOGY

## 2023-07-19 PROCEDURE — 99386 PR PREVENTIVE VISIT,NEW,40-64: ICD-10-PCS | Mod: S$GLB,,, | Performed by: OBSTETRICS & GYNECOLOGY

## 2023-07-19 PROCEDURE — 99999 PR PBB SHADOW E&M-EST. PATIENT-LVL IV: CPT | Mod: PBBFAC,,, | Performed by: OBSTETRICS & GYNECOLOGY

## 2023-07-19 PROCEDURE — 3008F PR BODY MASS INDEX (BMI) DOCUMENTED: ICD-10-PCS | Mod: CPTII,S$GLB,, | Performed by: OBSTETRICS & GYNECOLOGY

## 2023-07-19 NOTE — PROGRESS NOTES
CC: Annual check-up    SUBJECTIVE:   57 y.o. female   for annual routine Pap and checkup. Patient's last menstrual period was 10/23/1997 (exact date)..  She has no unusual complaints and has vaginal dryness and atrophy  Former pt of [facility], had LSH/BSO in 30's for endometriosis.    H/o Factor 5 lieden deff and had a DVT    Past Medical History:   Diagnosis Date    Allergic rhinitis     Anemia     Arthritis     Basal cell carcinoma     Broken ankle     Cancer     Clotting disorder     Disorder of kidney and ureter     DVT (deep venous thrombosis)     Factor V deficiency     Fracture of elbow     left    Glioblastoma multiforme of brain     Hypothyroidism     IBS (irritable bowel syndrome)     Osteoporosis     Panhypopituitarism     Peripheral polyneuropathy     Secondary adrenal insufficiency     Seizures     Thyroid disease      Past Surgical History:   Procedure Laterality Date    APPENDECTOMY      BRAIN SURGERY      for glioblastoma      SECTION      CHOLECYSTECTOMY      CLOSURE OF DEFECT OF MOHS PROCEDURE Left 2018    Procedure: CLOSURE, MOHS PROCEDURE DEFECT SCALP  Flap closure;  Surgeon: Yrn Novak MD;  [address];  Service: Plastics;  Laterality: Left;    COLONOSCOPY N/A 2019    Procedure: COLONOSCOPY Suprep;  Surgeon: Tony Mosher MD;  Location: [facility];  Service: Endoscopy;  Laterality: N/A;    ESOPHAGOGASTRODUODENOSCOPY N/A 2019    Procedure: EGD/colon;  Surgeon: Tony Mosher MD;  Location: [facility] ENDO;  Service: Endoscopy;  Laterality: N/A;    EXCISION OF GANGLION CYST OF HAND Left 2020    Procedure: EXCISION, GANGLION CYST, HAND;  Surgeon: Ross Drew Jr., MD;  Location: [facility] OR;  Service: Orthopedics;  Laterality: Left;    HYSTERECTOMY      TILT TABLE TEST N/A 7/15/2022    Procedure: TILT TABLE TEST;  Surgeon: Amol Kohler MD;  Location: [facility] EP LAB;  Service: Cardiology;   Laterality: N/A;  Orthostatic hypotension, dizziness, Tilt Table Test with EEG, Dr.Tiffany Roca (neuro), DM    TUBAL LIGATION       Social History     Socioeconomic History    Marital status:     Number of children: 2   Occupational History    Occupation:    Tobacco Use    Smoking status: Never    Smokeless tobacco: Never   Substance and Sexual Activity    Alcohol use: No    Drug use: No    Sexual activity: Not Currently   Social History Narrative     29 years, lives at home with .      Social Determinants of Health     Financial Resource Strain: Low Risk     Difficulty of Paying Living Expenses: Not hard at all   Food Insecurity: No Food Insecurity    Worried About Running Out of Food in the Last Year: Never true    Ran Out of Food in the Last Year: Never true   Transportation Needs: No Transportation Needs    Lack of Transportation (Medical): No    Lack of Transportation (Non-Medical): No   Physical Activity: Insufficiently Active    Days of Exercise per Week: 2 days    Minutes of Exercise per Session: 10 min   Stress: No Stress Concern Present    Feeling of Stress : Only a little   Social Connections: Unknown    Frequency of Communication with Friends and Family: More than three times a week    Frequency of Social Gatherings with Friends and Family: Once a week    Active Member of Clubs or Organizations: Yes    Attends Club or Organization Meetings: More than 4 times per year    Marital Status:    Housing Stability: Low Risk     Unable to Pay for Housing in the Last Year: No    Number of Places Lived in the Last Year: 1    Unstable Housing in the Last Year: No     Family History   Problem Relation Age of Onset    Heart disease Mother     Diabetes type II Mother     Diabetes type II Father     Heart attack Maternal Grandfather      OB History    Para Term  AB Living   1 1 1         SAB IAB Ectopic Multiple Live Births                  # Outcome Date GA  Lbr Jeronimo/2nd Weight Sex Delivery Anes PTL Lv   1 Term      CS-Unspec            Current Outpatient Medications   Medication Sig Dispense Refill    acetaminophen (TYLENOL) 500 MG tablet Take 1-2 tablets (500-1,000 mg total) by mouth 3 (three) times daily as needed for Pain.  0    biotin 1 mg Cap Take by mouth.      dexamethasone (DECADRON) 4 mg/mL injection Inject 1 mL (4 mg total) into the muscle as needed (Signs and symtpoms of severe adrenal insufficiency). 3 ml syringe with 18 g needle  to draw  X 10 21 g 1 inch needle to inject x 10 1 mL 2    diclofenac sodium (VOLTAREN) 1 % Gel Apply 2 g topically 3 (three) times daily as needed (apply to affected joint).      DULoxetine (CYMBALTA) 30 MG capsule TAKE 1 CAPSULE BY MOUTH EVERY DAY IN 1 TO 2 WEEKS. IF NO RELIEF, MAY INCREASE DOSE TO 2 CAPSULES DAILY. CALL FOR REFILLS. 30 capsule 1    ERGOCALCIFEROL, VITAMIN D2, (VITAMIN D ORAL) Take 1 capsule by mouth nightly.       ferrous sulfate 324 mg (65 mg iron) TbEC Take 325 mg by mouth nightly.       hyoscyamine (LEVSIN/SL) 0.125 mg Subl Place 1 tablet (0.125 mg total) under the tongue every 4 (four) hours as needed. 120 tablet 3    levothyroxine (SYNTHROID) 100 MCG tablet TAKE 1 TABLET BY MOUTH EVERY DAY BEFORE BREAKFAST 90 tablet 0    loratadine-pseudoephedrine  mg (CLARITIN-D 24-HOUR)  mg per 24 hr tablet Take 1 tablet by mouth as needed.       mupirocin (BACTROBAN) 2 % ointment 2 (two) times daily. Apply to affected area      phenytoin (DILANTIN) 100 MG ER capsule Take 2 capsules (200 mg total) by mouth nightly. 60 capsule 11    XARELTO 20 mg Tab TAKE 1 TABLET BY MOUTH EVERY DAY 90 tablet 4    hydrocortisone (CORTEF) 10 MG Tab TAKE 1& 1/2 TABLETS BY MOUTH IN THE MORNING AND 1/2 TABLET IN THE AFTERNOON. DOUBLE THE DOSE IN CASE OF ILLNESS. 75 tablet 6     No current facility-administered medications for this visit.     Allergies: Neurontin [gabapentin], Lactose, and Soap     ROS:  Constitutional: no weight  loss, weight gain, fever, fatigue  Eyes:  No vision changes, glasses/contacts  ENT/Mouth: No ulcers, sinus problems, ears ringing, headache  Cardiovascular: No inability to lie flat, chest pain, exercise intolerance, swelling, heart palpitations  Respiratory: No wheezing, coughing blood, shortness of breath, or cough  Gastrointestinal: No diarrhea, bloody stool, nausea/vomiting, constipation, gas, hemorrhoids  Genitourinary: No blood in urine, painful urination, urgency of urination, frequency of urination, incomplete emptying, incontinence, abnormal bleeding, painful periods, heavy periods, vaginal discharge, vaginal odor, painful intercourse, sexual problems, bleeding after intercourse.  Musculoskeletal: No muscle weakness  Skin/Breast: No painful breasts, nipple discharge, masses, rash, ulcers  Neurological: No passing out, seizures, numbness, headache  Endocrine: No diabetes, hypothyroid, hyperthyroid, hot flashes, hair loss, abnormal hair growth, ance  Psychiatric: No depression, crying  Hematologic: No bruises, bleeding, swollen lymph nodes, anemia.      OBJECTIVE:   The patient appears well, alert, oriented x 3, in no distress.  /61   Wt 60.4 kg (133 lb 3.2 oz)   LMP 10/23/1997 (Exact Date)   BMI 20.86 kg/m²   NECK: no thyromegaly, trachea midline  SKIN: no acne, striae, hirsutism  BREAST EXAM: breasts appear normal, no suspicious masses, no skin or nipple changes or axillary nodes, symmetric fibrous changes in both upper outer quadrants  ABDOMEN: no hernias, masses, or hepatosplenomegaly  GENITALIA: normal external genitalia, no erythema, no discharge  URETHRA: normal urethra, normal urethral meatus  VAGINA: mucosal atrophy  CERVIX: no lesions or cervical motion tenderness  UTERUS: uterus absent  ADNEXA: no mass, fullness, tenderness      ASSESSMENT:   well woman  1. Well woman exam    2. Annual physical exam        PLAN:   mammogram  pap smear  return annually or prn

## 2023-07-21 ENCOUNTER — NUTRITION (OUTPATIENT)
Dept: NUTRITION | Facility: CLINIC | Age: 58
End: 2023-07-21
Payer: COMMERCIAL

## 2023-07-21 VITALS — HEIGHT: 67 IN | BODY MASS INDEX: 20.87 KG/M2 | WEIGHT: 132.94 LBS

## 2023-07-21 DIAGNOSIS — M54.41 CHRONIC BILATERAL LOW BACK PAIN WITH BILATERAL SCIATICA: ICD-10-CM

## 2023-07-21 DIAGNOSIS — G89.29 CHRONIC NECK PAIN: ICD-10-CM

## 2023-07-21 DIAGNOSIS — N18.31 STAGE 3A CHRONIC KIDNEY DISEASE: ICD-10-CM

## 2023-07-21 DIAGNOSIS — K58.9 IRRITABLE BOWEL SYNDROME, UNSPECIFIED TYPE: Primary | ICD-10-CM

## 2023-07-21 DIAGNOSIS — M54.2 CHRONIC NECK PAIN: ICD-10-CM

## 2023-07-21 DIAGNOSIS — Z71.3 DIETARY COUNSELING: ICD-10-CM

## 2023-07-21 DIAGNOSIS — M54.42 CHRONIC BILATERAL LOW BACK PAIN WITH BILATERAL SCIATICA: ICD-10-CM

## 2023-07-21 DIAGNOSIS — G89.29 CHRONIC BILATERAL LOW BACK PAIN WITH BILATERAL SCIATICA: ICD-10-CM

## 2023-07-21 DIAGNOSIS — Z00.00 ANNUAL PHYSICAL EXAM: ICD-10-CM

## 2023-07-21 DIAGNOSIS — G62.9 PERIPHERAL POLYNEUROPATHY: ICD-10-CM

## 2023-07-21 PROCEDURE — 97802 PR MED NUTR THER, 1ST, INDIV, EA 15 MIN: ICD-10-PCS | Mod: S$GLB,,, | Performed by: DIETITIAN, REGISTERED

## 2023-07-21 PROCEDURE — 97802 MEDICAL NUTRITION INDIV IN: CPT | Mod: S$GLB,,, | Performed by: DIETITIAN, REGISTERED

## 2023-07-21 PROCEDURE — 99999 PR PBB SHADOW E&M-EST. PATIENT-LVL III: CPT | Mod: PBBFAC,,,

## 2023-07-21 PROCEDURE — 99999 PR PBB SHADOW E&M-EST. PATIENT-LVL III: ICD-10-PCS | Mod: PBBFAC,,,

## 2023-07-21 NOTE — PATIENT INSTRUCTIONS
Low sodium, renal, low fat diet.  Eat meals and snacks on a regular schedule.  Aim for 5 or 6 small meals/snacks each day.  Do not skip meals.  Increase fiber in your eating plan gradually  Aim to consume 6 to 8 cups of water in addition to other beverages.  Low fat diet; avoid fried foods and foods prepared with added fat.  Limit acidic, spicy, fried or greasy foods, caffeine, and mint if you have symptoms of reflux (heartburn) or GERD

## 2023-07-21 NOTE — PROGRESS NOTES
"Referring Physician:Zachary Bender MD     Reason for visit:  Chief Complaint   Patient presents with    Irritable Bowel Syndrome    Chronic Kidney Disease    Nutrition Counseling      Initial Visit    :1965     Allergies Reviewed  Meds Reviewed    Anthropometrics  Weight:60.3 kg (132 lb 15 oz)  Height:5' 7" (1.702 m)  BMI:Body mass index is 20.82 kg/m².   IBW: 61.6 kg  +/-10%    Meds:  Outpatient Medications Prior to Visit   Medication Sig Dispense Refill    acetaminophen (TYLENOL) 500 MG tablet Take 1-2 tablets (500-1,000 mg total) by mouth 3 (three) times daily as needed for Pain.  0    biotin 1 mg Cap Take by mouth.      dexamethasone (DECADRON) 4 mg/mL injection Inject 1 mL (4 mg total) into the muscle as needed (Signs and symtpoms of severe adrenal insufficiency). 3 ml syringe with 18 g needle  to draw  X 10 21 g 1 inch needle to inject x 10 1 mL 2    diclofenac sodium (VOLTAREN) 1 % Gel Apply 2 g topically 3 (three) times daily as needed (apply to affected joint).      DULoxetine (CYMBALTA) 30 MG capsule TAKE 1 CAPSULE BY MOUTH EVERY DAY IN 1 TO 2 WEEKS. IF NO RELIEF, MAY INCREASE DOSE TO 2 CAPSULES DAILY. CALL FOR REFILLS. 30 capsule 1    ERGOCALCIFEROL, VITAMIN D2, (VITAMIN D ORAL) Take 1 capsule by mouth nightly.       ferrous sulfate 324 mg (65 mg iron) TbEC Take 325 mg by mouth nightly.       hydrocortisone (CORTEF) 10 MG Tab TAKE 1& 1/2 TABLETS BY MOUTH IN THE MORNING AND 1/2 TABLET IN THE AFTERNOON. DOUBLE THE DOSE IN CASE OF ILLNESS. 75 tablet 6    hyoscyamine (LEVSIN/SL) 0.125 mg Subl Place 1 tablet (0.125 mg total) under the tongue every 4 (four) hours as needed. 120 tablet 3    levothyroxine (SYNTHROID) 100 MCG tablet TAKE 1 TABLET BY MOUTH EVERY DAY BEFORE BREAKFAST 90 tablet 0    loratadine-pseudoephedrine  mg (CLARITIN-D 24-HOUR)  mg per 24 hr tablet Take 1 tablet by mouth as needed.       mupirocin (BACTROBAN) 2 % ointment 2 (two) times daily. Apply to affected area      " "phenytoin (DILANTIN) 100 MG ER capsule Take 2 capsules (200 mg total) by mouth nightly. 60 capsule 11    XARELTO 20 mg Tab TAKE 1 TABLET BY MOUTH EVERY DAY 90 tablet 4     No facility-administered medications prior to visit.       Food/Drug Interactions Noted:  n/a    Vitamins/Supplements/Herbs:  see med list    Labs: reviewed     Nutrition Prescription: 1848 Kcals/day( 30 kcal/kg IBW),  49-62 g protein( 0.8-1.0 g/kg IBW)     Support System:  pt states she and her  grocery shop, and he does the cooking.  Works as a ; some days eats lunch with coworker.      Diet Hx:  pt states she has a remote history of "anorexia" in her 20's.  States she has not sense of taste or smell; eats by "texture".  Some days is not hungry and doesn't eat.  Is followed by nephrology; avoids bananas.   States she is confused about what to eat with her complex medical history (IBS/CKD III)    Beverages:  sweet tea; soda; water.  Snacks:  Kind bar; fruit/nut mix (she brought samples today and we reviewed the food labels)    Breakfast:  would like to drink Smoothie Edgar coffee smoothie every day, but it makes her feel bloated.  Does eat lowfat greek yogurt with berries.  Lunch: if she eats:  ham sandwich or [employer] ham or chicken teriyaki sandwich.  Dinner:   last night had leftover [place] sub    Current activity level and/or physical limitations:   did not discuss exercise    Motivation to make changes/anticipated barriers and/or expected adherence:  no barriers to adherence identified    Nutrition-Focus Physical Findings:  pt appears well nourished    Assessment:  pt very attentive and asked numerous relevant questions about foods/beverages recommended and to avoid; reading food labels; grocery shopping and cooking tips; importance of not skipping meals; keeping a food journal to document symptoms/food triggers.  All of her questions were answered, and she verbalized understanding of information.    Nutrition Diagnosis: " Food- and nutrition-related knowledge deficit RT lack of prior exposure to information AEB dx IBS/CKD III    Recommendations:    Low sodium, renal, low fat diet.  Eat meals and snacks on a regular schedule.  Aim for 5 or 6 small meals/snacks each day.  Do not skip meals.  Increase fiber in your eating plan gradually  Aim to consume 6 to 8 cups of water in addition to other beverages.  Low fat diet; avoid fried foods and foods prepared with added fat.  Limit acidic, spicy, fried or greasy foods, caffeine, and mint if you have symptoms of reflux (heartburn) or GERD    Handouts provided & reviewed:  IBS Nutrition Therapy; Reading a Food Label; Potassium Content of Foods    Strategies Implemented:  keep food journal; do not skip meals/snacks    Consultation Time:45 minutes.  Communicated with referring healthcare provider:  Consult note available in pt's Epic chart per MD discretion  Follow Up:  Pt provided with dietitian contact number and advised to call with questions or make future appointment if further intervention needed.  Pt states she would like to schedule a follow-up appointment; directed to check-out.

## 2023-07-24 ENCOUNTER — OFFICE VISIT (OUTPATIENT)
Dept: PAIN MEDICINE | Facility: OTHER | Age: 58
End: 2023-07-24
Payer: COMMERCIAL

## 2023-07-24 DIAGNOSIS — F06.30 MOOD DISORDER DUE TO MEDICAL CONDITION: ICD-10-CM

## 2023-07-24 DIAGNOSIS — M54.16 LUMBAR RADICULOPATHY: ICD-10-CM

## 2023-07-24 DIAGNOSIS — G62.9 PERIPHERAL POLYNEUROPATHY: ICD-10-CM

## 2023-07-24 DIAGNOSIS — G89.4 CHRONIC PAIN SYNDROME: Primary | ICD-10-CM

## 2023-07-24 DIAGNOSIS — R26.9 ABNORMALITY OF GAIT: ICD-10-CM

## 2023-07-24 DIAGNOSIS — R68.89 DECREASED MOTOR ACTIVITY: ICD-10-CM

## 2023-07-24 PROCEDURE — 99215 PR OFFICE/OUTPT VISIT, EST, LEVL V, 40-54 MIN: ICD-10-PCS | Mod: ,,, | Performed by: NURSE PRACTITIONER

## 2023-07-24 PROCEDURE — 1160F RVW MEDS BY RX/DR IN RCRD: CPT | Mod: CPTII,,, | Performed by: NURSE PRACTITIONER

## 2023-07-24 PROCEDURE — 1159F PR MEDICATION LIST DOCUMENTED IN MEDICAL RECORD: ICD-10-PCS | Mod: CPTII,,, | Performed by: NURSE PRACTITIONER

## 2023-07-24 PROCEDURE — 1159F MED LIST DOCD IN RCRD: CPT | Mod: CPTII,,, | Performed by: NURSE PRACTITIONER

## 2023-07-24 PROCEDURE — 1160F PR REVIEW ALL MEDS BY PRESCRIBER/CLIN PHARMACIST DOCUMENTED: ICD-10-PCS | Mod: CPTII,,, | Performed by: NURSE PRACTITIONER

## 2023-07-24 PROCEDURE — 99215 OFFICE O/P EST HI 40 MIN: CPT | Mod: ,,, | Performed by: NURSE PRACTITIONER

## 2023-07-24 NOTE — PROGRESS NOTES
[facility]    Initial Medical Screening Visit Note    Subjective:       Chief Complaint Requiring Rehabilitation: Chronic Pain    Consulted by: Zachary Bender MD      HPI:     Thelma Nguyen is a 57 y.o. female who presents today for the [facility] Medical Screening Visit. Thelma Nguyen was referred by Zachary Bender MD with associated diagnosis of chronic pain.     She began having low back pain approximately 10 years ago. She was diagnosed with an anular tear in the disc at that time. Her pain would be worse with bending. She had episodes of her back locking up on her. She was treated conservatively, including NSAIDs. She was later diagnosed with CKD. She stopped NSAIDs which did cause worsened back pain. She had a series of injections in 2014 with benefit. She has intermittent episodes of low back pain. She does have radiating pain into the right leg. Her pain is worse with prolonged sitting and standing. She does find relief with bending forward. Of note, she has a past medical history of glioblastoma at age 19. 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 She also has neuropathy to the legs. She was recently diagnosed with orthostatic hypotension. See below for additional details.        Chronic Pain History:      Ambulatory status:  Fully ambulatory, gets out of breath intermittently, negative cardiology/pulmonology        Balance problems?  Yes, vestibular exercises with PT      Fainting/Syncope/POTS?  Yes, October 2022.   Tilt table test shows Orthostatic Hypotension      Physical Therapy?  Completed in 2022      Exercise Habits?  No       Alternative/Complementary Therapies (massage, yoga, shailesh chi, acupuncture, guided imagery, chiropractic care, hypnosis, biofeedback, herbs, supplements, dietary approaches)?  TENs unit  Yoga- made her imbalance worse  Massage- possibly helpful, not consistent      Current pain  medications:  Tylenol   Cymbalta       Pain management injections:  Previous ESIs 8 years ago      Relevant surgeries:  - Brain surgery for tumor resection     Any upcoming surgeries or procedures? No       Working/Employed?   for [employer]       Sleep: difficulty falling and staying asleep (intermittent)     KAMILA? No      Sleep Aids? No       Mental Health Hx/Tx  Endorses anxiety.     Stress/Stress Mgmt comments: word[employer], jigsaw puzzles, soduku     Inpatient Psychiatric Tx? No     SI? No       Social Hx/Connections:     2 children- was initially told she would not be able to have children. Did IVF with friend as a surrogate. Daughter was born, she was found to be 8 weeks pregnant. Son and Daughter are 6 months apart.      Health Habits:      Smoking Status: No       Alcohol use: No     Illegal/illicit drug use: No      Substance abuse hx?: No       15. Aside from what has been discussed, do you have any other medical issues that you or your doctors would consider unstable at this time?   No        Past Medical History:   Diagnosis Date    Allergic rhinitis     Anemia     Arthritis     Basal cell carcinoma     Broken ankle     Cancer     Clotting disorder     Disorder of kidney and ureter     DVT (deep venous thrombosis)     Factor V deficiency     Fracture of elbow     left    Glioblastoma multiforme of brain     Hypothyroidism     IBS (irritable bowel syndrome)     Osteoporosis     Panhypopituitarism     Peripheral polyneuropathy     Secondary adrenal insufficiency     Seizures     Thyroid disease        Past Surgical History:   Procedure Laterality Date    APPENDECTOMY      BRAIN SURGERY      for glioblastoma      SECTION      CHOLECYSTECTOMY      CLOSURE OF DEFECT OF MOHS PROCEDURE Left 2018    Procedure: CLOSURE, MOHS PROCEDURE DEFECT SCALP  Flap closure;  Surgeon: Yrn Novak MD;  Location: [facility] OR [address] [place] [address];   Service: Plastics;  Laterality: Left;    COLONOSCOPY N/A 4/23/2019    Procedure: COLONOSCOPY Suprep;  Surgeon: Tony Mosher MD;  Location: [facility] ENDO;  Service: Endoscopy;  Laterality: N/A;    ESOPHAGOGASTRODUODENOSCOPY N/A 4/23/2019    Procedure: EGD/colon;  Surgeon: Tony Mosher MD;  Location: [facility] ENDO;  Service: Endoscopy;  Laterality: N/A;    EXCISION OF GANGLION CYST OF HAND Left 1/7/2020    Procedure: EXCISION, GANGLION CYST, HAND;  Surgeon: Ross Drew Jr., MD;  Location: [facility] OR;  Service: Orthopedics;  Laterality: Left;    HYSTERECTOMY  1999    TILT TABLE TEST N/A 7/15/2022    Procedure: TILT TABLE TEST;  Surgeon: Amol Kohler MD;  Location: [facility] EP LAB;  Service: Cardiology;  Laterality: N/A;  Orthostatic hypotension, dizziness, Tilt Table Test with EEG, Dr.Tiffany Roca (neuro), DM    TUBAL LIGATION  1998       Review of patient's allergies indicates:   Allergen Reactions    Neurontin [gabapentin]     Lactose     Soap Hives     Able to tolerate Dove only        Current Outpatient Medications   Medication Sig Dispense Refill    acetaminophen (TYLENOL) 500 MG tablet Take 1-2 tablets (500-1,000 mg total) by mouth 3 (three) times daily as needed for Pain.  0    biotin 1 mg Cap Take by mouth.      dexamethasone (DECADRON) 4 mg/mL injection Inject 1 mL (4 mg total) into the muscle as needed (Signs and symtpoms of severe adrenal insufficiency). 3 ml syringe with 18 g needle  to draw  X 10 21 g 1 inch needle to inject x 10 1 mL 2    diclofenac sodium (VOLTAREN) 1 % Gel Apply 2 g topically 3 (three) times daily as needed (apply to affected joint).      DULoxetine (CYMBALTA) 30 MG capsule TAKE 1 CAPSULE BY MOUTH EVERY DAY IN 1 TO 2 WEEKS. IF NO RELIEF, MAY INCREASE DOSE TO 2 CAPSULES DAILY. CALL FOR REFILLS. 30 capsule 1    ERGOCALCIFEROL, VITAMIN D2, (VITAMIN D ORAL) Take 1 capsule by mouth nightly.       ferrous sulfate 324 mg (65 mg iron) TbEC Take 325 mg by mouth nightly.       hydrocortisone  (CORTEF) 10 MG Tab TAKE 1& 1/2 TABLETS BY MOUTH IN THE MORNING AND 1/2 TABLET IN THE AFTERNOON. DOUBLE THE DOSE IN CASE OF ILLNESS. 75 tablet 6    hyoscyamine (LEVSIN/SL) 0.125 mg Subl Place 1 tablet (0.125 mg total) under the tongue every 4 (four) hours as needed. 120 tablet 3    levothyroxine (SYNTHROID) 100 MCG tablet TAKE 1 TABLET BY MOUTH EVERY DAY BEFORE BREAKFAST 90 tablet 0    loratadine-pseudoephedrine  mg (CLARITIN-D 24-HOUR)  mg per 24 hr tablet Take 1 tablet by mouth as needed.       mupirocin (BACTROBAN) 2 % ointment 2 (two) times daily. Apply to affected area      phenytoin (DILANTIN) 100 MG ER capsule Take 2 capsules (200 mg total) by mouth nightly. 60 capsule 11    XARELTO 20 mg Tab TAKE 1 TABLET BY MOUTH EVERY DAY 90 tablet 4     No current facility-administered medications for this visit.       Family History   Problem Relation Age of Onset    Heart disease Mother     Diabetes type II Mother     Diabetes type II Father     Heart attack Maternal Grandfather        Social History     Socioeconomic History    Marital status:     Number of children: 2   Occupational History    Occupation:    Tobacco Use    Smoking status: Never    Smokeless tobacco: Never   Substance and Sexual Activity    Alcohol use: No    Drug use: No    Sexual activity: Not Currently   Social History Narrative     29 years, lives at home with .      Social Determinants of Health     Financial Resource Strain: Low Risk     Difficulty of Paying Living Expenses: Not hard at all   Food Insecurity: No Food Insecurity    Worried About Running Out of Food in the Last Year: Never true    Ran Out of Food in the Last Year: Never true   Transportation Needs: No Transportation Needs    Lack of Transportation (Medical): No    Lack of Transportation (Non-Medical): No   Physical Activity: Insufficiently Active    Days of Exercise per Week: 2 days    Minutes of Exercise per Session: 10 min   Stress:  No Stress Concern Present    Feeling of Stress : Only a little   Social Connections: Unknown    Frequency of Communication with Friends and Family: More than three times a week    Frequency of Social Gatherings with Friends and Family: Once a week    Active Member of Clubs or Organizations: Yes    Attends Club or Organization Meetings: More than 4 times per year    Marital Status:    Housing Stability: Low Risk     Unable to Pay for Housing in the Last Year: No    Number of Places Lived in the Last Year: 1    Unstable Housing in the Last Year: No              Objective:        GEN: Well developed, well nourished. No acute distress. Fully alert, oriented, and appropriate. Speech normal rayna.   PSYCH: Normal affect. Thought content appropriate.  CHEST: Breathing symmetric. No audible wheezing.  SKIN: Warm, dry. No rash or discoloration on exposed areas.   NEURO/MUSCULOSKELETAL:  Cervical: ROM full and painless; TTP neck and shoulder musculature ; 5/5 UE strength; gross sensation and reflexes intact bilaterally; Negative Hanley's bilaterally.  Lumbar: ROM limited and painful. TTP lumbar musculature; 5/5 LE strength bilaterally; gross sensation and reflexes intact bilaterally.  SLR negative bilaterally (sitting)  CECELIA negative bilaterally (sitting)           Imaging:      Xray Lumbar Spine 5/18/2023:  FINDINGS:  There are 5 non-rib-bearing lumbar spine vertebrae.  There is no fracture or subluxation.  Alignment is normal.  Vertebral body heights are maintained.  There is mild disc height loss at L4-L5.  There are degenerative changes.  There is no evidence of dynamic instability.  There are vascular calcifications.  There are quadrant surgical clips.  Remaining visualized osseous structures are intact.     Impression:     No acute osseous abnormality.     Degenerative changes most significant at L4-L5.    Assessment:     Encounter Diagnoses   Name Primary?    Chronic pain syndrome Yes    Lumbar radiculopathy      Peripheral polyneuropathy     Decreased motor activity     Abnormality of gait     Mood disorder due to medical condition        Plan:     Diagnoses and all orders for this visit:    Chronic pain syndrome  -     Ambulatory referral[facility]consult to [facility]    Lumbar radiculopathy  -     Ambulatory referral/consult to Physical/Occupational Therapy; Future  -     Ambulatory referral/consult to Physical/Occupational Therapy; Future  -     Ambulatory referral/consult to Psychiatry; Future    Peripheral polyneuropathy    Decreased motor activity  -     Ambulatory referral/consult to Physical/Occupational Therapy; Future  -     Ambulatory referral/consult to Physical/Occupational Therapy; Future  -     Ambulatory referral/consult to Psychiatry; Future    Abnormality of gait  -     Ambulatory referral/consult to Physical/Occupational Therapy; Future  -     Ambulatory referral/consult to Physical/Occupational Therapy; Future  -     Ambulatory referral/consult to Psychiatry; Future    Mood disorder due to medical condition         Thelma Nguyen is a 57 y.o. female with the above diagnoses.      Education about pain and the chronic pain cycle was provided today. Discussed the importance of multimodal and multidisciplinary management of chronic pain with a focus on both pain relief and function. Discussed how our team provides education and training aimed at improving physical function, emotional health, stress and pain coping skills.Treatment is designed to build confidence in physical activity and ADLs and in your ability to control and manage your pain.     Recommend proceeding with PT/OT screening visit for further evaluation of personal goals, functional status and limitations prior to enrollment in the program.     I spent a total of 60 minutes with the patient, and greater than 50% of the time was spent in counseling and education.     The above plan and management options were  discussed at length with patient. Patient is in agreement with the above and verbalized understanding. It will be communicated with the referring physician via electronic record, fax, or mail.    Bibi Alvarado NP  07/24/2023

## 2023-07-25 RX ORDER — DULOXETIN HYDROCHLORIDE 30 MG/1
30 CAPSULE, DELAYED RELEASE ORAL DAILY
Qty: 30 CAPSULE | Refills: 1 | Status: SHIPPED | OUTPATIENT
Start: 2023-07-25 | End: 2023-07-26

## 2023-07-26 DIAGNOSIS — G89.29 CHRONIC NECK PAIN: ICD-10-CM

## 2023-07-26 DIAGNOSIS — G62.9 PERIPHERAL POLYNEUROPATHY: ICD-10-CM

## 2023-07-26 DIAGNOSIS — G89.29 CHRONIC BILATERAL LOW BACK PAIN WITH BILATERAL SCIATICA: ICD-10-CM

## 2023-07-26 DIAGNOSIS — M54.2 CHRONIC NECK PAIN: ICD-10-CM

## 2023-07-26 DIAGNOSIS — M54.42 CHRONIC BILATERAL LOW BACK PAIN WITH BILATERAL SCIATICA: ICD-10-CM

## 2023-07-26 DIAGNOSIS — M54.41 CHRONIC BILATERAL LOW BACK PAIN WITH BILATERAL SCIATICA: ICD-10-CM

## 2023-07-26 RX ORDER — DULOXETIN HYDROCHLORIDE 30 MG/1
60 CAPSULE, DELAYED RELEASE ORAL DAILY
Qty: 90 CAPSULE | Refills: 1 | Status: SHIPPED | OUTPATIENT
Start: 2023-07-26 | End: 2023-08-28

## 2023-08-02 ENCOUNTER — CLINICAL SUPPORT (OUTPATIENT)
Dept: REHABILITATION | Facility: OTHER | Age: 58
End: 2023-08-02
Attending: NURSE PRACTITIONER
Payer: COMMERCIAL

## 2023-08-02 DIAGNOSIS — R29.818 POOR TRUNK CONTROL: ICD-10-CM

## 2023-08-02 DIAGNOSIS — Z74.09 IMPAIRED FUNCTIONAL MOBILITY, BALANCE, GAIT, AND ENDURANCE: ICD-10-CM

## 2023-08-02 DIAGNOSIS — R68.89 DECREASED MOTOR ACTIVITY: ICD-10-CM

## 2023-08-02 DIAGNOSIS — R26.9 ABNORMALITY OF GAIT: ICD-10-CM

## 2023-08-02 DIAGNOSIS — M54.16 LUMBAR RADICULOPATHY: ICD-10-CM

## 2023-08-02 DIAGNOSIS — R52 PAIN AGGRAVATED BY ACTIVITIES OF DAILY LIVING: ICD-10-CM

## 2023-08-02 DIAGNOSIS — R29.898 DECREASED STRENGTH OF TRUNK AND BACK: ICD-10-CM

## 2023-08-02 DIAGNOSIS — R29.3 POOR POSTURE: ICD-10-CM

## 2023-08-02 PROCEDURE — 97163 PT EVAL HIGH COMPLEX 45 MIN: CPT

## 2023-08-02 PROCEDURE — 97530 THERAPEUTIC ACTIVITIES: CPT

## 2023-08-02 PROCEDURE — 97166 OT EVAL MOD COMPLEX 45 MIN: CPT

## 2023-08-02 NOTE — PATIENT INSTRUCTIONS
DIAPHRAGMATIC BREATHING     The diaphragm is a dome shaped muscle that forms the floor of the rib cage. It is the most efficient muscle for breathing and relaxation, although most people are not used to using the diaphragm. Diaphragmatic or belly breathing is an important technique to learn because it helps settle down or relax the autonomic nervous system. The correct use of diaphragmatic breathing can help to quiet brain activity resulting in the relaxation of all the muscles and organs of the body. This is accomplished by slow rhythmic breathing concentrated in the diaphragm muscle rather than the chest.    How to do proper relaxation breathing:    Start by lying on your back or reclining in a chair in a relaxed position. Place one hand on your chest and the other on your abdomen.  Relax your jaw by placing your tongue on the roof of your mouth and keeping your teeth slightly apart.   Take a deep breath in, letting the abdomen expand and rise while you keep your upper chest, neck and shoulders relaxed.   As you breathe out, allow your abdomen and chest to fall. Exhale completely.  It doesn't matter if you breathe in/out through your nose and/or mouth. Do whichever feels comfortable.  Remember to breathe slowly.  Do not force your breathing. Do not hold your breath.  Repeat for 5-10 reps, and as needed for pain and stress management throughout the day (10 reps 5x a day)

## 2023-08-02 NOTE — PROGRESS NOTES
"NOTE: This is a duplicate copy utilized for reassessment purposes & continuity of care.  See pt's plan of care for co-signed copy.    [facility]  Functional Restoration Program  Physical Therapy Initial Evaluation    Date: 8/2/2023   Name: Thelma Nguyen  [facility] Number: [id number]    Therapy Diagnosis:   Encounter Diagnoses   Name Primary?    Lumbar radiculopathy     Decreased motor activity     Abnormality of gait     Decreased strength of trunk and back     Impaired functional mobility, balance, gait, and endurance          Physician: Bibi Alvarado, NP    Physician Orders: PT Eval and Treat   Medical Diagnosis from Referral:   M54.16 (ICD-10-CM) - Lumbar radiculopathy   R68.89 (ICD-10-CM) - Decreased motor activity   R26.9 (ICD-10-CM) - Abnormality of gait     Evaluation Date: 8/2/2023  Authorization Period Expiration: 07/23/24  Plan of Care Expiration: 12/22/23  Visit # / Visits authorized: 01/ 01  FOTO: 01/ 03     Precautions: Standard and Fall  hx of CA (brain /c surgery)     Time In: 11:30 am   Time Out: 12:30 pm   Total Appointment Time (timed & untimed codes): 60 minutes      Subjective     Date of onset: LBP > 10 yr, cervical last 2 yrs     Patient reported history of current condition - Valeria reports primary pain limiting her functionality is from LBP.  Patient report LB discomfort as "good day/bad day" presentation.  Patient describe sx as a dull ache that progresses to sharp pain /c bending/lifting.  Pt note N/T B feet.  Patient believe this is from neuropathy and possible "pinched nerve L5".  Meds have helped to dec pain in B feet.  Patient report heating pad can help relieve LB discomfort.   Patient report sitting /c knees to chest can relieve some discomfort.    Patient also not sig concern for balance.  Patient report looking up brings on HA and often disorientation. Patient report concern for balance /c stair ascending/descending stairs "I avoid stairs"  " "Patient report she showers sitting on floor of shower and does not own shower chair.  Patient report she began falling (middle of night when letting dog out) a few years ago leading to OH dx.  Patient clarify she feels she is much more aware of the signs and sx of a potential falls leading to less falls.  Patient note "out of breath" even when walking to mailbox.  By evening (4:30pm) feel worn out not able do anything so she lays on couch.   Patient /c difficulty /c bending/lifting activities (household chores, laundry  Patient note travel plans in Aug, so notes interest in Sept cohort     Patient's FRP goals:  "to continue exercises at home"  "walk around the block without stopping"     Per MD report   Thelma Nguyen is a 57 y.o. female who presents today for the Functional Restoration Program Medical Screening Visit. Thelma Nguyen was referred by Zachary Bender MD with associated diagnosis of chronic pain.      She began having low back pain approximately 10 years ago. She was diagnosed with an anular tear in the disc at that time. Her pain would be worse with bending. She had episodes of her back locking up on her. She was treated conservatively, including NSAIDs. She was later diagnosed with CKD. She stopped NSAIDs which did cause worsened back pain. She had a series of injections in 2014 with benefit. She has intermittent episodes of low back pain. She does have radiating pain into the right leg. Her pain is worse with prolonged sitting and standing. She does find relief with bending forward. Of note, she has a past medical history of glioblastoma at age 19. 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 She also has neuropathy to the legs. She was recently diagnosed with orthostatic hypotension.       Imaging:  Per MD report  Xray Lumbar Spine 5/18/2023:  FINDINGS:  There are 5 non-rib-bearing lumbar spine vertebrae.  There is no fracture or subluxation.  " "Alignment is normal.  Vertebral body heights are maintained.  There is mild disc height loss at L4-L5.  There are degenerative changes.  There is no evidence of dynamic instability.  There are vascular calcifications.  There are quadrant surgical clips.  Remaining visualized osseous structures are intact.     Impression:  No acute osseous abnormality.  Degenerative changes most significant at L4-L5.      Prior Therapy: PT for neck within last year, vestibular PT /c good results,   Social History: lives in house, 1 level, /c pool lives with their spouse   2 children- was initially told she would not be able to have children. Did IVF with friend as a surrogate. Daughter was born, she was found to be 8 weeks pregnant. Son and Daughter are 6 months apart.    Occupation:  for [email or web address] (next show 2024), [employer]   Prior Level of Function: gym/class visits (pre-COVID), working multiple  jobs  Current Level of Function: difficulty /c walking to mailbox, pain /c bending/lifting     Pain:      Current LB 4/10, worst  7/10 in evening, best 2/10 when watching theatre show (last of the season)   Location: bilateral LB   Description: Aching and Dull /c elements of sharp pain /c activity   Aggravating Factors: Standing, Bending, Walking, and Lifting  Easing Factors: heating pad    Patient's FRP goals:  "to continue exercises at home"  "walk around the block without stopping"     Medical History:   Past Medical History:   Diagnosis Date    Allergic rhinitis     Anemia 1987    Arthritis 2000    Basal cell carcinoma     Broken ankle     Cancer 1985    Clotting disorder 1993    Disorder of kidney and ureter     DVT (deep venous thrombosis)     Factor V deficiency     Fracture of elbow     left    Glioblastoma multiforme of brain     Hypothyroidism     IBS (irritable bowel syndrome) 1975    Osteoporosis     Panhypopituitarism     Peripheral polyneuropathy     Secondary " adrenal insufficiency     Seizures     Thyroid disease        Surgical History:   Thelma Nguyen  has a past surgical history that includes Hysterectomy (); Brain surgery (); Appendectomy;  section (); Cholecystectomy (); Tubal ligation (); Closure of defect of Mohs procedure (Left, 2018); Esophagogastroduodenoscopy (N/A, 2019); Colonoscopy (N/A, 2019); Excision of ganglion cyst of hand (Left, 2020); and Tilt table test (N/A, 7/15/2022).    Medications:   Thelma has a current medication list which includes the following prescription(s): acetaminophen, biotin, dexamethasone, diclofenac sodium, duloxetine, ergocalciferol (vitamin d2), ferrous sulfate, hydrocortisone, hyoscyamine, levothyroxine, loratadine-pseudoephedrine  mg, mupirocin, phenytoin, and xarelto.    Allergies:   Review of patient's allergies indicates:   Allergen Reactions    Neurontin [gabapentin]     Lactose     Soap Hives     Able to tolerate Dove only         Objective     Postural examination:  Seated: fair, legs crossed, difficulty self correcting, unable to hold thru conversation   Standing: NBOS    Range of Motion - Cervical   AROM AROM AROM    2023 Reassessment  Reassessment   Flexion WFL  ° °   Extension Major P!   ° °   Side bending Right mod ° ° °   Side bending Left mod ° ° °   Rotation Right mod P! ° °   Rotation Left mod P! ° °     Range of Motion - Lumbar   2023      ROM Loss ROM Loss ROM Loss   Flexion minimal loss     Extension moderate loss P!     Side bending Right within functional limits     Side bending Left within functional limits     Rotation Right minimal loss     Rotation Left minimal loss       Strength Testing   2023 Reassessment Reassessment   Motion Tested         Lower Extremity R L R L R L   Hip Flexion 4/5 4/5       Hip Extension 3+/5 3+/5       Hip Abduction 3+/5 3+/5       Hip IR 3+/5 3+/5       Hip ER 3+/5 3+/5       Knee Extension  5/5 5/5       Knee Flexion 5/5 5/5         Functional Testing     8/2/2023 Reassessment  Reassessment   Timed Up and Go 12.83 sec BUE use  sec sec   Single Limb Stance R LE < 2 sec sec sec   Single Limb Stance L LE < 2 sec sec sec   2 Minute Walk Test 329 feet feet feet   6 Minute Walk Test 959 ft = 292 m feet feet   Self Selected Walking Speed 0.811 m/sec m/sec m/sec     GAIT:  Assistive Device used: none  Level of Assistance: independent  Patient displays the following gait deviations:  unsteady gait, decreased step length, and antalgic gait, dec arms swing     Minimum standards/norms:    TUG:  < 13.5 seconds/ Males 7.3 sec, Females 8.1 sec  SLS:  26-29 sec  Ages 20-69  Self Selected Walking Speed:  .4-.8m/sec  Limited community ambulator                                                   .8- 1.2  Community ambulator                                                    1.2 m/sec and above  Able to safely cross streets        Limitation/Restriction for FOTO LUMBAR Survey    Therapist reviewed FOTO scores for Thelma Nguyen on 8/2/2023.   FOTO documents entered into [employer] - see Media section.    Limitation Score 8/2/2023: 58%    Predicted Score: 45%         TREATMENT     Total Treatment time (time-based codes) separate from Evaluation: 10 minutes     Valeria received the treatments listed below:        Valeria received therapeutic activities to improve functional performance for 10 minutes, including:    While in DAVEY for functional mobility tolerance assessment (patient note R shin numbness abolished /c sustained DAVEY 5 min)     PT education:  Physical & psychological viscous pain cycles (see patient instructions 8/2/2023)  Role of consistent activity (graded exposure) to break the physical cycle  Importance of education & behavioral changes that promote intrinsic patient motivation to help break the psychological cycle  Impact on pt's functional goals when breaking each one of these cycles.    Impact central  sensitization has on the sensory system in the chronic pain population          PATIENT EDUCATION AND HOME EXERCISES     Education provided:   - Pain Cycle (see patient instructions)     Written Home Exercises Provided: Patient instructed to cont prior HEP. Exercises were reviewed and Valeria was able to demonstrate them prior to the end of the session.  Valeria demonstrated fair  understanding of the education provided. See EMR under Patient Instructions for information provided during therapy sessions.    ASSESSMENT   Thelma is a 57 y.o. female referred to outpatient Physical Therapy with a medical diagnosis of M54.16 (ICD-10-CM) - Lumbar radiculopathy, R68.89 (ICD-10-CM) - Decreased motor activity, R26.9 (ICD-10-CM) - Abnormality of gait.  Pt presents with pain, weakness, impaired mobility, decreased AROM, impaired balance, fall risk, gait deviations, decreased endurance, fear of pain w/ central sensitization, increased psychosocial concerns, & inability to perform ADL's as before due to current functional status. Patient note abolished R shin numbness /p sustained lumbar extension indicating possible mechanical component to B shin presentation and appropriateness of continued exploration in POC for sx self management. Patient subjective report describe sig deconditioning however able to complete 6MWT indicating appropriateness of inclusion in FRP cohort structure.  Pt's increased psychosocial concerns w/ central sensitization present an unstable clinical presentation which may limit progress, but the intrinsic motivation to improve will assist.    Based on the above history and physical examination an active physical therapy program within a multi-disciplinary setting is recommended.  Pt will benefit from skilled outpatient physical therapy to address the deficits listed below in the chart, provide pt/family education and to maximize pt's level of independence in the home and community environment.     Plan of care  discussed with patient: Yes  Pt's spiritual, cultural and educational needs considered and patient is agreeable to the plan of care and goals as stated below:     Pt prognosis is Fair.   Anticipated Barriers for therapy: chronic nature of issues, psychosocial factors,     Medical Necessity is demonstrated by the following  History  Co-morbidities and personal factors that may impact the plan of care Co-morbidities:   anxiety, coping style/mechanism, difficulty sleeping, history of cancer, and level of undertstanding of current condition    Personal Factors:   coping style  lifestyle     high   Examination  Body Structures and Functions, activity limitations and participation restrictions that may impact the plan of care Body Regions:   head  neck  back  lower extremities  trunk    Body Systems:    gross symmetry  ROM  strength  gross coordinated movement  balance  gait  transfers  transitions  motor control    Participation Restrictions:   none    Activity limitations:   Learning and applying knowledge  no deficits    General Tasks and Commands  no deficits    Communication  no deficits    Mobility  lifting and carrying objects  walking    Self care  washing oneself (bathing, drying, washing hands)    Domestic Life  shopping  cooking  doing house work (cleaning house, washing dishes, laundry)    Interactions/Relationships  no deficits    Life Areas  no deficits    Community and Social Life  community life  recreation and leisure         high   Clinical Presentation unstable clinical presentation with unpredictable characteristics high   Decision Making/ Complexity Score: high       GOALS: Pt is in agreement with the following goals:  Goal Progress Towards Goal   SHORT Term: In 3 weeks, pt will:    Verbalize & demonstrate good understanding of resisted training with 3-1-3 second rep for psdctzmles-[id number]-[id number] contraction to encourage full muscle recruitment for strength & endurance. Initiated 8/2/2023  "  Verbalize & demonstrate good understanding of home stretch routine to improve AROM, help decrease pain & improve mobility. Initiated 8/2/2023   Demonstrate proper supine or seated diaphragmatic breathing with decreased chest excursion & emphasis on full abdominal excursion & increased expiration time to promote muscle relaxation & increased parasympathetic activity to improve patient tolerance to activities. Initiated 8/2/2023   Verbalize good understanding of importance of proper posture in resisted exercise, functional activities & ADL's in order to help reduce postural strain, promote proper breathing. Initiated 8/2/2023   Demonstrate good understanding of full body resisted exercises w/ use of pictures &/or verbal cues to promote independence w/ exercise at the end of the program. Initiated 8/2/2023   Verbalize plan for continuing resisted exercises w/ specific location (i.e. commercial gym, home, community fitness center)  to incorporate all major muscle groups to maintain & progress therapeutic functional improvements. Initiated 8/2/2023   Verbalize difference between  "hurt vs harm"  to demonstrate understanding of fear avoidance in pain cycle.  Initiated 8/2/2023       Midterm: In 8 weeks, pt will:    Verbalize good understanding of activities planning/scheduling (stretching, mindfulness, resisted training, & cardio) prescribed by PT/OT following the end of the program to sustain & progress functional improvements. Initiated 8/2/2023   Perform selected resisted training w/ minimal to no cuing in therapy session to be independent w/ resisted strengthening. Initiated 8/2/2023   Demonstrate improved symptom self-management w/ posture/positioning and mechanical movements and/or implementation of appropriate modalities throughout a typical day.  Initiated 8/2/2023   Demonstrate independence w/ home stretch routine to improve AROM, help decrease pain & improve mobility. Initiated 8/2/2023       Long Term: In 12 " weeks, pt will:    Perform selected cardio & resisted training independently to continue safe regular performance of daily exercise. Initiated 8/2/2023   Improve 2 Minute Walk Test (MWT) to 400 feet and 6 MWT to 1200 feet or greater to improve gait speed and mobility. Initiated 8/2/2023   Perform single leg stance 10 seconds or greater bilaterally to improve safety and balance in ADLs. Initiated 8/2/2023   Demonstrate Timed Up & Go (with appropriate assistive device, if necessary) of 10 seconds or less to decrease fall risk and improve functional mobility. Initiated 8/2/2023   Score 45 % or less on LUMBAR FOTO to indicate significant functional improvements in impaired functions secondary to pain. Initiated 8/2/2023    Initiated 8/2/2023         PLAN   Plan of care Certification: 8/2/2023 to 12/22/23.    Outpatient Physical Therapy 3 times weekly for 12 weeks to include the following interventions: Gait Training, Manual Therapy, Moist Heat/ Ice, Neuromuscular Re-ed, Patient Education, Therapeutic Activities, and Therapeutic Exercise.     Lavelle Banerjee, PT
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
## 2023-08-02 NOTE — PATIENT INSTRUCTIONS
The physical cycle is very intuitive for people! As pain increases & you avoid activities, you get weaker. This leads to more pain, which leads to more activity avoidance & the cycle fuels on leading to maladaptive behaviors. You start avoiding activities you love to do & start having pain with normal & safe activities.    With chronic conditions, it is important to consider all of the negative psychological factors associated with pain. How we experience pain is based on the context of our lives. You are limited & thus getting less derek out of life, but also have all of the past experiences with pain looming over you. This negative context is scientifically proven to cause the brain to become more sensitive & perceive more pain. Pain may change or spread, & you may to start feeling pain from non-painful stimuli, such as movement & everyday life. This does not mean your pain is not real, it just means the brain is producing more than it should & your mind needs to be retrained along with your body.

## 2023-08-03 ENCOUNTER — TELEPHONE (OUTPATIENT)
Dept: INTERNAL MEDICINE | Facility: CLINIC | Age: 58
End: 2023-08-03
Payer: COMMERCIAL

## 2023-08-03 NOTE — TELEPHONE ENCOUNTER
Called pt to triage. Pt states she had a splinter on foot removed while at a pedicure today and the wound hasn't stopped bleeding. Pt states the site is very small, but she has been unsuccessful in getting the bleeding to stop. Pt referred to  for eval and dressing. Pt verbalized understanding.

## 2023-08-03 NOTE — TELEPHONE ENCOUNTER
----- Message from Marcy Pratt sent at 8/3/2023  1:26 PM CDT -----  Regarding: Bleeding  Good afternoon,    Your patient is here doing a mammogram today. She states that she had a pedicure at 12:00 where the lady pulled a sticker out of her toe and that it has not stopped bleeding in the last hour and a half. She said she is on blood thinners, and is concerned that she should see a doctor. Could you all please call her and advise her of what you would like for her to do?    Thank you,  Marcy

## 2023-08-05 DIAGNOSIS — E27.49 SECONDARY ADRENAL INSUFFICIENCY: ICD-10-CM

## 2023-08-07 RX ORDER — HYDROCORTISONE 10 MG/1
TABLET ORAL
Qty: 75 TABLET | Refills: 2 | Status: SHIPPED | OUTPATIENT
Start: 2023-08-07 | End: 2024-01-19 | Stop reason: SDUPTHER

## 2023-08-16 DIAGNOSIS — N18.30 CKD (CHRONIC KIDNEY DISEASE), STAGE III: ICD-10-CM

## 2023-08-26 DIAGNOSIS — M54.42 CHRONIC BILATERAL LOW BACK PAIN WITH BILATERAL SCIATICA: ICD-10-CM

## 2023-08-26 DIAGNOSIS — G62.9 PERIPHERAL POLYNEUROPATHY: ICD-10-CM

## 2023-08-26 DIAGNOSIS — M54.2 CHRONIC NECK PAIN: ICD-10-CM

## 2023-08-26 DIAGNOSIS — G89.29 CHRONIC BILATERAL LOW BACK PAIN WITH BILATERAL SCIATICA: ICD-10-CM

## 2023-08-26 DIAGNOSIS — Z00.00 ANNUAL PHYSICAL EXAM: ICD-10-CM

## 2023-08-26 DIAGNOSIS — M54.41 CHRONIC BILATERAL LOW BACK PAIN WITH BILATERAL SCIATICA: ICD-10-CM

## 2023-08-26 DIAGNOSIS — K58.9 IRRITABLE BOWEL SYNDROME, UNSPECIFIED TYPE: ICD-10-CM

## 2023-08-26 DIAGNOSIS — G89.29 CHRONIC NECK PAIN: ICD-10-CM

## 2023-08-27 DIAGNOSIS — E03.8 SECONDARY HYPOTHYROIDISM: ICD-10-CM

## 2023-08-27 DIAGNOSIS — E27.49 SECONDARY ADRENAL INSUFFICIENCY: ICD-10-CM

## 2023-08-28 RX ORDER — DULOXETIN HYDROCHLORIDE 30 MG/1
60 CAPSULE, DELAYED RELEASE ORAL DAILY
Qty: 180 CAPSULE | Refills: 1 | Status: ON HOLD | OUTPATIENT
Start: 2023-08-28 | End: 2024-03-15

## 2023-08-28 RX ORDER — HYOSCYAMINE SULFATE 0.12 MG/1
0.12 TABLET SUBLINGUAL EVERY 4 HOURS PRN
Qty: 360 TABLET | Refills: 2 | Status: ON HOLD | OUTPATIENT
Start: 2023-08-28

## 2023-08-28 RX ORDER — LEVOTHYROXINE SODIUM 100 UG/1
TABLET ORAL
Qty: 90 TABLET | Refills: 0 | Status: SHIPPED | OUTPATIENT
Start: 2023-08-28 | End: 2023-12-11 | Stop reason: SDUPTHER

## 2023-08-28 NOTE — TELEPHONE ENCOUNTER
Refill Routing Note   Medication(s) are not appropriate for processing by [facility] for the following reason(s):      Medication outside of protocol    [facility] action(s):  Route Care Due:  None identified            Appointments  past 12m or future 3m with PCP    Date Provider   Last Visit   7/14/2023 Zachary Bender MD   Next Visit   1/19/2024 Zachary Bender MD   ED visits in past 90 days: 0        Note composed:3:56 PM 08/28/2023

## 2023-09-24 ENCOUNTER — PATIENT MESSAGE (OUTPATIENT)
Dept: INTERNAL MEDICINE | Facility: CLINIC | Age: 58
End: 2023-09-24
Payer: COMMERCIAL

## 2023-09-25 ENCOUNTER — OFFICE VISIT (OUTPATIENT)
Dept: PSYCHIATRY | Facility: OTHER | Age: 58
End: 2023-09-25
Payer: COMMERCIAL

## 2023-09-25 ENCOUNTER — CLINICAL SUPPORT (OUTPATIENT)
Dept: REHABILITATION | Facility: OTHER | Age: 58
End: 2023-09-25
Payer: COMMERCIAL

## 2023-09-25 DIAGNOSIS — R68.89 DECREASED MOTOR ACTIVITY: ICD-10-CM

## 2023-09-25 DIAGNOSIS — R26.9 ABNORMALITY OF GAIT: ICD-10-CM

## 2023-09-25 DIAGNOSIS — R29.898 DECREASED STRENGTH OF TRUNK AND BACK: Primary | ICD-10-CM

## 2023-09-25 DIAGNOSIS — M54.16 LUMBAR RADICULOPATHY: Primary | ICD-10-CM

## 2023-09-25 DIAGNOSIS — R29.818 POOR TRUNK CONTROL: ICD-10-CM

## 2023-09-25 DIAGNOSIS — Z74.09 IMPAIRED FUNCTIONAL MOBILITY, BALANCE, GAIT, AND ENDURANCE: ICD-10-CM

## 2023-09-25 DIAGNOSIS — R52 PAIN AGGRAVATED BY ACTIVITIES OF DAILY LIVING: Primary | ICD-10-CM

## 2023-09-25 DIAGNOSIS — R29.3 POOR POSTURE: ICD-10-CM

## 2023-09-25 PROCEDURE — 97110 THERAPEUTIC EXERCISES: CPT

## 2023-09-25 PROCEDURE — 90791 PSYCH DIAGNOSTIC EVALUATION: CPT | Mod: ,,, | Performed by: SOCIAL WORKER

## 2023-09-25 PROCEDURE — 97530 THERAPEUTIC ACTIVITIES: CPT

## 2023-09-25 PROCEDURE — 97112 NEUROMUSCULAR REEDUCATION: CPT | Mod: CQ

## 2023-09-25 PROCEDURE — 90791 PR PSYCHIATRIC DIAGNOSTIC EVALUATION: ICD-10-PCS | Mod: ,,, | Performed by: SOCIAL WORKER

## 2023-09-25 PROCEDURE — 97530 THERAPEUTIC ACTIVITIES: CPT | Mod: CQ

## 2023-09-25 PROCEDURE — 97110 THERAPEUTIC EXERCISES: CPT | Mod: CQ

## 2023-09-25 NOTE — PROGRESS NOTES
[facility]    Functional Restoration Program  Physical Therapy Treatment Note  FRP Day 1    Name: Thelma Nguyen  MRN:[id number]      Therapy Diagnosis:   Encounter Diagnoses   Name Primary?    Decreased strength of trunk and back Yes    Impaired functional mobility, balance, gait, and endurance      Physician: Bibi Alvarado NP    Date: 9/25/2023        Time In: 2:30  Time Out: 3:30  Total Billable Time: 60 minutes    SUBJECTIVE       Valeria reports having a fall recently due to her hypotension and receiving a bruise on her head and eye. She has made contact c/ PCP, OK to cont c/ FRP. Pt states having had PT a lot in the past. Pt reports her working conditions may not be the most suitable for her posture. She sits on the floor a lot and is slumped during sewing. Pt rates her pain min today but it is constant and fatigue is definitely an issue. Her job does keep her busy and she is trying to pace better but she can't help it when she has a deadline.      Current 3/10  Location(s): left hip, LB,     OBJECTIVE     Objective Measures updated at progress report unless specified.     Treatment       Valeria received the Individualized Treatments listed below:     Valeria participated in dynamic functional therapeutic activities to improve functional performance for 30  minutes, including:    Diaphragmatic breathing:   Verbal cues for unlabored, long & relaxed breathing w/ increased time for exhalation  Awareness of pulling breath down away from mouth to hips  Cues for for proper abdominal excursion & decreased use of accessory muscles of breathing (hand on stomach & on chest; increased stomach motion, decreased chest motion)  Education on inhalation activating sympathetic nervous system   Education on exhalation activating parasympathetic nervous system  Performed supine & seated     Valeria participated in neuromuscular re-education activities to improve: Balance, Coordination,  Kinesthetic, Sense, Proprioception, and Posture for 30 minutes. The following activities were included:      Peripheral muscle strengthening which included 1-2 sets of 10-20 repetitions at a slow, controlled 5-7 second per rep pace focused on strengthening supporting musculature for improved body mechanics & functional mobility.  Patient & therapist focused on proper form & speed during treatment to ensure optimal strengthening of each targeted muscle group & neuromuscular re-education. Patients are instructed to keep sets/reps with proper load to stay at 3-5 out of 10 on the provided modified Barby exertion scale.    BATCA Machine Exercises Weight (lbs) Sets Repetitions Barby Exertion Scale Rating   Leg Extension        Hamstring Curls       Chest Press       Pec Fly       Lat Pull Down       Mid Row       Bicep Bar Curls       Standing Tricep Extension       Single Leg Press 15 1 20 4       Patient Education and Home Exercises     Home Exercises Provided and Patient Education Provided     Education provided:   - FRP Educational Topics: Modified Barby Exertion Scale, Physical & Psychological Pain Cycles, Z-Lying for Pain Relief/Relaxation, Diaphragmatic Breathing, Activity Pacing for Pain & Debility , Central Sensitization , and Posture & Body Mechanics    Written Home Exercises Provided: Patient instructed to cont prior HEP. Exercises were reviewed and Valeria was able to demonstrate them prior to the end of the session.  Valeria demonstrated good  understanding of the education provided. See EMR under Patient Instructions for information provided during therapy sessions    ASSESSMENT     Valeria w/ good participation in day 1 of FRP. Tx initiated with education of the importance of movement and of resisted exercise basics. Pt with good understanding and was able to perform with controlled movements & appropriate Barby exertion ratings needing moderate cuing for proper form & speed of movements. Pt was educated in breathing  techniques to engage PNS with need for cont education and practice.  Discussed how we would continue to progress slowly to build strength using exertion guide in green and yellow zone.  Reviewed traffic light model for stretching and strengthening.  Discussed hurt versus harm, and expectation to have some muscular response and DOMS following today.  Discussed strategies of ice, water, movement, to manage soreness.  Patient tolerated information well. All goals are still appropriate & ongoing.     Valeria Is progressing well towards her goals.   Pt prognosis is Good.     Pt will continue to benefit from skilled outpatient physical therapy to address the deficits listed in the problem list box on initial evaluation, provide pt/family education and to maximize pt's level of independence in the home and community environment.     Pt's spiritual, cultural and educational needs considered and pt agreeable to plan of care and goals.     Anticipated Barriers for therapy: chronic nature of issues, psychosocial factors, central sensitization      PLAN     Continue PT per POC     Aide Silva, PTA

## 2023-09-25 NOTE — PROGRESS NOTES
"[facility]  Functional Restoration Program  Occupational Therapy Treatment Note  FRP Day 1      Name: Thelma Nguyen  MRN:[id number]  Encounter Date: 9/25/2023    Diagnosis:   Encounter Diagnoses   Name Primary?    Pain aggravated by activities of daily living Yes    Poor trunk control     Poor posture      Referring provider: Bibi Alvarado NP    Physician Orders: eval and treat; [facility]  Medical Diagnosis:   M54.16 (ICD-10-CM) - Lumbar radiculopathy  R68.89 (ICD-10-CM) - Decreased motor activity R26.9 (ICD-10-CM) - Abnormality of gait   Evaluation Date: 8/2/2023  Insurance Authorization Period Expiration: 16/9/2023  Plan of Care Certification Period: 12/22/2023  Visit # / Visits authorized: 1/20  FOTO completed: 1/3     Precautions:  Standard and Fall, Orthostatic Hypotension     Time In: 13:33  Time Out: 14:34  Total Appointment Time: 61 minutes    SUBJECTIVE     She reports: "my body just quits. Normally I know when I need to sit down, but I fell last Wednesday". "I have been paying attention to my gait; when I a scared I take little steps". "I still have a headache, and it hurts to raise my eyebrow." "I can do anything if I can do it sitting down". "Lately I have to ask someone to help me get the groceries in the car. I hate that I don't know how the day is going to end".    "I had a pedicure and they pulled a stickler out of my food; it bled for hours. It's always something".    Valeria was not partially compliant with parts of home exercise program given previously.     Response to previous treatment: Ms. Nguyen noted: tired, slept well.     Functional change: Eval only at this time.     Pain:       Currently rating pain as 4/10.   Location: throughout; head.   Description: Aching and Dull with elements of sharp pain with activity     Patient Specific Activities based on Personal goals:  Activity  9/25/2023    Performance Satisfaction   Walk in [employer] 1  1   2.  " showering 5 5   3.  Grocery shopping 6 4   4.  Travel 6 5   5.  Cooking/meal prep. 7 (sits down)  6           Total Score 5  4.2       Patient Specific Activity Scoring Scheme for Performance     1        2        3        4        5        6        7        8        9        10     Unable/Unsatisfied                                          Able/Satisfied                                                                          Objective     Objective Measures updated at progress report unless specified.    Treatment     Valeria received the treatments listed below:     Therapeutic activities and functional lifting activities in order to increase participation in, endurance for, and performance with vocational, selfcare ADLs, and leisure activities in order to improve quality of life, for 53 minutes, including:    While seated, review of FRP binder regarding body mechanics and tips to prevent back pain, including review of recommended daily water intake based on body weight.     Introduction to stretching as tool to manage discomfort, as well as importance, and use of JANET scale to guide pacing. Used physiZapstitch ball for walk outs for modified forward flex stretch.  Review of standing swaying side to side, for PNS activation.     Personal goals reviewed. Valeria rated performance of activities related to personal goals, as well as satisfaction of said performance  (Refer to subjective section for details).    Therapeutic exercises to develop strength, endurance, ROM, flexibility, posture, and core stabilization for 8 minutes, including:    Valeria participated in endurance activity using Nustep for 8 minutes, starting at level 1 to 1.3 to improve functional endurance and progress towards increased MET level for improved community mobility and participation, rated as Easy (2/10) exertion, Valeria re-educated on how to add mindfulness component to activity and how to pace appropriately by completed self check ins and grading  "activity as needed, with goal to reach moderate to sort of hard by end of activity. Completed 663 steps; elevation 434'.    No environmental, cultural, spiritual, developmental or education needs expressed or noted.    Patient Education and Home Exercises   Education provided:   - Progress towards goals   - importance of completion of exercises and activities outside of session to attain goals.   - proper posture and body mechanics.  - anticipated muscular soreness following lift testing and strategies for management including stretching, using ice, scheduled rest.  - Functional pain scale  - JANET rate of perceived exertion scale.   - pain cycle  - pursed lip and diaphragmatic breathing techniques  - details of the Functional Restoration Program.     Written Home Exercises Provided: Patient instructed to cont prior HEP.  Exercises were reviewed and Valeria was able to demonstrate them prior to the end of the session. Valeria demonstrated fair  understanding of the education provided.     See EMR under Patient Instructions for exercises provided during therapy sessions. Patient education also located in FRP binder provided on day 1 of the cohort.       Assessment     Ms. Nguyen presented to therapy with facial bruising; reported fall last week. Despite this, tolerated today's session well, with general introduction to FRP, and stretch for pain relief. Review of "top tips for healthy back performed"; Valeria with comprehension of education provided, appropriate questions asked. In rating personal goals for performance and satisfaction, Valeria stated that goals remain appropriate.     Valeria is progressing well towards her goals and status of goals can be seen below. Patient's prognosis is Fair.     Valeria would continue to benefit from skilled outpatient occupational therapy to address the deficits listed in the problem list on initial evaluation, provide patient education, and to maximize patient's level of independence in " the home and community environment.     Anticipated barriers to occupational therapy: co-morbidities    Goals:     SHORT Term goals: In 3 weeks, patient will:  Status of goal:   Implements correct use of breathing techniques during functional lifting tasks, with minimal cues needed. INITIATED   Utilizes JANET rate of exertion scale to pace performance with functional lifting tasks, and implements self-care strategies during activity participation to manage pain. INITIATED   Verbalize understanding of energy conservation and pacing strategies during daily habits, roles, and routines in order to improve tolerance for work and home demands.  INITIATED   Completes 5x sit to stand test in 16 seconds or less to improve ability to participate in community mobility.   INITIATED   Demonstrate increased positional tolerance to the level needed for work, home, and community activities (standing in cue at social event, managing supplies for outing, streneous IADL), as evidenced by having a METS level of 3. INITIATED   Demonstrate proper body mechanics with functional lifting tasks (floor to waist, waist to shoulder, maximum lift, and box carry), via teach back method with minimal cues needed. INITIATED   Demonstrate increased functional strength by lifting 13 lbs waist to shoulder for management of (I)ADL, including dressing and grooming, placing items in kitchen cabinets, folding towels, and/or managing suitcase when traveling.   INITIATED   Demonstrate increased functional strength by lifting 18 lbs floor to waist to meet demand of work/home routine, including lifting groceries, managing laundry, and/or managing suitcase when traveling.   INITIATED   Tolerate Home Activity Plan (HAP)/ Home Exercise Program (HEP) to promote safety and independence with ADL, with report of completion of at least 2 out of 4 days outside of program participation.  INITIATED   Show improved perception of own abilities as evidenced by increase of  FOTO scores to established MDC value and perception of performance ratings regarding personal long term goals.  INITIATED         MIDTERM goals: In 9 weeks, patient will: Status of goal:   Report implementation of application of mindfulness, stretching, and other coping strategies for improved participation in daily routine. INITIATED   Verbalize functional application of lifting techniques in daily life (golfers lift, floor to waist, waist to shoulder). INITIATED   Completes 5x sit to stand test in 12 seconds or less to improve ability to participate in community mobility.  INITIATED   Applies functional application of lifting techniques (golfer's lift, floor to waist, waist to shoulder) in activities of daily life, per patient report.  INITIATED   Demonstrate physical capacities consistent with a Light (#20 max, frequent lifting/carrying #10, 2.5 METS  demand level, as needed to perform activities to be successful in roles of life, regarding Part Time Employment, Household Management, and Community Participation. INITIATED   Have an improvement of 3 points in 2/5 personal goals in rating of  performance.  INITIATED   Show improved perception of own abilities as evidenced by increase of FOTO scores to established MC II value and perception of performance ratings regarding personal long term goals.  INITIATED       LONG term goals = Patient Specific Goals:  Vocational: able to carry supplies   Recreational : walking around [place], travel   Daily Living Activities: cooking, laundry, groceries, showering   Measured by patient's self-reported performance and satisfaction of personal FRP goals, listed above in subjective section.   Total Score = sum of the activity performance scores / number of activities  Minimum detectable change (90%CI) for average score = 2 points  Minimum detectable change (90%CI) for single activity score  = 3 points    Plan     Continue per plan of care.     Updates/Grading for next  session: as tolerated, YUKO Lee, ELI/L, CEAS-I  9/25/2023

## 2023-09-25 NOTE — PROGRESS NOTES
Date: 09/25/2023    Referral Source: Dr. Bender referred the Pt to the program     Met with pt for 60 minutes to perform initial testing and discuss the process of FRP and continuing home program. Pt. filled out measures of assessment, scores are as follows:  Quita Nguyen Cohort 71 Day 1 Day 16 % change   FAMILIA Depression-5  Anxiety-11  Stress-7 Depression-   Anxiety-   Stress-  % improvement  % improvement  % improvement   VAS  Pain today-5  Pain in the past week-8 Pain today-   Pain in the past week-  % improvement  % improvement   Pain Catastrophizing Scale 31  % decrease    Sleep Quality Instrument  9  % improvement    Pain Disability Index 47  % decrease   Fear Avoidance Beliefs Fear of Physical pain -17  Fear of Pain caused by work/chores- 14  Total fear of pain- 39 Fear of Physical pain-   Fear of Pain caused by work/chores-   Total fear of pain-  % decrease  % decrease  % decrease overall   Katt Pain questionnaire 38  % decrease   Low Back Disability  38% % % decrease   ACE score 2 N/A        Discussed process of program: Consent forms signed, all questions answered.     Content of Session: See below    Therapeutic techniques and approaches including meds: what other treatments has the patient tried that havent worked?  Why are they now in the functional restoration program?    Assessment of the patients ability to adhere to the treatment plan outlined in the Functional Restoration Program  [facility]  Psychosocial Assessment      Date: 9/25/2023  Time: 1:57 PM    Name: Thelma Nguyen    Chief Complaint:No chief complaint on file.      History of Present Illness: Pt presents to the functional restoration program as a cancer survivor. Pt currently has a blood disorder, kidney disorder, as well as a pinched nerve which all contribute to her chronic pain. Pt starting falling about 6 years ago and her most recent fall was yesterday 9/24/23.     Medical History:   Past Medical  History:   Diagnosis Date    Allergic rhinitis     Anemia     Arthritis     Basal cell carcinoma     Broken ankle     Cancer 1985    Clotting disorder     Disorder of kidney and ureter     DVT (deep venous thrombosis)     Factor V deficiency     Fracture of elbow     left    Glioblastoma multiforme of brain     Hypothyroidism     IBS (irritable bowel syndrome)     Osteoporosis     Panhypopituitarism     Peripheral polyneuropathy     Secondary adrenal insufficiency     Seizures     Thyroid disease        Past Surgical History:   Procedure Laterality Date    APPENDECTOMY      BRAIN SURGERY      for glioblastoma      SECTION  1998    CHOLECYSTECTOMY  2007    CLOSURE OF DEFECT OF MOHS PROCEDURE Left 2018    Procedure: CLOSURE, MOHS PROCEDURE DEFECT SCALP  Flap closure;  Surgeon: Yrn Novak MD;  Location: [address] FLR;  Service: Plastics;  Laterality: Left;    COLONOSCOPY N/A 2019    Procedure: COLONOSCOPY Suprep;  Surgeon: Tony Mosher MD;  Location: [facility] ENDO;  Service: Endoscopy;  Laterality: N/A;    ESOPHAGOGASTRODUODENOSCOPY N/A 2019    Procedure: EGD/colon;  Surgeon: Tony Mosher MD;  Location: [facility] ENDO;  Service: Endoscopy;  Laterality: N/A;    EXCISION OF GANGLION CYST OF HAND Left 2020    Procedure: EXCISION, GANGLION CYST, HAND;  Surgeon: Ross Drew Jr., MD;  Location: [facility] OR;  Service: Orthopedics;  Laterality: Left;    HYSTERECTOMY      TILT TABLE TEST N/A 7/15/2022    Procedure: TILT TABLE TEST;  Surgeon: Amol Kohler MD;  Location: [facility] EP LAB;  Service: Cardiology;  Laterality: N/A;  Orthostatic hypotension, dizziness, Tilt Table Test with EEG, Dr.Tiffany Roca (neuro), DM    TUBAL LIGATION         Family History   Problem Relation Age of Onset    Heart disease Mother     Diabetes type II Mother     Diabetes type II Father     Heart attack Maternal Grandfather        Social History     Socioeconomic History     Marital status:     Number of children: 2   Occupational History    Occupation:    Tobacco Use    Smoking status: Never    Smokeless tobacco: Never   Substance and Sexual Activity    Alcohol use: No    Drug use: No    Sexual activity: Not Currently   Social History Narrative     30 years, lives at home with .      Social Determinants of Health     Financial Resource Strain: Low Risk  (8/8/2022)    Overall Financial Resource Strain (CARDIA)     Difficulty of Paying Living Expenses: Not hard at all   Food Insecurity: No Food Insecurity (8/8/2022)    Hunger Vital Sign     Worried About Running Out of Food in the Last Year: Never true     Ran Out of Food in the Last Year: Never true   Transportation Needs: No Transportation Needs (8/8/2022)    PRAPARE - Transportation     Lack of Transportation (Medical): No     Lack of Transportation (Non-Medical): No   Physical Activity: Insufficiently Active (8/8/2022)    Exercise Vital Sign     Days of Exercise per Week: 2 days     Minutes of Exercise per Session: 10 min   Stress: No Stress Concern Present (8/8/2022)    [employer] [employer] - Occupational Stress Questionnaire     Feeling of Stress : Only a little   Recent Concern: Stress - Stress Concern Present (5/29/2022)    [place] [employer] - Occupational Stress Questionnaire     Feeling of Stress : To some extent   Social Connections: Unknown (8/8/2022)    Social Connection and Isolation Panel [NHANES]     Frequency of Communication with Friends and Family: More than three times a week     Frequency of Social Gatherings with Friends and Family: Once a week     Active Member of Clubs or Organizations: Yes     Attends Club or Organization Meetings: More than 4 times per year     Marital Status:    Housing Stability: Low Risk  (8/8/2022)    Housing Stability Vital Sign     Unable to Pay for Housing in the Last Year: No     Number of Places Lived  in the Last Year: 1     Unstable Housing in the Last Year: No       Current Outpatient Medications   Medication Sig Dispense Refill    acetaminophen (TYLENOL) 500 MG tablet Take 1-2 tablets (500-1,000 mg total) by mouth 3 (three) times daily as needed for Pain.  0    biotin 1 mg Cap Take by mouth.      dexamethasone (DECADRON) 4 mg/mL injection Inject 1 mL (4 mg total) into the muscle as needed (Signs and symtpoms of severe adrenal insufficiency). 3 ml syringe with 18 g needle  to draw  X 10 21 g 1 inch needle to inject x 10 1 mL 2    diclofenac sodium (VOLTAREN) 1 % Gel Apply 2 g topically 3 (three) times daily as needed (apply to affected joint).      DULoxetine (CYMBALTA) 30 MG capsule TAKE 2 CAPSULES (60 MG TOTAL) BY MOUTH ONCE DAILY. 180 capsule 1    ERGOCALCIFEROL, VITAMIN D2, (VITAMIN D ORAL) Take 1 capsule by mouth nightly.       ferrous sulfate 324 mg (65 mg iron) TbEC Take 325 mg by mouth nightly.       hydrocortisone (CORTEF) 10 MG Tab TAKE 1& 1/2 TABLETS BY MOUTH IN THE MORNING AND 1/2 TABLET IN THE AFTERNOON. DOUBLE THE DOSE IN CASE OF ILLNESS. 75 tablet 2    hyoscyamine (LEVSIN/SL) 0.125 mg Subl PLACE 1 TABLET (0.125 MG TOTAL) UNDER THE TONGUE EVERY 4 (FOUR) HOURS AS NEEDED. 360 tablet 2    levothyroxine (SYNTHROID) 100 MCG tablet TAKE 1 TABLET BY MOUTH EVERY DAY BEFORE BREAKFAST 90 tablet 0    loratadine-pseudoephedrine  mg (CLARITIN-D 24-HOUR)  mg per 24 hr tablet Take 1 tablet by mouth as needed.       mupirocin (BACTROBAN) 2 % ointment 2 (two) times daily. Apply to affected area      phenytoin (DILANTIN) 100 MG ER capsule Take 2 capsules (200 mg total) by mouth nightly. 60 capsule 11    XARELTO 20 mg Tab TAKE 1 TABLET BY MOUTH EVERY DAY 90 tablet 4     No current facility-administered medications for this visit.       Review of patient's allergies indicates:   Allergen Reactions    Neurontin [gabapentin]     Lactose     Soap Hives     Able to tolerate Dove only        Family History:  "(mental illness, substance abuse, relationships, etc.)    Paternal: Pts father and mother got  when she was 9 months old. "My father left when I was [age]".     Maternal: Pts mother struggled with depression and health issues.     Siblings: Pt has one sister and three half siblings.     Children: Pt has two children, a daughter and a son who are both 25.     Psychiatric History/Previous Substance Abuse TX (incluse response to past substance abuse tx): Deferred     Past Psychiatric History: No     Is pt currently in treatment with a therapist or psychiatrist? The pt is not currently seeing a therapist of psychiatrist. Pt has seen a therapist in the past      Adventist/Spiritual Orientation:Latter-day    Sexual/Physical Abuse (indicate if patient is abuser or the abused): Deferred     Sexual Orientation and History: Pt lives with . Pt's  retired two years ago.      History:  No    Employment History: Pt used to work as a school psychologist. Pt is currently a  for musicals.     Legal Issues: No     Financial Status: Finances are not a source of stress.     Social, Peer Group, Living Situation, and Living Environment: Pt lives with  who has recently retired. Pt has a few close friends and feels good about her social connections.     Leisure and Recreation Activities: Pt loves reading, movies, and musicals. Pt used to like shopping but says that she doesn't have the energy to shop anymore. Pt would be able to travel more that her  is retired.     Nutrition: Pt said that she has struggled with an eating disorder over the years. Pt says she doesn't really like to eat since she is unable to smell or taste.     Sleep: Pt says sleep varies-- sometimes has difficulty falling and staying asleep.     Exercise: Pt says she doesn't exercise at all, "I'm bad about that". Pt says that she avoids going on walk because of her anxiety about needing to use the bathroom. " "    Stressors:Health Problems    Substance Use History: (include age of onset, duration, intensity, patterns of use, relapse history, and consequences of use for each substance): Pt doesn't drink alcohol and does not smoke.     Any Other Addictive Behaviors: No     Motivation for change:  Yes    Impressions: Pt has been dealing with cancer and other health issues for over three decades. Pt like to be able manage pain better and, in turn, "function more normally".     Clinical diagnosis/priority: 56 y/o presents for entrance to Functional Restoration Program. Pt will benefit from group dynamic, titrated exercise, CBT and chronic pain education.     Psychosocial/cultural factors: Pt's son lives in [place] and works at [employer]. Pt would like to be able to visit him and be able to walk around [employer] with more ease. Pt's  is newly retired and she would prefer if he would take up some hobbies and/or get out of the house more.     Current level of functioning: Pt is currently at a low level of functioning.     "

## 2023-09-26 NOTE — PATIENT INSTRUCTIONS
The Modified Barby Exertion Scale    This scale is used throughout therapy treatment sessions as a guide for graded exposure to activity. Assigning a grade of difficulty for all activity aids in understanding of how to safely challenge the body & the mind. Staying in this Goldilocks' Zone overtime helps to combat the subconscious effects of central sensitization on the production of pain. To pace appropriately & utilize proper energy conservation techniques, the goal is to stay within a 3-5 range on the scale throughout the course of activity.

## 2023-09-26 NOTE — PROGRESS NOTES
"[facility]  Functional Restoration Program  Occupational Therapy Treatment Note  FRP Day 2      Name: Thelma Nguyen  MRN:[id number]  Encounter Date: 9/27/2023    Diagnosis:   Encounter Diagnoses   Name Primary?    Pain aggravated by activities of daily living Yes    Poor trunk control     Poor posture        Referring provider: Bibi Alvarado NP    Physician Orders: eval and treat; [facility]  Medical Diagnosis:   M54.16 (ICD-10-CM) - Lumbar radiculopathy  R68.89 (ICD-10-CM) - Decreased motor activity R26.9 (ICD-10-CM) - Abnormality of gait   Evaluation Date: 8/2/2023  Insurance Authorization Period Expiration: 16/9/2023  Plan of Care Certification Period: 12/22/2023  Visit # / Visits authorized: 2/20 (plus eval)  FOTO completed: 2/3    Precautions:  Standard and Fall, Orthostatic Hypotension     Time In: 1330  Time Out: 1445  Total Appointment Time: 75 minutes    SUBJECTIVE     She reports: "I get afraid of falling" and "I do a lot of lifting for work" re: functional lifts     Valeria was compliant with parts of home exercise program given previously. Still having difficulty with pacing, activation of PNS and avoiding boom/bust cycle.     Response to previous treatment: Ms. Nguyen noted: tired, slept well, no complaints    Functional change: n/a    Pain:       Curren 4/10.   Location: throughout; head.   Description: Aching and Dull with elements of sharp pain with activity     Patient Specific Activities based on Personal goals:  Activity  9/25/2023    Performance Satisfaction   Walk in [facility] 1  1   2.  showering 5 5   3.  Grocery shopping 6 4   4.  Travel 6 5   5.  Cooking/meal prep. 7 (sits down)  6           Total Score 5  4.2       Patient Specific Activity Scoring Scheme for Performance     1        2        3        4        5        6        7        8        9        10     Unable/Unsatisfied                                          Able/Satisfied               "                                                            Objective     Objective Measures updated at progress report unless specified.    Treatment     Valeria received the treatments listed below:     Therapeutic activities and functional lifting activities in order to increase participation in, endurance for, and performance with vocational, selfcare ADLs, and leisure activities in order to improve quality of life, for 50 minutes, including:    Valeria participated in functional lift waist to shoulder, 2x6 reps x 5 lbs with a rating of Sort of hard (4/10) exertion. Valeria educated on standing posture, core awareness, keeping shoulders depressed and retracted, stepping to place > reaching to place, keeping weight close to center of gravity and pacing as needed. Then completed 2x6 reps with beach ball, rated moderate 3/10.     Pt educated on golfer's lifting technique with education focused on safety and stability with standing. Pt educated on the importance of cross-over method, RUE/LLE for stability and vis versa for movement within lift.   Pt able to demonstrate concept of lift 5x2 on L side rated as easy 2/10. 5x3 reps on R side, rated 3/10. Increased ease on R side with gentle bend in R knee. Endorsed surprise at decreased strain with golfers lift, vs completing with B legs on floor.       Therapeutic exercises to develop strength, endurance, ROM, flexibility, posture, and core stabilization for 25 minutes, including:    Full body functional mobility w/ 3-10 sec hold technique &/or 3-5 repetition technique to improve functional performance. In order to:  Improve driving safety, visual & spatial awareness:  Cervical flx/ext  Cervical side bending  Cervical rotation  Cervical protraction/retraction  Improve lifting mechanics, showering/bathing, dressing, cooking, reaching, pushing & fine motor skills  Shld rolls  Shld depression/elevation  Scapular retraction/protraction/scaption  Posterior shld stretch (cross  arm)  Shld ER stretch (chicken wing)  Elbow flx/ext  Forearm supination/pronation  Wrist flx/ext  Open/close hands/fingers  Improve bed mobility & rolling, bending over, cooking & cleaning:  Seated trunk rotations  Seated trunk/thoracic ext/flx  Improve functional gait, squatting, sitting tolerance, functional balance:   Seated marches & knee to chest  Seated knee flx/ext  Seated hip ER/piriformis stretch  Seated hamstring stretch  Seated toe raise to heel raise   Seated ankle circles each way  Improve overhead reaching/activities, standing tolerance:  Standing lumbar extensions  Standing lateral leaning stretch  Standing quad stretch (UE support for balance)      No environmental, cultural, spiritual, developmental or education needs expressed or noted.    Patient Education and Home Exercises   Education provided:   - Progress towards goals   - importance of completion of exercises and activities outside of session to attain goals.   - proper posture and body mechanics.  - anticipated muscular soreness following lift testing and strategies for management including stretching, using ice, scheduled rest.  - Functional pain scale  - JANET rate of perceived exertion scale.   - pain cycle  - pursed lip and diaphragmatic breathing techniques  - details of the Functional Restoration Program.     Written Home Exercises Provided: Patient instructed to cont prior HEP.  Exercises were reviewed and Valeria was able to demonstrate them prior to the end of the session. Valeria demonstrated fair  understanding of the education provided.     See EMR under Patient Instructions for exercises provided during therapy sessions. Patient education also located in FRP binder provided on day 1 of the cohort.       Assessment     Ms. Nguyen endorsed improved pain management with activity, though required redirection from anticipatory fear of pain.  Responsive to education on functional lifts, endorsing significant need for this education in  "work setting "packing up boxes for shows."  She endorsed surprise at lack of pain with golfer's lift. Education on impact of anticipatory pain provided.  Overall good engagement in exercise and functional lifts.     Valeria is progressing well towards her goals and status of goals can be seen below. Patient's prognosis is Fair.     Valeria would continue to benefit from skilled outpatient occupational therapy to address the deficits listed in the problem list on initial evaluation, provide patient education, and to maximize patient's level of independence in the home and community environment.     Anticipated barriers to occupational therapy: co-morbidities    Goals:     SHORT Term goals: In 3 weeks, patient will:  Status of goal:   Implements correct use of breathing techniques during functional lifting tasks, with minimal cues needed. INITIATED   Utilizes JANET rate of exertion scale to pace performance with functional lifting tasks, and implements self-care strategies during activity participation to manage pain. INITIATED   Verbalize understanding of energy conservation and pacing strategies during daily habits, roles, and routines in order to improve tolerance for work and home demands.  INITIATED   Completes 5x sit to stand test in 16 seconds or less to improve ability to participate in community mobility.   INITIATED   Demonstrate increased positional tolerance to the level needed for work, home, and community activities (standing in cue at social event, managing supplies for outing, streneous IADL), as evidenced by having a METS level of 3. INITIATED   Demonstrate proper body mechanics with functional lifting tasks (floor to waist, waist to shoulder, maximum lift, and box carry), via teach back method with minimal cues needed. INITIATED   Demonstrate increased functional strength by lifting 13 lbs waist to shoulder for management of (I)ADL, including dressing and grooming, placing items in kitchen cabinets, " folding towels, and/or managing suitcase when traveling.   INITIATED   Demonstrate increased functional strength by lifting 18 lbs floor to waist to meet demand of work/home routine, including lifting groceries, managing laundry, and/or managing suitcase when traveling.   INITIATED   Tolerate Home Activity Plan (HAP)/ Home Exercise Program (HEP) to promote safety and independence with ADL, with report of completion of at least 2 out of 4 days outside of program participation.  INITIATED   Show improved perception of own abilities as evidenced by increase of FOTO scores to established MDC value and perception of performance ratings regarding personal long term goals.  INITIATED         MIDTERM goals: In 9 weeks, patient will: Status of goal:   Report implementation of application of mindfulness, stretching, and other coping strategies for improved participation in daily routine. INITIATED   Verbalize functional application of lifting techniques in daily life (golfers lift, floor to waist, waist to shoulder). INITIATED   Completes 5x sit to stand test in 12 seconds or less to improve ability to participate in community mobility.  INITIATED   Applies functional application of lifting techniques (golfer's lift, floor to waist, waist to shoulder) in activities of daily life, per patient report.  INITIATED   Demonstrate physical capacities consistent with a Light (#20 max, frequent lifting/carrying #10, 2.5 METS  demand level, as needed to perform activities to be successful in roles of life, regarding Part Time Employment, Household Management, and Community Participation. INITIATED   Have an improvement of 3 points in 2/5 personal goals in rating of  performance.  INITIATED   Show improved perception of own abilities as evidenced by increase of FOTO scores to established MC II value and perception of performance ratings regarding personal long term goals.  INITIATED       LONG term goals = Patient Specific  Goals:  Vocational: able to carry supplies   Recreational : walking around [place], travel   Daily Living Activities: cooking, laundry, groceries, showering   Measured by patient's self-reported performance and satisfaction of personal FRP goals, listed above in subjective section.   Total Score = sum of the activity performance scores / number of activities  Minimum detectable change (90%CI) for average score = 2 points  Minimum detectable change (90%CI) for single activity score  = 3 points    Plan     Continue per plan of care.     Updates/Grading for next session: as tolerated, YUKO Bennett, LOTR   9/29/2023

## 2023-09-27 ENCOUNTER — CLINICAL SUPPORT (OUTPATIENT)
Dept: REHABILITATION | Facility: OTHER | Age: 58
End: 2023-09-27
Payer: COMMERCIAL

## 2023-09-27 ENCOUNTER — CLINICAL SUPPORT (OUTPATIENT)
Dept: PSYCHIATRY | Facility: OTHER | Age: 58
End: 2023-09-27
Payer: COMMERCIAL

## 2023-09-27 DIAGNOSIS — Z74.09 IMPAIRED FUNCTIONAL MOBILITY, BALANCE, GAIT, AND ENDURANCE: ICD-10-CM

## 2023-09-27 DIAGNOSIS — R29.818 POOR TRUNK CONTROL: ICD-10-CM

## 2023-09-27 DIAGNOSIS — R52 PAIN AGGRAVATED BY ACTIVITIES OF DAILY LIVING: Primary | ICD-10-CM

## 2023-09-27 DIAGNOSIS — R29.898 DECREASED STRENGTH OF TRUNK AND BACK: Primary | ICD-10-CM

## 2023-09-27 DIAGNOSIS — F43.29 ADJUSTMENT DISORDER WITH PHYSICAL COMPLAINTS: Primary | ICD-10-CM

## 2023-09-27 DIAGNOSIS — R29.3 POOR POSTURE: ICD-10-CM

## 2023-09-27 PROCEDURE — 97530 THERAPEUTIC ACTIVITIES: CPT

## 2023-09-27 PROCEDURE — 97110 THERAPEUTIC EXERCISES: CPT

## 2023-09-27 PROCEDURE — 90853 PR GROUP PSYCHOTHERAPY: ICD-10-PCS | Mod: ,,, | Performed by: SOCIAL WORKER

## 2023-09-27 PROCEDURE — 90853 GROUP PSYCHOTHERAPY: CPT | Mod: ,,, | Performed by: SOCIAL WORKER

## 2023-09-27 PROCEDURE — 97112 NEUROMUSCULAR REEDUCATION: CPT

## 2023-09-27 NOTE — PROGRESS NOTES
[facility]    Functional Restoration Program  Physical Therapy Treatment Note  FRP Day 2    Name: Thelma Nguyen  MRN:[id number]      Therapy Diagnosis:   Encounter Diagnoses   Name Primary?    Decreased strength of trunk and back Yes    Impaired functional mobility, balance, gait, and endurance        Physician: Bibi Alvarado NP    Date: 9/27/2023    Physician Orders: PT Eval and Treat   Medical Diagnosis from Referral:   M54.16 (ICD-10-CM) - Lumbar radiculopathy   R68.89 (ICD-10-CM) - Decreased motor activity   R26.9 (ICD-10-CM) - Abnormality of gait      Evaluation Date: 8/2/2023  Authorization Period Expiration: 12/31/23  Plan of Care Expiration: 12/22/23  Visit # / Visits authorized: 03/ 20  FOTO: 01/ 03      Precautions:: Standard and Fall  hx of CA (brain /c surgery)      Time In: 2:50 pm   Time Out: 4:00 pm   Total Appointment Time (timed & untimed codes): 70 minutes         SUBJECTIVE     Valeria reports returning home from Monday session and watching movie laying down.  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    Patient reports tolerating last visit well /c no c/o of excess discomfort.  Patient agree to tx today.          Current 5/10  cervical  3/10 HA   Location(s): left hip, LB     OBJECTIVE     Objective Measures updated at progress report unless specified.          Limitation/Restriction for FOTO LUMBAR Survey     Therapist reviewed FOTO scores for Thelma Nguyen on 8/2/2023.   FOTO documents entered into [facility] - see Media section.     Limitation Score 8/2/2023: 58%     Predicted Score: 45%             Treatment       Valeria received the Individualized Treatments listed below:     Valeria participated in dynamic functional therapeutic activities to improve functional performance for 00 minutes, including:      Valeria participated in  neuromuscular re-education activities to improve: Balance, Coordination, Kinesthetic, Sense, Proprioception, and Posture for 40 minutes. The following activities were included:    Diaphragmatic breathing:   Verbal cues for unlabored, long & relaxed breathing w/ increased time for exhalation  Awareness of pulling breath down away from mouth to hips  Cues for for proper abdominal excursion & decreased use of accessory muscles of breathing (hand on stomach & on chest; increased stomach motion, decreased chest motion)  Inhale, partial release, hold, partial release, hold, release x 5   Education on inhalation activating sympathetic nervous system   Education on exhalation activating parasympathetic nervous system  Performed supine     Fitter board - low setting, 20 tilt bouts   Fwd/Bwd     Trial 1- 2 hand  fade to 1 hand cue to inc tilt speed     Trial 2- EC 2 hand  cue to inc tilt speed    Lat     Trial 1- 1 hand  fade to 1 finger cue to reach end range    Trial 2- EC 1 hand  cue to reach end range     Peripheral muscle strengthening which included 1-2 sets of 10-20 repetitions at a slow, controlled 5-7 second per rep pace focused on strengthening supporting musculature for improved body mechanics & functional mobility.  Patient & therapist focused on proper form & speed during treatment to ensure optimal strengthening of each targeted muscle group & neuromuscular re-education. Patients are instructed to keep sets/reps with proper load to stay at 3-5 out of 10 on the provided modified Barby exertion scale.    BATCA Machine Exercises Weight (lbs) Sets Repetitions Barby Exertion Scale Rating   Leg Extension  10 1 20 5   Hamstring Curls 20 1 20 3   Chest Press 10 1 15 3   Pec Fly 10 1 15 5   Lat Pull Down       Mid Row       Bicep Bar Curls       Standing Tricep Extension       Single Leg Press           Valeria participated in therapeutic exercises to develop strength, endurance, ROM, flexibility, posture,  and core stabilization for 30 minutes including:    Seated   Cervical retractions x 10, /c patient OP 2 x 10    Cervical retraction /c  ext x 10 patient note    Cervical retraction /c sidebend x 5 5 sec hold B     Supine over rolled towel    Diaphragmatic breathing (see above)   B shoulder flex x 10    B shoulder horizAB x 10    Snow angels x 10    SA punches x 10          Patient Education and Home Exercises     Home Exercises Provided and Patient Education Provided     Education provided:   - FRP Educational Topics: Modified Barby Exertion Scale, Physical & Psychological Pain Cycles, Z-Lying for Pain Relief/Relaxation, Diaphragmatic Breathing, Activity Pacing for Pain & Debility , Central Sensitization , and Posture & Body Mechanics    Written Home Exercises Provided: Patient instructed to cont prior HEP. Exercises were reviewed and Valeria was able to demonstrate them prior to the end of the session.  Valeria demonstrated good  understanding of the education provided. See EMR under Patient Instructions for information provided during therapy sessions    ASSESSMENT     Patient subjective report indicate sig postural influence to sewing work.  Therefore, tx include cervical  and thoracic mobility to promote improved cervical mobility and address patient noted cervical discomfort today.  Patient complete /s inc discomfort indicating appropriateness of continue performance.  Patient demonstrate improved diaphragmatic breath control /c inhale/hold application.  Patient will need f/u for improved breath motor control.  Tx continue to introduce resistance training including education on appropriate muscle ache during exercise and application of RPE.  Patient demonstrate good pacing throughout resistance exercises.    Patient introduced to daily stretching program in OT session.  Patient encouraged to perform daily.      Valeria Is progressing well towards her goals.   Pt prognosis is Good.     Pt will continue to benefit from  skilled outpatient physical therapy to address the deficits listed in the problem list box on initial evaluation, provide pt/family education and to maximize pt's level of independence in the home and community environment.     Pt's spiritual, cultural and educational needs considered and pt agreeable to plan of care and goals.     Anticipated Barriers for therapy: chronic nature of issues, psychosocial factors, central sensitization      PLAN     Continue PT per DOM Banerjee, PT

## 2023-09-27 NOTE — PROGRESS NOTES
Group Psychotherapy    Site: [facility]    Clinical status of patient: Outpatient    9/27/2023    Length of service:[id number]    Referred by: Functional Restoration Program     Number of patients in attendance: 4    Target symptoms: Chronic Pain Management     Patient's response to intervention:  The patient's response to intervention is active listening.    Progress toward goals and other mental status changes:  The patient's progress toward goals is fair .    Plan: group psychotherapy    Topics Covered: Pts attended CBT for Chronic Pain as part of their participation in Functional Restoration. Topics discussed today included an introduction to FRP and discussed the outline of the program for the next 5 weeks. Introduced the concept of the pain cycle from a psychological and physiological point of view. Chronic pain can lead to fear of movement and injury which leads to progressive deconditioning and eventually more pain. When the mind focuses on pain it can increase anger and anxiety which can lead to depression and social isolation. Will f/u Friday

## 2023-09-29 ENCOUNTER — CLINICAL SUPPORT (OUTPATIENT)
Dept: REHABILITATION | Facility: OTHER | Age: 58
End: 2023-09-29
Payer: COMMERCIAL

## 2023-09-29 ENCOUNTER — CLINICAL SUPPORT (OUTPATIENT)
Dept: PSYCHIATRY | Facility: OTHER | Age: 58
End: 2023-09-29
Payer: COMMERCIAL

## 2023-09-29 DIAGNOSIS — R29.818 POOR TRUNK CONTROL: ICD-10-CM

## 2023-09-29 DIAGNOSIS — R29.3 POOR POSTURE: ICD-10-CM

## 2023-09-29 DIAGNOSIS — R52 PAIN AGGRAVATED BY ACTIVITIES OF DAILY LIVING: Primary | ICD-10-CM

## 2023-09-29 DIAGNOSIS — F43.29 ADJUSTMENT DISORDER WITH PHYSICAL COMPLAINTS: Primary | ICD-10-CM

## 2023-09-29 PROCEDURE — 97530 THERAPEUTIC ACTIVITIES: CPT

## 2023-09-29 PROCEDURE — 90853 PR GROUP PSYCHOTHERAPY: ICD-10-PCS | Mod: ,,, | Performed by: SOCIAL WORKER

## 2023-09-29 PROCEDURE — 97112 NEUROMUSCULAR REEDUCATION: CPT | Mod: CQ

## 2023-09-29 PROCEDURE — 97110 THERAPEUTIC EXERCISES: CPT | Mod: CQ

## 2023-09-29 PROCEDURE — 97110 THERAPEUTIC EXERCISES: CPT

## 2023-09-29 PROCEDURE — 90853 GROUP PSYCHOTHERAPY: CPT | Mod: ,,, | Performed by: SOCIAL WORKER

## 2023-09-29 PROCEDURE — 97530 THERAPEUTIC ACTIVITIES: CPT | Mod: CQ

## 2023-09-29 NOTE — PROGRESS NOTES
I was present for this encounter. I have met with the above student and agree with the assessment and note written. Any changes have been made and authorized by me.  Kaye Bailey, [facility] 09/29/2023 12:58 PM

## 2023-09-29 NOTE — PROGRESS NOTES
I was present for this encounter. I have met with the above student and agree with the assessment and note written. Any changes have been made and authorized by me.  Kaye Bailey, [facility] 09/29/2023 12:48 PM  Results of the testing below:  Quita Nguyen Cohort 71 Day 1 Day 16 % change   FAMILIA Depression-5  Anxiety-11  Stress-7 Depression-   Anxiety-   Stress-  % improvement  % improvement  % improvement   VAS  Pain today-5  Pain in the past week-8 Pain today-   Pain in the past week-  % improvement  % improvement   Pain Catastrophizing Scale 31  % decrease    Sleep Quality Instrument  9  % improvement    Pain Disability Index 47  % decrease   Fear Avoidance Beliefs Fear of Physical pain -17  Fear of Pain caused by work/chores- 14  Total fear of pain- 39 Fear of Physical pain-   Fear of Pain caused by work/chores-   Total fear of pain-  % decrease  % decrease  % decrease overall   Katt Pain questionnaire 38  % decrease   Low Back Disability  38% % % decrease   ACE score 2 N/A

## 2023-09-29 NOTE — PROGRESS NOTES
"[facility]  Functional Restoration Program  Occupational Therapy Treatment Note  FRP Day 3      Name: Thelma Nguyen  MRN:[id number]  Encounter Date: 9/29/2023    Diagnosis:   Encounter Diagnoses   Name Primary?    Pain aggravated by activities of daily living Yes    Poor trunk control     Poor posture      Referring provider: Bibi Alvarado NP    Physician Orders: eval and treat; [facility]  Medical Diagnosis:   M54.16 (ICD-10-CM) - Lumbar radiculopathy  R68.89 (ICD-10-CM) - Decreased motor activity R26.9 (ICD-10-CM) - Abnormality of gait   Evaluation Date: 8/2/2023  Insurance Authorization Period Expiration: 6/9/2024  Plan of Care Certification Period: 12/22/2023  Visit # / Visits authorized: 3/20 (plus eval)  FOTO completed: 2/3     Precautions:  Standard and Fall, Orthostatic Hypotension     Time In: 13:30  Time Out: 14:45  Total Billable Time: 75 minutes    SUBJECTIVE     She reports: "I didn't get a lot of sleep Wednesday night, and I was by myself on Thursday, so I did a lot of sitting around".    Valeria was compliant with parts of home exercise program given previously.     Response to previous treatment: Ms. Nguyen noted: restless legs at night; not much sleep.    Functional change: none identified at this time.    Pain:       Curren 4/10.   Location: throughout; head.   Description: Aching and Dull with elements of sharp pain with activity     Patient Specific Activities based on Personal goals:  Activity  9/25/2023    Performance Satisfaction   Walk in [facility] 1  1   2.  showering 5 5   3.  Grocery shopping 6 4   4.  Travel 6 5   5.  Cooking/meal prep. 7 (sits down)  6           Total Score 5  4.2       Patient Specific Activity Scoring Scheme for Performance     1        2        3        4        5        6        7        8        9        10     Unable/Unsatisfied                                          Able/Satisfied                                         "                                  Objective     Objective Measures updated at progress report unless specified.    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 Will see if someone can take pictures while seated at work station.    Focus on hip hinge using dowel for biofeedback x 5 reps, followed by completing functional lifting, raised floor to waist, 7 reps total, with ball walk outs performed mid way x 5 lbs with exertion rated Moderate (3/10); focused education on hip hinge. Pt plans to use this lift related to carrying supplies.     Valeria completed dynamic reaching in various functional ranges, with continued focus on hip rotation/weight shifting, 3 reps x 0 lbs, rated as Moderate (3/10) exertion.     Therapeutic exercises to develop strength, endurance, ROM, flexibility, posture, and core stabilization for 45 minutes, including:    Full body stretch w/ 3-10 sec hold technique &/or 3-5 repetition technique on all of the following:  Cervical flx/ext  Cervical sidebending  Cervical rotation  Cervical protraction/retraction  Shld rolls  Shld depression/elevation  Scapular retraction/protraction/scaption  Posterior shld stretch (cross arm)  Shld ER stretch (chicken wing)  Elbow flx/ext  Forearm supination/pronation  Wrist flx/ext  Open/close hands/fingers  Seated trunk rotations  Seated trunk/thoracic ext/flx  Seated marches & knee to chest  Seated knee flx/ext  Seated hip ER/piriformis stretch  Seated hamstring stretch  Seated toe raise to heel raise   Seated ankle circles each way  Standing lumbar extensions  Standing lateral trunk  stretch  Standing quad stretch    Valeria participated in endurance activity using Nustep for 11 minutes, starting at level 1 to 1.3 at 7 minutes, to improve functional endurance and progress towards increased MET level for improved community mobility and participation, rated as Moderate (3/10) exertion, Valeria re-educated on how to add mindfulness component to activity and how to pace appropriately by completed self check ins and grading activity as needed, with goal to reach moderate to sort of hard by end of activity. Completed FOTO survey during this time, and without UE use subsequently for first 7 minutes. Completed 964 steps; 632' elevation.    No environmental, cultural, spiritual, developmental or education needs expressed or noted.    Patient Education and Home Exercises   Education provided:   - Progress towards goals   - importance of completion of exercises and activities outside of session to attain goals.   - proper posture and body mechanics.  - anticipated muscular soreness following lift testing and strategies for management including stretching, using ice, scheduled rest.  - Functional pain scale  - JANET rate of perceived exertion scale.   - pain cycle  - pursed lip and diaphragmatic breathing techniques  - hip hinge    Written Home Exercises Provided: Patient instructed to cont prior HEP.  Exercises were reviewed and Valeria was able to demonstrate them prior to the end of the session. Valeria demonstrated fair  understanding of the education provided.     See EMR under Patient Instructions for exercises provided during therapy sessions. Patient education also located in FRP binder provided on day 1 of the cohort.     Assessment     Ms. Nguyen brought in photos of work set up; reports having to lift different machines from and to different desk areas, and might twist, instead of step. With functional lifting, quick to initiate task, without time on set up, resulting in inability to use proper body  mechanics. Difficulties with hip hinges. Overall good engagement in exercise and functional lifts.     Valeria is progressing well towards her goals and status of goals can be seen below. Patient's prognosis is Fair.     Valeria would continue to benefit from skilled outpatient occupational therapy to address the deficits listed in the problem list on initial evaluation, provide patient education, and to maximize patient's level of independence in the home and community environment.     Anticipated barriers to occupational therapy: co-morbidities    Goals:     SHORT Term goals: In 3 weeks, patient will:  Status of goal:   Implements correct use of breathing techniques during functional lifting tasks, with minimal cues needed. INITIATED   Utilizes JANET rate of exertion scale to pace performance with functional lifting tasks, and implements self-care strategies during activity participation to manage pain. INITIATED   Verbalize understanding of energy conservation and pacing strategies during daily habits, roles, and routines in order to improve tolerance for work and home demands.  INITIATED   Completes 5x sit to stand test in 16 seconds or less to improve ability to participate in community mobility.   INITIATED   Demonstrate increased positional tolerance to the level needed for work, home, and community activities (standing in cue at social event, managing supplies for outing, streneous IADL), as evidenced by having a METS level of 3. INITIATED   Demonstrate proper body mechanics with functional lifting tasks (floor to waist, waist to shoulder, maximum lift, and box carry), via teach back method with minimal cues needed. INITIATED   Demonstrate increased functional strength by lifting 13 lbs waist to shoulder for management of (I)ADL, including dressing and grooming, placing items in kitchen cabinets, folding towels, and/or managing suitcase when traveling.   INITIATED   Demonstrate increased functional strength by  lifting 18 lbs floor to waist to meet demand of work/home routine, including lifting groceries, managing laundry, and/or managing suitcase when traveling.   INITIATED   Tolerate Home Activity Plan (HAP)/ Home Exercise Program (HEP) to promote safety and independence with ADL, with report of completion of at least 2 out of 4 days outside of program participation.  INITIATED   Show improved perception of own abilities as evidenced by increase of FOTO scores to established MDC value and perception of performance ratings regarding personal long term goals.  INITIATED         MIDTERM goals: In 9 weeks, patient will: Status of goal:   Report implementation of application of mindfulness, stretching, and other coping strategies for improved participation in daily routine. INITIATED   Verbalize functional application of lifting techniques in daily life (golfers lift, floor to waist, waist to shoulder). INITIATED   Completes 5x sit to stand test in 12 seconds or less to improve ability to participate in community mobility.  INITIATED   Applies functional application of lifting techniques (golfer's lift, floor to waist, waist to shoulder) in activities of daily life, per patient report.  INITIATED   Demonstrate physical capacities consistent with a Light (#20 max, frequent lifting/carrying #10, 2.5 METS  demand level, as needed to perform activities to be successful in roles of life, regarding Part Time Employment, Household Management, and Community Participation. INITIATED   Have an improvement of 3 points in 2/5 personal goals in rating of  performance.  INITIATED   Show improved perception of own abilities as evidenced by increase of FOTO scores to established MC II value and perception of performance ratings regarding personal long term goals.  INITIATED       LONG term goals = Patient Specific Goals:  Vocational: able to carry supplies   Recreational : walking around [place], travel   Daily Living Activities:  cooking, laundry, groceries, showering   Measured by patient's self-reported performance and satisfaction of personal FRP goals, listed above in subjective section.   Total Score = sum of the activity performance scores / number of activities  Minimum detectable change (90%CI) for average score = 2 points  Minimum detectable change (90%CI) for single activity score  = 3 points    Plan     Continue per plan of care.     Updates/Grading for next session: as tolerated.     NIKKI Lee, OTR/L, CEAS-I  9/29/2023

## 2023-09-29 NOTE — PROGRESS NOTES
[facility]    Functional Restoration Program  Physical Therapy Treatment Note  FRP Day 3    Name: Thelma Nguyen  MRN:[id number]      Therapy Diagnosis:   Encounter Diagnoses   Name Primary?    Pain aggravated by activities of daily living Yes    Poor trunk control     Poor posture          Physician: Bibi Alvarado NP    Date: 9/29/2023    Physician Orders: PT Eval and Treat   Medical Diagnosis from Referral:   M54.16 (ICD-10-CM) - Lumbar radiculopathy   R68.89 (ICD-10-CM) - Decreased motor activity   R26.9 (ICD-10-CM) - Abnormality of gait      Evaluation Date: 8/2/2023  Authorization Period Expiration: 12/31/23  Plan of Care Expiration: 12/22/23  Visit # / Visits authorized: 03/ 20  FOTO: 01/ 03      Precautions:: Standard and Fall  hx of CA (brain /c surgery)      Time In: 2:50 pm   Time Out: 4:00 pm   Total Appointment Time (timed & untimed codes): 70 minutes         SUBJECTIVE     Valeria reports not getting a good night sleep Wed night. Pt reports being very fatigued after FRP that evening.   Patient reports tolerating last visit well /c no c/o of excess discomfort.  Patient agree to tx today.          Current 5/10  cervical  3/10 HA   Location(s): left hip, LB     OBJECTIVE     Objective Measures updated at progress report unless specified.          Limitation/Restriction for FOTO LUMBAR Survey     Therapist reviewed FOTO scores for Thelma Nguyen on 8/2/2023.   FOTO documents entered into 5 examples - see Media section.     Limitation Score 8/2/2023: 58%     Predicted Score: 45%             Treatment       Valeria received the Individualized Treatments listed below:     Valeria participated in dynamic functional therapeutic activities to improve functional performance for 10 minutes, including:  Diaphragmatic breathing:   Verbal cues for unlabored, long & relaxed breathing w/ increased time for exhalation  Awareness of pulling breath down away from mouth to  hips  Cues for for proper abdominal excursion & decreased use of accessory muscles of breathing (hand on stomach & on chest; increased stomach motion, decreased chest motion)   Education on inhalation activating sympathetic nervous system   Education on exhalation activating parasympathetic nervous system  Performed supine Z lying    Valeria participated in neuromuscular re-education activities to improve: Balance, Coordination, Kinesthetic, Sense, Proprioception, and Posture for 40 minutes. The following activities were included:    Fitter board - low setting, 20 tilt bouts   Fwd/Bwd     Trial 1- 2 hand  fade to 1 hand cue to inc tilt speed     Trial 2- EC 2 hand  cue to inc tilt speed    Lat     Trial 1- 1 hand  fade to 1 finger cue to reach end range    Trial 2- EC 1 hand  cue to reach end range     Peripheral muscle strengthening which included 1-2 sets of 10-20 repetitions at a slow, controlled 5-7 second per rep pace focused on strengthening supporting musculature for improved body mechanics & functional mobility.  Patient & therapist focused on proper form & speed during treatment to ensure optimal strengthening of each targeted muscle group & neuromuscular re-education. Patients are instructed to keep sets/reps with proper load to stay at 3-5 out of 10 on the provided modified Barby exertion scale.    BATCA Machine Exercises Weight (lbs) Sets Repetitions Barby Exertion Scale Rating   Leg Extension        Hamstring Curls       Chest Press       Pec Fly       Lat Pull Down 15 1 20 4   Mid Row 17.5 1 20 5   Bicep Bar Curls 5 1 20 4   Standing Tricep Extension 10 1 20 3   Single Leg Press 20 1 20 3       Valeria participated in therapeutic exercises to develop strength, endurance, ROM, flexibility, posture, and core stabilization for 20 minutes including:    Seated   Cervical retractions x 10, /c patient OP 2 x 10    Cervical retraction /c  ext x 10    Cervical retraction /c sidebend x 5 5 sec hold B      Supine over rolled towel    B shoulder flex x 15    B shoulder horizAB x 10    Snow angels x 15    SA punches x 15   Standing   Open book stretch x10 R and L         Patient Education and Home Exercises     Home Exercises Provided and Patient Education Provided     Education provided:   - FRP Educational Topics: Modified Barby Exertion Scale, Physical & Psychological Pain Cycles, Z-Lying for Pain Relief/Relaxation, Diaphragmatic Breathing, Activity Pacing for Pain & Debility , Central Sensitization , and Posture & Body Mechanics    Written Home Exercises Provided: Patient instructed to cont prior HEP. Exercises were reviewed and Valeria was able to demonstrate them prior to the end of the session.  Valeria demonstrated good  understanding of the education provided. See EMR under Patient Instructions for information provided during therapy sessions    ASSESSMENT      Valeria participated well in PT session. Tx cont with cervical and thoracic mobility to promote improved mobility and address patient's noted cervical/thoracic  discomfort and postural imbalance due to working positions.  Patient demonstrate improved diaphragmatic breath control /c increased practice and cues.  Patient will need f/u for improved breath motor control.  Cont resistance training on BATCA machines c/ patient demonstrating good pacing and posture throughout c/ appropriate PRE.  Up dated FOTO today, no change since eval. Goals remain the same.    Valeria Is progressing well towards her goals.   Pt prognosis is Good.     Pt will continue to benefit from skilled outpatient physical therapy to address the deficits listed in the problem list box on initial evaluation, provide pt/family education and to maximize pt's level of independence in the home and community environment.     Pt's spiritual, cultural and educational needs considered and pt agreeable to plan of care and goals.     Anticipated Barriers for therapy: chronic nature of issues,  psychosocial factors, central sensitization      PLAN     Continue PT per POC     Aide Silva, PTA

## 2023-09-29 NOTE — TELEPHONE ENCOUNTER
Called to schedule pt for VV today per PCP request. Pt states she cannot make today at 130p because she is at functional restoration. Pt states her vision has gone back to normal, still has a small bump on her head.

## 2023-09-29 NOTE — PROGRESS NOTES
Group Psychotherapy    Site: [facility]    Clinical status of patient: Outpatient    9/29/2023    Length of service:[id number]    Referred by: [facility] Program     Number of patients in attendance: 5    Target symptoms: Chronic Pain Management     Patient's response to intervention:  The patient's response to intervention is active listening.    Progress toward goals and other mental status changes:  The patient's progress toward goals is fair .    Plan: group psychotherapy    Topics Covered: Pt attended CBT for Chronic Pain as part of their participation in Functional Restoration. Topics discussed today included a discussion on sleep and sleep hygiene, including how sleep can impact anxiety, depression, other mood disorders and chronic pain. Pts were given the [facility] sleep diary and asked to track their sleep for the next 7 nights to discover any patterns. We discussed homework for the weekend, will f/u in 3 days.

## 2023-10-02 ENCOUNTER — CLINICAL SUPPORT (OUTPATIENT)
Dept: REHABILITATION | Facility: OTHER | Age: 58
End: 2023-10-02
Payer: COMMERCIAL

## 2023-10-02 ENCOUNTER — CLINICAL SUPPORT (OUTPATIENT)
Dept: PSYCHIATRY | Facility: OTHER | Age: 58
End: 2023-10-02
Payer: COMMERCIAL

## 2023-10-02 DIAGNOSIS — R52 PAIN AGGRAVATED BY ACTIVITIES OF DAILY LIVING: Primary | ICD-10-CM

## 2023-10-02 DIAGNOSIS — R29.3 POOR POSTURE: ICD-10-CM

## 2023-10-02 DIAGNOSIS — R29.898 DECREASED STRENGTH OF TRUNK AND BACK: Primary | ICD-10-CM

## 2023-10-02 DIAGNOSIS — Z74.09 IMPAIRED FUNCTIONAL MOBILITY, BALANCE, GAIT, AND ENDURANCE: ICD-10-CM

## 2023-10-02 DIAGNOSIS — R29.818 POOR TRUNK CONTROL: ICD-10-CM

## 2023-10-02 DIAGNOSIS — F43.29 ADJUSTMENT DISORDER WITH PHYSICAL COMPLAINTS: Primary | ICD-10-CM

## 2023-10-02 PROCEDURE — 90853 GROUP PSYCHOTHERAPY: CPT | Mod: ,,, | Performed by: SOCIAL WORKER

## 2023-10-02 PROCEDURE — 97112 NEUROMUSCULAR REEDUCATION: CPT | Mod: CQ

## 2023-10-02 PROCEDURE — 97530 THERAPEUTIC ACTIVITIES: CPT

## 2023-10-02 PROCEDURE — 97110 THERAPEUTIC EXERCISES: CPT

## 2023-10-02 PROCEDURE — 90853 PR GROUP PSYCHOTHERAPY: ICD-10-PCS | Mod: ,,, | Performed by: SOCIAL WORKER

## 2023-10-02 PROCEDURE — 97530 THERAPEUTIC ACTIVITIES: CPT | Mod: CQ

## 2023-10-02 PROCEDURE — 97110 THERAPEUTIC EXERCISES: CPT | Mod: CQ

## 2023-10-02 NOTE — PROGRESS NOTES
Group Psychotherapy    Site: [facility]    Clinical status of patient: Outpatient    10/2/2023    Length of service:[id number]    Referred by: Functional [facility]oration Program     Number of patients in attendance: 5    Target symptoms: Chronic Pain Management     Patient's response to intervention:  The patient's response to intervention is self-disclosure.    Progress toward goals and other mental status changes:  The patient's progress toward goals is good.    Plan: group psychotherapy    Topics Covered: Pt attended CBT for Chronic Pain as part of their participation in Functional Restoration. Topics discussed today included broadening the understanding of the connection between self-care and psychological, physical, professional, and emotional wellbeing. Discussed the importance of self-care in the management of chronic pain, depression, and anxiety. Pts filled out their self-care wheel with activities that they are able to do with their current level of functioning and pain. Pt discussed how they are going to make time for the items on their self-care wheel as well as ways that they can hold themselves accountable. Will follow up Wednesday.

## 2023-10-02 NOTE — PROGRESS NOTES
[facility]    Functional Restoration Program  Physical Therapy Treatment Note  FRP Day 4    Name: Thelma Nguyen  MRN:[id number]      Therapy Diagnosis:   Encounter Diagnoses   Name Primary?    Decreased strength of trunk and back Yes    Impaired functional mobility, balance, gait, and endurance          Physician: Bibi Alvarado NP    Date: 10/2/2023    Physician Orders: PT Eval and Treat   Medical Diagnosis from Referral:   M54.16 (ICD-10-CM) - Lumbar radiculopathy   R68.89 (ICD-10-CM) - Decreased motor activity   R26.9 (ICD-10-CM) - Abnormality of gait      Evaluation Date: 8/2/2023  Authorization Period Expiration: 12/31/23  Plan of Care Expiration: 12/22/23  Visit # / Visits authorized: 04/ 20  FOTO: 01/ 03      Precautions:: Standard and Fall  hx of CA (brain /c surgery)      Time In: 2:45 pm   Time Out: 4:00 pm   Total Appointment Time (timed & untimed codes): 70 minutes         SUBJECTIVE     Valeria reports feeling some soreness in upper back from last wk's activities. Pt she did work on [employer] this weekend because of near deadlines but had some help. Pt thought she was careful to take breaks but was still very tired afterward. Pt reports sleeping well. Pt stated she was performing the stretch in the PM but may switch to AM and making definite time so it forgotten due to fatigue at end of day. Pt reports finding herself loosing her balance and hitting doorways more often and always to the right.     Patient reports tolerating last visit well /c no c/o of excess discomfort.  Patient agree to tx today.          Current 3/10    Location(s): upper back, shlds    OBJECTIVE     Objective Measures updated at progress report unless specified.       Treatment       Valeria received the Individualized Treatments listed below:     Valeria participated in dynamic functional therapeutic activities to improve functional performance for 10 minutes, including:  Diaphragmatic breathing:    Verbal cues for unlabored, long & relaxed breathing w/ increased time for exhalation  Awareness of pulling breath down away from mouth to hips  Cues for for proper abdominal excursion & decreased use of accessory muscles of breathing (hand on stomach & on chest; increased stomach motion, decreased chest motion)   Education on inhalation activating sympathetic nervous system   Education on exhalation activating parasympathetic nervous system  Performed supine Z lying    Valeria participated in neuromuscular re-education activities to improve: Balance, Coordination, Kinesthetic, Sense, Proprioception, and Posture for 30 minutes. The following activities were included:    Fitter board - low setting, 20 tilt bouts   Fwd/Bwd     Trial 1- 2 hand  fade to 1 hand cue to inc tilt speed     Trial 2- EC 2 hand  cue to inc tilt speed    Lat     Trial 1- 1 hand  fade to 1 finger cue to reach end range    Trial 2- EC 1 hand  cue to reach end range     KaraoTarisa walk - to the R and L - 10 steps x 2 ea slight LOL to R    Peripheral muscle strengthening which included 1-2 sets of 10-20 repetitions at a slow, controlled 5-7 second per rep pace focused on strengthening supporting musculature for improved body mechanics & functional mobility.  Patient & therapist focused on proper form & speed during treatment to ensure optimal strengthening of each targeted muscle group & neuromuscular re-education. Patients are instructed to keep sets/reps with proper load to stay at 3-5 out of 10 on the provided modified Barby exertion scale.    BATCA Machine Exercises Weight (lbs) Sets Repetitions Barby Exertion Scale Rating   Leg Extension  10 1 20 4   Hamstring Curls 22.5 1 20 2   Chest Press 10 1 15 4   Pec Fly 10 1 20 3   Lat Pull Down       Mid Row       Bicep Curls DB 4# 1 20 4   Standing Tricep Extension 10 1 20 4   Single Leg Press           Valeria participated in therapeutic exercises to develop strength, endurance, ROM,  flexibility, posture, and core stabilization for 30 minutes including:    Seated   Cervical retractions x 10, /c patient OP 2 x 10    Cervical retraction /c  ext x 10    Cervical retraction /c sidebend x 5 5 sec hold B    B shld ER and retract c/ RTB x10    Supine over rolled towel    B shoulder flex x 15    B shoulder horizAB x 10 NP   Snow angels x 15    SA punches x 15   Supine Bridge c/ isometric abd RTB 2x10    Standing - NP   Open book stretch x10 R and L     Sci-fit seated stepper endurance training 8 min 90 steps/ min maintained throughtout    Patient Education and Home Exercises     Home Exercises Provided and Patient Education Provided     Education provided:   - FRP Educational Topics: Modified Barby Exertion Scale, Physical & Psychological Pain Cycles, Z-Lying for Pain Relief/Relaxation, Diaphragmatic Breathing, Activity Pacing for Pain & Debility , Central Sensitization , and Posture & Body Mechanics    Written Home Exercises Provided: Patient instructed to cont prior HEP. Exercises were reviewed and Valeria was able to demonstrate them prior to the end of the session.  Valeria demonstrated good  understanding of the education provided. See EMR under Patient Instructions for information provided during therapy sessions    ASSESSMENT      Valeria participated well in PT session. Tx cont with cervical and thoracic mobility to promote improved mobility and address patient's noted cervical/thoracic  discomfort and postural imbalance due to working positions.  Patient demonstrate improved diaphragmatic breath control /c increased practice and cues.  Patient will need f/u for improved breath motor control.  Cont resistance training on BATCA machines c/ patient demonstrating good pacing, cues for posture throughout and c/ appropriate PRE. Addressing pt's poor proprioception c/ karaoke walks and fitter board c/ eyes closed. Cont to follow up c/ increased challenge.       Valeria Is progressing well towards her goals.    Pt prognosis is Good.     Pt will continue to benefit from skilled outpatient physical therapy to address the deficits listed in the problem list box on initial evaluation, provide pt/family education and to maximize pt's level of independence in the home and community environment.     Pt's spiritual, cultural and educational needs considered and pt agreeable to plan of care and goals.     Anticipated Barriers for therapy: chronic nature of issues, psychosocial factors, central sensitization      PLAN     Continue PT per POC     Aide Silva, PTA

## 2023-10-02 NOTE — PROGRESS NOTES
"[facility]  Functional Restoration Program  Occupational Therapy Treatment Note  FRP Day 4      Name: Thelma Nguyen  MRN:[id number]  Encounter Date: 10/2/2023    Diagnosis:   Encounter Diagnoses   Name Primary?    Pain aggravated by activities of daily living Yes    Poor trunk control     Poor posture        Referring provider: Bibi Alvarado NP    Physician Orders: eval and treat; [facility]  Medical Diagnosis:   M54.16 (ICD-10-CM) - Lumbar radiculopathy  R68.89 (ICD-10-CM) - Decreased motor activity R26.9 (ICD-10-CM) - Abnormality of gait   Evaluation Date: 8/2/2023  Insurance Authorization Period Expiration: 6/9/2024  Plan of Care Certification Period: 12/22/2023  Visit # / Visits authorized: 4/20 (plus eval)  FOTO completed: 2/3     Precautions:  Standard and Fall, Orthostatic Hypotension     Time In: 1330   Time Out: 1445   Total Billable Time: 75 minutes    SUBJECTIVE     She reports: "sore and busy" over the weekend, endorsed difficulty with pacing.     Valeria was compliant with parts of home exercise program given previously.  Wasn't able to complete all homework,  Still having difficulty with pacing 2* "going out of town for 3 weeks and hitting my head last week set me behind"; "I tend to sit" re: rest breaks during pacing attempts over weekend    Response to previous treatment: Ms. Nguyen noted: sore at midback "T" shape    Functional change: noticing pain ebbing/flowing and improving w repositioning/breaks; takes breaks more often    Pain:       Curren 4/10  Location: throughout; head.   Description: Aching and Dull with elements of sharp pain with activity     Patient Specific Activities based on Personal goals:  Activity  9/25/2023    Performance Satisfaction   Walk in [facility] 1  1   2.  showering 5 5   3.  Grocery shopping 6 4   4.  Travel 6 5   5.  Cooking/meal prep. 7 (sits down)  6           Total Score 5  4.2       Patient Specific Activity Scoring " Scheme for Performance     1        2        3        4        5        6        7        8        9        10     Unable/Unsatisfied                                          Able/Satisfied                                                                          Objective     Objective Measures updated at progress report unless specified.    Treatment     Valeria received the treatments listed below:     Therapeutic activities and functional lifting activities in order to increase participation in, endurance for, and performance with vocational, selfcare ADLs, and leisure activities in order to improve quality of life, for 50 minutes, including:    In preparation for functional lift off floor, Valeria provided with education on importance of hip hinge to alleviate pressure and strain on knee joint provided via teachback.  Then in standing with raised surface, pt performed hip hinge>knee bend>crate tap performed with moderate cues to shift weight into heels and maintain neutral spine, rated Sort of hard (4/10).      Seated physioball rollouts performed during rest breaks.     Pt completed sustained seated activity to discuss current habits/routines re: daily schedule pre program, and set goals related to intentional movement, water intake, nutrition, stretching and mindfulness in order to create new daily schedule to include those goals. Pt used fine motor and dexterity skills to handwrite old and new schedules. Continued education on proper ergonomics (posture, neutral spine, 90 degrees for elbows on table, etc), weight shifting as needed and standing stretches as needed. Pt rated activity easy (2/10) exertion.  During activity pt discussed anticipated barriers, anxieties and stressors re: new schedule and self accountability. Pt with good mindset and plan moving forward and with decreased stress and increased self-efficacy post activity.       Therapeutic exercises to develop strength, endurance, ROM, flexibility,  posture, and core stabilization for 25 minutes, including:    Full body stretch w/ 3-10 sec hold technique &/or 3-5 repetition technique on all of the following:  Cervical flx/ext  Cervical sidebending  Cervical rotation  Cervical protraction/retraction  Shld rolls  Shld depression/elevation  Scapular retraction/protraction/scaption  Posterior shld stretch (cross arm)  Shld ER stretch (chicken wing)  Elbow flx/ext  Forearm supination/pronation  Wrist flx/ext  Open/close hands/fingers  Seated trunk rotations  Seated trunk/thoracic ext/flx  Seated marches & knee to chest  Seated knee flx/ext  Seated hip ER/piriformis stretch  Seated hamstring stretch  Seated toe raise to heel raise   Seated ankle circles each way  Standing lumbar extensions  Standing lateral trunk stretch  Standing quad stretch    Valeria participated in endurance activity using Nustep for 11 minutes, starting at level 1 to 1.3 at 7 minutes, to improve functional endurance and progress towards increased MET level for improved community mobility and participation, rated as Moderate (3/10) exertion, Valeria re-educated on how to add mindfulness component to activity and how to pace appropriately by completed self check ins and grading activity as needed, with goal to reach moderate to sort of hard by end of activity. Completed FOTO survey during this time, and without UE use subsequently for first 7 minutes. Completed 964 steps; 632' elevation.    No environmental, cultural, spiritual, developmental or education needs expressed or noted.    Patient Education and Home Exercises   Education provided:   - Progress towards goals   - importance of completion of exercises and activities outside of session to attain goals.   - proper posture and body mechanics.  - anticipated muscular soreness following lift testing and strategies for management including stretching, using ice, scheduled rest.  - Functional pain scale  - JANET rate of perceived exertion scale.   -  pain cycle  - pursed lip and diaphragmatic breathing techniques  - hip hinge    Written Home Exercises Provided: Patient instructed to cont prior HEP.  Exercises were reviewed and Valeria was able to demonstrate them prior to the end of the session. Valeria demonstrated fair  understanding of the education provided.     See EMR under Patient Instructions for exercises provided during therapy sessions. Patient education also located in FRP binder provided on day 1 of the cohort.     Assessment     Ms. Nguyen presents this date with heightened fatigue and endorsed issues with stomach over the weekend, requiring cues to recognize impact of heightened stressors on pain and fatigue presentation.   Good responsiveness to scheduling exercise this date, tendency to favor more sedentary activities in AM and group more demanding tasks in PM, likely contributing to boom/bust and difficulty pacing. She was open to feedback and endorsed good awareness as result of today's education.       Valeria is progressing well towards her goals and status of goals can be seen below. Patient's prognosis is Fair.     Valeria would continue to benefit from skilled outpatient occupational therapy to address the deficits listed in the problem list on initial evaluation, provide patient education, and to maximize patient's level of independence in the home and community environment.     Anticipated barriers to occupational therapy: co-morbidities    Goals:     SHORT Term goals: In 3 weeks, patient will:  Status of goal:   Implements correct use of breathing techniques during functional lifting tasks, with minimal cues needed. INITIATED   Utilizes JANET rate of exertion scale to pace performance with functional lifting tasks, and implements self-care strategies during activity participation to manage pain. INITIATED   Verbalize understanding of energy conservation and pacing strategies during daily habits, roles, and routines in order to improve  tolerance for work and home demands.  INITIATED   Completes 5x sit to stand test in 16 seconds or less to improve ability to participate in community mobility.   INITIATED   Demonstrate increased positional tolerance to the level needed for work, home, and community activities (standing in cue at social event, managing supplies for outing, streneous IADL), as evidenced by having a METS level of 3. INITIATED   Demonstrate proper body mechanics with functional lifting tasks (floor to waist, waist to shoulder, maximum lift, and box carry), via teach back method with minimal cues needed. INITIATED   Demonstrate increased functional strength by lifting 13 lbs waist to shoulder for management of (I)ADL, including dressing and grooming, placing items in kitchen cabinets, folding towels, and/or managing suitcase when traveling.   INITIATED   Demonstrate increased functional strength by lifting 18 lbs floor to waist to meet demand of work/home routine, including lifting groceries, managing laundry, and/or managing suitcase when traveling.   INITIATED   Tolerate Home Activity Plan (HAP)/ Home Exercise Program (HEP) to promote safety and independence with ADL, with report of completion of at least 2 out of 4 days outside of program participation.  INITIATED   Show improved perception of own abilities as evidenced by increase of FOTO scores to established MDC value and perception of performance ratings regarding personal long term goals.  INITIATED         MIDTERM goals: In 9 weeks, patient will: Status of goal:   Report implementation of application of mindfulness, stretching, and other coping strategies for improved participation in daily routine. INITIATED   Verbalize functional application of lifting techniques in daily life (golfers lift, floor to waist, waist to shoulder). INITIATED   Completes 5x sit to stand test in 12 seconds or less to improve ability to participate in community mobility.  INITIATED   Applies  functional application of lifting techniques (golfer's lift, floor to waist, waist to shoulder) in activities of daily life, per patient report.  INITIATED   Demonstrate physical capacities consistent with a Light (#20 max, frequent lifting/carrying #10, 2.5 METS  demand level, as needed to perform activities to be successful in roles of life, regarding Part Time Employment, Household Management, and Community Participation. INITIATED   Have an improvement of 3 points in 2/5 personal goals in rating of  performance.  INITIATED   Show improved perception of own abilities as evidenced by increase of FOTO scores to established MC II value and perception of performance ratings regarding personal long term goals.  INITIATED       LONG term goals = Patient Specific Goals:  Vocational: able to carry supplies   Recreational : walking around [place], travel   Daily Living Activities: cooking, laundry, groceries, showering   Measured by patient's self-reported performance and satisfaction of personal FRP goals, listed above in subjective section.   Total Score = sum of the activity performance scores / number of activities  Minimum detectable change (90%CI) for average score = 2 points  Minimum detectable change (90%CI) for single activity score  = 3 points    Plan     Continue per plan of care.     Updates/Grading for next session: follow up on schedule     LAUREANO Hollingsworth   10/2/2023

## 2023-10-03 NOTE — PROGRESS NOTES
"[facility]    Functional Restoration Program  Physical Therapy Treatment Note  FRP Day 5    Name: Thelma Nguyen  MRN:[id number]      Therapy Diagnosis:   Encounter Diagnoses   Name Primary?    Decreased strength of trunk and back Yes    Impaired functional mobility, balance, gait, and endurance        Physician: Bibi Alvarado NP    Date: 10/4/2023    Physician Orders: PT Eval and Treat   Medical Diagnosis from Referral:   M54.16 (ICD-10-CM) - Lumbar radiculopathy   R68.89 (ICD-10-CM) - Decreased motor activity   R26.9 (ICD-10-CM) - Abnormality of gait      Evaluation Date: 8/2/2023  Authorization Period Expiration: 12/31/23  Plan of Care Expiration: 12/22/23  Visit # / Visits authorized: 05/ 20  FOTO: 01/ 03      Precautions:: Standard and Fall  hx of CA (brain /c surgery)      Time In: 2:45 pm   Time Out: 4:00 pm   Total Appointment Time (timed & untimed codes): 75 minutes     SUBJECTIVE     Valeria reports near fall she believes from BP "episode" on Tuesday evening.  Patient sat on ground until "feeling passes".  Patient clarify she was not as tired as she expected able to then go out on multiple errands /s sig fatigue.  Patient report in car and sitting on couch finding herself correcting her posture.    Patient reports perform stretches in PM but only because she kept forgetting to do them during the day.   Patient report waking /c inc fatigue today but clarifies she feels this is from sinus "stuffed up" rather than from exercises in FRP.     Patient reports tolerating last visit well /c no c/o of excess discomfort.  Patient agree to tx today.          Current 5/10  cervical  3/10 HA   Location(s): left hip, LB     OBJECTIVE     Objective Measures updated at progress report unless specified.      Limitation/Restriction for FOTO LUMBAR Survey     Therapist reviewed FOTO scores for Thelma Nguyen on 8/2/2023.   FOTO documents entered into EPIC - see Media " section.     Limitation Score 8/2/2023: 58%     Predicted Score: 45%             Treatment       Valeria received the Individualized Treatments listed below:     Valeria participated in dynamic functional therapeutic activities to improve functional performance for 10 minutes, including:    Postural correction in car or couch   SOC x 10 cue for dec torso motion, x 10 /c improved pelvic tilt     Valeria participated in neuromuscular re-education activities to improve: Balance, Coordination, Kinesthetic, Sense, Proprioception, and Posture for 45 minutes. The following activities were included:    Supine   PPT x 10 cue for dec valsalva, dec BLE use, tactile cue for TA activation, breathing control    TA activation /c Tball in hooklying x 10 10 sec hold  TA activation /c Tball in hooklying /c SLR x 10 B    PPT x 5 patient reminded of self cue for TA activation     Karaoke walk 3 laps untimed of 5 full steps L/5 steps R    24 sec lap of 5 full steps L/R      Peripheral muscle strengthening which included 1-2 sets of 10-20 repetitions at a slow, controlled 5-7 second per rep pace focused on strengthening supporting musculature for improved body mechanics & functional mobility.  Patient & therapist focused on proper form & speed during treatment to ensure optimal strengthening of each targeted muscle group & neuromuscular re-education. Patients are instructed to keep sets/reps with proper load to stay at 3-5 out of 10 on the provided modified Barby exertion scale.    Scopelec Machine Exercises Weight (lbs) Sets Repetitions Barby Exertion Scale Rating   Leg Extension  12.5 1 20 2   Hamstring Curls 27.5 1 15 5   Chest Press       Pec Fly       Lat Pull Down       Mid Row 20 1 20 4   Bicep Curls       Standing Tricep Extension       Single Leg Press 20 1 20 2     NP:   Fitter board - low setting, 20 tilt bouts   Fwd/Bwd     Trial 1- 2 hand  fade to 1 hand cue to inc tilt speed     Trial 2- EC 2 hand  cue to inc tilt speed    Lat      Trial 1- 1 hand  fade to 1 finger cue to reach end range    Trial 2- EC 1 hand  cue to reach end range       Valeria participated in therapeutic exercises to develop strength, endurance, ROM, flexibility, posture, and core stabilization for 20 minutes including:    Supine   LTR x 10   Prone laying -> DAVEY  3 x 30 sec bouts -> press ups 2 x 5     Supine over rolled yoga mat    B shoulder flex x 10, x 10 swimmers, x 5 /c head turns     B shoulder horizAB x 20    Snow angels x 20     SA punches x 20 /c 1# DB  Seated   Cervical retractions x 10, /c patient OP 2 x 10    Cervical retraction /c  ext x 10            Patient Education and Home Exercises     Home Exercises Provided and Patient Education Provided     Education provided:   - FRP Educational Topics: Modified Barby Exertion Scale, Physical & Psychological Pain Cycles, Z-Lying for Pain Relief/Relaxation, Diaphragmatic Breathing, Activity Pacing for Pain & Debility , Central Sensitization , and Posture & Body Mechanics    Written Home Exercises Provided: Patient instructed to cont prior HEP. Exercises were reviewed and Valeria was able to demonstrate them prior to the end of the session.  Valeria demonstrated good  understanding of the education provided. See EMR under Patient Instructions for information provided during therapy sessions    ASSESSMENT      Patient /c good participation today.  Tx /c inc focus on lumbar and thoracic mobility. Patient demonstrate improved lumbar ext tolerance /c progression to press ups.  Subjective report indicating improved postural awareness /c self corrects, therefore, patient education on SOC technique.  Patient need cueing for improved pelvic tilt but improved /c reps.  Similarly, TA activation exercises improved quality of activation /c reps.  Recommend progressing lumbopelvic stability challenges in future visits.  Patient continue to develop confidence on BATCA.          Valeria Is progressing well towards her goals.   Pt  prognosis is Good.     Pt will continue to benefit from skilled outpatient physical therapy to address the deficits listed in the problem list box on initial evaluation, provide pt/family education and to maximize pt's level of independence in the home and community environment.     Pt's spiritual, cultural and educational needs considered and pt agreeable to plan of care and goals.     Anticipated Barriers for therapy: chronic nature of issues, psychosocial factors, central sensitization      PLAN     Continue PT per DOM Banerjee, PT

## 2023-10-04 ENCOUNTER — CLINICAL SUPPORT (OUTPATIENT)
Dept: PSYCHIATRY | Facility: OTHER | Age: 58
End: 2023-10-04
Payer: COMMERCIAL

## 2023-10-04 ENCOUNTER — CLINICAL SUPPORT (OUTPATIENT)
Dept: REHABILITATION | Facility: OTHER | Age: 58
End: 2023-10-04
Payer: COMMERCIAL

## 2023-10-04 DIAGNOSIS — R29.818 POOR TRUNK CONTROL: ICD-10-CM

## 2023-10-04 DIAGNOSIS — R29.3 POOR POSTURE: ICD-10-CM

## 2023-10-04 DIAGNOSIS — R52 PAIN AGGRAVATED BY ACTIVITIES OF DAILY LIVING: Primary | ICD-10-CM

## 2023-10-04 DIAGNOSIS — F43.29 ADJUSTMENT DISORDER WITH PHYSICAL COMPLAINTS: Primary | ICD-10-CM

## 2023-10-04 DIAGNOSIS — M54.16 LUMBAR RADICULOPATHY: ICD-10-CM

## 2023-10-04 DIAGNOSIS — R29.898 DECREASED STRENGTH OF TRUNK AND BACK: Primary | ICD-10-CM

## 2023-10-04 DIAGNOSIS — Z74.09 IMPAIRED FUNCTIONAL MOBILITY, BALANCE, GAIT, AND ENDURANCE: ICD-10-CM

## 2023-10-04 PROCEDURE — 97530 THERAPEUTIC ACTIVITIES: CPT

## 2023-10-04 PROCEDURE — 97112 NEUROMUSCULAR REEDUCATION: CPT

## 2023-10-04 PROCEDURE — 90853 GROUP PSYCHOTHERAPY: CPT | Mod: ,,, | Performed by: SOCIAL WORKER

## 2023-10-04 PROCEDURE — 90853 PR GROUP PSYCHOTHERAPY: ICD-10-PCS | Mod: ,,, | Performed by: SOCIAL WORKER

## 2023-10-04 PROCEDURE — 97110 THERAPEUTIC EXERCISES: CPT

## 2023-10-04 NOTE — PROGRESS NOTES
Group Psychotherapy    Site: [facility]    Clinical status of patient: Outpatient    10/4/2023    Length of service:[id number]    Referred by: Functional Restoration Program     Number of patients in attendance: 6    Target symptoms: Chronic Pain Management     Patient's response to intervention:  The patient's response to intervention is active listening.    Progress toward goals and other mental status changes:  The patient's progress toward goals is fair .    Plan: group psychotherapy    Topics Covered: Pts attended CBT for Chronic Pain as part of their participation in Functional Restoration. Topics discussed today included the autonomic nervous system and the relationship between the sympathetic and parasympathetic nervous system and the parts it plays with body regulation. Chronic pain patients have a heightened sympathetic response which increase chronic pain, less movement/exercise and result in depression and anxiety. First nutrition lecture covered the importance of a balanced diet and the role that processed foods and added sugars increase inflammation in the body causing an increase in chronic pain.

## 2023-10-04 NOTE — PROGRESS NOTES
I was present for this encounter. have met with the above student and agree with the assessment and note written. Any changes have been made and authorized by me.  Kaye Bailey, [facility] 10/04/2023 11:48 AM

## 2023-10-04 NOTE — PROGRESS NOTES
"[facility]  Functional Restoration Program  Occupational Therapy Treatment Note  FRP Day 5      Name: Thelma Nguyen  MRN:[id number]  Encounter Date: 10/4/2023    Diagnosis:   Encounter Diagnoses   Name Primary?    Pain aggravated by activities of daily living Yes    Poor trunk control     Poor posture        Referring provider: Bibi Alvarado NP    Physician Orders: eval and treat; [facility]  Medical Diagnosis:   M54.16 (ICD-10-CM) - Lumbar radiculopathy  R68.89 (ICD-10-CM) - Decreased motor activity R26.9 (ICD-10-CM) - Abnormality of gait   Evaluation Date: 8/2/2023  Insurance Authorization Period Expiration: 6/9/2024  Plan of Care Certification Period: 12/22/2023  Visit # / Visits authorized: 5/20 (plus eval)  FOTO completed: 2/3     Precautions:  Standard and Fall, Orthostatic Hypotension     Time In: 1330   Time Out: 1445   Total Billable Time: 75 minutes    SUBJECTIVE     She reports: "I was pretty tired yesterday, I laid down on the sofa around 1 and didn't wake up until 3"; "my stomach is a little better. But I'm trying to be better at eating better" cues required to recognize impact of stress on symptoms of GI and pain.   She also shared, "I was able to walk around [facility] and [facility] and not get tired yesterday"      Valeria was compliant with parts of home exercise program given previously.      Response to previous treatment: Ms. Nguyen noted: tolerable soreness, noted increased fatigue on Tuesday    Functional change: noticing pain ebbing/flowing and improving w repositioning/breaks; takes breaks more often. Improved activity tolerance     Pain:       Curren 4/10  Location: throughout; head.   Description: Aching and Dull with elements of sharp pain with activity     Patient Specific Activities based on Personal goals:  Activity  9/25/2023    Performance Satisfaction   Walk in [place]  [employer] 1  1   2.  showering 5 5   3.  Grocery shopping 6 4   4.  Travel 6 5   5.  " Cooking/meal prep. 7 (sits down)  6           Total Score 5  4.2       Patient Specific Activity Scoring Scheme for Performance     1        2        3        4        5        6        7        8        9        10     Unable/Unsatisfied                                          Able/Satisfied                                                                          Objective     Objective Measures updated at progress report unless specified.    Treatment     Valeria received the treatments listed below:     Therapeutic activities and functional lifting activities in order to increase participation in, endurance for, and performance with vocational, selfcare ADLs, and leisure activities in order to improve quality of life, for 50 minutes, including:    Continuation of scheduling activity this date- review of schedule made on Monday for current habits/routines re: daily schedule pre program, and set goals related to intentional movement, water intake, nutrition, stretching and mindfulness.  Color code system introduced as form of increased awareness of impact of time management. Pacing handout also provided this date.  Pt with good mindset and plan moving forward and with decreased stress and increased self-efficacy post activity. Pt rated activity moderate (3/10) exertion.      In preparation for functional lift off floor, Valeria provided with continued education on importance of hip hinge to alleviate pressure and strain on knee joint provided via teachback.  Then in standing with raised surface, pt performed hip hinge>knee bend>crate tap performed with moderate cues to shift weight into heels and maintain neutral spine, rated Sort of hard (4/10).      Seated physioball rollouts performed during rest breaks.     Valeria completed functional lifting, floor to waist, 2x6 reps x 5 lbs with exertion rated Sort of hard (4/10); focused education on hinging at hips before initiating bend in knees. Tends to lean forward into  toes before initiating stand. Extended time with pacing required 2* generalized debility and quick tension build-up in shoulders and midback.        Therapeutic exercises to develop strength, endurance, ROM, flexibility, posture, and core stabilization for 25 minutes, including:    Full body stretch w/ 3-10 sec hold technique &/or 3-5 repetition technique on all of the following:  Cervical flx/ext  Cervical sidebending  Cervical rotation  Cervical protraction/retraction  Shld rolls  Shld depression/elevation  Scapular retraction/protraction/scaption  Posterior shld stretch (cross arm)  Shld ER stretch (chicken wing)  Elbow flx/ext  Forearm supination/pronation  Wrist flx/ext  Open/close hands/fingers  Seated trunk rotations  Seated trunk/thoracic ext/flx  Seated marches & knee to chest  Seated knee flx/ext  Seated hip ER/piriformis stretch  Seated hamstring stretch  Seated toe raise to heel raise   Seated ankle circles each way  Standing lumbar extensions  Standing lateral trunk stretch  Standing quad stretch      No environmental, cultural, spiritual, developmental or education needs expressed or noted.    Patient Education and Home Exercises   Education provided:   - Progress towards goals   - importance of completion of exercises and activities outside of session to attain goals.   - proper posture and body mechanics.  - anticipated muscular soreness following lift testing and strategies for management including stretching, using ice, scheduled rest.  - Functional pain scale  - JANET rate of perceived exertion scale.   - pain cycle  - pursed lip and diaphragmatic breathing techniques  - hip hinge    Written Home Exercises Provided: Patient instructed to cont prior HEP.  Exercises were reviewed and Valeria was able to demonstrate them prior to the end of the session. Valeria demonstrated fair  understanding of the education provided.     See EMR under Patient Instructions for exercises provided during therapy sessions.  Patient education also located in [facility] binder provided on day 1 of the cohort.     Assessment     Ms. Nguyen tolerated today's session well. Tends to lean forward into toes before initiating stand. Extended time with pacing required 2* generalized debility and quick tension build-up in shoulders and midback.  Good carryover with education re: scheduling and stretching. Continues to be limited in awareness of impact of psychosocial stressors on pain and GI symptoms, was open to feedback and endorsed good awareness as result of today's education.       Valeria is progressing well towards her goals and status of goals can be seen below. Patient's prognosis is Fair.     Valeria would continue to benefit from skilled outpatient occupational therapy to address the deficits listed in the problem list on initial evaluation, provide patient education, and to maximize patient's level of independence in the home and community environment.     Anticipated barriers to occupational therapy: co-morbidities    Goals:     SHORT Term goals: In 3 weeks, patient will:  Status of goal:   Implements correct use of breathing techniques during functional lifting tasks, with minimal cues needed. INITIATED   Utilizes JANET rate of exertion scale to pace performance with functional lifting tasks, and implements self-care strategies during activity participation to manage pain. INITIATED   Verbalize understanding of energy conservation and pacing strategies during daily habits, roles, and routines in order to improve tolerance for work and home demands.  INITIATED   Completes 5x sit to stand test in 16 seconds or less to improve ability to participate in community mobility.   INITIATED   Demonstrate increased positional tolerance to the level needed for work, home, and community activities (standing in cue at social event, managing supplies for outing, streneous IADL), as evidenced by having a METS level of 3. INITIATED   Demonstrate proper body  mechanics with functional lifting tasks (floor to waist, waist to shoulder, maximum lift, and box carry), via teach back method with minimal cues needed. INITIATED   Demonstrate increased functional strength by lifting 13 lbs waist to shoulder for management of (I)ADL, including dressing and grooming, placing items in kitchen cabinets, folding towels, and/or managing suitcase when traveling.   INITIATED   Demonstrate increased functional strength by lifting 18 lbs floor to waist to meet demand of work/home routine, including lifting groceries, managing laundry, and/or managing suitcase when traveling.   INITIATED   Tolerate Home Activity Plan (HAP)/ Home Exercise Program (HEP) to promote safety and independence with ADL, with report of completion of at least 2 out of 4 days outside of program participation.  INITIATED   Show improved perception of own abilities as evidenced by increase of FOTO scores to established MDC value and perception of performance ratings regarding personal long term goals.  INITIATED         MIDTERM goals: In 9 weeks, patient will: Status of goal:   Report implementation of application of mindfulness, stretching, and other coping strategies for improved participation in daily routine. INITIATED   Verbalize functional application of lifting techniques in daily life (golfers lift, floor to waist, waist to shoulder). INITIATED   Completes 5x sit to stand test in 12 seconds or less to improve ability to participate in community mobility.  INITIATED   Applies functional application of lifting techniques (golfer's lift, floor to waist, waist to shoulder) in activities of daily life, per patient report.  INITIATED   Demonstrate physical capacities consistent with a Light (#20 max, frequent lifting/carrying #10, 2.5 METS  demand level, as needed to perform activities to be successful in roles of life, regarding Part Time Employment, Household Management, and Community Participation. INITIATED   Have  an improvement of 3 points in 2/5 personal goals in rating of  performance.  INITIATED   Show improved perception of own abilities as evidenced by increase of FOTO scores to established MC II value and perception of performance ratings regarding personal long term goals.  INITIATED       LONG term goals = Patient Specific Goals:  Vocational: able to carry supplies   Recreational : walking around [place], travel   Daily Living Activities: cooking, laundry, groceries, showering   Measured by patient's self-reported performance and satisfaction of personal FRP goals, listed above in subjective section.   Total Score = sum of the activity performance scores / number of activities  Minimum detectable change (90%CI) for average score = 2 points  Minimum detectable change (90%CI) for single activity score  = 3 points    Plan     Continue per plan of care.     Updates/Grading for next session: follow up on pacing sheets, waist to shoulder lift, impact of stress on GI    LAUREANO Hollingsworth   10/4/2023

## 2023-10-06 ENCOUNTER — CLINICAL SUPPORT (OUTPATIENT)
Dept: REHABILITATION | Facility: OTHER | Age: 58
End: 2023-10-06
Payer: COMMERCIAL

## 2023-10-06 ENCOUNTER — CLINICAL SUPPORT (OUTPATIENT)
Dept: PSYCHIATRY | Facility: OTHER | Age: 58
End: 2023-10-06
Payer: COMMERCIAL

## 2023-10-06 DIAGNOSIS — R29.3 POOR POSTURE: ICD-10-CM

## 2023-10-06 DIAGNOSIS — R52 PAIN AGGRAVATED BY ACTIVITIES OF DAILY LIVING: Primary | ICD-10-CM

## 2023-10-06 DIAGNOSIS — R29.818 POOR TRUNK CONTROL: ICD-10-CM

## 2023-10-06 DIAGNOSIS — R29.898 DECREASED STRENGTH OF TRUNK AND BACK: Primary | ICD-10-CM

## 2023-10-06 DIAGNOSIS — F43.29 ADJUSTMENT DISORDER WITH PHYSICAL COMPLAINTS: Primary | ICD-10-CM

## 2023-10-06 DIAGNOSIS — Z74.09 IMPAIRED FUNCTIONAL MOBILITY, BALANCE, GAIT, AND ENDURANCE: ICD-10-CM

## 2023-10-06 PROCEDURE — 90853 PR GROUP PSYCHOTHERAPY: ICD-10-PCS | Mod: ,,, | Performed by: SOCIAL WORKER

## 2023-10-06 PROCEDURE — 90853 GROUP PSYCHOTHERAPY: CPT | Mod: ,,, | Performed by: SOCIAL WORKER

## 2023-10-06 PROCEDURE — 97110 THERAPEUTIC EXERCISES: CPT

## 2023-10-06 PROCEDURE — 97112 NEUROMUSCULAR REEDUCATION: CPT | Mod: CQ

## 2023-10-06 PROCEDURE — 97530 THERAPEUTIC ACTIVITIES: CPT | Mod: CQ

## 2023-10-06 PROCEDURE — 97530 THERAPEUTIC ACTIVITIES: CPT

## 2023-10-06 PROCEDURE — 97110 THERAPEUTIC EXERCISES: CPT | Mod: CQ

## 2023-10-06 NOTE — PROGRESS NOTES
"[facility]    Functional Restoration Program  Physical Therapy Treatment Note  FRP Day 6    Name: Thelma Nguyen  MRN:[id number]      Therapy Diagnosis:   Encounter Diagnoses   Name Primary?    Decreased strength of trunk and back Yes    Impaired functional mobility, balance, gait, and endurance          Physician: Bibi Alvarado NP    Date: 10/6/2023    Physician Orders: PT Eval and Treat   Medical Diagnosis from Referral:   M54.16 (ICD-10-CM) - Lumbar radiculopathy   R68.89 (ICD-10-CM) - Decreased motor activity   R26.9 (ICD-10-CM) - Abnormality of gait      Evaluation Date: 8/2/2023  Authorization Period Expiration: 12/31/23  Plan of Care Expiration: 12/22/23  Visit # / Visits authorized: 06/ 20  FOTO: 01/ 03      Precautions:: Standard and Fall  hx of CA (brain /c surgery)      Time In: 2:45 pm   Time Out: 4:00 pm   Total Appointment Time (timed & untimed codes): 75 minutes     SUBJECTIVE     Valeria reports a fear of falling due to what she calls her "episodes" which are orthostatic hypotension.   Pt reports her pain is 6/10 today due to her position while sewing last night. Pt states she did not sew all 6 hrs at one time, did things in between.     Patient reports tolerating last visit well /c no c/o of excess discomfort.  Patient agree to tx today.          Current 5/10  cervical  3/10 HA   Location(s): left hip, LB     OBJECTIVE     Objective Measures updated at progress report unless specified.      Limitation/Restriction for FOTO LUMBAR Survey     Therapist reviewed FOTO scores for Thelma Nguyen on 8/2/2023.   FOTO documents entered into [employer] - see Media section.     Limitation Score 8/2/2023: 58%     Predicted Score: 45%             Treatment       Valeria received the Individualized Treatments listed below:     Valeria participated in dynamic functional therapeutic activities to improve functional performance for 30  minutes, including:    Full body " functional mobility w/ 3-10 sec hold technique &/or 3-5 repetition technique to improve functional performance. In order to:  Improve driving safety, visual & spatial awareness:  Cervical flx/ext  Cervical side bending  Cervical rotation  Cervical protraction/retraction  Improve lifting mechanics, showering/bathing, dressing, cooking, reaching, pushing & fine motor skills  Shld rolls  Shld depression/elevation  Scapular retraction/protraction/scaption  Posterior shld stretch (cross arm)  Shld ER stretch (chicken wing)  Elbow flx/ext  Forearm supination/pronation  Wrist flx/ext  Open/close hands/fingers  Improve bed mobility & rolling, bending over, cooking & cleaning:  Seated trunk rotations  Seated trunk/thoracic ext/flx  Improve functional gait, squatting, sitting tolerance, functional balance:   Seated marches & knee to chest  Seated knee flx/ext  Seated hip ER/piriformis stretch  Seated hamstring stretch  Seated toe raise to heel raise   Seated ankle circles each way  Improve overhead reaching/activities, standing tolerance:  Standing lumbar extensions  Standing lateral leaning stretch  Standing quad stretch (UE support for balance)    Diaphragmatic breathing:   Verbal cues for unlabored, long & relaxed breathing w/ increased time for exhalation  Awareness of pulling breath down away from mouth to hips  Cues for for proper abdominal excursion & decreased use of accessory muscles of breathing (hand on stomach & on chest; increased stomach motion, decreased chest motion)  Education on inhalation activating sympathetic nervous system   Education on exhalation activating parasympathetic nervous system  Performed supine & seated     Valeria participated in neuromuscular re-education activities to improve: Balance, Coordination, Kinesthetic, Sense, Proprioception, and Posture for 35 minutes. The following activities were included:    Supine   PPT x 10 cue   TA activation /c Tball in hooklying x 10 10 sec hold  TA  activation /c Tball in hooklying /c SLR x 10 B    PPT x 5 patient reminded of self cue for TA activation   NP  Karaoke walk 3 laps untimed of 5 full steps L/5 steps R    24 sec lap of 5 full steps L/R      Peripheral muscle strengthening which included 1-2 sets of 10-20 repetitions at a slow, controlled 5-7 second per rep pace focused on strengthening supporting musculature for improved body mechanics & functional mobility.  Patient & therapist focused on proper form & speed during treatment to ensure optimal strengthening of each targeted muscle group & neuromuscular re-education. Patients are instructed to keep sets/reps with proper load to stay at 3-5 out of 10 on the provided modified Barby exertion scale.    Amigos y [employer]igos Machine Exercises Weight (lbs) Sets Repetitions Barby Exertion Scale Rating   Leg Extension        Hamstring Curls       Chest Press 10 1 15 4   Pec Fly 15 1 15 4   Lat Pull Down 17.5 1 15 4   Mid Row       Bicep Curls - DB 5 1 15 4   Standing Tricep Extension 12.5 1 15 4   Single Leg Press         NP:   Fitter board - low setting, 20 tilt bouts   Fwd/Bwd     Trial 1- 2 hand  fade to 1 hand cue to inc tilt speed     Trial 2- EC 2 hand  cue to inc tilt speed    Lat     Trial 1- 1 hand  fade to 1 finger cue to reach end range    Trial 2- EC 1 hand  cue to reach end range       Valeria participated in therapeutic exercises to develop strength, endurance, ROM, flexibility, posture, and core stabilization for 20 minutes including:    Supine chin tucks, cervical side bend, cervical rot    Supine over rolled yoga mat    B shoulder flex x 10, x 10 swimmers, x 5 /c head turns     B shoulder horizAB x 20    Snow angels x 20     SA punches x 20 /c 1# DB  Seated   Cervical retractions x 10, /c patient OP 2 x 10    Cervical retraction /c  ext x 10    UT stretch c/ OP           Patient Education and Home Exercises     Home Exercises Provided and Patient Education Provided     Education provided:   - DAMIR  Educational Topics: Modified Barby Exertion Scale, Physical & Psychological Pain Cycles, Z-Lying for Pain Relief/Relaxation, Diaphragmatic Breathing, Activity Pacing for Pain & Debility , Central Sensitization , and Posture & Body Mechanics    Written Home Exercises Provided: Patient instructed to cont prior HEP. Exercises were reviewed and Valeria was able to demonstrate them prior to the end of the session.  Valeria demonstrated good  understanding of the education provided. See EMR under Patient Instructions for information provided during therapy sessions    ASSESSMENT      Valeria /c good participation today.  Tx /c inc focus on lumbar and thoracic mobility and pain management. Patient demonstrate improved lumbar ext tolerance /c progression to press ups.  Subjective report indicating improved postural awareness /c self corrects, therefore, patient education on SOC technique.  Patient need cueing for improved pelvic tilt but improved /c reps.  Similarly, TA activation exercises improved quality of activation /c reps.  Recommend progressing lumbopelvic stability challenges in future visits.  Patient continue to develop confidence on BATCA.          Valeria Is progressing well towards her goals.   Pt prognosis is Good.     Pt will continue to benefit from skilled outpatient physical therapy to address the deficits listed in the problem list box on initial evaluation, provide pt/family education and to maximize pt's level of independence in the home and community environment.     Pt's spiritual, cultural and educational needs considered and pt agreeable to plan of care and goals.     Anticipated Barriers for therapy: chronic nature of issues, psychosocial factors, central sensitization      PLAN     Continue PT per POC     Aide Silva, PTA

## 2023-10-06 NOTE — PROGRESS NOTES
"[facility]  Functional Restoration Program  Occupational Therapy Treatment Note  FRP Day 6      Name: Thelma Nguyen  MRN:[id number]  Encounter Date: 10/6/2023    Diagnosis:   Encounter Diagnoses   Name Primary?    Pain aggravated by activities of daily living Yes    Poor trunk control     Poor posture      Referring provider: Bibi Alvarado NP    Physician Orders: eval and treat; [facility]  Medical Diagnosis:   M54.16 (ICD-10-CM) - Lumbar radiculopathy  R68.89 (ICD-10-CM) - Decreased motor activity R26.9 (ICD-10-CM) - Abnormality of gait   Evaluation Date: 8/2/2023  Insurance Authorization Period Expiration: 6/9/2024  Plan of Care Certification Period: 12/22/2023  Visit # / Visits authorized: 6/20 (plus eval)  FOTO completed: 2/3     Precautions:  Standard and Fall, Orthostatic Hypotension     Time In: 14:54  Time Out: 16:03  Total Billable Time: 69 minutes    SUBJECTIVE     She reports: "when I get cold my neck is one of the first places I hurt". "I went to the [employer] before I came here, and I wasn't exhausted after. Normally I feel exhausted already when I walk in".    Valeria was compliant with parts of home exercise program given previously.      Response to previous treatment: Ms. Nguyen noted: went OK, was able to do some shopping after session.     Functional change: noticing pain ebbing/flowing and improving with repositioning/breaks; takes breaks more often. Improved activity tolerance.     Pain:       Curren 5/10  Location: neck, hip, throughout, head.   Description: Aching and Dull with elements of sharp pain with activity     Patient Specific Activities based on Personal goals:  Activity  9/25/2023    Performance Satisfaction   Walk in [facility] 1  1   2.  showering 5 5   3.  Grocery shopping 6 4   4.  Travel 6 5   5.  Cooking/meal prep. 7 (sits down)  6           Total Score 5  4.2       Patient Specific Activity Scoring Scheme for Performance     1        " 2        3        4        5        6        7        8        9        10     Unable/Unsatisfied                                          Able/Satisfied                                                                          Objective     Objective Measures updated at progress report unless specified.    Treatment     Valeria received the treatments listed below:     Therapeutic activities and functional lifting activities in order to increase participation in, endurance for, and performance with vocational, selfcare ADLs, and leisure activities in order to improve quality of life, for 54 minutes, including:    Review of tracking sheet re energy banking; is willing to track additional days.     Valeria completed dynamic reaching in various functional ranges, with continued focus on hip rotation/weight shifting, 2x5 reps x no added lbs, rated as Moderate (3/10) exertion.     Valeria completed functional lifting, floor to waist, 2x5 reps x 5 lbs with exertion rated Hard (5/10); focused education on sequence, with descend into chair used to review technique.     Valeria participated in functional lift waist to shoulder, 2x5 reps x 5 lbs with a rating of Hard (5/10) exertion. Valeria educated on standing posture, core awareness, keeping shoulders depressed and retracted, stepping to place > reaching to place, keeping weight close to center of gravity and pacing as needed. Tactile cues     Seated physioball rollouts performed during rest breaks.     Therapeutic exercises to develop strength, endurance, ROM, flexibility, posture, and core stabilization for 15 minutes, including:    Valeria participated in endurance activity using Nustep for 15 minutes, starting at level 1.4 to improve functional endurance and progress towards increased MET level for improved community mobility and participation, rated as Sort of hard (4/10) exertion, Valeria re-educated on how to add mindfulness component to activity and how to pace appropriately by  completed self check ins and grading activity as needed, with goal to reach moderate to sort of hard by end of activity. Completed 1251 steps; elevation 785'.     No environmental, cultural, spiritual, developmental or education needs expressed or noted.    Patient Education and Home Exercises   Education provided:   - Progress towards goals   - importance of completion of exercises and activities outside of session to attain goals.   - proper posture and body mechanics.  - anticipated muscular soreness following lift testing and strategies for management including stretching, using ice, scheduled rest.  - Functional pain scale  - JANET rate of perceived exertion scale.   - pain cycle  - pursed lip and diaphragmatic breathing techniques  - hip hinge    Written Home Exercises Provided: Patient instructed to cont prior HEP.  Exercises were reviewed and Valeria was able to demonstrate them prior to the end of the session. Valeria demonstrated fair  understanding of the education provided.     See EMR under Patient Instructions for exercises provided during therapy sessions. Patient education also located in FRP binder provided on day 1 of the cohort.     Assessment     Ms. Nguyen tolerated today's session well. Continues with difficulties with sequence of lifting technique, but open to feedback. Is willing to continue tracking activities, for review of pacing opportunities.    Valeria is progressing well towards her goals and status of goals can be seen below. Patient's prognosis is Fair.     Valeria would continue to benefit from skilled outpatient occupational therapy to address the deficits listed in the problem list on initial evaluation, provide patient education, and to maximize patient's level of independence in the home and community environment.     Anticipated barriers to occupational therapy: co-morbidities    Goals:     SHORT Term goals: In 3 weeks, patient will:  Status of goal:   Implements correct use of  breathing techniques during functional lifting tasks, with minimal cues needed. INITIATED   Utilizes JANET rate of exertion scale to pace performance with functional lifting tasks, and implements self-care strategies during activity participation to manage pain. INITIATED   Verbalize understanding of energy conservation and pacing strategies during daily habits, roles, and routines in order to improve tolerance for work and home demands.  INITIATED   Completes 5x sit to stand test in 16 seconds or less to improve ability to participate in community mobility.   INITIATED   Demonstrate increased positional tolerance to the level needed for work, home, and community activities (standing in cue at social event, managing supplies for outing, streneous IADL), as evidenced by having a METS level of 3. INITIATED   Demonstrate proper body mechanics with functional lifting tasks (floor to waist, waist to shoulder, maximum lift, and box carry), via teach back method with minimal cues needed. INITIATED   Demonstrate increased functional strength by lifting 13 lbs waist to shoulder for management of (I)ADL, including dressing and grooming, placing items in kitchen cabinets, folding towels, and/or managing suitcase when traveling.   INITIATED   Demonstrate increased functional strength by lifting 18 lbs floor to waist to meet demand of work/home routine, including lifting groceries, managing laundry, and/or managing suitcase when traveling.   INITIATED   Tolerate Home Activity Plan (HAP)/ Home Exercise Program (HEP) to promote safety and independence with ADL, with report of completion of at least 2 out of 4 days outside of program participation.  INITIATED   Show improved perception of own abilities as evidenced by increase of FOTO scores to established MDC value and perception of performance ratings regarding personal long term goals.  INITIATED         MIDTERM goals: In 9 weeks, patient will: Status of goal:   Report  implementation of application of mindfulness, stretching, and other coping strategies for improved participation in daily routine. INITIATED   Verbalize functional application of lifting techniques in daily life (golfers lift, floor to waist, waist to shoulder). INITIATED   Completes 5x sit to stand test in 12 seconds or less to improve ability to participate in community mobility.  INITIATED   Applies functional application of lifting techniques (golfer's lift, floor to waist, waist to shoulder) in activities of daily life, per patient report.  INITIATED   Demonstrate physical capacities consistent with a Light (#20 max, frequent lifting/carrying #10, 2.5 METS  demand level, as needed to perform activities to be successful in roles of life, regarding Part Time Employment, Household Management, and Community Participation. INITIATED   Have an improvement of 3 points in 2/5 personal goals in rating of  performance.  INITIATED   Show improved perception of own abilities as evidenced by increase of FOTO scores to established MC II value and perception of performance ratings regarding personal long term goals.  INITIATED       LONG term goals = Patient Specific Goals:  Vocational: able to carry supplies   Recreational : walking around [place], travel   Daily Living Activities: cooking, laundry, groceries, showering   Measured by patient's self-reported performance and satisfaction of personal FRP goals, listed above in subjective section.   Total Score = sum of the activity performance scores / number of activities  Minimum detectable change (90%CI) for average score = 2 points  Minimum detectable change (90%CI) for single activity score  = 3 points    Plan     Continue per plan of care.     Updates/Grading for next session: follow up on pacing sheets, waist to shoulder lift, impact of stress on GI, FOTO/review activities related to personal goals.     NIKKI Lee, OTR/L, CEAS-I  10/6/2023

## 2023-10-09 ENCOUNTER — CLINICAL SUPPORT (OUTPATIENT)
Dept: PSYCHIATRY | Facility: OTHER | Age: 58
End: 2023-10-09
Payer: COMMERCIAL

## 2023-10-09 ENCOUNTER — CLINICAL SUPPORT (OUTPATIENT)
Dept: REHABILITATION | Facility: OTHER | Age: 58
End: 2023-10-09
Payer: COMMERCIAL

## 2023-10-09 DIAGNOSIS — Z74.09 IMPAIRED FUNCTIONAL MOBILITY, BALANCE, GAIT, AND ENDURANCE: ICD-10-CM

## 2023-10-09 DIAGNOSIS — R52 PAIN AGGRAVATED BY ACTIVITIES OF DAILY LIVING: Primary | ICD-10-CM

## 2023-10-09 DIAGNOSIS — R29.898 DECREASED STRENGTH OF TRUNK AND BACK: Primary | ICD-10-CM

## 2023-10-09 DIAGNOSIS — R29.818 POOR TRUNK CONTROL: ICD-10-CM

## 2023-10-09 DIAGNOSIS — F43.29 ADJUSTMENT DISORDER WITH PHYSICAL COMPLAINTS: Primary | ICD-10-CM

## 2023-10-09 DIAGNOSIS — R29.3 POOR POSTURE: ICD-10-CM

## 2023-10-09 PROCEDURE — 97530 THERAPEUTIC ACTIVITIES: CPT

## 2023-10-09 PROCEDURE — 97112 NEUROMUSCULAR REEDUCATION: CPT | Performed by: PHYSICAL MEDICINE & REHABILITATION

## 2023-10-09 PROCEDURE — 97530 THERAPEUTIC ACTIVITIES: CPT | Performed by: PHYSICAL MEDICINE & REHABILITATION

## 2023-10-09 PROCEDURE — 97110 THERAPEUTIC EXERCISES: CPT

## 2023-10-09 NOTE — PROGRESS NOTES
[facility]    Functional Restoration Program  Physical Therapy Treatment Note  FRP Day 7    Name: Thelma Nguyen  MRN:[id number]      Therapy Diagnosis:   Encounter Diagnoses   Name Primary?    Decreased strength of trunk and back Yes    Impaired functional mobility, balance, gait, and endurance            Physician: Bibi Alvarado NP    Date: 10/9/2023    Physician Orders: PT Eval and Treat   Medical Diagnosis from Referral:   M54.16 (ICD-10-CM) - Lumbar radiculopathy   R68.89 (ICD-10-CM) - Decreased motor activity   R26.9 (ICD-10-CM) - Abnormality of gait      Evaluation Date: 8/2/2023  Authorization Period Expiration: 12/31/23  Plan of Care Expiration: 12/22/23  Visit # / Visits authorized: 07/ 20 (FOTO done, reassess next visit)    FOTO: 02/ 03      Precautions:: Standard and Fall  hx of CA (brain /c surgery)      Time In: 2:45 pm   Time Out: 4:00 pm   Total Appointment Time (timed & untimed codes): 75 minutes     SUBJECTIVE     Valeria reports a fear of falling and doesn't want to walk on treadmill.  She was willing to walk in the ceravntes and we discussed ambulation where she could.  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  She isn't even aware she carries her shoulders high, until we discuss relaxation.  Pt reports her pain is 5/10 today, and head ache, but 3/10 after visit, a little better.       Patient reports tolerating last visit well /c no c/o of excess discomfort.  Patient agree to tx today.          Current 5/10  cervical  3/10 HA   Location(s): left hip, LB     OBJECTIVE     Objective Measures updated at progress report unless specified.      Limitation/Restriction for FOTO LUMBAR Survey     Therapist reviewed FOTO scores for Thelma Nguyen on 8/2/2023.   FOTO documents entered into [employer] - see Media section.     Limitation Score 8/2/2023: 58%   Limitation score 10/9/23: 54%      Predicted Score: 45%             Treatment        Valeria received the Individualized Treatments listed below:     Valeria participated in dynamic functional therapeutic activities to improve functional performance for 46  minutes for  functional mobility  and head ache relief  to improve functional performance. In order to:  Improve driving safety, visual & spatial awareness, sew with comfort, and reduce headaches  Sub occipital release with tennis balls 4 min  Cervical retraction 10 reps  Cervical extension with rotation 10 reps  Thoracic extension chair, 10 reps elbows together and 10 reps butterfly elbows  cervical rotation      Improve shoulder tension with for activities:  Shld rolls  Improve functional gait  Walking 6 min in cervantes, exertion 5/10   Improve trunk strength and balance:  Cat/camel 10 reps with breathing      Diaphragmatic breathing:   Verbal cues for unlabored, long & relaxed breathing w/ increased time for exhalation  Awareness of pulling breath down away from mouth to hips  Cues for for proper abdominal excursion & decreased use of accessory muscles of breathing (hand on stomach & on chest; increased stomach motion, decreased chest motion)  Education on inhalation activating sympathetic nervous system   Education on exhalation activating parasympathetic nervous system  Performed supine & seated     Valeria participated in neuromuscular re-education activities to improve: Balance, Coordination, Kinesthetic, Sense, Proprioception, and Posture for 24 minutes. The following activities were included:  Supine   Arms and legs straight- arm reaching away, legs reaching away, same side arm and leg reaching away 5 reps  PPT x 10 cue   TA activation /c Tball in hooklying x 10 10 sec hold  TA activation /c Tball in hooklying /c SLR x 10 B    PPT x 5 patient reminded of self cue for TA activation    Bridging 10 reps        NP  Karaoke walk 3 laps untimed of 5 full steps L/5 steps R    24 sec lap of 5 full steps L/R      Peripheral muscle strengthening which  included 1-2 sets of 10-20 repetitions at a slow, controlled 5-7 second per rep pace focused on strengthening supporting musculature for improved body mechanics & functional mobility.  Patient & therapist focused on proper form & speed during treatment to ensure optimal strengthening of each targeted muscle group & neuromuscular re-education. Patients are instructed to keep sets/reps with proper load to stay at 3-5 out of 10 on the provided modified Barby exertion scale.    BATCA Machine Exercises Weight (lbs) Sets Repetitions Barby Exertion Scale Rating   Leg Extension  15 1 15 4   Hamstring Curls 27.5 1 20 4   Chest Press           Pec Fly           Lat Pull Down           Mid Row       Bicep Curls - DB           Standing Tricep Extension           Single Leg Press 25 1 15 3     NP:   Fitter board - low setting, 20 tilt bouts   Fwd/Bwd     Trial 1- 2 hand  fade to 1 hand cue to inc tilt speed     Trial 2- EC 2 hand  cue to inc tilt speed    Lat     Trial 1- 1 hand  fade to 1 finger cue to reach end range    Trial 2- EC 1 hand  cue to reach end range       NP  Valeria participated in therapeutic exercises to develop strength, endurance, ROM, flexibility, posture, and core stabilization for 0 minutes including:    Supine over rolled yoga mat    B shoulder flex x 10, x 10 swimmers, x 5 /c head turns     B shoulder horizAB x 20    Snow angels x 20     SA punches x 20 /c 1# DB           Patient Education and Home Exercises     Home Exercises Provided and Patient Education Provided     Education provided:   - FRP Educational Topics: Modified Barby Exertion Scale, Physical & Psychological Pain Cycles, Z-Lying for Pain Relief/Relaxation, Diaphragmatic Breathing, Activity Pacing for Pain & Debility , Central Sensitization , and Posture & Body Mechanics    Written Home Exercises Provided: Patient instructed to cont prior HEP. Exercises were reviewed and Valeria was able to demonstrate them prior to the end of the  session.  Valeria demonstrated good  understanding of the education provided. See EMR under Patient Instructions for information provided during therapy sessions    ASSESSMENT      Valeria /c good participation today.  Tx /c inc focus on head ache release techniques and stretches for neck, thoracic and lumbar spine for  pain management with sewing and sitting at theater, and pain management. Patient demonstrate improved lumbar ext tolerance /c progression to cat/camel.   Subjective report indicating improved postural awareness /c self corrects, therefore, patient education on SOC technique and shoulder rolls..  Patient need cueing for improved pelvic tilt but improved /c reps.  Similarly, TA activation exercises improved quality of activation /c reps.  Recommend progressing lumbopelvic stability challenges in future visits.  Patient continue to develop confidence on BATCA.    Discuss goals for discharge exercise plan.      Valeria Is progressing well towards her goals.   Pt prognosis is Good.     Pt will continue to benefit from skilled outpatient physical therapy to address the deficits listed in the problem list box on initial evaluation, provide pt/family education and to maximize pt's level of independence in the home and community environment.     Pt's spiritual, cultural and educational needs considered and pt agreeable to plan of care and goals.     Anticipated Barriers for therapy: chronic nature of issues, psychosocial factors, central sensitization       GOALS: Pt is in agreement with the following goals:  Goal Progress Towards Goal   SHORT Term: In 3 weeks, pt will:     Verbalize & demonstrate good understanding of resisted training with 3-1-3 second rep for edgkhwfwis-[id number]-nlcnthcie contraction to encourage full muscle recruitment for strength & endurance. Initiated 8/2/2023   Verbalize & demonstrate good understanding of home stretch routine to improve AROM, help decrease pain & improve mobility.  "Initiated 8/2/2023   Demonstrate proper supine or seated diaphragmatic breathing with decreased chest excursion & emphasis on full abdominal excursion & increased expiration time to promote muscle relaxation & increased parasympathetic activity to improve patient tolerance to activities. Initiated 8/2/2023   Verbalize good understanding of importance of proper posture in resisted exercise, functional activities & ADL's in order to help reduce postural strain, promote proper breathing. Initiated 8/2/2023   Demonstrate good understanding of full body resisted exercises w/ use of pictures &/or verbal cues to promote independence w/ exercise at the end of the program. Initiated 8/2/2023   Verbalize plan for continuing resisted exercises w/ specific location (i.e. commercial gym, home, community fitness center)  to incorporate all major muscle groups to maintain & progress therapeutic functional improvements. Initiated 8/2/2023   Verbalize difference between  "hurt vs harm"  to demonstrate understanding of fear avoidance in pain cycle.  Initiated 8/2/2023         Midterm: In 8 weeks, pt will:     Verbalize good understanding of activities planning/scheduling (stretching, mindfulness, resisted training, & cardio) prescribed by PT/OT following the end of the program to sustain & progress functional improvements. Initiated 8/2/2023   Perform selected resisted training w/ minimal to no cuing in therapy session to be independent w/ resisted strengthening. Initiated 8/2/2023   Demonstrate improved symptom self-management w/ posture/positioning and mechanical movements and/or implementation of appropriate modalities throughout a typical day.  Initiated 8/2/2023   Demonstrate independence w/ home stretch routine to improve AROM, help decrease pain & improve mobility. Initiated 8/2/2023         Long Term: In 12 weeks, pt will:     Perform selected cardio & resisted training independently to continue safe regular performance of " daily exercise. Initiated 8/2/2023   Improve 2 Minute Walk Test (MWT) to 400 feet and 6 MWT to 1200 feet or greater to improve gait speed and mobility. Initiated 8/2/2023   Perform single leg stance 10 seconds or greater bilaterally to improve safety and balance in ADLs. Initiated 8/2/2023   Demonstrate Timed Up & Go (with appropriate assistive device, if necessary) of 10 seconds or less to decrease fall risk and improve functional mobility. Initiated 8/2/2023   Score 45 % or less on LUMBAR FOTO to indicate significant functional improvements in impaired functions secondary to pain. Initiated 8/2/2023     Initiated 8/2/2023         PLAN     Continue PT per POC     Jesusita Reyes, PT

## 2023-10-09 NOTE — PROGRESS NOTES
Group Psychotherapy    Site: [facility]    Clinical status of patient: Outpatient    10/9/2023    Length of service:[id number]    Referred by: Functional Restoration Program     Number of patients in attendance: 6    Target symptoms: Chronic Pain Management     Patient's response to intervention:  The patient's response to intervention is active listening.    Progress toward goals and other mental status changes:  The patient's progress toward goals is fair .    Plan: group psychotherapy    Topics Covered: Pts attended CBT for Chronic Pain as part of their participation in Functional Restoration. Topics discussed today included anger and anxiety and how these factor into the pain cycle. Sources of anger activates the stress response system. Cortisol levels increasing can decrease serotonin thereby increasing chances of feeling depressed. Pts learned the physical response to anger and anxiety including cardiovascular, immune and digestive systems negative responses. Understanding your triggers can give you more control without letting anger and anxiety take over. Skills discussed included deep breathing with 4-4-6 practice and other diversionary activities to decrease anxiety and resultant depression.

## 2023-10-09 NOTE — PROGRESS NOTES
"[facility]  Functional Restoration Program  Occupational Therapy Treatment Note  FRP Day 7      Name: Thelma Nguyen  MRN:[id number]  Encounter Date: 10/9/2023    Diagnosis:   Encounter Diagnoses   Name Primary?    Pain aggravated by activities of daily living Yes    Poor trunk control     Poor posture      Referring provider: Bibi Alvarado NP    Physician Orders: eval and treat; [facility]  Medical Diagnosis:   M54.16 (ICD-10-CM) - Lumbar radiculopathy  R68.89 (ICD-10-CM) - Decreased motor activity R26.9 (ICD-10-CM) - Abnormality of gait   Evaluation Date: 8/2/2023  Insurance Authorization Period Expiration: 6/9/2024  Plan of Care Certification Period: 12/22/2023  Visit # / Visits authorized: 7/20 (plus eval)  FOTO completed: 2/3     Precautions:  Standard and Fall, Orthostatic Hypotension     Time In: 1330  Time Out: 1445  Total Billable Time: 75 minutes    SUBJECTIVE     She reports: "I tend to rest when I can. I'm afraid of falling"  "the stretching is helping"     Valeria was compliant with parts of home exercise program given previously.  Having difficulty with pacing and avoiding boom/bust cycle.     Response to previous treatment: Ms. Nguyen noted: went OK, was able to do some shopping after session.     Functional change: noticing pain ebbing/flowing and improving with repositioning/breaks; takes breaks more often. Improved activity tolerance.     Pain:       Curren 4/10  Location: neck, hip, throughout, head.   Description: Aching and Dull with elements of sharp pain with activity     Patient Specific Activities based on Personal goals:  Activity  9/25/2023    Performance Satisfaction   Walk in [facility] 1  1   2.  showering 5 5   3.  Grocery shopping 6 4   4.  Travel 6 5   5.  Cooking/meal prep. 7 (sits down)  6           Total Score 5  4.2       Patient Specific Activity Scoring Scheme for Performance     1        2        3        4        5        6        7    "     8        9        10     Unable/Unsatisfied                                          Able/Satisfied                                                                          Objective     Objective Measures updated at progress report unless specified.    Treatment     Valeria received the treatments listed below:     Therapeutic activities and functional lifting activities in order to increase participation in, endurance for, and performance with vocational, selfcare ADLs, and leisure activities in order to improve quality of life, for 30 minutes, including:    Seated review of pacing handouts and weekend homework. Continued education on pacing (boom/bust, spoon use, energy banking, impact of stressors/rest), weight shifting as needed and standing stretches as needed. Pt rated activity easy (2/10) exertion.  During activity pt discussed  barriers and stressors re: pacing, endorsed improvements in self accountability.     Physioball roll out seated in chair.    Therapeutic exercises to develop strength, endurance, ROM, flexibility, posture, and core stabilization for 45 minutes, including:    Full body functional mobility w/ 3-10 sec hold technique &/or 3-5 repetition technique to improve functional performance. In order to:  Improve driving safety, visual & spatial awareness:  Cervical flx/ext  Cervical side bending  Cervical rotation  Cervical protraction/retraction  Improve lifting mechanics, showering/bathing, dressing, cooking, reaching, pushing & fine motor skills  Shld rolls  Shld depression/elevation  Scapular retraction/protraction/scaption  Posterior shld stretch (cross arm)  Shld ER stretch (chicken wing)  Elbow flx/ext  Forearm supination/pronation  Wrist flx/ext  Open/close hands/fingers  Improve bed mobility & rolling, bending over, cooking & cleaning:  Seated trunk rotations  Seated trunk/thoracic ext/flx  Improve functional gait, squatting, sitting tolerance, functional balance:   Seated marches &  knee to chest  Seated knee flx/ext  Seated hip ER/piriformis stretch  Seated hamstring stretch  Seated toe raise to heel raise   Seated ankle circles each way  Improve overhead reaching/activities, standing tolerance:  Standing lumbar extensions  Standing lateral leaning stretch  Standing quad stretch (UE support for balance)    Valeria participated in endurance activity using Nustep for 10 minutes, starting at level 1.4 to improve functional endurance and progress towards increased MET level for improved community mobility and participation, rated as Moderate (3/10) exertion, Valeria re-educated on how to add mindfulness component to activity and how to pace appropriately by completed self check ins and grading activity as needed, with goal to reach moderate to sort of hard by end of activity. Completed 982 steps.     No environmental, cultural, spiritual, developmental or education needs expressed or noted.    Patient Education and Home Exercises   Education provided:   - Progress towards goals   - importance of completion of exercises and activities outside of session to attain goals.   - proper posture and body mechanics.  - anticipated muscular soreness following lift testing and strategies for management including stretching, using ice, scheduled rest.  - Functional pain scale  - JANET rate of perceived exertion scale.   - pain cycle  - pursed lip and diaphragmatic breathing techniques  - hip hinge    Written Home Exercises Provided: Patient instructed to cont prior HEP.  Exercises were reviewed and Valeria was able to demonstrate them prior to the end of the session. Valeria demonstrated fair  understanding of the education provided.     See EMR under Patient Instructions for exercises provided during therapy sessions. Patient education also located in FRP binder provided on day 1 of the cohort.     Assessment     Ms. Nguyen tolerated today's session well. Continues with difficulties with pacing outside of clinic 2*  heightened work demands. Noted to favor low-energy, largely sedentary forms of rest when not at work, but of note, she was able to implement planned scheduling for morning time, and endorsed improved pain and stressor management with use of AM routine.      Valeria is progressing well towards her goals and status of goals can be seen below. Patient's prognosis is Fair.     Valeria would continue to benefit from skilled outpatient occupational therapy to address the deficits listed in the problem list on initial evaluation, provide patient education, and to maximize patient's level of independence in the home and community environment.     Anticipated barriers to occupational therapy: co-morbidities    Goals:     SHORT Term goals: In 3 weeks, patient will:  Status of goal:   Implements correct use of breathing techniques during functional lifting tasks, with minimal cues needed. INITIATED   Utilizes JANET rate of exertion scale to pace performance with functional lifting tasks, and implements self-care strategies during activity participation to manage pain. INITIATED   Verbalize understanding of energy conservation and pacing strategies during daily habits, roles, and routines in order to improve tolerance for work and home demands.  INITIATED   Completes 5x sit to stand test in 16 seconds or less to improve ability to participate in community mobility.   INITIATED   Demonstrate increased positional tolerance to the level needed for work, home, and community activities (standing in cue at social event, managing supplies for outing, streneous IADL), as evidenced by having a METS level of 3. INITIATED   Demonstrate proper body mechanics with functional lifting tasks (floor to waist, waist to shoulder, maximum lift, and box carry), via teach back method with minimal cues needed. INITIATED   Demonstrate increased functional strength by lifting 13 lbs waist to shoulder for management of (I)ADL, including dressing and  grooming, placing items in kitchen cabinets, folding towels, and/or managing suitcase when traveling.   INITIATED   Demonstrate increased functional strength by lifting 18 lbs floor to waist to meet demand of work/home routine, including lifting groceries, managing laundry, and/or managing suitcase when traveling.   INITIATED   Tolerate Home Activity Plan (HAP)/ Home Exercise Program (HEP) to promote safety and independence with ADL, with report of completion of at least 2 out of 4 days outside of program participation.  INITIATED   Show improved perception of own abilities as evidenced by increase of FOTO scores to established MDC value and perception of performance ratings regarding personal long term goals.  INITIATED         MIDTERM goals: In 9 weeks, patient will: Status of goal:   Report implementation of application of mindfulness, stretching, and other coping strategies for improved participation in daily routine. INITIATED   Verbalize functional application of lifting techniques in daily life (golfers lift, floor to waist, waist to shoulder). INITIATED   Completes 5x sit to stand test in 12 seconds or less to improve ability to participate in community mobility.  INITIATED   Applies functional application of lifting techniques (golfer's lift, floor to waist, waist to shoulder) in activities of daily life, per patient report.  INITIATED   Demonstrate physical capacities consistent with a Light (#20 max, frequent lifting/carrying #10, 2.5 METS  demand level, as needed to perform activities to be successful in roles of life, regarding Part Time Employment, Household Management, and Community Participation. INITIATED   Have an improvement of 3 points in 2/5 personal goals in rating of  performance.  INITIATED   Show improved perception of own abilities as evidenced by increase of FOTO scores to established MC II value and perception of performance ratings regarding personal long term goals.  INITIATED        LONG term goals = Patient Specific Goals:  Vocational: able to carry supplies   Recreational : walking around [place], travel   Daily Living Activities: cooking, laundry, groceries, showering   Measured by patient's self-reported performance and satisfaction of personal FRP goals, listed above in subjective section.   Total Score = sum of the activity performance scores / number of activities  Minimum detectable change (90%CI) for average score = 2 points  Minimum detectable change (90%CI) for single activity score  = 3 points    Plan     Continue per plan of care.     Updates/Grading for next session: follow up on pacing sheets, waist to shoulder lift, impact of stress on GI, FOTO/review activities related to personal goals.     LAUREANO Hollingsworth   10/9/2023

## 2023-10-10 NOTE — PROGRESS NOTES
"[facility]  Functional Restoration Program  Occupational Therapy Treatment and Reassessment Note  [facility] Day 8      Name: Thelma Nguyen  MRN:[id number]  Encounter Date: 10/11/2023    Diagnosis:   No diagnosis found.    Referring provider: Bibi Alvarado NP    Physician Orders: eval and treat; [facility]  Medical Diagnosis:   M54.16 (ICD-10-CM) - Lumbar radiculopathy  R68.89 (ICD-10-CM) - Decreased motor activity R26.9 (ICD-10-CM) - Abnormality of gait   Evaluation Date: 8/2/2023  Insurance Authorization Period Expiration: 6/9/2024  Plan of Care Certification Period: 12/22/2023  Visit # / Visits authorized: 8/20 (plus eval)  FOTO completed: 2/3     Precautions:  Standard and Fall, Orthostatic Hypotension     Time In: 1330  Time Out: 1445  Total Billable Time: 75 minutes    SUBJECTIVE     She reports: "yesterday was really hard, I was really sore. I still made it to my sewing class. But left early. And then I slept. Then I went to work, and I had to leave early too and went to bed" "I woke up in the night with an upset stomach and I fell, I was disoriented."  "I didn't eat anything, I don't know why my stomach was so upset" demonstrating limited insight into impact of stressors on GI presentation.     Valeria was compliant with parts of home exercise program given previously.  Having difficulty with pacing and avoiding boom/bust cycle.     Response to previous treatment: Ms. Nguyen noted: went OK, was able to do some shopping after session.     Functional change: noticing pain ebbing/flowing and improving with repositioning/breaks; takes breaks more often. Improved activity tolerance.  Able to grocery shop with more endurance; able to unload groceries with less assistance; prep more ingredients, still asking  to cook.     Pain:         Functional Pain Scale Rating 0-10: within the last 24 hours  Pain Rating 8/2/2023 Reassessment   Currently 4/10 6/10   At Worst 4/10 8/10   At Best 2/10 6/10 " "  Composite Score 3.33/10    [BONNIE  is 1 point or 15-20% change in interference composite score (Israel et al. 2008)]  Description: Aching and Dull /c elements of sharp pain /c activity   Aggravating Factors: Standing, Bending, Walking, and Lifting  Easing Factors: heating pad, stretching    Patient Specific Activities based on Personal goals:  Activity  9/25/2023 10/11/2023     Performance Satisfaction Performance Satisfaction   Walk in [facility] 1  1 3 3   2.  showering 5 5 3 "I haven't been doing it" 3   3.  Grocery shopping 6 4 5 5   4.  Travel 6 5 5 5   5.  Cooking/meal prep. 7 (sits down)  6 4 5             Total Score 5  4.2  4 4.2      Patient Specific Activity Scoring Scheme for Performance     1        2        3        4        5        6        7        8        9        10     Unable/Unsatisfied                                          Able/Satisfied                                                                          Objective      Strength (in pounds, rung II, average of three trials)    8/2/2023 10/11/2023    Right hand  45.67 lbs 49 lb   Left hand  40.33 lbs 35 lbs    [norms for women aged 55-59: R=57.3 +/-12.5; L=47.3 +/-11.9 (Jovonwetz et al, 1985)]      Functional Strength Test:  Pile Testing: Lifting  (Pounds/Heart rate)   GAP 8/2/2023  (#/HR/JANET) 10/11/2023     Repetitive Floor to Waist      8/UTA/3    Repetitive Waist to Shoulder    8/133/2    1- Time Maximum     10/130/3    Carry-2 Handed (40ft)     7/134/3    Work demand Level   Sedentary       Reason for Stop point: eval: Increased time required for completing repetitions, Inability to maintain proper body mechanics, Increased discomfort, Fear of increased pain. During physical testing, participation level was consistent. The work demand level listed above is based on the physical performance screening test performed. More comprehensive physical testing integrated with clinical findings would be required to " derived an accurate work capacity.   Goal: #20 max, frequent lifting/carrying #10, 2.5 METS  demand level     Balance  5 times sit-stand  (adults 18-63 y/o) 8/2/2023 10/11/2023    >12 sec= fall risk for general elderly  >16 sec= fall risk for Parkinson's disease  >10 sec= balance/vestibular dysfunction (<61 y/o)  >14.2 sec= balance/vestibular dysfunction (>61 y/o)  >12 sec= fall risk for CVA 19.38 seconds     (with UE used to push up from chair) 16.94 seconds     (With OUT UE to push up from chair)        Endurance Testing: Modified Ramp Treadmill Test    GAP 8/2/2023 10/11/2023    Minutes Completed  2 minutes 6 seconds 3 min    % Grades  5 % 10 %    Speed (mph)  1.3 mph 1.7 mph    METS 3 4     bpm 128 bpm    Barby Rating Perceived Exertion Scale   4 5    Reason for stop point: eval: Decline in maintaining pace safely . Initiated recovery using standing  pose.       Limitation/Restriction for FOTO NOC Survey     Therapist reviewed FOTO scores for Thelma Nguyen on 10/11/2023    FOTO documents entered into [employer] - see Media section.     Limitation Score: 59%                No environmental, cultural, spiritual, developmental or education needs expressed or noted.    Treatment     Valeria received the treatments listed below:     Therapeutic activities and functional lifting activities in order to increase participation in, endurance for, and performance with vocational, selfcare ADLs, and leisure activities in order to improve quality of life, for 75 minutes, including:    Reassessment initiated this date. Limited tolerance for assessment 2* fatigue and self-limiting fear of anticipated pain, though responsive to challenges recognized in education (application of FRP tools vs management via largely sedentary forms of activity-avoidance.)         Physioball roll outs performed between reps and activities.     Extended seated conversation regarding impact of anticipatory pain on perceived threat of  pain or fall.   Extended standing conversation on fear of falling and impact of sedentary lifestyle on increased fall risk.     No environmental, cultural, spiritual, developmental or education needs expressed or noted.    Patient Education and Home Exercises   Education provided:   - Progress towards goals   - importance of completion of exercises and activities outside of session to attain goals.   - proper posture and body mechanics.  - anticipated muscular soreness following lift testing and strategies for management including stretching, using ice, scheduled rest.  - Functional pain scale  - JANET rate of perceived exertion scale.   - pain cycle  - pursed lip and diaphragmatic breathing techniques  - hip hinge    Written Home Exercises Provided: Patient instructed to cont prior HEP.  Exercises were reviewed and Valeria was able to demonstrate them prior to the end of the session. Valeria demonstrated fair  understanding of the education provided.     See EMR under Patient Instructions for exercises provided during therapy sessions. Patient education also located in FRP binder provided on day 1 of the cohort.     Assessment     Ms. Nguyen tolerated today's session fairly, including initiation of reassessment. Pt unable to complete reassessment 2* extended time on education and rest breaks between reps 2* fatigue and self-limiting fear of anticipated pain.  Of note, Valeria described a boom/bust day yesterday, and stated she did not complete her stretching or other FRP tools 2* feeling sore and tired, she also states she had a fall that evening.  Valeria with limited insight into impact of psychosocial stressors on pain and GI pain, though responsive to challenges recognized in education (application of FRP tools vs management via largely sedentary forms of activity-avoidance.)     In regards to measures, she demonstrated a decline in both subjective measures (FOTO and rating of personal goals) likely related to limited  "application of FRP tools outside of clinic (besides stretching which she completes "most mornings").  Ratings also is likely impacted by recent fall. In objective measures, shes was able to tolerate more time on the treadmill w higher MET level despite verbalizing significant fear of falling, and was also able to complete the 5 TSTS with less time and no need for UE support, demonstrating decreased fall risk. Improved  strength average measured in R hand from baseline eval, though demonstrated decline in L hand average  strength.     Valeria is progressing well towards her goals and status of goals can be seen below. Patient's prognosis is Fair.     Valeria would continue to benefit from skilled outpatient occupational therapy to address the deficits listed in the problem list on initial evaluation, provide patient education, and to maximize patient's level of independence in the home and community environment.     Anticipated barriers to occupational therapy: co-morbidities    Goals:     SHORT Term goals: In 3 weeks, patient will:  Status of goal, reviewed on 10/11/2023:   Implements correct use of breathing techniques during functional lifting tasks, with minimal cues needed. In progress    Utilizes JANET rate of exertion scale to pace performance with functional lifting tasks, and implements self-care strategies during activity participation to manage pain.  In progress    Verbalize understanding of energy conservation and pacing strategies during daily habits, roles, and routines in order to improve tolerance for work and home demands.  In progress    Completes 5x sit to stand test in 16 seconds or less to improve ability to participate in community mobility.   Partially Met 10/11/2023   Completed in 16.94 seconds   Demonstrate increased positional tolerance to the level needed for work, home, and community activities (standing in cue at social event, managing supplies for outing, streneous IADL), as " evidenced by having a METS level of 3. Met 10/11/2023    Demonstrate proper body mechanics with functional lifting tasks (floor to waist, waist to shoulder, maximum lift, and box carry), via teach back method with minimal cues needed. In progress    Demonstrate increased functional strength by lifting 13 lbs waist to shoulder for management of (I)ADL, including dressing and grooming, placing items in kitchen cabinets, folding towels, and/or managing suitcase when traveling.   In progress    Demonstrate increased functional strength by lifting 18 lbs floor to waist to meet demand of work/home routine, including lifting groceries, managing laundry, and/or managing suitcase when traveling.   In progress    Tolerate Home Activity Plan (HAP)/ Home Exercise Program (HEP) to promote safety and independence with ADL, with report of completion of at least 2 out of 4 days outside of program participation.  In progress    Show improved perception of own abilities as evidenced by increase of FOTO scores to established MDC value and perception of performance ratings regarding personal long term goals.  In progress          MIDTERM goals: In 9 weeks, patient will: Status of goal, reviewed on 10/11/2023:   Report implementation of application of mindfulness, stretching, and other coping strategies for improved participation in daily routine. In progress    Verbalize functional application of lifting techniques in daily life (golfers lift, floor to waist, waist to shoulder). In progress    Completes 5x sit to stand test in 12 seconds or less to improve ability to participate in community mobility.  In progress    Applies functional application of lifting techniques (golfer's lift, floor to waist, waist to shoulder) in activities of daily life, per patient report.  In progress    Demonstrate physical capacities consistent with a Light (#20 max, frequent lifting/carrying #10, 2.5 METS  demand level, as needed to perform activities to  be successful in roles of life, regarding Part Time Employment, Household Management, and Community Participation. In progress    Have an improvement of 3 points in 2/5 personal goals in rating of  performance.  In progress    Show improved perception of own abilities as evidenced by increase of FOTO scores to established MC II value and perception of performance ratings regarding personal long term goals.  In progress       LONG term goals = Patient Specific Goals:  Vocational: able to carry supplies   Recreational : walking around [place] world, travel   Daily Living Activities: cooking, laundry, groceries, showering   Measured by patient's self-reported performance and satisfaction of personal FRP goals, listed above in subjective section.   Total Score = sum of the activity performance scores / number of activities  Minimum detectable change (90%CI) for average score = 2 points  Minimum detectable change (90%CI) for single activity score  = 3 points    Plan     Continue per plan of care.     Updates/Grading for next session: follow up on pacing sheets, waist to shoulder lift, impact of stress on GI, finish LAUREANO Mckeon   10/11/2023

## 2023-10-11 ENCOUNTER — CLINICAL SUPPORT (OUTPATIENT)
Dept: REHABILITATION | Facility: OTHER | Age: 58
End: 2023-10-11
Payer: COMMERCIAL

## 2023-10-11 ENCOUNTER — CLINICAL SUPPORT (OUTPATIENT)
Dept: PSYCHIATRY | Facility: OTHER | Age: 58
End: 2023-10-11
Payer: COMMERCIAL

## 2023-10-11 DIAGNOSIS — R29.898 DECREASED STRENGTH OF TRUNK AND BACK: Primary | ICD-10-CM

## 2023-10-11 DIAGNOSIS — F43.29 ADJUSTMENT DISORDER WITH PHYSICAL COMPLAINTS: Primary | ICD-10-CM

## 2023-10-11 DIAGNOSIS — R29.818 POOR TRUNK CONTROL: ICD-10-CM

## 2023-10-11 DIAGNOSIS — R52 PAIN AGGRAVATED BY ACTIVITIES OF DAILY LIVING: Primary | ICD-10-CM

## 2023-10-11 DIAGNOSIS — Z74.09 IMPAIRED FUNCTIONAL MOBILITY, BALANCE, GAIT, AND ENDURANCE: ICD-10-CM

## 2023-10-11 DIAGNOSIS — R29.3 POOR POSTURE: ICD-10-CM

## 2023-10-11 PROCEDURE — 97110 THERAPEUTIC EXERCISES: CPT

## 2023-10-11 PROCEDURE — 90853 PR GROUP PSYCHOTHERAPY: ICD-10-PCS | Mod: ,,, | Performed by: SOCIAL WORKER

## 2023-10-11 PROCEDURE — 90853 GROUP PSYCHOTHERAPY: CPT | Mod: ,,, | Performed by: SOCIAL WORKER

## 2023-10-11 PROCEDURE — 97112 NEUROMUSCULAR REEDUCATION: CPT

## 2023-10-11 PROCEDURE — 97530 THERAPEUTIC ACTIVITIES: CPT

## 2023-10-11 NOTE — PROGRESS NOTES
[facility]    Functional Restoration Program  Physical Therapy Treatment Note  FRP Day 8    Name: Thelma Nguyen  MRN:[id number]      Therapy Diagnosis:   No diagnosis found.    ***      Physician: Bibi Alvarado NP    Date: 10/11/2023    Physician Orders: PT Eval and Treat   Medical Diagnosis from Referral:   M54.16 (ICD-10-CM) - Lumbar radiculopathy   R68.89 (ICD-10-CM) - Decreased motor activity   R26.9 (ICD-10-CM) - Abnormality of gait      Evaluation Date: 8/2/2023  Authorization Period Expiration: 12/31/23  Plan of Care Expiration: 12/22/23  Visit # / Visits authorized: 08/ 20     FOTO: 02/ 03      Precautions:: Standard and Fall  hx of CA (brain /c surgery)      Time In: 2:45 pm   Time Out: 4:00 pm   Total Appointment Time (timed & untimed codes): 75 minutes     SUBJECTIVE     Valeria reports         Today  Since - weekend   HEP   Difficulties     Changes  Stretching   Breathing  Gym plan - active vs passive   At theatre - posture, stretch       Patient reports tolerating last visit well /c no c/o of excess discomfort.  Patient agree to tx today.       a fear of falling and doesn't want to walk on treadmill.  She was willing to walk in the [place] and we discussed ambulation where she could.  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  She isn't even aware she carries her shoulders high, until we discuss relaxation.  Pt reports her pain is 5/10 today, and head ache, but 3/10 after visit, a little better.              Current 5/10  cervical  3/10 HA   Location(s): left hip, LB     OBJECTIVE     Objective Measures updated at progress report unless specified.      Postural examination:  Seated: fair, legs crossed, difficulty self correcting, unable to hold thru conversation        Range of Motion - Cervical    AROM AROM AROM     8/2/2023 Reassessment 10/11/23 Reassessment   Flexion WFL  ° °   Extension Major P!   ° °   Side bending Right mod ° ° °    Side bending Left mod ° ° °   Rotation Right mod P! ° °   Rotation Left mod P! ° °      Range of Motion - Lumbar    8/2/2023  10/11/23       ROM Loss ROM Loss ROM Loss   Flexion minimal loss       Extension moderate loss P!       Side bending Right within functional limits       Side bending Left within functional limits       Rotation Right minimal loss       Rotation Left minimal loss          Strength Testing             8/2/2023 Reassessment 10/11/23 Reassessment   Motion Tested               Lower Extremity R L R L R L   Hip Flexion 4/5 4/5           Hip Extension 3+/5 3+/5           Hip Abduction 3+/5 3+/5           Hip IR 3+/5 3+/5           Hip ER 3+/5 3+/5           Knee Extension 5/5 5/5           Knee Flexion 5/5 5/5              Functional Testing       8/2/2023 Reassessment 10/11/23 Reassessment   Timed Up and Go 12.83 sec BUE use  sec sec   Single Limb Stance R LE < 2 sec sec sec   Single Limb Stance L LE < 2 sec sec sec   2 Minute Walk Test 329 feet feet feet   6 Minute Walk Test 959 ft = 292 m feet feet   Self Selected Walking Speed 0.811 m/sec m/sec m/sec      GAIT:  Assistive Device used: none  Level of Assistance: independent  Patient displays the following gait deviations:  unsteady gait, decreased step length, and antalgic gait, dec arms swing         Limitation/Restriction for FOTO LUMBAR Survey     Therapist reviewed FOTO scores for Thelma Nguyen on 8/2/2023.   FOTO documents entered into [employer] - see Media section.     Limitation Score 8/2/2023: 58%   Limitation score 10/9/23: 54%      Predicted Score: 45%             Treatment       Valeria received the Individualized Treatments listed below:     Valeria participated in dynamic functional therapeutic activities to improve functional performance for *** minutes, including:      PT reassessment (see above)       Improve driving safety, visual & spatial awareness, sew with comfort, and reduce headaches  Sub occipital release with  tennis balls 4 min  Cervical retraction 10 reps  Cervical extension with rotation 10 reps  Thoracic extension chair, 10 reps elbows together and 10 reps butterfly elbows  cervical rotation      Improve shoulder tension with for activities:  Shld rolls  Improve functional gait  Walking 6 min in cervantes, exertion 5/10   Improve trunk strength and balance:  Cat/camel 10 reps with breathing      Diaphragmatic breathing:   Verbal cues for unlabored, long & relaxed breathing w/ increased time for exhalation  Awareness of pulling breath down away from mouth to hips  Cues for for proper abdominal excursion & decreased use of accessory muscles of breathing (hand on stomach & on chest; increased stomach motion, decreased chest motion)  Education on inhalation activating sympathetic nervous system   Education on exhalation activating parasympathetic nervous system  Performed supine & seated     Valeria participated in neuromuscular re-education activities to improve: Balance, Coordination, Kinesthetic, Sense, Proprioception, and Posture for *** minutes. The following activities were included:      Supine   Arms and legs straight- arm reaching away, legs reaching away, same side arm and leg reaching away 5 reps  PPT x 10 cue   TA activation /c Tball in hooklying x 10 10 sec hold  TA activation /c Tball in hooklying /c SLR x 10 B    PPT x 5 patient reminded of self cue for TA activation    Bridging 10 reps        NP  Karaoke walk 3 laps untimed of 5 full steps L/5 steps R    24 sec lap of 5 full steps L/R      Peripheral muscle strengthening which included 1-2 sets of 10-20 repetitions at a slow, controlled 5-7 second per rep pace focused on strengthening supporting musculature for improved body mechanics & functional mobility.  Patient & therapist focused on proper form & speed during treatment to ensure optimal strengthening of each targeted muscle group & neuromuscular re-education. Patients are instructed to keep sets/reps with  proper load to stay at 3-5 out of 10 on the provided modified Barby exertion scale.    BATCA Machine Exercises Weight (lbs) Sets Repetitions Barby Exertion Scale Rating   Leg Extension  15 1 15 4   Hamstring Curls 27.5 1 20 4   Chest Press           Pec Fly           Lat Pull Down           Mid Row       Bicep Curls - DB           Standing Tricep Extension           Single Leg Press 25 1 15 3     NP:   Fitter board - low setting, 20 tilt bouts   Fwd/Bwd     Trial 1- 2 hand  fade to 1 hand cue to inc tilt speed     Trial 2- EC 2 hand  cue to inc tilt speed    Lat     Trial 1- 1 hand  fade to 1 finger cue to reach end range    Trial 2- EC 1 hand  cue to reach end range       Valeria participated in therapeutic exercises to develop strength, endurance, ROM, flexibility, posture, and core stabilization for *** minutes including:      PT reassessment (see above)     Supine over rolled yoga mat    B shoulder flex x 10, x 10 swimmers, x 5 /c head turns     B shoulder horizAB x 20    Snow angels x 20     SA punches x 20 /c 1# DB           Patient Education and Home Exercises     Home Exercises Provided and Patient Education Provided     Education provided:   - FRP Educational Topics: Modified Barby Exertion Scale, Physical & Psychological Pain Cycles, Z-Lying for Pain Relief/Relaxation, Diaphragmatic Breathing, Activity Pacing for Pain & Debility , Central Sensitization , and Posture & Body Mechanics    Written Home Exercises Provided: Patient instructed to cont prior HEP. Exercises were reviewed and Valeria was able to demonstrate them prior to the end of the session.  Valeria demonstrated good  understanding of the education provided. See EMR under Patient Instructions for information provided during therapy sessions    ASSESSMENT          Goal - posture, stretch     Updated FOTO score indicate no sig change in perception of pain presentation and functionality.          Valeria /c good participation today.  Tx /c inc  focus on head ache release techniques and stretches for neck, thoracic and lumbar spine for  pain management with sewing and sitting at theater, and pain management. Patient demonstrate improved lumbar ext tolerance /c progression to cat/camel.   Subjective report indicating improved postural awareness /c self corrects, therefore, patient education on SOC technique and shoulder rolls..  Patient need cueing for improved pelvic tilt but improved /c reps.  Similarly, TA activation exercises improved quality of activation /c reps.  Recommend progressing lumbopelvic stability challenges in future visits.  Patient continue to develop confidence on BATCA.    Discuss goals for discharge exercise plan.      Valeria Is progressing well towards her goals.   Pt prognosis is Good.     Pt will continue to benefit from skilled outpatient physical therapy to address the deficits listed in the problem list box on initial evaluation, provide pt/family education and to maximize pt's level of independence in the home and community environment.     Pt's spiritual, cultural and educational needs considered and pt agreeable to plan of care and goals.     Anticipated Barriers for therapy: chronic nature of issues, psychosocial factors, central sensitization       GOALS: Pt is in agreement with the following goals:  Goal Progress Towards Goal   SHORT Term: In 3 weeks, pt will:     Verbalize & demonstrate good understanding of resisted training with 3-1-3 second rep for [id number] contraction to encourage full muscle recruitment for strength & endurance. Initiated 8/2/2023   Verbalize & demonstrate good understanding of home stretch routine to improve AROM, help decrease pain & improve mobility. Initiated 8/2/2023   Demonstrate proper supine or seated diaphragmatic breathing with decreased chest excursion & emphasis on full abdominal excursion & increased expiration time to promote muscle relaxation & increased  "parasympathetic activity to improve patient tolerance to activities. Initiated 8/2/2023   Verbalize good understanding of importance of proper posture in resisted exercise, functional activities & ADL's in order to help reduce postural strain, promote proper breathing. Initiated 8/2/2023   Demonstrate good understanding of full body resisted exercises w/ use of pictures &/or verbal cues to promote independence w/ exercise at the end of the program. Initiated 8/2/2023   Verbalize plan for continuing resisted exercises w/ specific location (i.e. commercial gym, home, community fitness center)  to incorporate all major muscle groups to maintain & progress therapeutic functional improvements. Initiated 8/2/2023   Verbalize difference between  "hurt vs harm"  to demonstrate understanding of fear avoidance in pain cycle.  Initiated 8/2/2023         Midterm: In 8 weeks, pt will:     Verbalize good understanding of activities planning/scheduling (stretching, mindfulness, resisted training, & cardio) prescribed by PT/OT following the end of the program to sustain & progress functional improvements. Initiated 8/2/2023   Perform selected resisted training w/ minimal to no cuing in therapy session to be independent w/ resisted strengthening. Initiated 8/2/2023   Demonstrate improved symptom self-management w/ posture/positioning and mechanical movements and/or implementation of appropriate modalities throughout a typical day.  Initiated 8/2/2023   Demonstrate independence w/ home stretch routine to improve AROM, help decrease pain & improve mobility. Initiated 8/2/2023         Long Term: In 12 weeks, pt will:     Perform selected cardio & resisted training independently to continue safe regular performance of daily exercise. Initiated 8/2/2023   Improve 2 Minute Walk Test (MWT) to 400 feet and 6 MWT to 1200 feet or greater to improve gait speed and mobility. Initiated 8/2/2023   Perform single leg stance 10 seconds or greater " bilaterally to improve safety and balance in ADLs. Initiated 8/2/2023   Demonstrate Timed Up & Go (with appropriate assistive device, if necessary) of 10 seconds or less to decrease fall risk and improve functional mobility. Initiated 8/2/2023   Score 45 % or less on LUMBAR FOTO to indicate significant functional improvements in impaired functions secondary to pain. Initiated 8/2/2023     Initiated 8/2/2023         PLAN     Continue PT per POC     Lavelle Banerjee, PT

## 2023-10-11 NOTE — PROGRESS NOTES
"[facility]    [facility] Program  Physical Therapy REASSESSMENT  FRP Day 8    Name: Thelma Nguyen  MRN:[id number]      Therapy Diagnosis:   Encounter Diagnoses   Name Primary?    Decreased strength of trunk and back Yes    Impaired functional mobility, balance, gait, and endurance          Physician: Bibi Alvarado NP    Date: 10/11/2023    Physician Orders: PT Eval and Treat   Medical Diagnosis from Referral:   M54.16 (ICD-10-CM) - Lumbar radiculopathy   R68.89 (ICD-10-CM) - Decreased motor activity   R26.9 (ICD-10-CM) - Abnormality of gait      Evaluation Date: 8/2/2023  Authorization Period Expiration: 12/31/23  Plan of Care Expiration: 12/22/23  Visit # / Visits authorized: 08/ 20     FOTO: 02/ 03      Precautions:: Standard and Fall  hx of CA (brain /c surgery)      Time In: 2:45 pm   Time Out: 4:00 pm   Total Appointment Time (timed & untimed codes): 75 minutes     SUBJECTIVE     Valeria reports a fall in middle of last night when entering her bathroom.  Patient deny injury and note dec spinal pain versus Tuesday but expressed this accident as evidence for fear of falling.   Patient feels that inc lumbar mobility challenges on Monday may have lead to inc discomfort thru Tuesday.  However, patient note that in past she would have stayed in bed that day but instead she went to a sewing course.  Patient clarify she was not able to stay for the whole class but was surprised that her body had "wanted to stretch".  Patient note it feels good to do and was disappointed when she wasn't able to complete them yesterday due to discomfort.    Patient report using controlled breathing throughout the day at various moments. Patient use example of deep breathing to dec bathroom sensation when driving.    Patient report inc postural awareness using specific pillow at back on her couch, planning to take back cushion to use during her sitting time at theatre and inquiring " "about use of standing desk for her sewing.    Upon performing exercises patient note, "I'm less tense because I know I can do this"                Current 5/10  cervical  3/10 HA   Location(s): left hip, LB     OBJECTIVE     Objective Measures updated at progress report unless specified.      Postural examination:  Seated: fair, legs crossed, difficulty self correcting, unable to hold thru conversation        Range of Motion - Cervical    AROM AROM AROM     8/2/2023 Reassessment 10/11/23 Reassessment   Flexion WFL  WFL° °   Extension Major P!   mod° °   Side bending Right mod ° mod° °   Side bending Left mod ° mod° °   Rotation Right mod P! mod° °   Rotation Left mod P! mod° °      Range of Motion - Lumbar    8/2/2023  10/11/23       ROM Loss ROM Loss ROM Loss   Flexion minimal loss  min loss     Extension moderate loss P!  mod loss     Side bending Right within functional limits  WFL     Side bending Left within functional limits  WFL      Rotation Right minimal loss  min loss     Rotation Left minimal loss  min loss         Strength Testing             8/2/2023 Reassessment 10/11/23 Reassessment   Motion Tested               Lower Extremity R L R L R L   Hip Flexion 4/5 4/5  4/5 4/5        Hip Extension 3+/5 3+/5  3+/5 3+/5        Hip Abduction 3+/5 3+/5  3+/5 3+/5        Hip IR 3+/5 3+/5  3+/5 3+/5        Hip ER 3+/5 3+/5 3+/5  3+/5        Knee Extension 5/5 5/5  5/5 5/5        Knee Flexion 5/5 5/5  5/5 5/5           Functional Testing       8/2/2023 Reassessment 10/11/23 Reassessment   Timed Up and Go 12.83 sec BUE use  11.24, 10.39, 9.42  sec no BUE use sec   Single Limb Stance R LE < 2 sec 7.48 sec sec   Single Limb Stance L LE < 2 sec 9.80 sec sec   2 Minute Walk Test 329 feet 329 ft = 100.5 m feet   6 Minute Walk Test 959 ft = 292 m 950 ft = 290 m feet   Self Selected Walking Speed 0.811 m/sec 0.81 m/sec m/sec      GAIT:  Assistive Device used: none  Level of Assistance: independent  Patient displays the " following gait deviations:  unsteady gait, decreased step length, dec arms swing         Limitation/Restriction for FOTO LUMBAR Survey     Therapist reviewed FOTO scores for Thelma Nguyen on 8/2/2023.   FOTO documents entered into [employer] - see Media section.     INTAKE Score 8/02/2023: 42 (58% limitation)   09/29/23: 43  10/9/23: 46 (54% limitation)   10/11/23: 47     Predicted Score: 45%     MDC 6   MCID 9            Treatment       Valeria received the Individualized Treatments listed below:     Valeria participated in dynamic functional therapeutic activities to improve functional performance for 35 minutes, including:    PT reassessment (see above)       Pt instructed on pillows for positioning & support to offload areas of pain to improve sleeping quality by reducing pain including:  Supine w/ pillows under knees  Side-lying w/ pillows under knee & ankle, as well as upper arm/shld  Semi-supine w/ pillows under trunk, lower knees, upper knees/ankles, & upper arm/shld  Pt w/ good return demonstration & reported increased comfort w/ use.      SOC x 20 for postural recognition and correction when in theatre seats, cue for pelvic tilt      Patient demonstrate   Diaphragmatic breathing:   Verbal cues for unlabored, long & relaxed breathing w/ increased time for exhalation  Patient verbalize inhalation activating sympathetic nervous system, exhalation activating parasympathetic nervous system  Performed seated     Valeria participated in neuromuscular re-education activities to improve: Balance, Coordination, Kinesthetic, Sense, Proprioception, and Posture for 10 minutes. The following activities were included:      Fitter board - low setting, 20 tilt bouts   Fwd/Bwd     Trial 1- 2 hand  cue to inc tilt speed     Trial 2- 1 hand  fade to 3 finger support    Lat     Trial 1- 1 hand  cue for inc tilt speed    Trial 2- EC 1 hand  cue to reach end range       NP:     Supine   Arms and legs straight-  arm reaching away, legs reaching away, same side arm and leg reaching away 5 reps  PPT x 10 cue   TA activation /c Tball in hooklying x 10 10 sec hold  TA activation /c Tball in hooklying /c SLR x 10 B    PPT x 5 patient reminded of self cue for TA activation    Bridging 10 reps    Karaoke walk 3 laps untimed of 5 full steps L/5 steps R    24 sec lap of 5 full steps L/R      Peripheral muscle strengthening which included 1-2 sets of 10-20 repetitions at a slow, controlled 5-7 second per rep pace focused on strengthening supporting musculature for improved body mechanics & functional mobility.  Patient & therapist focused on proper form & speed during treatment to ensure optimal strengthening of each targeted muscle group & neuromuscular re-education. Patients are instructed to keep sets/reps with proper load to stay at 3-5 out of 10 on the provided modified Barby exertion scale.    BATCA Machine Exercises Weight (lbs) Sets Repetitions Barby Exertion Scale Rating   Leg Extension        Hamstring Curls       Chest Press       Pec Fly       Lat Pull Down       Mid Row       Bicep Curls - DB       Standing Tricep Extension       Single Leg Press           Valeria participated in therapeutic exercises to develop strength, endurance, ROM, flexibility, posture, and core stabilization for 30 minutes including:      PT reassessment (see above)     Supine over rolled yoga mat    Positional release 3 min    LTR x 10    BKFO x 10 each side   [place] x 10   SA punches x 10    Seated T ball rollouts x 3 /c 10 sec hold        Patient Education and Home Exercises     Home Exercises Provided and Patient Education Provided     Education provided:   - FRP Educational Topics: Modified Barby Exertion Scale, Physical & Psychological Pain Cycles, Z-Lying for Pain Relief/Relaxation, Diaphragmatic Breathing, Activity Pacing for Pain & Debility , Central Sensitization , and Posture & Body Mechanics    Written Home Exercises Provided: Patient  instructed to cont prior HEP. Exercises were reviewed and Valeria was able to demonstrate them prior to the end of the session.  Valeria demonstrated good  understanding of the education provided. See EMR under Patient Instructions for information provided during therapy sessions    ASSESSMENT        Patient /c good participation in tx today, however, continues to need extended time to complete activities and needing consistent cue for standing and sitting posture.  However, subjective report indicate inc postural awareness and advancements in postural correction thereby meeting associated goals.  Patient report not inc pain /c ROM testing indicating improved movement quality.  Functional activity reassessment indicate improved TUG (barely meeting goal) and SLS but no change in 6MWT.  Patient continue to demonstrate dec conditioning and will benefit from continue endurance challenges especially considering she admits to not beginning walking program outside of tx.  Updated FOTO score indicate appropriate trend but no sig change in perception of pain presentation and functionality.  Despite noting fall and inc discomfort throughout yesterday, patient not demonstrate decreases in physical measures further offering evidence of appropriateness of continue POC.  However, patient will most benefit from continue pain education to enhance insight into ability especially /c resistance training and developing a plan for future workouts.        Valeria Is progressing well towards her goals.   Pt prognosis is Good.     Pt will continue to benefit from skilled outpatient physical therapy to address the deficits listed in the problem list box on initial evaluation, provide pt/family education and to maximize pt's level of independence in the home and community environment.     Pt's spiritual, cultural and educational needs considered and pt agreeable to plan of care and goals.     Anticipated Barriers for therapy: chronic nature of  "issues, psychosocial factors, central sensitization       GOALS: Pt is in agreement with the following goals:  Goal Progress Towards Goal   SHORT Term: In 3 weeks, pt will:     Verbalize & demonstrate good understanding of resisted training with 3-1-3 second rep for [id number] contraction to encourage full muscle recruitment for strength & endurance. PROGRESSING 10/11/23   Verbalize & demonstrate good understanding of home stretch routine to improve AROM, help decrease pain & improve mobility. PROGRESSING 10/11/23   Demonstrate proper supine or seated diaphragmatic breathing with decreased chest excursion & emphasis on full abdominal excursion & increased expiration time to promote muscle relaxation & increased parasympathetic activity to improve patient tolerance to activities. PROGRESSING 10/11/23   Verbalize good understanding of importance of proper posture in resisted exercise, functional activities & ADL's in order to help reduce postural strain, promote proper breathing. MET 10/11/23   Demonstrate good understanding of full body resisted exercises w/ use of pictures &/or verbal cues to promote independence w/ exercise at the end of the program. PROGRESSING 10/11/23   Verbalize plan for continuing resisted exercises w/ specific location (i.e. commercial gym, home, community fitness center)  to incorporate all major muscle groups to maintain & progress therapeutic functional improvements. PROGRESSING 10/11/23   Verbalize difference between  "hurt vs harm"  to demonstrate understanding of fear avoidance in pain cycle.  PROGRESSING 10/11/23         Midterm: In 8 weeks, pt will:     Verbalize good understanding of activities planning/scheduling (stretching, mindfulness, resisted training, & cardio) prescribed by PT/OT following the end of the program to sustain & progress functional improvements. PROGRESSING 10/11/23   Perform selected resisted training w/ minimal to no cuing in therapy session " to be independent w/ resisted strengthening. PROGRESSING 10/11/23   Demonstrate improved symptom self-management w/ posture/positioning and mechanical movements and/or implementation of appropriate modalities throughout a typical day.  PROGRESSING 10/11/23   Demonstrate independence w/ home stretch routine to improve AROM, help decrease pain & improve mobility. PROGRESSING 10/11/23         Long Term: In 12 weeks, pt will:     Perform selected cardio & resisted training independently to continue safe regular performance of daily exercise. PROGRESSING 10/11/23   Improve 2 Minute Walk Test (MWT) to 400 feet and 6 MWT to 1200 feet or greater to improve gait speed and mobility. PROGRESSING 10/11/23   Perform single leg stance 10 seconds or greater bilaterally to improve safety and balance in ADLs. MET 10/11/23   Demonstrate Timed Up & Go (with appropriate assistive device, if necessary) of 10 seconds or less to decrease fall risk and improve functional mobility. PROGRESSING 10/11/23   Score 45 % or less on LUMBAR FOTO to indicate significant functional improvements in impaired functions secondary to pain. PROGRESSING 10/11/23              PLAN     Continue PT per DOM Banerjee, PT

## 2023-10-12 NOTE — PROGRESS NOTES
Group Psychotherapy    Site: [facility]    Clinical status of patient: Outpatient    10/6/2023    Length of service:[id number]    Referred by: [facility]     Number of patients in attendance: 5    Target symptoms: Chronic Pain Management     Patient's response to intervention:  The patient's response to intervention is active listening.    Progress toward goals and other mental status changes:  The patient's progress toward goals is fair .    Plan: group psychotherapy    Topics Covered: Pt attended CBT for Chronic Pain as part of their participation in Functional Restoration. Topics discussed today included a review of what they have learned thus far. We discussed the role that emotion plays in pain generation, including anxiety, depression and other mood disorders. Discussed pain from an inflammatory perspective. Discussed the role chronic stress plays in pain generation, discussed coping mechanisms. Will f/u in 3 days.

## 2023-10-12 NOTE — PROGRESS NOTES
I was present for this encounter. I have met with the above student and agree with the assessment and note written. Any changes have been made and authorized by me.  Kaye Bailey, [facility] 10/12/2023 6:55 PM

## 2023-10-12 NOTE — PROGRESS NOTES
Group Psychotherapy    Site: [facility]    Clinical status of patient: Outpatient    10/11/2023    Length of service:[id number]    Referred by: Functional [facility]oration Program     Number of patients in attendance: 5    Target symptoms: Chronic Pain Management     Patient's response to intervention:  The patient's response to intervention is active listening.    Progress toward goals and other mental status changes:  The patient's progress toward goals is fair .    Plan: group psychotherapy    Topics Covered: Pt attended CBT for Chronic Pain as part of their participation in Functional Restoration. Topics discussed today included a discussion on the neuromatrix of pain. We discussed how the neuromatrix is impacted by anxiety, depression and other mood disorders. Pts also attended a lecture on nutrition and added sugars in managing their chronic pain. Will f/u in 2 days.

## 2023-10-13 ENCOUNTER — CLINICAL SUPPORT (OUTPATIENT)
Dept: REHABILITATION | Facility: OTHER | Age: 58
End: 2023-10-13
Payer: COMMERCIAL

## 2023-10-13 ENCOUNTER — CLINICAL SUPPORT (OUTPATIENT)
Dept: PSYCHIATRY | Facility: OTHER | Age: 58
End: 2023-10-13
Payer: COMMERCIAL

## 2023-10-13 DIAGNOSIS — R29.898 DECREASED STRENGTH OF TRUNK AND BACK: Primary | ICD-10-CM

## 2023-10-13 DIAGNOSIS — Z74.09 IMPAIRED FUNCTIONAL MOBILITY, BALANCE, GAIT, AND ENDURANCE: ICD-10-CM

## 2023-10-13 DIAGNOSIS — R29.3 POOR POSTURE: ICD-10-CM

## 2023-10-13 DIAGNOSIS — R52 PAIN AGGRAVATED BY ACTIVITIES OF DAILY LIVING: Primary | ICD-10-CM

## 2023-10-13 DIAGNOSIS — R29.818 POOR TRUNK CONTROL: ICD-10-CM

## 2023-10-13 DIAGNOSIS — F43.29 ADJUSTMENT DISORDER WITH PHYSICAL COMPLAINTS: Primary | ICD-10-CM

## 2023-10-13 PROCEDURE — 97112 NEUROMUSCULAR REEDUCATION: CPT | Mod: CQ

## 2023-10-13 PROCEDURE — 97110 THERAPEUTIC EXERCISES: CPT | Mod: CQ

## 2023-10-13 PROCEDURE — 97530 THERAPEUTIC ACTIVITIES: CPT

## 2023-10-13 PROCEDURE — 90853 PR GROUP PSYCHOTHERAPY: ICD-10-PCS | Mod: ,,, | Performed by: SOCIAL WORKER

## 2023-10-13 PROCEDURE — 97530 THERAPEUTIC ACTIVITIES: CPT | Mod: CQ

## 2023-10-13 PROCEDURE — 90853 GROUP PSYCHOTHERAPY: CPT | Mod: ,,, | Performed by: SOCIAL WORKER

## 2023-10-13 PROCEDURE — 97110 THERAPEUTIC EXERCISES: CPT

## 2023-10-13 NOTE — PROGRESS NOTES
"[facility]  Functional Restoration Program  Occupational Therapy Treatment Note  FRP Day 9      Name: Thelma Nguyen  MRN:[id number]  Encounter Date: 10/13/2023    Diagnosis:   Encounter Diagnoses   Name Primary?    Pain aggravated by activities of daily living Yes    Poor trunk control     Poor posture        Referring provider: Bibi Alvarado NP    Physician Orders: eval and treat; [facility]  Medical Diagnosis:   M54.16 (ICD-10-CM) - Lumbar radiculopathy  R68.89 (ICD-10-CM) - Decreased motor activity R26.9 (ICD-10-CM) - Abnormality of gait   Evaluation Date: 8/2/2023  Insurance Authorization Period Expiration: 6/9/2024  Plan of Care Certification Period: 12/22/2023  Visit # / Visits authorized: 9/20 (plus eval)  FOTO completed: 2/3     Precautions:  Standard and Fall, Orthostatic Hypotension     Time In: 1330  Time Out: 1445  Total Billable Time: 75 minutes    SUBJECTIVE     She reports: "I'm doing much better. I slept, I stretched, I was able to work from home so it was much less stressful." "I'd like to figure it all out, figure out the balance"  "sometimes its not in my control though"  "I want to be able to ride my bike, but the other therapist told me that I shouldn't do it alone."     Valeria was compliant with parts of home exercise program given previously.  Having difficulty with pacing and avoiding boom/bust cycle.  Feels she's doing well with stretching, being aware of posture and self-correcting posture.     Response to previous treatment: Ms. Nguyen noted: felt better after she left on Wednesday, but was still feeling tired    Functional change: noticing pain ebbing/flowing and improving with repositioning/breaks; takes breaks more often. Improved activity tolerance.  Able to grocery shop with more endurance; able to unload groceries with less assistance; prep more ingredients, still asking  to cook.     Pain:       Current 3/10  Description: Aching and Dull /c elements of " "sharp pain /c activity   Aggravating Factors: Standing, Bending, Walking, and Lifting  Easing Factors: heating pad, stretching    Patient Specific Activities based on Personal goals:  Activity  9/25/2023 10/11/2023     Performance Satisfaction Performance Satisfaction   Walk in [facility] 1  1 3 3   2.  showering 5 5 3 "I haven't been doing it" 3   3.  Grocery shopping 6 4 5 5   4.  Travel 6 5 5 5   5.  Cooking/meal prep. 7 (sits down)  6 4 5             Total Score 5  4.2  4 4.2      Patient Specific Activity Scoring Scheme for Performance     1        2        3        4        5        6        7        8        9        10     Unable/Unsatisfied                                          Able/Satisfied                                                                          Objective      Strength (in pounds, rung II, average of three trials)    8/2/2023 10/11/2023    Right hand  45.67 lbs 49 lb   Left hand  40.33 lbs 35 lbs    [norms for women aged 55-59: R=57.3 +/-12.5; L=47.3 +/-11.9 (Elisha et al, 1985)]      Functional Strength Test:  Pile Testing: Lifting  (Pounds/Heart rate)   GAP 8/2/2023  (#/HR/JANET) 10/13/2023     Repetitive Floor to Waist      8/EMA/3 8/85/3   Repetitive Waist to Shoulder    8/133/2 8/120/4   1- Time Maximum     10/130/3 15/113/4   Carry-2 Handed (40ft)     7/134/3 16/122/5-6   Work demand Level   Sedentary    Sedentary-light   Reason for Stop point: eval: Increased time required for completing repetitions, Inability to maintain proper body mechanics, Increased discomfort, Fear of increased pain. During physical testing, participation level was consistent. The work demand level listed above is based on the physical performance screening test performed. More comprehensive physical testing integrated with clinical findings would be required to derived an accurate work capacity.     Goal: #20 max, frequent lifting/carrying #10, 2.5 METS  demand level   "   Balance  5 times sit-stand  (adults 18-65 y/o) 8/2/2023 10/11/2023    >12 sec= fall risk for general elderly  >16 sec= fall risk for Parkinson's disease  >10 sec= balance/vestibular dysfunction (<59 y/o)  >14.2 sec= balance/vestibular dysfunction (>59 y/o)  >12 sec= fall risk for CVA 19.38 seconds     (with UE used to push up from chair) 16.94 seconds     (With OUT UE to push up from chair)        Endurance Testing: Modified Ramp Treadmill Test    GAP 8/2/2023 10/11/2023    Minutes Completed  2 minutes 6 seconds 3 min    % Grades  5 % 10 %    Speed (mph)  1.3 mph 1.7 mph    METS 3 4     bpm 128 bpm    Barby Rating Perceived Exertion Scale   4 5    Reason for stop point: eval: Decline in maintaining pace safely . Initiated recovery using standing  pose.       Limitation/Restriction for FOTO NOC Survey     Therapist reviewed FOTO scores for Thelma Nguyen on 10/13/2023    FOTO documents entered into EP[employer] - see Media section.     Limitation Score: 59%                No environmental, cultural, spiritual, developmental or education needs expressed or noted.    Treatment     Valeria received the treatments listed below:     Therapeutic activities and functional lifting activities in order to increase participation in, endurance for, and performance with vocational, selfcare ADLs, and leisure activities in order to improve quality of life, for 50 minutes, including:    Completion of functional lifts measures, see objective measures this date.     Physioball roll outs.    Seated conversation about impact of SNS vs PNS, use of boundaries and management of time to avoid boom/bust.     Valeria participated in dynamic functional therapeutic exercises to improve functional performance for 25 minutes, including:    Full body functional mobility w/ 3-10 sec hold technique &/or 3-5 repetition technique to improve functional performance. In order to:  Improve driving safety, visual & spatial  awareness:  Cervical flx/ext  Cervical side bending  Cervical rotation  Cervical protraction/retraction  Improve lifting mechanics, showering/bathing, dressing, cooking, reaching, pushing & fine motor skills  Shld rolls  Shld depression/elevation  Scapular retraction/protraction/scaption  Posterior shld stretch (cross arm)  Shld ER stretch (chicken wing)  Elbow flx/ext  Forearm supination/pronation  Wrist flx/ext  Open/close hands/fingers  Improve bed mobility & rolling, bending over, cooking & cleaning:  Seated trunk rotations  Seated trunk/thoracic ext/flx  Improve functional gait, squatting, sitting tolerance, functional balance:   Seated marches & knee to chest  Seated knee flx/ext  Seated hip ER/piriformis stretch  Seated hamstring stretch  Seated toe raise to heel raise   Seated ankle circles each way  Improve overhead reaching/activities, standing tolerance:  Standing lumbar extensions  Standing lateral leaning stretch  Standing quad stretch (UE support for balance)    No environmental, cultural, spiritual, developmental or education needs expressed or noted.    Patient Education and Home Exercises   Education provided:   - Progress towards goals   - importance of completion of exercises and activities outside of session to attain goals.   - proper posture and body mechanics.  - anticipated muscular soreness following lift testing and strategies for management including stretching, using ice, scheduled rest.  - Functional pain scale  - JANET rate of perceived exertion scale.   - pain cycle  - pursed lip and diaphragmatic breathing techniques  - hip hinge    Written Home Exercises Provided: Patient instructed to cont prior HEP.  Exercises were reviewed and Vlaeria was able to demonstrate them prior to the end of the session. Valeria demonstrated fair  understanding of the education provided.     See EMR under Patient Instructions for exercises provided during therapy sessions. Patient education also located in [facility]  binder provided on day 1 of the cohort.     Assessment     Ms. Nguyen tolerated today's session fairly, including completion of reassessment. Feedback required for all functional lifts 2* challenges with form and technique. Improvements noted with cues 2* guarding and difficulty with over-sequencing steps.  Cues required for use of JANET scale to incorporate mental and physical work when rating. In conversation and educational review, improved insight into impact of psychosocial stressors on pain and GI pain.  Stable presentation from initial eval in regards to functional lifts and weight tolerance for lifts, likely related to continued difficulty with implementing appropriate form and limited application of FRP tools outside of clinic.  Extended education and conversation about boundaries required to continue to foster growth initiated in FRP time when not in clinic.     Valeria is progressing well towards her goals and status of goals can be seen below. Patient's prognosis is Fair.     Valeria would continue to benefit from skilled outpatient occupational therapy to address the deficits listed in the problem list on initial evaluation, provide patient education, and to maximize patient's level of independence in the home and community environment.     Anticipated barriers to occupational therapy: co-morbidities    Goals:     SHORT Term goals: In 3 weeks, patient will:  Status of goal, reviewed on 10/13/2023:   Implements correct use of breathing techniques during functional lifting tasks, with minimal cues needed. In progress    Utilizes JANET rate of exertion scale to pace performance with functional lifting tasks, and implements self-care strategies during activity participation to manage pain.  In progress    Verbalize understanding of energy conservation and pacing strategies during daily habits, roles, and routines in order to improve tolerance for work and home demands.  In progress    Completes 5x sit to stand  test in 16 seconds or less to improve ability to participate in community mobility.   Partially Met 10/11/2023   Completed in 16.94 seconds   Demonstrate increased positional tolerance to the level needed for work, home, and community activities (standing in cue at social event, managing supplies for outing, streneous IADL), as evidenced by having a METS level of 3. Met 10/11/2023    Demonstrate proper body mechanics with functional lifting tasks (floor to waist, waist to shoulder, maximum lift, and box carry), via teach back method with minimal cues needed. In progress    Demonstrate increased functional strength by lifting 13 lbs waist to shoulder for management of (I)ADL, including dressing and grooming, placing items in kitchen cabinets, folding towels, and/or managing suitcase when traveling.   In progress    Demonstrate increased functional strength by lifting 18 lbs floor to waist to meet demand of work/home routine, including lifting groceries, managing laundry, and/or managing suitcase when traveling.   In progress    Tolerate Home Activity Plan (HAP)/ Home Exercise Program (HEP) to promote safety and independence with ADL, with report of completion of at least 2 out of 4 days outside of program participation.  In progress    Show improved perception of own abilities as evidenced by increase of FOTO scores to established MDC value and perception of performance ratings regarding personal long term goals.  In progress          MIDTERM goals: In 9 weeks, patient will: Status of goal, reviewed on 10/13/2023:   Report implementation of application of mindfulness, stretching, and other coping strategies for improved participation in daily routine. In progress    Verbalize functional application of lifting techniques in daily life (golfers lift, floor to waist, waist to shoulder). In progress    Completes 5x sit to stand test in 12 seconds or less to improve ability to participate in community mobility.  In  progress    Applies functional application of lifting techniques (golfer's lift, floor to waist, waist to shoulder) in activities of daily life, per patient report.  In progress    Demonstrate physical capacities consistent with a Light (#20 max, frequent lifting/carrying #10, 2.5 METS  demand level, as needed to perform activities to be successful in roles of life, regarding Part Time Employment, Household Management, and Community Participation. In progress    Have an improvement of 3 points in 2/5 personal goals in rating of  performance.  In progress    Show improved perception of own abilities as evidenced by increase of FOTO scores to established MC II value and perception of performance ratings regarding personal long term goals.  In progress       LONG term goals = Patient Specific Goals:  Vocational: able to carry supplies   Recreational : walking around [place] world, travel   Daily Living Activities: cooking, laundry, groceries, showering   Measured by patient's self-reported performance and satisfaction of personal FRP goals, listed above in subjective section.   Total Score = sum of the activity performance scores / number of activities  Minimum detectable change (90%CI) for average score = 2 points  Minimum detectable change (90%CI) for single activity score  = 3 points    Plan     Continue per plan of care.     Updates/Grading for next session: follow up on pacing sheets, waist to shoulder lift, impact of stress on GI, wants to ride BIKE      LAUREANO Hollingsworth   10/13/2023

## 2023-10-13 NOTE — PROGRESS NOTES
"[facility]  Functional Restoration Program  Occupational Therapy Treatment Note  FRP Day 10    Name: Thelma Nguyen  MRN:[id number]  Encounter Date: 10/16/2023    Diagnosis:   Encounter Diagnoses   Name Primary?    Pain aggravated by activities of daily living Yes    Poor trunk control     Poor posture      Referring provider: Bibi Alvarado NP    Physician Orders: eval and treat; [facility]  Medical Diagnosis:   M54.16 (ICD-10-CM) - Lumbar radiculopathy  R68.89 (ICD-10-CM) - Decreased motor activity R26.9 (ICD-10-CM) - Abnormality of gait   Evaluation Date: 8/2/2023  Insurance Authorization Period Expiration: 6/9/2024  Plan of Care Certification Period: 12/22/2023  Visit # / Visits authorized: 11/20 (plus eval)  FOTO completed: 2/3     Precautions:  Standard and Fall, Orthostatic Hypotension     Time In: 13:35  Time Out: 14:45  Total Billable Time: 70 minutes    SUBJECTIVE     She reports: "I am used to curling up when sitting. Even when I sleep, I curl up. I think because I am anemic and always cold". "I am working on drinking more".    Valeria was compliant with parts of home exercise program given previously.  Having difficulty with pacing and avoiding boom/bust cycle.  Feels she's doing well with stretching, being aware of posture and self-correcting posture.     Response to previous treatment: Ms. Nguyen noted: felt better after she left on Wednesday, but was still feeling tired    Functional change: noticing pain ebbing/flowing and improving with repositioning/breaks; takes breaks more often. Improved activity tolerance.  Able to grocery shop with more endurance; able to unload groceries with less assistance; prep more ingredients, still asking  to cook.     Pain:       Current 3/10  Description: Aching and Dull /c elements of sharp pain /c activity   Aggravating Factors: Standing, Bending, Walking, and Lifting  Easing Factors: heating pad, stretching    Patient Specific Activities " "based on Personal goals:  Activity  9/25/2023 10/11/2023     Performance Satisfaction Performance Satisfaction   Walk in [facility] 1  1 3 3   2.  showering 5 5 3 "I haven't been doing it" 3   3.  Grocery shopping 6 4 5 5   4.  Travel 6 5 5 5   5.  Cooking/meal prep. 7 (sits down)  6 4 5             Total Score 5  4.2  4 4.2      Patient Specific Activity Scoring Scheme for Performance     1        2        3        4        5        6        7        8        9        10     Unable/Unsatisfied                                          Able/Satisfied                                                                          Objective     Measures updated at progress report, or unless otherwise noted.    See progress report 10/13/2023.    Treatment     Valeria received the treatments listed below:     Therapeutic activities and functional lifting activities in order to increase participation in, endurance for, and performance with vocational, selfcare ADLs, and leisure activities in order to improve quality of life, for 45 minutes, including:    Valeria participated in functional lift waist to shoulder, 15 reps x 8 lbs with a rating of Moderate (3/10) exertion. Valeria educated on standing posture, core awareness, keeping shoulders depressed and retracted, stepping to place > reaching to place, keeping weight close to center of gravity and pacing as needed. Step stool used for extended overhead reach. Showed photo's of dressing rooms/supply bins.    Discussion on desk; sitting position, working on table vs ground, and changing habits instead of changing equipment and still with prolonged seated.    Valeria completed dynamic reaching in various functional ranges, with continued focus on hip rotation/weight shifting, 10 reps x 1 lbs, rated as Hard (5/10) exertion.     Therapeutic exercises to develop strength, endurance, ROM, and flexibility for 25 minutes, including:    Full body stretch w/ 3-10 sec hold " technique &/or 3-5 repetition technique on all of the following:  Cervical flx/ext  Cervical sidebending  Cervical rotation  Cervical protraction/retraction  Shld rolls  Shld depression/elevation  Scapular retraction/protraction/scaption  Posterior shld stretch (cross arm)  Shld ER stretch (chicken wing)  Elbow flx/ext  Forearm supination/pronation  Wrist flx/ext  Open/close hands/fingers  Seated trunk rotations  Seated trunk/thoracic ext/flx  Seated marches & knee to chest  Seated knee flx/ext  Seated hip ER/piriformis stretch  Seated hamstring stretch  Seated toe raise to heel raise   Seated ankle circles each way  Standing lumbar extensions  Standing lateral trunk stretch  Standing quad stretch    Valeria participated in endurance activity using Nustep for 10 minutes, at level 1.4 to improve functional endurance and progress towards increased MET level for improved community mobility and participation, rated as Sort of hard (4/10) exertion, Valeria re-educated on how to add mindfulness component to activity and how to pace appropriately by completed self check ins and grading activity as needed, with goal to reach moderate to sort of hard by end of activity.    No environmental, cultural, spiritual, developmental or education needs expressed or noted.    Patient Education and Home Exercises   Education provided:   - Progress towards goals   - importance of completion of exercises and activities outside of session to attain goals.   - proper posture and body mechanics.  - anticipated muscular soreness following lift testing and strategies for management including stretching, using ice, scheduled rest.  - Functional pain scale  - JANET rate of perceived exertion scale.   - pain cycle  - pursed lip and diaphragmatic breathing techniques  - hip hinge    Written Home Exercises Provided: Patient instructed to cont prior HEP.  Exercises were reviewed and Valeria was able to demonstrate them prior to the end of the session.  Valeria demonstrated fair  understanding of the education provided.     See EMR under Patient Instructions for exercises provided during therapy sessions. Patient education also located in FRP binder provided on day 1 of the cohort.     Assessment     Ms. Nguyen tolerated session well; presented with report of having seen sewing table set up she feels will improve her ability to attend to task, but verbalizing understanding that habits are playing role, and importance of pacing and taking breaks, limiting the amount she is sitting. Cues needed with functional lifting tasks re form.    Valeria is progressing well towards her goals and status of goals can be seen below. Patient's prognosis is Fair.     Valeria would continue to benefit from skilled outpatient occupational therapy to address the deficits listed in the problem list on initial evaluation, provide patient education, and to maximize patient's level of independence in the home and community environment.     Anticipated barriers to occupational therapy: co-morbidities    Goals:     SHORT Term goals: In 3 weeks, patient will:  Status of goal, reviewed on 10/13/2023:   Implements correct use of breathing techniques during functional lifting tasks, with minimal cues needed. In progress    Utilizes JANET rate of exertion scale to pace performance with functional lifting tasks, and implements self-care strategies during activity participation to manage pain.  In progress    Verbalize understanding of energy conservation and pacing strategies during daily habits, roles, and routines in order to improve tolerance for work and home demands.  In progress    Completes 5x sit to stand test in 16 seconds or less to improve ability to participate in community mobility.   Partially Met 10/11/2023   Completed in 16.94 seconds   Demonstrate increased positional tolerance to the level needed for work, home, and community activities (standing in cue at social event, managing  supplies for outing, streneous IADL), as evidenced by having a METS level of 3. Met 10/11/2023    Demonstrate proper body mechanics with functional lifting tasks (floor to waist, waist to shoulder, maximum lift, and box carry), via teach back method with minimal cues needed. In progress    Demonstrate increased functional strength by lifting 13 lbs waist to shoulder for management of (I)ADL, including dressing and grooming, placing items in kitchen cabinets, folding towels, and/or managing suitcase when traveling.   In progress    Demonstrate increased functional strength by lifting 18 lbs floor to waist to meet demand of work/home routine, including lifting groceries, managing laundry, and/or managing suitcase when traveling.   In progress    Tolerate Home Activity Plan (HAP)/ Home Exercise Program (HEP) to promote safety and independence with ADL, with report of completion of at least 2 out of 4 days outside of program participation.  In progress    Show improved perception of own abilities as evidenced by increase of FOTO scores to established MDC value and perception of performance ratings regarding personal long term goals.  In progress          MIDTERM goals: In 9 weeks, patient will: Status of goal, reviewed on 10/13/2023:   Report implementation of application of mindfulness, stretching, and other coping strategies for improved participation in daily routine. In progress    Verbalize functional application of lifting techniques in daily life (golfers lift, floor to waist, waist to shoulder). In progress    Completes 5x sit to stand test in 12 seconds or less to improve ability to participate in community mobility.  In progress    Applies functional application of lifting techniques (golfer's lift, floor to waist, waist to shoulder) in activities of daily life, per patient report.  In progress    Demonstrate physical capacities consistent with a Light (#20 max, frequent lifting/carrying #10, 2.5 METS  demand  level, as needed to perform activities to be successful in roles of life, regarding Part Time Employment, Household Management, and Community Participation. In progress    Have an improvement of 3 points in 2/5 personal goals in rating of  performance.  In progress    Show improved perception of own abilities as evidenced by increase of FOTO scores to established MC II value and perception of performance ratings regarding personal long term goals.  In progress       LONG term goals = Patient Specific Goals:  Vocational: able to carry supplies   Recreational : walking around [place], travel   Daily Living Activities: cooking, laundry, groceries, showering   Measured by patient's self-reported performance and satisfaction of personal FRP goals, listed above in subjective section.   Total Score = sum of the activity performance scores / number of activities  Minimum detectable change (90%CI) for average score = 2 points  Minimum detectable change (90%CI) for single activity score  = 3 points    Plan     Continue per plan of care.     Updates/Grading for next session: follow up on pacing sheets, waist to shoulder lift, impact of stress on GI, wants to ride BIKE      NIKKI Lee, OTR/L, CEAS-I  10/16/2023

## 2023-10-13 NOTE — PROGRESS NOTES
[facility]    Functional Restoration Program  Physical Therapy REASSESSMENT  FRP Day 9    Name: Thelma Nguyen  MRN:[id number]      Therapy Diagnosis:   Encounter Diagnoses   Name Primary?    Decreased strength of trunk and back Yes    Impaired functional mobility, balance, gait, and endurance            Physician: Bibi Alvarado NP    Date: 10/13/2023    Physician Orders: PT Eval and Treat   Medical Diagnosis from Referral:   M54.16 (ICD-10-CM) - Lumbar radiculopathy   R68.89 (ICD-10-CM) - Decreased motor activity   R26.9 (ICD-10-CM) - Abnormality of gait      Evaluation Date: 8/2/2023  Authorization Period Expiration: 12/31/23  Plan of Care Expiration: 12/22/23  Visit # / Visits authorized: 09/ 20     FOTO: 02/ 03      Precautions:: Standard and Fall  hx of CA (brain /c surgery)      Time In: 2:45 pm   Time Out: 4:00 pm   Total Appointment Time (timed & untimed codes): 75 minutes     SUBJECTIVE     Valeria reports                Current 5/10  cervical  3/10 HA   Location(s): left hip, LB     OBJECTIVE     Objective Measures updated at progress report unless specified.        Limitation/Restriction for FOTO LUMBAR Survey     Therapist reviewed FOTO scores for Thelma Nguyen on 8/2/2023.   FOTO documents entered into [employer] - see Media section.     INTAKE Score 8/02/2023: 42 (58% limitation)   09/29/23: 43  10/9/23: 46 (54% limitation)   10/11/23: 47     Predicted Score: 45%     MDC 6   MCID 9            Treatment       Valeria received the Individualized Treatments listed below:     Valeria participated in dynamic functional therapeutic activities to improve functional performance for 35 minutes, including:      Endurance Activity: seated stepper 10 min  steps/min      SOC x 20 for postural recognition and correction when in theatre seats, cue for pelvic tilt      Patient demonstrate   Diaphragmatic breathing:   Verbal cues for unlabored, long & relaxed breathing  w/ increased time for exhalation  Patient verbalize inhalation activating sympathetic nervous system, exhalation activating parasympathetic nervous system  Performed seated     Valeria participated in neuromuscular re-education activities to improve: Balance, Coordination, Kinesthetic, Sense, Proprioception, and Posture for 40 minutes. The following activities were included:      Fitter board - low setting, 20 tilt bouts   Fwd/Bwd     Trial 1- 2 hand  cue to inc tilt speed     Trial 2- 1 hand  fade to 3 finger support    Lat     Trial 1- 1 hand  cue for inc tilt speed    Trial 2- EC 1 hand  cue to reach end range     Supine NP   Arms and legs straight- arm reaching away, legs reaching away, same side arm and leg reaching away 5 reps  PPT x 10 cue   TA activation /c Tball in hooklying x 10 10 sec hold  TA activation /c Tball in hooklying /c SLR x 10 B    PPT x 5 patient reminded of self cue for TA activation    Bridging 10 reps    Karaoke walk 3 laps untimed of 5 full steps L/5 steps R NP   24 sec lap of 5 full steps L/R      Peripheral muscle strengthening which included 1-2 sets of 10-20 repetitions at a slow, controlled 5-7 second per rep pace focused on strengthening supporting musculature for improved body mechanics & functional mobility.  Patient & therapist focused on proper form & speed during treatment to ensure optimal strengthening of each targeted muscle group & neuromuscular re-education. Patients are instructed to keep sets/reps with proper load to stay at 3-5 out of 10 on the provided modified Barby exertion scale.    BATCA Machine Exercises Weight (lbs) Sets Repetitions Barby Exertion Scale Rating   Leg Extension        Hamstring Curls       Chest Press       Pec Fly       Lat Pull Down       Mid Row       Bicep Curls - DB       Standing Tricep Extension       Single Leg Press           Valeria participated in therapeutic exercises to develop strength, endurance, ROM, flexibility, posture,  and core stabilization for 10 minutes including:    Supine over rolled yoga mat NP   Positional release 3 min    LTR x 10    BKFO x 10 each side   [place] x 10   SA punches x 10      Seated T ball rollouts x 3 /c 10 sec hold   No money         Patient Education and Home Exercises     Home Exercises Provided and Patient Education Provided     Education provided:   - FRP Educational Topics: Modified Barby Exertion Scale, Physical & Psychological Pain Cycles, Z-Lying for Pain Relief/Relaxation, Diaphragmatic Breathing, Activity Pacing for Pain & Debility , Central Sensitization , and Posture & Body Mechanics    Written Home Exercises Provided: Patient instructed to cont prior HEP. Exercises were reviewed and Valeria was able to demonstrate them prior to the end of the session.  Valeria demonstrated good  understanding of the education provided. See EMR under Patient Instructions for information provided during therapy sessions    ASSESSMENT        Patient /c good participation in tx today, however, continues to need extended time to complete activities and needing consistent cue for standing and sitting posture.  However, subjective report indicate inc postural awareness and advancements in postural correction thereby meeting associated goals.  Patient report not inc pain /c ROM testing indicating improved movement quality.  Functional activity reassessment indicate improved TUG (barely meeting goal) and SLS but no change in 6MWT.  Patient continue to demonstrate dec conditioning and will benefit from continue endurance challenges especially considering she admits to not beginning walking program outside of tx.  Updated FOTO score indicate appropriate trend but no sig change in perception of pain presentation and functionality.  Despite noting fall and inc discomfort throughout yesterday, patient not demonstrate decreases in physical measures further offering evidence of appropriateness of continue POC.  However,  patient will most benefit from continue pain education to enhance insight into ability especially /c resistance training and developing a plan for future workouts.        Valeria Is progressing well towards her goals.   Pt prognosis is Good.     Pt will continue to benefit from skilled outpatient physical therapy to address the deficits listed in the problem list box on initial evaluation, provide pt/family education and to maximize pt's level of independence in the home and community environment.     Pt's spiritual, cultural and educational needs considered and pt agreeable to plan of care and goals.     Anticipated Barriers for therapy: chronic nature of issues, psychosocial factors, central sensitization       GOALS: Pt is in agreement with the following goals:  Goal Progress Towards Goal   SHORT Term: In 3 weeks, pt will:     Verbalize & demonstrate good understanding of resisted training with 3-1-3 second rep for [id number]-[id number] contraction to encourage full muscle recruitment for strength & endurance. PROGRESSING 10/11/23   Verbalize & demonstrate good understanding of home stretch routine to improve AROM, help decrease pain & improve mobility. PROGRESSING 10/11/23   Demonstrate proper supine or seated diaphragmatic breathing with decreased chest excursion & emphasis on full abdominal excursion & increased expiration time to promote muscle relaxation & increased parasympathetic activity to improve patient tolerance to activities. PROGRESSING 10/11/23   Verbalize good understanding of importance of proper posture in resisted exercise, functional activities & ADL's in order to help reduce postural strain, promote proper breathing. MET 10/11/23   Demonstrate good understanding of full body resisted exercises w/ use of pictures &/or verbal cues to promote independence w/ exercise at the end of the program. PROGRESSING 10/11/23   Verbalize plan for continuing resisted exercises w/ specific location  "(i.e. commercial gym, home, community fitness center)  to incorporate all major muscle groups to maintain & progress therapeutic functional improvements. PROGRESSING 10/11/23   Verbalize difference between  "hurt vs harm"  to demonstrate understanding of fear avoidance in pain cycle.  PROGRESSING 10/11/23         Midterm: In 8 weeks, pt will:     Verbalize good understanding of activities planning/scheduling (stretching, mindfulness, resisted training, & cardio) prescribed by PT/OT following the end of the program to sustain & progress functional improvements. PROGRESSING 10/11/23   Perform selected resisted training w/ minimal to no cuing in therapy session to be independent w/ resisted strengthening. PROGRESSING 10/11/23   Demonstrate improved symptom self-management w/ posture/positioning and mechanical movements and/or implementation of appropriate modalities throughout a typical day.  PROGRESSING 10/11/23   Demonstrate independence w/ home stretch routine to improve AROM, help decrease pain & improve mobility. PROGRESSING 10/11/23         Long Term: In 12 weeks, pt will:     Perform selected cardio & resisted training independently to continue safe regular performance of daily exercise. PROGRESSING 10/11/23   Improve 2 Minute Walk Test (MWT) to 400 feet and 6 MWT to 1200 feet or greater to improve gait speed and mobility. PROGRESSING 10/11/23   Perform single leg stance 10 seconds or greater bilaterally to improve safety and balance in ADLs. MET 10/11/23   Demonstrate Timed Up & Go (with appropriate assistive device, if necessary) of 10 seconds or less to decrease fall risk and improve functional mobility. PROGRESSING 10/11/23   Score 45 % or less on LUMBAR FOTO to indicate significant functional improvements in impaired functions secondary to pain. PROGRESSING 10/11/23              PLAN     Continue PT per POC     Aide Silva, PTA    "

## 2023-10-16 ENCOUNTER — CLINICAL SUPPORT (OUTPATIENT)
Dept: REHABILITATION | Facility: OTHER | Age: 58
End: 2023-10-16
Payer: COMMERCIAL

## 2023-10-16 ENCOUNTER — CLINICAL SUPPORT (OUTPATIENT)
Dept: PSYCHIATRY | Facility: OTHER | Age: 58
End: 2023-10-16
Payer: COMMERCIAL

## 2023-10-16 DIAGNOSIS — R29.898 DECREASED STRENGTH OF TRUNK AND BACK: Primary | ICD-10-CM

## 2023-10-16 DIAGNOSIS — F43.29 ADJUSTMENT DISORDER WITH PHYSICAL COMPLAINTS: Primary | ICD-10-CM

## 2023-10-16 DIAGNOSIS — Z74.09 IMPAIRED FUNCTIONAL MOBILITY, BALANCE, GAIT, AND ENDURANCE: ICD-10-CM

## 2023-10-16 DIAGNOSIS — R29.3 POOR POSTURE: ICD-10-CM

## 2023-10-16 DIAGNOSIS — R52 PAIN AGGRAVATED BY ACTIVITIES OF DAILY LIVING: Primary | ICD-10-CM

## 2023-10-16 DIAGNOSIS — R29.818 POOR TRUNK CONTROL: ICD-10-CM

## 2023-10-16 PROCEDURE — 97110 THERAPEUTIC EXERCISES: CPT | Mod: CQ

## 2023-10-16 PROCEDURE — 90853 GROUP PSYCHOTHERAPY: CPT | Mod: ,,, | Performed by: SOCIAL WORKER

## 2023-10-16 PROCEDURE — 97110 THERAPEUTIC EXERCISES: CPT

## 2023-10-16 PROCEDURE — 97530 THERAPEUTIC ACTIVITIES: CPT

## 2023-10-16 PROCEDURE — 97112 NEUROMUSCULAR REEDUCATION: CPT | Mod: CQ

## 2023-10-16 PROCEDURE — 90853 PR GROUP PSYCHOTHERAPY: ICD-10-PCS | Mod: ,,, | Performed by: SOCIAL WORKER

## 2023-10-16 NOTE — PROGRESS NOTES
Group Psychotherapy    Site: [facility]    Clinical status of patient: Outpatient    10/16/2023    Length of service:[id number]    Referred by: Functional Restoration Program     Number of patients in attendance: 6    Target symptoms: Chronic Pain Management     Patient's response to intervention:  The patient's response to intervention is self-disclosure.    Progress toward goals and other mental status changes:  The patient's progress toward goals is good.    Plan: group psychotherapy    Topics Covered: Pt attended CBT for Chronic Pain as part of their participation in Functional Restoration. Pts learned how to set short and long-term goals. Discussed the importance of using SMART goals when managing chronic pain, anxiety and depression. Pts spent time working on their own SMART goals and how they will measure and meet them. Emphasis placed on goals being realistic and avoiding boom and bust results and how that can in turn help manage pain, anxiety and depression. Pts were also provided with the second nutrition lecture in which the connection between diet, inflammation, and chronic pain. Pts were provided with information about different types of anti-inflammatory food options and ways to maintain blood sugar balance. Will follow up on Wednesday.

## 2023-10-16 NOTE — PROGRESS NOTES
"[facility]    Functional Restoration Program  Day 10    Name: Thelma Nguyen  MRN:[id number]      Therapy Diagnosis:   Encounter Diagnoses   Name Primary?    Decreased strength of trunk and back Yes    Impaired functional mobility, balance, gait, and endurance        Physician: Bibi Alvarado NP    Date: 10/17/2023    Physician Orders: PT Eval and Treat   Medical Diagnosis from Referral:   M54.16 (ICD-10-CM) - Lumbar radiculopathy   R68.89 (ICD-10-CM) - Decreased motor activity   R26.9 (ICD-10-CM) - Abnormality of gait      Evaluation Date: 8/2/2023  Authorization Period Expiration: 12/31/23  Plan of Care Expiration: 12/22/23  Visit # / Visits authorized: 10/ 20     FOTO: 02/ 03      Precautions:: Standard and Fall  hx of CA (brain /c surgery)      Time In: 2:45 pm   Time Out: 4:00 pm   Total Appointment Time (timed & untimed codes): 75 minutes     SUBJECTIVE     Valeria reports less overall pain today but cont with L side upper shld and neck pain. Pt states compliance with "homework". She reports going to [employer] this weekend, increased car driving and walking on uneven grass. Pt reports tolerating                Current 5/10  cervical  3/10 HA   Location(s): left hip, LB     OBJECTIVE     Objective Measures updated at progress report unless specified.        Limitation/Restriction for FOTO LUMBAR Survey     Therapist reviewed FOTO scores for Thelma Nguyen on 8/2/2023.   FOTO documents entered into [employer] [place] - see Media section.     INTAKE Score 8/02/2023: 42 (58% limitation)   09/29/23: 43  10/9/23: 46 (54% limitation)   10/11/23: 47     Predicted Score: 45%     MDC 6   MCID 9            Treatment       Valeria received the Individualized Treatments listed below:     Valeria participated in dynamic functional therapeutic activities to improve functional performance for 00 minutes, including:      Endurance Activity: seated stepper  0min  steps/min      SOC x 20 " for postural recognition and correction when in theatre seats, cue for pelvic tilt      Patient demonstrate   Diaphragmatic breathing:   Verbal cues for unlabored, long & relaxed breathing w/ increased time for exhalation  Patient verbalize inhalation activating sympathetic nervous system, exhalation activating parasympathetic nervous system  Performed seated     Valeria participated in neuromuscular re-education activities to improve: Balance, Coordination, Kinesthetic, Sense, Proprioception, and Posture for 55 minutes. The following activities were included:      Fitter board - low setting, 20 tilt bouts NP   Fwd/Bwd     Trial 1- 2 hand  cue to inc tilt speed     Trial 2- 1 hand  fade to 3 finger support    Lat     Trial 1- 1 hand  cue for inc tilt speed    Trial 2- EC 1 hand  cue to reach end range     Supine NP   Arms and legs straight- arm reaching away, legs reaching away, same side arm and leg reaching away 5 reps  PPT x 10 cue   TA activation /c Tball in hooklying x 10 10 sec hold  TA activation /c Tball in hooklying /c SLR x 10 B    PPT x 5 patient reminded of self cue for TA activation    Bridging 10 reps    Karaoke walk 3 laps untimed of 5 full steps L/5 steps R    24 sec lap of 5 full steps L/R     Balance training walking on foam pad   Forward 2 laps   Step up on foam pads   Side steps   Side steps with step up    Peripheral muscle strengthening which included 1-2 sets of 10-20 repetitions at a slow, controlled 5-7 second per rep pace focused on strengthening supporting musculature for improved body mechanics & functional mobility.  Patient & therapist focused on proper form & speed during treatment to ensure optimal strengthening of each targeted muscle group & neuromuscular re-education. Patients are instructed to keep sets/reps with proper load to stay at 3-5 out of 10 on the provided modified Barby exertion scale.    BATCA Machine Exercises Weight (lbs) Sets Repetitions Barby Exertion  Scale Rating   Leg Extension  15 1 20 5   Hamstring Curls 30 1 20 5   Chest Press       Pec Fly       Lat Pull Down       Mid Row       Bicep Curls - DB       Standing Tricep Extension       Single Leg Press 25 1 15 4     Peripheral muscle strengthening which included 1-2 sets of 10-20 repetitions at a slow, controlled 5-7 second per rep pace focused on strengthening supporting musculature for improved body mechanics & functional mobility.  Patient & therapist focused on proper form during treatment to ensure optimal strengthening of each targeted muscle group. Patients are instructed to keep sets/reps with proper load to stay at 3-5 out of 10 on the provided modified Barby exertion scale.       Free Weight Exercises Weight (lbs) Sets Repetitions Barby Exertion Scale Rating   Bicep Dumbbell Curls 4 1 20 4   Bent Over Tricep Kickback 2 1 20 3   Bent Over Row       Supine Pec Fly B 2 1 20 3   Supine Chest Press B 2 1 20 3   Overhead Shoulder Press       Reverse Back Fly       Sit to Stand Unsupported       Wall Squats            Valeria participated in therapeutic exercises to develop strength, endurance, ROM, flexibility, posture, and core stabilization for 20 minutes including:    Supine over rolled yoga mat NP   Positional release 3 min    LTR x 10    BKFO x 10 each side   Snow Kirby x 10        Seated T ball rollouts x 3 /c 10 sec hold   No money  SA punches x 10 (over yoga mat)         Patient Education and Home Exercises     Home Exercises Provided and Patient Education Provided     Education provided:   - FRP Educational Topics: Modified Barby Exertion Scale, Physical & Psychological Pain Cycles, Z-Lying for Pain Relief/Relaxation, Diaphragmatic Breathing, Activity Pacing for Pain & Debility , Central Sensitization , and Posture & Body Mechanics    Written Home Exercises Provided: Patient instructed to cont prior HEP. Exercises were reviewed and Valeria was able to demonstrate them prior to the end of the session.   Valeria demonstrated good  understanding of the education provided. See EMR under Patient Instructions for information provided during therapy sessions    ASSESSMENT      Valeria participle well in PT session today. Pt walked more over the weekend and drove herself 5 hrs out of town. She cont with fear of falling, hence increase balance training. Resistance training transferred to free weights and pt performing well. Pt has 2# DB at home and plans to purchase more. Pt wanting to ride her bike again, needing improved balance and motor skills before returning to bike riding. Discuss other options such as purchase 3-wheel for safety issues.          Valeria Is progressing well towards her goals.   Pt prognosis is Good.     Pt will continue to benefit from skilled outpatient physical therapy to address the deficits listed in the problem list box on initial evaluation, provide pt/family education and to maximize pt's level of independence in the home and community environment.     Pt's spiritual, cultural and educational needs considered and pt agreeable to plan of care and goals.     Anticipated Barriers for therapy: chronic nature of issues, psychosocial factors, central sensitization       GOALS: Pt is in agreement with the following goals:  Goal Progress Towards Goal   SHORT Term: In 3 weeks, pt will:     Verbalize & demonstrate good understanding of resisted training with 3-1-3 second rep for qmvhdlknbc-pupwcyjwd-nyeyrnecp contraction to encourage full muscle recruitment for strength & endurance. PROGRESSING 10/11/23   Verbalize & demonstrate good understanding of home stretch routine to improve AROM, help decrease pain & improve mobility. PROGRESSING 10/11/23   Demonstrate proper supine or seated diaphragmatic breathing with decreased chest excursion & emphasis on full abdominal excursion & increased expiration time to promote muscle relaxation & increased parasympathetic activity to improve patient tolerance to  "activities. PROGRESSING 10/11/23   Verbalize good understanding of importance of proper posture in resisted exercise, functional activities & ADL's in order to help reduce postural strain, promote proper breathing. MET 10/11/23   Demonstrate good understanding of full body resisted exercises w/ use of pictures &/or verbal cues to promote independence w/ exercise at the end of the program. PROGRESSING 10/11/23   Verbalize plan for continuing resisted exercises w/ specific location (i.e. commercial gym, home, community fitness center)  to incorporate all major muscle groups to maintain & progress therapeutic functional improvements. PROGRESSING 10/11/23   Verbalize difference between  "hurt vs harm"  to demonstrate understanding of fear avoidance in pain cycle.  PROGRESSING 10/11/23         Midterm: In 8 weeks, pt will:     Verbalize good understanding of activities planning/scheduling (stretching, mindfulness, resisted training, & cardio) prescribed by PT/OT following the end of the program to sustain & progress functional improvements. PROGRESSING 10/11/23   Perform selected resisted training w/ minimal to no cuing in therapy session to be independent w/ resisted strengthening. PROGRESSING 10/11/23   Demonstrate improved symptom self-management w/ posture/positioning and mechanical movements and/or implementation of appropriate modalities throughout a typical day.  PROGRESSING 10/11/23   Demonstrate independence w/ home stretch routine to improve AROM, help decrease pain & improve mobility. PROGRESSING 10/11/23         Long Term: In 12 weeks, pt will:     Perform selected cardio & resisted training independently to continue safe regular performance of daily exercise. PROGRESSING 10/11/23   Improve 2 Minute Walk Test (MWT) to 400 feet and 6 MWT to 1200 feet or greater to improve gait speed and mobility. PROGRESSING 10/11/23   Perform single leg stance 10 seconds or greater bilaterally to improve safety and balance in " ADLs. MET 10/11/23   Demonstrate Timed Up & Go (with appropriate assistive device, if necessary) of 10 seconds or less to decrease fall risk and improve functional mobility. PROGRESSING 10/11/23   Score 45 % or less on LUMBAR FOTO to indicate significant functional improvements in impaired functions secondary to pain. PROGRESSING 10/11/23              PLAN     Continue PT per POC     Aide Silva, PTA

## 2023-10-18 ENCOUNTER — CLINICAL SUPPORT (OUTPATIENT)
Dept: REHABILITATION | Facility: OTHER | Age: 58
End: 2023-10-18
Payer: COMMERCIAL

## 2023-10-18 ENCOUNTER — CLINICAL SUPPORT (OUTPATIENT)
Dept: PSYCHIATRY | Facility: OTHER | Age: 58
End: 2023-10-18
Payer: COMMERCIAL

## 2023-10-18 DIAGNOSIS — R29.3 POOR POSTURE: ICD-10-CM

## 2023-10-18 DIAGNOSIS — R29.898 DECREASED STRENGTH OF TRUNK AND BACK: Primary | ICD-10-CM

## 2023-10-18 DIAGNOSIS — R26.9 ABNORMALITY OF GAIT: ICD-10-CM

## 2023-10-18 DIAGNOSIS — R52 PAIN AGGRAVATED BY ACTIVITIES OF DAILY LIVING: Primary | ICD-10-CM

## 2023-10-18 DIAGNOSIS — Z74.09 IMPAIRED FUNCTIONAL MOBILITY, BALANCE, GAIT, AND ENDURANCE: ICD-10-CM

## 2023-10-18 DIAGNOSIS — R29.818 POOR TRUNK CONTROL: ICD-10-CM

## 2023-10-18 DIAGNOSIS — F43.29 ADJUSTMENT DISORDER WITH PHYSICAL COMPLAINTS: Primary | ICD-10-CM

## 2023-10-18 PROCEDURE — 90853 PR GROUP PSYCHOTHERAPY: ICD-10-PCS | Mod: ,,, | Performed by: SOCIAL WORKER

## 2023-10-18 PROCEDURE — 97112 NEUROMUSCULAR REEDUCATION: CPT

## 2023-10-18 PROCEDURE — 97530 THERAPEUTIC ACTIVITIES: CPT

## 2023-10-18 PROCEDURE — 90853 GROUP PSYCHOTHERAPY: CPT | Mod: ,,, | Performed by: SOCIAL WORKER

## 2023-10-18 PROCEDURE — 97110 THERAPEUTIC EXERCISES: CPT

## 2023-10-18 NOTE — PROGRESS NOTES
"[facility]  Functional Restoration Program  Occupational Therapy Treatment Note  FRP Day 11    Name: Thelma Nguyen  MRN:[id number]  Encounter Date: 10/18/2023    Diagnosis:   Encounter Diagnoses   Name Primary?    Pain aggravated by activities of daily living Yes    Poor trunk control     Poor posture      Referring provider: Bibi Alvarado NP    Physician Orders: eval and treat; [facility]  Medical Diagnosis:   M54.16 (ICD-10-CM) - Lumbar radiculopathy  R68.89 (ICD-10-CM) - Decreased motor activity R26.9 (ICD-10-CM) - Abnormality of gait   Evaluation Date: 8/2/2023  Insurance Authorization Period Expiration: 6/9/2024  Plan of Care Certification Period: 12/22/2023  Visit # / Visits authorized: 11/20 (plus eval)  FOTO completed: 2/3     Precautions:  Standard and Fall, Orthostatic Hypotension     Time In: 1445  Time Out: 1600  Total Billable Time: 75 minutes    SUBJECTIVE     She reports: "I worked yesterday, but I took breaks" "in the class I did some stretching, I took a break, the teacher thought something was wrong" "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 Normally I would have taken it because they asked"  " I don't have neck pain today!"     Valeria was compliant with parts of home exercise program given previously.  Having difficulty avoiding boom/bust cycle.  Feels she's doing well with stretching, being aware of posture and self-correcting posture.     Response to previous treatment: Ms. Nguyen noted: felt better after she left on Wednesday, but was still feeling tired    Functional change: noticing pain ebbing/flowing and improving with repositioning/breaks; takes breaks more often. Improved activity tolerance.  Able to grocery shop with more endurance; able to unload groceries with less assistance; prep more ingredients, still asking  to cook. Lifting sewing box from ground to car with less pain. "No neck pain today" re: improved body " "mechanics     Pain:       Current 3/10  Description: Aching and Dull /c elements of sharp pain /c activity   Aggravating Factors: Standing, Bending, Walking, and Lifting  Easing Factors: heating pad, stretching    Patient Specific Activities based on Personal goals:  Activity  9/25/2023 10/11/2023     Performance Satisfaction Performance Satisfaction   [facility]  1 3 3   2.  showering 5 5 3 "I haven't been doing it" 3   3.  Grocery shopping 6 4 5 5   4.  Travel 6 5 5 5   5.  Cooking/meal prep. 7 (sits down)  6 4 5             Total Score 5  4.2  4 4.2      Patient Specific Activity Scoring Scheme for Performance     1        2        3        4        5        6        7        8        9        10     Unable/Unsatisfied                                          Able/Satisfied                                                                          Objective     Measures updated at progress report, or unless otherwise noted.    See progress report 10/13/2023.    Treatment     Valeria received the treatments listed below:     Therapeutic activities and functional lifting activities in order to increase participation in, endurance for, and performance with vocational, selfcare ADLs, and leisure activities in order to improve quality of life, for 75 minutes, including:    Seated review of scheduling activity and application of FRP tools. Education on use of organization to prioritize goals and to maintain accountability. Use of color coding to ensure different areas of wellness and implementation of tools are maintained.     In preparation for functional lift off floor, Valeria provided with education on importance of hip hinge to alleviate pressure and strain on knee joint provided via teachback. The pt then completed sit>stand from edge of mat with cues for increased awareness of hinge>knee bend, completed 10  reps, rated Moderate (3/10).  Then in standing with floor level surface, pt " performed hip hinge>knee bend>crate tap x10 reps performed with min cues to shift weight into heels and maintain neutral spine, rated Moderate (3/10).      Valeria completed functional lifting, floor to waist, 5x2 reps x 5 lbs with exertion rated Sort of hard (4/10); focused education on hinging weight into heels and maintaining neutral length in spine. Pt plans to use this lift related to laundry, lifting up sewing kit, putting groceries in car.  Improved to rating of moderate 3/10 with application of lifting principles.     No environmental, cultural, spiritual, developmental or education needs expressed or noted.    Patient Education and Home Exercises   Education provided:   - Progress towards goals   - importance of completion of exercises and activities outside of session to attain goals.   - proper posture and body mechanics.  - anticipated muscular soreness following lift testing and strategies for management including stretching, using ice, scheduled rest.  - Functional pain scale  - JANET rate of perceived exertion scale.   - pain cycle  - pursed lip and diaphragmatic breathing techniques  - hip hinge    Written Home Exercises Provided: Patient instructed to cont prior HEP.  Exercises were reviewed and Valeria was able to demonstrate them prior to the end of the session. Valeria demonstrated fair  understanding of the education provided.     See EMR under Patient Instructions for exercises provided during therapy sessions. Patient education also located in FRP binder provided on day 1 of the cohort.     Assessment     Ms. Nguyen tolerated session well; endorsed decreased pain and increased energy this date. Good engagement in scheduling exercise with improved recognition of accountability and personal goals. Continues to require external cues to recognize need to improve form during functional lifts, though able to note improved ease with application of corrected form.     Valeria is progressing well towards her  goals and status of goals can be seen below. Patient's prognosis is Fair.     Valeria would continue to benefit from skilled outpatient occupational therapy to address the deficits listed in the problem list on initial evaluation, provide patient education, and to maximize patient's level of independence in the home and community environment.     Anticipated barriers to occupational therapy: co-morbidities    Goals:     SHORT Term goals: In 3 weeks, patient will:  Status of goal, reviewed on 10/13/2023:   Implements correct use of breathing techniques during functional lifting tasks, with minimal cues needed. In progress    Utilizes JANET rate of exertion scale to pace performance with functional lifting tasks, and implements self-care strategies during activity participation to manage pain.  In progress    Verbalize understanding of energy conservation and pacing strategies during daily habits, roles, and routines in order to improve tolerance for work and home demands.  In progress    Completes 5x sit to stand test in 16 seconds or less to improve ability to participate in community mobility.   Partially Met 10/11/2023   Completed in 16.94 seconds   Demonstrate increased positional tolerance to the level needed for work, home, and community activities (standing in cue at social event, managing supplies for outing, streneous IADL), as evidenced by having a METS level of 3. Met 10/11/2023    Demonstrate proper body mechanics with functional lifting tasks (floor to waist, waist to shoulder, maximum lift, and box carry), via teach back method with minimal cues needed. In progress    Demonstrate increased functional strength by lifting 13 lbs waist to shoulder for management of (I)ADL, including dressing and grooming, placing items in kitchen cabinets, folding towels, and/or managing suitcase when traveling.   In progress    Demonstrate increased functional strength by lifting 18 lbs floor to waist to meet demand of  work/home routine, including lifting groceries, managing laundry, and/or managing suitcase when traveling.   In progress    Tolerate Home Activity Plan (HAP)/ Home Exercise Program (HEP) to promote safety and independence with ADL, with report of completion of at least 2 out of 4 days outside of program participation.  In progress    Show improved perception of own abilities as evidenced by increase of FOTO scores to established MDC value and perception of performance ratings regarding personal long term goals.  In progress          MIDTERM goals: In 9 weeks, patient will: Status of goal, reviewed on 10/13/2023:   Report implementation of application of mindfulness, stretching, and other coping strategies for improved participation in daily routine. In progress    Verbalize functional application of lifting techniques in daily life (golfers lift, floor to waist, waist to shoulder). In progress    Completes 5x sit to stand test in 12 seconds or less to improve ability to participate in community mobility.  In progress    Applies functional application of lifting techniques (golfer's lift, floor to waist, waist to shoulder) in activities of daily life, per patient report.  In progress    Demonstrate physical capacities consistent with a Light (#20 max, frequent lifting/carrying #10, 2.5 METS  demand level, as needed to perform activities to be successful in roles of life, regarding Part Time Employment, Household Management, and Community Participation. In progress    Have an improvement of 3 points in 2/5 personal goals in rating of  performance.  In progress    Show improved perception of own abilities as evidenced by increase of FOTO scores to established MC II value and perception of performance ratings regarding personal long term goals.  In progress       LONG term goals = Patient Specific Goals:  Vocational: able to carry supplies   Recreational : walking around [place], travel   Daily Living Activities:  cooking, laundry, groceries, showering   Measured by patient's self-reported performance and satisfaction of personal FRP goals, listed above in subjective section.   Total Score = sum of the activity performance scores / number of activities  Minimum detectable change (90%CI) for average score = 2 points  Minimum detectable change (90%CI) for single activity score  = 3 points    Plan     Continue per plan of care.     Updates/Grading for next session: follow up on pacing sheets, waist to shoulder lift, impact of stress on GI, wants to ride BIKE      LAUREANO Hollingsworth   10/18/2023

## 2023-10-18 NOTE — PROGRESS NOTES
"[facility]    Functional Restoration Program  Daily Treatment Note  Day 10    Name: Thelma Nguyen  MRN:[id number]      Therapy Diagnosis:   Encounter Diagnoses   Name Primary?    Decreased strength of trunk and back Yes    Impaired functional mobility, balance, gait, and endurance        Physician: Bibi Alvarado NP    Date: 10/18/2023    Physician Orders: PT Eval and Treat   Medical Diagnosis from Referral:   M54.16 (ICD-10-CM) - Lumbar radiculopathy   R68.89 (ICD-10-CM) - Decreased motor activity   R26.9 (ICD-10-CM) - Abnormality of gait      Evaluation Date: 8/2/2023  Authorization Period Expiration: 12/31/23  Plan of Care Expiration: 12/22/23  Visit # / Visits authorized: 11/ 20     FOTO: 02/ 03      Precautions:: Standard and Fall  hx of CA (brain /c surgery)      Time In: 1:35 pm   Time Out: 2:50 pm   Total Appointment Time (timed & untimed codes): 75 minutes      SUBJECTIVE     Valeria reports performing stretching programming in AM and "feels looser".  Patient report this AM performing prone extensions for sx relief.  Patient report attending sewing class yesterday, completing full class (4 hrs) and "working on [employer]" job and notes she did not nap.  Patient believes this is an improvement in activity tolerance vs pre FRP where she would have napped during the day.  Patient report waking this AM /c LB discomfort but using the prone press ups for relief.     Patient plans 2 trips to [place] in November and extended travel in December including buying tickets for multiple plane rides to [place] and [place] for friends visits.             Current 5/10  cervical  3/10 HA   Location(s): left hip, LB     OBJECTIVE     Objective Measures updated at progress report unless specified.        Limitation/Restriction for FOTO LUMBAR Survey     Therapist reviewed FOTO scores for Thelma Nguyen on 8/2/2023.   FOTO documents entered into [employer] - see Media section.     INTAKE Score " 8/02/2023: 42 (58% limitation)   09/29/23: 43  10/9/23: 46 (54% limitation)   10/11/23: 47     Predicted Score: 45%     MDC 6   MCID 9            Treatment       Valeria received the Individualized Treatments listed below:     Valeria participated in dynamic functional therapeutic activities to improve functional performance for 25 minutes, including:      Full body functional mobility w/ 3-10 sec hold technique &/or 3-5 repetition technique to improve functional performance. In order to:  Improve driving safety, visual & spatial awareness:  Cervical flx/ext  Cervical side bending  Cervical rotation  Cervical protraction/retraction  Improve lifting mechanics, showering/bathing, dressing, cooking, reaching, pushing & fine motor skills  Shld rolls  Shld depression/elevation  Scapular retraction/protraction/scaption  Posterior shld stretch (cross arm)  Shld ER stretch (chicken wing)  Elbow flx/ext  Forearm supination/pronation  Wrist flx/ext  Open/close hands/fingers  Improve bed mobility & rolling, bending over, cooking & cleaning:  Seated trunk rotations  Seated trunk/thoracic ext/flx  Improve functional gait, squatting, sitting tolerance, functional balance:   Seated marches & knee to chest  Seated knee flx/ext  Seated hip ER/piriformis stretch  Seated hamstring stretch  Seated toe raise to heel raise   Seated ankle circles each way  Improve overhead reaching/activities, standing tolerance:  Standing lumbar extensions  Standing lateral leaning stretch  Standing quad stretch (UE support for balance)    .  SOC x 20 for postural recognition and correction when in theatre seats, cue for pelvic tilt        NP:   Endurance Activity: seated stepper  0min  steps/min        Valeria participated in neuromuscular re-education activities to improve: Balance, Coordination, Kinesthetic, Sense, Proprioception, and Posture for 15 minutes. The following activities were included:    Standing cone taps - 30 sec bouts, alternating L/R  LE    Fwd to same side x 3 PT SBA   Fwd/ /c cross side x 3  PT SBA    Bwd to same side x 2 PT CGA   Bwd to cross side x 2  2 hands on table support       NP:   Fitter board - low setting, 20 tilt bouts    Fwd/Bwd     Trial 1- 2 hand  cue to inc tilt speed     Trial 2- 1 hand  fade to 3 finger support    Lat     Trial 1- 1 hand  cue for inc tilt speed    Trial 2- EC 1 hand  cue to reach end range     Supine    Arms and legs straight- arm reaching away, legs reaching away, same side arm and leg reaching away 5 reps  PPT x 10 cue   TA activation /c Tball in hooklying x 10 10 sec hold  TA activation /c Tball in hooklying /c SLR x 10 B    PPT x 5 patient reminded of self cue for TA activation    Bridging 10 reps    Karaoke walk 3 laps untimed of 5 full steps L/5 steps R    24 sec lap of 5 full steps L/R     Balance training walking on foam pad   Forward 2 laps   Step up on foam pads   Side steps   Side steps with step up      Supine NP   Arms and legs straight- arm reaching away, legs reaching away, same side arm and leg reaching away 5 reps  PPT x 10 cue   TA activation /c Tball in hooklying x 10 10 sec hold  TA activation /c Tball in hooklying /c SLR x 10 B    PPT x 5 patient reminded of self cue for TA activation    Bridging 10 reps      Valeria participated in therapeutic exercises to develop strength, endurance, ROM, flexibility, posture, and core stabilization for 35 minutes including:    Supine   LTR x 10    TA activation /c SLR 2 x 10    TA activation /c BKFO 2 x 10    Bicycling x 20 B   Open books x 10 B   Supine over Blue bolster   Positional release 3 min    B shoulder flexion x 10   SA punches x 20 2# DB    [place] x 20         NP:     Peripheral muscle strengthening which included 1-2 sets of 10-20 repetitions at a slow, controlled 5-7 second per rep pace focused on strengthening supporting musculature for improved body mechanics & functional mobility.  Patient & therapist focused on proper  form & speed during treatment to ensure optimal strengthening of each targeted muscle group & neuromuscular re-education. Patients are instructed to keep sets/reps with proper load to stay at 3-5 out of 10 on the provided modified Barby exertion scale.    BATCA Machine Exercises Weight (lbs) Sets Repetitions Barby Exertion Scale Rating   Leg Extension  15  20 5   Hamstring Curls 30  20 5   Chest Press       Pec Fly       Lat Pull Down       Mid Row       Bicep Curls - DB       Standing Tricep Extension       Single Leg Press 25  15 4     Peripheral muscle strengthening which included 1-2 sets of 10-20 repetitions at a slow, controlled 5-7 second per rep pace focused on strengthening supporting musculature for improved body mechanics & functional mobility.  Patient & therapist focused on proper form during treatment to ensure optimal strengthening of each targeted muscle group. Patients are instructed to keep sets/reps with proper load to stay at 3-5 out of 10 on the provided modified Barby exertion scale.       Free Weight Exercises Weight (lbs) Sets Repetitions Barby Exertion Scale Rating   Bicep Dumbbell Curls 4  20 4   Bent Over Tricep Kickback 2  20 3   Bent Over Row       Supine Pec Fly B 2  20 3   Supine Chest Press B 2  20 3   Overhead Shoulder Press       Reverse Back Fly       Sit to Stand Unsupported       Wall Squats                Patient Education and Home Exercises     Home Exercises Provided and Patient Education Provided     Education provided:   - FRP Educational Topics: Modified Barby Exertion Scale, Physical & Psychological Pain Cycles, Z-Lying for Pain Relief/Relaxation, Diaphragmatic Breathing, Activity Pacing for Pain & Debility , Central Sensitization , and Posture & Body Mechanics    Written Home Exercises Provided: Patient instructed to cont prior HEP. Exercises were reviewed and Valeria was able to demonstrate them prior to the end of the session.  Valeria demonstrated good  understanding of the  education provided. See EMR under Patient Instructions for information provided during therapy sessions    ASSESSMENT      Patient subjective report of activities yesterday and over the weekend indicate improved activity tolerance and improved body awareness.  Patient demonstrate improved sx self management /c prone stretching thereby meeting associated goal.  Pt wanting to ride her bike again indicate improved self efficacy, however, patient continue to demonstrate fall risk.  Therefore tx today including dynamic balance challenge to improve balance and motor skills before returning to bike riding.  Recommend continue performance. Patient agreeable to option of 3 wheel bike.  Plan to return to resistance training next visit to promote associated goals including assessing patient comfort /c resistance exercise basics.  Tx today also include core endurance challenge /c patient demonstrate improved motor control.        Valeria Is progressing well towards her goals.   Pt prognosis is Good.     Pt will continue to benefit from skilled outpatient physical therapy to address the deficits listed in the problem list box on initial evaluation, provide pt/family education and to maximize pt's level of independence in the home and community environment.     Pt's spiritual, cultural and educational needs considered and pt agreeable to plan of care and goals.     Anticipated Barriers for therapy: chronic nature of issues, psychosocial factors, central sensitization       GOALS: Pt is in agreement with the following goals:  Goal Progress Towards Goal   SHORT Term: In 3 weeks, pt will:     Verbalize & demonstrate good understanding of resisted training with 3-1-3 second rep for [id number]-[id number]-[id number] contraction to encourage full muscle recruitment for strength & endurance. PROGRESSING 10/11/23   Verbalize & demonstrate good understanding of home stretch routine to improve AROM, help decrease pain & improve mobility. MET  "10/18/23   Demonstrate proper supine or seated diaphragmatic breathing with decreased chest excursion & emphasis on full abdominal excursion & increased expiration time to promote muscle relaxation & increased parasympathetic activity to improve patient tolerance to activities. PROGRESSING 10/11/23   Verbalize good understanding of importance of proper posture in resisted exercise, functional activities & ADL's in order to help reduce postural strain, promote proper breathing. MET 10/11/23   Demonstrate good understanding of full body resisted exercises w/ use of pictures &/or verbal cues to promote independence w/ exercise at the end of the program. PROGRESSING 10/11/23   Verbalize plan for continuing resisted exercises w/ specific location (i.e. commercial gym, home, community fitness center)  to incorporate all major muscle groups to maintain & progress therapeutic functional improvements. PROGRESSING 10/11/23   Verbalize difference between  "hurt vs harm"  to demonstrate understanding of fear avoidance in pain cycle.  PROGRESSING 10/11/23         Midterm: In 8 weeks, pt will:     Verbalize good understanding of activities planning/scheduling (stretching, mindfulness, resisted training, & cardio) prescribed by PT/OT following the end of the program to sustain & progress functional improvements. PROGRESSING 10/11/23   Perform selected resisted training w/ minimal to no cuing in therapy session to be independent w/ resisted strengthening. PROGRESSING 10/11/23   Demonstrate improved symptom self-management w/ posture/positioning and mechanical movements and/or implementation of appropriate modalities throughout a typical day.  PROGRESSING 10/11/23   Demonstrate independence w/ home stretch routine to improve AROM, help decrease pain & improve mobility. PROGRESSING 10/11/23         Long Term: In 12 weeks, pt will:     Perform selected cardio & resisted training independently to continue safe regular performance of " daily exercise. PROGRESSING 10/11/23   Improve 2 Minute Walk Test (MWT) to 400 feet and 6 MWT to 1200 feet or greater to improve gait speed and mobility. PROGRESSING 10/11/23   Perform single leg stance 10 seconds or greater bilaterally to improve safety and balance in ADLs. MET 10/11/23   Demonstrate Timed Up & Go (with appropriate assistive device, if necessary) of 10 seconds or less to decrease fall risk and improve functional mobility. PROGRESSING 10/11/23   Score 45 % or less on LUMBAR FOTO to indicate significant functional improvements in impaired functions secondary to pain. PROGRESSING 10/11/23              PLAN     Continue PT per DOM Banerjee, PT

## 2023-10-18 NOTE — PROGRESS NOTES
Group Psychotherapy    Site: [facility]    Clinical status of patient: Outpatient    10/18/2023    Length of service:[id number]    Referred by: Functional Restoration Program     Number of patients in attendance: 6    Target symptoms: Chronic Pain Management     Patient's response to intervention:  The patient's response to intervention is active listening.    Progress toward goals and other mental status changes:  The patient's progress toward goals is fair .    Plan: group psychotherapy    Topics Covered: Pt attended CBT for Chronic Pain as part of their participation in Functional Restoration. Topics discussed today included a third lecture on nutrition centered around the when, what and why we choose to eat. Food choices impact chronic pain in both good and bad ways. Emphasized highly processed foods and those with added sugar and high sodium contribute to increased inflammation and chronic pain. Discussed healthier choices that can have the opposite effect. Pts also discussed scheduling and the importance of balancing their needs with others in their family.  Pts made their own schedules in order to make time for themselves and to maintain their progress made in FRP. Failure to maintain a balanced schedule can lead to increased anxiety and depression. Re-emphasized the boom-bust cycle and the importance of scheduling to meet their individual goals.

## 2023-10-20 ENCOUNTER — CLINICAL SUPPORT (OUTPATIENT)
Dept: REHABILITATION | Facility: OTHER | Age: 58
End: 2023-10-20
Payer: COMMERCIAL

## 2023-10-20 ENCOUNTER — CLINICAL SUPPORT (OUTPATIENT)
Dept: PSYCHIATRY | Facility: OTHER | Age: 58
End: 2023-10-20
Payer: COMMERCIAL

## 2023-10-20 DIAGNOSIS — F43.29 ADJUSTMENT DISORDER WITH PHYSICAL COMPLAINTS: ICD-10-CM

## 2023-10-20 DIAGNOSIS — R52 PAIN AGGRAVATED BY ACTIVITIES OF DAILY LIVING: Primary | ICD-10-CM

## 2023-10-20 DIAGNOSIS — R26.9 ABNORMALITY OF GAIT: Primary | ICD-10-CM

## 2023-10-20 DIAGNOSIS — Z74.09 IMPAIRED FUNCTIONAL MOBILITY, BALANCE, GAIT, AND ENDURANCE: ICD-10-CM

## 2023-10-20 DIAGNOSIS — R29.3 POOR POSTURE: ICD-10-CM

## 2023-10-20 DIAGNOSIS — R29.818 POOR TRUNK CONTROL: ICD-10-CM

## 2023-10-20 DIAGNOSIS — R29.898 DECREASED STRENGTH OF TRUNK AND BACK: Primary | ICD-10-CM

## 2023-10-20 PROCEDURE — 97110 THERAPEUTIC EXERCISES: CPT | Mod: CQ

## 2023-10-20 PROCEDURE — 90853 GROUP PSYCHOTHERAPY: CPT | Mod: ,,, | Performed by: SOCIAL WORKER

## 2023-10-20 PROCEDURE — 97530 THERAPEUTIC ACTIVITIES: CPT | Mod: CQ

## 2023-10-20 PROCEDURE — 90853 PR GROUP PSYCHOTHERAPY: ICD-10-PCS | Mod: ,,, | Performed by: SOCIAL WORKER

## 2023-10-20 PROCEDURE — 97110 THERAPEUTIC EXERCISES: CPT

## 2023-10-20 PROCEDURE — 97112 NEUROMUSCULAR REEDUCATION: CPT | Mod: CQ

## 2023-10-20 PROCEDURE — 97530 THERAPEUTIC ACTIVITIES: CPT

## 2023-10-20 NOTE — PROGRESS NOTES
Group Psychotherapy    Site: [facility]    Clinical status of patient: Outpatient    10/20/2023    Length of service:[id number]    Referred by: Functional Restoration Program     Number of patients in attendance: 6    Target symptoms: Chronic Pain Management     Patient's response to intervention:  The patient's response to intervention is active listening.    Progress toward goals and other mental status changes:  The patient's progress toward goals is fair .    Plan: group psychotherapy    Topics Covered: Pts attended CBT for Chronic Pain as part of their participation in Functional Restoration. Topics discussed today included the connection between Adverse Childhood Experiences and chronic pain that can lead to increased rates of anxiety and depression. Pts also reviewed their homework for the weekend. A self-assessment for progress in FRP was conducted measuring their changes for managing anxiety and depression as well as any changes to pts nutrition, exercise and mindfulness practices.

## 2023-10-20 NOTE — PROGRESS NOTES
"[facility]  Functional Restoration Program  Occupational Therapy Treatment Note  FRP Day 12    Name: Thelma Nguyen  MRN:[id number]  Encounter Date: 10/20/2023    Diagnosis:   Encounter Diagnoses   Name Primary?    Pain aggravated by activities of daily living Yes    Poor trunk control     Poor posture      Referring provider: Bibi Alvarado NP    Physician Orders: eval and treat; [facility]  Medical Diagnosis:   M54.16 (ICD-10-CM) - Lumbar radiculopathy  R68.89 (ICD-10-CM) - Decreased motor activity R26.9 (ICD-10-CM) - Abnormality of gait   Evaluation Date: 8/2/2023  Insurance Authorization Period Expiration: 6/9/2024  Plan of Care Certification Period: 12/22/2023  Visit # / Visits authorized: 12/20 (plus eval)  FOTO completed: 2/3     Precautions:  Standard and Fall, Orthostatic Hypotension     Time In: 14:48  Time Out: 16:00  Total Billable Time: 72 minutes    SUBJECTIVE     She reports: "I have to work tonight; it's a lot of hand sewing". "Last night I was going to grab something, and I adjusted my technique". "I just need to make sure after next week that I fill the time on the days that I have been here and not just sit. I am going to get up and stretch at 9AM".    Valeria was compliant with parts of home exercise program given previously.    Response to previous treatment: Ms. Nguyen noted: felt better after she left on Wednesday, but was still feeling tired    Functional change: noticing pain ebbing/flowing and improving with repositioning/breaks; takes breaks more often. Improved activity tolerance.  Able to grocery shop with more endurance; able to unload groceries with less assistance; prep more ingredients, still asking  to cook. Lifting sewing box from ground to car with less pain. "No neck pain today" re: improved body mechanics     Pain:       Current 3/10  Description: Aching and Dull /c elements of sharp pain /c activity   Aggravating Factors: Standing, Bending, Walking, " "and Lifting  Easing Factors: heating pad, stretching    Patient Specific Activities based on Personal goals:  Activity  9/25/2023 10/11/2023     Performance Satisfaction Performance Satisfaction   Walk in [facility] 1  1 3 3   2.  showering 5 5 3 "I haven't been doing it" 3   3.  Grocery shopping 6 4 5 5   4.  Travel 6 5 5 5   5.  Cooking/meal prep. 7 (sits down)  6 4 5             Total Score 5  4.2  4 4.2      Patient Specific Activity Scoring Scheme for Performance     1        2        3        4        5        6        7        8        9        10     Unable/Unsatisfied                                          Able/Satisfied                                                                          Objective     Measures updated at progress report, or unless otherwise noted.    See progress report 10/13/2023.    Treatment     Valeria received the treatments listed below:     Therapeutic activities and functional lifting activities in order to increase participation in, endurance for, and performance with vocational, selfcare ADLs, and leisure activities in order to improve quality of life, for 57 minutes, including:    Valeria completed functional lifting, floor to waist, 10 reps with several breaks taken in between x 10 lbs with exertion rated Moderate (3/10); focused education on sequence of technique; cues to keep weight into heels. Pt plans to use this lift related to laundry, lifting boxes at work.    Discussion with video shown regarding bike walking, the alinker, as well as trike vs bike having increased stability when mounting.    Valeria participated in functional lift waist to shoulder, 2x5 reps x 10 lbs with a rating of Hard (5/10) exertion. Valeria educated on standing posture, core awareness, keeping shoulders depressed and retracted, stepping to place > reaching to place, keeping weight close to center of gravity and pacing as needed.     Valeria completed dynamic reaching in various " functional ranges, with continued focus on hip rotation/weight shifting, 10 reps x 1 lbs, rated as Sort of hard (4/10) exertion.     Valeria completed push and pull activity, simulating floor cleaning, x 3 minutes with education focused on the importance of shifting from the waist to reduce strain on back, keeping elbow tucked to the side, maintaining upright standing posture and keeping shoulders away from the ears. Valeria completed push and pull x 5 minutes with grocery basket in order to reach personal goal of improving grocery shopping ability, educated focused on upright standing posture and activating gluts with functional mobility, leading with hips, decreasing  on handles, using push/pull method with hands for improved control of cart, keeping cart close to center of gravity and weight shifting/stretching as needed. Rates as Sort of hard (4/10) exertion; although managing 180lbs.    Therapeutic exercises to develop strength, endurance, ROM, flexibility, posture, and core stabilization for 15 minutes, including:    Valeria participated in endurance activity using treadmill for 2x5 minutes with visual review to increase step length and use knee extension, with speed at end at 1.3 mph, and last 3 minutes with increasing incline from 3 to 5%, to improve functional endurance and progress towards increased MET level for improved community mobility and participation, rated as Hard (5/10) exertion, Valeria re-educated on how to add mindfulness component to activity and how to pace appropriately by completed self check ins and grading activity as needed, with goal to reach moderate to sort of hard by end of activity. Verbalized difference in muscle activity when having longer stride.     No environmental, cultural, spiritual, developmental or education needs expressed or noted.    Patient Education and Home Exercises   Education provided:   - Progress towards goals   - importance of completion of exercises and  activities outside of session to attain goals.   - proper posture and body mechanics.  - anticipated muscular soreness following lift testing and strategies for management including stretching, using ice, scheduled rest.  - Functional pain scale  - JANET rate of perceived exertion scale.   - pain cycle  - pursed lip and diaphragmatic breathing techniques  - hip hinge  - increasing step length    Written Home Exercises Provided: Patient instructed to cont prior HEP.  Exercises were reviewed and Valeria was able to demonstrate them prior to the end of the session. Valeria demonstrated fair understanding of the education provided.     See EMR under Patient Instructions for exercises provided during therapy sessions. Patient education also located in FRP binder provided on day 1 of the cohort.     Assessment     Ms. Nguyen with reports of fatigue before and during session, but with understanding of how her walking pattern eliminates resting phase increasing demand on muscles when walking. Tolerated all activities presented to her, with pacing needed for PNS activation, and cues needed to initiate pacing at times this session.     Valeria is progressing well towards her goals and status of goals can be seen below. Patient's prognosis is Fair, due to favoring sedentary lifestyle.    Valeria would continue to benefit from skilled outpatient occupational therapy to address the deficits listed in the problem list on initial evaluation, provide patient education, and to maximize patient's level of independence in the home and community environment.     Anticipated barriers to occupational therapy: co-morbidities    Goals:     SHORT Term goals: In 3 weeks, patient will:  Status of goal, reviewed on 10/13/2023:   Implements correct use of breathing techniques during functional lifting tasks, with minimal cues needed. In progress    Utilizes JANET rate of exertion scale to pace performance with functional lifting tasks, and implements  self-care strategies during activity participation to manage pain.  In progress    Verbalize understanding of energy conservation and pacing strategies during daily habits, roles, and routines in order to improve tolerance for work and home demands.  In progress    Completes 5x sit to stand test in 16 seconds or less to improve ability to participate in community mobility.   Partially Met 10/11/2023   Completed in 16.94 seconds   Demonstrate increased positional tolerance to the level needed for work, home, and community activities (standing in cue at social event, managing supplies for outing, streneous IADL), as evidenced by having a METS level of 3. Met 10/11/2023    Demonstrate proper body mechanics with functional lifting tasks (floor to waist, waist to shoulder, maximum lift, and box carry), via teach back method with minimal cues needed. In progress    Demonstrate increased functional strength by lifting 13 lbs waist to shoulder for management of (I)ADL, including dressing and grooming, placing items in kitchen cabinets, folding towels, and/or managing suitcase when traveling.   In progress    Demonstrate increased functional strength by lifting 18 lbs floor to waist to meet demand of work/home routine, including lifting groceries, managing laundry, and/or managing suitcase when traveling.   In progress    Tolerate Home Activity Plan (HAP)/ Home Exercise Program (HEP) to promote safety and independence with ADL, with report of completion of at least 2 out of 4 days outside of program participation.  In progress    Show improved perception of own abilities as evidenced by increase of FOTO scores to established MDC value and perception of performance ratings regarding personal long term goals.  In progress          MIDTERM goals: In 9 weeks, patient will: Status of goal, reviewed on 10/13/2023:   Report implementation of application of mindfulness, stretching, and other coping strategies for improved  participation in daily routine. In progress    Verbalize functional application of lifting techniques in daily life (golfers lift, floor to waist, waist to shoulder). In progress    Completes 5x sit to stand test in 12 seconds or less to improve ability to participate in community mobility.  In progress    Applies functional application of lifting techniques (golfer's lift, floor to waist, waist to shoulder) in activities of daily life, per patient report.  In progress    Demonstrate physical capacities consistent with a Light (#20 max, frequent lifting/carrying #10, 2.5 METS  demand level, as needed to perform activities to be successful in roles of life, regarding Part Time Employment, Household Management, and Community Participation. In progress    Have an improvement of 3 points in 2/5 personal goals in rating of  performance.  In progress    Show improved perception of own abilities as evidenced by increase of FOTO scores to established MC II value and perception of performance ratings regarding personal long term goals.  In progress       LONG term goals = Patient Specific Goals:  Vocational: able to carry supplies   Recreational : walking around [place], travel   Daily Living Activities: cooking, laundry, groceries, showering   Measured by patient's self-reported performance and satisfaction of personal FRP goals, listed above in subjective section.   Total Score = sum of the activity performance scores / number of activitie  Minimum detectable change (90%CI) for average score = 2 points  Minimum detectable change (90%CI) for single activity score  = 3 points    Plan     Continue per plan of care.     Updates/Grading for next session: impact of stress on GI, f/u bike vs trike.    NIKKI Lee, VIRGILIOR/L, CEAS-I  10/20/2023

## 2023-10-20 NOTE — PROGRESS NOTES
[facility]    Functional Restoration Program  Day 12    Name: Thelma Nguyen  MRN:[id number]      Therapy Diagnosis:   Encounter Diagnoses   Name Primary?    Decreased strength of trunk and back Yes    Impaired functional mobility, balance, gait, and endurance          Physician: Bibi Alvarado NP    Date: 10/20/2023    Physician Orders: PT Eval and Treat   Medical Diagnosis from Referral:   M54.16 (ICD-10-CM) - Lumbar radiculopathy   R68.89 (ICD-10-CM) - Decreased motor activity   R26.9 (ICD-10-CM) - Abnormality of gait      Evaluation Date: 8/2/2023  Authorization Period Expiration: 12/31/23  Plan of Care Expiration: 12/22/23  Visit # / Visits authorized: 12/ 20     FOTO: 02/ 03      Precautions:: Standard and Fall  hx of CA (brain /c surgery)      Time In: 2:45 pm   Time Out: 4:00 pm     Total Appointment Time (timed & untimed codes): 75 minutes     SUBJECTIVE     Valeria reports            Current 3/10    Location(s):  [facility]     OBJECTIVE     Objective Measures updated at progress report unless specified.        Limitation/Restriction for FOTO LUMBAR Survey     Therapist reviewed FOTO scores for Thelma Nguyen on 8/2/2023.   FOTO documents entered into [employer] - see Media section.     INTAKE Score 8/02/2023: 42 (58% limitation)   09/29/23: 43  10/9/23: 46 (54% limitation)   10/11/23: 47     Predicted Score: 45%     MDC 6   MCID 9            Treatment       Valeria received the Individualized Treatments listed below:     Valeria participated in dynamic functional therapeutic activities to improve functional performance for 20 minutes, including:    Full body functional mobility w/ 3-10 sec hold technique &/or 3-5 repetition technique to improve functional performance. In order to:  Improve driving safety, visual & spatial awareness:  Cervical flx/ext  Cervical side bending  Cervical rotation  Cervical protraction/retraction  Improve lifting mechanics, showering/bathing,  dressing, cooking, reaching, pushing & fine motor skills  Shld rolls  Shld depression/elevation  Scapular retraction/protraction/scaption  Posterior shld stretch (cross arm)  Shld ER stretch (chicken wing)  Elbow flx/ext  Forearm supination/pronation  Wrist flx/ext  Open/close hands/fingers  Improve bed mobility & rolling, bending over, cooking & cleaning:  Seated trunk rotations  Seated trunk/thoracic ext/flx  Improve functional gait, squatting, sitting tolerance, functional balance:   Seated marches & knee to chest  Seated knee flx/ext  Seated hip ER/piriformis stretch  Seated hamstring stretch  Seated toe raise to heel raise   Seated ankle circles each way  Improve overhead reaching/activities, standing tolerance:  Standing lumbar extensions  Standing lateral leaning stretch  Standing quad stretch (UE support for balance)    SOC x 20 for postural recognition and correction when in theatre seats, cue for pelvic tilt      Diaphragmatic breathing: NP  Verbal cues for unlabored, long & relaxed breathing w/ increased time for exhalation  Patient verbalize inhalation activating sympathetic nervous system, exhalation activating parasympathetic nervous system  Performed seated     Valeria participated in neuromuscular re-education activities to improve: Balance, Coordination, Kinesthetic, Sense, Proprioception, and Posture for 55 minutes. The following activities were included:      Fitter board - low setting, 20 tilt bouts    Fwd/Bwd     Trial 1- 2 hand  cue to inc tilt speed     Trial 2- 1 hand  fade to 3 finger support    Lat     Trial 1- 1 hand  cue for inc tilt speed    Trial 2- EC 1 hand  cue to reach end range     Supine    Dying Bug - just legs x15   Dying bug x10  PPT x 10 cue   TA activation /c Tball in hooklying x 10 10 sec hold  TA activation /c Tball in hooklying /c SLR x 10 B    PPT x 5 patient reminded of self cue for TA activation    Bridging 2x10 reps    Karaoke walk 3 laps untimed of 5  full steps L/5 steps R    24 sec lap of 5 full steps L/R     Balance training walking on foam pad   Forward 2 laps   Step up on foam pads   Side steps   Side steps with step up    Peripheral muscle strengthening which included 1-2 sets of 10-20 repetitions at a slow, controlled 5-7 second per rep pace focused on strengthening supporting musculature for improved body mechanics & functional mobility.  Patient & therapist focused on proper form & speed during treatment to ensure optimal strengthening of each targeted muscle group & neuromuscular re-education. Patients are instructed to keep sets/reps with proper load to stay at 3-5 out of 10 on the provided modified Barby exertion scale.    BATCA Machine Exercises Weight (lbs) Sets Repetitions Barby Exertion Scale Rating   Leg Extension        Hamstring Curls       Chest Press       Pec Fly       Lat Pull Down       Mid Row       Bicep Curls - DB       Standing Tricep Extension       Single Leg Press             Free Weight Exercises Weight (lbs) Sets Repetitions Barby Exertion Scale Rating   Bicep Dumbbell Curls       Bent Over Tricep Kickback       Bent Over Row       Supine Pec Fly B       Supine Chest Press B       Overhead Shoulder Press       Reverse Back Fly       Sit to Stand Unsupported 0 1 15 4   Wall Squats            Valeria participated in therapeutic exercises to develop strength, endurance, ROM, flexibility, posture, and core stabilization for 0 minutes including:    Supine over rolled yoga mat NP   Positional release 3 min    LTR x 10    BKFO x 10 each side   [place] x 10        Seated T ball rollouts x 3 /c 10 sec hold   No money  SA punches x 10 (over yoga mat)         Patient Education and Home Exercises     Home Exercises Provided and Patient Education Provided     Education provided:   - FRP Educational Topics: Modified Barby Exertion Scale, Physical & Psychological Pain Cycles, Z-Lying for Pain Relief/Relaxation, Diaphragmatic Breathing, Activity  Pacing for Pain & Debility , Central Sensitization , and Posture & Body Mechanics    Written Home Exercises Provided: Patient instructed to cont prior HEP. Exercises were reviewed and Valeria was able to demonstrate them prior to the end of the session.  Valeria demonstrated good  understanding of the education provided. See EMR under Patient Instructions for information provided during therapy sessions    ASSESSMENT      Valeria participle well in PT session today.           Valeria Is progressing well towards her goals.   Pt prognosis is Good.     Pt will continue to benefit from skilled outpatient physical therapy to address the deficits listed in the problem list box on initial evaluation, provide pt/family education and to maximize pt's level of independence in the home and community environment.     Pt's spiritual, cultural and educational needs considered and pt agreeable to plan of care and goals.     Anticipated Barriers for therapy: chronic nature of issues, psychosocial factors, central sensitization       GOALS: Pt is in agreement with the following goals:  Goal Progress Towards Goal   SHORT Term: In 3 weeks, pt will:     Verbalize & demonstrate good understanding of resisted training with 3-1-3 second rep for dlcntyvudc-uzetkgeab-ohqjevwce contraction to encourage full muscle recruitment for strength & endurance. PROGRESSING 10/11/23   Verbalize & demonstrate good understanding of home stretch routine to improve AROM, help decrease pain & improve mobility. PROGRESSING 10/11/23   Demonstrate proper supine or seated diaphragmatic breathing with decreased chest excursion & emphasis on full abdominal excursion & increased expiration time to promote muscle relaxation & increased parasympathetic activity to improve patient tolerance to activities. PROGRESSING 10/11/23   Verbalize good understanding of importance of proper posture in resisted exercise, functional activities & ADL's in order to help reduce postural  "strain, promote proper breathing. MET 10/11/23   Demonstrate good understanding of full body resisted exercises w/ use of pictures &/or verbal cues to promote independence w/ exercise at the end of the program. PROGRESSING 10/11/23   Verbalize plan for continuing resisted exercises w/ specific location (i.e. commercial gym, home, community fitness center)  to incorporate all major muscle groups to maintain & progress therapeutic functional improvements. PROGRESSING 10/11/23   Verbalize difference between  "hurt vs harm"  to demonstrate understanding of fear avoidance in pain cycle.  PROGRESSING 10/11/23         Midterm: In 8 weeks, pt will:     Verbalize good understanding of activities planning/scheduling (stretching, mindfulness, resisted training, & cardio) prescribed by PT/OT following the end of the program to sustain & progress functional improvements. PROGRESSING 10/11/23   Perform selected resisted training w/ minimal to no cuing in therapy session to be independent w/ resisted strengthening. PROGRESSING 10/11/23   Demonstrate improved symptom self-management w/ posture/positioning and mechanical movements and/or implementation of appropriate modalities throughout a typical day.  PROGRESSING 10/11/23   Demonstrate independence w/ home stretch routine to improve AROM, help decrease pain & improve mobility. PROGRESSING 10/11/23         Long Term: In 12 weeks, pt will:     Perform selected cardio & resisted training independently to continue safe regular performance of daily exercise. PROGRESSING 10/11/23   Improve 2 Minute Walk Test (MWT) to 400 feet and 6 MWT to 1200 feet or greater to improve gait speed and mobility. PROGRESSING 10/11/23   Perform single leg stance 10 seconds or greater bilaterally to improve safety and balance in ADLs. MET 10/11/23   Demonstrate Timed Up & Go (with appropriate assistive device, if necessary) of 10 seconds or less to decrease fall risk and improve functional mobility. " PROGRESSING 10/11/23   Score 45 % or less on LUMBAR FOTO to indicate significant functional improvements in impaired functions secondary to pain. PROGRESSING 10/11/23              PLAN     Continue PT per POC     Aide Silva, PTA

## 2023-10-23 ENCOUNTER — CLINICAL SUPPORT (OUTPATIENT)
Dept: REHABILITATION | Facility: OTHER | Age: 58
End: 2023-10-23
Payer: COMMERCIAL

## 2023-10-23 ENCOUNTER — CLINICAL SUPPORT (OUTPATIENT)
Dept: PSYCHIATRY | Facility: OTHER | Age: 58
End: 2023-10-23
Payer: COMMERCIAL

## 2023-10-23 DIAGNOSIS — R29.3 POOR POSTURE: ICD-10-CM

## 2023-10-23 DIAGNOSIS — R52 PAIN AGGRAVATED BY ACTIVITIES OF DAILY LIVING: Primary | ICD-10-CM

## 2023-10-23 DIAGNOSIS — R29.818 POOR TRUNK CONTROL: ICD-10-CM

## 2023-10-23 DIAGNOSIS — Z74.09 IMPAIRED FUNCTIONAL MOBILITY, BALANCE, GAIT, AND ENDURANCE: ICD-10-CM

## 2023-10-23 DIAGNOSIS — R29.898 DECREASED STRENGTH OF TRUNK AND BACK: Primary | ICD-10-CM

## 2023-10-23 DIAGNOSIS — F43.29 ADJUSTMENT DISORDER WITH PHYSICAL COMPLAINTS: ICD-10-CM

## 2023-10-23 DIAGNOSIS — R26.9 ABNORMALITY OF GAIT: Primary | ICD-10-CM

## 2023-10-23 PROCEDURE — 97530 THERAPEUTIC ACTIVITIES: CPT | Mod: CQ

## 2023-10-23 PROCEDURE — 90853 PR GROUP PSYCHOTHERAPY: ICD-10-PCS | Mod: ,,, | Performed by: SOCIAL WORKER

## 2023-10-23 PROCEDURE — 97530 THERAPEUTIC ACTIVITIES: CPT

## 2023-10-23 PROCEDURE — 97110 THERAPEUTIC EXERCISES: CPT

## 2023-10-23 PROCEDURE — 97110 THERAPEUTIC EXERCISES: CPT | Mod: CQ

## 2023-10-23 PROCEDURE — 90853 GROUP PSYCHOTHERAPY: CPT | Mod: ,,, | Performed by: SOCIAL WORKER

## 2023-10-23 NOTE — PROGRESS NOTES
"[facility]  Functional Restoration Program  Occupational Therapy Treatment Note  FRP Day 13    Name: Thelma Nguyen  MRN:[id number]  Encounter Date: 10/23/2023    Diagnosis:   Encounter Diagnoses   Name Primary?    Pain aggravated by activities of daily living Yes    Poor trunk control     Poor posture        Referring provider: Bibi Alvarado NP    Physician Orders: eval and treat; [facility]  Medical Diagnosis:   M54.16 (ICD-10-CM) - Lumbar radiculopathy  R68.89 (ICD-10-CM) - Decreased motor activity R26.9 (ICD-10-CM) - Abnormality of gait   Evaluation Date: 8/2/2023  Insurance Authorization Period Expiration: 6/9/2024  Plan of Care Certification Period: 12/22/2023  Visit # / Visits authorized: 13/20 (plus eval)  FOTO completed: 2/3     Precautions:  Standard and Fall, Orthostatic Hypotension     Time In: 14:45  Time Out: 16:00  Total Billable Time: 75 minutes    SUBJECTIVE     She reports: 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  She is playing with 's seat to get a good position. She asks for help when needed but is trying to be more active. She has gotten exercise equipment like therapy ball.   Valeria was compliant with parts of home exercise program given previously.    Response to previous treatment: Eager to do more    Functional change: noticing pain ebbing/flowing and improving with repositioning/breaks; takes breaks more often. Laundry and dishes still difficult. She still has a fear of falling.   Pain:       Current 0/10  Description:none in clinic today  Aggravating Factors: Standing, Bending, Walking, and Lifting  Easing Factors: heating pad, stretching    Patient Specific Activities based on Personal goals:  Activity  9/25/2023 10/11/2023     Performance Satisfaction Performance Satisfaction   [facility] 1  1 3 3   2.  showering 5 5 3 "I haven't been doing it" 3   3.  Grocery shopping 6 4 5 5   4.  Travel 6 5 5 5   5.  Cooking/meal prep. 7 (sits " down)  6 4 5             Total Score 5  4.2  4 4.2      Patient Specific Activity Scoring Scheme for Performance     1        2        3        4        5        6        7        8        9        10     Unable/Unsatisfied                                          Able/Satisfied                                                                          Objective     Measures updated at progress report, or unless otherwise noted.    See progress report 10/13/2023.    Treatment     Valeria received the treatments listed below:     Therapeutic activities and functional lifting activities in order to increase participation in, endurance for, and performance with vocational, selfcare ADLs, and leisure activities in order to improve quality of life, for 55 minutes, including:    Valeria completed functional lifting, floor to waist, 5 x 2 reps with several breaks taken in between x 13 lbs with exertion rated Sort of hard (4/10); focused education on sequence of technique; cues to keep weight into heels. Pt plans to use this lift related to laundry, lifting boxes at work.    Valeria participated in functional lift waist to shoulder, 10 reps x 10 lbs with a rating of Sort of hard (4/10) exertion. Valeria educated on standing posture, core awareness, keeping shoulders depressed and retracted, stepping to place > reaching to place, keeping weight close to center of gravity and pacing as needed.     Valeria completed dynamic reaching in various functional ranges, with continued focus on hip rotation/weight shifting, 10 reps with several breaks x 1 lbs, rated as Sort of hard (4/10) exertion. Valeria plans to use this with putting dishes away.     Valeria participated in box carry for 3 minutes x 10 lbs with focused education on depressing and retracting shoulders and core control, rated as Hard (5/10) exertion.     Discussion and demonstration of off set stance during prolonged stance such as washing dishes and full turn toward diswasher with  "loading vs. Twist of trunk.     Therapeutic exercises to develop strength, endurance, ROM, flexibility, posture, and core stabilization for 20 minutes, including:    Valeria participated in endurance activity using treadmill for 10 minutes with visual review to increase step length and use knee extension, with speed at end at 1.8 mph, and incline from 3%, to improve functional endurance and progress towards increased MET level for improved community mobility and participation, rated as Sort of hard (4/10) exertion, Valeria re-educated on how to add mindfulness component to activity and how to pace appropriately by completed self check ins and grading activity as needed, with goal to reach moderate to sort of hard by end of activity. Verbalized difference in muscle activity when having longer stride. She feels increased speed helps with stride.     Discussed and practiced posture with supine on towel roll down spine and arms out in "T" to stretch pectoralis muscles and squeezing towel with scapula to strengthen rhomboids.     No environmental, cultural, spiritual, developmental or education needs expressed or noted.    Patient Education and Home Exercises   Education provided:   - Progress towards goals   - importance of completion of exercises and activities outside of session to attain goals.   - proper posture and body mechanics.  - anticipated muscular soreness following lift testing and strategies for management including stretching, using ice, scheduled rest.  - Functional pain scale  - JANET rate of perceived exertion scale.   - pain cycle  - pursed lip and diaphragmatic breathing techniques  - hip hinge  - increasing step length    Written Home Exercises Provided: Patient instructed to cont prior HEP.  Exercises were reviewed and Valeria was able to demonstrate them prior to the end of the session. Valeria demonstrated fair understanding of the education provided.   Added 10/23/2023: Supine on towel roll .   See EMR " under Patient Instructions for exercises provided during therapy sessions. Patient education also located in FRP binder provided on day 1 of the cohort.     Assessment     Ms. Nguyen with need to stretch back and hamstrings between every exercise presented.  Tolerated all activities presented to her, with pacing needed for PNS activation, and cues needed for upper trunk posture.     Valeria is progressing well towards her goals and status of goals can be seen below. Patient's prognosis is Fair, due to favoring sedentary lifestyle.    Valeria would continue to benefit from skilled outpatient occupational therapy to address the deficits listed in the problem list on initial evaluation, provide patient education, and to maximize patient's level of independence in the home and community environment.     Anticipated barriers to occupational therapy: co-morbidities    Goals:     SHORT Term goals: In 3 weeks, patient will:  Status of goal, reviewed on 10/13/2023:   Implements correct use of breathing techniques during functional lifting tasks, with minimal cues needed. In progress    Utilizes JANET rate of exertion scale to pace performance with functional lifting tasks, and implements self-care strategies during activity participation to manage pain.  In progress    Verbalize understanding of energy conservation and pacing strategies during daily habits, roles, and routines in order to improve tolerance for work and home demands.  In progress    Completes 5x sit to stand test in 16 seconds or less to improve ability to participate in community mobility.   Partially Met 10/11/2023   Completed in 16.94 seconds   Demonstrate increased positional tolerance to the level needed for work, home, and community activities (standing in cue at social event, managing supplies for outing, streneous IADL), as evidenced by having a METS level of 3. Met 10/11/2023    Demonstrate proper body mechanics with functional lifting tasks (floor to  waist, waist to shoulder, maximum lift, and box carry), via teach back method with minimal cues needed. In progress    Demonstrate increased functional strength by lifting 13 lbs waist to shoulder for management of (I)ADL, including dressing and grooming, placing items in kitchen cabinets, folding towels, and/or managing suitcase when traveling.   In progress    Demonstrate increased functional strength by lifting 18 lbs floor to waist to meet demand of work/home routine, including lifting groceries, managing laundry, and/or managing suitcase when traveling.   In progress    Tolerate Home Activity Plan (HAP)/ Home Exercise Program (HEP) to promote safety and independence with ADL, with report of completion of at least 2 out of 4 days outside of program participation.  In progress    Show improved perception of own abilities as evidenced by increase of FOTO scores to established MDC value and perception of performance ratings regarding personal long term goals.  In progress          MIDTERM goals: In 9 weeks, patient will: Status of goal, reviewed on 10/13/2023:   Report implementation of application of mindfulness, stretching, and other coping strategies for improved participation in daily routine. In progress    Verbalize functional application of lifting techniques in daily life (golfers lift, floor to waist, waist to shoulder). In progress    Completes 5x sit to stand test in 12 seconds or less to improve ability to participate in community mobility.  In progress    Applies functional application of lifting techniques (golfer's lift, floor to waist, waist to shoulder) in activities of daily life, per patient report.  In progress    Demonstrate physical capacities consistent with a Light (#20 max, frequent lifting/carrying #10, 2.5 METS  demand level, as needed to perform activities to be successful in roles of life, regarding Part Time Employment, Household Management, and Community Participation. In progress     Have an improvement of 3 points in 2/5 personal goals in rating of  performance.  In progress    Show improved perception of own abilities as evidenced by increase of FOTO scores to established MC II value and perception of performance ratings regarding personal long term goals.  In progress       LONG term goals = Patient Specific Goals:  Vocational: able to carry supplies   Recreational : walking around [place], travel   Daily Living Activities: cooking, laundry, groceries, showering   Measured by patient's self-reported performance and satisfaction of personal FRP goals, listed above in subjective section.   Total Score = sum of the activity performance scores / number of activitie  Minimum detectable change (90%CI) for average score = 2 points  Minimum detectable change (90%CI) for single activity score  = 3 points    Plan     Continue per plan of care.     Updates/Grading for next session: posture and core, f/u bike vs trike.    Beryl Schwartz OT    10/23/2023

## 2023-10-23 NOTE — PROGRESS NOTES
Group Psychotherapy    Site: [facility]    Clinical status of patient: Outpatient    10/23/2023    Length of service:[id number]    Referred by: Functional Restoration Program     Number of patients in attendance: 6    Target symptoms: Chronic Pain Management     Patient's response to intervention:  The patient's response to intervention is active listening.    Progress toward goals and other mental status changes:  The patient's progress toward goals is fair .    Plan: group psychotherapy    Topics Covered: Pts attended CBT for Chronic Pain as part of their participation in Functional Restoration. Topics discussed today included the importance of relationships and building social support.  Social support helps to maintain and build resiliency which is an important part of FRP. Lack of support can lead to burnout and social isolation causing or making worse feelings of anxiety and depression. Pts discussed their warning signs of being overwhelmed or depressed and actions they can take when this happens.

## 2023-10-23 NOTE — PROGRESS NOTES
[facility]    Functional Restoration Program  Day 13    Name: Thelma Nguyen  MRN:[id number]      Therapy Diagnosis:   Encounter Diagnoses   Name Primary?    Decreased strength of trunk and back Yes    Impaired functional mobility, balance, gait, and endurance            Physician: Bibi Alvarado NP    Date: 10/23/2023    Physician Orders: PT Eval and Treat   Medical Diagnosis from Referral:   M54.16 (ICD-10-CM) - Lumbar radiculopathy   R68.89 (ICD-10-CM) - Decreased motor activity   R26.9 (ICD-10-CM) - Abnormality of gait      Evaluation Date: 8/2/2023  Authorization Period Expiration: 12/31/23  Plan of Care Expiration: 12/22/23  Visit # / Visits authorized: 13/ 20     FOTO: 02/ 03      Precautions:: Standard and Fall  hx of CA (brain /c surgery)      Time In: 1:30 pm   Time Out: 2:45 pm     Total Appointment Time (timed & untimed codes): 75 minutes     SUBJECTIVE     Valeria reports performing her daily stretches but not going on a specific daily walks. She stated walking a lot this weekend with chores and other activities. Pt drove 2.5 hrs on her own and was comfortable, does feel a little sore when getting out of the car. Pt has slowed down on her sewing due to no deadlines at the time. She can pace out herself better with further away due dates. Pt states she is more aware of her posture now and constantly change/correcting positions.       Current 3/10    Location(s):  LB     OBJECTIVE     Objective Measures updated at progress report unless specified.        Limitation/Restriction for FOTO LUMBAR Survey     Therapist reviewed FOTO scores for Thelma Nguyen on 8/2/2023.   FOTO documents entered into [employer] - see Media section.     INTAKE Score 8/02/2023: 42 (58% limitation)   09/29/23: 43  10/9/23: 46 (54% limitation)   10/11/23: 47     Predicted Score: 45%     MDC 6   MCID 9            Treatment       Valeria received the Individualized Treatments listed below:      Valeria participated in dynamic functional therapeutic activities to improve functional performance for 25 minutes, including:    Full body functional mobility w/ 3-10 sec hold technique &/or 3-5 repetition technique to improve functional performance. In order to:  Improve driving safety, visual & spatial awareness:  Cervical flx/ext  Cervical side bending  Cervical rotation  Cervical protraction/retraction  Improve lifting mechanics, showering/bathing, dressing, cooking, reaching, pushing & fine motor skills  Shld rolls  Shld depression/elevation  Scapular retraction/protraction/scaption  Posterior shld stretch (cross arm)  Shld ER stretch (chicken wing)  Elbow flx/ext  Forearm supination/pronation  Wrist flx/ext  Open/close hands/fingers  Improve bed mobility & rolling, bending over, cooking & cleaning:  Seated trunk rotations  Seated trunk/thoracic ext/flx  Improve functional gait, squatting, sitting tolerance, functional balance:   Seated marches & knee to chest  Seated knee flx/ext  Seated hip ER/piriformis stretch  Seated hamstring stretch  Seated toe raise to heel raise   Seated ankle circles each way  Improve overhead reaching/activities, standing tolerance:  Standing lumbar extensions  Standing lateral leaning stretch  Standing quad stretch (UE support for balance)    SOC x 20 for postural recognition and correction when in theatre seats, cue for pelvic tilt      Diaphragmatic breathing: NP  Verbal cues for unlabored, long & relaxed breathing w/ increased time for exhalation  Patient verbalize inhalation activating sympathetic nervous system, exhalation activating parasympathetic nervous system  Performed seated     Valeria participated in neuromuscular re-education activities to improve: Balance, Coordination, Kinesthetic, Sense, Proprioception, and Posture for 0 minutes. The following activities were included:      Fitter board - low setting, 20 tilt bouts    Fwd/Bwd     Trial 1- 2 hand  cue to inc  tilt speed     Trial 2- 1 hand  fade to 3 finger support    Lat     Trial 1- 1 hand  cue for inc tilt speed    Trial 2- EC 1 hand  cue to reach end range     Supine    Dying Bug - just legs x15   Dying bug x10  PPT x 10 cue   TA activation /c Tball in hooklying x 10 10 sec hold  TA activation /c Tball in hooklying /c SLR x 10 B    PPT x 5 patient reminded of self cue for TA activation    Bridging 2x10 reps    Karaoke walk 3 laps untimed of 5 full steps L/5 steps R    24 sec lap of 5 full steps L/R     Balance training walking on foam pad   Forward 2 laps   Step up on foam pads   Side steps   Side steps with step up      Valeria participated in therapeutic exercises to develop strength, endurance, ROM, flexibility, posture, and core stabilization for 50 minutes including:    Peripheral muscle strengthening which included 1-2 sets of 10-20 repetitions at a slow, controlled 5-7 second per rep pace focused on strengthening supporting musculature for improved body mechanics & functional mobility.  Patient & therapist focused on proper form & speed during treatment to ensure optimal strengthening of each targeted muscle group & neuromuscular re-education. Patients are instructed to keep sets/reps with proper load to stay at 3-5 out of 10 on the provided modified Barby exertion scale.    BATCA Machine Exercises Weight (lbs) Sets Repetitions Barby Exertion Scale Rating   Leg Extension        Hamstring Curls       Chest Press       Pec Fly       Lat Pull Down       Mid Row       Bicep Curls - DB       Standing Tricep Extension       Single Leg Press             Free Weight Exercises Weight (lbs) Sets Repetitions Barby Exertion Scale Rating   Bicep Dumbbell Curls 6 1 20 4   Bent Over Tricep Kickback       Prone Single Row 3 1 20 3   Supine Pec Fly B 3 ea hand 1 20 4   Supine Chest Press B 3 ea hand 1 20 4   Overhead Shoulder Press 3 ea 1 15 5   Reverse Back Fly       Sit to Stand Unsupported 0 1 15 4   Wall Squats               Supine over rolled yoga mat    Positional release 3 min    [place] x 10    SA punches x 10        Seated T ball rollouts x 3 /c 10 sec hold   No money x10 RTB    Seated stepper 10 min Level 1.5 ~90 steps/min         Patient Education and Home Exercises     Home Exercises Provided and Patient Education Provided     Education provided:   - FRP Educational Topics: Modified Barby Exertion Scale, Physical & Psychological Pain Cycles, Z-Lying for Pain Relief/Relaxation, Diaphragmatic Breathing, Activity Pacing for Pain & Debility , Central Sensitization , and Posture & Body Mechanics    Written Home Exercises Provided: Patient instructed to cont prior HEP. Exercises were reviewed and Valeria was able to demonstrate them prior to the end of the session.  Valeria demonstrated good  understanding of the education provided. See EMR under Patient Instructions for information provided during therapy sessions    ASSESSMENT      Valeria participle well in PT session today. Pt showing good understanding of importance of good posture in managing LB and neck pain. Pt is working on schedule for inclusion of cardio, stretches and mindfulness into her daily routine. Focus today on independent resistance training post program. Pt was able to explain and demonstrate importance of slow pace during resistance ex to engage all muscle fibers in group. Pt also, able to explain and demonstrate proper posture and breathing during resisted ex, functional and household activities to decrease strain, hence meeting applicable goal.   Valeria able to verbalize & demonstrate good understanding of home stretch routine to improve AROM, help decrease pain & improve mobility, hence meeting set goal. Cont to stress pacing and scheduling to maintain success of said goals after pt completes FRP.    Valeria Is progressing well towards her goals.   Pt prognosis is Good.     Pt will continue to benefit from skilled outpatient physical therapy to address the  "deficits listed in the problem list box on initial evaluation, provide pt/family education and to maximize pt's level of independence in the home and community environment.     Pt's spiritual, cultural and educational needs considered and pt agreeable to plan of care and goals.     Anticipated Barriers for therapy: chronic nature of issues, psychosocial factors, central sensitization       GOALS: Pt is in agreement with the following goals:  Goal Progress Towards Goal   SHORT Term: In 3 weeks, pt will:     Verbalize & demonstrate good understanding of resisted training with 3-1-3 second rep for [id number]-[id number]-[id number] contraction to encourage full muscle recruitment for strength & endurance. MET 10/22/23   Verbalize & demonstrate good understanding of home stretch routine to improve AROM, help decrease pain & improve mobility. MET 10/22/23   Demonstrate proper supine or seated diaphragmatic breathing with decreased chest excursion & emphasis on full abdominal excursion & increased expiration time to promote muscle relaxation & increased parasympathetic activity to improve patient tolerance to activities. PROGRESSING 10/11/23   Verbalize good understanding of importance of proper posture in resisted exercise, functional activities & ADL's in order to help reduce postural strain, promote proper breathing. MET 10/11/23   Demonstrate good understanding of full body resisted exercises w/ use of pictures &/or verbal cues to promote independence w/ exercise at the end of the program. MET 10/22/23   Verbalize plan for continuing resisted exercises w/ specific location (i.e. commercial gym, home, community fitness center)  to incorporate all major muscle groups to maintain & progress therapeutic functional improvements. PROGRESSING 10/11/23   Verbalize difference between  "hurt vs harm"  to demonstrate understanding of fear avoidance in pain cycle.  PROGRESSING 10/11/23         Midterm: In 8 weeks, pt will:   "   Verbalize good understanding of activities planning/scheduling (stretching, mindfulness, resisted training, & cardio) prescribed by PT/OT following the end of the program to sustain & progress functional improvements. PROGRESSING 10/11/23   Perform selected resisted training w/ minimal to no cuing in therapy session to be independent w/ resisted strengthening. PROGRESSING 10/11/23   Demonstrate improved symptom self-management w/ posture/positioning and mechanical movements and/or implementation of appropriate modalities throughout a typical day.  PROGRESSING 10/11/23   Demonstrate independence w/ home stretch routine to improve AROM, help decrease pain & improve mobility. PROGRESSING 10/11/23         Long Term: In 12 weeks, pt will:     Perform selected cardio & resisted training independently to continue safe regular performance of daily exercise. PROGRESSING 10/11/23   Improve 2 Minute Walk Test (MWT) to 400 feet and 6 MWT to 1200 feet or greater to improve gait speed and mobility. PROGRESSING 10/11/23   Perform single leg stance 10 seconds or greater bilaterally to improve safety and balance in ADLs. MET 10/11/23   Demonstrate Timed Up & Go (with appropriate assistive device, if necessary) of 10 seconds or less to decrease fall risk and improve functional mobility. PROGRESSING 10/11/23   Score 45 % or less on LUMBAR FOTO to indicate significant functional improvements in impaired functions secondary to pain. PROGRESSING 10/11/23              PLAN     Continue PT per POC     Aide Silva, PTA

## 2023-10-24 NOTE — PROGRESS NOTES
I have met with the above student and agree with the assessment and note written. I was present for this encounter. Any changes have been made and authorized by me.  Kaye Bailey, [facility] 10/24/2023 12:31 PM

## 2023-10-24 NOTE — PROGRESS NOTES
I have met with the above student and agree with the assessment and note written. I was present for this encounter. Any changes have been made and authorized by me.  Kaye Bailey, [facility] 10/24/2023 12:23 PM

## 2023-10-24 NOTE — PROGRESS NOTES
I have met with the above student and agree with the assessment and note written. I was present for this encounter. Any changes have been made and authorized by me.  Kaye Bailey, [facility] 10/24/2023 10:42 AM

## 2023-10-24 NOTE — PROGRESS NOTES
I have met with the above student and agree with the assessment and note written. I was present for this encounter. Any changes have been made and authorized by me.  Kaye Bailey, [facility] 10/24/2023 12:31 PM

## 2023-10-25 ENCOUNTER — CLINICAL SUPPORT (OUTPATIENT)
Dept: REHABILITATION | Facility: OTHER | Age: 58
End: 2023-10-25
Payer: COMMERCIAL

## 2023-10-25 ENCOUNTER — CLINICAL SUPPORT (OUTPATIENT)
Dept: PSYCHIATRY | Facility: OTHER | Age: 58
End: 2023-10-25
Payer: COMMERCIAL

## 2023-10-25 DIAGNOSIS — F43.29 ADJUSTMENT DISORDER WITH PHYSICAL COMPLAINTS: Primary | ICD-10-CM

## 2023-10-25 DIAGNOSIS — R52 PAIN AGGRAVATED BY ACTIVITIES OF DAILY LIVING: Primary | ICD-10-CM

## 2023-10-25 DIAGNOSIS — R29.818 POOR TRUNK CONTROL: ICD-10-CM

## 2023-10-25 DIAGNOSIS — Z74.09 IMPAIRED FUNCTIONAL MOBILITY, BALANCE, GAIT, AND ENDURANCE: ICD-10-CM

## 2023-10-25 DIAGNOSIS — R29.898 DECREASED STRENGTH OF TRUNK AND BACK: Primary | ICD-10-CM

## 2023-10-25 DIAGNOSIS — R29.3 POOR POSTURE: ICD-10-CM

## 2023-10-25 PROCEDURE — 97110 THERAPEUTIC EXERCISES: CPT | Mod: CQ

## 2023-10-25 PROCEDURE — 97112 NEUROMUSCULAR REEDUCATION: CPT | Mod: CQ

## 2023-10-25 PROCEDURE — 97530 THERAPEUTIC ACTIVITIES: CPT

## 2023-10-25 PROCEDURE — 97530 THERAPEUTIC ACTIVITIES: CPT | Mod: CQ

## 2023-10-25 PROCEDURE — 90791 PSYCH DIAGNOSTIC EVALUATION: CPT | Mod: ,,, | Performed by: SOCIAL WORKER

## 2023-10-25 PROCEDURE — 97110 THERAPEUTIC EXERCISES: CPT

## 2023-10-25 PROCEDURE — 90791 PR PSYCHIATRIC DIAGNOSTIC EVALUATION: ICD-10-PCS | Mod: ,,, | Performed by: SOCIAL WORKER

## 2023-10-25 NOTE — PROGRESS NOTES
"[facility]  Functional Restoration Program  Occupational Therapy Treatment Note  FRP Day 14    Name: Thelma Nguyen  MRN:[id number]  Encounter Date: 10/25/2023    Diagnosis:   Encounter Diagnoses   Name Primary?    Pain aggravated by activities of daily living Yes    Poor trunk control     Poor posture          Referring provider: Bibi Alvarado NP    Physician Orders: eval and treat; [facility]  Medical Diagnosis:   M54.16 (ICD-10-CM) - Lumbar radiculopathy  R68.89 (ICD-10-CM) - Decreased motor activity R26.9 (ICD-10-CM) - Abnormality of gait   Evaluation Date: 8/2/2023  Insurance Authorization Period Expiration: 6/9/2024  Plan of Care Certification Period: 12/22/2023  Visit # / Visits authorized: 14/20 (plus eval)  FOTO completed: 2/3     Precautions:  Standard and Fall, Orthostatic Hypotension     Time In: 1330  Time Out: 1445  Total Billable Time: 75 minutes    SUBJECTIVE     She reports: Able to go out to dinner without too much fatigue. She was able to have a full day the next day. She got a yoga block, 3# weights, and a lumbar pillow. She wanted to know best place to lie on roll down spine.   Valeria was compliant with parts of home exercise program given previously.    Response to previous treatment: Eager to do more, Less leaning on cart with shopping, putting one foot at bottom of cart to rest back as needed.     Functional change: noticing pain ebbing/flowing and improving with repositioning/breaks; takes breaks more often. Laundry and dishes still difficult. She still has a fear of falling.   Pain:       Current 2/10  Description:left flank and lower back.   Aggravating Factors: Standing, Bending, Walking, and Lifting  Easing Factors: heating pad, stretching    Patient Specific Activities based on Personal goals:  Activity  9/25/2023 10/11/2023     Performance Satisfaction Performance Satisfaction   Walk in [employer] [facility] 1  1 3 3   2.  showering 5 5 3 "I haven't been " "doing it" 3   3.  Grocery shopping 6 4 5 5   4.  Travel 6 5 5 5   5.  Cooking/meal prep. 7 (sits down)  6 4 5             Total Score 5  4.2  4 4.2      Patient Specific Activity Scoring Scheme for Performance     1        2        3        4        5        6        7        8        9        10     Unable/Unsatisfied                                          Able/Satisfied                                                                          Objective     Measures updated at progress report, or unless otherwise noted.    See progress report 10/13/2023.    Treatment     Valeria received the treatments listed below:     Therapeutic activities and functional lifting activities in order to increase participation in, endurance for, and performance with vocational, selfcare ADLs, and leisure activities in order to improve quality of life, for 55 minutes, including:    Valeria completed functional lifting, floor to waist, 5 x 2 reps with several breaks taken in between x 13 lbs with exertion rated Moderate (3/10); focused education on sequence of technique; cues to keep weight into heels. Pt plans to use this lift related to laundry, lifting boxes at work. Discussed sorting laundry on an ongoing basis and not at laundry time. Discussed placing laundry basket on dryer to load washer and not on floor to decrease need to bend over.     Valeria participated in functional lift waist to shoulder, 10 reps x 10 lbs with a rating of Sort of hard (4/10) exertion. Valeria educated on standing posture, core awareness, keeping shoulders depressed and retracted, stepping to place > reaching to place, keeping weight close to center of gravity and pacing as needed. Needed breaks. Left shoulder increased pain. Shoulder rolls post task. This is needed to place fabric on shelf at work.     Valeria completed dynamic reaching in various functional ranges, with continued focus on hip rotation/weight shifting, 5 reps x 2 with several breaks x 2 lbs, " rated as Moderate (3/10) exertion. Valeria plans to use this with putting dishes away.     Valeria participated in box carry for 4 minutes x 10 lbs with focused education on depressing and retracting shoulders and core control, rated as Sort of hard (4/10) exertion. Educated on posture with carry especially shoulder retraction and depression. Also worked on stride length and heel strike to prevent shuffling.       Therapeutic exercises to develop strength, endurance, ROM, flexibility, posture, and core stabilization for 20 minutes, including:  Full body functional mobility w/ 3-10 sec hold technique &/or 3-5 repetition technique to improve functional performance. In order to:  Improve driving safety, visual & spatial awareness:  Cervical flx/ext  Cervical side bending  Cervical rotation  Cervical protraction/retraction  Improve lifting mechanics, showering/bathing, dressing, cooking, reaching, pushing & fine motor skills  Shld rolls  Shld depression/elevation  Scapular retraction/protraction/scaption  Posterior shld stretch (cross arm)  Shld ER stretch (chicken wing)  Elbow flx/ext  Forearm supination/pronation  Wrist flx/ext  Open/close hands/fingers  Improve bed mobility & rolling, bending over, cooking & cleaning:  Seated trunk rotations  Seated trunk/thoracic ext/flx  Improve functional gait, squatting, sitting tolerance, functional balance:   Seated marches & knee to chest  Seated knee flx/ext  Seated hip ER/piriformis stretch  Seated hamstring stretch  Seated toe raise to heel raise   Seated ankle circles each way  Improve overhead reaching/activities, standing tolerance:  Standing lumbar extensions  Standing lateral leaning stretch  Standing quad stretch (UE support for balance)   Valeria participated in endurance activity using treadmill for 5 minutes with visual review to increase step length and use knee extension, with speed at end at 1.8 mph, and incline from5%, to improve functional endurance and progress  "towards increased MET level for improved community mobility and participation, rated as Sort of hard (4/10) exertion, Valeria re-educated on how to add mindfulness component to activity and how to pace appropriately by completed self check ins and grading activity as needed, with goal to reach moderate to sort of hard by end of activity. Verbalized difference in muscle activity when having longer stride. She feels increased speed helps with stride. She needed to stop after five minutes today due to increase hip pain.     Discussed and practiced posture with supine on towel roll down spine and arms out in "T" to stretch pectoralis muscles and squeezing towel with scapula to strengthen rhomboids. Reviewed sway and stretch for relief of slight discomforts vs. Stopping for severe pain.     No environmental, cultural, spiritual, developmental or education needs expressed or noted.    Patient Education and Home Exercises   Education provided:   - Progress towards goals   - importance of completion of exercises and activities outside of session to attain goals.   - proper posture and body mechanics.  - anticipated muscular soreness following lift testing and strategies for management including stretching, using ice, scheduled rest.  - Functional pain scale  - JANET rate of perceived exertion scale.   - pain cycle  - pursed lip and diaphragmatic breathing techniques  - hip hinge  - increasing step length    Written Home Exercises Provided: Patient instructed to cont prior HEP.  Exercises were reviewed and Valeria was able to demonstrate them prior to the end of the session. Valeria demonstrated fair understanding of the education provided.   Added 10/23/2023: Supine on towel roll .   See EMR under Patient Instructions for exercises provided during therapy sessions. Patient education also located in [facility] binder provided on day 1 of the cohort.     Assessment     Ms. Hillsvan with need to stretch back and hamstrings between every " exercise presented.  Fatigued easily today with pacing needed for PNS activation, and cues needed for upper trunk posture.     Valeria is progressing well towards her goals and status of goals can be seen below. Patient's prognosis is Fair, due to favoring sedentary lifestyle.    Valeria would continue to benefit from skilled outpatient occupational therapy to address the deficits listed in the problem list on initial evaluation, provide patient education, and to maximize patient's level of independence in the home and community environment.     Anticipated barriers to occupational therapy: co-morbidities    Goals:     SHORT Term goals: In 3 weeks, patient will:  Status of goal, reviewed on 10/13/2023:   Implements correct use of breathing techniques during functional lifting tasks, with minimal cues needed. In progress    Utilizes JANET rate of exertion scale to pace performance with functional lifting tasks, and implements self-care strategies during activity participation to manage pain.  In progress    Verbalize understanding of energy conservation and pacing strategies during daily habits, roles, and routines in order to improve tolerance for work and home demands.  In progress    Completes 5x sit to stand test in 16 seconds or less to improve ability to participate in community mobility.   Partially Met 10/11/2023   Completed in 16.94 seconds   Demonstrate increased positional tolerance to the level needed for work, home, and community activities (standing in cue at social event, managing supplies for outing, streneous IADL), as evidenced by having a METS level of 3. Met 10/11/2023    Demonstrate proper body mechanics with functional lifting tasks (floor to waist, waist to shoulder, maximum lift, and box carry), via teach back method with minimal cues needed. In progress    Demonstrate increased functional strength by lifting 13 lbs waist to shoulder for management of (I)ADL, including dressing and grooming,  placing items in kitchen cabinets, folding towels, and/or managing suitcase when traveling.   In progress    Demonstrate increased functional strength by lifting 18 lbs floor to waist to meet demand of work/home routine, including lifting groceries, managing laundry, and/or managing suitcase when traveling.   In progress    Tolerate Home Activity Plan (HAP)/ Home Exercise Program (HEP) to promote safety and independence with ADL, with report of completion of at least 2 out of 4 days outside of program participation.  In progress    Show improved perception of own abilities as evidenced by increase of FOTO scores to established MDC value and perception of performance ratings regarding personal long term goals.  In progress          MIDTERM goals: In 9 weeks, patient will: Status of goal, reviewed on 10/13/2023:   Report implementation of application of mindfulness, stretching, and other coping strategies for improved participation in daily routine. In progress    Verbalize functional application of lifting techniques in daily life (golfers lift, floor to waist, waist to shoulder). In progress    Completes 5x sit to stand test in 12 seconds or less to improve ability to participate in community mobility.  In progress    Applies functional application of lifting techniques (golfer's lift, floor to waist, waist to shoulder) in activities of daily life, per patient report.  In progress    Demonstrate physical capacities consistent with a Light (#20 max, frequent lifting/carrying #10, 2.5 METS  demand level, as needed to perform activities to be successful in roles of life, regarding Part Time Employment, Household Management, and Community Participation. In progress    Have an improvement of 3 points in 2/5 personal goals in rating of  performance.  In progress    Show improved perception of own abilities as evidenced by increase of FOTO scores to established MC II value and perception of performance ratings regarding  personal long term goals.  In progress       LONG term goals = Patient Specific Goals:  Vocational: able to carry supplies   Recreational : walking around [place] world, travel   Daily Living Activities: cooking, laundry, groceries, showering   Measured by patient's self-reported performance and satisfaction of personal FRP goals, listed above in subjective section.   Total Score = sum of the activity performance scores / number of activitie  Minimum detectable change (90%CI) for average score = 2 points  Minimum detectable change (90%CI) for single activity score  = 3 points    Plan     Continue per plan of care.     Updates/Grading for next session: posture and core, f/u bike vs trike.    Beryl Schwartz, OT    10/25/2023

## 2023-10-26 NOTE — PROGRESS NOTES
[facility]    Functional Restoration Program  Day 13    Name: Thelma Nguyen  MRN:[id number]      Therapy Diagnosis:   Encounter Diagnoses   Name Primary?    Decreased strength of trunk and back Yes    Impaired functional mobility, balance, gait, and endurance              Physician: Bibi Alvarado NP    Date: 10/26/2023    Physician Orders: PT Eval and Treat   Medical Diagnosis from Referral:   M54.16 (ICD-10-CM) - Lumbar radiculopathy   R68.89 (ICD-10-CM) - Decreased motor activity   R26.9 (ICD-10-CM) - Abnormality of gait      Evaluation Date: 8/2/2023  Authorization Period Expiration: 12/31/23  Plan of Care Expiration: 12/22/23  Visit # / Visits authorized: 13/ 20     FOTO: 02/ 03      Precautions:: Standard and Fall  hx of CA (brain /c surgery)      Time In: 1:30 pm   Time Out: 2:45 pm     Total Appointment Time (timed & untimed codes): 75 minutes     SUBJECTIVE     Valeria reports performing her daily stretches but not going on a specific daily walks. She stated walking a lot this weekend with chores and other activities. Pt drove 2.5 hrs on her own and was comfortable, does feel a little sore when getting out of the car. Pt has slowed down on her sewing due to no deadlines at the time. She can pace out herself better with further away due dates. Pt states she is more aware of her posture now and constantly change/correcting positions.       Current 3/10    Location(s):  LB     OBJECTIVE     Objective Measures updated at progress report unless specified.        Limitation/Restriction for FOTO LUMBAR Survey     Therapist reviewed FOTO scores for Thelma Nguyen on 8/2/2023.   FOTO documents entered into [employer] - see Media section.     INTAKE Score 8/02/2023: 42 (58% limitation)   09/29/23: 43  10/9/23: 46 (54% limitation)   10/11/23: 47     Predicted Score: 45%     MDC 6   MCID 9            Treatment       Valeria received the Individualized Treatments listed  below:     Valeria participated in dynamic functional therapeutic activities to improve functional performance for 15 minutes, including:      SOC x 20 for postural recognition and correction when in theatre seats, cue for pelvic tilt      Diaphragmatic breathing:   Verbal cues for unlabored, long & relaxed breathing w/ increased time for exhalation  Patient verbalize inhalation activating sympathetic nervous system, exhalation activating parasympathetic nervous system  Performed seated     Valeria participated in neuromuscular re-education activities to improve: Balance, Coordination, Kinesthetic, Sense, Proprioception, and Posture for 0 minutes. The following activities were included:      Fitter board - low setting, 20 tilt bouts    Fwd/Bwd     Trial 1- 2 hand  cue to inc tilt speed     Trial 2- 1 hand  fade to 3 finger support    Lat     Trial 1- 1 hand  cue for inc tilt speed    Trial 2- EC 1 hand  cue to reach end range     Supine    Dying Bug - just legs x15   Dying bug x10  PPT x 10 cue   TA activation /c Tball in hooklying x 10 10 sec hold  TA activation /c Tball in hooklying /c SLR x 10 B    PPT x 5 patient reminded of self cue for TA activation    Bridging 2x10 reps    Karaoke walk 3 laps untimed of 5 full steps L/5 steps R    24 sec lap of 5 full steps L/R     Balance training walking on foam pad   Forward 2 laps   Step up on foam pads   Side steps   Side steps with step up      Valeria participated in therapeutic exercises to develop strength, endurance, ROM, flexibility, posture, and core stabilization for 50 minutes including:    Peripheral muscle strengthening which included 1-2 sets of 10-20 repetitions at a slow, controlled 5-7 second per rep pace focused on strengthening supporting musculature for improved body mechanics & functional mobility.  Patient & therapist focused on proper form & speed during treatment to ensure optimal strengthening of each targeted muscle group & neuromuscular  re-education. Patients are instructed to keep sets/reps with proper load to stay at 3-5 out of 10 on the provided modified Barby exertion scale.    BATCA Machine Exercises Weight (lbs) Sets Repetitions Barby Exertion Scale Rating   Leg Extension        Hamstring Curls       Chest Press       Pec Fly       Lat Pull Down       Mid Row       Bicep Curls - DB       Standing Tricep Extension       Single Leg Press             Free Weight Exercises Weight (lbs) Sets Repetitions Barby Exertion Scale Rating   Bicep Dumbbell Curls 6 1 20 4   Bent Over Tricep Kickback       Prone Single Row 3 1 20 3   Supine Pec Fly B 3 ea hand 1 20 4   Supine Chest Press B 3 ea hand 1 20 4   Overhead Shoulder Press 3 ea 1 15 5   Reverse Back Fly       Sit to Stand Unsupported 0 1 15 4   Wall Squats              Supine over rolled yoga mat    Positional release 3 min    [place] x 10    SA punches x 10        Seated T ball rollouts x 3 /c 10 sec hold   No money x10 RTB    Seated stepper 10 min Level 1.5 ~90 steps/min         Patient Education and Home Exercises     Home Exercises Provided and Patient Education Provided     Education provided:   - FRP Educational Topics: Modified Barby Exertion Scale, Physical & Psychological Pain Cycles, Z-Lying for Pain Relief/Relaxation, Diaphragmatic Breathing, Activity Pacing for Pain & Debility , Central Sensitization , and Posture & Body Mechanics    Written Home Exercises Provided: Patient instructed to cont prior HEP. Exercises were reviewed and Valeria was able to demonstrate them prior to the end of the session.  Valeria demonstrated good  understanding of the education provided. See EMR under Patient Instructions for information provided during therapy sessions    ASSESSMENT      Valeria participle well in PT session today. Pt showing good understanding of importance of good posture in managing LB and neck pain. Pt is working on schedule for inclusion of cardio, stretches and mindfulness into her daily  routine. Focus today on independent resistance training post program. Pt with good understanding of importance of  resisted ex training for strengthening and maintaining progress made already. Pt makes a lot of self correct with posture and understands importance of good posture though out activities to decrease pain.  Cont to stress pacing and scheduling to maintain success of said goals after pt completes FRP.    Valeria Is progressing well towards her goals.   Pt prognosis is Good.     Pt will continue to benefit from skilled outpatient physical therapy to address the deficits listed in the problem list box on initial evaluation, provide pt/family education and to maximize pt's level of independence in the home and community environment.     Pt's spiritual, cultural and educational needs considered and pt agreeable to plan of care and goals.     Anticipated Barriers for therapy: chronic nature of issues, psychosocial factors, central sensitization       GOALS: Pt is in agreement with the following goals:  Goal Progress Towards Goal   SHORT Term: In 3 weeks, pt will:     Verbalize & demonstrate good understanding of resisted training with 3-1-3 second rep for [id number] contraction to encourage full muscle recruitment for strength & endurance. MET 10/22/23   Verbalize & demonstrate good understanding of home stretch routine to improve AROM, help decrease pain & improve mobility. MET 10/22/23   Demonstrate proper supine or seated diaphragmatic breathing with decreased chest excursion & emphasis on full abdominal excursion & increased expiration time to promote muscle relaxation & increased parasympathetic activity to improve patient tolerance to activities. PROGRESSING 10/11/23   Verbalize good understanding of importance of proper posture in resisted exercise, functional activities & ADL's in order to help reduce postural strain, promote proper breathing. MET 10/11/23   Demonstrate good  "understanding of full body resisted exercises w/ use of pictures &/or verbal cues to promote independence w/ exercise at the end of the program. MET 10/22/23   Verbalize plan for continuing resisted exercises w/ specific location (i.e. commercial gym, home, community fitness center)  to incorporate all major muscle groups to maintain & progress therapeutic functional improvements. PROGRESSING 10/11/23   Verbalize difference between  "hurt vs harm"  to demonstrate understanding of fear avoidance in pain cycle.  PROGRESSING 10/11/23         Midterm: In 8 weeks, pt will:     Verbalize good understanding of activities planning/scheduling (stretching, mindfulness, resisted training, & cardio) prescribed by PT/OT following the end of the program to sustain & progress functional improvements. PROGRESSING 10/11/23   Perform selected resisted training w/ minimal to no cuing in therapy session to be independent w/ resisted strengthening. PROGRESSING 10/11/23   Demonstrate improved symptom self-management w/ posture/positioning and mechanical movements and/or implementation of appropriate modalities throughout a typical day.  PROGRESSING 10/11/23   Demonstrate independence w/ home stretch routine to improve AROM, help decrease pain & improve mobility. PROGRESSING 10/11/23         Long Term: In 12 weeks, pt will:     Perform selected cardio & resisted training independently to continue safe regular performance of daily exercise. PROGRESSING 10/11/23   Improve 2 Minute Walk Test (MWT) to 400 feet and 6 MWT to 1200 feet or greater to improve gait speed and mobility. PROGRESSING 10/11/23   Perform single leg stance 10 seconds or greater bilaterally to improve safety and balance in ADLs. MET 10/11/23   Demonstrate Timed Up & Go (with appropriate assistive device, if necessary) of 10 seconds or less to decrease fall risk and improve functional mobility. PROGRESSING 10/11/23   Score 45 % or less on LUMBAR FOTO to indicate significant " functional improvements in impaired functions secondary to pain. PROGRESSING 10/11/23              PLAN     Continue PT per POC     Aide Silva, PTA

## 2023-10-27 ENCOUNTER — CLINICAL SUPPORT (OUTPATIENT)
Dept: PSYCHIATRY | Facility: OTHER | Age: 58
End: 2023-10-27
Payer: COMMERCIAL

## 2023-10-27 ENCOUNTER — CLINICAL SUPPORT (OUTPATIENT)
Dept: REHABILITATION | Facility: OTHER | Age: 58
End: 2023-10-27
Payer: COMMERCIAL

## 2023-10-27 DIAGNOSIS — R29.818 POOR TRUNK CONTROL: ICD-10-CM

## 2023-10-27 DIAGNOSIS — R29.898 DECREASED STRENGTH OF TRUNK AND BACK: Primary | ICD-10-CM

## 2023-10-27 DIAGNOSIS — Z74.09 IMPAIRED FUNCTIONAL MOBILITY, BALANCE, GAIT, AND ENDURANCE: ICD-10-CM

## 2023-10-27 DIAGNOSIS — R29.3 POOR POSTURE: ICD-10-CM

## 2023-10-27 DIAGNOSIS — R52 PAIN AGGRAVATED BY ACTIVITIES OF DAILY LIVING: Primary | ICD-10-CM

## 2023-10-27 DIAGNOSIS — F43.29 ADJUSTMENT DISORDER WITH PHYSICAL COMPLAINTS: Primary | ICD-10-CM

## 2023-10-27 PROCEDURE — 97110 THERAPEUTIC EXERCISES: CPT

## 2023-10-27 PROCEDURE — 90832 PR PSYCHOTHERAPY W/PATIENT, 30 MIN: ICD-10-PCS | Mod: ,,, | Performed by: SOCIAL WORKER

## 2023-10-27 PROCEDURE — 97530 THERAPEUTIC ACTIVITIES: CPT

## 2023-10-27 PROCEDURE — 90832 PSYTX W PT 30 MINUTES: CPT | Mod: ,,, | Performed by: SOCIAL WORKER

## 2023-10-27 NOTE — PROGRESS NOTES
I have met with the above student and agree with the assessment and note written. I was present for this encounter. Any changes have been made and authorized by me.  Kaye Bailey, [facility] 11/13/2023 2:22 PM

## 2023-10-27 NOTE — PROGRESS NOTES
The team (SW, NP, OT, PT) met for 30 minutes with Thelma Nguyen regarding their progress in the program and the plans moving forward.  Discussion involved a comparison of the testing scores against day 1 intake scores and the plan to continue to make progress in those areas. Please refer to individual notes for plans of care.  All questions answered.    Important milestones achieved during the program:  Quita Nguyen Cohort 71 Day 1 Day 16 % change   FAMILIA Depression-5  Anxiety-11  Stress-7 Depression- 1  Anxiety- 7  Stress- 5 80% improvement  37% improvement  29% improvement   VAS  Pain today-5  Pain in the past week-8 Pain today- 5  Pain in the past week- 9 No change   Slight increase    Pain Catastrophizing Scale 31 15 52% decrease    Sleep Quality Instrument  9 11 Slight increase   Pain Disability Index 47 39 17% decrease   Fear Avoidance Beliefs Fear of Physical pain -17  Fear of Pain caused by work/chores- 14  Total fear of pain- 39 Fear of Physical pain- 14  Fear of Pain caused by work/chores- 15  Total fear of pain- 34 18% decrease  Slight increase     13% decrease overall   Katt Pain questionnaire 38 43 Slight increase   Low Back Disability  38% 38% No change    ACE score 2 N/A

## 2023-10-27 NOTE — PROGRESS NOTES
"[facility]    Functional Restoration Program  Daily Treatment Note  Day 15    Name: Thelma Nguyen  MRN:[id number]      Therapy Diagnosis:   Encounter Diagnoses   Name Primary?    Decreased strength of trunk and back Yes    Impaired functional mobility, balance, gait, and endurance        Physician: Bibi Alvarado NP    Date: 10/27/2023    Physician Orders: PT Eval and Treat   Medical Diagnosis from Referral:   M54.16 (ICD-10-CM) - Lumbar radiculopathy   R68.89 (ICD-10-CM) - Decreased motor activity   R26.9 (ICD-10-CM) - Abnormality of gait      Evaluation Date: 8/2/2023  Authorization Period Expiration: 12/31/23  Plan of Care Expiration: 12/22/23  Visit # / Visits authorized: 15/ 20     FOTO: 02/ 03      Precautions:: Standard and Fall  hx of CA (brain /c surgery)      Time In: 1:45 pm   Time Out: 3:00 pm     Total Appointment Time (timed & untimed codes): 75 minutes     SUBJECTIVE     Valeria reports no sig change in sx presentation since last visit.  Patient report continue daily stretching and attempts at controlled breathing leading, however, notes no sig dec in typical discomfort.  Patient plans to purchase a tri-cycle to promote inc activity outside of the home.  Patient inquire about the use of ankle weights during her at home resistance training.    Patient report that she has said "no" to a few projects in an attempt to pace herself.           Current 3/10    Location(s):  [facility]     OBJECTIVE     Objective Measures updated at progress report unless specified.        Limitation/Restriction for FOTO LUMBAR Survey     Therapist reviewed FOTO scores for Thelma Nguyen on 8/2/2023.   FOTO documents entered into [employer] - see Media section.     INTAKE Score 8/02/2023: 42 (58% limitation)   09/29/23: 43  10/9/23: 46 (54% limitation)   10/11/23: 47  10/27/23: 47      Predicted Score: 51%     MDC 6   MCID 9            Treatment       Valeria received the Individualized " Treatments listed below:     Valeria participated in dynamic functional therapeutic activities to improve functional performance for 20 minutes, including:    Readminister FOTO (see above)     Stair exercises  Ascend/Descend steps - 30 sec bouts, 1 hand on rail throughout  3/10 RPE                LLE lead ascend x 1, descend x 1              RLE lead ascend x 1, descend x 1   SOC x 20 for postural recognition cue for pelvic tilt      NP:   Diaphragmatic breathing:   Verbal cues for unlabored, long & relaxed breathing w/ increased time for exhalation  Patient verbalize inhalation activating sympathetic nervous system, exhalation activating parasympathetic nervous system  Performed seated     Valeria participated in neuromuscular re-education activities to improve: Balance, Coordination, Kinesthetic, Sense, Proprioception, and Posture for 00 minutes. The following activities were included:    Fitter board - low setting, 20 tilt bouts    Fwd/Bwd     Trial 1- 2 hand  cue to inc tilt speed     Trial 2- 1 hand  fade to 3 finger support    Lat     Trial 1- 1 hand  cue for inc tilt speed    Trial 2- EC 1 hand  cue to reach end range   Supine    Dying Bug - just legs x15   Dying bug x10  PPT x 10 cue   TA activation /c Tball in hooklying x 10 10 sec hold  TA activation /c Tball in hooklying /c SLR x 10 B    PPT x 5 patient reminded of self cue for TA activation    Bridging 2x10 reps    Karaoke walk 3 laps untimed of 5 full steps L/5 steps R    24 sec lap of 5 full steps L/R     Balance training walking on foam pad   Forward 2 laps   Step up on foam pads   Side steps   Side steps with step up      Valeria participated in therapeutic exercises to develop strength, endurance, ROM, flexibility, posture, and core stabilization for 55 minutes including:    Peripheral muscle strengthening which included 1-2 sets of 10-20 repetitions at a slow, controlled 5-7 second per rep pace focused on strengthening supporting  musculature for improved body mechanics & functional mobility.  Patient & therapist focused on proper form & speed during treatment to ensure optimal strengthening of each targeted muscle group & neuromuscular re-education. Patients are instructed to keep sets/reps with proper load to stay at 3-5 out of 10 on the provided modified Barby exertion scale.    BATCA Machine Exercises Weight (lbs) Sets Repetitions Barby Exertion Scale Rating   Leg Extension        Hamstring Curls       Chest Press       Pec Fly       Lat Pull Down       Mid Row       Bicep Curls - DB       Standing Tricep Extension       Single Leg Press 35 1 15 5         Free Weight Exercises Weight (lbs) Sets Repetitions Barby Exertion Scale Rating   Bicep Dumbbell Curls 6 1 20 4   Bent Over Tricep Kickback       Prone Single Row 3 1 20 3   Supine Pec Fly B 3 ea hand 1 20 4   Supine Chest Press B 3 ea hand 1 20 4   Overhead Shoulder Press 3 ea 1 15 5   Reverse Back Fly       Sit to Stand Unsupported 0 1 15 4   Wall Squats       Prone hip ext  3# 2 10 2   Prone knee flex  3# 2 10 2   Prone hip IR/EF 3# 2 10 2   Supine SLR  3# 1 10 2        Supine over rolled yoga mat    Positional release 3 min    [place] x 10    SA punches x 10      NP:   Seated T ball rollouts x 3 /c 10 sec hold   No money x10 RTB  Seated stepper 10 min Level 1.5 ~90 steps/min         Patient Education and Home Exercises     Home Exercises Provided and Patient Education Provided     Education provided:   - FRP Educational Topics: Modified Barby Exertion Scale, Physical & Psychological Pain Cycles, Z-Lying for Pain Relief/Relaxation, Diaphragmatic Breathing, Activity Pacing for Pain & Debility , Central Sensitization , and Posture & Body Mechanics    Written Home Exercises Provided: Patient instructed to cont prior HEP. Exercises were reviewed and Valeria was able to demonstrate them prior to the end of the session.  Valeria demonstrated good  understanding of the education provided. See  EMR under Patient Instructions for information provided during therapy sessions    ASSESSMENT      Bulk of tx session today spent /c patient demonstrating at home resistance training program. Patient /c safe technique and inc understanding of body area challenged thereby meeting associated goal for plan to continue resistance training.  Patient continue to demonstrate dec fear avoidance /c stair exercise participation and inquiry into use of ankle weights at home.  Tx include sample BLE exercises /c ankle weights to promote inc intensity /c HEP. Stair exercises also demonstrate improved dynamic balance thereby addressing patient fall risk. However, patient continue to need rest breaks between set/exercises and notes continue bouts of inc fatigue at home.  Updated FOTO score mirror this /c only slight improvement of score and not meeting MDC or MCID.  Patient encouraged to address deconditioning /c tricycle outings.  Patient is appropriate for Ind HEP performance but will need f/u at 1 month reassessment to address self efficacy and ensure application of pacing to improve overall functionality.        Valeria Is progressing fair towards her goals.   Pt prognosis is Good.     Pt will continue to benefit from skilled outpatient physical therapy to address the deficits listed in the problem list box on initial evaluation, provide pt/family education and to maximize pt's level of independence in the home and community environment.     Pt's spiritual, cultural and educational needs considered and pt agreeable to plan of care and goals.     Anticipated Barriers for therapy: chronic nature of issues, psychosocial factors, central sensitization       GOALS: Pt is in agreement with the following goals:  Goal Progress Towards Goal   SHORT Term: In 3 weeks, pt will:     Verbalize & demonstrate good understanding of resisted training with 3-1-3 second rep for dqtucazges-dlieukawh-ghphsujos contraction to encourage full muscle  "recruitment for strength & endurance. MET 10/22/23   Verbalize & demonstrate good understanding of home stretch routine to improve AROM, help decrease pain & improve mobility. MET 10/22/23   Demonstrate proper supine or seated diaphragmatic breathing with decreased chest excursion & emphasis on full abdominal excursion & increased expiration time to promote muscle relaxation & increased parasympathetic activity to improve patient tolerance to activities. PROGRESSING 10/11/23   Verbalize good understanding of importance of proper posture in resisted exercise, functional activities & ADL's in order to help reduce postural strain, promote proper breathing. MET 10/11/23   Demonstrate good understanding of full body resisted exercises w/ use of pictures &/or verbal cues to promote independence w/ exercise at the end of the program. MET 10/22/23   Verbalize plan for continuing resisted exercises w/ specific location (i.e. commercial gym, home, community fitness center)  to incorporate all major muscle groups to maintain & progress therapeutic functional improvements. MET 10/27/23   Verbalize difference between  "hurt vs harm"  to demonstrate understanding of fear avoidance in pain cycle.  PROGRESSING 10/11/23         Midterm: In 8 weeks, pt will:     Verbalize good understanding of activities planning/scheduling (stretching, mindfulness, resisted training, & cardio) prescribed by PT/OT following the end of the program to sustain & progress functional improvements. PROGRESSING 10/11/23   Perform selected resisted training w/ minimal to no cuing in therapy session to be independent w/ resisted strengthening. PROGRESSING 10/11/23   Demonstrate improved symptom self-management w/ posture/positioning and mechanical movements and/or implementation of appropriate modalities throughout a typical day.  PROGRESSING 10/11/23   Demonstrate independence w/ home stretch routine to improve AROM, help decrease pain & improve mobility. " PROGRESSING 10/11/23         Long Term: In 12 weeks, pt will:     Perform selected cardio & resisted training independently to continue safe regular performance of daily exercise. PROGRESSING 10/11/23   Improve 2 Minute Walk Test (MWT) to 400 feet and 6 MWT to 1200 feet or greater to improve gait speed and mobility. PROGRESSING 10/11/23   Perform single leg stance 10 seconds or greater bilaterally to improve safety and balance in ADLs. MET 10/11/23   Demonstrate Timed Up & Go (with appropriate assistive device, if necessary) of 10 seconds or less to decrease fall risk and improve functional mobility. PROGRESSING 10/11/23   Score 45 % or less on LUMBAR FOTO to indicate significant functional improvements in impaired functions secondary to pain. PROGRESSING 10/11/23              PLAN     Continue PT per DOM Banerjee, PT

## 2023-10-27 NOTE — PROGRESS NOTES
"[facility]  Functional Restoration Program  Occupational Therapy Treatment Note  FRP Day 15    Name: Thelma Nguyen  MRN:[id number]  Encounter Date: 10/27/2023    Diagnosis:   Encounter Diagnoses   Name Primary?    Pain aggravated by activities of daily living Yes    Poor trunk control     Poor posture      Referring provider: Bibi Alvarado NP    Physician Orders: eval and treat; [facility]  Medical Diagnosis:   M54.16 (ICD-10-CM) - Lumbar radiculopathy  R68.89 (ICD-10-CM) - Decreased motor activity R26.9 (ICD-10-CM) - Abnormality of gait   Evaluation Date: 8/2/2023  Insurance Authorization Period Expiration: 6/9/2024  Plan of Care Certification Period: 12/22/2023  Visit # / Visits authorized: 15/20 (plus eval)  FOTO completed: 3/3     Precautions:  Standard and Fall, Orthostatic Hypotension     Time In: 1230  Time Out: 1345  Total Billable Time: 75 minutes    SUBJECTIVE     She reports: "I'm excited to do more things around the house and to practice taking a break"  "I'm noticing I'm lasting longer, and also to not push through it"   Valeria was compliant with parts of home exercise program given previously.    Response to previous treatment: Eager to do more, Less leaning on cart with shopping, putting one foot at bottom of cart to rest back as needed.     Functional change: noticing pain ebbing/flowing and improving with repositioning/breaks; takes breaks more often. Laundry and dishes still difficult. She still has a fear of falling.  Noticing guarding in body. Improved planning for trips/outings. Cooking more, doing meal prep.   Pain:     Current: 4/10   Worst: 6/10   Best 2/10   Description:left flank and lower back.   Aggravating Factors: Standing, Bending, Walking, and Lifting  Easing Factors: heating pad, stretching    Patient Specific Activities based on Personal goals:  Activity  9/25/2023 10/11/2023  10/27/2023     Performance Satisfaction Performance Satisfaction Performance " "Satisfaction   Walk in [facility] 1  1 3 3 5 8   2.  showering 5 5 3 "I haven't been doing it" 3 5 5   3.  Grocery shopping 6 4 5 5 8 8   4.  Travel 6 5 5 5 7 6   5.  Cooking/meal prep. 7 (sits down)  6 4 5 7 7               Total Score 5  4.2  4 4.2 6.4 6.8      Patient Specific Activity Scoring Scheme for Performance     1        2        3        4        5        6        7        8        9        10     Unable/Unsatisfied                                          Able/Satisfied                                                                          Objective      Strength (in pounds, rung II, average of three trials)    8/2/2023 10/11/2023    Right hand  45.67 lbs 49 lb   Left hand  40.33 lbs 35 lbs    [norms for women aged 55-59: R=57.3 +/-12.5; L=47.3 +/-11.9 (Elisha et al, 1985)]      Functional Strength Test:  Pile Testing: Lifting  (Pounds/Heart rate)   GAP 8/2/2023  (#/HR/JANET) 10/13/2023     Repetitive Floor to Waist      8/EMA/3 8/85/3   Repetitive Waist to Shoulder    8/133/2 8/120/4   1- Time Maximum     10/130/3 15/113/4   Carry-2 Handed (40ft)     7/134/3 16/122/5-6   Work demand Level   Sedentary    Sedentary-light   Reason for Stop point: eval: Increased time required for completing repetitions, Inability to maintain proper body mechanics, Increased discomfort, Fear of increased pain. During physical testing, participation level was consistent. The work demand level listed above is based on the physical performance screening test performed. More comprehensive physical testing integrated with clinical findings would be required to derived an accurate work capacity.      Goal: #20 max, frequent lifting/carrying #10, 2.5 METS  demand level     Balance  5 times sit-stand  (adults 18-65 y/o) 8/2/2023 10/11/2023    >12 sec= fall risk for general elderly  >16 sec= fall risk for Parkinson's disease  >10 sec= balance/vestibular dysfunction (<59 y/o)  >14.2 sec= " balance/vestibular dysfunction (>59 y/o)  >12 sec= fall risk for CVA 19.38 seconds     (with UE used to push up from chair) 16.94 seconds      (With OUT UE to push up from chair)        Endurance Testing: Modified Ramp Treadmill Test    GAP 8/2/2023 10/11/2023    Minutes Completed  2 minutes 6 seconds 3 min    % Grades  5 % 10 %    Speed (mph)  1.3 mph 1.7 mph    METS 3 4     bpm 128 bpm    Barby Rating Perceived Exertion Scale   4 5    Reason for stop point: eval: Decline in maintaining pace safely . Initiated recovery using standing  pose.       Limitation/Restriction for FOTO NOC Survey     Therapist reviewed FOTO scores for Thelma Nguyen on 10/27/2023    FOTO documents entered into [employer] - see Media section.     Limitation Score: 51 %                  No environmental, cultural, spiritual, developmental or education needs expressed or noted.    Treatment     Valeria received the treatments listed below:     Therapeutic activities and functional lifting activities in order to increase participation in, endurance for, and performance with vocational, selfcare ADLs, and leisure activities in order to improve quality of life, for 30 minutes, including:    In seated, review of personal goals with rating of performance and satisfaction as noted above.     Pt completed sustained standing activity to discuss current habits/routines re: daily schedule pre program, and set goals related to intentional movement, water intake, nutrition, stretching and mindfulness in order to create new daily schedule to include those goals. Pt used fine motor and dexterity skills to handwrite old and new schedules. Continued education on proper ergonomics (posture, neutral spine, 90 degrees for elbows on table, etc), weight shifting as needed and standing stretches as needed. Pt rated activity easy (2/10) exertion.  During activity pt discussed anticipated barriers, anxieties and stressors re: new schedule and self  accountability. Pt with good mindset and plan moving forward and with decreased stress and increased self-efficacy post activity.    Therapeutic exercises to develop strength, endurance, ROM, flexibility, posture, and core stabilization for 30 minutes, including:    Full body functional mobility w/ 3-10 sec hold technique &/or 3-5 repetition technique to improve functional performance. In order to:  Improve driving safety, visual & spatial awareness:  Cervical flx/ext  Cervical side bending  Cervical rotation  Cervical protraction/retraction  Improve lifting mechanics, showering/bathing, dressing, cooking, reaching, pushing & fine motor skills  Shld rolls  Shld depression/elevation  Scapular retraction/protraction/scaption  Posterior shld stretch (cross arm)  Shld ER stretch (chicken wing)  Elbow flx/ext  Forearm supination/pronation  Wrist flx/ext  Open/close hands/fingers  Improve bed mobility & rolling, bending over, cooking & cleaning:  Seated trunk rotations  Seated trunk/thoracic ext/flx  Improve functional gait, squatting, sitting tolerance, functional balance:   Seated marches & knee to chest  Seated knee flx/ext  Seated hip ER/piriformis stretch  Seated hamstring stretch  Seated toe raise to heel raise   Seated ankle circles each way  Improve overhead reaching/activities, standing tolerance:  Standing lumbar extensions  Standing lateral leaning stretch  Standing quad stretch (UE support for balance)     Valeria participated in endurance activity using Nustep for 12 minutes, starting at level 1.0 to 1.8 to improve functional endurance and progress towards increased MET level for improved community mobility and participation, rated as Moderate (3/10) exertion, Valeria re-educated on how to add mindfulness component to activity and how to pace appropriately by completed self check ins and grading activity as needed, with goal to reach moderate to sort of hard by end of activity.    No environmental, cultural,  spiritual, developmental or education needs expressed or noted.    Patient Education and Home Exercises   Education provided:   - Progress towards goals   - importance of completion of exercises and activities outside of session to attain goals.   - proper posture and body mechanics.  - anticipated muscular soreness following lift testing and strategies for management including stretching, using ice, scheduled rest.  - Functional pain scale  - JANET rate of perceived exertion scale.   - pain cycle  - pursed lip and diaphragmatic breathing techniques  - hip hinge  - increasing step length    Written Home Exercises Provided: Patient instructed to cont prior HEP.  Exercises were reviewed and Valeria was able to demonstrate them prior to the end of the session. Valeria demonstrated fair understanding of the education provided.   Added 10/23/2023: Supine on towel roll .   See EMR under Patient Instructions for exercises provided during therapy sessions. Patient education also located in FRP binder provided on day 1 of the cohort.     Assessment     Ms. Nguyen endorsed plans to get tricycle and had good engagement in today's session and specifically in practice of scheduling to prioritize FRP tools. Emphasis in education and conversation this date on boundaries to continue prioritization of goals and self-care.     Valeria is progressing well towards her goals and status of goals can be seen below. Patient's prognosis is Fair, due to favoring sedentary lifestyle.    Valeria would continue to benefit from skilled outpatient occupational therapy to address the deficits listed in the problem list on initial evaluation, provide patient education, and to maximize patient's level of independence in the home and community environment.     Anticipated barriers to occupational therapy: co-morbidities    Goals:     SHORT Term goals: In 3 weeks, patient will:  Status of goal, reviewed on 10/13/2023:   Implements correct use of breathing  techniques during functional lifting tasks, with minimal cues needed. In progress    Utilizes JANET rate of exertion scale to pace performance with functional lifting tasks, and implements self-care strategies during activity participation to manage pain.  In progress    Verbalize understanding of energy conservation and pacing strategies during daily habits, roles, and routines in order to improve tolerance for work and home demands.  In progress    Completes 5x sit to stand test in 16 seconds or less to improve ability to participate in community mobility.   Partially Met 10/11/2023   Completed in 16.94 seconds   Demonstrate increased positional tolerance to the level needed for work, home, and community activities (standing in cue at social event, managing supplies for outing, streneous IADL), as evidenced by having a METS level of 3. Met 10/11/2023    Demonstrate proper body mechanics with functional lifting tasks (floor to waist, waist to shoulder, maximum lift, and box carry), via teach back method with minimal cues needed. In progress    Demonstrate increased functional strength by lifting 13 lbs waist to shoulder for management of (I)ADL, including dressing and grooming, placing items in kitchen cabinets, folding towels, and/or managing suitcase when traveling.   In progress    Demonstrate increased functional strength by lifting 18 lbs floor to waist to meet demand of work/home routine, including lifting groceries, managing laundry, and/or managing suitcase when traveling.   In progress    Tolerate Home Activity Plan (HAP)/ Home Exercise Program (HEP) to promote safety and independence with ADL, with report of completion of at least 2 out of 4 days outside of program participation.  In progress    Show improved perception of own abilities as evidenced by increase of FOTO scores to established MDC value and perception of performance ratings regarding personal long term goals.  In progress          MIDTERM  goals: In 9 weeks, patient will: Status of goal, reviewed on 10/13/2023:   Report implementation of application of mindfulness, stretching, and other coping strategies for improved participation in daily routine. In progress    Verbalize functional application of lifting techniques in daily life (golfers lift, floor to waist, waist to shoulder). In progress    Completes 5x sit to stand test in 12 seconds or less to improve ability to participate in community mobility.  In progress    Applies functional application of lifting techniques (golfer's lift, floor to waist, waist to shoulder) in activities of daily life, per patient report.  In progress    Demonstrate physical capacities consistent with a Light (#20 max, frequent lifting/carrying #10, 2.5 METS  demand level, as needed to perform activities to be successful in roles of life, regarding Part Time Employment, Household Management, and Community Participation. In progress    Have an improvement of 3 points in 2/5 personal goals in rating of  performance.  In progress    Show improved perception of own abilities as evidenced by increase of FOTO scores to established MC II value and perception of performance ratings regarding personal long term goals.  In progress       LONG term goals = Patient Specific Goals:  Vocational: able to carry supplies   Recreational : walking around [place], travel   Daily Living Activities: cooking, laundry, groceries, showering   Measured by patient's self-reported performance and satisfaction of personal FRP goals, listed above in subjective section.   Total Score = sum of the activity performance scores / number of activitie  Minimum detectable change (90%CI) for average score = 2 points  Minimum detectable change (90%CI) for single activity score  = 3 points    Plan     Plan to continue independently with HEP and application of FRP tools and to follow up at 1 month.     Julianna Bennett, OT    10/27/2023

## 2023-11-05 ENCOUNTER — PATIENT MESSAGE (OUTPATIENT)
Dept: INTERNAL MEDICINE | Facility: CLINIC | Age: 58
End: 2023-11-05
Payer: COMMERCIAL

## 2023-11-08 ENCOUNTER — PATIENT MESSAGE (OUTPATIENT)
Dept: REHABILITATION | Facility: OTHER | Age: 58
End: 2023-11-08
Payer: COMMERCIAL

## 2023-11-08 NOTE — PROGRESS NOTES
Date: 11/08/2023    Referral Source: Dr. Bender referred the Pt to the program     Met with pt for 60 minutes to perform final testing and discuss the process of FRP and continuing home program. Pt. filled out measures of assessment, scores are as follows:    Sobia Mcclure  Cohort 71 Day 1 Day 16 % change   FAMILIA Depression-1  Anxiety-2  Stress-7 Depression- 4  Anxiety- 7  Stress- 5 Slight increase   Slight increase   29% improvement   VAS  Pain today-6  Pain in the past week-3 Pain today- 4  Pain in the past week- 8 34% improvement     Slight increase    Pain Catastrophizing Scale 11 19 Slight increase    Sleep Quality Instrument  7 8 No change    Pain Disability Index 29 14 52% decrease   Fear Avoidance Beliefs Fear of Physical pain - 18  Fear of Pain caused by work/chores- 21  Total fear of pain- 50 Fear of Physical pain- 12  Fear of Pain caused by work/chores- 10  Total fear of pain- 30 34% decrease    52% decrease    40% decrease overall   Katt Pain questionnaire 30 35 Slight increase    Low Back Disability  44% 26% 18% decrease   ACE score 2 N/A        Discussed process of program: Consent forms signed, all questions answered.     Content of Session: See below    Therapeutic techniques and approaches including meds: what other treatments has the patient tried that havent worked?  Why are they now in the functional restoration program?    Assessment of the patients ability to adhere to the treatment plan outlined in the Functional Restoration Program  Scientologist - Pain Management  Psychosocial Assessment      Date: 11/8/2023  Time: 1:57 PM    Name: Thelma Nguyen    Chief Complaint: Chronic pain and adjustment disorder with physical complaints     History of Present Illness: Pt presents to the functional restoration program as a cancer survivor. Pt currently has a blood disorder, kidney disorder, as well as a pinched nerve which all contribute to her chronic pain. Pt starting falling about 6 years ago  and her most recent fall was yesterday 23.     Medical History:   Past Medical History:   Diagnosis Date    Allergic rhinitis     Anemia     Arthritis     Basal cell carcinoma     Broken ankle     Cancer     Clotting disorder     Disorder of kidney and ureter     DVT (deep venous thrombosis)     Factor V deficiency     Fracture of elbow     left    Glioblastoma multiforme of brain     Hypothyroidism     IBS (irritable bowel syndrome)     Osteoporosis     Panhypopituitarism     Peripheral polyneuropathy     Secondary adrenal insufficiency     Seizures     Thyroid disease        Past Surgical History:   Procedure Laterality Date    APPENDECTOMY      BRAIN SURGERY      for glioblastoma      SECTION      CHOLECYSTECTOMY  2007    CLOSURE OF DEFECT OF MOHS PROCEDURE Left 2018    Procedure: CLOSURE, MOHS PROCEDURE DEFECT SCALP  Flap closure;  Surgeon: Yrn Novak MD;  [address]  Service: Plastics;  Laterality: Left;    COLONOSCOPY N/A 2019    Procedure: COLONOSCOPY Suprep;  Surgeon: Tony Mosher MD;  Location: [facility] ENDO;  Service: Endoscopy;  Laterality: N/A;    ESOPHAGOGASTRODUODENOSCOPY N/A 2019    Procedure: EGD/colon;  Surgeon: Tony Mosher MD;  Location: [facility] ENDO;  Service: Endoscopy;  Laterality: N/A;    EXCISION OF GANGLION CYST OF HAND Left 2020    Procedure: EXCISION, GANGLION CYST, HAND;  Surgeon: Ross Drew Jr., MD;  Location: [facility] OR;  Service: Orthopedics;  Laterality: Left;    HYSTERECTOMY      TILT TABLE TEST N/A 7/15/2022    Procedure: TILT TABLE TEST;  Surgeon: Amol Kohler MD;  Location: [facility] EP LAB;  Service: Cardiology;  Laterality: N/A;  Orthostatic hypotension, dizziness, Tilt Table Test with EEG, Dr.Tiffany Roca (neuro), DM    TUBAL LIGATION         Family History   Problem Relation Age of Onset    Heart disease Mother     Diabetes type II Mother     Diabetes type II Father      Heart attack Maternal Grandfather        Social History     Socioeconomic History    Marital status:     Number of children: 2   Occupational History    Occupation:    Tobacco Use    Smoking status: Never    Smokeless tobacco: Never   Substance and Sexual Activity    Alcohol use: No    Drug use: No    Sexual activity: Not Currently   Social History Narrative     30 years, lives at home with .      Social Determinants of Health     Financial Resource Strain: Low Risk  (8/8/2022)    Overall Financial Resource Strain (CARDIA)     Difficulty of Paying Living Expenses: Not hard at all   Food Insecurity: No Food Insecurity (8/8/2022)    Hunger Vital Sign     Worried About Running Out of Food in the Last Year: Never true     Ran Out of Food in the Last Year: Never true   Transportation Needs: No Transportation Needs (8/8/2022)    PRAPARE - Transportation     Lack of Transportation (Medical): No     Lack of Transportation (Non-Medical): No   Physical Activity: Insufficiently Active (8/8/2022)    Exercise Vital Sign     Days of Exercise per Week: 2 days     Minutes of Exercise per Session: 10 min   Stress: No Stress Concern Present (8/8/2022)    [place] [employer] - Occupational Stress Questionnaire     Feeling of Stress : Only a little   Recent Concern: Stress - Stress Concern Present (5/29/2022)    [place] [employer] - Occupational Stress Questionnaire     Feeling of Stress : To some extent   Social Connections: Unknown (8/8/2022)    Social Connection and Isolation Panel [NHANES]     Frequency of Communication with Friends and Family: More than three times a week     Frequency of Social Gatherings with Friends and Family: Once a week     Active Member of Clubs or Organizations: Yes     Attends Club or Organization Meetings: More than 4 times per year     Marital Status:    Housing Stability: Low Risk  (8/8/2022)    Housing Stability  Vital Sign     Unable to Pay for Housing in the Last Year: No     Number of Places Lived in the Last Year: 1     Unstable Housing in the Last Year: No       Current Outpatient Medications   Medication Sig Dispense Refill    acetaminophen (TYLENOL) 500 MG tablet Take 1-2 tablets (500-1,000 mg total) by mouth 3 (three) times daily as needed for Pain.  0    biotin 1 mg Cap Take by mouth.      dexamethasone (DECADRON) 4 mg/mL injection Inject 1 mL (4 mg total) into the muscle as needed (Signs and symtpoms of severe adrenal insufficiency). 3 ml syringe with 18 g needle  to draw  X 10 21 g 1 inch needle to inject x 10 1 mL 2    diclofenac sodium (VOLTAREN) 1 % Gel Apply 2 g topically 3 (three) times daily as needed (apply to affected joint).      DULoxetine (CYMBALTA) 30 MG capsule TAKE 2 CAPSULES (60 MG TOTAL) BY MOUTH ONCE DAILY. 180 capsule 1    ERGOCALCIFEROL, VITAMIN D2, (VITAMIN D ORAL) Take 1 capsule by mouth nightly.       ferrous sulfate 324 mg (65 mg iron) TbEC Take 325 mg by mouth nightly.       hydrocortisone (CORTEF) 10 MG Tab TAKE 1& 1/2 TABLETS BY MOUTH IN THE MORNING AND 1/2 TABLET IN THE AFTERNOON. DOUBLE THE DOSE IN CASE OF ILLNESS. 75 tablet 2    hyoscyamine (LEVSIN/SL) 0.125 mg Subl PLACE 1 TABLET (0.125 MG TOTAL) UNDER THE TONGUE EVERY 4 (FOUR) HOURS AS NEEDED. 360 tablet 2    levothyroxine (SYNTHROID) 100 MCG tablet TAKE 1 TABLET BY MOUTH EVERY DAY BEFORE BREAKFAST 90 tablet 0    loratadine-pseudoephedrine  mg (CLARITIN-D 24-HOUR)  mg per 24 hr tablet Take 1 tablet by mouth as needed.       mupirocin (BACTROBAN) 2 % ointment 2 (two) times daily. Apply to affected area      phenytoin (DILANTIN) 100 MG ER capsule Take 2 capsules (200 mg total) by mouth nightly. 60 capsule 11    XARELTO 20 mg Tab TAKE 1 TABLET BY MOUTH EVERY DAY 90 tablet 4     No current facility-administered medications for this visit.       Review of patient's allergies indicates:   Allergen Reactions    Neurontin  "[gabapentin]     Lactose     Soap Hives     Able to tolerate Dove only        Family History: (mental illness, substance abuse, relationships, etc.)    Paternal: Pts father and mother got  when she was 9 months old. "My father left when I was [age]".     Maternal: Pts mother struggled with depression and health issues.     Siblings: Pt has one sister and three half siblings.     Children: Pt has two children, a daughter and a son who are both 25.     Psychiatric History/Previous Substance Abuse TX (incluse response to past substance abuse tx): Deferred     Past Psychiatric History: No     Is pt currently in treatment with a therapist or psychiatrist? The pt is not currently seeing a therapist of psychiatrist. Pt has seen a therapist in the past      Jain/Spiritual Orientation:Shinto    Sexual/Physical Abuse (indicate if patient is abuser or the abused): Deferred     Sexual Orientation and History: Pt lives with . Pt's  retired two years ago.      History:  No    Employment History: Pt used to work as a school psychologist. Pt is currently a  for musicals.     Legal Issues: No     Financial Status: Finances are not a source of stress.     Social, Peer Group, Living Situation, and Living Environment: Pt lives with  who has recently retired. Pt has a few close friends and feels good about her social connections.     Leisure and Recreation Activities: Pt loves reading, movies, and musicals. Pt used to like shopping but says that she doesn't have the energy to shop anymore. Pt would be able to travel more that her  is retired.     Nutrition: Pt said that she has struggled with an eating disorder over the years. Pt says she doesn't really like to eat since she is unable to smell or taste.     Sleep: Pt says sleep varies-- sometimes has difficulty falling and staying asleep.     Exercise: Pt says she doesn't exercise at all, "I'm bad about that". Pt says " "that she avoids going on walk because of her anxiety about needing to use the bathroom.     Stressors:Health Problems    Substance Use History: (include age of onset, duration, intensity, patterns of use, relapse history, and consequences of use for each substance): Pt doesn't drink alcohol and does not smoke.     Any Other Addictive Behaviors: No     Motivation for change:  Yes    Impressions: Pt has been dealing with cancer and other health issues for over three decades. Pt like to be able manage pain better and, in turn, "function more normally".     Clinical diagnosis/priority: 56 y/o presents for discharge from Functional Restoration Program. Pt benefited from group dynamic, titrated exercise, CBT and chronic pain education.     Psychosocial/cultural factors: Pt's son lives in [place] and works at [employer]. Pt would like to be able to visit him and be able to walk around [place] with more ease. Pt's  is newly retired and she would prefer if he would take up some hobbies and/or get out of the house more.     Current level of functioning: Pt is currently at a moderate level of functioning.     "

## 2023-11-22 NOTE — PROGRESS NOTES
"[facility]  Functional Restoration Program  Occupational Therapy Treatment Note and Discharge Summary    Name: Thelma Nguyen  MRN:[id number]  Encounter Date: 11/27/2023    Diagnosis:   Encounter Diagnoses   Name Primary?    Pain aggravated by activities of daily living Yes    Poor trunk control     Poor posture      Referring provider: Bibi Alvarado NP    Physician Orders: eval and treat; FRP  Medical Diagnosis:   M54.16 (ICD-10-CM) - Lumbar radiculopathy  R68.89 (ICD-10-CM) - Decreased motor activity R26.9 (ICD-10-CM) - Abnormality of gait   Evaluation Date: 8/2/2023  Insurance Authorization Period Expiration: 6/9/2024  Plan of Care Certification Period: 12/22/2023  Visit # / Visits authorized: 15/20 (plus eval)    Precautions:  Standard and Fall, Orthostatic Hypotension     Time In: 12:31  Time Out: 13:31  Total Billable Time: 60 minutes    SUBJECTIVE     She reports: "I was sore after my fall, and haven't been able to do as much, but I am on the uphill again". "My trike should come soon". "I was happy not being dependent on others [place], and my son was happy with it".    Functional change: noticing pain ebbing/flowing and improving with repositioning/breaks; takes breaks more often. Laundry and dishes still difficult. She still has a fear of falling.  Noticing guarding in body. Improved planning for trips/outings. Cooking more, doing meal prep.     Pain:       Functional Pain Scale Rating 0-10: within the last 24 hours; currently 6/10  Pain Rating 8/2/2023 10/11/2023 11/27/2023   Average 4/10 6/10 4-5   At Worst 4/10 8/10 8   At Best 2/10 6/10 3   Composite Score 3.33/10  6.67/10 5-5.33/10   [BONNIE  is 1 point or 15-20% change in interference composite score (Israel et al. 2008)]  Description: Aching and Dull /c elements of sharp pain /c activity   Aggravating Factors: Standing, Bending, Walking, and Lifting  Easing Factors: heating pad, " "stretching    Patient Specific Activities based on Personal goals:  Activity  9/25/2023 10/11/2023  10/27/2023  11/27/2023    Performance (P) Satisfaction (S) P S P S P S   Walk in [facility] 1  1 3 3 5 8 3 4  Had stomach issues affecting satisfaction.    2.  showering 5 5 3 "I haven't been doing it" 3 5 5 VOR HEP 5  palming Looking up hurts  5 "I'm just indifferent to it"   3.  Grocery shopping 6 4 5 5 8 8 Short trips vs weekly shopping  8 vs 4 6-7 "getting there, figuring it out. Frustrated with needing to remind spouse to    4.  Travel 6 5 5 5 7 6 6 7   5.  Cooking/meal prep. 7 (sits down)  6 4 5 7 7 6 8                 Total Score 5  4.2  4 4.2 6.4 6.8 4.8-5.6 6-6.2      Patient Specific Activity Scoring Scheme for Performance     1        2        3        4        5        6        7        8        9        10     Unable/Unsatisfied                                          Able/Satisfied                                                                            Objective       Upper Extremity Strength - Manual Muscle Testing   8/2/2023 11/27/2023   Motion Tested Right Left  RIGHT LEFT   Shoulder Flexion 4+/5 4/5 5/5 5/5   Shoulder Extension 5/5 5/5 5 5   Shoulder Abduction 4-/5 4-/5 4 4+   Shoulder IR 5/5 5/5 5 5   Shoulder ER 5/5 4+/5 5 4+   Elbow Flexion 4+/5 4+/5 5 5   Elbow Extension 4+/5 4+/5 5 5         Strength (in pounds, rung II, average of three trials)    8/2/2023 10/11/2023  11/27/2023   Right hand  45.67 lbs 49 lb 45   Left hand  40.33 lbs 35 lbs  32.33   [norms for women aged 55-59: R=57.3 +/-12.5; L=47.3 +/-11.9 (Elisha et al, 1985)]      Functional Strength Test:  Pile Testing: Lifting  (Pounds/Heart rate)   GAP 8/2/2023  (#/HR/JANET) 10/13/2023  11/27/2023    Repetitive Floor to Waist      8/EMA/3 8/85/3 8/137/3   Repetitive Waist to Shoulder    8/133/2 8/120/4 8/138/5   1- Time Maximum     10/130/3 15/113/4 15/76/2   Carry-2 Handed (40ft)     7/134/3 16/122/5-6 " "16/81/3   Work demand Level   Sedentary    Sedentary-light Sedentary-light   Reason for Stop point: eval, 11/27/2023: Increased time required for completing repetitions, Inability to maintain proper body mechanics, Increased discomfort, Fear of increased pain.  The work demand level listed above is based on the physical performance screening test performed. More comprehensive physical testing integrated with clinical findings would be required to derived an accurate work capacity.      Balance  5 times sit-stand (adults 18-63 y/o) 8/2/2023 10/11/2023  11/27/2023   >12 sec= fall risk for general elderly  >16 sec= fall risk for Parkinson's disease  >10 sec= balance/vestibular dysfunction (<61 y/o)  >14.2 sec= balance/vestibular dysfunction (>61 y/o)  >12 sec= fall risk for CVA 19.38 seconds     (with UE used to push up from chair) 16.94 seconds      (With OUT UE to push up from chair)   13.93    (Without UE used)      Endurance Testing: Modified Ramp Treadmill Test    GAP 8/2/2023 10/11/2023  11/27/2023   Minutes Completed  2 minutes 6 seconds 3 min  3 minutes 7 seconds   % Grades  5 % 10 %  10%   Speed (mph)  1.3 mph 1.7 mph  1.7   METS 3 4  4    bpm 128 bpm  135   Barby Rating Perceived Exertion Scale   4 5  7 ( I didn't think of the BARBY scale; I was focussed on keeping my feet going").   Reason for stop point: eval: Decline in maintaining pace safely . Initiated recovery using standing  pose.       Limitation/Restriction for FOTO NOC Survey     Therapist reviewed FOTO scores for Thelma Nguyen on 11/27/2023    FOTO documents entered into EPIC - see Media section.     Limitation Score: 51 %                 No environmental, cultural, spiritual, developmental or education needs expressed or noted.    Treatment     Valeria received the treatments listed below:     Therapeutic activities and functional lifting activities in order to increase participation in, endurance for, and performance with " vocational, selfcare ADLs, and leisure activities in order to improve quality of life, for 60 minutes, including:    Taking of measures, review of activities related to personal goals.     No environmental, cultural, spiritual, developmental or education needs expressed or noted.    Patient Education and Home Exercises   Education provided:   - Progress towards goals   - importance of completion of exercises and activities outside of session to attain goals.   - proper posture and body mechanics.  - anticipated muscular soreness following lift testing and strategies for management including stretching, using ice, scheduled rest.  - Functional pain scale  - JANET rate of perceived exertion scale.   - pain cycle  - pursed lip and diaphragmatic breathing techniques  - hip hinge  - increasing step length  - palming, doing vestibular therex daily from earlier PT session.  - benefits of martial arts to learn tuck/roll    Written Home Exercises Provided: Patient instructed to cont prior HEP.  Exercises were reviewed and Valeria was able to demonstrate them prior to the end of the session. Valeria demonstrated fair understanding of the education provided.   Added 10/23/2023: Supine on towel roll .     See EMR under Patient Instructions for exercises provided during therapy sessions. Patient education also located in FRP binder provided on day 1 of the cohort.     Assessment     Ms. Nguyen presented with report of not having been able to get as much accomplished due to set back with recent fall. Reports still difficulties with hyudration, but doing LE therex prior to moving, as well as isometric UE palm pushes. Open to returning to doing vestibular HEP as received in previous PT encounter.     Perception of function did not improved as decline in scores of performance and satisfaction of activities related to personal goals, and no % increase per FOTO since last month.    Measures taken show Valeria is improving, with 5TST <14  seconds. With endurance testing, Valeria not using JANET scale to pace self with endurance testing, as focussed on legs moving; she realizes this happens also when following others when walking, vs her setting the pace, and although at [employer] she is not walking as much due to increased independence with use of scooter, not satisfied with current status. However, with functional lifting, she is able to manage similar weights but with increased ease, as she is able to sequence technique and application of body mechanics.    Despite ongoing issues, is managing chronic pain and applying tools/techniques learned, and as such, has met goal for FRP program participation. She is in agreement with discharge today.     Goals:     SHORT Term goals: In 3 weeks, patient will:  Status of goal, reviewed on 11/27/2023:   Implements correct use of breathing techniques during functional lifting tasks, with minimal cues needed. MET   Utilizes JANET rate of exertion scale to pace performance with functional lifting tasks, and implements self-care strategies during activity participation to manage pain. NOT MET when distracted by walking   Verbalize understanding of energy conservation and pacing strategies during daily habits, roles, and routines in order to improve tolerance for work and home demands.  MET   Completes 5x sit to stand test in 16 seconds or less to improve ability to participate in community mobility.   MET    Demonstrate increased positional tolerance to the level needed for work, home, and community activities (standing in cue at social event, managing supplies for outing, streneous IADL), as evidenced by having a METS level of 3. Met 10/11/2023    Demonstrate proper body mechanics with functional lifting tasks (floor to waist, waist to shoulder, maximum lift, and box carry), via teach back method with minimal cues needed. MET   Demonstrate increased functional strength by lifting 13 lbs waist to shoulder for  Detail Level: Detailed management of (I)ADL, including dressing and grooming, placing items in kitchen cabinets, folding towels, and/or managing suitcase when traveling.   NOT MET   Demonstrate increased functional strength by lifting 18 lbs floor to waist to meet demand of work/home routine, including lifting groceries, managing laundry, and/or managing suitcase when traveling.   NOT MET   Tolerate Home Activity Plan (HAP)/ Home Exercise Program (HEP) to promote safety and independence with ADL, with report of completion of at least 2 out of 4 days outside of program participation.  MET   Show improved perception of own abilities as evidenced by increase of FOTO scores to established MDC value and perception of performance ratings regarding personal long term goals.  MET         MIDTERM goals: In 9 weeks, patient will: Status of goal, reviewed on 11/27/2023   Report implementation of application of mindfulness, stretching, and other coping strategies for improved participation in daily routine. MET   Verbalize functional application of lifting techniques in daily life (golfers lift, floor to waist, waist to shoulder). MET   Completes 5x sit to stand test in 12 seconds or less to improve ability to participate in community mobility.  NOT MET   Applies functional application of lifting techniques (golfer's lift, floor to waist, waist to shoulder) in activities of daily life, per patient report.  MET   Demonstrate physical capacities consistent with a Light (#20 max, frequent lifting/carrying #10, 2.5 METS  demand level, as needed to perform activities to be successful in roles of life, regarding Part Time Employment, Household Management, and Community Participation. NOT MET   Have an improvement of 3 points in 2/5 personal goals in rating of  performance.  NOT MET   Show improved perception of own abilities as evidenced by increase of FOTO scores to established MC II value and perception of performance ratings regarding personal long term  Quality 226: Preventive Care And Screening: Tobacco Use: Screening And Cessation Intervention: Patient screened for tobacco use and is an ex/non-smoker goals.  NOT MET      LONG term goals = Patient Specific Goals:  Vocational: able to carry supplies   Recreational : walking around [employer], travel   Daily Living Activities: cooking, laundry, groceries, showering   Measured by patient's self-reported performance and satisfaction of personal FRP goals, listed above in subjective section.   Total Score = sum of the activity performance scores / number of activitie  Minimum detectable change (90%CI) for average score = 2 points  Minimum detectable change (90%CI) for single activity score  = 3 points    Plan     Discharge from OT at this time.     NIKKI Lee, OTR/L, CEAS-I  11/27/2023          Quality 130: Documentation Of Current Medications In The Medical Record: Current Medications Documented Quality 431: Preventive Care And Screening: Unhealthy Alcohol Use - Screening: Patient not identified as an unhealthy alcohol user when screened for unhealthy alcohol use using a systematic screening method

## 2023-11-27 ENCOUNTER — CLINICAL SUPPORT (OUTPATIENT)
Dept: REHABILITATION | Facility: OTHER | Age: 58
End: 2023-11-27
Payer: COMMERCIAL

## 2023-11-27 DIAGNOSIS — Z74.09 IMPAIRED FUNCTIONAL MOBILITY, BALANCE, GAIT, AND ENDURANCE: ICD-10-CM

## 2023-11-27 DIAGNOSIS — R29.898 DECREASED STRENGTH OF TRUNK AND BACK: Primary | ICD-10-CM

## 2023-11-27 DIAGNOSIS — R52 PAIN AGGRAVATED BY ACTIVITIES OF DAILY LIVING: Primary | ICD-10-CM

## 2023-11-27 DIAGNOSIS — R29.818 POOR TRUNK CONTROL: ICD-10-CM

## 2023-11-27 DIAGNOSIS — R29.3 POOR POSTURE: ICD-10-CM

## 2023-11-27 PROCEDURE — 97110 THERAPEUTIC EXERCISES: CPT | Performed by: PHYSICAL MEDICINE & REHABILITATION

## 2023-11-27 PROCEDURE — 97530 THERAPEUTIC ACTIVITIES: CPT | Performed by: PHYSICAL MEDICINE & REHABILITATION

## 2023-11-27 PROCEDURE — 97530 THERAPEUTIC ACTIVITIES: CPT

## 2023-11-27 NOTE — PROGRESS NOTES
"[facility]    Functional Restoration Program  Daily Treatment Note  Day 16 - 1 month follow up and discharge    Name: Thelma Nguyen  MRN:[id number]      Therapy Diagnosis:   Encounter Diagnoses   Name Primary?    Decreased strength of trunk and back Yes    Impaired functional mobility, balance, gait, and endurance        Physician: Bibi Alvarado NP    Date: 11/27/2023    Physician Orders: PT Eval and Treat   Medical Diagnosis from Referral:   M54.16 (ICD-10-CM) - Lumbar radiculopathy   R68.89 (ICD-10-CM) - Decreased motor activity   R26.9 (ICD-10-CM) - Abnormality of gait      Evaluation Date: 8/2/2023  Authorization Period Expiration: 12/31/23  Plan of Care Expiration: 12/22/23  Visit # / Visits authorized: 16/ 20     FOTO: 03/ 03      Precautions:: Standard and Fall  hx of CA (brain /c surgery)      Time In: 1:30 pm   Time Out: 2:45 pm pm     Total Appointment Time (timed & untimed codes): 75 minutes     SUBJECTIVE     Valeria reports no sig change in sx presentation since last visit. She did have one fall.  She reports she felt dizzy.  In hindsight, she will move more before getting up to help prevent falls.   Patient reports trying to stretch daily when not travelling  and attempts at controlled breathing leading, however, notes no sig dec in typical discomfort.  She plans to bring her bands with her when she travels to be consistent with her strengthening.  Patient has bought  a tri-cycle to promote inc activity outside of the home.  She is using theraband for strengthening.      Patient report that she has said "no" to a few projects in an attempt to pace herself.           Current 3/10    Location(s):  LB     OBJECTIVE     Objective Measures updated at progress report unless specified.           Range of Motion - Cervical    AROM AROM AROM     8/2/2023 Reassessment 10/11/23 Reassessment  11/27/23   Flexion WFL  WFL° WFL°   Extension Major P!   mod° WFL°   Side bending " Right mod ° mod° 28, min loss   Side bending Left mod ° mod° 29, min loss   Rotation Right mod P! mod° full°   Rotation Left mod P! mod° full°      Range of Motion - Lumbar    8/2/2023  10/11/23 11/27/23      ROM Loss ROM Loss ROM Loss   Flexion minimal loss  min loss  min loss   Extension moderate loss P!  mod loss  min loss   Side bending Right within functional limits  WFL  WFL   Side bending Left within functional limits  WFL   WFL   Rotation Right minimal loss  min loss  WFL   Rotation Left minimal loss  min loss   WFL      Strength Testing             8/2/2023 Reassessment 10/11/23 Reassessment  11/27/23   Motion Tested               Lower Extremity R L R L R L   Hip Flexion 4/5 4/5  4/5 4/5   4+/5 4+/5   Hip Extension 3+/5 3+/5  3+/5 3+/5        Hip Abduction 3+/5 3+/5  3+/5 3+/5  4/5  4/5   Hip IR 3+/5 3+/5  3+/5 3+/5  4/5  4/5   Hip ER 3+/5 3+/5 3+/5  3+/5  4/5  4/5   Knee Extension 5/5 5/5  5/5 5/5  5/5  5/5   Knee Flexion 5/5 5/5  5/5 5/5  5/5  5/5      Functional Testing       8/2/2023 Reassessment 10/11/23 Reassessment  11/27/23   Timed Up and Go 12.83 sec BUE use  11.24, 10.39, 9.42  sec no BUE use 9.39 sec with no UE    Single Limb Stance R LE < 2 sec 7.48 sec  10 sec with one finger hold and 5 sec with no hold   Single Limb Stance L LE < 2 sec 9.80 sec  10 sec with one finger hold and 5 sec with no hold   2 Minute Walk Test 329 feet 329 ft = 100.5 m 99.1 m   6 Minute Walk Test 959 ft = 292 m 950 ft = 290 m 315.12 meters   Self Selected Walking Speed 0.811 m/sec 0.81 m/sec .88 m/sec      GAIT:  Assistive Device used: none  Level of Assistance: independent  Patient displays the following gait deviations:  unsteady gait, decreased step length, dec arms swing              Limitation/Restriction for FOTO LUMBAR Survey     Therapist reviewed FOTO scores for Thelma Nguyen on 8/2/2023.   FOTO documents entered into EPIC - see Media section.     INTAKE Score 8/02/2023: 42 (58% limitation)    09/29/23: 43   10/9/23: 46 (54% limitation)   10/11/23: 47  10/27/23: 47   11/27/23: 53% function or 47% limited     Predicted Score: 51% function, goal met               Treatment       Valeria received the Individualized Treatments listed below:     Valeria participated in dynamic functional therapeutic activities to improve functional performance for 45 minutes, including:      Full body functional mobility w/ 3-10 sec hold technique &/or 3-5 repetition technique to improve functional performance. In order to:  Improve driving safety, visual & spatial awareness:  Cervical flx/ext  Cervical side bending  Cervical rotation  Cervical protraction/retraction  Improve lifting mechanics, showering/bathing, dressing, cooking, reaching, pushing & fine motor skills  Shld rolls  Shld depression/elevation  Scapular retraction/protraction/scaption  Posterior shld stretch (cross arm)  Shld ER stretch (chicken wing)  Elbow flx/ext  Forearm supination/pronation  Wrist flx/ext  Open/close hands/fingers  Improve bed mobility & rolling, bending over, cooking & cleaning:  Seated trunk rotations  Seated trunk/thoracic ext/flx  Improve functional gait, squatting, sitting tolerance, functional balance:   Seated marches & knee to chest  Seated knee flx/ext  Seated hip ER/piriformis stretch  Seated hamstring stretch  Seated toe raise to heel raise   Seated ankle circles each way  Improve overhead reaching/activities, standing tolerance:  Standing lumbar extensions  Standing lateral leaning stretch  Standing quad stretch (UE support for balance)    SOC x 20 for postural recognition and correction when in theatre seats, cue for pelvic tilt      Readminister FOTO (see above)   Walking test, balance test  Walked 6 min with .88 m/sec walk speed and exertion 3/10       Diaphragmatic breathing:   Verbal cues for unlabored, long & relaxed breathing w/ increased time for exhalation  Patient verbalize inhalation activating sympathetic nervous  system, exhalation activating parasympathetic nervous system  Performed seated     Valeria participated in neuromuscular re-education activities to improve: Balance, Coordination, Kinesthetic, Sense, Proprioception, and Posture for 7 minutes. The following activities were included:    Supine    Dying Bug - just legs x15   Dying bug x10  PPT x 10 cue   TA activation /c Tball in hooklying x 10 10 sec hold  TA activation /c Tball in hooklying /c SLR x 10 B    PPT x 5 patient reminded of self cue for TA activation    Bridging 2x10 reps     Balance training walking on foam pad   Forward 2 laps   Step up on foam pads   Side steps   Side steps with step up      Valeria participated in therapeutic exercises to develop strength, endurance, ROM, flexibility, posture, and core stabilization for 25 minutes reviewing HEP and strengthening for home.      Free Weight Exercises Weight (lbs) Sets Repetitions Barby Exertion Scale Rating   Bicep Dumbbell Curls 6 1 20 4   Bent Over Tricep Kickback       Prone Single Row 3 1 20 3   Supine Pec Fly B 3 ea hand 1 20 4   Supine Chest Press B 3 ea hand 1 20 4   Overhead Shoulder Press 3 ea 1 15 5   Reverse Back Fly       Sit to Stand Unsupported 0 1 15 4   Wall Squats       Prone hip ext  3# 2 10 2   Prone knee flex  3# 2 10 2   Prone hip IR/EF 3# 2 10 2   Supine SLR  3# 1 10 2           Patient Education and Home Exercises     Home Exercises Provided and Patient Education Provided     Education provided:   - FRP Educational Topics: Modified Barby Exertion Scale, Physical & Psychological Pain Cycles, Z-Lying for Pain Relief/Relaxation, Diaphragmatic Breathing, Activity Pacing for Pain & Debility , Central Sensitization , and Posture & Body Mechanics    Written Home Exercises Provided: Patient instructed to cont prior HEP. Exercises were reviewed and Valeria was able to demonstrate them prior to the end of the session.  Valeria demonstrated good  understanding of the education provided. See EMR under  Patient Instructions for information provided during therapy sessions    ASSESSMENT      Patient returns for 1 month follow up.  Reassessment reveals:  -stretching regularly except when travelling and she will bring her stretches with her  -strengthening with bands, again not as consistent with travel and she will bring bands on next trip  -fall, with tolerance fair, and patient learning she will watch for signs, hydrate, and move before getting up  -timed and go 2 sec faster and goal met  -6 min walk test .81 m/sec to .88 m/sec  -balance and single leg stance over all improved  -FOTO scores from 58% limited to 47% limited  -patient has bought tri bike for cardio and safe exercise     GOALS: Pt is in agreement with the following goals:  Goal Progress Towards Goal   SHORT Term: In 3 weeks, pt will:     Verbalize & demonstrate good understanding of resisted training with 3-1-3 second rep for moxabtudso-svjuagqvt-lkcwpvkvh contraction to encourage full muscle recruitment for strength & endurance. MET 10/22/23   Verbalize & demonstrate good understanding of home stretch routine to improve AROM, help decrease pain & improve mobility. MET 10/22/23   Demonstrate proper supine or seated diaphragmatic breathing with decreased chest excursion & emphasis on full abdominal excursion & increased expiration time to promote muscle relaxation & increased parasympathetic activity to improve patient tolerance to activities. MET 11/27/23    Verbalize good understanding of importance of proper posture in resisted exercise, functional activities & ADL's in order to help reduce postural strain, promote proper breathing. MET 10/11/23   Demonstrate good understanding of full body resisted exercises w/ use of pictures &/or verbal cues to promote independence w/ exercise at the end of the program. MET 10/22/23   Verbalize plan for continuing resisted exercises w/ specific location (i.e. commercial gym, home, community fitness center)  to  "incorporate all major muscle groups to maintain & progress therapeutic functional improvements. MET 10/27/23   Verbalize difference between  "hurt vs harm"  to demonstrate understanding of fear avoidance in pain cycle.  MET 11/27/23         Midterm: In 8 weeks, pt will:     Verbalize good understanding of activities planning/scheduling (stretching, mindfulness, resisted training, & cardio) prescribed by PT/OT following the end of the program to sustain & progress functional improvements. MET 11/27/23   Perform selected resisted training w/ minimal to no cuing in therapy session to be independent w/ resisted strengthening. Using bands at home 11/27/23 MET   Demonstrate improved symptom self-management w/ posture/positioning and mechanical movements and/or implementation of appropriate modalities throughout a typical day.  MET 11/27/23   Demonstrate independence w/ home stretch routine to improve AROM, help decrease pain & improve mobility. MET 11/27/23         Long Term: In 12 weeks, pt will:     Perform selected cardio & resisted training independently to continue safe regular performance of daily exercise. PROGRESSING 10/11/23 working on it 11/27/23   Improve 2 Minute Walk Test (MWT) to 400 feet and 6 MWT to 1200 feet or greater to improve gait speed and mobility. 11/27/23 Almost met at 1023 feet    Perform single leg stance 10 seconds or greater bilaterally to improve safety and balance in ADLs. MET 10/11/23   Demonstrate Timed Up & Go (with appropriate assistive device, if necessary) of 10 seconds or less to decrease fall risk and improve functional mobility. MET 11/27/23   Score 45 % or less on LUMBAR FOTO to indicate significant functional improvements in impaired functions secondary to pain. MET 11/27/23              PLAN   Reviewed HEP, components of health, and patient is being DC on HEP.  Jesusita Reyes, PT    "

## 2023-12-11 ENCOUNTER — OFFICE VISIT (OUTPATIENT)
Dept: INTERNAL MEDICINE | Facility: CLINIC | Age: 58
End: 2023-12-11
Payer: COMMERCIAL

## 2023-12-11 ENCOUNTER — LAB VISIT (OUTPATIENT)
Dept: LAB | Facility: HOSPITAL | Age: 58
End: 2023-12-11
Attending: INTERNAL MEDICINE
Payer: COMMERCIAL

## 2023-12-11 VITALS
SYSTOLIC BLOOD PRESSURE: 154 MMHG | HEART RATE: 102 BPM | DIASTOLIC BLOOD PRESSURE: 92 MMHG | WEIGHT: 139.31 LBS | BODY MASS INDEX: 21.87 KG/M2 | HEIGHT: 67 IN | OXYGEN SATURATION: 100 %

## 2023-12-11 DIAGNOSIS — G62.0 DRUG-INDUCED PERIPHERAL NEUROPATHY: ICD-10-CM

## 2023-12-11 DIAGNOSIS — E27.49 SECONDARY ADRENAL INSUFFICIENCY: ICD-10-CM

## 2023-12-11 DIAGNOSIS — N18.30 CKD (CHRONIC KIDNEY DISEASE), STAGE III: ICD-10-CM

## 2023-12-11 DIAGNOSIS — E23.0 PANHYPOPITUITARISM: ICD-10-CM

## 2023-12-11 DIAGNOSIS — I95.1 ORTHOSTATIC HYPOTENSION: Primary | ICD-10-CM

## 2023-12-11 DIAGNOSIS — E03.8 SECONDARY HYPOTHYROIDISM: ICD-10-CM

## 2023-12-11 PROBLEM — C71.9 GLIOBLASTOMA MULTIFORME OF BRAIN: Status: RESOLVED | Noted: 2017-09-25 | Resolved: 2023-12-11

## 2023-12-11 LAB
ALBUMIN SERPL BCP-MCNC: 3.8 G/DL (ref 3.5–5.2)
ALBUMIN SERPL BCP-MCNC: 3.8 G/DL (ref 3.5–5.2)
ALP SERPL-CCNC: 87 U/L (ref 55–135)
ALP SERPL-CCNC: 87 U/L (ref 55–135)
ALT SERPL W/O P-5'-P-CCNC: 13 U/L (ref 10–44)
ALT SERPL W/O P-5'-P-CCNC: 13 U/L (ref 10–44)
ANION GAP SERPL CALC-SCNC: 15 MMOL/L (ref 8–16)
ANION GAP SERPL CALC-SCNC: 15 MMOL/L (ref 8–16)
AST SERPL-CCNC: 17 U/L (ref 10–40)
AST SERPL-CCNC: 17 U/L (ref 10–40)
BASOPHILS # BLD AUTO: 0.05 K/UL (ref 0–0.2)
BASOPHILS NFR BLD: 0.7 % (ref 0–1.9)
BILIRUB SERPL-MCNC: 0.2 MG/DL (ref 0.1–1)
BILIRUB SERPL-MCNC: 0.2 MG/DL (ref 0.1–1)
BUN SERPL-MCNC: 40 MG/DL (ref 6–20)
BUN SERPL-MCNC: 40 MG/DL (ref 6–20)
CALCIUM SERPL-MCNC: 9.8 MG/DL (ref 8.7–10.5)
CALCIUM SERPL-MCNC: 9.8 MG/DL (ref 8.7–10.5)
CHLORIDE SERPL-SCNC: 102 MMOL/L (ref 95–110)
CHLORIDE SERPL-SCNC: 102 MMOL/L (ref 95–110)
CO2 SERPL-SCNC: 22 MMOL/L (ref 23–29)
CO2 SERPL-SCNC: 22 MMOL/L (ref 23–29)
CREAT SERPL-MCNC: 1.8 MG/DL (ref 0.5–1.4)
CREAT SERPL-MCNC: 1.8 MG/DL (ref 0.5–1.4)
DIFFERENTIAL METHOD: ABNORMAL
EOSINOPHIL # BLD AUTO: 0.1 K/UL (ref 0–0.5)
EOSINOPHIL NFR BLD: 1.7 % (ref 0–8)
ERYTHROCYTE [DISTWIDTH] IN BLOOD BY AUTOMATED COUNT: 12.8 % (ref 11.5–14.5)
EST. GFR  (NO RACE VARIABLE): 32.7 ML/MIN/1.73 M^2
EST. GFR  (NO RACE VARIABLE): 32.7 ML/MIN/1.73 M^2
GLUCOSE SERPL-MCNC: 99 MG/DL (ref 70–110)
GLUCOSE SERPL-MCNC: 99 MG/DL (ref 70–110)
HCT VFR BLD AUTO: 35.1 % (ref 37–48.5)
HGB BLD-MCNC: 11.3 G/DL (ref 12–16)
IMM GRANULOCYTES # BLD AUTO: 0.02 K/UL (ref 0–0.04)
IMM GRANULOCYTES NFR BLD AUTO: 0.3 % (ref 0–0.5)
LYMPHOCYTES # BLD AUTO: 2.5 K/UL (ref 1–4.8)
LYMPHOCYTES NFR BLD: 32.5 % (ref 18–48)
MCH RBC QN AUTO: 31.4 PG (ref 27–31)
MCHC RBC AUTO-ENTMCNC: 32.2 G/DL (ref 32–36)
MCV RBC AUTO: 98 FL (ref 82–98)
MONOCYTES # BLD AUTO: 0.5 K/UL (ref 0.3–1)
MONOCYTES NFR BLD: 6.6 % (ref 4–15)
NEUTROPHILS # BLD AUTO: 4.4 K/UL (ref 1.8–7.7)
NEUTROPHILS NFR BLD: 58.2 % (ref 38–73)
NRBC BLD-RTO: 0 /100 WBC
PLATELET # BLD AUTO: 257 K/UL (ref 150–450)
PMV BLD AUTO: 10.5 FL (ref 9.2–12.9)
POTASSIUM SERPL-SCNC: 5.2 MMOL/L (ref 3.5–5.1)
POTASSIUM SERPL-SCNC: 5.2 MMOL/L (ref 3.5–5.1)
PROT SERPL-MCNC: 7.9 G/DL (ref 6–8.4)
PROT SERPL-MCNC: 7.9 G/DL (ref 6–8.4)
RBC # BLD AUTO: 3.6 M/UL (ref 4–5.4)
SODIUM SERPL-SCNC: 139 MMOL/L (ref 136–145)
SODIUM SERPL-SCNC: 139 MMOL/L (ref 136–145)
T4 FREE SERPL-MCNC: 0.71 NG/DL (ref 0.71–1.51)
WBC # BLD AUTO: 7.59 K/UL (ref 3.9–12.7)

## 2023-12-11 PROCEDURE — 1159F MED LIST DOCD IN RCRD: CPT | Mod: CPTII,S$GLB,, | Performed by: INTERNAL MEDICINE

## 2023-12-11 PROCEDURE — 3077F PR MOST RECENT SYSTOLIC BLOOD PRESSURE >= 140 MM HG: ICD-10-PCS | Mod: CPTII,S$GLB,, | Performed by: INTERNAL MEDICINE

## 2023-12-11 PROCEDURE — 99999 PR PBB SHADOW E&M-EST. PATIENT-LVL IV: CPT | Mod: PBBFAC,,, | Performed by: INTERNAL MEDICINE

## 2023-12-11 PROCEDURE — 3008F BODY MASS INDEX DOCD: CPT | Mod: CPTII,S$GLB,, | Performed by: INTERNAL MEDICINE

## 2023-12-11 PROCEDURE — 99215 PR OFFICE/OUTPT VISIT, EST, LEVL V, 40-54 MIN: ICD-10-PCS | Mod: S$GLB,,, | Performed by: INTERNAL MEDICINE

## 2023-12-11 PROCEDURE — 3077F SYST BP >= 140 MM HG: CPT | Mod: CPTII,S$GLB,, | Performed by: INTERNAL MEDICINE

## 2023-12-11 PROCEDURE — 1159F PR MEDICATION LIST DOCUMENTED IN MEDICAL RECORD: ICD-10-PCS | Mod: CPTII,S$GLB,, | Performed by: INTERNAL MEDICINE

## 2023-12-11 PROCEDURE — 3008F PR BODY MASS INDEX (BMI) DOCUMENTED: ICD-10-PCS | Mod: CPTII,S$GLB,, | Performed by: INTERNAL MEDICINE

## 2023-12-11 PROCEDURE — 80053 COMPREHEN METABOLIC PANEL: CPT | Performed by: INTERNAL MEDICINE

## 2023-12-11 PROCEDURE — 3080F PR MOST RECENT DIASTOLIC BLOOD PRESSURE >= 90 MM HG: ICD-10-PCS | Mod: CPTII,S$GLB,, | Performed by: INTERNAL MEDICINE

## 2023-12-11 PROCEDURE — 1160F RVW MEDS BY RX/DR IN RCRD: CPT | Mod: CPTII,S$GLB,, | Performed by: INTERNAL MEDICINE

## 2023-12-11 PROCEDURE — 85025 COMPLETE CBC W/AUTO DIFF WBC: CPT | Performed by: INTERNAL MEDICINE

## 2023-12-11 PROCEDURE — 84439 ASSAY OF FREE THYROXINE: CPT | Performed by: INTERNAL MEDICINE

## 2023-12-11 PROCEDURE — 99999 PR PBB SHADOW E&M-EST. PATIENT-LVL IV: ICD-10-PCS | Mod: PBBFAC,,, | Performed by: INTERNAL MEDICINE

## 2023-12-11 PROCEDURE — 3080F DIAST BP >= 90 MM HG: CPT | Mod: CPTII,S$GLB,, | Performed by: INTERNAL MEDICINE

## 2023-12-11 PROCEDURE — 1160F PR REVIEW ALL MEDS BY PRESCRIBER/CLIN PHARMACIST DOCUMENTED: ICD-10-PCS | Mod: CPTII,S$GLB,, | Performed by: INTERNAL MEDICINE

## 2023-12-11 PROCEDURE — 99215 OFFICE O/P EST HI 40 MIN: CPT | Mod: S$GLB,,, | Performed by: INTERNAL MEDICINE

## 2023-12-11 PROCEDURE — 36415 COLL VENOUS BLD VENIPUNCTURE: CPT | Performed by: INTERNAL MEDICINE

## 2023-12-11 RX ORDER — LEVOTHYROXINE SODIUM 100 UG/1
100 TABLET ORAL
Qty: 90 TABLET | Refills: 3 | Status: SHIPPED | OUTPATIENT
Start: 2023-12-11 | End: 2024-02-27

## 2023-12-11 NOTE — PROGRESS NOTES
"    CHIEF COMPLAINT     Chief Complaint   Patient presents with    Fall       HPI     Thelma Nguyen is a 57 y.o. female  history of glioblastoma s/p resection, complicated by panhypopituitarism and neuropathy, IBS, CKD 3, depression, hx of skin cancer and factor 5 leiden here today for     Has had approximately 6 episodes where she has fallen since our last visit. Reports that after sitting for prolonged period of time, she gets suddenly lightheaded and has fallen. Reports that if she sits down she can abort fainting spells. Reoriented fairly quickly after episode.    Reports she doesn't always get warning prior to stopping. Reports compliance with hydrocortisone    Personally Reviewed Patient's Medical, surgical, family and social hx. Changes updated in "[employer]".  Care Team updated in Epic    Review of Systems:  Review of Systems   Neurological:  Positive for syncope and light-headedness.       Health Maintenance:   Reviewed with patient  Due for the following:      PHYSICAL EXAM     BP (!) 154/92 (BP Location: Right arm, Patient Position: Sitting, BP Method: Medium (Manual))   Pulse 102   Ht 5' 7" (1.702 m)   Wt 63.2 kg (139 lb 5.3 oz)   LMP 10/23/1997 (Exact Date)   SpO2 100%   BMI 21.82 kg/m²     Gen: Well Appearing, NAD  HEENT: PERR, EOMI  Neck: FROM, no thyromegaly, no cervical adenopathy  CVD: RRR, no M/R/G  Pulm: Normal work of breathing, CTAB, no wheezing  Abd:  Soft, NT, ND non TTP, no mass  MSK: no LE edema  Neuro: A&Ox3, gait normal, speech normal  Mood; Mood normal, behavior normal, thought process linear       LABS     Labs reviewed; Notable for    ASSESSMENT     1. Orthostatic hypotension  COMPRESSION STOCKINGS      2. Drug-induced peripheral neuropathy        3. Panhypopituitarism  CBC Auto Differential    Comprehensive Metabolic Panel      4. Secondary hypothyroidism  levothyroxine (SYNTHROID) 100 MCG tablet    T4, FREE    CANCELED: TSH      5. Secondary adrenal insufficiency  " levothyroxine (SYNTHROID) 100 MCG tablet              Plan     Thelma Nguyen is a 57 y.o. female with  history of glioblastoma s/p resection, complicated by panhypopituitarism and neuropathy, IBS, CKD 3, depression, hx of skin cancer and factor 5 leiden   1. Orthostatic hypotension\  Think episodes are orthostatic in nature  Going to give waist high compression garmets  Sent message to endocrinologist about increasing her hydrocortisone  Consider trial of beta blocker in case  Stressed counterpressure maneuvers and sitting down if symptoms are bothersome  - COMPRESSION STOCKINGS    2. Drug-induced peripheral neuropathy  Sx stable follows with neurology    3. Panhypopituitarism  Continue hydrocortisone and levothyroxine.  - CBC Auto Differential; Future  - Comprehensive Metabolic Panel; Future    4. Secondary hypothyroidism  - levothyroxine (SYNTHROID) 100 MCG tablet; Take 1 tablet (100 mcg total) by mouth before breakfast.  Dispense: 90 tablet; Refill: 3  - T4, FREE; Future    5. Secondary adrenal insufficiency  Continue hydrocortisone, sent message to trial alternative dose.  - levothyroxine (SYNTHROID) 100 MCG tablet; Take 1 tablet (100 mcg total) by mouth before breakfast.  Dispense: 90 tablet; Refill: 3    >40 minutes of professional services provided as part of today's visit.      Zachary Bender MD

## 2023-12-11 NOTE — Clinical Note
Patient having increased frequency of orthostatic hypoTN and falls, wanted to know if it was okay to try and increase hydrocortisone and see if we can relieve the some of these symptoms.

## 2023-12-12 ENCOUNTER — PATIENT MESSAGE (OUTPATIENT)
Dept: INTERNAL MEDICINE | Facility: CLINIC | Age: 58
End: 2023-12-12
Payer: COMMERCIAL

## 2023-12-12 DIAGNOSIS — R55 SYNCOPE AND COLLAPSE: Primary | ICD-10-CM

## 2023-12-12 NOTE — ADDENDUM NOTE
Addended by: NORRIS SMILEY on: 12/12/2023 08:12 AM     Modules accepted: Orders     Looks Ok  Stable results.  Please notify patient

## 2023-12-23 ENCOUNTER — PATIENT MESSAGE (OUTPATIENT)
Dept: REHABILITATION | Facility: OTHER | Age: 58
End: 2023-12-23
Payer: COMMERCIAL

## 2023-12-28 ENCOUNTER — OFFICE VISIT (OUTPATIENT)
Dept: NEUROLOGY | Facility: CLINIC | Age: 58
End: 2023-12-28
Payer: COMMERCIAL

## 2023-12-28 VITALS
HEIGHT: 67 IN | SYSTOLIC BLOOD PRESSURE: 146 MMHG | BODY MASS INDEX: 22.4 KG/M2 | HEART RATE: 103 BPM | WEIGHT: 142.75 LBS | DIASTOLIC BLOOD PRESSURE: 83 MMHG

## 2023-12-28 DIAGNOSIS — I95.1 ORTHOSTATIC HYPOTENSION: ICD-10-CM

## 2023-12-28 DIAGNOSIS — R29.6 FALLS FREQUENTLY: ICD-10-CM

## 2023-12-28 DIAGNOSIS — E27.49 SECONDARY ADRENAL INSUFFICIENCY: ICD-10-CM

## 2023-12-28 DIAGNOSIS — G62.9 PERIPHERAL POLYNEUROPATHY: ICD-10-CM

## 2023-12-28 DIAGNOSIS — G40.209 PARTIAL SYMPTOMATIC EPILEPSY WITH COMPLEX PARTIAL SEIZURES, NOT INTRACTABLE, WITHOUT STATUS EPILEPTICUS: Primary | ICD-10-CM

## 2023-12-28 DIAGNOSIS — Z74.09 IMPAIRED FUNCTIONAL MOBILITY, BALANCE, GAIT, AND ENDURANCE: ICD-10-CM

## 2023-12-28 DIAGNOSIS — N18.30 STAGE 3 CHRONIC KIDNEY DISEASE, UNSPECIFIED WHETHER STAGE 3A OR 3B CKD: ICD-10-CM

## 2023-12-28 DIAGNOSIS — Z87.898 HISTORY OF BRAIN TUMOR: ICD-10-CM

## 2023-12-28 PROCEDURE — 3077F PR MOST RECENT SYSTOLIC BLOOD PRESSURE >= 140 MM HG: ICD-10-PCS | Mod: CPTII,S$GLB,,

## 2023-12-28 PROCEDURE — 99215 PR OFFICE/OUTPT VISIT, EST, LEVL V, 40-54 MIN: ICD-10-PCS | Mod: S$GLB,,,

## 2023-12-28 PROCEDURE — 3077F SYST BP >= 140 MM HG: CPT | Mod: CPTII,S$GLB,,

## 2023-12-28 PROCEDURE — 99999 PR PBB SHADOW E&M-EST. PATIENT-LVL IV: ICD-10-PCS | Mod: PBBFAC,,,

## 2023-12-28 PROCEDURE — 3008F BODY MASS INDEX DOCD: CPT | Mod: CPTII,S$GLB,,

## 2023-12-28 PROCEDURE — 1159F PR MEDICATION LIST DOCUMENTED IN MEDICAL RECORD: ICD-10-PCS | Mod: CPTII,S$GLB,,

## 2023-12-28 PROCEDURE — 3079F PR MOST RECENT DIASTOLIC BLOOD PRESSURE 80-89 MM HG: ICD-10-PCS | Mod: CPTII,S$GLB,,

## 2023-12-28 PROCEDURE — 3079F DIAST BP 80-89 MM HG: CPT | Mod: CPTII,S$GLB,,

## 2023-12-28 PROCEDURE — 3008F PR BODY MASS INDEX (BMI) DOCUMENTED: ICD-10-PCS | Mod: CPTII,S$GLB,,

## 2023-12-28 PROCEDURE — 1159F MED LIST DOCD IN RCRD: CPT | Mod: CPTII,S$GLB,,

## 2023-12-28 PROCEDURE — 99215 OFFICE O/P EST HI 40 MIN: CPT | Mod: S$GLB,,,

## 2023-12-28 PROCEDURE — 99999 PR PBB SHADOW E&M-EST. PATIENT-LVL IV: CPT | Mod: PBBFAC,,,

## 2023-12-28 RX ORDER — FLUDROCORTISONE ACETATE 0.1 MG/1
100 TABLET ORAL DAILY
Qty: 30 TABLET | Refills: 0 | Status: SHIPPED | OUTPATIENT
Start: 2023-12-28 | End: 2024-01-19

## 2023-12-28 RX ORDER — PHENYTOIN SODIUM 100 MG/1
200 CAPSULE, EXTENDED RELEASE ORAL NIGHTLY
Qty: 180 CAPSULE | Refills: 3 | Status: ON HOLD | OUTPATIENT
Start: 2023-12-28 | End: 2024-12-27

## 2023-12-28 NOTE — PATIENT INSTRUCTIONS
You came to [facility] because of your seizure disorder. Your seizures are well controlled on dilantin 200mg nightly. Please continue the same medication at the same dose.     To help with the orthostatic hypotension, keep hydrated with electrolyte fluids. Gentle exercise. Compression stockings. Slow position changes.     Do not miss any doses of medication. If a dose of medication is missed, take it as soon as it is remembered even if that means doubling up on the dose. Get regular sleep. Go to sleep at the same time and wake up at the same time every day. People with epilepsy require more sleep than people without epilepsy.  Sleeping 10-12 hours a day can be normal for a person with epilepsy.  Every seizure makes it harder to prevent the next seizure. Epilepsy is associated with SUDEP, or sudden unexpected death in epilepsy.  The risk is significantly higher if convulsive seizures are not well controlled. For more information, check out these websites: https://www.epilepsy.com/, https://www.epilepsyalliance[place].org/, www.[place]-epilepsy.org, www.womenandepilepsy.org.  If you are interested in meeting other individuals in our epilepsy community, please reach out to the [facility] [place] ([phone], [phone], info@[place].org).  They organize many informative and fun activities in the region.  They can provide you invaluable information on how to get access to resources available for patients living with epilepsy as well as a rich community of like-minded individuals who are all learning to cope with the same issues.  It is very important to remember, you are not alone.     Per [place] law, no episodes of loss of consciousness for 6 months before driving.  Avoid dangerous situations.  For example, no baths/pools alone, no heights, no power tools.  Wear a bike helmet.  If breakthrough seizures occur that are different in character, frequency, or duration from normal episodes, please  patient portal me or call the office and we will decide the next steps. If multiple seizures occur in a row without return back to baseline, 911 needs to be called.     Return to clinic in 6 months or sooner with issues.  Please patient portal with any questions or concerns.    Cecile Walton PA-C   Neurology-Epilepsy  [facility]    Forms/Letters/Disability/DMV Paperwork: We understand the importance of filling out forms and providing letters in a timely manner.  However, many of these forms have very tricky language and once an official form is submitted as part of the medical record, it can not be modified or erased.  Please work with us in order to get these forms filled out in the most complete, accurate, and efficient way. 1.  Once you are aware that a form will need to be completed, please make an appointment.  A virtual appointment with Dr. Roca or Misti is perfectly fine. 2.  Please fill out the form as much as you can.  There are many questions that we do not have an answer for.  Please bring a blank copy of the form and your partially filled out form to the clinic visit or send them to us over the portal.  We will complete the form together with you during the clinic visit, sign it, and either return it to you or send it to the correct destination.  Every form will require an appointment however we can fill out multiple forms at once if needed.  Please do not hesitate to reach out with any questions or concerns about this policy.  We are trying to make sure that we have a system in place to meet this need which works for everyone involved.  Thank you for your understanding.

## 2023-12-28 NOTE — PROGRESS NOTES
Name: Thelma Nguyen  MRN:[id number]   CSN: [id number]  Date of service: 12/28/2023  Age:58 y.o.   Gender:female   Referring Physician/Service: No referring provider defined for this encounter.   The patient is here today with: self    Neurology Clinic: Follow Up Visit    CHIEF COMPLAINT:  Remote history of GBM resection with secondary seizures    Interval Events/ROS 12/28/2023:    Current ASM/SEs: phenytoin 200mg qhs; no SE  Breakthrough seizures/events: none  Driving: yes  Sleep: fair  Mood: good, improved     Chronic polyneuropathy. Chronic orthostatic hypotension resulting in falls. Participated in the Functional Restoration Program in October which was helpful. Chronic intermittent headaches. Otherwise, no fever, no cold symptoms, no current headache, no changes in vision, no new weakness, no chest pain, no shortness of breath, no nausea, no vomiting, no diarrhea, no constipation.    Recent Labs   Lab 02/16/22  1158 06/16/22  0814 07/01/22  1542 07/14/22  0951 08/01/22  1028 02/02/23  1618 05/22/23  1027   Phenytoin Lvl  --   --   --   --   --  6.2 L 5.9 L   Phenytoin, Free 2.5 HH 2.5 HH 1.1 0.9 L 1.0  --   --    Phenytoin, Total 29.6 H 20.5 H 9.7 L 8.2 L 7.5 L  --   --      Interval Events/ROS 5/22/2023:    Current ASM/SEs: phenytoin 200mg qhs; no SE   Breakthrough seizures/events: none  Driving: yes  Folic acid: no  Sleep: fair  Mood: good, some anxiety, switched to cymbalta via PMR    Reports a sharp pain behind bilateral eyes w/ upward gaze + sensitive to light. Once triggered, lasts around 1-2 hours. Sometimes relieved with tylenol. Saw ophthalmology 1/2023 with reportedly no issues apart from cataract. IBS. PCP appt 7/2023. Chronic neuropathy. Otherwise, no fever, no cold symptoms, no changes in vision, no new weakness, no chest pain, no shortness of breath, no nausea, no vomiting, no diarrhea, no constipation, no problems walking.     Interval Events/ROS 2/2/2023:    Current ASM/SEs: phenytoin  200mg qhs; SE peripheral neuropathy   Breakthrough seizures/events: none  Driving: yes  Folic acid: no  Sleep: fair; restless legs at night   Mood: good, would like to try decreasing sertraline to 50mg qhs     Last fall was 10/2022 at a football game, felt like she got overheated. No subsequent injury. Otherwise, no fever, no cold symptoms, no headache, no changes in vision, no new weakness, no chest pain, no shortness of breath, no nausea, no vomiting, no diarrhea, no constipation, no problems walking.     Interval Events/ROS 2022:     Tilt table test 7/15 notable for orthostatic hypotension w/ no epileptiform abnormalities or electrographic seizures on EEG. No seizures on phenytoin 200mg qhs. Feels orthostatic symptoms have somewhat improved w/ electrolyte drinks and slow changes in position. No recent falls. Restless leg and peripheral neuropathy have been worse lately. Difficulty sleeping, increased sleep talking. Has noticed mild improvement in mood w/ sertraline. Reports neck pain described as soreness, does not radiate. Has not tried anything for pain. Otherwise, no fever, no cold symptoms, no headache, no changes in vision, no new weakness, no chest pain, no shortness of breath, no nausea, no vomiting, no diarrhea, no problems walking.    HPI 2/15/2022:     Age of first seizure:  (17yo)  Seizure Risk Factors: Left GBM (s/p XRT and Chemo) - in [place] ([facility]) (), no CNS infections, no family history of sz, term  with no prolonged hospitalization   Time of Last Seizure: ?, last nocturnal lip bite around [place]   # of lifetime Seizures: ?maybe 25   Frequency of Seizures: at most 3 seizures in one day, at the worst 1-2 monthly, currently none for years   Seizure Triggers: GBM initially, now unclear   Injuries/Hospitalization for seizures? No injuries, hospitalization twice -  with GBM, [facility]TC  [id number]  Driving? Yes, no issues   Pregnancy? 2 pregnancies, one baby born at 28 weeks  "  Bone Health: Osteopenia on Dexa at 2020      Auras:  Hearing voices, buzzing noise followed by someone's voice (no auditory hallucinations since surgery)     Seizure Events:   1.  Loss of awareness, zoning out, staring, decreased responsiveness, standing still or will run away   2. Generalized convulsion (1995-> gets put back on seizure medication)  3. New episodes, after sitting or lying, when she gets up, tingling of the feet which rises up her body, her legs give out and she might fall, this has been happening almost three years, hit ground, legs like noodles, but no LOC    4. Nocturnal episodes when she will wake up with a lip bite no other signs of seizures -> none since around 2005      Current AED/SEs:  1. Phenytoin 400 mg q.h.s. SE no major issues but does have osteopenia on 2020 scan    Previous AED/SEs or reason for DC.   1. Phenobarbital - cognitive slowing   2. Gabapentin - hands and feet tingle     EEG:  No EEG in the system, does not recall ever having one of these    MRI: 05/2021 brain:  Small left temporoparietal resection  Surveillance scans: no plan currently     Other Allergies: gabapentin caused tingling      AED compliance, adherence: no missed doses     ROS 2/15/2022:  Endo f/u for panhypopituitarism s/p surgery. Factor V Leiden on xarelto syndrome as well as IBS. 1 adult child; 1 child through IVF. Pulled muscle in chest. Some word hesitancy. Hearing aids. Generalized weakness. +nauseated. No vomiting. IBS. Hard to eat. No sense of smell or taste for 30 years. Two years after surgery -> under 100lbs. Unable to eat. Currently cold all the time. Alternating diarrhea and constipations.  Tired with SOB. Started last spring. Anemia. Disoriented in the dark.  Lightheadedness with position changes sometimes resulting in falls.  No changes in vision.  No problems swallowing.  No focal weakness.  Generalized weakness.    EXAM:   - Vitals: BP (!) 146/83   Pulse 103   Ht 5' 7" (1.702 m)   Wt 64.8 kg " (142 lb 12 oz)   LMP 10/23/1997 (Exact Date)   BMI 22.36 kg/m²    - General: Awake, cooperative, NAD  - HEENT: NC/AT  - Neck:  Decreased range of motion. Dense muscle spasms.   - Pulmonary: no increased WOB  - Cardiac: well perfused   - Abdomen: soft, nontender, nondistended  - Extremities: no edema  - Skin: no rashes or lesions noted.     NEURO EXAM:   - Mental Status: Awake, alert, oriented x 3. Able to relate history without difficulty. Attentive to examiner. Language is fluent with intact repetition and comprehension. Normal prosody. There were no paraphasic errors. Able to name both high and low frequency objects. Speech was not dysarthric. Able to follow both midline and appendicular commands. There was no evidence of apraxia or neglect.    - Cranial Nerves:  VFF to confrontation. EOMI with nystagmus and intermittent dysconjugate gaze. No facial droop.  Facial fullness.  Hearing intact to finger-rub bilaterally (with hearing aids in). 5/5 strength in trapezii and SCM bilaterally. Tongue protrudes in midline and to either side with no evidence of atrophy or weakness.    - Motor:  Decreased bulk and tone throughout. No pronator drift bilaterally. No adventitious movements such as tremor or asterixis noted.     Delt Bic Tri WrE WrF  FFl FE IO IP Quad Ham TA Gastroc    R   5     5    5    5    5        5   5    5   5    5        5     5      5            L   5      5    5   5    5        5    5   5    5    5       5     5      5              - Sensory: No deficits to light touch. No extinction to DSS.  - Coordination: No dysmetria on FNF .  - Gait:  Slow and somewhat labored, mildly wide-based. Romberg positive.    PLAN:  58-year-old woman with a GBM discovered at 17yo with subsequent resection without recurrence with secondary seizures well controlled with phenytoin 200mg q.h.s.. Continue this medication at same dose, refill provided. Multiple complicated comorbid medical disorders including adrenal  insufficiency, renal dysfunction, orthostatic hypotension, IBS, factor 5 requiring rivaroxaban, and osteopenia. Tilt table test positive for orthostatic hypotension, no epileptiform abnormalities on EEG. Exercise, electrolyte drinks, compression stockings, slow changes in position. Continue to follow with PMR for management of chronic neuropathy. If she can build up some reserve, consider rapid transition to lacosamide in the EMU because of high risk of injury from breakthrough seizures in this vulnerable patient. Advised to reach out over the portal with any concerns. Patient is comfortable with plan. All questions and concerns are addressed at this time. Follow up in about 6 months (around 6/28/2024).     Patient Instructions   You came to Epilepsy Clinic because of your seizure disorder. Your seizures are well controlled on dilantin 200mg nightly. Please continue the same medication at the same dose.     To help with the orthostatic hypotension, keep hydrated with electrolyte fluids. Gentle exercise. Compression stockings. Slow position changes.     Do not miss any doses of medication. If a dose of medication is missed, take it as soon as it is remembered even if that means doubling up on the dose. Get regular sleep. Go to sleep at the same time and wake up at the same time every day. People with epilepsy require more sleep than people without epilepsy.  Sleeping 10-12 hours a day can be normal for a person with epilepsy.  Every seizure makes it harder to prevent the next seizure. Epilepsy is associated with SUDEP, or sudden unexpected death in epilepsy.  The risk is significantly higher if convulsive seizures are not well controlled. For more information, check out these websites: https://www.epilepsy.com/, https://www.epilepsyalliance[place].org/, www.[place]-epilepsy.org, www.womenandepilepsy.org.  If you are interested in meeting other individuals in our epilepsy community, please reach out to the Epilepsy  [facility] ([phone], [phone], [email or web address]@epilepsyl[place].org).  They organize many informative and fun activities in the region.  They can provide you invaluable information on how to get access to resources available for patients living with epilepsy as well as a rich community of like-minded individuals who are all learning to cope with the same issues.  It is very important to remember, you are not alone.     Per [place] law, no episodes of loss of consciousness for 6 months before driving.  Avoid dangerous situations.  For example, no baths/pools alone, no heights, no power tools.  Wear a bike helmet.  If breakthrough seizures occur that are different in character, frequency, or duration from normal episodes, please patient portal me or call the office and we will decide the next steps. If multiple seizures occur in a row without return back to baseline, 911 needs to be called.     Return to clinic in 6 months or sooner with issues.  Please patient portal with any questions or concerns.    Cecile Walton PA-C   Neurology-Epilepsy  [facility]    Forms/Letters/Disability/DMV Paperwork: We understand the importance of filling out forms and providing letters in a timely manner.  However, many of these forms have very tricky language and once an official form is submitted as part of the medical record, it can not be modified or erased.  Please work with us in order to get these forms filled out in the most complete, accurate, and efficient way. 1.  Once you are aware that a form will need to be completed, please make an appointment.  A virtual appointment with Dr. Roca or Misti is perfectly fine. 2.  Please fill out the form as much as you can.  There are many questions that we do not have an answer for.  Please bring a blank copy of the form and your partially filled out form to the clinic visit or send them to us over the portal.  We will complete the form together with you  during the clinic visit, sign it, and either return it to you or send it to the correct destination.  Every form will require an appointment however we can fill out multiple forms at once if needed.  Please do not hesitate to reach out with any questions or concerns about this policy.  We are trying to make sure that we have a system in place to meet this need which works for everyone involved.  Thank you for your understanding.       Problem List Items Addressed This Visit          Neuro    Nonintractable epilepsy without status epilepticus - Primary    Relevant Medications    phenytoin (DILANTIN) 100 MG ER capsule    Peripheral polyneuropathy       Cardiac/Vascular    Orthostatic hypotension       Renal/    CKD (chronic kidney disease), stage III       Endocrine    Secondary adrenal insufficiency       Other    Falls frequently    Impaired functional mobility, balance, gait, and endurance     Other Visit Diagnoses       History of brain tumor              Disclaimer: This note has been generated using voice-recognition software. There may be typographical errors that were missed during proof-reading.     LABS:  Recent Labs   Lab 11/29/21  1119 03/28/22  1311 03/29/22  1120 08/08/22  1057 05/22/23  1027 12/11/23  1720   WBC 6.92 4.63 4.77  --   --  7.59   Hemoglobin 10.6 L 10.0 L 10.8 L  --   --  11.3 L   Hematocrit 31.9 L 31.0 L 33.1 L  --   --  35.1 L   Platelets 341 273 290  --   --  257   Sodium 143 141  --  140  --  139  139   Potassium 5.1 4.9  --  5.0  --  5.2 H  5.2 H   BUN 23 H 29 H  --  31 H  --  40 H  40 H   Creatinine 1.42 H 1.51 H  --  1.39  --  1.8 H  1.8 H   eGFR if  48.3 A 44.5 A  --   --   --   --    eGFR if non  41.9 A 38.6 A  --   --   --   --    TSH <0.026 L  --   --   --   --   --    Vitamin B-12  --   --   --   --  >2000 H  --        Recent Labs   Lab 02/02/23  1618 05/22/23  1027   Phenytoin Lvl 6.2 L 5.9 L          IMAGING:  Recent imaging is personally  reviewed with the patient.    Results for orders placed during the hospital encounter of 05/19/16    MRI Brain Without Contrast    Impression  No acute intracranial process.  Evidence of left craniotomy with focal area of postoperative encephalomalacia left posterior temporal lobe convexity.  Minor residual marginal T2 signal hyperintensity.    Minor periventricular and pontine white matter hyperintensity, usually related to small vessel ischemic degeneration.  ______________________________________    Electronically signed by: FRANSICO GRANT MD  Date:     05/19/16  Time:    11:08    Results for orders placed during the hospital encounter of 05/28/21    MRI Brain W WO Contrast    Impression  Stable postsurgical change.  There is no evidence of recurrent or residual neoplasm.      Electronically signed by: Juan Guthrie MD  Date:    05/28/2021  Time:    14:07    Results for orders placed during the hospital encounter of 06/01/20    DXA Bone Density Spine And Hip    Impression  Osteopenia is present.  Slight increase in bone density involving the spine and femoral necks bilaterally.      Electronically signed by: Sonam Valdovinos MD  Date:    06/01/2020  Time:    15:01    PAST MEDICAL HISTORY:   Active Ambulatory Problems     Diagnosis Date Noted    Panhypopituitarism 03/25/2015    IBS (irritable bowel syndrome) 03/25/2015    Nonintractable epilepsy without status epilepticus 03/25/2015    Allergic rhinitis due to pollen 04/28/2015    Peripheral polyneuropathy 03/16/2016    Other osteoporosis without current pathological fracture 03/16/2016    Sacroiliac joint pain 04/25/2016    Myofascial muscle pain 04/25/2016    Secondary adrenal insufficiency 05/05/2016    Secondary hypothyroidism 05/05/2016    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/2016    Ganglion cyst of dorsum of left wrist 04/12/2018    Mohs defect 11/08/2018    Mohs defect of left scalp 11/08/2018    CKD (chronic kidney disease), stage III 03/28/2019     Positive FIT (fecal immunochemical test) 2019    Left wrist pain 2019    Ganglion cyst 2020    Hyperkalemia 2021    Falls frequently 2022    Shortness of breath 2022    Dizziness 02/15/2022    Mood disorder due to medical condition 02/15/2022    Orthostatic hypotension 02/15/2022    Low body weight due to inadequate caloric intake 02/15/2022    Pain aggravated by activities of daily living 2023    Poor trunk control 2023    Poor posture 2023    Decreased strength of trunk and back 2023    Impaired functional mobility, balance, gait, and endurance 2023    Drug-induced peripheral neuropathy 2023     Resolved Ambulatory Problems     Diagnosis Date Noted    Left elbow pain 2015    Left shoulder pain 2015    Thrombophilia 2015    Open wound of right great toe 2016    Glioblastoma multiforme of brain 2017    Impaired functional mobility, balance, gait, and endurance 2022    Decreased strength of upper extremity 2022    Neck pain, bilateral 2022    Impaired mobility and activities of daily living 2022    Neck pain, bilateral 2022     Past Medical History:   Diagnosis Date    Allergic rhinitis     Anemia     Arthritis     Basal cell carcinoma     Broken ankle     Cancer     Clotting disorder     Disorder of kidney and ureter     DVT (deep venous thrombosis)     Fracture of elbow     Hypothyroidism     Osteoporosis     Seizures     Thyroid disease         PAST SURGICAL HISTORY:   Past Surgical History:   Procedure Laterality Date    APPENDECTOMY      BRAIN SURGERY      for glioblastoma      SECTION      CHOLECYSTECTOMY      CLOSURE OF DEFECT OF MOHS PROCEDURE Left 2018    Procedure: CLOSURE, MOHS PROCEDURE DEFECT SCALP  Flap closure;  Surgeon: Yrn Novak MD;  Location: [facility] OR [address] [place] [address];  Service: Plastics;  Laterality: Left;     COLONOSCOPY N/A 4/23/2019    Procedure: COLONOSCOPY Suprep;  Surgeon: Tony Mosher MD;  Location: [facility] ENDO;  Service: Endoscopy;  Laterality: N/A;    ESOPHAGOGASTRODUODENOSCOPY N/A 4/23/2019    Procedure: EGD/colon;  Surgeon: Tony Mosher MD;  Location: [facility] ENDO;  Service: Endoscopy;  Laterality: N/A;    EXCISION OF GANGLION CYST OF HAND Left 1/7/2020    Procedure: EXCISION, GANGLION CYST, HAND;  Surgeon: Ross Drew Jr., MD;  Location: [facility] OR;  Service: Orthopedics;  Laterality: Left;    HYSTERECTOMY  1999    TILT TABLE TEST N/A 7/15/2022    Procedure: TILT TABLE TEST;  Surgeon: Amol Kohler MD;  [facility] EP LAB;  Service: Cardiology;  Laterality: N/A;  Orthostatic hypotension, dizziness, Tilt Table Test with EEG, Dr.Tiffany Roca (neuro), DM    TUBAL LIGATION  1998        ALLERGIES: Neurontin [gabapentin], Lactose, and Soap   CURRENT MEDICATIONS:   Current Outpatient Medications   Medication Sig Dispense Refill    acetaminophen (TYLENOL) 500 MG tablet Take 1-2 tablets (500-1,000 mg total) by mouth 3 (three) times daily as needed for Pain.  0    biotin 1 mg Cap Take by mouth.      dexamethasone (DECADRON) 4 mg/mL injection Inject 1 mL (4 mg total) into the muscle as needed (Signs and symtpoms of severe adrenal insufficiency). 3 ml syringe with 18 g needle  to draw  X 10 21 g 1 inch needle to inject x 10 1 mL 2    diclofenac sodium (VOLTAREN) 1 % Gel Apply 2 g topically 3 (three) times daily as needed (apply to affected joint).      DULoxetine (CYMBALTA) 30 MG capsule TAKE 2 CAPSULES (60 MG TOTAL) BY MOUTH ONCE DAILY. 180 capsule 1    ERGOCALCIFEROL, VITAMIN D2, (VITAMIN D ORAL) Take 1 capsule by mouth nightly.       ferrous sulfate 324 mg (65 mg iron) TbEC Take 325 mg by mouth nightly.       hydrocortisone (CORTEF) 10 MG Tab TAKE 1& 1/2 TABLETS BY MOUTH IN THE MORNING AND 1/2 TABLET IN THE AFTERNOON. DOUBLE THE DOSE IN CASE OF ILLNESS. 75 tablet 2    hyoscyamine (LEVSIN/SL) 0.125 mg Subl  PLACE 1 TABLET (0.125 MG TOTAL) UNDER THE TONGUE EVERY 4 (FOUR) HOURS AS NEEDED. 360 tablet 2    levothyroxine (SYNTHROID) 100 MCG tablet Take 1 tablet (100 mcg total) by mouth before breakfast. 90 tablet 3    loratadine-pseudoephedrine  mg (CLARITIN-D 24-HOUR)  mg per 24 hr tablet Take 1 tablet by mouth as needed.       mupirocin (BACTROBAN) 2 % ointment 2 (two) times daily. Apply to affected area      phenytoin (DILANTIN) 100 MG ER capsule Take 2 capsules (200 mg total) by mouth nightly. 60 capsule 11    XARELTO 20 mg Tab TAKE 1 TABLET BY MOUTH EVERY DAY 90 tablet 4     No current facility-administered medications for this visit.        FAMILY HISTORY:   Family History   Problem Relation Age of Onset    Heart disease Mother     Diabetes type II Mother     Diabetes type II Father     Heart attack Maternal Grandfather          SOCIAL HISTORY:   Social History     Socioeconomic History    Marital status:     Number of children: 2   Occupational History    Occupation:    Tobacco Use    Smoking status: Never    Smokeless tobacco: Never   Substance and Sexual Activity    Alcohol use: No    Drug use: No    Sexual activity: Not Currently   Social History Narrative     30 years, lives at home with .      Social Determinants of Health     Financial Resource Strain: Low Risk  (12/11/2023)    Overall Financial Resource Strain (CARDIA)     Difficulty of Paying Living Expenses: Not hard at all   Food Insecurity: No Food Insecurity (12/11/2023)    Hunger Vital Sign     Worried About Running Out of Food in the Last Year: Never true     Ran Out of Food in the Last Year: Never true   Transportation Needs: Unmet Transportation Needs (12/11/2023)    PRAPARE - Transportation     Lack of Transportation (Medical): Yes     Lack of Transportation (Non-Medical): Yes   Physical Activity: Insufficiently Active (12/11/2023)    Exercise Vital Sign     Days of Exercise per Week: 3 days      Minutes of Exercise per Session: 20 min   Stress: No Stress Concern Present (12/11/2023)    [employer] - Occupational Stress Questionnaire     Feeling of Stress : Only a little   Social Connections: Unknown (12/11/2023)    Social Connection and Isolation Panel [NHANES]     Frequency of Communication with Friends and Family: Twice a week     Frequency of Social Gatherings with Friends and Family: Twice a week     Active Member of Clubs or Organizations: Yes     Attends Club or Organization Meetings: More than 4 times per year     Marital Status:    Housing Stability: Low Risk  (12/11/2023)    Housing Stability Vital Sign     Unable to Pay for Housing in the Last Year: No     Number of Places Lived in the Last Year: 1     Unstable Housing in the Last Year: No      Questions and concerns raised by the patient and family/care-giver(s) were addressed and they indicated understanding of everything discussed and agreed to plans as above.    Cecile Walton PA-C   Neurology-Epilepsy  [facility]    Collaborating physician, Dr. Lisa Roca, was available during today's encounter.     I spent approximately 40 minutes on the day of this encounter preparing to see the patient, obtaining and reviewing history and results, performing a medically appropriate exam, counseling and educating the patient/family/caregiver, documenting clinical information, coordinating care, and ordering medications, tests, procedures, and referrals.

## 2024-01-04 DIAGNOSIS — N18.32 STAGE 3B CHRONIC KIDNEY DISEASE: Primary | ICD-10-CM

## 2024-01-10 ENCOUNTER — PATIENT MESSAGE (OUTPATIENT)
Dept: INTERNAL MEDICINE | Facility: CLINIC | Age: 59
End: 2024-01-10
Payer: COMMERCIAL

## 2024-01-10 DIAGNOSIS — I95.1 ORTHOSTATIC HYPOTENSION: Primary | ICD-10-CM

## 2024-01-11 ENCOUNTER — OFFICE VISIT (OUTPATIENT)
Dept: NEPHROLOGY | Facility: CLINIC | Age: 59
End: 2024-01-11
Payer: COMMERCIAL

## 2024-01-11 VITALS
HEART RATE: 122 BPM | WEIGHT: 138.88 LBS | SYSTOLIC BLOOD PRESSURE: 118 MMHG | OXYGEN SATURATION: 98 % | BODY MASS INDEX: 21.75 KG/M2 | DIASTOLIC BLOOD PRESSURE: 75 MMHG

## 2024-01-11 DIAGNOSIS — N18.32 STAGE 3B CHRONIC KIDNEY DISEASE: ICD-10-CM

## 2024-01-11 PROCEDURE — 3078F DIAST BP <80 MM HG: CPT | Mod: CPTII,S$GLB,,

## 2024-01-11 PROCEDURE — 3066F NEPHROPATHY DOC TX: CPT | Mod: CPTII,S$GLB,,

## 2024-01-11 PROCEDURE — 3074F SYST BP LT 130 MM HG: CPT | Mod: CPTII,S$GLB,,

## 2024-01-11 PROCEDURE — 3008F BODY MASS INDEX DOCD: CPT | Mod: CPTII,S$GLB,,

## 2024-01-11 PROCEDURE — 99213 OFFICE O/P EST LOW 20 MIN: CPT | Mod: S$GLB,,,

## 2024-01-11 PROCEDURE — 99999 PR PBB SHADOW E&M-EST. PATIENT-LVL IV: CPT | Mod: PBBFAC,,,

## 2024-01-11 PROCEDURE — 1159F MED LIST DOCD IN RCRD: CPT | Mod: CPTII,S$GLB,,

## 2024-01-11 RX ORDER — CALCITRIOL 0.25 UG/1
0.25 CAPSULE ORAL DAILY
Qty: 30 CAPSULE | Refills: 3 | Status: SHIPPED | OUTPATIENT
Start: 2024-01-11 | End: 2024-01-11 | Stop reason: CLARIF

## 2024-01-11 RX ORDER — CALCITRIOL 0.25 UG/1
0.25 CAPSULE ORAL DAILY
Qty: 30 CAPSULE | Refills: 3 | Status: SHIPPED | OUTPATIENT
Start: 2024-01-11 | End: 2024-01-11 | Stop reason: SDUPTHER

## 2024-01-11 RX ORDER — CALCITRIOL 0.25 UG/1
0.25 CAPSULE ORAL DAILY
Qty: 30 CAPSULE | Refills: 3
Start: 2024-01-11 | End: 2024-01-11 | Stop reason: SDUPTHER

## 2024-01-11 RX ORDER — CALCITRIOL 0.25 UG/1
0.25 CAPSULE ORAL DAILY
Qty: 30 CAPSULE | Refills: 3
Start: 2024-01-11 | End: 2024-05-10

## 2024-01-11 NOTE — PROGRESS NOTES
.New Consultation Report  Nephrology      Reason for Consult: Chronic Kidney Disease    History of Present Illness:  Patient is a 58 y.o. female presents for a new consultation for evaluation of her chronic kidney disease 2/2 NSAIDS use. He doesn't have any history of DM, HTN. 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    The patient denies SOB, LE edema, hematuria or foamy urine.     The patient reports hypotensive episodes.    Past Medical History:   Diagnosis Date    Allergic rhinitis     Anemia 1987    Arthritis 2000    Basal cell carcinoma     Broken ankle     Cancer 1985    Clotting disorder 1993    Disorder of kidney and ureter     DVT (deep venous thrombosis)     Factor V deficiency     Fracture of elbow     left    Glioblastoma multiforme of brain     Hypothyroidism     IBS (irritable bowel syndrome) 1975    Osteoporosis     Panhypopituitarism     Peripheral polyneuropathy     Secondary adrenal insufficiency     Seizures 1995    Thyroid disease 1987         Current Outpatient Medications:     acetaminophen (TYLENOL) 500 MG tablet, Take 1-2 tablets (500-1,000 mg total) by mouth 3 (three) times daily as needed for Pain., Disp: , Rfl: 0    biotin 1 mg Cap, Take by mouth., Disp: , Rfl:     diclofenac sodium (VOLTAREN) 1 % Gel, Apply 2 g topically 3 (three) times daily as needed (apply to affected joint)., Disp: , Rfl:     DULoxetine (CYMBALTA) 30 MG capsule, TAKE 2 CAPSULES (60 MG TOTAL) BY MOUTH ONCE DAILY., Disp: 180 capsule, Rfl: 1    ERGOCALCIFEROL, VITAMIN D2, (VITAMIN D ORAL), Take 1 capsule by mouth nightly. , Disp: , Rfl:     ferrous sulfate 324 mg (65 mg iron) TbEC, Take 325 mg by mouth nightly. , Disp: , Rfl:     fludrocortisone (FLORINEF) 0.1 mg Tab, Take 1 tablet (100 mcg total) by mouth once daily., Disp: 30 tablet, Rfl: 0    hydrocortisone (CORTEF) 10 MG Tab, TAKE 1& 1/2 TABLETS BY MOUTH IN THE MORNING AND 1/2 TABLET IN THE AFTERNOON. DOUBLE THE DOSE IN CASE OF  ILLNESS., Disp: 75 tablet, Rfl: 2    levothyroxine (SYNTHROID) 100 MCG tablet, Take 1 tablet (100 mcg total) by mouth before breakfast., Disp: 90 tablet, Rfl: 3    loratadine-pseudoephedrine  mg (CLARITIN-D 24-HOUR)  mg per 24 hr tablet, Take 1 tablet by mouth as needed. , Disp: , Rfl:     mupirocin (BACTROBAN) 2 % ointment, 2 (two) times daily. Apply to affected area, Disp: , Rfl:     phenytoin (DILANTIN) 100 MG ER capsule, Take 2 capsules (200 mg total) by mouth nightly., Disp: 180 capsule, Rfl: 3    XARELTO 20 mg Tab, TAKE 1 TABLET BY MOUTH EVERY DAY, Disp: 90 tablet, Rfl: 4    dexamethasone (DECADRON) 4 mg/mL injection, Inject 1 mL (4 mg total) into the muscle as needed (Signs and symtpoms of severe adrenal insufficiency). 3 ml syringe with 18 g needle  to draw  X 10 21 g 1 inch needle to inject x 10 (Patient not taking: Reported on 2024), Disp: 1 mL, Rfl: 2    hyoscyamine (LEVSIN/SL) 0.125 mg Subl, PLACE 1 TABLET (0.125 MG TOTAL) UNDER THE TONGUE EVERY 4 (FOUR) HOURS AS NEEDED. (Patient not taking: Reported on 2024), Disp: 360 tablet, Rfl: 2  Review of patient's allergies indicates:   Allergen Reactions    Neurontin [gabapentin]     Lactose     Soap Hives     Able to tolerate Dove only         Past Surgical History:   Procedure Laterality Date    APPENDECTOMY      BRAIN SURGERY      for glioblastoma      SECTION      CHOLECYSTECTOMY      CLOSURE OF DEFECT OF MOHS PROCEDURE Left 2018    Procedure: CLOSURE, MOHS PROCEDURE DEFECT SCALP  Flap closure;  Surgeon: Yrn Novak MD;  Location: [facility] OR [address] [place] [address];  Service: Plastics;  Laterality: Left;    COLONOSCOPY N/A 2019    Procedure: COLONOSCOPY Suprep;  Surgeon: Tony Mosher MD;  Location: [facility] ENDO;  Service: Endoscopy;  Laterality: N/A;    ESOPHAGOGASTRODUODENOSCOPY N/A 2019    Procedure: EGD/colon;  Surgeon: Tony Mosher MD;  Location: [facility] ENDO;  Service: Endoscopy;  Laterality: N/A;     EXCISION OF GANGLION CYST OF HAND Left 1/7/2020    Procedure: EXCISION, GANGLION CYST, HAND;  Surgeon: Ross Drew Jr., MD;  Location: [facility] OR;  Service: Orthopedics;  Laterality: Left;    HYSTERECTOMY  1999    TILT TABLE TEST N/A 7/15/2022    Procedure: TILT TABLE TEST;  Surgeon: Amol Kohler MD;  Location: [facility] EP LAB;  Service: Cardiology;  Laterality: N/A;  Orthostatic hypotension, dizziness, Tilt Table Test with EEG, Dr.Tiffany Roca (neuro), DM    TUBAL LIGATION  1998     Family History   Problem Relation Age of Onset    Heart disease Mother     Diabetes type II Mother     Diabetes type II Father     Heart attack Maternal Grandfather      Social History     Tobacco Use    Smoking status: Never    Smokeless tobacco: Never   Substance Use Topics    Alcohol use: No    Drug use: No       ROS    Vitals:    01/11/24 1253   BP: 118/75   Pulse: (!) 122       PHYSICAL EXAMINATION:  General: no distress, well nourished  Skin: color, texture, turgor normal. No rash or lesions  HEENT: Eyes: reactive pupils, normal conjunctiva. Oral mucosa moist, no ulcers. Throat: no erythema.  Neck: supple, symmetrical, trachea midline, no JVD, no carotid bruit  Lungs: clear to auscultation bilaterally and normal respiratory effort  Cardiovascular: Heart: regular rate and rhythm, S1, S2 normal, no murmur, rub or gallop. Pulses: 2+ and symmetric.  Abdomen: bowel sounds present, no abdominal bruit, soft, non-tender non-distented; no masses, organomegaly or ascites.   Musculoskeletal: no pitting edema in lower extremities, no clubbing or cyanosis  Lymph Nodes: No cervical or supraclavicular adenopathy  Neurologic: AAOx3, normal strength and tone. No focal deficit. No asterixis.       LABORATORY DATA:  Lab Results   Component Value Date    CREATININE 1.58 (H) 01/05/2024       Prot/Creat Ratio, Urine   Date Value Ref Range Status   01/05/2024 0.08 0.00 - 0.20 Final   05/25/2021 0.08 0.00 - 0.20 Final   09/02/2020 0.07 0.00 - 0.20  "Final       Lab Results   Component Value Date     01/05/2024    K 5.0 01/05/2024    CO2 23 01/05/2024       Lab Results   Component Value Date    .3 (H) 01/05/2024    CALCIUM 8.9 01/05/2024    PHOS 3.6 01/05/2024       Lab Results   Component Value Date    HGB 11.3 (L) 01/05/2024        No results found for: "HGBA1C"    Lab Results   Component Value Date    LDLCALC 116.0 03/28/2022           IMPRESSION / RECOMMENDATIONS:     1. Stage 3b chronic kidney disease  RENAL DIETICIAN EDUCATION    Vitamin D    US Retroperitoneal Complete          SUMMARY OF PLAN:  1; Chronic Kidney Disease 3b;  Scr stable, not progressive, HgB 11.3, , no proteinuria, PO4 3.6  -Will do Vitamin D levels  -P/O Calcitriol 0.25mg daily  -U/S retroperitoneal (previous scan showed bilateral cysts)  -No need for treating anemia due to CKD at this stage  -Referral to a renal dietician for low potassium foods  -Endocrinology already following her adrenal issues      RTC in 4 months    Discussed with staff Dr. Lior Bay MD  Nephrology Fellow   "

## 2024-01-12 NOTE — PROGRESS NOTES
I have reviewed the notes, assessments, and/or procedures performed by Dr Posada and I concur with her/his documentation of Thelma Nguyen.  Date of Service: 1/11/2024

## 2024-01-19 ENCOUNTER — OFFICE VISIT (OUTPATIENT)
Dept: INTERNAL MEDICINE | Facility: CLINIC | Age: 59
End: 2024-01-19
Payer: COMMERCIAL

## 2024-01-19 VITALS
BODY MASS INDEX: 22.29 KG/M2 | WEIGHT: 142 LBS | HEART RATE: 70 BPM | DIASTOLIC BLOOD PRESSURE: 68 MMHG | OXYGEN SATURATION: 96 % | SYSTOLIC BLOOD PRESSURE: 114 MMHG | HEIGHT: 67 IN

## 2024-01-19 DIAGNOSIS — E27.49 SECONDARY ADRENAL INSUFFICIENCY: ICD-10-CM

## 2024-01-19 DIAGNOSIS — D68.2 FACTOR V DEFICIENCY: ICD-10-CM

## 2024-01-19 DIAGNOSIS — G62.0 DRUG-INDUCED PERIPHERAL NEUROPATHY: ICD-10-CM

## 2024-01-19 DIAGNOSIS — I95.1 ORTHOSTATIC HYPOTENSION: Primary | ICD-10-CM

## 2024-01-19 DIAGNOSIS — M81.8 OTHER OSTEOPOROSIS WITHOUT CURRENT PATHOLOGICAL FRACTURE: ICD-10-CM

## 2024-01-19 DIAGNOSIS — G40.209 PARTIAL SYMPTOMATIC EPILEPSY WITH COMPLEX PARTIAL SEIZURES, NOT INTRACTABLE, WITHOUT STATUS EPILEPTICUS: ICD-10-CM

## 2024-01-19 PROCEDURE — 99214 OFFICE O/P EST MOD 30 MIN: CPT | Mod: S$GLB,,, | Performed by: INTERNAL MEDICINE

## 2024-01-19 PROCEDURE — 99999 PR PBB SHADOW E&M-EST. PATIENT-LVL III: CPT | Mod: PBBFAC,,, | Performed by: INTERNAL MEDICINE

## 2024-01-19 PROCEDURE — 3066F NEPHROPATHY DOC TX: CPT | Mod: CPTII,S$GLB,, | Performed by: INTERNAL MEDICINE

## 2024-01-19 PROCEDURE — 3074F SYST BP LT 130 MM HG: CPT | Mod: CPTII,S$GLB,, | Performed by: INTERNAL MEDICINE

## 2024-01-19 PROCEDURE — 3008F BODY MASS INDEX DOCD: CPT | Mod: CPTII,S$GLB,, | Performed by: INTERNAL MEDICINE

## 2024-01-19 PROCEDURE — 3078F DIAST BP <80 MM HG: CPT | Mod: CPTII,S$GLB,, | Performed by: INTERNAL MEDICINE

## 2024-01-19 RX ORDER — HYDROCORTISONE 10 MG/1
TABLET ORAL
Qty: 75 TABLET | Refills: 2 | Status: SHIPPED | OUTPATIENT
Start: 2024-01-19 | End: 2024-02-01

## 2024-01-19 NOTE — PROGRESS NOTES
"    CHIEF COMPLAINT     Chief Complaint   Patient presents with    Follow-up     Med maintenance       HPI     Thelma Nguyen is a 58 y.o. female history of glioblastoma s/p resection, complicated by panhypopituitarism and neuropathy, IBS, CKD 3, depression, hx of skin cancer and factor 5 leiden deficiency here today for     At last visit, tried fludrocortisone, didn't notice improvement in symptoms and had adverse sx of nausea. Stopped medication. Still waiting on thigh high compression stockings    Personally Reviewed Patient's Medical, surgical, family and social hx. Changes updated in [facility].  Care Team updated in Epic    Review of Systems:  Review of Systems    Health Maintenance:   Reviewed with patient  Due for the following:      PHYSICAL EXAM     /68 (BP Location: Right arm, Patient Position: Sitting, BP Method: Medium (Manual))   Pulse 70   Ht 5' 7" (1.702 m)   Wt 64.4 kg (141 lb 15.6 oz)   LMP 10/23/1997 (Exact Date)   SpO2 96%   BMI 22.24 kg/m²     Gen: Well Appearing, NAD  HEENT: PERR, EOMI  Neck: FROM, no thyromegaly, no cervical adenopathy  CVD: RRR, no M/R/G  Pulm: Normal work of breathing, CTAB, no wheezing  Abd:  Soft, NT, ND non TTP, no mass  MSK: no LE edema  Neuro: A&Ox3, gait normal, speech normal  Mood; Mood normal, behavior normal, thought process linear       LABS     Labs reviewed; Notable for    Renal US  1. Findings suggesting possible medical renal disease.  No hydronephrosis  2. Multiple bilateral simple renal cysts as above.  ASSESSMENT     1. Orthostatic hypotension  Ambulatory referral/consult to Cardiology      2. Factor V deficiency        3. Partial symptomatic epilepsy with complex partial seizures, not intractable, without status epilepticus        4. Secondary adrenal insufficiency  hydrocortisone (CORTEF) 10 MG Tab      5. Drug-induced peripheral neuropathy        6. Other osteoporosis without current pathological fracture  DXA Bone Density Axial " Skeleton 1 or more sites              Plan     Thelma Nguyen is a 58 y.o. female with history of glioblastoma s/p resection, complicated by panhypopituitarism and neuropathy, IBS, CKD 3, depression, hx of skin cancer and factor 5 leiden deficiency     1. Orthostatic hypotension  Fludrocortisone not helpful, discontinued secondary to side effects  Continue compression socks  Will refer Cardiology  - Ambulatory referral/consult to Cardiology; Future    2. Factor V deficiency  Followed by heme continue Xarelto    3. Partial symptomatic epilepsy with complex partial seizures, not intractable, without status epilepticus  Followed by Neurology on Dilantin 200 mg nightly    4. Secondary adrenal insufficiency  Followed by endocrinology refilled patient has pan hypo pit secondary to history of GBM  - hydrocortisone (CORTEF) 10 MG Tab; TAKE 1& 1/2 TABLETS BY MOUTH IN THE MORNING AND 1/2 TABLET IN THE AFTERNOON. DOUBLE THE DOSE IN CASE OF ILLNESS.  Dispense: 75 tablet; Refill: 2    5. Drug-induced peripheral neuropathy  On duloxetine for symptomatic treatment     6. Other osteoporosis without current pathological fracture  Will be due for DEXA scan in August she is status post 7 years of Fosamax on to your drug holiday.  - DXA Bone Density Axial Skeleton 1 or more sites; Future      Zachary Bender MD

## 2024-01-22 ENCOUNTER — PATIENT MESSAGE (OUTPATIENT)
Dept: NEPHROLOGY | Facility: CLINIC | Age: 59
End: 2024-01-22
Payer: COMMERCIAL

## 2024-01-23 ENCOUNTER — PATIENT MESSAGE (OUTPATIENT)
Dept: INTERNAL MEDICINE | Facility: CLINIC | Age: 59
End: 2024-01-23
Payer: COMMERCIAL

## 2024-01-29 DIAGNOSIS — R55 SYNCOPE AND COLLAPSE: ICD-10-CM

## 2024-01-29 RX ORDER — FLUDROCORTISONE ACETATE 0.1 MG/1
100 TABLET ORAL
Qty: 30 TABLET | Refills: 0 | OUTPATIENT
Start: 2024-01-29

## 2024-01-29 NOTE — TELEPHONE ENCOUNTER
Refill Routing Note   Medication(s) are not appropriate for processing by [facility] for the following reason(s):        Outside of protocol    [facility] action(s):  Route     Requires labs : Yes             Appointments  past 12m or future 3m with PCP    Date Provider   Last Visit   1/19/2024 Zachary Bender MD   Next Visit   6/11/2024 Zachary Bender MD   ED visits in past 90 days: 0        Note composed:10:21 AM 01/29/2024

## 2024-01-29 NOTE — TELEPHONE ENCOUNTER
Care Due:                  Date            Visit Type   Department     Provider  --------------------------------------------------------------------------------                                EP -                              PRIMARY      [facility] INTERNAL  Last Visit: 01-      CARE ([facility])   ANIL SMILEY                              EP -                              PRIMARY      [facility] INTERNAL  Next Visit: 06-      CARE ([facility])   ANIL SMILEY                                                            Last  Test          Frequency    Reason                     Performed    Due Date  --------------------------------------------------------------------------------    TSH.........  12 months..  levothyroxine............  Not Found    Overdue    [employer] Embedded Care Due Messages. Reference number: [id number].   1/29/2024 12:08:32 AM CST

## 2024-02-01 ENCOUNTER — PATIENT MESSAGE (OUTPATIENT)
Dept: INTERNAL MEDICINE | Facility: CLINIC | Age: 59
End: 2024-02-01
Payer: COMMERCIAL

## 2024-02-01 ENCOUNTER — PATIENT MESSAGE (OUTPATIENT)
Dept: ENDOCRINOLOGY | Facility: CLINIC | Age: 59
End: 2024-02-01
Payer: COMMERCIAL

## 2024-02-01 DIAGNOSIS — H90.3 SENSORINEURAL HEARING LOSS, BILATERAL: Primary | ICD-10-CM

## 2024-02-01 DIAGNOSIS — E27.49 SECONDARY ADRENAL INSUFFICIENCY: ICD-10-CM

## 2024-02-01 RX ORDER — HYDROCORTISONE 10 MG/1
TABLET ORAL
Qty: 75 TABLET | Refills: 2 | Status: SHIPPED | OUTPATIENT
Start: 2024-02-01 | End: 2024-02-27 | Stop reason: SDUPTHER

## 2024-02-01 NOTE — TELEPHONE ENCOUNTER
Refill Routing Note   Medication(s) are not appropriate for processing by [facility] for the following reason(s):        Outside of protocol    [facility] action(s):  Route             Appointments  past 12m or future 3m with PCP    Date Provider   Last Visit   1/19/2024 Zachary Bender MD   Next Visit   6/11/2024 Zachary Bender MD   ED visits in past 90 days: 0        Note composed:2:15 PM 02/01/2024

## 2024-02-06 ENCOUNTER — TELEPHONE (OUTPATIENT)
Dept: ENDOCRINOLOGY | Facility: CLINIC | Age: 59
End: 2024-02-06
Payer: COMMERCIAL

## 2024-02-06 NOTE — TELEPHONE ENCOUNTER
----- Message from Marcy Hawthorne MA sent at 2/1/2024  2:24 PM CST -----  Regarding: RE: overdue for visit  Done!    ----- Message -----  From: Aisha Vizcaino MA  Sent: 2/1/2024   2:18 PM CST  To: Marcy Hawthorne MA  Subject: FW: overdue for visit                            Hello,    Can you schedule patient on Feb 27 with Dr. Serrano on Feb. 27 @ 2 pm ([facility])  ----- Message -----  From: Zachary Bender MD  Sent: 2/1/2024  10:16 AM CST  To: Maggie Franco Staff  Subject: overdue for visit                                Patient trying to schedule f/u visit for panhypopituitary

## 2024-02-10 ENCOUNTER — OFFICE VISIT (OUTPATIENT)
Dept: URGENT CARE | Facility: CLINIC | Age: 59
End: 2024-02-10
Payer: COMMERCIAL

## 2024-02-10 VITALS
RESPIRATION RATE: 17 BRPM | BODY MASS INDEX: 22.13 KG/M2 | DIASTOLIC BLOOD PRESSURE: 60 MMHG | HEIGHT: 67 IN | SYSTOLIC BLOOD PRESSURE: 108 MMHG | WEIGHT: 141 LBS | TEMPERATURE: 98 F | OXYGEN SATURATION: 100 % | HEART RATE: 101 BPM

## 2024-02-10 DIAGNOSIS — M25.571 ACUTE RIGHT ANKLE PAIN: Primary | ICD-10-CM

## 2024-02-10 PROCEDURE — 96372 THER/PROPH/DIAG INJ SC/IM: CPT | Mod: S$GLB,,, | Performed by: PHYSICIAN ASSISTANT

## 2024-02-10 PROCEDURE — 99213 OFFICE O/P EST LOW 20 MIN: CPT | Mod: 25,S$GLB,, | Performed by: PHYSICIAN ASSISTANT

## 2024-02-10 RX ORDER — KETOROLAC TROMETHAMINE 30 MG/ML
15 INJECTION, SOLUTION INTRAMUSCULAR; INTRAVENOUS
Status: COMPLETED | OUTPATIENT
Start: 2024-02-10 | End: 2024-02-10

## 2024-02-10 RX ORDER — DEXAMETHASONE SODIUM PHOSPHATE 100 MG/10ML
5 INJECTION INTRAMUSCULAR; INTRAVENOUS
Status: COMPLETED | OUTPATIENT
Start: 2024-02-10 | End: 2024-02-10

## 2024-02-10 RX ADMIN — DEXAMETHASONE SODIUM PHOSPHATE 5 MG: 100 INJECTION INTRAMUSCULAR; INTRAVENOUS at 03:02

## 2024-02-10 RX ADMIN — KETOROLAC TROMETHAMINE 15 MG: 30 INJECTION, SOLUTION INTRAMUSCULAR; INTRAVENOUS at 03:02

## 2024-02-10 NOTE — PROGRESS NOTES
"Subjective:      Patient ID: Thelma Nguyen is a 58 y.o. female.    Vitals:  height is 5' 7" (1.702 m) and weight is 64 kg (141 lb). Her oral temperature is 98 °F (36.7 °C). Her blood pressure is 108/60 and her pulse is 101. Her respiration is 17 and oxygen saturation is 100%.     Chief Complaint: Ankle Pain    Pt complain of right ankle pain that started 3 weeks ago. Pt says no injury.  Pt did not take anything for relief.  She denies injury.    Ankle Pain   The incident occurred more than 1 week ago. The incident occurred at home. There was no injury mechanism. The pain is present in the right ankle. Quality: throbbing. The pain is at a severity of 6/10. The pain is mild. The pain has been Constant since onset. Pertinent negatives include no inability to bear weight, loss of motion, loss of sensation, muscle weakness, numbness or tingling. She reports no foreign bodies present. The symptoms are aggravated by movement. She has tried nothing for the symptoms. The treatment provided no relief.       Musculoskeletal:  Positive for pain, joint pain and joint swelling.   Neurological:  Negative for numbness.      Objective:     Physical Exam   Constitutional: She is oriented to person, place, and time. She appears well-developed.   HENT:   Head: Normocephalic and atraumatic.   Ears:   Right Ear: External ear normal.   Left Ear: External ear normal.   Nose: Nose normal.   Mouth/Throat: Oropharynx is clear and moist.   Eyes: Conjunctivae, EOM and lids are normal. Pupils are equal, round, and reactive to light.   Neck: Trachea normal and phonation normal. Neck supple.   Musculoskeletal: Normal range of motion.         General: Normal range of motion.      Comments: Right ankle is mildly swollen.  There is no ecchymosis or bruising.  There is no fluctuance in the ankle joint.  There is a slight increase in warmth in the right ankle that is swollen compared to the left.  No compartment compromise.  There is no " erythema and no cellulitis.   Neurological: She is alert and oriented to person, place, and time.   Skin: Skin is warm, dry and intact.   Psychiatric: Her speech is normal and behavior is normal. Judgment and thought content normal.   Nursing note and vitals reviewed.  Patient has a history of excessive clotting but has a diagnosis of factor 5 deficiency on her chart which explained the patient that if she has excessive clotting in the past that she does not have a factor 5 deficiency but more so in factor 5 Leiden mutation.    Assessment:     1. Acute right ankle pain        Plan:       Acute right ankle pain  -     NON-PNEUMATIC WALKING BOOT FOR HOME USE  -     dexAMETHasone injection 5 mg  -     ketorolac injection 15 mg  -     Ambulatory referral/consult to Orthopedics      Follow up if symptoms worsen or fail to improve, for F/U with PCP or ED. There are no Patient Instructions on file for this visit.

## 2024-02-15 ENCOUNTER — CLINICAL SUPPORT (OUTPATIENT)
Dept: AUDIOLOGY | Facility: CLINIC | Age: 59
End: 2024-02-15
Payer: COMMERCIAL

## 2024-02-15 DIAGNOSIS — H90.3 SENSORINEURAL HEARING LOSS, BILATERAL: Primary | ICD-10-CM

## 2024-02-15 DIAGNOSIS — H93.13 TINNITUS, BILATERAL: ICD-10-CM

## 2024-02-15 PROCEDURE — 99999 PR PBB SHADOW E&M-EST. PATIENT-LVL I: CPT | Mod: PBBFAC,,,

## 2024-02-15 PROCEDURE — 92567 TYMPANOMETRY: CPT | Mod: S$GLB,,,

## 2024-02-15 PROCEDURE — 92557 COMPREHENSIVE HEARING TEST: CPT | Mod: S$GLB,,,

## 2024-02-15 PROCEDURE — 99499 UNLISTED E&M SERVICE: CPT | Mod: S$GLB,,,

## 2024-02-16 NOTE — PROGRESS NOTES
Hearing Aid Follow-up     Mrs. Nguyen was supposed to be seen for a hearing aid follow-up appointment after her annual hearing evaluation, but she forgot her hearing aids at home. Hearing test was completed, and Mrs. Nguyen was told to bring her hearing aids by the hearing aid office when she has time for them to be re-programmed and checked/cleaned. She was in agreement with this plan.     Before leaving from the appointment, Mrs. Nguyen began to not feel well. She mentioned having hypertension, and her blood pressure was taken by Vilma Bey. Medical intervention was recommended, but Mrs. Nguyen declined at this time. Mrs. Nguyen was escorted down to [facility] where her car was without issue.

## 2024-02-16 NOTE — PROGRESS NOTES
Thelma Nguyen was seen today in the clinic for an annual audiologic evaluation.  Patient's main complaint was hearing loss.  Mrs. Nguyen experiences intermittent tinnitus bilaterally. She wears ReSound Quattro hearing aids that were purchased at this clinic.     Tympanometry revealed Type A in the right ear and Type A in the left ear.     Audiogram results revealed a mild sloping to moderate sensorineural hearing loss in the right ear and a mild sloping to moderately-severe sensorineural hearing loss in the left ear.      Speech reception thresholds were noted at 25 dB in the right ear and 25 dB in the left ear.    Speech discrimination scores were 100% in the right ear and 100% in the left ear.    Recommendations:  Otologic evaluation  Follow-up for hearing aid adjustments as needed  Annual audiogram, or sooner if any changes in hearing are noted  Hearing protection when in noise

## 2024-02-19 ENCOUNTER — PATIENT MESSAGE (OUTPATIENT)
Dept: INTERNAL MEDICINE | Facility: CLINIC | Age: 59
End: 2024-02-19
Payer: COMMERCIAL

## 2024-02-20 ENCOUNTER — HOSPITAL ENCOUNTER (OUTPATIENT)
Dept: RADIOLOGY | Facility: HOSPITAL | Age: 59
Discharge: HOME | End: 2024-02-20
Attending: SURGERY
Payer: COMMERCIAL

## 2024-02-20 ENCOUNTER — PATIENT MESSAGE (OUTPATIENT)
Dept: INTERNAL MEDICINE | Facility: CLINIC | Age: 59
End: 2024-02-20
Payer: COMMERCIAL

## 2024-02-20 ENCOUNTER — OFFICE VISIT (OUTPATIENT)
Dept: ORTHOPEDICS | Facility: CLINIC | Age: 59
End: 2024-02-20
Payer: COMMERCIAL

## 2024-02-20 ENCOUNTER — PATIENT MESSAGE (OUTPATIENT)
Dept: AUDIOLOGY | Facility: CLINIC | Age: 59
End: 2024-02-20
Payer: COMMERCIAL

## 2024-02-20 VITALS — BODY MASS INDEX: 21.45 KG/M2 | HEIGHT: 67 IN | WEIGHT: 136.69 LBS

## 2024-02-20 DIAGNOSIS — S92.251G: Primary | ICD-10-CM

## 2024-02-20 DIAGNOSIS — M25.571 CHRONIC PAIN OF RIGHT ANKLE: ICD-10-CM

## 2024-02-20 DIAGNOSIS — G89.29 CHRONIC PAIN OF RIGHT ANKLE: Primary | ICD-10-CM

## 2024-02-20 DIAGNOSIS — M25.571 CHRONIC PAIN OF RIGHT ANKLE: Primary | ICD-10-CM

## 2024-02-20 DIAGNOSIS — G89.29 CHRONIC PAIN OF RIGHT ANKLE: ICD-10-CM

## 2024-02-20 DIAGNOSIS — M79.671 RIGHT FOOT PAIN: ICD-10-CM

## 2024-02-20 PROCEDURE — 73610 X-RAY EXAM OF ANKLE: CPT | Mod: TC,PN,RT

## 2024-02-20 PROCEDURE — 73630 X-RAY EXAM OF FOOT: CPT | Mod: TC,PN,RT

## 2024-02-20 PROCEDURE — 99203 OFFICE O/P NEW LOW 30 MIN: CPT | Mod: S$GLB,,, | Performed by: SURGERY

## 2024-02-20 PROCEDURE — 73630 X-RAY EXAM OF FOOT: CPT | Mod: 26,RT,, | Performed by: RADIOLOGY

## 2024-02-20 PROCEDURE — 1159F MED LIST DOCD IN RCRD: CPT | Mod: CPTII,S$GLB,, | Performed by: SURGERY

## 2024-02-20 PROCEDURE — 73610 X-RAY EXAM OF ANKLE: CPT | Mod: 26,RT,, | Performed by: RADIOLOGY

## 2024-02-20 PROCEDURE — 99999 PR PBB SHADOW E&M-EST. PATIENT-LVL III: CPT | Mod: PBBFAC,,, | Performed by: SURGERY

## 2024-02-20 PROCEDURE — 3008F BODY MASS INDEX DOCD: CPT | Mod: CPTII,S$GLB,, | Performed by: SURGERY

## 2024-02-20 PROCEDURE — 3066F NEPHROPATHY DOC TX: CPT | Mod: CPTII,S$GLB,, | Performed by: SURGERY

## 2024-02-20 NOTE — PROGRESS NOTES
Subjective:   Chief complaint:   Chief Complaint   Patient presents with    Right Foot - Pain     Throbbing pain      Right Lower Leg - Pain, Swelling     Shooting pain when she stands   Feels like pain is going up a blood vessel     Right Ankle - Pain     Shooting pain when she stands up   Inside of ankle is throbbing pain        Date of injury:  Unknown    HPI:   Thelma Nguyen is a 58 y.o. female who presents today for evaluation of right foot possible fracture.  Rates pain as severe.  Pain has been ongoing for since date of injury.  Inciting event:  She is uncertain of what the inciting event was..  Treatments thus far:  She presented to the emergency room where she received a Toradol injection on the .  She is unsure if this is helping, as she has severe neuropathy secondary to glioblastoma multiforme.  She also has a form of factor 5 Leiden.  She is known osteopenia.  She presents for further management of her foot pain and possible fracture.      ROS:  See problem list; Nothing else of note on 12 point system review     Past Medical History:   Diagnosis Date    Allergic rhinitis     Anemia     Arthritis     Basal cell carcinoma     Broken ankle     Cancer     Clotting disorder     Disorder of kidney and ureter     DVT (deep venous thrombosis)     Factor V deficiency     Fracture of elbow     left    Glioblastoma multiforme of brain     Hypothyroidism     IBS (irritable bowel syndrome)     Osteoporosis     Panhypopituitarism     Peripheral polyneuropathy     Secondary adrenal insufficiency     Seizures     Thyroid disease        Past Surgical History:   Procedure Laterality Date    APPENDECTOMY      BRAIN SURGERY      for glioblastoma      SECTION      CHOLECYSTECTOMY      CLOSURE OF DEFECT OF MOHS PROCEDURE Left 2018    Procedure: CLOSURE, MOHS PROCEDURE DEFECT SCALP  Flap closure;  Surgeon: Yrn Novak MD;   Location: [facility] OR 2ND FLR;  Service: Plastics;  Laterality: Left;    COLONOSCOPY N/A 4/23/2019    Procedure: COLONOSCOPY Suprep;  Surgeon: Tony Mosher MD;  Location: [facility] ENDO;  Service: Endoscopy;  Laterality: N/A;    ESOPHAGOGASTRODUODENOSCOPY N/A 4/23/2019    Procedure: EGD/colon;  Surgeon: Tony Mosher MD;  Location: [facility] ENDO;  Service: Endoscopy;  Laterality: N/A;    EXCISION OF GANGLION CYST OF HAND Left 1/7/2020    Procedure: EXCISION, GANGLION CYST, HAND;  Surgeon: Ross Drew Jr., MD;  Location: [facility] OR;  Service: Orthopedics;  Laterality: Left;    HYSTERECTOMY  1999    TILT TABLE TEST N/A 7/15/2022    Procedure: TILT TABLE TEST;  Surgeon: Amol Kohler MD;  Location: [facility] EP LAB;  Service: Cardiology;  Laterality: N/A;  Orthostatic hypotension, dizziness, Tilt Table Test with EEG, Dr.Tiffany Roca (neuro), DM    TUBAL LIGATION  1998       Family History   Problem Relation Age of Onset    Heart disease Mother     Diabetes type II Mother     Diabetes type II Father     Heart attack Maternal Grandfather        Social History     Socioeconomic History    Marital status:     Number of children: 2   Occupational History    Occupation:    Tobacco Use    Smoking status: Never    Smokeless tobacco: Never   Substance and Sexual Activity    Alcohol use: No    Drug use: No    Sexual activity: Not Currently   Social History Narrative     29 years, lives at home with .      Social Determinants of Health     Financial Resource Strain: Low Risk  (12/11/2023)    Overall Financial Resource Strain (CARDIA)     Difficulty of Paying Living Expenses: Not hard at all   Food Insecurity: No Food Insecurity (12/11/2023)    Hunger Vital Sign     Worried About Running Out of Food in the Last Year: Never true     Ran Out of Food in the Last Year: Never true   Transportation Needs: Unmet Transportation Needs (12/11/2023)    PRAPARE - Transportation     Lack of Transportation  (Medical): Yes     Lack of Transportation (Non-Medical): Yes   Physical Activity: Insufficiently Active (12/11/2023)    Exercise Vital Sign     Days of Exercise per Week: 3 days     Minutes of Exercise per Session: 20 min   Stress: No Stress Concern Present (12/11/2023)    [employer] [place] [place] [employer] - Occupational Stress Questionnaire     Feeling of Stress : Only a little   Social Connections: Unknown (12/11/2023)    Social Connection and Isolation Panel [NHANES]     Frequency of Communication with Friends and Family: Twice a week     Frequency of Social Gatherings with Friends and Family: Twice a week     Active Member of Clubs or Organizations: Yes     Attends Club or Organization Meetings: More than 4 times per year     Marital Status:    Housing Stability: Low Risk  (12/11/2023)    Housing Stability Vital Sign     Unable to Pay for Housing in the Last Year: No     Number of Places Lived in the Last Year: 1     Unstable Housing in the Last Year: No        Objective:   Exam:  There were no vitals filed for this visit.  General: No acute distress, well-appearing  Neurologic: Alert and oriented x3  Psychiatric: Appropriate mood and affect, cooperative  Cardiovascular: Regular pulse  Respiratory: Breathing on room air  Skin: No rashes or ulcers  Vascular:  Palpable pulses  Musculoskeletal:  Diffuse neuropathy about the foot and ankle.  Minor swelling to the right medial aspect of the midfoot about the navicular.  Tender about the navicular medially.    Imaging:  Radiographs were ordered and independently interpreted by me.    Age indeterminate navicular body fracture, complete, minor dorsal subluxation.  Displaced.  Independently interpreted.    Additional records/labs reviewed:  As above      Assessment:     1. Closed avulsion fracture of navicular bone of right foot with delayed healing, subsequent encounter    2. Right foot pain         Navicular body fracture age indeterminate, right foot    "  Plan:       Orders Placed This Encounter   Procedures    HME - OTHER     Scooter for home use     Order Specific Question:   Type of Equipment:     Answer:   scooter for home use     Order Specific Question:   Height:     Answer:   5' 7" (1.702 m)     Order Specific Question:   Weight:     Answer:   62 kg (136 lb 11 oz)    CT Foot Without Contrast Right     Standing Status:   Future     Standing Expiration Date:   2/20/2025     Order Specific Question:   May the Radiologist modify the order per protocol to meet the clinical needs of the patient?     Answer:   Yes     -We recommend Calcium and vitamin D as well as bone metabolism medication per her rheumatology team.  -we will obtain a CT scan to further assess the morphology as well as chronicity of the fracture  -nonweightbearing in cam boot to the right lower extremity  -follow up after CT  -discussed surgical and nonsurgical options.  Should the patient elect to pursue surgery she would require preoperative clearance.  We have advised her to follow up with her PCP.    Geoffrey Goodson MD  [facility]  Orthopedic Surgery  "

## 2024-02-20 NOTE — TELEPHONE ENCOUNTER
Pt will possibly need a pre op exam for surgery clearance, awaiting test results to determine if pt will have a procedure.

## 2024-02-26 ENCOUNTER — PATIENT MESSAGE (OUTPATIENT)
Dept: CARDIOLOGY | Facility: CLINIC | Age: 59
End: 2024-02-26
Payer: COMMERCIAL

## 2024-02-27 ENCOUNTER — OFFICE VISIT (OUTPATIENT)
Dept: ORTHOPEDICS | Facility: CLINIC | Age: 59
End: 2024-02-27
Payer: COMMERCIAL

## 2024-02-27 ENCOUNTER — OFFICE VISIT (OUTPATIENT)
Dept: ENDOCRINOLOGY | Facility: CLINIC | Age: 59
End: 2024-02-27
Payer: COMMERCIAL

## 2024-02-27 ENCOUNTER — CLINICAL SUPPORT (OUTPATIENT)
Dept: AUDIOLOGY | Facility: CLINIC | Age: 59
End: 2024-02-27
Payer: COMMERCIAL

## 2024-02-27 VITALS
BODY MASS INDEX: 21.45 KG/M2 | DIASTOLIC BLOOD PRESSURE: 81 MMHG | SYSTOLIC BLOOD PRESSURE: 131 MMHG | WEIGHT: 136.69 LBS | HEIGHT: 67 IN | HEART RATE: 106 BPM

## 2024-02-27 VITALS — HEIGHT: 67 IN | BODY MASS INDEX: 21.41 KG/M2

## 2024-02-27 DIAGNOSIS — S92.251G: Primary | ICD-10-CM

## 2024-02-27 DIAGNOSIS — H90.3 SENSORINEURAL HEARING LOSS, BILATERAL: Primary | ICD-10-CM

## 2024-02-27 DIAGNOSIS — E27.49 SECONDARY ADRENAL INSUFFICIENCY: ICD-10-CM

## 2024-02-27 DIAGNOSIS — M81.8 OTHER OSTEOPOROSIS WITHOUT CURRENT PATHOLOGICAL FRACTURE: ICD-10-CM

## 2024-02-27 DIAGNOSIS — E23.0 PANHYPOPITUITARISM: Primary | ICD-10-CM

## 2024-02-27 DIAGNOSIS — S92.253A NAVICULAR FRACTURE OF ANKLE: ICD-10-CM

## 2024-02-27 DIAGNOSIS — H93.13 TINNITUS, BILATERAL: ICD-10-CM

## 2024-02-27 PROCEDURE — 99214 OFFICE O/P EST MOD 30 MIN: CPT | Mod: S$GLB,,, | Performed by: INTERNAL MEDICINE

## 2024-02-27 PROCEDURE — 3079F DIAST BP 80-89 MM HG: CPT | Mod: CPTII,S$GLB,, | Performed by: INTERNAL MEDICINE

## 2024-02-27 PROCEDURE — 3075F SYST BP GE 130 - 139MM HG: CPT | Mod: CPTII,S$GLB,, | Performed by: INTERNAL MEDICINE

## 2024-02-27 PROCEDURE — G2211 COMPLEX E/M VISIT ADD ON: HCPCS | Mod: S$GLB,,, | Performed by: INTERNAL MEDICINE

## 2024-02-27 PROCEDURE — 99999 PR PBB SHADOW E&M-EST. PATIENT-LVL I: CPT | Mod: PBBFAC,,,

## 2024-02-27 PROCEDURE — 99999 PR PBB SHADOW E&M-EST. PATIENT-LVL IV: CPT | Mod: PBBFAC,,, | Performed by: SURGERY

## 2024-02-27 PROCEDURE — 3066F NEPHROPATHY DOC TX: CPT | Mod: CPTII,S$GLB,, | Performed by: INTERNAL MEDICINE

## 2024-02-27 PROCEDURE — 99999 PR PBB SHADOW E&M-EST. PATIENT-LVL IV: CPT | Mod: PBBFAC,,, | Performed by: INTERNAL MEDICINE

## 2024-02-27 PROCEDURE — 3008F BODY MASS INDEX DOCD: CPT | Mod: CPTII,S$GLB,, | Performed by: SURGERY

## 2024-02-27 PROCEDURE — 99214 OFFICE O/P EST MOD 30 MIN: CPT | Mod: S$GLB,,, | Performed by: SURGERY

## 2024-02-27 PROCEDURE — 3008F BODY MASS INDEX DOCD: CPT | Mod: CPTII,S$GLB,, | Performed by: INTERNAL MEDICINE

## 2024-02-27 PROCEDURE — 3066F NEPHROPATHY DOC TX: CPT | Mod: CPTII,S$GLB,, | Performed by: SURGERY

## 2024-02-27 PROCEDURE — 1159F MED LIST DOCD IN RCRD: CPT | Mod: CPTII,S$GLB,, | Performed by: INTERNAL MEDICINE

## 2024-02-27 PROCEDURE — 99499 UNLISTED E&M SERVICE: CPT | Mod: S$GLB,,,

## 2024-02-27 RX ORDER — LEVOTHYROXINE SODIUM 112 UG/1
112 TABLET ORAL
Qty: 30 TABLET | Refills: 11 | Status: SHIPPED | OUTPATIENT
Start: 2024-02-27 | End: 2025-02-26

## 2024-02-27 RX ORDER — SODIUM CHLORIDE 9 MG/ML
INJECTION, SOLUTION INTRAVENOUS CONTINUOUS
Status: CANCELLED | OUTPATIENT
Start: 2024-02-27

## 2024-02-27 RX ORDER — HYDROCORTISONE 10 MG/1
TABLET ORAL
Qty: 240 TABLET | Refills: 3 | Status: ON HOLD | OUTPATIENT
Start: 2024-02-27 | End: 2024-03-16

## 2024-02-27 RX ORDER — MUPIROCIN 20 MG/G
OINTMENT TOPICAL
Status: CANCELLED | OUTPATIENT
Start: 2024-02-27

## 2024-02-27 RX ORDER — CEFAZOLIN SODIUM 2 G/50ML
2 SOLUTION INTRAVENOUS
Status: CANCELLED | OUTPATIENT
Start: 2024-02-27

## 2024-02-27 NOTE — PATIENT INSTRUCTIONS
"  Adrenal insufficiency self-care instructions and sick day rules     Adrenal insufficiency, which involves a deficiency in steroid hormones that are normally produced by the body, can result in symptoms that include generalized and severe fatigue, malaise, dizziness, and low blood pressure. Should you develop any of these symptoms, please call us immediately or go to the ER if severe symptoms develop. You may also take your emergency intramuscular dexamethasone injection if you have this available    Please refer to the following instructions for patients with adrenal insufficiency:  1. Please get an alert bracelet that says "Adrenal insufficiency. Give stress doses of steroids in case of illness."     2. If you become nauseous and are unable to keep hydrocortisone down, you need to go to an emergency room to get this medication via IV.     3. If you are ill with a low-grade fever of  F, please double your dose of hydrocortisone. If you have a fever of >100 F, please triple your hydrocortisone dose for 3 days or until fever resolves.     4. If you are undergoing a planned medical procedure (such as minor surgery, colonoscopy) or a major dental procedure (tooth extraction, root canal etc) you should double your dose the day before and the day of and the day after the procedure.    5. If a major surgery requiring general anesthesia is being planned, please notify your endocrinologist so that we can give instructions to the anesthesia team that will be taking care of you with regards to the amount of hydrocortisone to be given during surgery.    Please call us with any questions and to notify us that you are ill at home or are heading to ER/hospital so that we can help you through the situation and communicate with the physicians taking care of you.    "

## 2024-02-27 NOTE — ASSESSMENT & PLAN NOTE
Treated with fosamax about 7 years and on drug holiday.  Repeat dexa schedule in 8/2024, will need visit after that to determine if she needs to restart treatment.

## 2024-02-27 NOTE — H&P (VIEW-ONLY)
"SUBJECTIVE:    Ms. Nguyen is here today for a follow up visit.  She presents for the results of her CT.  To review she has had foot and ankle pain and noticed swelling about the middle of her right foot.  She lacks sensation in this foot secondary to treatments for her glioblastoma multiforme.  She does not recall specific injury.  She has osteopenia.        OBJECTIVE:      Vitals:    02/27/24 1131   Height: 5' 7" (1.702 m)   PainSc:   4   PainLoc: Foot       Lower Extremity Exam  Right lower extremity with mild erythema and +1 edema about the midfoot over the navicular.  Tender over the navicular.  No evidence of flatfoot.  Mild forefoot varus.  Moderate sensory defect about the entirety of the foot and ankle     DIAGNOSTIC STUDIES:  CT of the foot reviewed.  Age indeterminate complete navicular fracture of the body of the navicular with 5 mm gap between fracture fragments.    ASSESSMENT:   1. Displaced navicular fracture      PLAN:  There are no diagnoses linked to this encounter.    Discussed her pathology with her as well as the propensity of the navicular for avascular necrosis in the ramifications of this.  We discussed operative, nonoperative and other alternatives to treatment.  She would like to pursue operative management for this injury.  I recommend open reduction internal fixation of the right navicular versus navicular cuneiform and possible talonavicular fusion with iliac crest bone grafting.    We had a long discussion of the risks and benefits of different operative and non-operative options. I explained the injury using pictures, models, and the patient's x-ray and CT images. I explained the risks of surgery which include but are not limited to continued pain, deformity, bleeding, infection, damage to surrounding structures, non-union, mal-union, arthritis, and in rare cases loss of limb. I explained the risks of co-morbidities which influences the rate of complications.  I discussed the effects " of her neuropathy on the surgery and that we may place her in a a splint or cast so she could take it off and examined the foot to make sure she has not developing wounds.  I explained the risks of non-operative management, including continued pain, long term immobilization, malunion, non-union, avascular necrosis, flatfoot deformity rapid arthritis progression, and difficulty with ambulation. I discussed all of these risks using non-medical terms and confirmed understanding. The patient elected for the procedure above.    We will schedule her.  She is already reached out to her PCP for preoperative clearance.     Geoffrey Goodson MD  [facility]  Orthopedic Surgery      This note was done with voice recognition software. Please excuse any errors missed in proof reading.

## 2024-02-27 NOTE — ASSESSMENT & PLAN NOTE
Secondary adrenal insufficiency- continue HC 15/5, has emergency dexamethasone injection  She is aware of sick day rules    In preparation for surgery with need stress doses of IV hydrocortisone.  Recommendation for anesthesia:  - 100 mg IV hydrocortisone prior to surgery on induction followed by 50 mg IV q8H hours  - POD 1-3 oral hydrocortisone 40 mg in the morning and 20 mg in the afternoon around 3 pm  - POD 4 onward return to home dose of oral hydrocortisone 15/5 mg.        Secondary hypothyroidism- increase to levothyroxine 112 mcg daily as FT4 is below goal (goal in the upper half of the normal range) with hypothyroid symptoms     No indication for estrogen replacement given she is postmenopausal.

## 2024-02-27 NOTE — PROGRESS NOTES
Subjective:    02/27/2024     The patient's last visit with me was on 8/8/2022.     Patient ID: Thelma Nguyen is a 58 y.o. female.    Chief Complaint:  No chief complaint on file.    History of Present Illness  Ms. Nguyen is a 58 y.o. female with hx of GBM in remission, CDK, orthostatin hypotenison, IBS, factor 5 leiden deficiency (on xarelto), osteoporosis who is here for a follow-up visit for evaluation of panhypopituitarism    Diagnosed with a glioblastoma multiforme in [facility] 1985 (pathology report not available), treated with surgery and radiation and developed panhypopituitarism.  Note she also has seizure disorder.  Previous regimen included: decadron and dilantin.     Imaging:  Brain MRI 5/2021 - no residual tumor, normal pituitary     Interval hx:  Reports recurrent falls, reports hx of orthostatic hypotension confirmed on tilt table.    Has seen several specialists for this.  Tried fludrocortisone but had a negative reaction to it.    In the past with normal renin and aldosterone    Recently diagnosed with foot fracture w/o trauma, will be having surgery next month(s) for this    Recent pituitary labs:  Component      Latest Ref Rng 8/8/2022 12/11/2023   Glucose      70 - 110 mg/dL 108     Sodium      136 - 145 mmol/L 140     Potassium      3.5 - 5.1 mmol/L 5.0     Chloride      95 - 110 mmol/L 103     CO2      23 - 29 mmol/L 28     BUN      7 - 17 mg/dL 31 (H)     Calcium      8.7 - 10.5 mg/dL 8.1 (L)     Creatinine      0.50 - 1.40 mg/dL 1.39     Albumin      3.5 - 5.2 g/dL 3.9     Phosphorus Level      2.7 - 4.5 mg/dL 2.7     eGFR      >60 mL/min/1.73 m^2 44.8 !     Anion Gap      8 - 16 mmol/L 9     Free T4      0.71 - 1.51 ng/dL 0.81  0.71    Vitamin D      30 - 96 ng/mL 36     Renin Activity      ng/mL/h 0.9     ALDOSTERONE      ng/dL 33.7              Regarding secondary adrenal insufficiency   On Hydrocortisone 15 mg daily, 5 mg in the afternoon (sometimes misses  afternoon dose w/o symptoms) .   denies symptoms lightheadedness.   No unexplained weight loss   Has IBS w/ abd pain, loose stools, nausea  Aware of sick day rules  has medical alert tag on smart watch    Regarding secondary hypothyroidism:  On Levothyroxine 100 mcg in the morning.Taking appropriately, does not miss medications.   Last fT4 at low end of normal  Lab Results   Component Value Date    TSH <0.026 (L) 11/29/2021    FREET4 0.71 12/11/2023       Thyroid Symptoms  + fatigue     Weight change:  [x]  Gain []  Loss  []  Denies   Up 25 lbs in a few months after weight loss.    Wt Readings from Last 3 Encounters:   02/20/24 62 kg (136 lb 11 oz)   02/10/24 64 kg (141 lb)   01/19/24 64.4 kg (141 lb 15.6 oz)      Temperature intolerance:  [x]  Cold (chronic) []  Hot   []  Denies     GI:  []  Diarrhea [x]  Constipation []  Denies       Integument:  []  Hair loss [x]  Dry skin  []  Denies     Other:  []  Palpitation [x]  Tremor    []  Increased anxiety    []  Denies        Has osteoporosis, T score of -2.6 at FN (dexa 6/2020)    Dexa 8/2022   The T score associated with the lumbar spine is -1.3 on the current examination. It was -1.0 on the prior examination. The T score associated with the left femoral neck is -2.3 on the current examination. It was -2.3 on the prior examination. The T score associated with the right femoral neck is -2.2 on the current examination. It was -2.3 on the prior examination.        Does report neuropathy of unknown etiology.    Was on fosamax 70 mg  Weekly from  2015- 8/2022 .  On drug holiday now with repeat dexa 8/2024  denies GERD symptoms.  Takes vitamin D daily - 2K IU  No hx of nephrolithiasis.       2016: Fractured right ankle.     Lab Results   Component Value Date    CALCIUM 8.9 01/05/2024    ALBUMIN 4.4 01/05/2024    ESTGFRAFRICA 44.5 (A) 03/28/2022    EGFRNONAA 38.6 (A) 03/28/2022    PHOS 3.6 01/05/2024    ALKPHOS 87 12/11/2023    ALKPHOS 87 12/11/2023    [id number] 31  "01/17/2024    .3 (H) 01/05/2024      No symptoms of DI (denies polyuria/polydipsia).       Has never been on growth hormone    Last IGF-1:   Lab Results   Component Value Date    SOMATMDN 165 07/27/2021      Objective:     There were no vitals filed for this visit.  BP: reports hx of low BP  BP Readings from Last 3 Encounters:   02/10/24 108/60   01/19/24 114/68   01/11/24 118/75     Wt Readings from Last 1 Encounters:   02/20/24 1018 62 kg (136 lb 11 oz)     Constitutional:  Pleasant,  in no acute distress.   HENT:   Eyes:     No scleral icterus.   Respiratory:   Effort normal   Neurological:  normal speech  Psych:  Normal mood and affect.      There is no height or weight on file to calculate BMI.    Lab Review:   No results found for: "HGBA1C"  Lab Results   Component Value Date    CHOL 204 (H) 03/28/2022    HDL 59 03/28/2022    LDLCALC 116.0 03/28/2022    TRIG 145 03/28/2022    CHOLHDL 28.9 03/28/2022     Lab Results   Component Value Date     01/05/2024    K 5.0 01/05/2024     01/05/2024    CO2 23 01/05/2024     (H) 01/05/2024    BUN 55 (H) 01/05/2024    CREATININE 1.58 (H) 01/05/2024    CALCIUM 8.9 01/05/2024    PROT 7.9 12/11/2023    PROT 7.9 12/11/2023    ALBUMIN 4.4 01/05/2024    BILITOT 0.2 12/11/2023    BILITOT 0.2 12/11/2023    ALKPHOS 87 12/11/2023    ALKPHOS 87 12/11/2023    AST 17 12/11/2023    AST 17 12/11/2023    ALT 13 12/11/2023    ALT 13 12/11/2023    ANIONGAP 16 01/05/2024    ESTGFRAFRICA 44.5 (A) 03/28/2022    EGFRNONAA 38.6 (A) 03/28/2022    TSH <0.026 (L) 11/29/2021       Assessment and Plan     Problem List Items Addressed This Visit       Other osteoporosis without current pathological fracture     Treated with fosamax about 7 years and on drug holiday.  Repeat dexa schedule in 8/2024, will need visit after that to determine if she needs to restart treatment.          Panhypopituitarism - Primary     Secondary adrenal insufficiency- continue  15/5, has emergency " dexamethasone injection  She is aware of sick day rules    In preparation for surgery with need stress doses of IV hydrocortisone.  Recommendation for anesthesia:  - 100 mg IV hydrocortisone prior to surgery on induction followed by 50 mg IV q8H hours  - POD 1-3 oral hydrocortisone 40 mg in the morning and 20 mg in the afternoon around 3 pm  - POD 4 onward return to home dose of oral hydrocortisone 15/5 mg.        Secondary hypothyroidism- increase to levothyroxine 112 mcg daily as FT4 is below goal (goal in the upper half of the normal range) with hypothyroid symptoms     No indication for estrogen replacement given she is postmenopausal.          Relevant Medications    levothyroxine (SYNTHROID) 112 MCG tablet    Other Relevant Orders    TSH    T4, Free    Secondary adrenal insufficiency    Relevant Medications    hydrocortisone (CORTEF) 10 MG Tab     RTC i general endo in 9/2024  Lab in 4 weeks     Joan Serrano MD       CC: Dr. Geoffrey Nuno

## 2024-02-27 NOTE — Clinical Note
Hello,   This patient let me know she is having foot surgery.  Since she has adrenal insufficiency she will need stress dose steroids for which I have included recommendations in my note.   Joan Serrano MD

## 2024-02-27 NOTE — PROGRESS NOTES
HEARING AID FOLLOW-UP     Thelma Elton Nguyen was seen today per recommendation of Dr. Tia Mena to program hearing aids. Devices were cleaned; listening check found them to be in good working condition.     Hearing aids were connected to [employer] and updated to most recent audiogram. No other programming changes were made today.     Patient reported good sound quality and encouraged to contact this clinic should issues arise.       Hearing Aid Details  ReSound Quattro 9   Lt SN [id number]   Rt SN [id number]   : 2LP  Dome: Medium Open   Warranty Exp 2/23/23      SN [id number]       Recommendations:  1. Annual Audiogram  2. Daily use of binaural amplification  3. Hearing aid follow-ups as needed

## 2024-02-28 ENCOUNTER — TELEPHONE (OUTPATIENT)
Dept: INTERNAL MEDICINE | Facility: CLINIC | Age: 59
End: 2024-02-28
Payer: COMMERCIAL

## 2024-02-28 NOTE — TELEPHONE ENCOUNTER
----- Message from Kristin Pardo sent at 2/28/2024  3:54 PM CST -----  Contact: [phone]  Pt is requesting this through the portal please advise    Preferred Date Range: Any    Preferred Times: Any Time    Reason for visit: To be cleared for surgery on 3/15    Comments:  Being cleared for foot surgery on 3/15

## 2024-03-01 ENCOUNTER — PATIENT MESSAGE (OUTPATIENT)
Dept: REHABILITATION | Facility: OTHER | Age: 59
End: 2024-03-01
Payer: COMMERCIAL

## 2024-03-04 ENCOUNTER — ANESTHESIA EVENT (OUTPATIENT)
Dept: SURGERY | Facility: HOSPITAL | Age: 59
End: 2024-03-04
Payer: COMMERCIAL

## 2024-03-04 NOTE — ANESTHESIA PREPROCEDURE EVALUATION
Adrenal insufficiency - recs from endocrine:      Secondary adrenal insufficiency- continue HC 15/5, has emergency dexamethasone injection  She is aware of sick day rules     In preparation for surgery with need stress doses of IV hydrocortisone.  Recommendation for anesthesia:  - 100 mg IV hydrocortisone prior to surgery on induction followed by 50 mg IV q8H hours  - POD 1-3 oral hydrocortisone 40 mg in the morning and 20 mg in the afternoon around 3 pm  - POD 4 onward return to home dose of oral hydrocortisone 15/5 mg.              Thelma Nguyen is a 58 y.o., female.    [facility]  Anesthesia Pre-Operative Evaluation       Patient Name: Thelma Nguyen  YOB: 1965  MRN: [id number]  CSN: [id number]      Code Status: Full Code   Date of Procedure: 3/15/2024  Anesthesia: General Procedure: Procedure(s) (LRB):  ORIF, FRACTURE, TARSAL BONE (Right)  BONE GRAFT, ILIAC CREST (Right)  Pre-Operative Diagnosis: Closed avulsion fracture of navicular bone of right foot with delayed healing, subsequent encounter [S92.251G]  Proceduralist: Surgeon(s) and Role:     * Geoffrey Goodson MD - Primary        SUBJECTIVE:   Thelma Nguyen is a 58 y.o. female who  has a past medical history of Allergic rhinitis, Anemia (1987), Arthritis (2000), Basal cell carcinoma, Broken ankle, Cancer (1985), Clotting disorder (1993), Disorder of kidney and ureter, DVT (deep venous thrombosis), Factor V deficiency, Fracture of elbow, Glioblastoma multiforme of brain, Hypothyroidism, IBS (irritable bowel syndrome) (1975), Osteoporosis, Panhypopituitarism, Peripheral polyneuropathy, Secondary adrenal insufficiency, Seizures (1995), and Thyroid disease (1987). No notes on file    Anticoagulants   Medication Route Frequency       she has a current medication list which includes the following long-term medication(s): duloxetine, hyoscyamine, levothyroxine, phenytoin, and diclofenac sodium.    ALLERGIES:     Review of patient's allergies indicates:   Allergen Reactions    Neurontin [gabapentin]     Lactose     Soap Hives     Able to tolerate Dove only      LDA:          Lines/Drains/Airways       Peripheral Intravenous Line  Duration                  Peripheral IV - Single Lumen 03/15/24 0607 20 G Right Wrist <1 day                  MEDICATIONS:     Antibiotics (From admission, onward)      Start     Stop Route Frequency Ordered    03/15/24 05  mupirocin 2 % ointment         -- Nasl On Call Procedure 03/15/24 0519    03/15/24 05  ceFAZolin 2 g in dextrose 5 % in water (D5W) 50 mL IVPB (MB+)         -- IV On Call Procedure 03/15/24 05          VTE Risk Mitigation (From admission, onward)      None          Current Facility-Administered Medications   Medication Dose Route Frequency Provider Last Rate Last Admin    0.9%  NaCl infusion   Intravenous Continuous Geoffrey Goodson MD 50 mL/hr at 03/15/24 06 New Bag at 03/15/24 06    ceFAZolin 2 g in dextrose 5 % in water (D5W) 50 mL IVPB (MB+)  2 g Intravenous On Call Procedure Geoffrey Goodson MD        fentaNYL 50 mcg/mL injection 100 mcg  100 mcg Intravenous PRN Neelam Munoz PA-C   50 mcg at 03/15/24 0634    midazolam (VERSED) 1 mg/mL injection 1 mg  1 mg Intravenous PRN Neelam Munoz PA-C   1 mg at 03/15/24 0634    mupirocin 2 % ointment   Nasal On Call Procedure Geoffrey Goodson MD   Given at 03/15/24 0608          History:   There are no hospital problems to display for this patient.    Surgical History:    has a past surgical history that includes Hysterectomy (); Brain surgery (); Appendectomy;  section (); Cholecystectomy (); Tubal ligation (); Closure of defect of Mohs procedure (Left, 2018); Esophagogastroduodenoscopy (N/A, 2019); Colonoscopy (N/A, 2019); Excision of ganglion cyst of hand (Left, 2020); Tilt table test (N/A, 07/15/2022); and Shoulder surgery  (Right).   Social History:    reports that she is not currently sexually active.  reports that she has never smoked. She has never used smokeless tobacco. She reports that she does not drink alcohol and does not use drugs.     OBJECTIVE:     Vital Signs (Most Recent):  Temp: 36.4 °C (97.5 °F) (03/15/24 0542)  Pulse: 97 (03/15/24 0658)  Resp: 16 (03/15/24 0658)  BP: 138/60 (03/15/24 0658)  SpO2: 100 % (03/15/24 0658) Vital Signs Range (Last 24H):  Temp:  [36.4 °C (97.5 °F)]   Pulse:  []   Resp:  [11-20]   BP: (138-156)/(60-73)   SpO2:  [98 %-100 %]        Body mass index is 22.08 kg/m².   Wt Readings from Last 4 Encounters:   03/15/24 64 kg (141 lb)   03/08/24 62.1 kg (137 lb)   02/27/24 62 kg (136 lb 11 oz)   02/20/24 62 kg (136 lb 11 oz)     Significant Labs:  Lab Results   Component Value Date    WBC 7.82 01/05/2024    HGB 11.3 (L) 01/05/2024    HCT 35.4 (L) 01/05/2024     01/05/2024     01/05/2024    K 5.0 01/05/2024     01/05/2024    CREATININE 1.58 (H) 01/05/2024    BUN 55 (H) 01/05/2024    CO2 23 01/05/2024     (H) 01/05/2024    CALCIUM 8.9 01/05/2024    PHOS 3.6 01/05/2024    ALKPHOS 87 12/11/2023    ALKPHOS 87 12/11/2023    ALT 13 12/11/2023    ALT 13 12/11/2023    AST 17 12/11/2023    AST 17 12/11/2023    ALBUMIN 4.4 01/05/2024     Patient's last menstrual period was 10/23/1997 (exact date).  No results found for this or any previous visit (from the past 72 hour(s)).    EKG:   Results for orders placed or performed in visit on 11/30/21   IN OFFICE EKG 12-LEAD (to [place])    Collection Time: 11/30/21  3:14 PM    Narrative    Test Reason : R06.02,    Vent. Rate : 097 BPM     Atrial Rate : 097 BPM     P-R Int : 140 ms          QRS Dur : 082 ms      QT Int : 376 ms       P-R-T Axes : 066 055 080 degrees     QTc Int : 477 ms    Normal sinus rhythm  Normal ECG  No previous ECGs available  Confirmed by Guido Laguna MD ([id number]) on 12/6/2021 7:24:18 PM    Referred By: PARVEEN PRECIADO            Confirmed By:Guido Laguna MD       TTE:  No results found for this or any previous visit.  EF   Date Value Ref Range Status   12/06/2021 65 % Final      ASSESSMENT/PLAN:       Pre-op Assessment    I have reviewed the Patient Summary Reports.     I have reviewed the Nursing Notes.       Review of Systems  Anesthesia Hx:  No problems with previous Anesthesia                Cardiovascular:  Exercise tolerance: poor                                           Renal/:  Chronic Renal Disease, CKD                Neurological:       Seizures                                Endocrine:   Hypothyroidism  Adrenal insufficiency on steroids - see endocrine's note for recommendations            Physical Exam  General: Well nourished and Cooperative    Airway:  Mallampati: III   Mouth Opening: Small, but > 3cm  TM Distance: Normal  Neck ROM: Normal ROM    Dental:  Intact    Chest/Lungs:  Normal Respiratory Rate    Heart:  Rate: Normal        Anesthesia Plan  Type of Anesthesia, risks & benefits discussed:    Anesthesia Type: Gen Supraglottic Airway, Regional  Intra-op Monitoring Plan: Standard ASA Monitors  Post Op Pain Control Plan: peripheral nerve block, multimodal analgesia and IV/PO Opioids PRN  Induction:  IV  Informed Consent: Informed consent signed with the Patient and all parties understand the risks and agree with anesthesia plan.  All questions answered.   ASA Score: 3  Anesthesia Plan Notes: Patient on steroids chronically for adrenal insufficiency after glioblastoma resection caused injury to her pituitary gland.  We discussed 2 options of staying until 3 pm to receive another stress dose of steroids in hospital, or she can go home and use home meds.  We will follow up after surgery but I am comfortable with patient going home and using home medications as she is a reliable patient.      Ready For Surgery From Anesthesia Perspective.     .

## 2024-03-07 ENCOUNTER — TELEPHONE (OUTPATIENT)
Dept: INTERNAL MEDICINE | Facility: CLINIC | Age: 59
End: 2024-03-07
Payer: COMMERCIAL

## 2024-03-07 NOTE — TELEPHONE ENCOUNTER
----- Message from Lara Perez sent at 3/7/2024  8:58 AM CST -----  Contact: Thelma [phone]  Caller is requesting an earlier appointment than what we can offer.  Caller declined first available appointment listed below.  Caller will not accept being placed on the waitlist and is requesting a message be sent to doctor.    Did you offer to schedule the next available appt and put the patient on the wait list:  n/a    When is the first available appointment: 04/2024    Preference of timeframe to be scheduled:  Today     Symptoms: PRE OP    Would the patient prefer a call back or a response via [facility]:  call back    Additional Information:  Thelma is calling to schedule an appt for Pre-Op. Pt states she thought her appt was for today with the provider and really needs this appt to be clear to have the surgery on March 15. Please call Thelma back for advice

## 2024-03-08 ENCOUNTER — OFFICE VISIT (OUTPATIENT)
Dept: INTERNAL MEDICINE | Facility: CLINIC | Age: 59
End: 2024-03-08
Payer: COMMERCIAL

## 2024-03-08 VITALS
OXYGEN SATURATION: 97 % | HEIGHT: 67 IN | SYSTOLIC BLOOD PRESSURE: 130 MMHG | DIASTOLIC BLOOD PRESSURE: 85 MMHG | HEART RATE: 104 BPM | WEIGHT: 137 LBS | BODY MASS INDEX: 21.5 KG/M2

## 2024-03-08 DIAGNOSIS — Z01.810 PREOP CARDIOVASCULAR EXAM: Primary | ICD-10-CM

## 2024-03-08 DIAGNOSIS — D68.2 FACTOR V DEFICIENCY: ICD-10-CM

## 2024-03-08 DIAGNOSIS — G40.209 PARTIAL SYMPTOMATIC EPILEPSY WITH COMPLEX PARTIAL SEIZURES, NOT INTRACTABLE, WITHOUT STATUS EPILEPTICUS: ICD-10-CM

## 2024-03-08 DIAGNOSIS — I95.1 ORTHOSTATIC HYPOTENSION: ICD-10-CM

## 2024-03-08 DIAGNOSIS — E27.49 SECONDARY ADRENAL INSUFFICIENCY: ICD-10-CM

## 2024-03-08 PROCEDURE — 1160F RVW MEDS BY RX/DR IN RCRD: CPT | Mod: CPTII,S$GLB,, | Performed by: INTERNAL MEDICINE

## 2024-03-08 PROCEDURE — 3075F SYST BP GE 130 - 139MM HG: CPT | Mod: CPTII,S$GLB,, | Performed by: INTERNAL MEDICINE

## 2024-03-08 PROCEDURE — 3008F BODY MASS INDEX DOCD: CPT | Mod: CPTII,S$GLB,, | Performed by: INTERNAL MEDICINE

## 2024-03-08 PROCEDURE — 1159F MED LIST DOCD IN RCRD: CPT | Mod: CPTII,S$GLB,, | Performed by: INTERNAL MEDICINE

## 2024-03-08 PROCEDURE — 3066F NEPHROPATHY DOC TX: CPT | Mod: CPTII,S$GLB,, | Performed by: INTERNAL MEDICINE

## 2024-03-08 PROCEDURE — 99999 PR PBB SHADOW E&M-EST. PATIENT-LVL V: CPT | Mod: PBBFAC,,, | Performed by: INTERNAL MEDICINE

## 2024-03-08 PROCEDURE — 99214 OFFICE O/P EST MOD 30 MIN: CPT | Mod: S$GLB,,, | Performed by: INTERNAL MEDICINE

## 2024-03-08 PROCEDURE — 3079F DIAST BP 80-89 MM HG: CPT | Mod: CPTII,S$GLB,, | Performed by: INTERNAL MEDICINE

## 2024-03-08 NOTE — PROGRESS NOTES
Subjective:       Patient ID: Thelma Nguyen is a 58 y.o. female.    Chief Complaint: Pre-op Exam (Right foot)    Patient is here for followup for chronic conditions.    Here for preop for R foot surgery.    No new health issues.    She has seen her endocrinologist and has been advised on hydrocort dosing for AI.    Review of Systems   Constitutional:  Positive for activity change.   Respiratory:  Negative for shortness of breath.    Cardiovascular:  Negative for chest pain, palpitations and leg swelling.   Gastrointestinal:  Negative for abdominal distention, abdominal pain, nausea and vomiting.   Musculoskeletal:  Positive for arthralgias (R ankle/foot).           Objective:      Physical Exam  Vitals reviewed.   Constitutional:       General: She is not in acute distress.     Appearance: Normal appearance. She is well-developed. She is not ill-appearing, toxic-appearing or diaphoretic.      Comments: Here with spouse. Has knee walker for R leg   HENT:      Head: Normocephalic and atraumatic.   Eyes:      General: No scleral icterus.     Pupils: Pupils are equal, round, and reactive to light.   Neck:      Thyroid: No thyromegaly.   Cardiovascular:      Rate and Rhythm: Normal rate and regular rhythm.      Heart sounds: Normal heart sounds. No murmur heard.     No friction rub. No gallop.   Pulmonary:      Effort: Pulmonary effort is normal. No respiratory distress.      Breath sounds: Normal breath sounds. No wheezing or rales.   Abdominal:      General: Bowel sounds are normal. There is no distension.      Palpations: Abdomen is soft. There is no mass.      Tenderness: There is no abdominal tenderness. There is no guarding or rebound.   Musculoskeletal:         General: Tenderness present. Normal range of motion.      Cervical back: Normal range of motion.      Comments: R foot in boot   Lymphadenopathy:      Cervical: No cervical adenopathy.   Neurological:      General: No focal deficit present.       Mental Status: She is alert and oriented to person, place, and time.   Psychiatric:         Mood and Affect: Mood normal.         Speech: Speech normal.         Behavior: Behavior normal.         Assessment:       1. Preop cardiovascular exam    2. Orthostatic hypotension    3. Partial symptomatic epilepsy with complex partial seizures, not intractable, without status epilepticus    4. Secondary adrenal insufficiency    5. Factor V deficiency        Plan:       Thelma was seen today for pre-op exam.    Diagnoses and all orders for this visit:    Preop cardiovascular exam  Pt here for preop exam.  Pt is at low risk for a moderately risky procedure, I do not see any medical contra-indications for proceeding with this surgery. pt is medically cleared.      Orthostatic hypotension  Pressures OK today    Partial symptomatic epilepsy with complex partial seizures, not intractable, without status epilepticus  Will take sz med night before procedure and then resume postop    Secondary adrenal insufficiency  Has instructions from Dr Correa    Factor V deficiency  Patient Instructions   Last day Xarelto --  3/12  No Xarelto 3/13-15  Ask surgeon if OK to start back on 3/16  She has not had interruption induced VTE          No follow-ups on file.    Future Appointments   Date Time Provider Department Center   4/1/2024 10:00 AM LAB, [facility] [place]   4/26/2024 11:00 AM Kleber Liu, PT DOS [facility] [place]   5/2/2024  1:30 PM Swetha Bay MD [facility] NEPHRO [facility]   5/3/2024 12:00 PM Annemarie-Viktor Cuello MD [facility] AR[facility] [place]   6/11/2024 11:00 AM Zachary Bender MD [facility] IM [facility] PCW   8/27/2024  3:00 PM [facility]

## 2024-03-12 ENCOUNTER — PATIENT MESSAGE (OUTPATIENT)
Dept: PREADMISSION TESTING | Facility: HOSPITAL | Age: 59
End: 2024-03-12
Payer: COMMERCIAL

## 2024-03-12 NOTE — ANESTHESIA PAT ROS NOTE
03/12/2024  Thelma Nguyen is a 58 y.o., female with history of Secondary Adrenal Insufficiency. Endocrinologist has ordered stress dose steroids:  In preparation for surgery with need stress doses of IV hydrocortisone.  Recommendation for anesthesia:  - 100 mg IV hydrocortisone prior to surgery on induction followed by 50 mg IV q8H hours       Pre-op Assessment    I have reviewed the Patient Summary Reports.       I have reviewed the Medications.     Review of Systems  Anesthesia Hx:  No problems with previous Anesthesia   History of prior surgery of interest to airway management or planning:  Previous anesthesia: General, MAC 11/8/2018  closure of MOH's Defect to scalp with general anesthesia.  Procedure performed at an [facility].      1/7/2020  Excision of Ganglion cyst left hand with MAC.  Procedure performed at an [facility]. Airway issues documented on chart review include mask, easy, GETA, easy direct laryngoscopy , view on direct laryngoscopy Grade I      Denies Personal Hx of Anesthesia complications.                    Social:  No Alcohol Use, Non-Smoker       Hematology/Oncology:       -- Anemia:               Hematology Comments: Factor V Leiden, H/O DVT X3 while holding Xarelto, Vitamin D deficiency      --  Cancer in past history:              surgery and radiation   Oncology Comments: H/O Basal Cell Carcinoma, S/P Excision,  Glioblastoma Multiforme of brain, S/P Brain surgery, Brain MRI 5/2021 - no residual tumor, (treated with surgery and radiation), normal pituitary     EENT/Dental:  chronic allergic rhinitis           Cardiovascular:  Cardiovascular Normal Exercise tolerance: good      Denies MI.  Denies CAD.    Denies CABG/stent.    Denies Angina.       Denies MAGANA.  ECG has been reviewed. H/O DVT, tilt table test- dizziness, orthostatic hypotension, adrenal  insufficiency                         Pulmonary:  Pulmonary Normal   Denies COPD.  Denies Asthma.   Denies Shortness of breath.                  Renal/:  Chronic Renal Disease, CKD   Stage 3b chronic kidney disease,  Hyperkalemia, K+ = 5.0,  BUN = 55, Creat = 1.58,  eGFR = 38 on 2024               Hepatic/GI:      Denies GERD. Denies Liver Disease.  IBS, H/O Appendectomy,  Cholecystectomy          Musculoskeletal:  Arthritis   Closed avulsion fracture of navicular bone of right foot with delayed healing,   Navicular fracture of ankle,  H/O Fractured left elbow,  Osteoporosis, Excision of Ganglion cyst of left hand, Poor trunk control,  Sacroiliac joint pain,  Myofascial muscle pain,  Pain aggravated by activities of daily living,   Decreased strength of trunk and back, Falls frequently, Impaired functional mobility, balance, gait, and endurance           Spine Disorders: lumbar and cervical Degenerative disease           OB/GYN/PEDS:  Tubal Ligation, , Hysterectomy           Neurological:    Denies CVA.    Denies Headaches. Seizures, well controlled    Partial symptomatic epilepsy with complex partial seizures, not intractable, without status epilepticus,  Drug-induced peripheral neuropathy      Peripheral Neuropathy                          Endocrine:  Denies Diabetes. Hypothyroidism  Panhypopituitarism, Secondary adrenal insufficiency, Low body weight due to inadequate caloric intake, PTH = 205.3 on 24        Dermatological:  Moh's Defect of scalp, S/P Closure   Psych:  Psychiatric History   Mood disorder due to medical condition              Past Medical History:   Diagnosis Date    Allergic rhinitis     Anemia     Arthritis     Basal cell carcinoma     Broken ankle     Cancer 1985    Clotting disorder     Disorder of kidney and ureter     DVT (deep venous thrombosis)     Factor V deficiency     Fracture of elbow     left    Glioblastoma multiforme of brain     Hypothyroidism      IBS (irritable bowel syndrome)     Osteoporosis     Panhypopituitarism     Peripheral polyneuropathy     Secondary adrenal insufficiency     Seizures     Thyroid disease      Past Surgical History:   Procedure Laterality Date    APPENDECTOMY      BRAIN SURGERY      for glioblastoma      SECTION      CHOLECYSTECTOMY  2007    CLOSURE OF DEFECT OF MOHS PROCEDURE Left 2018    Procedure: CLOSURE, MOHS PROCEDURE DEFECT SCALP  Flap closure;  Surgeon: Yrn Novak MD;  Location: [address] FLR;  Service: Plastics;  Laterality: Left;    COLONOSCOPY N/A 2019    Procedure: COLONOSCOPY Suprep;  Surgeon: Tony Mosher MD;  Location: [facility] ENDO;  Service: Endoscopy;  Laterality: N/A;    ESOPHAGOGASTRODUODENOSCOPY N/A 2019    Procedure: EGD/colon;  Surgeon: Tony Mosher MD;  Location: [facility] ENDO;  Service: Endoscopy;  Laterality: N/A;    EXCISION OF GANGLION CYST OF HAND Left 2020    Procedure: EXCISION, GANGLION CYST, HAND;  Surgeon: Ross Drew Jr., MD;  Location: [facility] OR;  Service: Orthopedics;  Laterality: Left;    HYSTERECTOMY      TILT TABLE TEST N/A 7/15/2022    Procedure: TILT TABLE TEST;  Surgeon: Amol Kohler MD;  Location: [facility] EP LAB;  Service: Cardiology;  Laterality: N/A;  Orthostatic hypotension, dizziness, Tilt Table Test with EEG, Dr.Tiffany Roca (neuro), DM    TUBAL LIGATION         Anesthesia Assessment: Preoperative EQUATION    Planned Procedure: Procedure(s) (LRB):  ORIF, FRACTURE, TARSAL BONE (Right)  BONE GRAFT, ILIAC CREST (Right)  Requested Anesthesia Type:General  Surgeon: Geoffrey Goodson MD  Service: Orthopedics  Known or anticipated Date of Surgery:3/15/2024    Surgeon notes: reviewed    Electronic QUestionnaire Assessment completed via nurse interview with patient.        Triage considerations:     The patient has no apparent active cardiac condition (No unstable coronary Syndrome such as severe unstable angina or recent  [<1 month] myocardial infarction, decompensated CHF, severe valvular   disease or significant arrhythmia)    Previous anesthesia records:GETA, MAC, Easy airway, Easy intubation, and No problems    Last PCP note: within 1 month , within [facility]   Subspecialty notes: Endocrinology, Neurology    Other important co-morbidities: Hypothyroid       EKG 11/30/2021:  Vent. Rate : 097 BPM     Atrial Rate : 097 BPM      P-R Int : 140 ms          QRS Dur : 082 ms       QT Int : 376 ms       P-R-T Axes : 066 055 080 degrees      QTc Int : 477 ms   Normal sinus rhythm   Normal ECG   No previous ECGs available   Confirmed by Guido Laguna MD ([id number]) on 12/6/2021 7:24:18 PM       Exercise Stress Echo 12/6/2021:  Summary  The stress echo portion of this study is negative for myocardial ischemia.  The ECG portion of this study is negative for myocardial ischemia.  The left ventricle is normal in size with normal systolic function.  The estimated ejection fraction is 65%.  Normal left ventricular diastolic function.  With normal right ventricular systolic function.  The test was stopped because the patient experienced fatigue and shortness of breath.  There were no arrhythmias during stress.  The patient's exercise capacity was average.      Tilt Table Procedure 7/15/2022:  Significant tachycardic and hypotensive response to head-up tilt, consistent with orthostatic hypotension wtih reflex sinus tachycardia. No syncope.         Tests already available:  Results have been reviewed.             Instructions given. (See in Nurse's note)      Optimization:  Anesthesia Preop Clinic Assessment Not Indicated    Medical Opinion Indicated: Yes, PCP       Sub-specialist consult indicated: Endocrine      Plan:    Consultation:Patient's PCP for a statement of optimization      Patient  has previously scheduled Medical Appointment:    Navigation: No additional tests Scheduled.              Consults scheduled: N/A    Patient is medically cleared  at low risk for moderately risky procedure per PCP.                              Stress Dose Steroid Order from Endocrine:  In preparation for surgery with need stress doses of IV hydrocortisone.  Recommendation for anesthesia:  - 100 mg IV hydrocortisone prior to surgery on induction followed by 50 mg IV q8H hours         Ht: 5'7  Wt: 137 lb  BMI: 21.46  Vaccinated

## 2024-03-14 ENCOUNTER — PATIENT MESSAGE (OUTPATIENT)
Dept: ORTHOPEDICS | Facility: CLINIC | Age: 59
End: 2024-03-14
Payer: COMMERCIAL

## 2024-03-15 ENCOUNTER — ANESTHESIA (OUTPATIENT)
Dept: SURGERY | Facility: HOSPITAL | Age: 59
End: 2024-03-15
Payer: COMMERCIAL

## 2024-03-15 ENCOUNTER — HOSPITAL ENCOUNTER (OUTPATIENT)
Facility: HOSPITAL | Age: 59
Discharge: HOME | End: 2024-03-16
Attending: SURGERY | Admitting: SURGERY
Payer: COMMERCIAL

## 2024-03-15 DIAGNOSIS — E27.49 SECONDARY ADRENAL INSUFFICIENCY: Primary | ICD-10-CM

## 2024-03-15 DIAGNOSIS — G62.9 PERIPHERAL POLYNEUROPATHY: ICD-10-CM

## 2024-03-15 DIAGNOSIS — M54.2 CHRONIC NECK PAIN: ICD-10-CM

## 2024-03-15 DIAGNOSIS — G89.29 CHRONIC NECK PAIN: ICD-10-CM

## 2024-03-15 DIAGNOSIS — R07.9 CHEST PAIN: ICD-10-CM

## 2024-03-15 DIAGNOSIS — S92.251G: ICD-10-CM

## 2024-03-15 DIAGNOSIS — G89.29 CHRONIC BILATERAL LOW BACK PAIN WITH BILATERAL SCIATICA: ICD-10-CM

## 2024-03-15 DIAGNOSIS — M54.41 CHRONIC BILATERAL LOW BACK PAIN WITH BILATERAL SCIATICA: ICD-10-CM

## 2024-03-15 DIAGNOSIS — M54.42 CHRONIC BILATERAL LOW BACK PAIN WITH BILATERAL SCIATICA: ICD-10-CM

## 2024-03-15 DIAGNOSIS — S92.253A NAVICULAR FRACTURE OF ANKLE: ICD-10-CM

## 2024-03-15 PROBLEM — S92.901A FRACTURE OF RIGHT FOOT: Status: ACTIVE | Noted: 2024-03-15

## 2024-03-15 LAB
ALBUMIN SERPL BCP-MCNC: 3.1 G/DL (ref 3.5–5.2)
ALP SERPL-CCNC: 83 U/L (ref 55–135)
ALT SERPL W/O P-5'-P-CCNC: 26 U/L (ref 10–44)
ANION GAP SERPL CALC-SCNC: 7 MMOL/L (ref 8–16)
AST SERPL-CCNC: 33 U/L (ref 10–40)
BASOPHILS # BLD AUTO: 0.02 K/UL (ref 0–0.2)
BASOPHILS NFR BLD: 0.2 % (ref 0–1.9)
BILIRUB SERPL-MCNC: 0.1 MG/DL (ref 0.1–1)
BUN SERPL-MCNC: 41 MG/DL (ref 6–20)
CALCIUM SERPL-MCNC: 8.2 MG/DL (ref 8.7–10.5)
CHLORIDE SERPL-SCNC: 112 MMOL/L (ref 95–110)
CO2 SERPL-SCNC: 22 MMOL/L (ref 23–29)
CREAT SERPL-MCNC: 1.7 MG/DL (ref 0.5–1.4)
CREAT SERPL-MCNC: 1.7 MG/DL (ref 0.5–1.4)
DIFFERENTIAL METHOD BLD: ABNORMAL
EOSINOPHIL # BLD AUTO: 0 K/UL (ref 0–0.5)
EOSINOPHIL NFR BLD: 0.1 % (ref 0–8)
ERYTHROCYTE [DISTWIDTH] IN BLOOD BY AUTOMATED COUNT: 13.2 % (ref 11.5–14.5)
EST. GFR  (NO RACE VARIABLE): 34.8 ML/MIN/1.73 M^2
EST. GFR  (NO RACE VARIABLE): 34.8 ML/MIN/1.73 M^2
GLUCOSE SERPL-MCNC: 102 MG/DL (ref 70–110)
HCT VFR BLD AUTO: 30 % (ref 37–48.5)
HGB BLD-MCNC: 9.7 G/DL (ref 12–16)
IMM GRANULOCYTES # BLD AUTO: 0.04 K/UL (ref 0–0.04)
IMM GRANULOCYTES NFR BLD AUTO: 0.3 % (ref 0–0.5)
LYMPHOCYTES # BLD AUTO: 1.2 K/UL (ref 1–4.8)
LYMPHOCYTES NFR BLD: 10.8 % (ref 18–48)
MAGNESIUM SERPL-MCNC: 2.1 MG/DL (ref 1.6–2.6)
MCH RBC QN AUTO: 31.2 PG (ref 27–31)
MCHC RBC AUTO-ENTMCNC: 32.3 G/DL (ref 32–36)
MCV RBC AUTO: 97 FL (ref 82–98)
MONOCYTES # BLD AUTO: 0.6 K/UL (ref 0.3–1)
MONOCYTES NFR BLD: 5.1 % (ref 4–15)
NEUTROPHILS # BLD AUTO: 9.6 K/UL (ref 1.8–7.7)
NEUTROPHILS NFR BLD: 83.5 % (ref 38–73)
NRBC BLD-RTO: 0 /100 WBC
PHOSPHATE SERPL-MCNC: 1.6 MG/DL (ref 2.7–4.5)
PLATELET # BLD AUTO: 209 K/UL (ref 150–450)
PMV BLD AUTO: 9.1 FL (ref 9.2–12.9)
POTASSIUM SERPL-SCNC: 4.9 MMOL/L (ref 3.5–5.1)
PROT SERPL-MCNC: 6.3 G/DL (ref 6–8.4)
RBC # BLD AUTO: 3.11 M/UL (ref 4–5.4)
SODIUM SERPL-SCNC: 141 MMOL/L (ref 136–145)
WBC # BLD AUTO: 11.52 K/UL (ref 3.9–12.7)

## 2024-03-15 PROCEDURE — 64445 NJX AA&/STRD SCIATIC NRV IMG: CPT | Mod: 59 | Performed by: ANESTHESIOLOGY

## 2024-03-15 PROCEDURE — 63600175 PHARM REV CODE 636 W HCPCS: Performed by: PHYSICIAN ASSISTANT

## 2024-03-15 PROCEDURE — 64450 NJX AA&/STRD OTHER PN/BRANCH: CPT | Mod: 59,RT,, | Performed by: ANESTHESIOLOGY

## 2024-03-15 PROCEDURE — 37000008 HC ANESTHESIA 1ST 15 MINUTES: Performed by: SURGERY

## 2024-03-15 PROCEDURE — 63600175 PHARM REV CODE 636 W HCPCS: Performed by: NURSE ANESTHETIST, CERTIFIED REGISTERED

## 2024-03-15 PROCEDURE — 99900035 HC TECH TIME PER 15 MIN (STAT)

## 2024-03-15 PROCEDURE — 28320 REPAIR OF FOOT BONES: CPT | Mod: RT,,, | Performed by: SURGERY

## 2024-03-15 PROCEDURE — C1762 CONN TISS, HUMAN(INC FASCIA): HCPCS | Performed by: SURGERY

## 2024-03-15 PROCEDURE — 27200750 HC INSULATED NEEDLE/ STIMUPLEX: Performed by: ANESTHESIOLOGY

## 2024-03-15 PROCEDURE — 94761 N-INVAS EAR/PLS OXIMETRY MLT: CPT

## 2024-03-15 PROCEDURE — 25000003 PHARM REV CODE 250: Performed by: SURGERY

## 2024-03-15 PROCEDURE — 25000003 PHARM REV CODE 250: Performed by: NURSE ANESTHETIST, CERTIFIED REGISTERED

## 2024-03-15 PROCEDURE — C1734 ORTH/DEVIC/DRUG BN/BN,TIS/BN: HCPCS | Performed by: SURGERY

## 2024-03-15 PROCEDURE — 36000708 HC OR TIME LEV III 1ST 15 MIN: Performed by: SURGERY

## 2024-03-15 PROCEDURE — D9220A PRA ANESTHESIA: Mod: CRNA,,, | Performed by: NURSE ANESTHETIST, CERTIFIED REGISTERED

## 2024-03-15 PROCEDURE — 63600175 PHARM REV CODE 636 W HCPCS: Performed by: STUDENT IN AN ORGANIZED HEALTH CARE EDUCATION/TRAINING PROGRAM

## 2024-03-15 PROCEDURE — 84100 ASSAY OF PHOSPHORUS: CPT | Performed by: STUDENT IN AN ORGANIZED HEALTH CARE EDUCATION/TRAINING PROGRAM

## 2024-03-15 PROCEDURE — 71000039 HC RECOVERY, EACH ADD'L HOUR: Performed by: SURGERY

## 2024-03-15 PROCEDURE — 80053 COMPREHEN METABOLIC PANEL: CPT | Performed by: STUDENT IN AN ORGANIZED HEALTH CARE EDUCATION/TRAINING PROGRAM

## 2024-03-15 PROCEDURE — 63600175 PHARM REV CODE 636 W HCPCS: Performed by: ANESTHESIOLOGY

## 2024-03-15 PROCEDURE — 63600175 PHARM REV CODE 636 W HCPCS: Performed by: SURGERY

## 2024-03-15 PROCEDURE — 85025 COMPLETE CBC W/AUTO DIFF WBC: CPT | Performed by: STUDENT IN AN ORGANIZED HEALTH CARE EDUCATION/TRAINING PROGRAM

## 2024-03-15 PROCEDURE — 27201423 OPTIME MED/SURG SUP & DEVICES STERILE SUPPLY: Performed by: SURGERY

## 2024-03-15 PROCEDURE — 36415 COLL VENOUS BLD VENIPUNCTURE: CPT | Performed by: STUDENT IN AN ORGANIZED HEALTH CARE EDUCATION/TRAINING PROGRAM

## 2024-03-15 PROCEDURE — 25000003 PHARM REV CODE 250: Performed by: STUDENT IN AN ORGANIZED HEALTH CARE EDUCATION/TRAINING PROGRAM

## 2024-03-15 PROCEDURE — 64447 NJX AA&/STRD FEMORAL NRV IMG: CPT | Mod: 59,RT | Performed by: ANESTHESIOLOGY

## 2024-03-15 PROCEDURE — 20900 REMOVAL OF BONE FOR GRAFT: CPT | Mod: 51,,, | Performed by: SURGERY

## 2024-03-15 PROCEDURE — 27200651 HC AIRWAY, LMA: Performed by: ANESTHESIOLOGY

## 2024-03-15 PROCEDURE — 71000033 HC RECOVERY, INTIAL HOUR: Performed by: SURGERY

## 2024-03-15 PROCEDURE — 37000009 HC ANESTHESIA EA ADD 15 MINS: Performed by: SURGERY

## 2024-03-15 PROCEDURE — D9220A PRA ANESTHESIA: Mod: ANES,,, | Performed by: ANESTHESIOLOGY

## 2024-03-15 PROCEDURE — 83735 ASSAY OF MAGNESIUM: CPT | Performed by: STUDENT IN AN ORGANIZED HEALTH CARE EDUCATION/TRAINING PROGRAM

## 2024-03-15 PROCEDURE — 36000709 HC OR TIME LEV III EA ADD 15 MIN: Performed by: SURGERY

## 2024-03-15 PROCEDURE — C1713 ANCHOR/SCREW BN/BN,TIS/BN: HCPCS | Performed by: SURGERY

## 2024-03-15 DEVICE — KIT AUGMENT BONE GRAFT 3.0CC: Type: IMPLANTABLE DEVICE | Site: FOOT | Status: FUNCTIONAL

## 2024-03-15 DEVICE — IMPLANTABLE DEVICE: Type: IMPLANTABLE DEVICE | Site: FOOT | Status: FUNCTIONAL

## 2024-03-15 RX ORDER — SODIUM CHLORIDE 0.9 % (FLUSH) 0.9 %
3 SYRINGE (ML) INJECTION
Status: DISCONTINUED | OUTPATIENT
Start: 2024-03-15 | End: 2024-03-15 | Stop reason: HOSPADM

## 2024-03-15 RX ORDER — DULOXETIN HYDROCHLORIDE 30 MG/1
60 CAPSULE, DELAYED RELEASE ORAL DAILY
Qty: 180 CAPSULE | Refills: 1 | Status: SHIPPED | OUTPATIENT
Start: 2024-03-15

## 2024-03-15 RX ORDER — LIDOCAINE HYDROCHLORIDE AND EPINEPHRINE 10; 10 MG/ML; UG/ML
INJECTION, SOLUTION INFILTRATION; PERINEURAL
Status: DISCONTINUED | OUTPATIENT
Start: 2024-03-15 | End: 2024-03-15 | Stop reason: HOSPADM

## 2024-03-15 RX ORDER — NALOXONE HCL 0.4 MG/ML
0.02 VIAL (ML) INJECTION
Status: DISCONTINUED | OUTPATIENT
Start: 2024-03-15 | End: 2024-03-16 | Stop reason: HOSPADM

## 2024-03-15 RX ORDER — CALCITRIOL 0.25 UG/1
0.25 CAPSULE ORAL DAILY
Status: DISCONTINUED | OUTPATIENT
Start: 2024-03-16 | End: 2024-03-16 | Stop reason: HOSPADM

## 2024-03-15 RX ORDER — PHENYLEPHRINE HYDROCHLORIDE 10 MG/ML
INJECTION INTRAVENOUS
Status: DISCONTINUED | OUTPATIENT
Start: 2024-03-15 | End: 2024-03-15

## 2024-03-15 RX ORDER — OXYCODONE HYDROCHLORIDE 5 MG/1
5 TABLET ORAL
Status: DISCONTINUED | OUTPATIENT
Start: 2024-03-15 | End: 2024-03-15 | Stop reason: HOSPADM

## 2024-03-15 RX ORDER — OXYCODONE HYDROCHLORIDE 10 MG/1
10 TABLET ORAL EVERY 6 HOURS PRN
Status: DISCONTINUED | OUTPATIENT
Start: 2024-03-15 | End: 2024-03-16 | Stop reason: HOSPADM

## 2024-03-15 RX ORDER — HALOPERIDOL 5 MG/ML
0.5 INJECTION INTRAMUSCULAR EVERY 10 MIN PRN
Status: DISCONTINUED | OUTPATIENT
Start: 2024-03-15 | End: 2024-03-15 | Stop reason: HOSPADM

## 2024-03-15 RX ORDER — ESMOLOL HYDROCHLORIDE 10 MG/ML
INJECTION INTRAVENOUS
Status: DISCONTINUED | OUTPATIENT
Start: 2024-03-15 | End: 2024-03-15

## 2024-03-15 RX ORDER — DULOXETIN HYDROCHLORIDE 60 MG/1
60 CAPSULE, DELAYED RELEASE ORAL DAILY
Status: DISCONTINUED | OUTPATIENT
Start: 2024-03-16 | End: 2024-03-16 | Stop reason: HOSPADM

## 2024-03-15 RX ORDER — ONDANSETRON HYDROCHLORIDE 2 MG/ML
INJECTION, SOLUTION INTRAVENOUS
Status: DISCONTINUED | OUTPATIENT
Start: 2024-03-15 | End: 2024-03-15

## 2024-03-15 RX ORDER — ENOXAPARIN SODIUM 100 MG/ML
40 INJECTION SUBCUTANEOUS EVERY 24 HOURS
Status: DISCONTINUED | OUTPATIENT
Start: 2024-03-15 | End: 2024-03-16

## 2024-03-15 RX ORDER — LANOLIN ALCOHOL/MO/W.PET/CERES
1 CREAM (GRAM) TOPICAL DAILY
Status: DISCONTINUED | OUTPATIENT
Start: 2024-03-16 | End: 2024-03-16 | Stop reason: HOSPADM

## 2024-03-15 RX ORDER — IBUPROFEN 200 MG
16 TABLET ORAL
Status: DISCONTINUED | OUTPATIENT
Start: 2024-03-15 | End: 2024-03-16 | Stop reason: HOSPADM

## 2024-03-15 RX ORDER — ROPIVACAINE HYDROCHLORIDE 5 MG/ML
INJECTION, SOLUTION EPIDURAL; INFILTRATION; PERINEURAL
Status: COMPLETED | OUTPATIENT
Start: 2024-03-15 | End: 2024-03-15

## 2024-03-15 RX ORDER — FENTANYL CITRATE 50 UG/ML
100 INJECTION, SOLUTION INTRAMUSCULAR; INTRAVENOUS
Status: DISCONTINUED | OUTPATIENT
Start: 2024-03-15 | End: 2024-03-15 | Stop reason: HOSPADM

## 2024-03-15 RX ORDER — ACETAMINOPHEN 325 MG/1
650 TABLET ORAL EVERY 4 HOURS PRN
Status: DISCONTINUED | OUTPATIENT
Start: 2024-03-15 | End: 2024-03-16 | Stop reason: HOSPADM

## 2024-03-15 RX ORDER — FAMOTIDINE 10 MG/ML
INJECTION INTRAVENOUS
Status: DISCONTINUED | OUTPATIENT
Start: 2024-03-15 | End: 2024-03-15

## 2024-03-15 RX ORDER — SODIUM CHLORIDE 0.9 % (FLUSH) 0.9 %
10 SYRINGE (ML) INJECTION EVERY 12 HOURS PRN
Status: DISCONTINUED | OUTPATIENT
Start: 2024-03-15 | End: 2024-03-16 | Stop reason: HOSPADM

## 2024-03-15 RX ORDER — SODIUM CHLORIDE 9 MG/ML
INJECTION, SOLUTION INTRAVENOUS CONTINUOUS
Status: DISCONTINUED | OUTPATIENT
Start: 2024-03-15 | End: 2024-03-16 | Stop reason: HOSPADM

## 2024-03-15 RX ORDER — CHOLECALCIFEROL (VITAMIN D3) 25 MCG
2000 TABLET ORAL DAILY
Status: DISCONTINUED | OUTPATIENT
Start: 2024-03-16 | End: 2024-03-16 | Stop reason: HOSPADM

## 2024-03-15 RX ORDER — IBUPROFEN 200 MG
24 TABLET ORAL
Status: DISCONTINUED | OUTPATIENT
Start: 2024-03-15 | End: 2024-03-16 | Stop reason: HOSPADM

## 2024-03-15 RX ORDER — LIDOCAINE HYDROCHLORIDE 20 MG/ML
INJECTION INTRAVENOUS
Status: DISCONTINUED | OUTPATIENT
Start: 2024-03-15 | End: 2024-03-15

## 2024-03-15 RX ORDER — HYDROMORPHONE HYDROCHLORIDE 2 MG/ML
INJECTION, SOLUTION INTRAMUSCULAR; INTRAVENOUS; SUBCUTANEOUS
Status: DISCONTINUED | OUTPATIENT
Start: 2024-03-15 | End: 2024-03-15

## 2024-03-15 RX ORDER — CARBOXYMETHYLCELLULOSE SODIUM 10 MG/ML
GEL OPHTHALMIC
Status: DISCONTINUED | OUTPATIENT
Start: 2024-03-15 | End: 2024-03-15

## 2024-03-15 RX ORDER — OXYCODONE HYDROCHLORIDE 5 MG/1
5 TABLET ORAL EVERY 6 HOURS PRN
Status: DISCONTINUED | OUTPATIENT
Start: 2024-03-15 | End: 2024-03-16 | Stop reason: HOSPADM

## 2024-03-15 RX ORDER — FENTANYL CITRATE 50 UG/ML
25 INJECTION, SOLUTION INTRAMUSCULAR; INTRAVENOUS EVERY 5 MIN PRN
Status: DISCONTINUED | OUTPATIENT
Start: 2024-03-15 | End: 2024-03-15 | Stop reason: HOSPADM

## 2024-03-15 RX ORDER — GLUCAGON 1 MG
1 KIT INJECTION
Status: DISCONTINUED | OUTPATIENT
Start: 2024-03-15 | End: 2024-03-16 | Stop reason: HOSPADM

## 2024-03-15 RX ORDER — VANCOMYCIN HYDROCHLORIDE 1 G/20ML
INJECTION, POWDER, LYOPHILIZED, FOR SOLUTION INTRAVENOUS
Status: DISCONTINUED | OUTPATIENT
Start: 2024-03-15 | End: 2024-03-15 | Stop reason: HOSPADM

## 2024-03-15 RX ORDER — PROPOFOL 10 MG/ML
VIAL (ML) INTRAVENOUS
Status: DISCONTINUED | OUTPATIENT
Start: 2024-03-15 | End: 2024-03-15

## 2024-03-15 RX ORDER — LEVOTHYROXINE SODIUM 112 UG/1
112 TABLET ORAL
Status: DISCONTINUED | OUTPATIENT
Start: 2024-03-16 | End: 2024-03-16 | Stop reason: HOSPADM

## 2024-03-15 RX ORDER — MUPIROCIN 20 MG/G
OINTMENT TOPICAL
Status: DISCONTINUED | OUTPATIENT
Start: 2024-03-15 | End: 2024-03-15 | Stop reason: HOSPADM

## 2024-03-15 RX ORDER — MIDAZOLAM HYDROCHLORIDE 1 MG/ML
1 INJECTION INTRAMUSCULAR; INTRAVENOUS
Status: DISCONTINUED | OUTPATIENT
Start: 2024-03-15 | End: 2024-03-15 | Stop reason: HOSPADM

## 2024-03-15 RX ORDER — ONDANSETRON HYDROCHLORIDE 2 MG/ML
4 INJECTION, SOLUTION INTRAVENOUS EVERY 8 HOURS PRN
Status: DISCONTINUED | OUTPATIENT
Start: 2024-03-15 | End: 2024-03-16 | Stop reason: HOSPADM

## 2024-03-15 RX ORDER — PHENYTOIN SODIUM 100 MG/1
200 CAPSULE, EXTENDED RELEASE ORAL NIGHTLY
Status: DISCONTINUED | OUTPATIENT
Start: 2024-03-15 | End: 2024-03-16 | Stop reason: HOSPADM

## 2024-03-15 RX ADMIN — PROPOFOL 20 MG: 10 INJECTION, EMULSION INTRAVENOUS at 09:03

## 2024-03-15 RX ADMIN — PROPOFOL 50 MG: 10 INJECTION, EMULSION INTRAVENOUS at 07:03

## 2024-03-15 RX ADMIN — SODIUM CHLORIDE: 0.9 INJECTION, SOLUTION INTRAVENOUS at 06:03

## 2024-03-15 RX ADMIN — PHENYTOIN SODIUM 200 MG: 100 CAPSULE ORAL at 09:03

## 2024-03-15 RX ADMIN — MUPIROCIN: 20 OINTMENT TOPICAL at 06:03

## 2024-03-15 RX ADMIN — FENTANYL CITRATE 50 MCG: 50 INJECTION INTRAMUSCULAR; INTRAVENOUS at 07:03

## 2024-03-15 RX ADMIN — ROPIVACAINE HYDROCHLORIDE 15 ML: 5 INJECTION, SOLUTION EPIDURAL; INFILTRATION; PERINEURAL at 06:03

## 2024-03-15 RX ADMIN — ACETAMINOPHEN 650 MG: 325 TABLET ORAL at 11:03

## 2024-03-15 RX ADMIN — FENTANYL CITRATE 50 MCG: 50 INJECTION INTRAMUSCULAR; INTRAVENOUS at 06:03

## 2024-03-15 RX ADMIN — MIDAZOLAM 1 MG: 1 INJECTION INTRAMUSCULAR; INTRAVENOUS at 06:03

## 2024-03-15 RX ADMIN — SODIUM CHLORIDE 100 MG: 9 INJECTION, SOLUTION INTRAVENOUS at 07:03

## 2024-03-15 RX ADMIN — PHENYLEPHRINE HYDROCHLORIDE 50 MCG: 10 INJECTION INTRAVENOUS at 08:03

## 2024-03-15 RX ADMIN — ENOXAPARIN SODIUM 40 MG: 40 INJECTION SUBCUTANEOUS at 06:03

## 2024-03-15 RX ADMIN — FAMOTIDINE 20 MG: 10 INJECTION, SOLUTION INTRAVENOUS at 07:03

## 2024-03-15 RX ADMIN — CARBOXYMETHYLCELLULOSE SODIUM 4 DROP: 10 GEL OPHTHALMIC at 07:03

## 2024-03-15 RX ADMIN — HYDROMORPHONE HYDROCHLORIDE 0.2 MG: 2 INJECTION, SOLUTION INTRAMUSCULAR; INTRAVENOUS; SUBCUTANEOUS at 09:03

## 2024-03-15 RX ADMIN — ESMOLOL HYDROCHLORIDE 20 MG: 100 INJECTION, SOLUTION INTRAVENOUS at 09:03

## 2024-03-15 RX ADMIN — HYDROMORPHONE HYDROCHLORIDE 0.4 MG: 2 INJECTION, SOLUTION INTRAMUSCULAR; INTRAVENOUS; SUBCUTANEOUS at 09:03

## 2024-03-15 RX ADMIN — ROPIVACAINE HYDROCHLORIDE 30 ML: 5 INJECTION EPIDURAL; INFILTRATION; PERINEURAL at 06:03

## 2024-03-15 RX ADMIN — SODIUM CHLORIDE: 9 INJECTION, SOLUTION INTRAVENOUS at 06:03

## 2024-03-15 RX ADMIN — PROPOFOL 150 MG: 10 INJECTION, EMULSION INTRAVENOUS at 07:03

## 2024-03-15 RX ADMIN — PHENYLEPHRINE HYDROCHLORIDE 50 MCG: 10 INJECTION INTRAVENOUS at 07:03

## 2024-03-15 RX ADMIN — ONDANSETRON 4 MG: 2 INJECTION INTRAMUSCULAR; INTRAVENOUS at 09:03

## 2024-03-15 RX ADMIN — SODIUM CHLORIDE, SODIUM GLUCONATE, SODIUM ACETATE, POTASSIUM CHLORIDE, MAGNESIUM CHLORIDE, SODIUM PHOSPHATE, DIBASIC, AND POTASSIUM PHOSPHATE: .53; .5; .37; .037; .03; .012; .00082 INJECTION, SOLUTION INTRAVENOUS at 07:03

## 2024-03-15 RX ADMIN — HYDROCORTISONE SODIUM SUCCINATE 50 MG: 100 INJECTION, POWDER, FOR SOLUTION INTRAMUSCULAR; INTRAVENOUS at 09:03

## 2024-03-15 RX ADMIN — LIDOCAINE HYDROCHLORIDE 75 MG: 20 INJECTION INTRAVENOUS at 07:03

## 2024-03-15 RX ADMIN — HYDROCORTISONE SODIUM SUCCINATE 50 MG: 100 INJECTION, POWDER, FOR SOLUTION INTRAMUSCULAR; INTRAVENOUS at 03:03

## 2024-03-15 NOTE — NURSING
Pt admitted to the floor via stretcher. Transferred to bed with assist. Oriented to room and call light system. Resting comfortably at this time and VSS.

## 2024-03-15 NOTE — ANESTHESIA PROCEDURE NOTES
Adductor canal single shot    Patient location during procedure: pre-op   Block not for primary anesthetic.  Reason for block: at surgeon's request and post-op pain management   Post-op Pain Location: right foot pain   Start time: 3/15/2024 6:40 AM  Timeout: 3/15/2024 6:34 AM   End time: 3/15/2024 6:41 AM    Staffing  Authorizing Provider: Karime Spencer MD  Performing Provider: Karime Spencer MD    Staffing  Performed by: Karime Spencer MD  Authorized by: Karime Spencer MD    Preanesthetic Checklist  Completed: patient identified, IV checked, site marked, risks and benefits discussed, surgical consent, monitors and equipment checked, pre-op evaluation and timeout performed  Peripheral Block  Patient position: supine  Prep: ChloraPrep  Patient monitoring: heart rate, cardiac monitor, continuous pulse ox, continuous capnometry and frequent blood pressure checks  Block type: adductor canal  Laterality: right  Injection technique: single shot  Needle  Needle type: Stimuplex   Needle gauge: 21 G  Needle length: 4 in  Needle localization: anatomical landmarks and ultrasound guidance   -ultrasound image captured on disc.  Assessment  Injection assessment: negative aspiration, negative parasthesia and local visualized surrounding nerve  Paresthesia pain: none  Heart rate change: no  Slow fractionated injection: yes    Medications:    Medications: ropivacaine (NAROPIN) injection 0.5% - Perineural   15 mL - 3/15/2024 6:41:00 AM    Additional Notes  VSS.  [facility] RN monitoring vitals throughout procedure.  Patient tolerated procedure well.

## 2024-03-15 NOTE — ANESTHESIA PROCEDURE NOTES
Popliteal Single shot    Patient location during procedure: pre-op   Block not for primary anesthetic.  Reason for block: at surgeon's request and post-op pain management   Post-op Pain Location: right foot pain   Start time: 3/15/2024 6:36 AM  Timeout: 3/15/2024 6:34 AM   End time: 3/15/2024 6:40 AM    Staffing  Authorizing Provider: Karime Spencer MD  Performing Provider: Karime Spencer MD    Staffing  Performed by: Karime Spencer MD  Authorized by: Karime Spencer MD    Preanesthetic Checklist  Completed: patient identified, IV checked, site marked, risks and benefits discussed, surgical consent, monitors and equipment checked, pre-op evaluation and timeout performed  Peripheral Block  Patient position: supine  Prep: ChloraPrep  Patient monitoring: heart rate, cardiac monitor, continuous pulse ox, continuous capnometry and frequent blood pressure checks  Block type: popliteal  Laterality: right  Injection technique: single shot  Needle  Needle type: Stimuplex   Needle gauge: 21 G  Needle length: 4 in  Needle localization: anatomical landmarks and ultrasound guidance   -ultrasound image captured on disc.  Assessment  Injection assessment: negative aspiration, negative parasthesia and local visualized surrounding nerve  Paresthesia pain: none  Heart rate change: no  Slow fractionated injection: yes    Medications:    Medications: ropivacaine (NAROPIN) injection 0.5% - Perineural   30 mL - 3/15/2024 6:36:00 AM    Additional Notes  VSS.  [facility] RN monitoring vitals throughout procedure.  Patient tolerated procedure well.

## 2024-03-15 NOTE — PLAN OF CARE
Pre-op complete. Waiting on site florina, H&P update, and anesthesia consent.  at bedside. Bed locked in lowest position with call bell within reach.

## 2024-03-15 NOTE — HPI
Patient with history of adrenal insufficiency transferring from [place] after ORIF of a right tarsal bone fracture for endocrine eval for adrenal insufficiency. 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 She tolerated the procedure well and is stable postoperatively. Based on Endocrinology recommendations for treatment of her adrenal insufficiency (office visit February 27), request was made to transfer to Hospital Medicine at [facility] for continued IV steroids with the 1st 24 hours postoperatively. After that, she should be able to transition to oral hydrocortisone.She was seen by Endocrinology preoperatively on February 27 and recommendations for perioperative steroid use were given. Levothyroxine dose was also adjusted.    Patient placed in observation under care of .

## 2024-03-15 NOTE — OP NOTE
Orthopaedic Surgery Operative Report      DATE OF PROCEDURE: 03/15/2024   PREOPERATIVE DIAGNOSIS: Closed avulsion fracture of navicular bone of right foot with delayed healing, subsequent encounter [S97.425F]  POSTOPERATIVE DIAGNOSIS: same  PROCEDURE PERFORMED: 1. Open Reduction Internal Fixation Navicular Fracture  2. Iliac Crest Bone [place]  SURGEON: Hamzah  ASSISTANT: Jas,   ANESTHESIA: General  ESTIMATED BLOOD LOSS: 20 cc.   IMPLANTS: Huma 3.0mm headless screws  * 3, PDGF    INDICATIONS FOR PROCEDURE: The patient is an 58 y.o. female who sustained a navicular fracture. She has a history of neuropathy secondary to glioblastoma multiforme treatments.  Therefore the timing of the injury and chronicity of the fracture are unknown.  It was decided that the best plan of action was to take the patient back to the operative theatre for the procedure above.  This procedure, as well as, alternatives to this procedure was discussed at length with the patient. Risks and benefits were also discussed. Risks include but are not limited to bleeding, infection, numbness, scarring, damage to major neurovascular structures, limb length/rotation discrepancy, failure of hardware, need for further surgery, loss of function, myocardial infarction, deep venous thrombosis, pulmonary embolism, nonunion, malunion and death. Patient understood these well and consented for the procedure as described.    PROCEDURE IN DETAIL: The patient was identified in the preoperative holding area and site was marked. The patient was wheeled into the Operating Room and general anesthesia was induced. The patient was placed into a supine position with the affected limb in the prime position. The affected limb was then prepped and draped in the usual sterile fashion. A timeout was taken to confirm the patient, site, surgery, surgeon and administration of preoperative antibiotics. All agreed and we proceeded.     We began by examining the Iliac  Crest, we made a 2cm incision and dissected the subcutaneous tissues neatly. We then encountered the fashion and cut in neatly. We then harvested our bone graft using acumed reamers, size 6. We obtained excellent bone graft. We irrigated thoroughly, then placed gel foam in the harvest site, then closed the fascia with 2 O vicryl and the subcutaneous tissue and skin with monocryl.    We then inflated the tourniquet. We exposed the navicular in the interval between TA and EHL. We made a 4 cm incision, dissected the soft tissues neatly, and encountered the capsule. We then elevated subperiosteally elevated and protected the NV bundle laterally. We exposed the fracture. There was callous and fibrous tissue consistent with a chronic injury that had gone on to nonunion. We freed up the fracture fragments using a freer, knife, rongeur, and dental pick. We trephinated both sides with a drill and MIS johanne. We placed our bone graft mixed with PDGF into the fracture nonunion site. We used tenaculum clamps to reduce the fracture. We placed 3 headless screws, 2 medial to lateral and 1 lateral to medial. This was done by placing a wire, measuring, drilling the appropriate depth then placing the screw. They were verified to be in bone on multiple planes fluoroscopically, ensuring the screws were in bone and not breaching the articular surface on either end.    Examining the talonavicular and naviculocuneiform joints, the cartilage surfaces looked excellent. We therefore elected not to fuse either joint.    Our procedure complete, we irrigated thoroughly, we then placed Vancomycin powder in the wound. We closed the deep layer with 2 O monocryl, the subcutaneous layer with 2 O monocryl, and the skin with 2 O nylon. She was placed in a sterile dressing in splint.    The patient was extubated, awakened and taken to the post anesthesia care unit in stable condition.     PLAN FOR THE PATIENT:   Admit to hospital for IV steroids and  monitoring under the Hospitalist Team  MARKY RLE in splint  Follow up in 1 week  Oxycodone PRN for pain  Tylenol standing for pain  Resume home Eloquis for VTE prophylaxis POD1.

## 2024-03-15 NOTE — ASSESSMENT & PLAN NOTE
Secondary adrenal insufficiency  Follows with endocrine as OP. Last office visit on 2/27.   Reviewed rec fro anesthesia in office note from 2/27.   - 100 mg IV hydrocortisone prior to surgery on induction followed by 50 mg IV q8H hours  - POD 1-3 oral hydrocortisone 40 mg in the morning and 20 mg in the afternoon around 3 pm  - POD 4 onward return to home dose of oral hydrocortisone 15/5 mg.       -- Continue IV hydrocortiosone 50mg Q8hrs. Consulted endo     Secondary hypothyroidism  - Continue levothyroxine 112 mcg daily

## 2024-03-15 NOTE — PROGRESS NOTES
"SUBJECTIVE:    Ms. Nguyen is here today for surgery.  On the phone last night answered all her questions.  Plan for navicular ORIF versus naviculocuneiform versus talonavicular fusion with possible iliac crest bone grafting.      She requires a dose of steroids after the surgery at roughly 3:00 p.m..  She is requested to go home to take this dose.  I told her that after the surgery I would speak to her and her         OBJECTIVE:      Vitals:    24 1131   Height: 5' 7" (1.702 m)   PainSc:   4   PainLoc: Foot     Past Medical History:   Diagnosis Date    Allergic rhinitis     Anemia     Arthritis     Basal cell carcinoma     Broken ankle     Cancer     Clotting disorder     Disorder of kidney and ureter     DVT (deep venous thrombosis)     Factor V deficiency     Fracture of elbow     left    Glioblastoma multiforme of brain     Hypothyroidism     IBS (irritable bowel syndrome)     Osteoporosis     Panhypopituitarism     Peripheral polyneuropathy     Secondary adrenal insufficiency     Seizures     Thyroid disease      Past Surgical History:   Procedure Laterality Date    APPENDECTOMY      BRAIN SURGERY      for glioblastoma      SECTION      CHOLECYSTECTOMY      CLOSURE OF DEFECT OF MOHS PROCEDURE Left 2018    Procedure: CLOSURE, MOHS PROCEDURE DEFECT SCALP  Flap closure;  Surgeon: Yrn Novak MD;  Location: [address];  Service: Plastics;  Laterality: Left;    COLONOSCOPY N/A 2019    Procedure: COLONOSCOPY Suprep;  Surgeon: Tony Mosher MD;  Location: [facility];  Service: Endoscopy;  Laterality: N/A;    ESOPHAGOGASTRODUODENOSCOPY N/A 2019    Procedure: EGD/colon;  Surgeon: Tony Mosher MD;  Location: [facility];  Service: Endoscopy;  Laterality: N/A;    EXCISION OF GANGLION CYST OF HAND Left 2020    Procedure: EXCISION, GANGLION CYST, HAND;  Surgeon: Ross Drew Jr., MD;  Location: [facility] OR;  Service: " Orthopedics;  Laterality: Left;    HYSTERECTOMY  1999    SHOULDER SURGERY Right     TILT TABLE TEST N/A 07/15/2022    Procedure: TILT TABLE TEST;  Surgeon: Amol Kohler MD;  Location: [facility];  Service: Cardiology;  Laterality: N/A;  Orthostatic hypotension, dizziness, Tilt Table Test with EEG, Dr.Tiffany Roca (neuro), DM    TUBAL LIGATION  1998     Review of patient's allergies indicates:   Allergen Reactions    Neurontin [gabapentin]     Lactose     Soap Hives     Able to tolerate Dove only        Lower Extremity Exam  Right lower extremity with mild erythema and +1 edema about the midfoot over the navicular.  Tender over the navicular.  No evidence of flatfoot.  Mild forefoot varus.  Moderate sensory defect about the entirety of the foot and ankle     DIAGNOSTIC STUDIES:  CT of the foot reviewed.  Age indeterminate complete navicular fracture of the body of the navicular with 5 mm gap between fracture fragments.    ASSESSMENT:   1. Displaced navicular fracture      PLAN:  There are no diagnoses linked to this encounter.    Discussed her pathology with her as well as the propensity of the navicular for avascular necrosis in the ramifications of this.  We discussed operative, nonoperative and other alternatives to treatment.  She would like to pursue operative management for this injury.  I recommend open reduction internal fixation of the right navicular versus navicular cuneiform and possible talonavicular fusion with iliac crest bone grafting.    We had a long discussion of the risks and benefits of different operative and non-operative options. I explained the injury using pictures, models, and the patient's x-ray and CT images. I explained the risks of surgery which include but are not limited to continued pain, deformity, bleeding, infection, damage to surrounding structures, non-union, mal-union, arthritis, and in rare cases loss of limb. I explained the risks of co-morbidities which influences the  rate of complications.  I discussed the effects of her neuropathy on the surgery and that we may place her in a a splint or cast so she could take it off and examined the foot to make sure she has not developing wounds.  I explained the risks of non-operative management, including continued pain, long term immobilization, malunion, non-union, avascular necrosis, flatfoot deformity rapid arthritis progression, and difficulty with ambulation. I discussed all of these risks using non-medical terms and confirmed understanding. The patient elected for the procedure above.    Plan for procedure as above.  She is going to resume her seizure medications postoperatively.  She is okay to re-start her Xarelto tomorrow for factor 5 [place].    I spoke to her and her  and relayed the instructions below:    Per her endocrinologist, this is the following replacement dose for steroid.   POD 1-3 oral hydrocortisone 40 mg in the morning and 20 mg in the afternoon around 3 pm  - POD 4 onward return to home dose of oral hydrocortisone 15/5 mg.       Secondary hypothyroidism- increase to levothyroxine 112 mcg daily as FT4 is below goal (goal in the upper half of the normal range) with hypothyroid symptoms     Discussed with her and her .    Geoffrey Goodson MD  [facility]  Orthopedic Surgery      This note was done with voice recognition software. Please excuse any errors missed in proof reading.

## 2024-03-15 NOTE — ASSESSMENT & PLAN NOTE
Patient is s/p Open Reduction Internal Fixation of right Navicular Fracture and Iliac Crest Bone [place] on 3/15 at [facility].  Reports that she is asked to follow up with ortho in 6 weeks. Patient advised to not bear weight on R leg for atleast 6 weeks. She has a knee scooter at home that her  plan to bring in. Reports surgeon advised her to resume home xaralto on 3/16.

## 2024-03-15 NOTE — PROVIDER TRANSFER
(Physician in Lead of Transfers)  Outside Transfer Acceptance Note / Regional Referral Center    Upon patient arrival to floor, please send SecureChat to [facility] or call extension [phone] (if no answer, do NOT leave a callback number after the beep, rather please send a SecureChat to [facility]), for Hospital Medicine admit team assignment and for additional admit orders for the patient.  Do not page the attending physician associated with the patient on arrival (this physician may not be on duty at the time of arrival).  Rather, always send a SecureChat to [facility] or call [phone] to reach the triage physician for orders and team assignment.    Referring facility: [facility]   Referring provider: LUCIA BARNETT  Accepting facility: [facility]  Accepting provider: RACHNA MANLEY  Reason for transfer:  Need IV steroids  Transfer diagnosis: Closed avulsion fracture of navicular bone of right foot with delayed healing, subsequent encounter [N72.401G]  Transfer specialty requested: Hospital Medicine  Transfer specialty notified: Yes  Transfer level: NUMBER 1-5: 2  Bed type requested: med-tele  Isolation status: No active isolations   Admission class or status: OP- Observation      Narrative     58-year-old female with a history of epilepsy, peripheral neuropathy, orthostatic hypotension, hypothyroidism, chronic kidney disease, 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  She tolerated the procedure well and is stable postoperatively.  Based on Endocrinology recommendations for treatment of her adrenal insufficiency (office visit February 27), they are requesting transfer to Hospital Medicine at [facility] for continued IV steroids for the 1st 24 hours postoperatively.  After that, she  should be able to transition to oral hydrocortisone.    She was seen by Endocrinology preoperatively on February 27 and recommendations for perioperative steroid use were given.  Levothyroxine dose was also adjusted.    VS:  Temperature 97.7°, pulse 115, respirations 20, blood pressure 136/62, O2 sats 96%    Objective     Vitals: Temp: 97.7 °F (36.5 °C) (03/15/24 1000)  Pulse: 104 (03/15/24 1215)  Resp: 11 (03/15/24 1215)  BP: 138/68 (03/15/24 1215)  SpO2: 95 % (03/15/24 1215)    Instructions    Admit to Hospital Medicine      FABI Mazariegos MD  Hospital Medicine Staff  Cell: [phone]

## 2024-03-15 NOTE — SUBJECTIVE & OBJECTIVE
Past Medical History:   Diagnosis Date    Allergic rhinitis     Anemia     Arthritis     Basal cell carcinoma     Broken ankle     Cancer 1985    Clotting disorder     Disorder of kidney and ureter     DVT (deep venous thrombosis)     Factor V deficiency     Fracture of elbow     left    Glioblastoma multiforme of brain     Hypothyroidism     IBS (irritable bowel syndrome)     Osteoporosis     Panhypopituitarism     Peripheral polyneuropathy     Secondary adrenal insufficiency     Seizures     Thyroid disease        Past Surgical History:   Procedure Laterality Date    APPENDECTOMY      BRAIN SURGERY      for glioblastoma      SECTION      CHOLECYSTECTOMY  2007    CLOSURE OF DEFECT OF MOHS PROCEDURE Left 2018    Procedure: CLOSURE, MOHS PROCEDURE DEFECT SCALP  Flap closure;  Surgeon: Yrn Novak MD;  Location: [facility] 2ND FLR;  Service: Plastics;  Laterality: Left;    COLONOSCOPY N/A 2019    Procedure: COLONOSCOPY Suprep;  Surgeon: Tony Mosher MD;  Location: [facility];  Service: Endoscopy;  Laterality: N/A;    ESOPHAGOGASTRODUODENOSCOPY N/A 2019    Procedure: EGD/colon;  Surgeon: Tony Mosher MD;  Location: [facility] ENDO;  Service: Endoscopy;  Laterality: N/A;    EXCISION OF GANGLION CYST OF HAND Left 2020    Procedure: EXCISION, GANGLION CYST, HAND;  Surgeon: Ross Drew Jr., MD;  Location: [facility] OR;  Service: Orthopedics;  Laterality: Left;    HYSTERECTOMY      SHOULDER SURGERY Right     TILT TABLE TEST N/A 07/15/2022    Procedure: TILT TABLE TEST;  Surgeon: Amol Kohler MD;  Location: [facility] EP LAB;  Service: Cardiology;  Laterality: N/A;  Orthostatic hypotension, dizziness, Tilt Table Test with EEG, Dr.Tiffany Roca (neuro), DM    TUBAL LIGATION         Review of patient's allergies indicates:   Allergen Reactions    Neurontin [gabapentin]     Lactose     Soap Hives     Able to tolerate Dove only        No current  facility-administered medications on file prior to encounter.     Current Outpatient Medications on File Prior to Encounter   Medication Sig    acetaminophen (TYLENOL) 500 MG tablet Take 1-2 tablets (500-1,000 mg total) by mouth 3 (three) times daily as needed for Pain.    biotin 1 mg Cap Take by mouth.    dexamethasone (DECADRON) 4 mg/mL injection Inject 1 mL (4 mg total) into the muscle as needed (Signs and symtpoms of severe adrenal insufficiency). 3 ml syringe with 18 g needle  to draw  X 10 21 g 1 inch needle to inject x 10    DULoxetine (CYMBALTA) 30 MG capsule TAKE 2 CAPSULES (60 MG TOTAL) BY MOUTH ONCE DAILY.    ERGOCALCIFEROL, VITAMIN D2, (VITAMIN D ORAL) Take 1 capsule by mouth nightly. 2000 IU daily    ferrous sulfate 324 mg (65 mg iron) TbEC Take 325 mg by mouth nightly.     hydrocortisone (CORTEF) 10 MG Tab TAKE 1& 1/2 TABLETS BY MOUTH IN THE MORNING AND 1/2 TABLET IN THE AFTERNOON. Increase per MD instruction when sick to maximum monthly dose of 80 tablets    hyoscyamine (LEVSIN/SL) 0.125 mg Subl PLACE 1 TABLET (0.125 MG TOTAL) UNDER THE TONGUE EVERY 4 (FOUR) HOURS AS NEEDED.    levothyroxine (SYNTHROID) 112 MCG tablet Take 1 tablet (112 mcg total) by mouth before breakfast.    loratadine-pseudoephedrine  mg (CLARITIN-D 24-HOUR)  mg per 24 hr tablet Take 1 tablet by mouth as needed.     phenytoin (DILANTIN) 100 MG ER capsule Take 2 capsules (200 mg total) by mouth nightly.    XARELTO 20 mg Tab TAKE 1 TABLET BY MOUTH EVERY DAY    calcitRIOL (ROCALTROL) 0.25 MCG Cap Take 1 capsule (0.25 mcg total) by mouth once daily.    diclofenac sodium (VOLTAREN) 1 % Gel Apply 2 g topically 3 (three) times daily as needed (apply to affected joint).    mupirocin (BACTROBAN) 2 % ointment 2 (two) times daily. Apply to affected area     Family History       Problem Relation (Age of Onset)    Diabetes type II Mother, Father    Heart attack Maternal Grandfather    Heart disease Mother          Tobacco Use     Smoking status: Never    Smokeless tobacco: Never   Substance and Sexual Activity    Alcohol use: No    Drug use: No    Sexual activity: Not Currently     Review of Systems   Constitutional:  Positive for activity change. Negative for chills, fatigue and fever.   Respiratory:  Negative for apnea, cough, chest tightness, shortness of breath and wheezing.    Cardiovascular:  Negative for chest pain, palpitations and leg swelling.   Gastrointestinal:  Negative for abdominal distention and abdominal pain.   Genitourinary:  Negative for dysuria.   Musculoskeletal:  Negative for arthralgias.   Neurological:  Negative for dizziness.   Psychiatric/Behavioral:  Negative for agitation.      Objective:     Vital Signs (Most Recent):  Temp: 97 °F (36.1 °C) (03/15/24 1635)  Pulse: 99 (03/15/24 1635)  Resp: 18 (03/15/24 1635)  BP: 139/67 (03/15/24 1635)  SpO2: 97 % (03/15/24 1635) Vital Signs (24h Range):  Temp:  [97 °F (36.1 °C)-98 °F (36.7 °C)] 97 °F (36.1 °C)  Pulse:  [] 99  Resp:  [10-29] 18  SpO2:  [92 %-100 %] 97 %  BP: (120-162)/(55-75) 139/67     Weight: 68 kg (149 lb 14.6 oz) (kg)  Body mass index is 23.48 kg/m².     Physical Exam  Constitutional:       Appearance: Normal appearance.   Cardiovascular:      Rate and Rhythm: Normal rate.   Pulmonary:      Effort: Pulmonary effort is normal.   Abdominal:      General: Abdomen is flat. There is distension.      Tenderness: There is no abdominal tenderness.   Musculoskeletal:      Comments: R  leg and foot wrapped    Skin:     General: Skin is warm and dry.   Neurological:      Mental Status: She is alert and oriented to person, place, and time. Mental status is at baseline.   Psychiatric:         Mood and Affect: Mood normal.         Behavior: Behavior normal.                Significant Labs: All pertinent labs within the past 24 hours have been reviewed.    Significant Imaging: I have reviewed all pertinent imaging results/findings within the past 24 hours.

## 2024-03-15 NOTE — TRANSFER OF CARE
"Anesthesia Transfer of Care Note    Patient: Thelma Nguyen    Procedure(s) Performed: Procedure(s) (LRB):  ORIF, FRACTURE, TARSAL BONE (Right)  BONE GRAFT, ILIAC CREST (Right)    Patient location: [facility]    Anesthesia Type: general and regional    Transport from OR: Transported from OR on 6-10 L/min O2 by face mask with adequate spontaneous ventilation    Post pain: adequate analgesia    Post assessment: no apparent anesthetic complications and tolerated procedure well    Post vital signs: stable    Level of consciousness: sedated    Nausea/Vomiting: no nausea/vomiting    Complications: none    Transfer of care protocol was followed      Last vitals: Visit Vitals  /60 (BP Location: Left arm, Patient Position: Lying)   Pulse 97   Temp 36.4 °C (97.5 °F) (Temporal)   Resp 16   Ht 5' 7" (1.702 m)   Wt 64 kg (141 lb)   LMP 10/23/1997 (Exact Date)   SpO2 100%   Breastfeeding No   BMI 22.08 kg/m²     "

## 2024-03-15 NOTE — BRIEF OP NOTE
DATE OF PROCEDURE: 03/15/2024   PREOPERATIVE DIAGNOSIS: Closed avulsion fracture of navicular bone of right foot with delayed healing, subsequent encounter [L41.948G]  POSTOPERATIVE DIAGNOSIS: same  PROCEDURE PERFORMED: 1. Open Reduction Internal Fixation Navicular Fracture  2. Iliac Crest Bone [place]  SURGEON: Hamzah  ASSISTANT: Alexandra   ANESTHESIA: General  ESTIMATED BLOOD LOSS: 20 cc.   DISPOSITION:  Extubated, awakened, taken to postanesthesia care unit in stable condition

## 2024-03-15 NOTE — NURSING TRANSFER
Nursing Transfer Note      3/15/2024   3:42 PM    Nurse giving handoff:Ventura   Nurse receiving handoff:Zina Freeman    Reason patient is being transferred: Overnight steroids    Transfer From: [facility]    Transfer via stretcher    Transfer with phone    Transported by Ambulance

## 2024-03-15 NOTE — ANESTHESIA PROCEDURE NOTES
Intubation    Date/Time: 3/15/2024 7:13 AM    Performed by: Cris Perez CRNA  Authorized by: Karime Spencer MD    Intubation:     Induction:  Intravenous    Intubated:  Postinduction    Mask Ventilation:  Not attempted    Attempts:  1    Attempted By:  CRNA    Difficult Airway Encountered?: No      Complications:  None    Airway Device:  Supraglottic airway/LMA    Airway Device Size:  3.0    Style/Cuff Inflation:  Cuffed    Inflation Amount (mL):  0    Secured at:  The lips    Placement Verified By:  Capnometry    Complicating Factors:  Small mouth    Findings Post-Intubation:  BS equal bilateral and atraumatic/condition of teeth unchanged

## 2024-03-15 NOTE — H&P
[facility] - Surgery  [facility] Medicine  History & Physical    Patient Name: Thelma Nguyen  MRN: [id number]  Patient Class: OP- Outpatient Recovery  Admission Date: 3/15/2024  Attending Physician: Jose Batres MD   Primary Care Provider: Zachary Bender MD         Patient information was obtained from patient and ER records.     Subjective:     Principal Problem:Panhypopituitarism    Chief Complaint: No chief complaint on file.       HPI: Patient with history of adrenal insufficiency transferring from [place] after ORIF of a right tarsal bone fracture for endocrine eval for adrenal insufficiency. 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 She tolerated the procedure well and is stable postoperatively. Based on Endocrinology recommendations for treatment of her adrenal insufficiency (office visit February 27), request was made to transfer to Hospital Medicine at [facility] for continued IV steroids with the 1st 24 hours postoperatively. After that, she should be able to transition to oral hydrocortisone.She was seen by Endocrinology preoperatively on February 27 and recommendations for perioperative steroid use were given. Levothyroxine dose was also adjusted.    Patient placed in observation under care of .     Past Medical History:   Diagnosis Date    Allergic rhinitis     Anemia 1987    Arthritis 2000    Basal cell carcinoma     Broken ankle     Cancer 1985    Clotting disorder 1993    Disorder of kidney and ureter     DVT (deep venous thrombosis)     Factor V deficiency     Fracture of elbow     left    Glioblastoma multiforme of brain      Hypothyroidism     IBS (irritable bowel syndrome)     Osteoporosis     Panhypopituitarism     Peripheral polyneuropathy     Secondary adrenal insufficiency     Seizures     Thyroid disease        Past Surgical History:   Procedure Laterality Date    APPENDECTOMY      BRAIN SURGERY      for glioblastoma      SECTION      CHOLECYSTECTOMY  2007    CLOSURE OF DEFECT OF MOHS PROCEDURE Left 2018    Procedure: CLOSURE, MOHS PROCEDURE DEFECT SCALP  Flap closure;  Surgeon: Yrn Novak MD;  Location: [address] FLR;  Service: Plastics;  Laterality: Left;    COLONOSCOPY N/A 2019    Procedure: COLONOSCOPY Suprep;  Surgeon: Tony Mosher MD;  Location: [facility] ENDO;  Service: Endoscopy;  Laterality: N/A;    ESOPHAGOGASTRODUODENOSCOPY N/A 2019    Procedure: EGD/colon;  Surgeon: Tony Mosher MD;  Location: [facility] ENDO;  Service: Endoscopy;  Laterality: N/A;    EXCISION OF GANGLION CYST OF HAND Left 2020    Procedure: EXCISION, GANGLION CYST, HAND;  Surgeon: Ross Drew Jr., MD;  Location: [facility] OR;  Service: Orthopedics;  Laterality: Left;    HYSTERECTOMY      SHOULDER SURGERY Right     TILT TABLE TEST N/A 07/15/2022    Procedure: TILT TABLE TEST;  Surgeon: Amol Kohler MD;  Location: [facility] EP LAB;  Service: Cardiology;  Laterality: N/A;  Orthostatic hypotension, dizziness, Tilt Table Test with EEG, Dr.Tiffany Roca (neuro), DM    TUBAL LIGATION         Review of patient's allergies indicates:   Allergen Reactions    Neurontin [gabapentin]     Lactose     Soap Hives     Able to tolerate Dove only        No current facility-administered medications on file prior to encounter.     Current Outpatient Medications on File Prior to Encounter   Medication Sig    acetaminophen (TYLENOL) 500 MG tablet Take 1-2 tablets (500-1,000 mg total) by mouth 3 (three) times daily as needed for Pain.    biotin 1 mg Cap Take by mouth.    dexamethasone (DECADRON) 4 mg/mL  injection Inject 1 mL (4 mg total) into the muscle as needed (Signs and symtpoms of severe adrenal insufficiency). 3 ml syringe with 18 g needle  to draw  X 10 21 g 1 inch needle to inject x 10    DULoxetine (CYMBALTA) 30 MG capsule TAKE 2 CAPSULES (60 MG TOTAL) BY MOUTH ONCE DAILY.    ERGOCALCIFEROL, VITAMIN D2, (VITAMIN D ORAL) Take 1 capsule by mouth nightly. 2000 IU daily    ferrous sulfate 324 mg (65 mg iron) TbEC Take 325 mg by mouth nightly.     hydrocortisone (CORTEF) 10 MG Tab TAKE 1& 1/2 TABLETS BY MOUTH IN THE MORNING AND 1/2 TABLET IN THE AFTERNOON. Increase per MD instruction when sick to maximum monthly dose of 80 tablets    hyoscyamine (LEVSIN/SL) 0.125 mg Subl PLACE 1 TABLET (0.125 MG TOTAL) UNDER THE TONGUE EVERY 4 (FOUR) HOURS AS NEEDED.    levothyroxine (SYNTHROID) 112 MCG tablet Take 1 tablet (112 mcg total) by mouth before breakfast.    loratadine-pseudoephedrine  mg (CLARITIN-D 24-HOUR)  mg per 24 hr tablet Take 1 tablet by mouth as needed.     phenytoin (DILANTIN) 100 MG ER capsule Take 2 capsules (200 mg total) by mouth nightly.    XARELTO 20 mg Tab TAKE 1 TABLET BY MOUTH EVERY DAY    calcitRIOL (ROCALTROL) 0.25 MCG Cap Take 1 capsule (0.25 mcg total) by mouth once daily.    diclofenac sodium (VOLTAREN) 1 % Gel Apply 2 g topically 3 (three) times daily as needed (apply to affected joint).    mupirocin (BACTROBAN) 2 % ointment 2 (two) times daily. Apply to affected area     Family History       Problem Relation (Age of Onset)    Diabetes type II Mother, Father    Heart attack Maternal Grandfather    Heart disease Mother          Tobacco Use    Smoking status: Never    Smokeless tobacco: Never   Substance and Sexual Activity    Alcohol use: No    Drug use: No    Sexual activity: Not Currently     Review of Systems   Constitutional:  Positive for activity change. Negative for chills, fatigue and fever.   Respiratory:  Negative for apnea, cough, chest tightness, shortness of breath  and wheezing.    Cardiovascular:  Negative for chest pain, palpitations and leg swelling.   Gastrointestinal:  Negative for abdominal distention and abdominal pain.   Genitourinary:  Negative for dysuria.   Musculoskeletal:  Negative for arthralgias.   Neurological:  Negative for dizziness.   Psychiatric/Behavioral:  Negative for agitation.      Objective:     Vital Signs (Most Recent):  Temp: 97 °F (36.1 °C) (03/15/24 1635)  Pulse: 99 (03/15/24 1635)  Resp: 18 (03/15/24 1635)  BP: 139/67 (03/15/24 1635)  SpO2: 97 % (03/15/24 1635) Vital Signs (24h Range):  Temp:  [97 °F (36.1 °C)-98 °F (36.7 °C)] 97 °F (36.1 °C)  Pulse:  [] 99  Resp:  [10-29] 18  SpO2:  [92 %-100 %] 97 %  BP: (120-162)/(55-75) 139/67     Weight: 68 kg (149 lb 14.6 oz) (kg)  Body mass index is 23.48 kg/m².     Physical Exam  Constitutional:       Appearance: Normal appearance.   Cardiovascular:      Rate and Rhythm: Normal rate.   Pulmonary:      Effort: Pulmonary effort is normal.   Abdominal:      General: Abdomen is flat. There is distension.      Tenderness: There is no abdominal tenderness.   Musculoskeletal:      Comments: R  leg and foot wrapped    Skin:     General: Skin is warm and dry.   Neurological:      Mental Status: She is alert and oriented to person, place, and time. Mental status is at baseline.   Psychiatric:         Mood and Affect: Mood normal.         Behavior: Behavior normal.                Significant Labs: All pertinent labs within the past 24 hours have been reviewed.    Significant Imaging: I have reviewed all pertinent imaging results/findings within the past 24 hours.  Assessment/Plan:     * Panhypopituitarism  Secondary adrenal insufficiency  Follows with endocrine as OP. Last office visit on 2/27.   Reviewed rec fro anesthesia in office note from 2/27.   - 100 mg IV hydrocortisone prior to surgery on induction followed by 50 mg IV q8H hours  - POD 1-3 oral hydrocortisone 40 mg in the morning and 20 mg in the  "afternoon around 3 pm  - POD 4 onward return to home dose of oral hydrocortisone 15/5 mg.       -- Continue IV hydrocortiosone 50mg Q8hrs. Consulted endo     Secondary hypothyroidism  - Continue levothyroxine 112 mcg daily    Fracture of right foot  Patient is s/p Open Reduction Internal Fixation of right Navicular Fracture and Iliac Crest Bone [place] on 3/15 at [facility].  Reports that she is asked to follow up with ortho in 6 weeks. Patient advised to not bear weight on R leg for atleast 6 weeks. She has a knee scooter at home that her  plan to bring in. Reports surgeon advised her to resume home xaralto on 3/16.      Factor V deficiency with DVT x 3  Resume xaralto on  3/16        VTE Risk Mitigation (From admission, onward)           Ordered     enoxaparin injection 40 mg  Daily         03/15/24 1653     IP VTE HIGH RISK PATIENT  Once         03/15/24 1653     Place sequential compression device  Until discontinued         03/15/24 1653                               SUBJECTIVE:    Ms. Nguyen is here today for surgery.  On the phone last night answered all her questions.  Plan for navicular ORIF versus naviculocuneiform versus talonavicular fusion with possible iliac crest bone grafting.      She requires a dose of steroids after the surgery at roughly 3:00 p.m..  She is requested to go home to take this dose.  I told her that after the surgery I would speak to her and her         OBJECTIVE:      Vitals:    02/27/24 1131   Height: 5' 7" (1.702 m)   PainSc:   4   PainLoc: Foot     Past Medical History:   Diagnosis Date    Allergic rhinitis     Anemia 1987    Arthritis 2000    Basal cell carcinoma     Broken ankle     Cancer 1985    Clotting disorder 1993    Disorder of kidney and ureter     DVT (deep venous thrombosis)     Factor V deficiency     Fracture of elbow     left    Glioblastoma multiforme of brain     Hypothyroidism     IBS (irritable bowel syndrome) 1975    Osteoporosis  "    Panhypopituitarism     Peripheral polyneuropathy     Secondary adrenal insufficiency     Seizures     Thyroid disease      Past Surgical History:   Procedure Laterality Date    APPENDECTOMY      BRAIN SURGERY      for glioblastoma      SECTION      CHOLECYSTECTOMY  2007    CLOSURE OF DEFECT OF MOHS PROCEDURE Left 2018    Procedure: CLOSURE, MOHS PROCEDURE DEFECT SCALP  Flap closure;  Surgeon: Yrn Novak MD;  Location: [address] FLR;  Service: Plastics;  Laterality: Left;    COLONOSCOPY N/A 2019    Procedure: COLONOSCOPY Suprep;  Surgeon: Tony Mosher MD;  Location: [facility] ENDO;  Service: Endoscopy;  Laterality: N/A;    ESOPHAGOGASTRODUODENOSCOPY N/A 2019    Procedure: EGD/colon;  Surgeon: Tony Mosher MD;  Location: [facility] ENDO;  Service: Endoscopy;  Laterality: N/A;    EXCISION OF GANGLION CYST OF HAND Left 2020    Procedure: EXCISION, GANGLION CYST, HAND;  Surgeon: Ross Drwe Jr., MD;  Location: [facility] OR;  Service: Orthopedics;  Laterality: Left;    HYSTERECTOMY      SHOULDER SURGERY Right     TILT TABLE TEST N/A 07/15/2022    Procedure: TILT TABLE TEST;  Surgeon: Amol Kohler MD;  Location: [facility] EP LAB;  Service: Cardiology;  Laterality: N/A;  Orthostatic hypotension, dizziness, Tilt Table Test with EEG, Dr.Tiffany Roca (neuro), DM    TUBAL LIGATION       Review of patient's allergies indicates:   Allergen Reactions    Neurontin [gabapentin]     Lactose     Soap Hives     Able to tolerate Dove only        Lower Extremity Exam  Right lower extremity with mild erythema and +1 edema about the midfoot over the navicular.  Tender over the navicular.  No evidence of flatfoot.  Mild forefoot varus.  Moderate sensory defect about the entirety of the foot and ankle     DIAGNOSTIC STUDIES:  CT of the foot reviewed.  Age indeterminate complete navicular fracture of the body of the navicular with 5 mm gap between fracture  fragments.    ASSESSMENT:   1. Displaced navicular fracture      PLAN:  There are no diagnoses linked to this encounter.    Discussed her pathology with her as well as the propensity of the navicular for avascular necrosis in the ramifications of this.  We discussed operative, nonoperative and other alternatives to treatment.  She would like to pursue operative management for this injury.  I recommend open reduction internal fixation of the right navicular versus navicular cuneiform and possible talonavicular fusion with iliac crest bone grafting.    We had a long discussion of the risks and benefits of different operative and non-operative options. I explained the injury using pictures, models, and the patient's x-ray and CT images. I explained the risks of surgery which include but are not limited to continued pain, deformity, bleeding, infection, damage to surrounding structures, non-union, mal-union, arthritis, and in rare cases loss of limb. I explained the risks of co-morbidities which influences the rate of complications.  I discussed the effects of her neuropathy on the surgery and that we may place her in a a splint or cast so she could take it off and examined the foot to make sure she has not developing wounds.  I explained the risks of non-operative management, including continued pain, long term immobilization, malunion, non-union, avascular necrosis, flatfoot deformity rapid arthritis progression, and difficulty with ambulation. I discussed all of these risks using non-medical terms and confirmed understanding. The patient elected for the procedure above.    Plan for procedure as above.  She is going to resume her seizure medications postoperatively.  She is okay to re-start her Xarelto tomorrow for factor 5 [place].    I spoke to her and her  and relayed the instructions below:    Per her endocrinologist, this is the following replacement dose for steroid.   POD 1-3 oral hydrocortisone 40 mg in  the morning and 20 mg in the afternoon around 3 pm  - POD 4 onward return to home dose of oral hydrocortisone 15/5 mg.       Secondary hypothyroidism- increase to levothyroxine 112 mcg daily as FT4 is below goal (goal in the upper half of the normal range) with hypothyroid symptoms     Discussed with her and her .    Geoffrey Goodson MD  [facility]  Orthopedic Surgery      This note was done with voice recognition software. Please excuse any errors missed in proof reading.       Jose Batres MD  Department of Hospital Medicine  [facility] - Surgery

## 2024-03-15 NOTE — NURSING
Nurses Note -- 4 Eyes      3/15/2024   5:02 PM      Skin assessed during: Admit      [x] No Altered Skin Integrity Present    []Prevention Measures Documented      [] Yes- Altered Skin Integrity Present or Discovered   [] LDA Added if Not in Epic (Describe Wound)   [] New Altered Skin Integrity was Present on Admit and Documented in LDA   [] Wound Image Taken    Wound Care Consulted? No    Attending Nurse:   DILIA Izaguirre    Second RN/Staff Member:   DILIA Rivera

## 2024-03-16 VITALS
DIASTOLIC BLOOD PRESSURE: 74 MMHG | SYSTOLIC BLOOD PRESSURE: 152 MMHG | RESPIRATION RATE: 18 BRPM | OXYGEN SATURATION: 95 % | HEART RATE: 91 BPM | BODY MASS INDEX: 23.53 KG/M2 | HEIGHT: 67 IN | TEMPERATURE: 98 F | WEIGHT: 149.94 LBS

## 2024-03-16 LAB
ALBUMIN SERPL BCP-MCNC: 3 G/DL (ref 3.5–5.2)
ALP SERPL-CCNC: 83 U/L (ref 55–135)
ALT SERPL W/O P-5'-P-CCNC: 16 U/L (ref 10–44)
ANION GAP SERPL CALC-SCNC: 10 MMOL/L (ref 8–16)
AST SERPL-CCNC: 21 U/L (ref 10–40)
BASOPHILS # BLD AUTO: 0.03 K/UL (ref 0–0.2)
BASOPHILS NFR BLD: 0.3 % (ref 0–1.9)
BILIRUB SERPL-MCNC: 0.2 MG/DL (ref 0.1–1)
BUN SERPL-MCNC: 39 MG/DL (ref 6–20)
CALCIUM SERPL-MCNC: 8 MG/DL (ref 8.7–10.5)
CHLORIDE SERPL-SCNC: 107 MMOL/L (ref 95–110)
CO2 SERPL-SCNC: 21 MMOL/L (ref 23–29)
CREAT SERPL-MCNC: 1.5 MG/DL (ref 0.5–1.4)
DIFFERENTIAL METHOD BLD: ABNORMAL
EOSINOPHIL # BLD AUTO: 0 K/UL (ref 0–0.5)
EOSINOPHIL NFR BLD: 0 % (ref 0–8)
ERYTHROCYTE [DISTWIDTH] IN BLOOD BY AUTOMATED COUNT: 13.1 % (ref 11.5–14.5)
EST. GFR  (NO RACE VARIABLE): 40.4 ML/MIN/1.73 M^2
GLUCOSE SERPL-MCNC: 114 MG/DL (ref 70–110)
HCT VFR BLD AUTO: 28.6 % (ref 37–48.5)
HGB BLD-MCNC: 9.2 G/DL (ref 12–16)
IMM GRANULOCYTES # BLD AUTO: 0.03 K/UL (ref 0–0.04)
IMM GRANULOCYTES NFR BLD AUTO: 0.3 % (ref 0–0.5)
LYMPHOCYTES # BLD AUTO: 1.5 K/UL (ref 1–4.8)
LYMPHOCYTES NFR BLD: 16.7 % (ref 18–48)
MAGNESIUM SERPL-MCNC: 2.1 MG/DL (ref 1.6–2.6)
MCH RBC QN AUTO: 30.6 PG (ref 27–31)
MCHC RBC AUTO-ENTMCNC: 32.2 G/DL (ref 32–36)
MCV RBC AUTO: 95 FL (ref 82–98)
MONOCYTES # BLD AUTO: 0.5 K/UL (ref 0.3–1)
MONOCYTES NFR BLD: 5.7 % (ref 4–15)
NEUTROPHILS # BLD AUTO: 7.1 K/UL (ref 1.8–7.7)
NEUTROPHILS NFR BLD: 77 % (ref 38–73)
NRBC BLD-RTO: 0 /100 WBC
PHOSPHATE SERPL-MCNC: 3.6 MG/DL (ref 2.7–4.5)
PLATELET # BLD AUTO: 209 K/UL (ref 150–450)
PMV BLD AUTO: 10.1 FL (ref 9.2–12.9)
POTASSIUM SERPL-SCNC: 4.4 MMOL/L (ref 3.5–5.1)
PROT SERPL-MCNC: 5.7 G/DL (ref 6–8.4)
RBC # BLD AUTO: 3.01 M/UL (ref 4–5.4)
SODIUM SERPL-SCNC: 138 MMOL/L (ref 136–145)
WBC # BLD AUTO: 9.18 K/UL (ref 3.9–12.7)

## 2024-03-16 PROCEDURE — 25000003 PHARM REV CODE 250: Performed by: STUDENT IN AN ORGANIZED HEALTH CARE EDUCATION/TRAINING PROGRAM

## 2024-03-16 PROCEDURE — 84100 ASSAY OF PHOSPHORUS: CPT | Performed by: STUDENT IN AN ORGANIZED HEALTH CARE EDUCATION/TRAINING PROGRAM

## 2024-03-16 PROCEDURE — 63600175 PHARM REV CODE 636 W HCPCS: Performed by: ANESTHESIOLOGY

## 2024-03-16 PROCEDURE — 83735 ASSAY OF MAGNESIUM: CPT | Performed by: STUDENT IN AN ORGANIZED HEALTH CARE EDUCATION/TRAINING PROGRAM

## 2024-03-16 PROCEDURE — 36415 COLL VENOUS BLD VENIPUNCTURE: CPT | Performed by: STUDENT IN AN ORGANIZED HEALTH CARE EDUCATION/TRAINING PROGRAM

## 2024-03-16 PROCEDURE — 80053 COMPREHEN METABOLIC PANEL: CPT | Performed by: STUDENT IN AN ORGANIZED HEALTH CARE EDUCATION/TRAINING PROGRAM

## 2024-03-16 PROCEDURE — 85025 COMPLETE CBC W/AUTO DIFF WBC: CPT | Performed by: STUDENT IN AN ORGANIZED HEALTH CARE EDUCATION/TRAINING PROGRAM

## 2024-03-16 RX ORDER — HYDRALAZINE HYDROCHLORIDE 25 MG/1
25 TABLET, FILM COATED ORAL EVERY 8 HOURS PRN
Status: DISCONTINUED | OUTPATIENT
Start: 2024-03-16 | End: 2024-03-16 | Stop reason: HOSPADM

## 2024-03-16 RX ORDER — HYDRALAZINE HYDROCHLORIDE 50 MG/1
50 TABLET, FILM COATED ORAL EVERY 8 HOURS PRN
Status: DISCONTINUED | OUTPATIENT
Start: 2024-03-16 | End: 2024-03-16

## 2024-03-16 RX ORDER — HYDROCORTISONE 5 MG/1
20 TABLET ORAL DAILY
Status: DISCONTINUED | OUTPATIENT
Start: 2024-03-16 | End: 2024-03-16 | Stop reason: HOSPADM

## 2024-03-16 RX ORDER — HYDROCORTISONE 10 MG/1
TABLET ORAL
Qty: 240 TABLET | Refills: 3
Start: 2024-03-16 | End: 2024-04-01 | Stop reason: DRUGHIGH

## 2024-03-16 RX ORDER — OXYCODONE HYDROCHLORIDE 5 MG/1
5 TABLET ORAL EVERY 4 HOURS PRN
Qty: 10 TABLET | Refills: 0 | Status: SHIPPED | OUTPATIENT
Start: 2024-03-16 | End: 2024-03-21 | Stop reason: SDUPTHER

## 2024-03-16 RX ADMIN — DULOXETINE HYDROCHLORIDE 60 MG: 60 CAPSULE, DELAYED RELEASE ORAL at 09:03

## 2024-03-16 RX ADMIN — HYDROCORTISONE SODIUM SUCCINATE 50 MG: 100 INJECTION, POWDER, FOR SOLUTION INTRAMUSCULAR; INTRAVENOUS at 08:03

## 2024-03-16 RX ADMIN — OXYCODONE HYDROCHLORIDE 10 MG: 10 TABLET ORAL at 02:03

## 2024-03-16 RX ADMIN — LEVOTHYROXINE SODIUM 112 MCG: 112 TABLET ORAL at 05:03

## 2024-03-16 RX ADMIN — OXYCODONE HYDROCHLORIDE 10 MG: 10 TABLET ORAL at 08:03

## 2024-03-16 NOTE — PROGRESS NOTES
The sw spoke to the pt via phone and she states her spouse Arie will transport her home at d/c. The sw stressed the importance of the pt going to her hsp f/u's and taking her medications. The pt acknowledged understanding and states she will comply. The pt has no further Case Management needs and is clear to d/c.     Future Appointments   Date Time Provider Department Center   4/1/2024 10:00 AM LAB, [facility] DES [facility] [place]   4/26/2024 11:00 AM Kleber Liu, PT DOSH OTPTRHB [facility]   5/2/2024  1:30 PM Swetha Bay MD NOM NEPHRO [facility]   5/3/2024 12:00 PM Annemarie-Viktor Cuello MD HG[facility] ARRHYTH [place]   6/11/2024 11:00 AM Zachary Bender MD NOMPATRICIA IM [facility]   8/27/2024  3:00 PM NOM[facility], DEXA1 [facility] BMD [facility] - Surgery  Discharge Final Note    Primary Care Provider: Zachary Bender MD    Expected Discharge Date: 3/16/2024    Final Discharge Note (most recent)       Final Note - 03/16/24 1048          Final Note    Assessment Type Discharge Planning Assessment                     Important Message from Medicare             Contact Info       Zachary Bender MD   Specialty: Internal Medicine   Relationship: PCP - General    [address]  [facility] [address]   Phone: [phone]       Next Steps: Follow up in 7 day(s)    Instructions: The sw requested a hsp f/u with the [facility] and the pt will be notified of the appointment.    [facility]   Specialty: Outpatient Rehab    [address]   [place] [address]   Phone: [phone]       Next Steps: Follow up on 3/26/2024    Instructions: 11:00am Dr. Geoffrey Nuno

## 2024-03-16 NOTE — PROGRESS NOTES
Nurses Note -- 4 Eyes      3/15/2024   7:56 PM      Skin assessed during: Q Shift Change      [x] No Altered Skin Integrity Present    []Prevention Measures Documented      [] Yes- Altered Skin Integrity Present or Discovered   [] LDA Added if Not in Epic (Describe Wound)   [] New Altered Skin Integrity was Present on Admit and Documented in LDA   [] Wound Image Taken    Wound Care Consulted? No    Attending Nurse:  Sae George RN/Staff Member:  EUGENIO Bates

## 2024-03-16 NOTE — HOSPITAL COURSE
Patient is s/p Open Reduction Internal Fixation of right Navicular Fracture and Iliac Crest Bone [place] on 3/15 at [facility]. Has hx of panhypopituitarism with Secondary adrenal insufficiency. Follows with endocrine as OP. Last office visit on 2/27. Reviewed rec for anesthesia in office note from 2/27.  100 mg IV hydrocortisone prior to surgery on induction followed by 50 mg IV q8H hours. POD 1-3 oral hydrocortisone 40 mg in the morning and 20 mg in the afternoon around 3 pm. POD 4 onward return to home dose of oral hydrocortisone 15/5 mg.    Continued IV hydrocortiosone 50mg Q8hrs on 3/15.Vitals remained stable. Switched to PO hydocortisone on 3/16. Continued levothyroxine 112 mcg daily. Reports that she has a follow up appointment with ortho in 1 week. Patient advised to not bear weight on R leg for atleast 6 weeks. She has a knee scooter at home that her  plan to bring in. Reports surgeon advised her to resume home xaralto on 3/16.      Patient is currently medically and HDS. She is being d/c home.Fu with PCP, ortho and endo as OP.  Plan of care discussed with patient, verbalized understanding. All questions were answered.

## 2024-03-16 NOTE — NURSING
Pt received discharge instructions and bedside meds. She verbalized understanding. All questions answered. PIV removed, catheter intact. NADN. Pt left unit via wheelchair with all belongings.

## 2024-03-16 NOTE — ANESTHESIA POSTPROCEDURE EVALUATION
Anesthesia Post Evaluation    Patient: Thelma Nguyen    Procedure(s) Performed: Procedure(s) (LRB):  ORIF, FRACTURE, TARSAL BONE (Right)  BONE GRAFT, ILIAC CREST (Right)    Final Anesthesia Type: general      Patient location during evaluation: GI PACU  Patient participation: Yes- Able to Participate  Level of consciousness: awake and alert  Post-procedure vital signs: reviewed and stable  Pain management: adequate  Airway patency: patent  KAMILA mitigation strategies: Multimodal analgesia  PONV status at discharge: No PONV  Anesthetic complications: yes  Perioperative Events: unplanned hospital admission      Rashmi-operative Events Comments: Patient requiring IV hydrocortisone for 24 hours  Cardiovascular status: blood pressure returned to baseline  Respiratory status: unassisted  Hydration status: euvolemic  Follow-up not needed.              Vitals Value Taken Time   /65 03/16/24 0504   Temp 36.4 °C (97.5 °F) 03/16/24 0504   Pulse 97 03/16/24 0504   Resp 18 03/16/24 0504   SpO2 97 % 03/16/24 0504         Event Time   Out of Recovery 15:54:00         Pain/Jose De Jesus Score: Pain Rating Prior to Med Admin: 3 (3/15/2024 11:36 PM)  Pain Rating Post Med Admin: 0 (3/16/2024 12:36 AM)  Jose De Jesus Score: 10 (3/15/2024 11:16 AM)

## 2024-03-16 NOTE — PLAN OF CARE
The sw spoke to the pt via phone to complete the assessment. The pt lives in [place] with her spouse Arie Nguyen [phone]. The pt still drives but her spouse will transport her home at d/c. The pt's independent with her ADL's and has the dme listed below. The pt will d/c today. The sw encouraged the pt to call if she has any further questions or concerns. The sw will continue to follow the pt throughout her transitions of care and assist with the d/c needs.     [facility] - [facility]  Discharge Assessment    Primary Care Provider: Zachary Bender MD     Discharge Assessment (most recent)       BRIEF DISCHARGE ASSESSMENT - 03/16/24 1048          Discharge Planning    Assessment Type Discharge Planning Assessment (P)      Resource/Environmental Concerns none (P)      Support Systems Spouse/significant other;Family members;Friends/neighbors (P)      Equipment Currently Used at Home bath bench;other (see comments) (P)    knee scooter    Current Living Arrangements home (P)      Patient/Family Anticipates Transition to home with family (P)      Patient/Family Anticipated Services at Transition none (P)      DME Needed Upon Discharge  none (P)      Discharge Plan A Home with family (P)      Discharge Plan B Home Health (P)

## 2024-03-16 NOTE — DISCHARGE SUMMARY
[facility] - Surgery  [facility] Medicine  Discharge Summary      Patient Name: Thelma Nguyen  MRN: [id number]  DARA: [id number]  Patient Class: OP- Outpatient Recovery  Admission Date: 3/15/2024  Hospital Length of Stay: 0 days  Discharge Date and Time:  03/16/2024 11:27 AM  Attending Physician: Jose Batres MD   Discharging Provider: Jose Batres MD  Primary Care Provider: Zachary Bender MD  [facility] Medicine Team: [facility] Jose Batres MD  Primary Care Team: [facility]    HPI:   Patient with history of adrenal insufficiency transferring from [place] after ORIF of a right tarsal bone fracture for endocrine eval for adrenal insufficiency. 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 She tolerated the procedure well and is stable postoperatively. Based on Endocrinology recommendations for treatment of her adrenal insufficiency (office visit February 27), request was made to transfer to Hospital Medicine at [facility] for continued IV steroids with the 1st 24 hours postoperatively. After that, she should be able to transition to oral hydrocortisone.She was seen by Endocrinology preoperatively on February 27 and recommendations for perioperative steroid use were given. Levothyroxine dose was also adjusted.    Patient placed in observation under care of .     Procedure(s) (LRB):  ORIF, FRACTURE, TARSAL BONE (Right)  BONE GRAFT, ILIAC CREST (Right)      Hospital Course:   Patient is s/p Open Reduction Internal Fixation of right Navicular Fracture and Iliac Crest Bone [place] on 3/15 at [facility]. Has hx of panhypopituitarism with  Secondary adrenal insufficiency. Follows with endocrine as OP. Last office visit on 2/27. Reviewed rec for anesthesia in office note from 2/27.  100 mg IV hydrocortisone prior to surgery on induction followed by 50 mg IV q8H hours. POD 1-3 oral hydrocortisone 40 mg in the morning and 20 mg in the afternoon around 3 pm. POD 4 onward return to home dose of oral hydrocortisone 15/5 mg.    Continued IV hydrocortiosone 50mg Q8hrs on 3/15.Vitals remained stable. Switched to PO hydocortisone on 3/16. Continued levothyroxine 112 mcg daily. Reports that she has a follow up appointment with ortho in 1 week. Patient advised to not bear weight on R leg for atleast 6 weeks. She has a knee scooter at home that her  plan to bring in. Reports surgeon advised her to resume home xaralto on 3/16.      Patient is currently medically and HDS. She is being d/c home.Fu with PCP, ortho and endo as OP.  Plan of care discussed with patient, verbalized understanding. All questions were answered.          Goals of Care Treatment Preferences:  Code Status: Full Code      Consults:     No new Assessment & Plan notes have been filed under this hospital service since the last note was generated.  Service: Hospital Medicine    Final Active Diagnoses:    Diagnosis Date Noted POA    PRINCIPAL PROBLEM:  Panhypopituitarism [E23.0] 03/25/2015 Yes    Fracture of right foot [S92.901A] 03/15/2024 Unknown    Factor V deficiency with DVT x 3 [D68.2] 05/05/2016 Yes      Problems Resolved During this Admission:       Discharged Condition: stable    Disposition: Home or Self Care    Follow Up:   Follow-up Information       Zachary Bender MD Follow up on 3/22/2024.    Specialty: Internal Medicine  Why: 9:30am [facility] F/U  [facility] information:  [address] [facility] [address]  [phone]               [facility] Follow up on 3/26/2024.    Specialty: Outpatient Rehab  Why: 11:00am Dr. Geoffrey Nuno  Contact information:  [address]  [place]  [place] [address]  [phone]                         Patient Instructions:      Diet Adult Regular     Keep surgical extremity elevated     Ice to affected area     Notify your health care provider if you experience any of the following:  temperature >100.4     Notify your health care provider if you experience any of the following:  persistent nausea and vomiting or diarrhea     Leave dressing on - Keep it clean, dry, and intact until clinic visit     Weight bearing restrictions (specify):   Order Comments: NWB RLE       Significant Diagnostic Studies: N/A    Pending Diagnostic Studies:       None           Medications:  Reconciled Home Medications:      Medication List        CHANGE how you take these medications      hydrocortisone 10 MG Tab  Commonly known as: CORTEF  - POD 1-3 (from 3/16/24 -3/18/24) oral hydrocortisone 40 mg in the morning and 20 mg in the afternoon around 3 pm  - POD 4 (from 3/19/24) onward return to home dose of oral hydrocortisone 15/5 mg.     TAKE 1& 1/2 TABLETS BY MOUTH IN THE MORNING AND 1/2 TABLET IN THE AFTERNOON. Increase per MD instruction when sick to maximum monthly dose of 80 tablets  What changed: additional instructions            CONTINUE taking these medications      acetaminophen 500 MG tablet  Commonly known as: TYLENOL  Take 1-2 tablets (500-1,000 mg total) by mouth 3 (three) times daily as needed for Pain.     biotin 1 mg Cap  Take by mouth.     calcitRIOL 0.25 MCG Cap  Commonly known as: ROCALTROL  Take 1 capsule (0.25 mcg total) by mouth once daily.     dexAMETHasone 4 mg/mL injection  Commonly known as: DECADRON  Inject 1 mL (4 mg total) into the muscle as needed (Signs and symtpoms of severe adrenal insufficiency). 3 ml syringe with 18 g needle  to draw  X 10 21 g 1 inch needle to inject x 10     diclofenac sodium 1 % Gel  Commonly known as: VOLTAREN  Apply 2 g topically 3 (three) times daily as needed (apply to affected joint).     DULoxetine 30 MG  capsule  Commonly known as: CYMBALTA  TAKE 2 CAPSULES (60 MG TOTAL) BY MOUTH ONCE DAILY.     ferrous sulfate 324 mg (65 mg iron) Tbec  Take 325 mg by mouth nightly.     hyoscyamine 0.125 mg Subl  Commonly known as: Levsin  PLACE 1 TABLET (0.125 MG TOTAL) UNDER THE TONGUE EVERY 4 (FOUR) HOURS AS NEEDED.     levothyroxine 112 MCG tablet  Commonly known as: SYNTHROID  Take 1 tablet (112 mcg total) by mouth before breakfast.     loratadine-pseudoephedrine  mg  mg per 24 hr tablet  Commonly known as: CLARITIN-D 24-hour  Take 1 tablet by mouth as needed.     mupirocin 2 % ointment  Commonly known as: BACTROBAN  2 (two) times daily. Apply to affected area     phenytoin 100 MG ER capsule  Commonly known as: DILANTIN  Take 2 capsules (200 mg total) by mouth nightly.     VITAMIN D ORAL  Take 1 capsule by mouth nightly. 2000 IU daily     XARELTO 20 mg Tab  Generic drug: rivaroxaban  TAKE 1 TABLET BY MOUTH EVERY DAY              Indwelling Lines/Drains at time of discharge:   Lines/Drains/Airways       None                   Time spent on the discharge of patient: 35 minutes         Jose Batres MD  Department of Hospital Medicine  [facility] - Surgery

## 2024-03-16 NOTE — NURSING
Nurses Note -- 4 Eyes      3/16/2024   3:22 PM      Skin assessed during: Daily Assessment      [x] No Altered Skin Integrity Present    [x]Prevention Measures Documented      [] Yes- Altered Skin Integrity Present or Discovered   [] LDA Added if Not in Epic (Describe Wound)   [] New Altered Skin Integrity was Present on Admit and Documented in LDA   [] Wound Image Taken    Wound Care Consulted? No    Attending Nurse:  Tia Mejia RN     Second RN/Staff Member: Sae Byrd LPN

## 2024-03-16 NOTE — PLAN OF CARE
[facility] scheduled pcp f/u   Future Appointments   Date Time Provider Department Center   3/22/2024  9:30 AM Zachary Bender MD [facility]   4/1/2024 10:00 AM [facility] [place]   4/26/2024 11:00 AM Kleber Liu, PT DOS OTPTRHB [facility]   5/2/2024  1:30 PM Swetha Bay MD [facility] NEPHRO [facility]   5/3/2024 12:00 PM Viktor Cohen MD [facility] AR[facility]HYTH [place]   6/11/2024 11:00 AM Zachary Bender MD [facility] IM [facility]   8/27/2024  3:00 PM KYLAH DAVILA [facility] BMD [facility]

## 2024-03-17 NOTE — PROGRESS NOTES
SUBJECTIVE:    Ms. Nguyen is POD1 s/p Navicular ORIF and Iliac Crest Bone Grafting. Doing well. Transferred to [facility] for post-op IV steroid and monitoring.    Splint CDI. Wiggles toes. States foot 'waking up' and has some pain but overall okay.    OBJECTIVE:      Vitals:    03/16/24 0808 03/16/24 0944 03/16/24 1133 03/16/24 1416   BP: (!) 170/83 132/60 (!) 152/74    Pulse: 96 104 91    Resp: 18  16 18   Temp: 98 °F (36.7 °C)  98 °F (36.7 °C)    TempSrc:   Oral    SpO2: 96% 96% 95%    Weight:       Height:           Lower Extremity Exam  Operative extremity: Splint clean dry intact  Wiggles toes, sensation improving but not to her baseline (has baseline neuropathy)  Toes warm well perfused, palpable DP    DIAGNOSTIC STUDIES: no new imaging    ASSESSMENT:   1. S/p tarsal bone ORIF, Iliac crest bone grafting       PLAN:  -okay for discharge from ortho perspective  -Nwb LLE   -follow up this Thursday in [place] - 930am.   -discussed post-op protocol - NWB LLE in splint, resume home Eloquis, has steroids/levothyroxine and other medicine information from PCP/Endo and hospitalist  -rest of care per hospitalist.    Geoffrey Goodson MD  [facility]  Orthopedic Surgery      This note was done with voice recognition software. Please excuse any errors missed in proof reading.

## 2024-03-18 ENCOUNTER — PATIENT MESSAGE (OUTPATIENT)
Dept: ORTHOPEDICS | Facility: CLINIC | Age: 59
End: 2024-03-18
Payer: COMMERCIAL

## 2024-03-21 ENCOUNTER — OFFICE VISIT (OUTPATIENT)
Dept: ORTHOPEDICS | Facility: CLINIC | Age: 59
End: 2024-03-21
Payer: COMMERCIAL

## 2024-03-21 VITALS — HEIGHT: 67 IN | WEIGHT: 149.94 LBS | BODY MASS INDEX: 23.53 KG/M2

## 2024-03-21 DIAGNOSIS — S92.251G: Primary | ICD-10-CM

## 2024-03-21 PROCEDURE — 3066F NEPHROPATHY DOC TX: CPT | Mod: CPTII,S$GLB,, | Performed by: SURGERY

## 2024-03-21 PROCEDURE — 99024 POSTOP FOLLOW-UP VISIT: CPT | Mod: S$GLB,,, | Performed by: SURGERY

## 2024-03-21 PROCEDURE — 99999 PR PBB SHADOW E&M-EST. PATIENT-LVL III: CPT | Mod: PBBFAC,,, | Performed by: SURGERY

## 2024-03-21 PROCEDURE — 1159F MED LIST DOCD IN RCRD: CPT | Mod: CPTII,S$GLB,, | Performed by: SURGERY

## 2024-03-21 RX ORDER — SULFAMETHOXAZOLE AND TRIMETHOPRIM 800; 160 MG/1; MG/1
1 TABLET ORAL 2 TIMES DAILY
Qty: 10 TABLET | Refills: 0 | Status: ON HOLD | OUTPATIENT
Start: 2024-03-21 | End: 2024-04-11 | Stop reason: HOSPADM

## 2024-03-21 RX ORDER — OXYCODONE HYDROCHLORIDE 5 MG/1
5 TABLET ORAL EVERY 4 HOURS PRN
Qty: 28 TABLET | Refills: 0 | Status: ON HOLD | OUTPATIENT
Start: 2024-03-21 | End: 2024-04-11 | Stop reason: HOSPADM

## 2024-03-21 RX ORDER — ACETAMINOPHEN 500 MG
500 TABLET ORAL EVERY 6 HOURS
Qty: 30 TABLET | Refills: 1 | Status: SHIPPED | OUTPATIENT
Start: 2024-03-21

## 2024-03-21 RX ORDER — NALOXONE HYDROCHLORIDE 4 MG/.1ML
SPRAY NASAL
Qty: 1 EACH | Refills: 11 | Status: ON HOLD | OUTPATIENT
Start: 2024-03-21 | End: 2024-04-11 | Stop reason: HOSPADM

## 2024-03-21 NOTE — PROGRESS NOTES
"SUBJECTIVE:    Ms. Nguyen is here today for a follow up visit.  Status post repair of right navicular fracture nonunion and iliac crest bone grafting.  This was 1 week ago.  She presents for 1 week follow up.  She presents in the splint.  She denies fevers chills or significant tenderness.  She has had several near falls as her balance is disrupted somewhat in the splint.        OBJECTIVE:      Vitals:    03/21/24 1015   Weight: 68 kg (149 lb 14.6 oz)   Height: 5' 7" (1.702 m)       Lower Extremity Exam  Splint removed.  Incision clean dry and intact with sutures in place, minor erythema surrounding the incision.  Fires FHL, EHL, TA, GSC.  Sensation at baseline.  Incision about the hip also examined.  Clean dry and healing appropriate.  Skin glue in place.        DIAGNOSTIC STUDIES:  No new imaging today    ASSESSMENT:   1. 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    We will leave her stitches in place today.  We will transition her to a boot.  We gave her and her significant other extensive instructions on removing the boot daily into remove the dressing to check around the incision if there are any concerns.  Please contact our office with any concerns.  Please report to the emergency room if there are any issues was contacting our office or for fevers, chills, new wounds, or any other red flags as discussed.    Follow up in 1 week to 10 days for planned suture removal and further evaluation.  Cam boot given.  Continue assay for DVT prophylaxis, pain medication refilled.    Geoffrey Goodson MD  [facility]  Orthopedic Surgery      This note was done with voice recognition software. Please excuse any errors missed in proof reading.     "

## 2024-03-22 ENCOUNTER — OFFICE VISIT (OUTPATIENT)
Dept: INTERNAL MEDICINE | Facility: CLINIC | Age: 59
End: 2024-03-22
Payer: COMMERCIAL

## 2024-03-22 VITALS
DIASTOLIC BLOOD PRESSURE: 62 MMHG | OXYGEN SATURATION: 98 % | BODY MASS INDEX: 23.48 KG/M2 | HEART RATE: 94 BPM | HEIGHT: 67 IN | SYSTOLIC BLOOD PRESSURE: 112 MMHG

## 2024-03-22 DIAGNOSIS — Z87.81 S/P ORIF (OPEN REDUCTION INTERNAL FIXATION) FRACTURE: ICD-10-CM

## 2024-03-22 DIAGNOSIS — Z09 HOSPITAL DISCHARGE FOLLOW-UP: Primary | ICD-10-CM

## 2024-03-22 DIAGNOSIS — Z98.890 S/P ORIF (OPEN REDUCTION INTERNAL FIXATION) FRACTURE: ICD-10-CM

## 2024-03-22 PROCEDURE — 3078F DIAST BP <80 MM HG: CPT | Mod: CPTII,S$GLB,, | Performed by: INTERNAL MEDICINE

## 2024-03-22 PROCEDURE — 99999 PR PBB SHADOW E&M-EST. PATIENT-LVL IV: CPT | Mod: PBBFAC,,, | Performed by: INTERNAL MEDICINE

## 2024-03-22 PROCEDURE — 3066F NEPHROPATHY DOC TX: CPT | Mod: CPTII,S$GLB,, | Performed by: INTERNAL MEDICINE

## 2024-03-22 PROCEDURE — 99213 OFFICE O/P EST LOW 20 MIN: CPT | Mod: S$GLB,,, | Performed by: INTERNAL MEDICINE

## 2024-03-22 PROCEDURE — 3074F SYST BP LT 130 MM HG: CPT | Mod: CPTII,S$GLB,, | Performed by: INTERNAL MEDICINE

## 2024-03-22 PROCEDURE — 1159F MED LIST DOCD IN RCRD: CPT | Mod: CPTII,S$GLB,, | Performed by: INTERNAL MEDICINE

## 2024-03-22 NOTE — PROGRESS NOTES
"    CHIEF COMPLAINT     Chief Complaint   Patient presents with    Follow-up       HPI     Thelma Nguyen is a 58 y.o. female pan hypo pick here today for     Was admitted from 03/15 316.  She underwent ORIF of right tarsal fracture.  She was admitted for IV steroids for the 1st 24 hours postoperatively.  She she was transitioned back to oral hydrocortisone prior to discharge.      Personally Reviewed Patient's Medical, surgical, family and social hx. Changes updated in [facility].  Care Team updated in Epic    Review of Systems:  Review of Systems    Health Maintenance:   Reviewed with patient  Due for the following:      PHYSICAL EXAM     /62   Pulse 94   Ht 5' 7" (1.702 m)   LMP 10/23/1997 (Exact Date)   SpO2 98%   BMI 23.48 kg/m²     Gen: Well Appearing, NAD  Wheelchair boot on right ankle       LABS     Labs reviewed; Notable for    ASSESSMENT     1. Hospital discharge follow-up        2. S/P ORIF (open reduction internal fixation) fracture                Plan     Thelma Nguyen is a 58 y.o. female with  1. Hospital discharge follow-up    Patient contacted within 48 hours of discharge,  Symptoms required hospitalization improving,   medications reconciled   follow up appts appear scheduled and verified with patient.    2. S/P ORIF (open reduction internal fixation) fracture  Has follow-up appointment April 1st  Walking boot    Zachary Bender MD      "

## 2024-03-28 ENCOUNTER — PATIENT MESSAGE (OUTPATIENT)
Dept: INTERNAL MEDICINE | Facility: CLINIC | Age: 59
End: 2024-03-28
Payer: COMMERCIAL

## 2024-04-01 ENCOUNTER — HOSPITAL ENCOUNTER (INPATIENT)
Facility: HOSPITAL | Age: 59
LOS: 2 days | Discharge: NURSING, REHAB OR LONG-TERM CARE | DRG: 496 | End: 2024-04-11
Attending: STUDENT IN AN ORGANIZED HEALTH CARE EDUCATION/TRAINING PROGRAM | Admitting: HOSPITALIST
Payer: COMMERCIAL

## 2024-04-01 ENCOUNTER — HOSPITAL ENCOUNTER (OUTPATIENT)
Dept: RADIOLOGY | Facility: HOSPITAL | Age: 59
Discharge: HOME | End: 2024-04-01
Attending: SURGERY
Payer: COMMERCIAL

## 2024-04-01 ENCOUNTER — LAB VISIT (OUTPATIENT)
Dept: LAB | Facility: HOSPITAL | Age: 59
End: 2024-04-01
Attending: INTERNAL MEDICINE
Payer: COMMERCIAL

## 2024-04-01 ENCOUNTER — OFFICE VISIT (OUTPATIENT)
Dept: ORTHOPEDICS | Facility: CLINIC | Age: 59
End: 2024-04-01
Payer: COMMERCIAL

## 2024-04-01 VITALS — BODY MASS INDEX: 23.53 KG/M2 | HEIGHT: 67 IN | WEIGHT: 149.94 LBS

## 2024-04-01 DIAGNOSIS — Z86.39 HISTORY OF ADRENAL INSUFFICIENCY: ICD-10-CM
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
## 2024-04-01 DIAGNOSIS — S92.251A CLOSED DISPLACED FRACTURE OF NAVICULAR BONE OF RIGHT FOOT, INITIAL ENCOUNTER: ICD-10-CM

## 2024-04-01 DIAGNOSIS — R29.6 RECURRENT FALLS: ICD-10-CM

## 2024-04-01 DIAGNOSIS — Z98.890 S/P FOOT SURGERY, RIGHT: ICD-10-CM

## 2024-04-01 DIAGNOSIS — Z74.09 IMPAIRED FUNCTIONAL MOBILITY, BALANCE, GAIT, AND ENDURANCE: ICD-10-CM

## 2024-04-01 DIAGNOSIS — S92.901B OPEN FRACTURE OF RIGHT FOOT, INITIAL ENCOUNTER: ICD-10-CM

## 2024-04-01 DIAGNOSIS — R07.9 CHEST PAIN: ICD-10-CM

## 2024-04-01 DIAGNOSIS — R55 SYNCOPE: ICD-10-CM

## 2024-04-01 DIAGNOSIS — R55 RECURRENT SYNCOPE: ICD-10-CM

## 2024-04-01 DIAGNOSIS — I95.1 ORTHOSTATIC HYPOTENSION: Primary | ICD-10-CM

## 2024-04-01 DIAGNOSIS — E23.0 PANHYPOPITUITARISM: ICD-10-CM

## 2024-04-01 PROBLEM — N18.4 STAGE 4 CHRONIC KIDNEY DISEASE: Status: ACTIVE | Noted: 2019-03-28

## 2024-04-01 LAB
ALBUMIN SERPL BCP-MCNC: 3.2 G/DL (ref 3.5–5.2)
ALP SERPL-CCNC: 136 U/L (ref 55–135)
ALT SERPL W/O P-5'-P-CCNC: 13 U/L (ref 10–44)
ANION GAP SERPL CALC-SCNC: 12 MMOL/L (ref 8–16)
AST SERPL-CCNC: 22 U/L (ref 10–40)
BASOPHILS # BLD AUTO: 0.04 K/UL (ref 0–0.2)
BASOPHILS NFR BLD: 0.5 % (ref 0–1.9)
BILIRUB SERPL-MCNC: 0.2 MG/DL (ref 0.1–1)
BUN SERPL-MCNC: 40 MG/DL (ref 6–20)
CALCIUM SERPL-MCNC: 9 MG/DL (ref 8.7–10.5)
CHLORIDE SERPL-SCNC: 107 MMOL/L (ref 95–110)
CO2 SERPL-SCNC: 20 MMOL/L (ref 23–29)
CREAT SERPL-MCNC: 2 MG/DL (ref 0.5–1.4)
DIFFERENTIAL METHOD BLD: ABNORMAL
EOSINOPHIL # BLD AUTO: 0.1 K/UL (ref 0–0.5)
EOSINOPHIL NFR BLD: 1.2 % (ref 0–8)
ERYTHROCYTE [DISTWIDTH] IN BLOOD BY AUTOMATED COUNT: 12.7 % (ref 11.5–14.5)
EST. GFR  (NO RACE VARIABLE): 29 ML/MIN/1.73 M^2
GLUCOSE SERPL-MCNC: 117 MG/DL (ref 70–110)
HCT VFR BLD AUTO: 28.9 % (ref 37–48.5)
HGB BLD-MCNC: 9.3 G/DL (ref 12–16)
IMM GRANULOCYTES # BLD AUTO: 0.04 K/UL (ref 0–0.04)
IMM GRANULOCYTES NFR BLD AUTO: 0.5 % (ref 0–0.5)
LYMPHOCYTES # BLD AUTO: 1.5 K/UL (ref 1–4.8)
LYMPHOCYTES NFR BLD: 19 % (ref 18–48)
MAGNESIUM SERPL-MCNC: 2 MG/DL (ref 1.6–2.6)
MCH RBC QN AUTO: 29.9 PG (ref 27–31)
MCHC RBC AUTO-ENTMCNC: 32.2 G/DL (ref 32–36)
MCV RBC AUTO: 93 FL (ref 82–98)
MONOCYTES # BLD AUTO: 0.4 K/UL (ref 0.3–1)
MONOCYTES NFR BLD: 4.5 % (ref 4–15)
NEUTROPHILS # BLD AUTO: 5.8 K/UL (ref 1.8–7.7)
NEUTROPHILS NFR BLD: 74.3 % (ref 38–73)
NRBC BLD-RTO: 0 /100 WBC
PLATELET # BLD AUTO: 369 K/UL (ref 150–450)
PMV BLD AUTO: 9.2 FL (ref 9.2–12.9)
POTASSIUM SERPL-SCNC: 4.1 MMOL/L (ref 3.5–5.1)
PROT SERPL-MCNC: 6.9 G/DL (ref 6–8.4)
RBC # BLD AUTO: 3.11 M/UL (ref 4–5.4)
SODIUM SERPL-SCNC: 139 MMOL/L (ref 136–145)
T4 FREE SERPL-MCNC: 0.98 NG/DL (ref 0.71–1.51)
TSH SERPL DL<=0.005 MIU/L-ACNC: 1.72 UIU/ML (ref 0.4–4)
WBC # BLD AUTO: 7.81 K/UL (ref 3.9–12.7)

## 2024-04-01 PROCEDURE — 73630 X-RAY EXAM OF FOOT: CPT | Mod: 26,RT,, | Performed by: RADIOLOGY

## 2024-04-01 PROCEDURE — G0378 HOSPITAL OBSERVATION PER HR: HCPCS

## 2024-04-01 PROCEDURE — 63600175 PHARM REV CODE 636 W HCPCS: Performed by: STUDENT IN AN ORGANIZED HEALTH CARE EDUCATION/TRAINING PROGRAM

## 2024-04-01 PROCEDURE — 99285 EMERGENCY DEPT VISIT HI MDM: CPT | Mod: 25

## 2024-04-01 PROCEDURE — 3066F NEPHROPATHY DOC TX: CPT | Mod: CPTII,S$GLB,, | Performed by: SURGERY

## 2024-04-01 PROCEDURE — 73630 X-RAY EXAM OF FOOT: CPT | Mod: TC,PN,RT

## 2024-04-01 PROCEDURE — 83735 ASSAY OF MAGNESIUM: CPT | Performed by: PHYSICIAN ASSISTANT

## 2024-04-01 PROCEDURE — 1159F MED LIST DOCD IN RCRD: CPT | Mod: CPTII,S$GLB,, | Performed by: SURGERY

## 2024-04-01 PROCEDURE — 84443 ASSAY THYROID STIM HORMONE: CPT | Performed by: INTERNAL MEDICINE

## 2024-04-01 PROCEDURE — 96374 THER/PROPH/DIAG INJ IV PUSH: CPT | Mod: 59

## 2024-04-01 PROCEDURE — 85025 COMPLETE CBC W/AUTO DIFF WBC: CPT | Performed by: PHYSICIAN ASSISTANT

## 2024-04-01 PROCEDURE — 36415 COLL VENOUS BLD VENIPUNCTURE: CPT | Performed by: INTERNAL MEDICINE

## 2024-04-01 PROCEDURE — 84439 ASSAY OF FREE THYROXINE: CPT | Performed by: INTERNAL MEDICINE

## 2024-04-01 PROCEDURE — 82533 TOTAL CORTISOL: CPT | Performed by: PHYSICIAN ASSISTANT

## 2024-04-01 PROCEDURE — 99024 POSTOP FOLLOW-UP VISIT: CPT | Mod: S$GLB,,, | Performed by: SURGERY

## 2024-04-01 PROCEDURE — 25000003 PHARM REV CODE 250: Performed by: NURSE PRACTITIONER

## 2024-04-01 PROCEDURE — 80053 COMPREHEN METABOLIC PANEL: CPT | Performed by: PHYSICIAN ASSISTANT

## 2024-04-01 PROCEDURE — 99999 PR PBB SHADOW E&M-EST. PATIENT-LVL V: CPT | Mod: PBBFAC,,, | Performed by: SURGERY

## 2024-04-01 RX ORDER — LANOLIN ALCOHOL/MO/W.PET/CERES
1 CREAM (GRAM) TOPICAL NIGHTLY
Status: DISCONTINUED | OUTPATIENT
Start: 2024-04-01 | End: 2024-04-11 | Stop reason: HOSPADM

## 2024-04-01 RX ORDER — HYDROCORTISONE 10 MG/1
TABLET ORAL
COMMUNITY

## 2024-04-01 RX ORDER — ENOXAPARIN SODIUM 100 MG/ML
40 INJECTION SUBCUTANEOUS EVERY 24 HOURS
Status: DISCONTINUED | OUTPATIENT
Start: 2024-04-01 | End: 2024-04-01

## 2024-04-01 RX ORDER — ACETAMINOPHEN 500 MG
500 TABLET ORAL EVERY 6 HOURS
Status: DISCONTINUED | OUTPATIENT
Start: 2024-04-02 | End: 2024-04-11 | Stop reason: HOSPADM

## 2024-04-01 RX ORDER — LEVOTHYROXINE SODIUM 112 UG/1
112 TABLET ORAL
Status: DISCONTINUED | OUTPATIENT
Start: 2024-04-02 | End: 2024-04-11 | Stop reason: HOSPADM

## 2024-04-01 RX ORDER — TALC
6 POWDER (GRAM) TOPICAL NIGHTLY PRN
Status: DISCONTINUED | OUTPATIENT
Start: 2024-04-01 | End: 2024-04-11 | Stop reason: HOSPADM

## 2024-04-01 RX ORDER — OXYCODONE HYDROCHLORIDE 5 MG/1
5 TABLET ORAL EVERY 4 HOURS PRN
Status: DISCONTINUED | OUTPATIENT
Start: 2024-04-01 | End: 2024-04-11 | Stop reason: HOSPADM

## 2024-04-01 RX ORDER — ACETAMINOPHEN 325 MG/1
650 TABLET ORAL EVERY 4 HOURS PRN
Status: DISCONTINUED | OUTPATIENT
Start: 2024-04-01 | End: 2024-04-11 | Stop reason: HOSPADM

## 2024-04-01 RX ORDER — PNV NO.95/FERROUS FUM/FOLIC AC 28MG-0.8MG
100 TABLET ORAL DAILY
COMMUNITY

## 2024-04-01 RX ORDER — GLUCAGON 1 MG
1 KIT INJECTION
Status: DISCONTINUED | OUTPATIENT
Start: 2024-04-01 | End: 2024-04-11 | Stop reason: HOSPADM

## 2024-04-01 RX ORDER — ACETAMINOPHEN 500 MG
2000 TABLET ORAL NIGHTLY
COMMUNITY

## 2024-04-01 RX ORDER — PHENYTOIN SODIUM 100 MG/1
200 CAPSULE, EXTENDED RELEASE ORAL NIGHTLY
Status: DISCONTINUED | OUTPATIENT
Start: 2024-04-01 | End: 2024-04-11 | Stop reason: HOSPADM

## 2024-04-01 RX ORDER — IBUPROFEN 200 MG
16 TABLET ORAL
Status: DISCONTINUED | OUTPATIENT
Start: 2024-04-01 | End: 2024-04-11 | Stop reason: HOSPADM

## 2024-04-01 RX ORDER — SODIUM CHLORIDE 0.9 % (FLUSH) 0.9 %
3 SYRINGE (ML) INJECTION EVERY 12 HOURS PRN
Status: DISCONTINUED | OUTPATIENT
Start: 2024-04-01 | End: 2024-04-11 | Stop reason: HOSPADM

## 2024-04-01 RX ORDER — DULOXETIN HYDROCHLORIDE 30 MG/1
60 CAPSULE, DELAYED RELEASE ORAL NIGHTLY
Status: DISCONTINUED | OUTPATIENT
Start: 2024-04-01 | End: 2024-04-11 | Stop reason: HOSPADM

## 2024-04-01 RX ORDER — DICLOFENAC SODIUM 10 MG/G
2 GEL TOPICAL 3 TIMES DAILY PRN
Status: DISCONTINUED | OUTPATIENT
Start: 2024-04-01 | End: 2024-04-11 | Stop reason: HOSPADM

## 2024-04-01 RX ORDER — IBUPROFEN 200 MG
24 TABLET ORAL
Status: DISCONTINUED | OUTPATIENT
Start: 2024-04-01 | End: 2024-04-11 | Stop reason: HOSPADM

## 2024-04-01 RX ORDER — ONDANSETRON HYDROCHLORIDE 2 MG/ML
4 INJECTION, SOLUTION INTRAVENOUS EVERY 8 HOURS PRN
Status: DISCONTINUED | OUTPATIENT
Start: 2024-04-01 | End: 2024-04-11 | Stop reason: HOSPADM

## 2024-04-01 RX ORDER — NALOXONE HCL 0.4 MG/ML
0.02 VIAL (ML) INJECTION
Status: DISCONTINUED | OUTPATIENT
Start: 2024-04-01 | End: 2024-04-11 | Stop reason: HOSPADM

## 2024-04-01 RX ADMIN — PHENYTOIN SODIUM 200 MG: 100 CAPSULE ORAL at 10:04

## 2024-04-01 RX ADMIN — DULOXETINE HYDROCHLORIDE 60 MG: 30 CAPSULE, DELAYED RELEASE ORAL at 10:04

## 2024-04-01 RX ADMIN — FERROUS SULFATE TAB 325 MG (65 MG ELEMENTAL FE) 1 EACH: 325 (65 FE) TAB at 10:04

## 2024-04-01 RX ADMIN — HYDROCORTISONE SODIUM SUCCINATE 100 MG: 100 INJECTION, POWDER, FOR SOLUTION INTRAMUSCULAR; INTRAVENOUS at 07:04

## 2024-04-01 NOTE — PROGRESS NOTES
"SUBJECTIVE:    Ms. Nguyen is here today for a follow up visit.  She is 2 weeks and 3 days status post open reduction fixation of her navicular fracture with bone grafting. She was seen 1 week postoperatively and transitioned over to a cam boot given her problems with sensation.  She has fallen multiple times since surgery. As per her notes in EPIC:    "On Thursday night I fainted again & Arie said I was out of it for 20 seconds to a minute before I was responding to him. Friday was better but I fainted with no warning. The positive is I have not hit or land on my face/head (mostly bc Arie is catching me). I dk if I should go to er or what."    "I fell Monday night and knocked the toilet paper back off the wall. Putting a long scratch across my back. I didnt feel well Tuesday and almost fell again but Arie caught me - more than once. Yesterday I fell every time I tried to go to the bathroom but once. One time I know I passed out. Arie saw it coming (he was hovering) and caught me and said that I went completely limp. I remember kinds waking up & taking a few seconds to realize what was happening. Arie said he was about to call 911. This morning I made it once with Arie holding my hand & a second time with Arie catching me. I dont like this. I also seem to be sleeping about 18 hours of the day. I just fainted on my way to the bathroom and luckily Arie caught me."    She tells me today she can not even remember how many times she is fallen or what she is landed on.  Her  reiterates that he is nervous taking care of her at home.  He is worried about more falls.  They are not sure whether she landed on the foot, how many times she landed on the foot, or whether she has injured the foot from the falls.    She denies any pain at this time.          OBJECTIVE:      Vitals:    04/01/24 1415   Weight: 68 kg (149 lb 14.6 oz)   Height: 5' 7" (1.702 m)       Lower Extremity Exam  Right lower extremity incisions " clean dry intact with sutures in place.  Healing appropriate.  Minimal erythema.  Medial migration visible and tenting of the skin.  Sensation at baseline.     DIAGNOSTIC STUDIES:  X-rays of the right foot show failure of the hardware with migration.  Appears to have refractured at the level of the screws.    ASSESSMENT:   1. Status post open reduction internal fixation navicular fracture with iliac crest bone grafting  2. Hardware failure  3. Multiple falls of unknown etiology      PLAN:  Thelma was seen today for pain.    Diagnoses and all orders for this visit:    Closed displaced fracture of navicular bone of right foot, initial encounter  -     X-Ray Foot Complete Right; Future        I have sent her to the emergency room given her multiple falls as well as hardware failure of her navicular fracture.  She will require revision fixation of her hardware.  This will take the form of naviculocuneiform fusion versus talonavicular fusion. We had a long discussion of the risks and benefits of different operative and non-operative options. I explained the injury using pictures, models, and the patient's x-ray images. I explained the risks of surgery which include but are not limited to continued pain, deformity, bleeding, infection, damage to surrounding structures, non-union, mal-union, arthritis, and in rare cases loss of limb. I explained the risks of co-morbidities which influences the rate of complications. I explained the risks of non-operative management, including continued pain, long term immobilization, malunion, non-union, rapid arthritis progression, and difficulty with ambulation. I discussed all of these risks using non-medical terms and confirmed understanding. The patient elected for Revision reduction internal fixation versus talonavicular fusion versus naviculocuneiform fusion, and any other indicated procedures.  We will plan for surgery tomorrow assuming she is medically cleared.  She will likely  need rehab afterwards.  -Patient transferred to the emergency room  -Emergency Medicine, hospitalist, and endocrinology consulted  -NPO After midnight  -Plan for procedure tomorrow if medically cleared.  -Consents signed.    Geoffrey Goodson MD  [facility]  Orthopedic Surgery      This note was done with voice recognition software. Please excuse any errors missed in proof reading.

## 2024-04-02 ENCOUNTER — ANESTHESIA EVENT (OUTPATIENT)
Dept: SURGERY | Facility: HOSPITAL | Age: 59
DRG: 496 | End: 2024-04-02
Payer: COMMERCIAL

## 2024-04-02 ENCOUNTER — E-CONSULT (OUTPATIENT)
Dept: ENDOCRINOLOGY | Facility: CLINIC | Age: 59
End: 2024-04-02
Payer: COMMERCIAL

## 2024-04-02 ENCOUNTER — ANESTHESIA (OUTPATIENT)
Dept: SURGERY | Facility: HOSPITAL | Age: 59
DRG: 496 | End: 2024-04-02
Payer: COMMERCIAL

## 2024-04-02 DIAGNOSIS — E23.0 PANHYPOPITUITARISM: Primary | ICD-10-CM

## 2024-04-02 PROBLEM — Z86.39 HISTORY OF ADRENAL INSUFFICIENCY: Status: ACTIVE | Noted: 2024-04-02

## 2024-04-02 PROBLEM — R55 RECURRENT SYNCOPE: Status: ACTIVE | Noted: 2024-04-02

## 2024-04-02 LAB
ANION GAP SERPL CALC-SCNC: 10 MMOL/L (ref 8–16)
BASOPHILS # BLD AUTO: 0.03 K/UL (ref 0–0.2)
BASOPHILS NFR BLD: 0.4 % (ref 0–1.9)
BUN SERPL-MCNC: 36 MG/DL (ref 6–20)
CALCIUM SERPL-MCNC: 8.7 MG/DL (ref 8.7–10.5)
CHLORIDE SERPL-SCNC: 107 MMOL/L (ref 95–110)
CO2 SERPL-SCNC: 23 MMOL/L (ref 23–29)
CORTIS SERPL-MCNC: 13.9 UG/DL
CREAT SERPL-MCNC: 1.6 MG/DL (ref 0.5–1.4)
DIFFERENTIAL METHOD BLD: ABNORMAL
EOSINOPHIL # BLD AUTO: 0 K/UL (ref 0–0.5)
EOSINOPHIL NFR BLD: 0.4 % (ref 0–8)
ERYTHROCYTE [DISTWIDTH] IN BLOOD BY AUTOMATED COUNT: 12.5 % (ref 11.5–14.5)
EST. GFR  (NO RACE VARIABLE): 37 ML/MIN/1.73 M^2
GLUCOSE SERPL-MCNC: 94 MG/DL (ref 70–110)
HCT VFR BLD AUTO: 27.7 % (ref 37–48.5)
HGB BLD-MCNC: 9.2 G/DL (ref 12–16)
IMM GRANULOCYTES # BLD AUTO: 0.02 K/UL (ref 0–0.04)
IMM GRANULOCYTES NFR BLD AUTO: 0.3 % (ref 0–0.5)
LYMPHOCYTES # BLD AUTO: 2 K/UL (ref 1–4.8)
LYMPHOCYTES NFR BLD: 29.4 % (ref 18–48)
MCH RBC QN AUTO: 30.8 PG (ref 27–31)
MCHC RBC AUTO-ENTMCNC: 33.2 G/DL (ref 32–36)
MCV RBC AUTO: 93 FL (ref 82–98)
MONOCYTES # BLD AUTO: 0.4 K/UL (ref 0.3–1)
MONOCYTES NFR BLD: 5.4 % (ref 4–15)
NEUTROPHILS # BLD AUTO: 4.3 K/UL (ref 1.8–7.7)
NEUTROPHILS NFR BLD: 64.1 % (ref 38–73)
NRBC BLD-RTO: 0 /100 WBC
OHS QRS DURATION: 82 MS
OHS QTC CALCULATION: 447 MS
PLATELET # BLD AUTO: 374 K/UL (ref 150–450)
PMV BLD AUTO: 8.9 FL (ref 9.2–12.9)
POTASSIUM SERPL-SCNC: 4.2 MMOL/L (ref 3.5–5.1)
RBC # BLD AUTO: 2.99 M/UL (ref 4–5.4)
SODIUM SERPL-SCNC: 140 MMOL/L (ref 136–145)
WBC # BLD AUTO: 6.7 K/UL (ref 3.9–12.7)

## 2024-04-02 PROCEDURE — 80048 BASIC METABOLIC PNL TOTAL CA: CPT | Performed by: NURSE PRACTITIONER

## 2024-04-02 PROCEDURE — 71000033 HC RECOVERY, INTIAL HOUR: Performed by: SURGERY

## 2024-04-02 PROCEDURE — 0QWL04Z REVISION OF INTERNAL FIXATION DEVICE IN RIGHT TARSAL, OPEN APPROACH: ICD-10-PCS | Performed by: SURGERY

## 2024-04-02 PROCEDURE — 0QPL04Z REMOVAL OF INTERNAL FIXATION DEVICE FROM RIGHT TARSAL, OPEN APPROACH: ICD-10-PCS | Performed by: SURGERY

## 2024-04-02 PROCEDURE — 85025 COMPLETE CBC W/AUTO DIFF WBC: CPT | Performed by: NURSE PRACTITIONER

## 2024-04-02 PROCEDURE — G0378 HOSPITAL OBSERVATION PER HR: HCPCS

## 2024-04-02 PROCEDURE — 63600175 PHARM REV CODE 636 W HCPCS: Performed by: NURSE ANESTHETIST, CERTIFIED REGISTERED

## 2024-04-02 PROCEDURE — 93010 ELECTROCARDIOGRAM REPORT: CPT | Mod: ,,, | Performed by: INTERNAL MEDICINE

## 2024-04-02 PROCEDURE — 99223 1ST HOSP IP/OBS HIGH 75: CPT | Mod: 25,,, | Performed by: INTERNAL MEDICINE

## 2024-04-02 PROCEDURE — 96376 TX/PRO/DX INJ SAME DRUG ADON: CPT

## 2024-04-02 PROCEDURE — 25000003 PHARM REV CODE 250: Performed by: SURGERY

## 2024-04-02 PROCEDURE — 63600175 PHARM REV CODE 636 W HCPCS: Mod: JZ,JG | Performed by: SURGERY

## 2024-04-02 PROCEDURE — C1713 ANCHOR/SCREW BN/BN,TIS/BN: HCPCS | Performed by: SURGERY

## 2024-04-02 PROCEDURE — C1734 ORTH/DEVIC/DRUG BN/BN,TIS/BN: HCPCS | Performed by: SURGERY

## 2024-04-02 PROCEDURE — 25000003 PHARM REV CODE 250: Performed by: NURSE PRACTITIONER

## 2024-04-02 PROCEDURE — 25000003 PHARM REV CODE 250: Performed by: NURSE ANESTHETIST, CERTIFIED REGISTERED

## 2024-04-02 PROCEDURE — 37000008 HC ANESTHESIA 1ST 15 MINUTES: Performed by: SURGERY

## 2024-04-02 PROCEDURE — 27201423 OPTIME MED/SURG SUP & DEVICES STERILE SUPPLY: Performed by: SURGERY

## 2024-04-02 PROCEDURE — 28730 FUSION OF FOOT BONES: CPT | Mod: 78,RT,, | Performed by: SURGERY

## 2024-04-02 PROCEDURE — 28465 OPTX TARSAL BONE FX EACH: CPT | Mod: 78,51,RT, | Performed by: SURGERY

## 2024-04-02 PROCEDURE — 93005 ELECTROCARDIOGRAM TRACING: CPT

## 2024-04-02 PROCEDURE — 36415 COLL VENOUS BLD VENIPUNCTURE: CPT | Performed by: NURSE PRACTITIONER

## 2024-04-02 PROCEDURE — 25000003 PHARM REV CODE 250: Performed by: ANESTHESIOLOGY

## 2024-04-02 PROCEDURE — D9220A PRA ANESTHESIA: Mod: CRNA,,, | Performed by: NURSE ANESTHETIST, CERTIFIED REGISTERED

## 2024-04-02 PROCEDURE — D9220A PRA ANESTHESIA: Mod: ANES,,, | Performed by: STUDENT IN AN ORGANIZED HEALTH CARE EDUCATION/TRAINING PROGRAM

## 2024-04-02 PROCEDURE — 0QB20ZZ EXCISION OF RIGHT PELVIC BONE, OPEN APPROACH: ICD-10-PCS | Performed by: SURGERY

## 2024-04-02 PROCEDURE — 27800903 OPTIME MED/SURG SUP & DEVICES OTHER IMPLANTS: Performed by: SURGERY

## 2024-04-02 PROCEDURE — 20902 REMOVAL OF BONE FOR GRAFT: CPT | Mod: 78,51,RT, | Performed by: SURGERY

## 2024-04-02 PROCEDURE — 0SGH04Z FUSION OF RIGHT TARSAL JOINT WITH INTERNAL FIXATION DEVICE, OPEN APPROACH: ICD-10-PCS | Performed by: SURGERY

## 2024-04-02 PROCEDURE — 37000009 HC ANESTHESIA EA ADD 15 MINS: Performed by: SURGERY

## 2024-04-02 PROCEDURE — 63600175 PHARM REV CODE 636 W HCPCS: Performed by: NURSE PRACTITIONER

## 2024-04-02 PROCEDURE — 99446 NTRPROF PH1/NTRNET/EHR 5-10: CPT | Mod: S$GLB,,, | Performed by: INTERNAL MEDICINE

## 2024-04-02 PROCEDURE — 36000708 HC OR TIME LEV III 1ST 15 MIN: Performed by: SURGERY

## 2024-04-02 PROCEDURE — 36000709 HC OR TIME LEV III EA ADD 15 MIN: Performed by: SURGERY

## 2024-04-02 PROCEDURE — 0SGH0KZ FUSION OF RIGHT TARSAL JOINT WITH NONAUTOLOGOUS TISSUE SUBSTITUTE, OPEN APPROACH: ICD-10-PCS | Performed by: SURGERY

## 2024-04-02 DEVICE — KIT AUGMENT BONE GRAFT 3.0CC: Type: IMPLANTABLE DEVICE | Site: FOOT | Status: FUNCTIONAL

## 2024-04-02 DEVICE — IMPLANTABLE DEVICE: Type: IMPLANTABLE DEVICE | Site: FOOT | Status: FUNCTIONAL

## 2024-04-02 DEVICE — SCREW BONE LOCK T8 2.7X14MM: Type: IMPLANTABLE DEVICE | Site: FOOT | Status: FUNCTIONAL

## 2024-04-02 DEVICE — SCREW VARIAX NON-LOK 2.7X18MM: Type: IMPLANTABLE DEVICE | Site: FOOT | Status: FUNCTIONAL

## 2024-04-02 DEVICE — PUTTY DBX 2.5CC: Type: IMPLANTABLE DEVICE | Site: FOOT | Status: FUNCTIONAL

## 2024-04-02 DEVICE — SCREW VARIAX NON-LOK 2.7X30MM: Type: IMPLANTABLE DEVICE | Site: FOOT | Status: FUNCTIONAL

## 2024-04-02 DEVICE — SCREW BONE LOCK T8 2.7X16MM: Type: IMPLANTABLE DEVICE | Site: FOOT | Status: FUNCTIONAL

## 2024-04-02 DEVICE — SCREW VARIAX LOK FT 2.7X18MM: Type: IMPLANTABLE DEVICE | Site: FOOT | Status: FUNCTIONAL

## 2024-04-02 DEVICE — CANNULATED SCREW
Type: IMPLANTABLE DEVICE | Site: FOOT | Status: FUNCTIONAL
Brand: DARTFIRE EDGE

## 2024-04-02 DEVICE — SCREW VARIAX NON LOK 2.7X28MM: Type: IMPLANTABLE DEVICE | Site: FOOT | Status: FUNCTIONAL

## 2024-04-02 RX ORDER — CEFAZOLIN SODIUM 1 G/3ML
INJECTION, POWDER, FOR SOLUTION INTRAMUSCULAR; INTRAVENOUS
Status: DISCONTINUED | OUTPATIENT
Start: 2024-04-02 | End: 2024-04-02

## 2024-04-02 RX ORDER — MIDODRINE HYDROCHLORIDE 2.5 MG/1
2.5 TABLET ORAL 2 TIMES DAILY WITH MEALS
Status: DISCONTINUED | OUTPATIENT
Start: 2024-04-02 | End: 2024-04-04

## 2024-04-02 RX ORDER — MIDAZOLAM HYDROCHLORIDE 1 MG/ML
INJECTION INTRAMUSCULAR; INTRAVENOUS
Status: DISCONTINUED | OUTPATIENT
Start: 2024-04-02 | End: 2024-04-02

## 2024-04-02 RX ORDER — LIDOCAINE HYDROCHLORIDE 10 MG/ML
INJECTION, SOLUTION INFILTRATION; PERINEURAL
Status: DISCONTINUED | OUTPATIENT
Start: 2024-04-02 | End: 2024-04-02 | Stop reason: HOSPADM

## 2024-04-02 RX ORDER — ONDANSETRON HYDROCHLORIDE 2 MG/ML
4 INJECTION, SOLUTION INTRAVENOUS DAILY PRN
Status: DISCONTINUED | OUTPATIENT
Start: 2024-04-02 | End: 2024-04-06

## 2024-04-02 RX ORDER — PROCHLORPERAZINE EDISYLATE 5 MG/ML
5 INJECTION INTRAMUSCULAR; INTRAVENOUS EVERY 30 MIN PRN
Status: DISCONTINUED | OUTPATIENT
Start: 2024-04-02 | End: 2024-04-05

## 2024-04-02 RX ORDER — MIDAZOLAM HYDROCHLORIDE 5 MG/ML
INJECTION INTRAMUSCULAR; INTRAVENOUS
Status: DISCONTINUED | OUTPATIENT
Start: 2024-04-02 | End: 2024-04-02

## 2024-04-02 RX ORDER — BUPIVACAINE HYDROCHLORIDE 2.5 MG/ML
INJECTION, SOLUTION EPIDURAL; INFILTRATION; INTRACAUDAL
Status: DISCONTINUED | OUTPATIENT
Start: 2024-04-02 | End: 2024-04-02 | Stop reason: HOSPADM

## 2024-04-02 RX ORDER — HYDROMORPHONE HYDROCHLORIDE 2 MG/ML
0.2 INJECTION, SOLUTION INTRAMUSCULAR; INTRAVENOUS; SUBCUTANEOUS EVERY 5 MIN PRN
Status: DISCONTINUED | OUTPATIENT
Start: 2024-04-02 | End: 2024-04-05

## 2024-04-02 RX ORDER — ROCURONIUM BROMIDE 10 MG/ML
INJECTION, SOLUTION INTRAVENOUS
Status: DISCONTINUED | OUTPATIENT
Start: 2024-04-02 | End: 2024-04-02

## 2024-04-02 RX ORDER — PROPOFOL 10 MG/ML
VIAL (ML) INTRAVENOUS
Status: DISCONTINUED | OUTPATIENT
Start: 2024-04-02 | End: 2024-04-02

## 2024-04-02 RX ORDER — LIDOCAINE HYDROCHLORIDE 20 MG/ML
INJECTION, SOLUTION EPIDURAL; INFILTRATION; INTRACAUDAL; PERINEURAL
Status: DISCONTINUED | OUTPATIENT
Start: 2024-04-02 | End: 2024-04-02

## 2024-04-02 RX ORDER — FENTANYL CITRATE 50 UG/ML
INJECTION, SOLUTION INTRAMUSCULAR; INTRAVENOUS
Status: DISCONTINUED | OUTPATIENT
Start: 2024-04-02 | End: 2024-04-02

## 2024-04-02 RX ORDER — SUCCINYLCHOLINE CHLORIDE 20 MG/ML
INJECTION INTRAMUSCULAR; INTRAVENOUS
Status: DISCONTINUED | OUTPATIENT
Start: 2024-04-02 | End: 2024-04-02

## 2024-04-02 RX ORDER — FENTANYL CITRATE 50 UG/ML
25 INJECTION, SOLUTION INTRAMUSCULAR; INTRAVENOUS EVERY 5 MIN PRN
Status: DISCONTINUED | OUTPATIENT
Start: 2024-04-02 | End: 2024-04-06

## 2024-04-02 RX ORDER — OXYCODONE AND ACETAMINOPHEN 5; 325 MG/1; MG/1
1 TABLET ORAL
Status: DISCONTINUED | OUTPATIENT
Start: 2024-04-02 | End: 2024-04-05

## 2024-04-02 RX ORDER — VANCOMYCIN HYDROCHLORIDE 1 G/20ML
INJECTION, POWDER, LYOPHILIZED, FOR SOLUTION INTRAVENOUS
Status: DISCONTINUED | OUTPATIENT
Start: 2024-04-02 | End: 2024-04-02 | Stop reason: HOSPADM

## 2024-04-02 RX ADMIN — HYDROCORTISONE SODIUM SUCCINATE 50 MG: 100 INJECTION, POWDER, FOR SOLUTION INTRAMUSCULAR; INTRAVENOUS at 01:04

## 2024-04-02 RX ADMIN — OXYCODONE HYDROCHLORIDE AND ACETAMINOPHEN 1 TABLET: 5; 325 TABLET ORAL at 05:04

## 2024-04-02 RX ADMIN — FENTANYL CITRATE 50 MCG: 50 INJECTION INTRAMUSCULAR; INTRAVENOUS at 04:04

## 2024-04-02 RX ADMIN — PROPOFOL 100 MG: 10 INJECTION, EMULSION INTRAVENOUS at 01:04

## 2024-04-02 RX ADMIN — HYDROCORTISONE SODIUM SUCCINATE 100 MG: 100 INJECTION, POWDER, FOR SOLUTION INTRAMUSCULAR; INTRAVENOUS at 09:04

## 2024-04-02 RX ADMIN — MIDAZOLAM HYDROCHLORIDE 2 MG: 1 INJECTION, SOLUTION INTRAMUSCULAR; INTRAVENOUS at 12:04

## 2024-04-02 RX ADMIN — OXYCODONE 5 MG: 5 TABLET ORAL at 07:04

## 2024-04-02 RX ADMIN — SUCCINYLCHOLINE CHLORIDE 140 MG: 20 INJECTION, SOLUTION INTRAMUSCULAR; INTRAVENOUS at 01:04

## 2024-04-02 RX ADMIN — MIDODRINE HYDROCHLORIDE 2.5 MG: 2.5 TABLET ORAL at 09:04

## 2024-04-02 RX ADMIN — PHENYTOIN SODIUM 200 MG: 100 CAPSULE ORAL at 09:04

## 2024-04-02 RX ADMIN — HYDROCORTISONE SODIUM SUCCINATE 100 MG: 100 INJECTION, POWDER, FOR SOLUTION INTRAMUSCULAR; INTRAVENOUS at 06:04

## 2024-04-02 RX ADMIN — ACETAMINOPHEN 500 MG: 500 TABLET ORAL at 12:04

## 2024-04-02 RX ADMIN — FERROUS SULFATE TAB 325 MG (65 MG ELEMENTAL FE) 1 EACH: 325 (65 FE) TAB at 09:04

## 2024-04-02 RX ADMIN — FENTANYL CITRATE 50 MCG: 50 INJECTION INTRAMUSCULAR; INTRAVENOUS at 01:04

## 2024-04-02 RX ADMIN — ACETAMINOPHEN 500 MG: 500 TABLET ORAL at 06:04

## 2024-04-02 RX ADMIN — ROCURONIUM BROMIDE 45 MG: 10 INJECTION, SOLUTION INTRAVENOUS at 01:04

## 2024-04-02 RX ADMIN — CEFAZOLIN 2 G: 330 INJECTION, POWDER, FOR SOLUTION INTRAMUSCULAR; INTRAVENOUS at 01:04

## 2024-04-02 RX ADMIN — ROCURONIUM BROMIDE 30 MG: 10 INJECTION, SOLUTION INTRAVENOUS at 02:04

## 2024-04-02 RX ADMIN — FENTANYL CITRATE 50 MCG: 50 INJECTION INTRAMUSCULAR; INTRAVENOUS at 02:04

## 2024-04-02 RX ADMIN — ROCURONIUM BROMIDE 5 MG: 10 INJECTION, SOLUTION INTRAVENOUS at 01:04

## 2024-04-02 RX ADMIN — SODIUM CHLORIDE: 0.9 INJECTION, SOLUTION INTRAVENOUS at 01:04

## 2024-04-02 RX ADMIN — LIDOCAINE HYDROCHLORIDE 100 MG: 20 INJECTION, SOLUTION EPIDURAL; INFILTRATION; INTRACAUDAL; PERINEURAL at 01:04

## 2024-04-02 RX ADMIN — SUGAMMADEX 200 MG: 100 INJECTION, SOLUTION INTRAVENOUS at 04:04

## 2024-04-02 RX ADMIN — OXYCODONE 5 MG: 5 TABLET ORAL at 11:04

## 2024-04-02 RX ADMIN — DULOXETINE HYDROCHLORIDE 60 MG: 30 CAPSULE, DELAYED RELEASE ORAL at 09:04

## 2024-04-02 RX ADMIN — ACETAMINOPHEN 500 MG: 500 TABLET ORAL at 11:04

## 2024-04-02 NOTE — HPI
"Thelma Kirkland "Susi Nguyen is a 58-year-old female w/ a significant PMH including but not limited to glioblastoma removal, panhypoparathyroidism, adrenal insufficiency, CKD, 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 She presented to the ED by request of Orthopedics, Dr. Goodson, for admission for surgical correction of right foot navicular fracture. Patient had ORIF performed with Dr. Goodson on 3/15. Patient admits to significant and lengthy history of severe orthostatic hypotension, and admits her "spells" of becoming lightheaded upon standing have severely worsened since surgery causing her to have numerous falls over past few weeks. She believes during these falls she re-injured her right foot causing hardware failure. From these falls she also does report contusing left elbow and left rib cage. Denying any recent head injury or LOC. No acute neck pain. No chest pain, palpitations, abdominal pain, nausea or vomiting. Patient also reports episodes of tachycardia that occur while sitting on the cough watching TV with alerts on her Apple watch of HR 110s-140s. She states that her falls have significantly increased over the last couple weeks up to 20 falls in the last 2 weeks. Some of these are accompanied by syncope and a disorientated feeling, other times her legs just give out and she falls. Patient's  states patient does not appear to have seizure activity during these episodes but she does start breathing really heavy and then wakes back up after 1-2 minutes. Patient has been under the care of cardiology and endocrinology. She states that she was diagnosed with postural hypotension with vasovagal response. She has never been prescribed a Holter Monitor.  "

## 2024-04-02 NOTE — ASSESSMENT & PLAN NOTE
S/p ORIF of right navicular fracture and iliac crest bone harvest 3/15  Patient returned today for evaluation with Dr. Goodson and sent in for hardware failure likely 2/2 multiple falls requiring revision fixation of hardware  Ortho following  NPO after MN  Likely procedure tomorrow  Revised cardiac risk index score: 0 points, class I risk, 3.9% 30-day risk of death, MI, or cardiac arrest    4/2/24  1. Navicular open reduction internal fixation, right  2. Naviculocuneiform fusion, right  3. Talonavicular joint open reduction internal fixation  4. Hardware removal, right foot  5. Iliac crest bone grafting, right hip

## 2024-04-02 NOTE — HPI
59yo female with Factor V deficiency with DVTs x 3 on Xarelto, right foot fracture, orthostatic hypotension, CKD stage III, adrenal insufficiency, Gliobastoma s/p surgery and XRT 30 years with hypothyroidism and panhypopituitarism who presented to the ER with complaints of foot pain. She is followed by Dr. Goodson and underwent surgery for fractured ankle with reports of recurrent falls and concern for displacement. She was admitted for repeat surgery today. She reports history of orthostatic hypotension and underwent tilt table and CHERISE in the past. She reports infrequent falls ie once per month or once every other month until her  most recent surgery. She reports about 20 falls in the past 2 weeks. She complains of dizziness with position changes described as a vertigo feeling and her legs go weak and typically will sit down or be eased to the floor by her . Prior to her surgery she was wearing thigh high compression stockings and reports it was helping however has been unable to wear them since her surgery. Labs CBC with H&H 9.2&27.8 BMP with creatinine 1.6 Orthostatics positive. Admitted to [facility] and Cardiology consulted for evaluation of orthostatic hypotension

## 2024-04-02 NOTE — SUBJECTIVE & OBJECTIVE
Past Medical History:   Diagnosis Date    Allergic rhinitis     Anemia     Arthritis     Basal cell carcinoma     Broken ankle     Cancer 1985    Clotting disorder     Disorder of kidney and ureter     DVT (deep venous thrombosis)     Factor V deficiency     Fracture of elbow     left    Glioblastoma multiforme of brain     Hypothyroidism     IBS (irritable bowel syndrome)     Osteoporosis     Panhypopituitarism     Peripheral polyneuropathy     Secondary adrenal insufficiency     Seizures     Thyroid disease        Past Surgical History:   Procedure Laterality Date    APPENDECTOMY      BRAIN SURGERY      for glioblastoma      SECTION      CHOLECYSTECTOMY      CLOSURE OF DEFECT OF MOHS PROCEDURE Left 2018    Procedure: CLOSURE, MOHS PROCEDURE DEFECT SCALP  Flap closure;  Surgeon: Yrn Novak MD;  Location: [address];  Service: Plastics;  Laterality: Left;    COLONOSCOPY N/A 2019    Procedure: COLONOSCOPY Suprep;  Surgeon: Tony Mosher MD;  Location: [facility];  Service: Endoscopy;  Laterality: N/A;    ESOPHAGOGASTRODUODENOSCOPY N/A 2019    Procedure: EGD/colon;  Surgeon: Tony Mosher MD;  Location: [facility];  Service: Endoscopy;  Laterality: N/A;    EXCISION OF GANGLION CYST OF HAND Left 2020    Procedure: EXCISION, GANGLION CYST, HAND;  Surgeon: Ross Drew Jr., MD;  Location: [facility];  Service: Orthopedics;  Laterality: Left;    HYSTERECTOMY      ILIAC CREST BONE GRAFT Right 3/15/2024    Procedure: BONE GRAFT, ILIAC CREST;  Surgeon: Geoffrey Goodson MD;  Location: [facility];  Service: Orthopedics;  Laterality: Right;  acumed harvester    OPEN REDUCTION AND INTERNAL FIXATION (ORIF) OF FRACTURE OF TARSAL BONE Right 3/15/2024    Procedure: ORIF, FRACTURE, TARSAL BONE;  Surgeon: Geoffrey Goodson MD;  Location: [facility];  Service: Orthopedics;  Laterality: Right;  supine, mini C arm, Styker, block okay    SHOULDER SURGERY  Right     TILT TABLE TEST N/A 07/15/2022    Procedure: TILT TABLE TEST;  Surgeon: Amol Kohler MD;  Location: [facility];  Service: Cardiology;  Laterality: N/A;  Orthostatic hypotension, dizziness, Tilt Table Test with EEG, Dr.Tiffany Roca (neuro), DM    TUBAL LIGATION  1998       Review of patient's allergies indicates:   Allergen Reactions    Neurontin [gabapentin]     Lactose     Soap Hives     Able to tolerate Dove only        No current facility-administered medications on file prior to encounter.     Current Outpatient Medications on File Prior to Encounter   Medication Sig    acetaminophen (TYLENOL) 500 MG tablet Take 1 tablet (500 mg total) by mouth every 6 (six) hours.    biotin 1 mg Cap Take 1 capsule by mouth once daily.    cholecalciferol, vitamin D3, (VITAMIN D3) 50 mcg (2,000 unit) Cap capsule Take 2,000 Units by mouth every evening.    cyanocobalamin (VITAMIN B-12) 100 MCG tablet Take 100 mcg by mouth once daily.    DULoxetine (CYMBALTA) 30 MG capsule TAKE 2 CAPSULES (60 MG TOTAL) BY MOUTH ONCE DAILY. (Patient taking differently: Take 60 mg by mouth every evening.)    ferrous sulfate 324 mg (65 mg iron) TbEC Take 325 mg by mouth nightly.     hydrocortisone (CORTEF) 10 MG Tab Take 1 and 1/2 tablet by mouth every morning and 1/2 tablet in the afternoon    hyoscyamine (LEVSIN/SL) 0.125 mg Subl PLACE 1 TABLET (0.125 MG TOTAL) UNDER THE TONGUE EVERY 4 (FOUR) HOURS AS NEEDED.    levothyroxine (SYNTHROID) 112 MCG tablet Take 1 tablet (112 mcg total) by mouth before breakfast.    naloxone (NARCAN) 4 mg/actuation Spry 4mg by nasal route as needed for opioid overdose; may repeat every 2-3 minutes in alternating nostrils until medical help arrives. Call 911    oxyCODONE (ROXICODONE) 5 MG immediate release tablet Take 1 tablet (5 mg total) by mouth every 4 (four) hours as needed for Pain.    phenytoin (DILANTIN) 100 MG ER capsule Take 2 capsules (200 mg total) by mouth nightly.    XARELTO 20 mg Tab TAKE 1  TABLET BY MOUTH EVERY DAY (Patient taking differently: Take 20 mg by mouth every evening.)    calcitRIOL (ROCALTROL) 0.25 MCG Cap Take 1 capsule (0.25 mcg total) by mouth once daily.    dexamethasone (DECADRON) 4 mg/mL injection Inject 1 mL (4 mg total) into the muscle as needed (Signs and symtpoms of severe adrenal insufficiency). 3 ml syringe with 18 g needle  to draw  X 10 21 g 1 inch needle to inject x 10    diclofenac sodium (VOLTAREN) 1 % Gel Apply 2 g topically 3 (three) times daily as needed (apply to affected joint).    loratadine-pseudoephedrine  mg (CLARITIN-D 24-HOUR)  mg per 24 hr tablet Take 1 tablet by mouth as needed.     sulfamethoxazole-trimethoprim 800-160mg (BACTRIM DS) 800-160 mg Tab Take 1 tablet by mouth 2 (two) times daily. (Patient taking differently: Take 1 tablet by mouth 2 (two) times daily as needed.)     Family History       Problem Relation (Age of Onset)    Diabetes type II Mother, Father    Heart attack Maternal Grandfather    Heart disease Mother          Tobacco Use    Smoking status: Never    Smokeless tobacco: Never   Substance and Sexual Activity    Alcohol use: No    Drug use: No    Sexual activity: Not Currently     Review of Systems   Constitutional: Negative for chills, decreased appetite, diaphoresis and fever.   Cardiovascular:  Positive for near-syncope. Negative for chest pain, claudication, cyanosis, dyspnea on exertion, irregular heartbeat, leg swelling, orthopnea, palpitations, paroxysmal nocturnal dyspnea and syncope.   Respiratory:  Negative for cough, hemoptysis, shortness of breath and wheezing.    Gastrointestinal:  Negative for bloating, abdominal pain, constipation, diarrhea, melena, nausea and vomiting.   Neurological:  Positive for dizziness. Negative for weakness.     Objective:     Vital Signs (Most Recent):  Temp: 97.9 °F (36.6 °C) (04/02/24 0723)  Pulse: (!) 127 (04/02/24 0726)  Resp: 18 (04/02/24 0723)  BP: (!) 81/43 (04/02/24 0726)  SpO2: 95  % (04/02/24 0723) Vital Signs (24h Range):  Temp:  [97.7 °F (36.5 °C)-98.1 °F (36.7 °C)] 97.9 °F (36.6 °C)  Pulse:  [] 127  Resp:  [12-20] 18  SpO2:  [95 %-100 %] 95 %  BP: ()/(43-81) 81/43     Weight: 62 kg (136 lb 11 oz)  Body mass index is 21.41 kg/m².    SpO2: 95 %         Intake/Output Summary (Last 24 hours) at 4/2/2024 1102  Last data filed at 4/2/2024 0152  Gross per 24 hour   Intake --   Output 300 ml   Net -300 ml       Lines/Drains/Airways       Drain  Duration             Female External Urinary Catheter w/ Suction 04/02/24 0930 <1 day              Peripheral Intravenous Line  Duration                  Peripheral IV - Single Lumen 04/01/24 1658 20 G Anterior;Right Forearm <1 day                     Physical Exam  Constitutional:       General: She is not in acute distress.     Appearance: She is well-developed.   Cardiovascular:      Rate and Rhythm: Normal rate and regular rhythm.      Heart sounds: No murmur heard.     No gallop.   Pulmonary:      Effort: Pulmonary effort is normal. No respiratory distress.      Breath sounds: Normal breath sounds. No wheezing.   Abdominal:      General: Bowel sounds are normal. There is no distension.      Palpations: Abdomen is soft.      Tenderness: There is no abdominal tenderness.   Skin:     General: Skin is warm and dry.   Neurological:      Mental Status: She is alert and oriented to person, place, and time.          Significant Labs: BMP:   Recent Labs   Lab 04/01/24 1657 04/02/24  0515   * 94    140   K 4.1 4.2    107   CO2 20* 23   BUN 40* 36*   CREATININE 2.0* 1.6*   CALCIUM 9.0 8.7   MG 2.0  --     and CBC   Recent Labs   Lab 04/01/24 1657 04/02/24  0515   WBC 7.81 6.70   HGB 9.3* 9.2*   HCT 28.9* 27.7*    374       Significant Imaging: Echocardiogram: Transthoracic echo (TTE) complete (Cupid Only): No results found for this or any previous visit.

## 2024-04-02 NOTE — OPERATIVE NOTE ADDENDUM
Certification of Assistant at Surgery       Surgery Date: 4/2/2024     Participating Surgeons:  Surgeon(s) and Role:     * Geoffrey Goodson MD - Primary    Procedures:  Procedure(s) (LRB):  FUSION, JOINT, MIDFOOT (Right)    Assistant Surgeon's Certification of Necessity:  I understand that section 1842 (b) (6) (d) of the Social Security Act generally prohibits Medicare Part B reasonable charge payment for the services of assistants at surgery in teaching hospitals when qualified residents are available to furnish such services. I certify that the services for which payment is claimed were medically necessary, and that no qualified resident was available to perform the services. I further understand that these services are subject to post-payment review by the Medicare carrier.      Tawanna Ly PA-C    04/02/2024  5:02 PM

## 2024-04-02 NOTE — ASSESSMENT & PLAN NOTE
Panhypopituitarism - Primary        Planned for repeat surgery so will need stress dose steroids given hx of adrenal insufficiency.  If hypotension and orthostatic hypotension does not improve on IV hydrocortisone would evaluate for other causes like infection     Recommendations :  - this patient will need stress dose steroids on induction with:              IV hydrocortisone 100 mg on induction followed by IV hydrocortisone 50 mg q8 hours   If hemodynamically stable:  - On POD 1 if tolerating oral meds change to oral hydrocortisone 40/20 mg with the guidance of inpatient endocrine service if needed   - on POD 3 decrease to home doses of hydrocortisone      If hemodynamically unstable continue IV hydrocortisone and consider endocrine consult or continuing supraphysiologic hydrocortisone until condition is improved

## 2024-04-02 NOTE — ANESTHESIA POSTPROCEDURE EVALUATION
Anesthesia Post Evaluation    Patient: Thelma Nguyen    Procedure(s) Performed: Procedure(s) (LRB):  FUSION, JOINT, MIDFOOT (Right)    Final Anesthesia Type: general      Patient location during evaluation: PACU  Patient participation: Yes- Able to Participate  Level of consciousness: awake and alert  Post-procedure vital signs: reviewed and stable  Pain management: adequate  Airway patency: patent    PONV status at discharge: No PONV  Anesthetic complications: no      Cardiovascular status: stable  Respiratory status: spontaneous ventilation  Hydration status: euvolemic  Follow-up not needed.              Vitals Value Taken Time   /60 04/02/24 1735   Temp 36.5 °C (97.7 °F) 04/02/24 1735   Pulse 94 04/02/24 1738   Resp 11 04/02/24 1738   SpO2 96 % 04/02/24 1738   Vitals shown include unvalidated device data.      No case tracking events are documented in the log.      Pain/Jose De Jesus Score: Pain Rating Prior to Med Admin: 5 (4/2/2024  5:14 PM)  Jose De Jesus Score: 10 (4/2/2024  5:35 PM)

## 2024-04-02 NOTE — CONSULTS
SUBJECTIVE:    Ms. Nguyen is a 58 year old female PMH including but not limited to glioblastoma removal, panhypoparathyroidism, adrenal insufficiency, CKD, factor V deficiency on xarelto, and recurrent syncope hospitalized today status post multiple falls and hardware failure of her ORIF of the of the right navicular fracture.  She is NPO since midnight with plan for revision open reduction internal fixation versus fusion of the navicular and talonavicular as well as naviculocuneiform joints.  This may require iliac crest bone grafting, of which she is aware.    Her pain is controlled.  She is resting comfortably.    OBJECTIVE:      Vitals:    04/02/24 0436 04/02/24 0723 04/02/24 0725 04/02/24 0726   BP:  131/61 114/61 (!) 81/43   Pulse: 94 92 (!) 114 (!) 127   Resp:  18     Temp:  97.9 °F (36.6 °C)     TempSrc:  Oral     SpO2:  95%     Weight:       Height:           Lower Extremity Exam  Right lower extremity incision clean dry intact with sutures in place. Healing appropriate. Minimal erythema. Medial migration of the navicular bone visible and tenting of the skin. Sensation at baseline.  Fires FHL, EHL, TA, GSC    Right hip incision clean dry and well healed.  No erythema     DIAGNOSTIC STUDIES:  CT completed -distraction and fragmentation of tarsal navicular fracture with hardware failure.    ASSESSMENT:   1. Failed navicular ORIF  2. Multiple falls with orthostatic hypotension       PLAN:  -nonweightbearing right lower extremity  -continue NPO at this time  -DVT PPX:  hold Xarelto, can restart after surgery  -plan for surgery today  -management of syncope, hypotension per hospitalist.  -we will again touch base today with her endocrinologist regarding bone health recommendations as well as confirmation of stress dose for steroids perioperatively.    Geoffrey Goodson MD  [facility]  Orthopedic Surgery      This note was done with voice recognition software. Please excuse any errors missed in proof  reading.

## 2024-04-02 NOTE — ASSESSMENT & PLAN NOTE
S/p ORIF of right navicular fracture and iliac crest bone harvest 3/15  Patient returned today for evaluation with Dr. Goodson and sent in for hardware failure likely 2/2 multiple falls requiring revision fixation of hardware  Ortho following  NPO after MN  Likely procedure tomorrow  Revised cardiac risk index score: 0 points, class I risk, 3.9% 30-day risk of death, MI, or cardiac arrest

## 2024-04-02 NOTE — CONSULTS
[facility] - Endocrinology  Response for E-Consult     Patient Name: Thelma Nguyen  MRN: [id number]  Primary Care Provider: Zachary Bender MD   Requesting Provider: Vilma Brunson DO  Consults    Recommendation:   Problem List Items Addressed This Visit       Panhypopituitarism - Primary     Planned for repeat surgery so will need stress dose steroids given hx of adrenal insufficiency.  If hypotension and orthostatic hypotension does not improve on IV hydrocortisone would evaluate for other causes like infection    Recommendations :  - this patient will need stress dose steroids on induction with:   IV hydrocortisone 100 mg on induction followed by IV hydrocortisone 50 mg q8 hours   If hemodynamically stable:  - On POD 1 if tolerating oral meds change to oral hydrocortisone 40/20 mg with the guidance of inpatient endocrine service if needed   - on POD 3 decrease to home doses of hydrocortisone     If hemodynamically unstable continue IV hydrocortisone and consider endocrine consult or continuing supraphysiologic hydrocortisone until condition is improved.                    Contingency: reach out to on call endocrine provider    Total time of Consultation: 10 minute    I did speak to the requesting provider verbally about this.     *This eConsult is based on the clinical data available to me and is furnished without benefit of a physical examination. The eConsult will need to be interpreted in light of any clinical issues or changes in patient status not available to me at the time of filing this eConsults. Significant changes in patient condition or level of acuity should result in immediate formal consultation and reevaluation. Please alert me if you have further questions.    Thank you for this eConsult referral.     Joan Serrano MD  [facility] - Endocrinology

## 2024-04-02 NOTE — SUBJECTIVE & OBJECTIVE
Past Medical History:   Diagnosis Date    Allergic rhinitis     Anemia     Arthritis     Basal cell carcinoma     Broken ankle     Cancer 1985    Clotting disorder     Disorder of kidney and ureter     DVT (deep venous thrombosis)     Factor V deficiency     Fracture of elbow     left    Glioblastoma multiforme of brain     Hypothyroidism     IBS (irritable bowel syndrome)     Osteoporosis     Panhypopituitarism     Peripheral polyneuropathy     Secondary adrenal insufficiency     Seizures     Thyroid disease        Past Surgical History:   Procedure Laterality Date    APPENDECTOMY      BRAIN SURGERY      for glioblastoma      SECTION      CHOLECYSTECTOMY      CLOSURE OF DEFECT OF MOHS PROCEDURE Left 2018    Procedure: CLOSURE, MOHS PROCEDURE DEFECT SCALP  Flap closure;  Surgeon: Yrn Novak MD;  Location: [address];  Service: Plastics;  Laterality: Left;    COLONOSCOPY N/A 2019    Procedure: COLONOSCOPY Suprep;  Surgeon: Tony Mosher MD;  Location: [facility];  Service: Endoscopy;  Laterality: N/A;    ESOPHAGOGASTRODUODENOSCOPY N/A 2019    Procedure: EGD/colon;  Surgeon: Tony Mosher MD;  Location: [facility];  Service: Endoscopy;  Laterality: N/A;    EXCISION OF GANGLION CYST OF HAND Left 2020    Procedure: EXCISION, GANGLION CYST, HAND;  Surgeon: Ross Drew Jr., MD;  Location: [facility];  Service: Orthopedics;  Laterality: Left;    HYSTERECTOMY      ILIAC CREST BONE GRAFT Right 3/15/2024    Procedure: BONE GRAFT, ILIAC CREST;  Surgeon: Geoffrey Goodson MD;  Location: [facility];  Service: Orthopedics;  Laterality: Right;  acumed harvester    OPEN REDUCTION AND INTERNAL FIXATION (ORIF) OF FRACTURE OF TARSAL BONE Right 3/15/2024    Procedure: ORIF, FRACTURE, TARSAL BONE;  Surgeon: Geoffrey Goodson MD;  Location: [facility];  Service: Orthopedics;  Laterality: Right;  supine, mini C arm, Styker, block okay    SHOULDER SURGERY  Right     TILT TABLE TEST N/A 07/15/2022    Procedure: TILT TABLE TEST;  Surgeon: Amol Kohler MD;  Location: [facility];  Service: Cardiology;  Laterality: N/A;  Orthostatic hypotension, dizziness, Tilt Table Test with EEG, Dr.Tiffany Roca (neuro), DM    TUBAL LIGATION  1998       Review of patient's allergies indicates:   Allergen Reactions    Neurontin [gabapentin]     Lactose     Soap Hives     Able to tolerate Dove only        No current facility-administered medications on file prior to encounter.     Current Outpatient Medications on File Prior to Encounter   Medication Sig    acetaminophen (TYLENOL) 500 MG tablet Take 1 tablet (500 mg total) by mouth every 6 (six) hours.    biotin 1 mg Cap Take 1 capsule by mouth once daily.    cholecalciferol, vitamin D3, (VITAMIN D3) 50 mcg (2,000 unit) Cap capsule Take 2,000 Units by mouth every evening.    cyanocobalamin (VITAMIN B-12) 100 MCG tablet Take 100 mcg by mouth once daily.    DULoxetine (CYMBALTA) 30 MG capsule TAKE 2 CAPSULES (60 MG TOTAL) BY MOUTH ONCE DAILY. (Patient taking differently: Take 60 mg by mouth every evening.)    ferrous sulfate 324 mg (65 mg iron) TbEC Take 325 mg by mouth nightly.     hydrocortisone (CORTEF) 10 MG Tab Take 1 and 1/2 tablet by mouth every morning and 1/2 tablet in the afternoon    hyoscyamine (LEVSIN/SL) 0.125 mg Subl PLACE 1 TABLET (0.125 MG TOTAL) UNDER THE TONGUE EVERY 4 (FOUR) HOURS AS NEEDED.    levothyroxine (SYNTHROID) 112 MCG tablet Take 1 tablet (112 mcg total) by mouth before breakfast.    naloxone (NARCAN) 4 mg/actuation Spry 4mg by nasal route as needed for opioid overdose; may repeat every 2-3 minutes in alternating nostrils until medical help arrives. Call 911    oxyCODONE (ROXICODONE) 5 MG immediate release tablet Take 1 tablet (5 mg total) by mouth every 4 (four) hours as needed for Pain.    phenytoin (DILANTIN) 100 MG ER capsule Take 2 capsules (200 mg total) by mouth nightly.    XARELTO 20 mg Tab TAKE 1  TABLET BY MOUTH EVERY DAY (Patient taking differently: Take 20 mg by mouth every evening.)    calcitRIOL (ROCALTROL) 0.25 MCG Cap Take 1 capsule (0.25 mcg total) by mouth once daily.    dexamethasone (DECADRON) 4 mg/mL injection Inject 1 mL (4 mg total) into the muscle as needed (Signs and symtpoms of severe adrenal insufficiency). 3 ml syringe with 18 g needle  to draw  X 10 21 g 1 inch needle to inject x 10    diclofenac sodium (VOLTAREN) 1 % Gel Apply 2 g topically 3 (three) times daily as needed (apply to affected joint).    loratadine-pseudoephedrine  mg (CLARITIN-D 24-HOUR)  mg per 24 hr tablet Take 1 tablet by mouth as needed.     sulfamethoxazole-trimethoprim 800-160mg (BACTRIM DS) 800-160 mg Tab Take 1 tablet by mouth 2 (two) times daily. (Patient taking differently: Take 1 tablet by mouth 2 (two) times daily as needed.)    [DISCONTINUED] acetaminophen (TYLENOL) 500 MG tablet Take 1-2 tablets (500-1,000 mg total) by mouth 3 (three) times daily as needed for Pain.    [DISCONTINUED] ERGOCALCIFEROL, VITAMIN D2, (VITAMIN D ORAL) Take 1 capsule by mouth nightly. 2000 IU daily    [DISCONTINUED] hydrocortisone (CORTEF) 10 MG Tab - POD 1-3 (from 3/16/24 -3/18/24) oral hydrocortisone 40 mg in the morning and 20 mg in the afternoon around 3 pm  - POD 4 (from 3/19/24) onward return to home dose of oral hydrocortisone 15/5 mg.     TAKE 1& 1/2 TABLETS BY MOUTH IN THE MORNING AND 1/2 TABLET IN THE AFTERNOON. Increase per MD instruction when sick to maximum monthly dose of 80 tablets (Patient not taking: Reported on 4/1/2024)    [DISCONTINUED] mupirocin (BACTROBAN) 2 % ointment 2 (two) times daily. Apply to affected area     Family History       Problem Relation (Age of Onset)    Diabetes type II Mother, Father    Heart attack Maternal Grandfather    Heart disease Mother          Tobacco Use    Smoking status: Never    Smokeless tobacco: Never   Substance and Sexual Activity    Alcohol use: No    Drug use: No     Sexual activity: Not Currently     Review of Systems   Constitutional:  Negative for chills and fever.   HENT:  Negative for congestion.    Respiratory:  Negative for shortness of breath.    Cardiovascular:  Negative for chest pain.   Gastrointestinal:  Negative for abdominal pain, nausea and vomiting.   Genitourinary:  Negative for dysuria.   Musculoskeletal:         Right foot fracture   Skin:         Bruising - rib cage; surgical incision right foot   Neurological:  Positive for dizziness, syncope and light-headedness.   Psychiatric/Behavioral:  Negative for agitation and confusion. The patient is not nervous/anxious.      Objective:     Vital Signs (Most Recent):  Temp: 97.7 °F (36.5 °C) (04/01/24 1536)  Pulse: 98 (04/01/24 1912)  Resp: 18 (04/01/24 1912)  BP: (!) 181/81 (04/01/24 1912)  SpO2: 100 % (04/01/24 1912) Vital Signs (24h Range):  Temp:  [97.7 °F (36.5 °C)] 97.7 °F (36.5 °C)  Pulse:  [] 98  Resp:  [12-18] 18  SpO2:  [99 %-100 %] 100 %  BP: ()/(54-81) 181/81     Weight: 62.1 kg (137 lb)  Body mass index is 21.46 kg/m².     Physical Exam  Constitutional:       General: She is not in acute distress.  HENT:      Head: Normocephalic and atraumatic.   Eyes:      Extraocular Movements: Extraocular movements intact.      Pupils: Pupils are equal, round, and reactive to light.   Cardiovascular:      Rate and Rhythm: Normal rate and regular rhythm.      Pulses:           Popliteal pulses are 2+ on the right side.        Dorsalis pedis pulses are 2+ on the left side.      Heart sounds: Normal heart sounds.   Pulmonary:      Effort: Pulmonary effort is normal. No respiratory distress.      Breath sounds: Normal breath sounds. No wheezing, rhonchi or rales.   Abdominal:      General: Bowel sounds are normal. There is no distension.      Palpations: Abdomen is soft.      Tenderness: There is no abdominal tenderness. There is no guarding or rebound.   Musculoskeletal:      Cervical back: No rigidity.       Right lower leg: No edema.      Left lower leg: No edema.   Skin:     General: Skin is warm.      Capillary Refill: Capillary refill takes 2 to 3 seconds.      Comments: Post op boot CDI   Neurological:      Mental Status: She is alert and oriented to person, place, and time.   Psychiatric:         Mood and Affect: Mood normal.         Behavior: Behavior normal.         Thought Content: Thought content normal.         Judgment: Judgment normal.              CRANIAL NERVES     CN III, IV, VI   Pupils are equal, round, and reactive to light.       Significant Labs: All pertinent labs within the past 24 hours have been reviewed.    Significant Imaging: I have reviewed all pertinent imaging results/findings within the past 24 hours.

## 2024-04-02 NOTE — H&P
"[facility]  History & Physical    Patient Name: Thelma Nguyen  MRN: [id number]  Patient Class: OP- Observation  Admission Date: 4/1/2024  Attending Physician: Aracelis Buenrostro*   Primary Care Provider: Zachary Bender MD         Patient information was obtained from patient, spouse/SO, past medical records, and ER records.     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 She presented to the ED by request of Orthopedics, Dr. Goodson, for admission for surgical correction of right foot navicular fracture. Patient had ORIF performed with Dr. Goodson on 3/15. Patient admits to significant and lengthy history of severe orthostatic hypotension, and admits her "spells" of becoming lightheaded upon standing have severely worsened since surgery causing her to have numerous falls over past few weeks. She believes during these falls she re-injured her right foot causing hardware failure. From these falls she also does report contusing left elbow and left rib cage. Denying any recent head injury or LOC. No acute neck pain. No chest pain, palpitations, abdominal pain, nausea or vomiting. Patient also reports episodes of tachycardia that occur while sitting on the cough watching TV with alerts on her Apple watch of HR 110s-140s. She states that her falls have significantly increased over the last couple weeks up to 20 falls in the last 2 weeks. Some of these are accompanied by syncope and a disorientated feeling, other times her legs just give out and she falls. " Patient's  states patient does not appear to have seizure activity during these episodes but she does start breathing really heavy and then wakes back up after 1-2 minutes. Patient has been under the care of cardiology and endocrinology. She states that she was diagnosed with postural hypotension with vasovagal response. She has never been prescribed a Holter Monitor.    Past Medical History:   Diagnosis Date    Allergic rhinitis     Anemia     Arthritis     Basal cell carcinoma     Broken ankle     Cancer     Clotting disorder     Disorder of kidney and ureter     DVT (deep venous thrombosis)     Factor V deficiency     Fracture of elbow     left    Glioblastoma multiforme of brain     Hypothyroidism     IBS (irritable bowel syndrome)     Osteoporosis     Panhypopituitarism     Peripheral polyneuropathy     Secondary adrenal insufficiency     Seizures     Thyroid disease        Past Surgical History:   Procedure Laterality Date    APPENDECTOMY      BRAIN SURGERY      for glioblastoma      SECTION      CHOLECYSTECTOMY      CLOSURE OF DEFECT OF MOHS PROCEDURE Left 2018    Procedure: CLOSURE, MOHS PROCEDURE DEFECT SCALP  Flap closure;  Surgeon: Yrn Novak MD;  Location: [address];  Service: Plastics;  Laterality: Left;    COLONOSCOPY N/A 2019    Procedure: COLONOSCOPY Suprep;  Surgeon: Tony Mosher MD;  Location: [facility];  Service: Endoscopy;  Laterality: N/A;    ESOPHAGOGASTRODUODENOSCOPY N/A 2019    Procedure: EGD/colon;  Surgeon: Tony Mosher MD;  Location: [facility];  Service: Endoscopy;  Laterality: N/A;    EXCISION OF GANGLION CYST OF HAND Left 2020    Procedure: EXCISION, GANGLION CYST, HAND;  Surgeon: Ross Drew Jr., MD;  Location: [facility];  Service: Orthopedics;  Laterality: Left;    HYSTERECTOMY      ILIAC CREST BONE GRAFT Right 3/15/2024    Procedure: BONE GRAFT, ILIAC CREST;  Surgeon:  Geoffrey Goodson MD;  Location: [facility] OR;  Service: Orthopedics;  Laterality: Right;  acumed harvester    OPEN REDUCTION AND INTERNAL FIXATION (ORIF) OF FRACTURE OF TARSAL BONE Right 3/15/2024    Procedure: ORIF, FRACTURE, TARSAL BONE;  Surgeon: Geoffrey Goodson MD;  Location: [facility] OR;  Service: Orthopedics;  Laterality: Right;  supine, mini C arm, Styker, block okay    SHOULDER SURGERY Right     TILT TABLE TEST N/A 07/15/2022    Procedure: TILT TABLE TEST;  Surgeon: Amol Kohler MD;  Location: [facility] EP [facility];  Service: Cardiology;  Laterality: N/A;  Orthostatic hypotension, dizziness, Tilt Table Test with EEG, Dr.Tiffany Roca (neuro), DM    TUBAL LIGATION  1998       Review of patient's allergies indicates:   Allergen Reactions    Neurontin [gabapentin]     Lactose     Soap Hives     Able to tolerate Dove only        No current facility-administered medications on file prior to encounter.     Current Outpatient Medications on File Prior to Encounter   Medication Sig    acetaminophen (TYLENOL) 500 MG tablet Take 1 tablet (500 mg total) by mouth every 6 (six) hours.    biotin 1 mg Cap Take 1 capsule by mouth once daily.    cholecalciferol, vitamin D3, (VITAMIN D3) 50 mcg (2,000 unit) Cap capsule Take 2,000 Units by mouth every evening.    cyanocobalamin (VITAMIN B-12) 100 MCG tablet Take 100 mcg by mouth once daily.    DULoxetine (CYMBALTA) 30 MG capsule TAKE 2 CAPSULES (60 MG TOTAL) BY MOUTH ONCE DAILY. (Patient taking differently: Take 60 mg by mouth every evening.)    ferrous sulfate 324 mg (65 mg iron) TbEC Take 325 mg by mouth nightly.     hydrocortisone (CORTEF) 10 MG Tab Take 1 and 1/2 tablet by mouth every morning and 1/2 tablet in the afternoon    hyoscyamine (LEVSIN/SL) 0.125 mg Subl PLACE 1 TABLET (0.125 MG TOTAL) UNDER THE TONGUE EVERY 4 (FOUR) HOURS AS NEEDED.    levothyroxine (SYNTHROID) 112 MCG tablet Take 1 tablet (112 mcg total) by mouth before breakfast.    naloxone (NARCAN) 4 mg/actuation Spry 4mg by  nasal route as needed for opioid overdose; may repeat every 2-3 minutes in alternating nostrils until medical help arrives. Call 911    oxyCODONE (ROXICODONE) 5 MG immediate release tablet Take 1 tablet (5 mg total) by mouth every 4 (four) hours as needed for Pain.    phenytoin (DILANTIN) 100 MG ER capsule Take 2 capsules (200 mg total) by mouth nightly.    XARELTO 20 mg Tab TAKE 1 TABLET BY MOUTH EVERY DAY (Patient taking differently: Take 20 mg by mouth every evening.)    calcitRIOL (ROCALTROL) 0.25 MCG Cap Take 1 capsule (0.25 mcg total) by mouth once daily.    dexamethasone (DECADRON) 4 mg/mL injection Inject 1 mL (4 mg total) into the muscle as needed (Signs and symtpoms of severe adrenal insufficiency). 3 ml syringe with 18 g needle  to draw  X 10 21 g 1 inch needle to inject x 10    diclofenac sodium (VOLTAREN) 1 % Gel Apply 2 g topically 3 (three) times daily as needed (apply to affected joint).    loratadine-pseudoephedrine  mg (CLARITIN-D 24-HOUR)  mg per 24 hr tablet Take 1 tablet by mouth as needed.     sulfamethoxazole-trimethoprim 800-160mg (BACTRIM DS) 800-160 mg Tab Take 1 tablet by mouth 2 (two) times daily. (Patient taking differently: Take 1 tablet by mouth 2 (two) times daily as needed.)    [DISCONTINUED] acetaminophen (TYLENOL) 500 MG tablet Take 1-2 tablets (500-1,000 mg total) by mouth 3 (three) times daily as needed for Pain.    [DISCONTINUED] ERGOCALCIFEROL, VITAMIN D2, (VITAMIN D ORAL) Take 1 capsule by mouth nightly. 2000 IU daily    [DISCONTINUED] hydrocortisone (CORTEF) 10 MG Tab - POD 1-3 (from 3/16/24 -3/18/24) oral hydrocortisone 40 mg in the morning and 20 mg in the afternoon around 3 pm  - POD 4 (from 3/19/24) onward return to home dose of oral hydrocortisone 15/5 mg.     TAKE 1& 1/2 TABLETS BY MOUTH IN THE MORNING AND 1/2 TABLET IN THE AFTERNOON. Increase per MD instruction when sick to maximum monthly dose of 80 tablets (Patient not taking: Reported on 4/1/2024)     [DISCONTINUED] mupirocin (BACTROBAN) 2 % ointment 2 (two) times daily. Apply to affected area     Family History       Problem Relation (Age of Onset)    Diabetes type II Mother, Father    Heart attack Maternal Grandfather    Heart disease Mother          Tobacco Use    Smoking status: Never    Smokeless tobacco: Never   Substance and Sexual Activity    Alcohol use: No    Drug use: No    Sexual activity: Not Currently     Review of Systems   Constitutional:  Negative for chills and fever.   HENT:  Negative for congestion.    Respiratory:  Negative for shortness of breath.    Cardiovascular:  Negative for chest pain.   Gastrointestinal:  Negative for abdominal pain, nausea and vomiting.   Genitourinary:  Negative for dysuria.   Musculoskeletal:         Right foot fracture   Skin:         Bruising - rib cage; surgical incision right foot   Neurological:  Positive for dizziness, syncope and light-headedness.   Psychiatric/Behavioral:  Negative for agitation and confusion. The patient is not nervous/anxious.      Objective:     Vital Signs (Most Recent):  Temp: 97.7 °F (36.5 °C) (04/01/24 1536)  Pulse: 98 (04/01/24 1912)  Resp: 18 (04/01/24 1912)  BP: (!) 181/81 (04/01/24 1912)  SpO2: 100 % (04/01/24 1912) Vital Signs (24h Range):  Temp:  [97.7 °F (36.5 °C)] 97.7 °F (36.5 °C)  Pulse:  [] 98  Resp:  [12-18] 18  SpO2:  [99 %-100 %] 100 %  BP: ()/(54-81) 181/81     Weight: 62.1 kg (137 lb)  Body mass index is 21.46 kg/m².     Physical Exam  Constitutional:       General: She is not in acute distress.  HENT:      Head: Normocephalic and atraumatic.   Eyes:      Extraocular Movements: Extraocular movements intact.      Pupils: Pupils are equal, round, and reactive to light.   Cardiovascular:      Rate and Rhythm: Normal rate and regular rhythm.      Pulses:           Popliteal pulses are 2+ on the right side.        Dorsalis pedis pulses are 2+ on the left side.      Heart sounds: Normal heart sounds.    Pulmonary:      Effort: Pulmonary effort is normal. No respiratory distress.      Breath sounds: Normal breath sounds. No wheezing, rhonchi or rales.   Abdominal:      General: Bowel sounds are normal. There is no distension.      Palpations: Abdomen is soft.      Tenderness: There is no abdominal tenderness. There is no guarding or rebound.   Musculoskeletal:      Cervical back: No rigidity.      Right lower leg: No edema.      Left lower leg: No edema.   Skin:     General: Skin is warm.      Capillary Refill: Capillary refill takes 2 to 3 seconds.      Comments: Post op boot CDI   Neurological:      Mental Status: She is alert and oriented to person, place, and time.   Psychiatric:         Mood and Affect: Mood normal.         Behavior: Behavior normal.         Thought Content: Thought content normal.         Judgment: Judgment normal.              CRANIAL NERVES     CN III, IV, VI   Pupils are equal, round, and reactive to light.       Significant Labs: All pertinent labs within the past 24 hours have been reviewed.    Significant Imaging: I have reviewed all pertinent imaging results/findings within the past 24 hours.  Assessment/Plan:     * Orthostatic hypotension  171/80 > 117/67 > 99/54  Patient reports being dx w/ postural hypotension with vasovagal response  Likely cause of recurrent syncope/falls  Cardiology consult ordered given worsening of sx  Stress test ordered  Head CT: no acute abnormality  Endocrinology e-consult performed, appreciate recs:  100 iv hydrocortisone.  (Q8) until surgery  After surgery 50 q8 x1 day   Double home dose  x 3day  If still falling after loading dose, something else is going on outside of endocrine.       Fracture of right foot  S/p ORIF of right navicular fracture and iliac crest bone harvest 3/15  Patient returned today for evaluation with Dr. Goodson and sent in for hardware failure likely 2/2 multiple falls requiring revision fixation of hardware  Ortho following  NPO  after MN  Likely procedure tomorrow  Revised cardiac risk index score: 0 points, class I risk, 3.9% 30-day risk of death, MI, or cardiac arrest      Recurrent falls  See orthostatic hypotension  Patient may benefit from PT/OT post-op      Stage 4 chronic kidney disease  Creatine stable for now. BMP reviewed- noted Estimated Creatinine Clearance: 30.2 mL/min (A) (based on SCr of 2 mg/dL (H)). according to latest data. Based on current GFR, CKD stage is stage 4 - GFR 15-29.  Monitor UOP and serial BMP and adjust therapy as needed. Renally dose meds. Avoid nephrotoxic medications and procedures.    Factor V deficiency with DVT x 3  Xarelto o/h pending surgery      Secondary hypothyroidism  Continue synthroid      Secondary adrenal insufficiency  Endocrinology e-consult performed, appreciate recs:  100 iv hydrocortisone.  (Q8) until surgery  After surgery 50 q8 x1 day   Double home dose  x 3day  If still falling after loading dose, something else is going on outside of endocrine.          Nonintractable epilepsy without status epilepticus  Continue AEDs  No seizures in several years        VTE Risk Mitigation (From admission, onward)           Ordered     IP VTE HIGH RISK PATIENT  Once         04/01/24 1938     Place sequential compression device  Until discontinued         04/01/24 1938                         On 04/01/2024, patient should be placed in hospital observation services under my care in collaboration with Aracelis Buenrostro MD.           Aide Whelan NP  Department of Hospital Medicine  [facility]

## 2024-04-02 NOTE — PHARMACY MED REC
"Admission Medication History     The home medication history was taken by Lata Stanley CPhT.    Medication history obtained from, Patient Verified    You may go to "Admission" then "Reconcile Home Medications" tabs to review and/or act upon these items.     The home medication list has been updated by the Pharmacy department.   Please read ALL comments highlighted in yellow.   Please address this information as you see fit.    Feel free to contact us if you have any questions or require assistance.      The medications listed below were removed from the home medication list.  Please reorder if appropriate:  Patient reports no longer taking the following medication(s):  Mupirocin 2% ointment        Lata Stanley CPhT.  Ext [phone]               .          "

## 2024-04-02 NOTE — ASSESSMENT & PLAN NOTE
- history of factor V on Xarelto as an outpatient  - currently on hold; anticipate DVT prophylaxis with either SQ Lovenox or Heparin vs resumption of Xarelto post operatively

## 2024-04-02 NOTE — ED PROVIDER NOTES
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 Goodson, for admission for surgical correction of right foot navicular fracture.  Patient had ORIF performed with Dr. Goodson on 3/15.  Patient admits to significant and lengthy history of severe orthostatic hypotension, and admits her "spells" of becoming lightheaded upon standing have severely worsened since surgery causing her to have numerous falls over past few weeks.  She believes during these falls she re-injured her right foot causing hardware failure.  From these falls she also does report contusing left elbow and left rib cage.  Denying any recent head injury or LOC. No acute neck pain.  No chest pain, palpitations, abdominal pain, nausea or vomiting.      The history is provided by the patient and medical records.     Review of patient's allergies indicates:   Allergen Reactions    Neurontin [gabapentin]     Lactose     Soap Hives     Able to tolerate Dove only      Past Medical History:   Diagnosis Date    Allergic rhinitis     Anemia 1987    Arthritis 2000    Basal cell carcinoma     Broken ankle     Cancer 1985    Clotting disorder 1993    Disorder of kidney and ureter     DVT (deep venous thrombosis)     Factor V deficiency     Fracture of elbow     left    Glioblastoma multiforme of brain     Hypothyroidism     IBS (irritable bowel syndrome) 1975    Osteoporosis     Panhypopituitarism     Peripheral polyneuropathy     Secondary adrenal insufficiency     Seizures 1995    Thyroid disease 1987     Past Surgical History:   Procedure Laterality Date    APPENDECTOMY      BRAIN SURGERY  1995    for " glioblastoma      SECTION      CHOLECYSTECTOMY  2007    CLOSURE OF DEFECT OF MOHS PROCEDURE Left 2018    Procedure: CLOSURE, MOHS PROCEDURE DEFECT SCALP  Flap closure;  Surgeon: Yrn Novak MD;  [address];  Service: Plastics;  Laterality: Left;    COLONOSCOPY N/A 2019    Procedure: COLONOSCOPY Suprep;  Surgeon: Tony Mosher MD;  Location: [facility] ENDO;  Service: Endoscopy;  Laterality: N/A;    ESOPHAGOGASTRODUODENOSCOPY N/A 2019    Procedure: EGD/colon;  Surgeon: Tony Mosher MD;  Location: [facility] ENDO;  Service: Endoscopy;  Laterality: N/A;    EXCISION OF GANGLION CYST OF HAND Left 2020    Procedure: EXCISION, GANGLION CYST, HAND;  Surgeon: Ross Drew Jr., MD;  Location: [facility];  Service: Orthopedics;  Laterality: Left;    HYSTERECTOMY      ILIAC CREST BONE GRAFT Right 3/15/2024    Procedure: BONE GRAFT, ILIAC CREST;  Surgeon: Geoffrey Goodson MD;  Location: [facility];  Service: Orthopedics;  Laterality: Right;  acumed harvester    OPEN REDUCTION AND INTERNAL FIXATION (ORIF) OF FRACTURE OF TARSAL BONE Right 3/15/2024    Procedure: ORIF, FRACTURE, TARSAL BONE;  Surgeon: Geoffrey Goodson MD;  Location: [facility];  Service: Orthopedics;  Laterality: Right;  supine, mini C arm, Styker, block okay    SHOULDER SURGERY Right     TILT TABLE TEST N/A 07/15/2022    Procedure: TILT TABLE TEST;  Surgeon: Amol Kohler MD;  Location: [facility];  Service: Cardiology;  Laterality: N/A;  Orthostatic hypotension, dizziness, Tilt Table Test with EEG, Dr.Tiffany Roca (neuro), DM    TUBAL LIGATION       Family History   Problem Relation Age of Onset    Heart disease Mother     Diabetes type II Mother     Diabetes type II Father     Heart attack Maternal Grandfather      Social History     Tobacco Use    Smoking status: Never    Smokeless tobacco: Never   Substance Use Topics    Alcohol use: No    Drug use: No     Review of Systems   Constitutional:   Negative for chills and fever.   Respiratory:  Negative for cough and shortness of breath.    Cardiovascular:  Negative for chest pain.        Left rib pain   Gastrointestinal:  Negative for abdominal pain, diarrhea, nausea and vomiting.   Genitourinary:  Negative for flank pain.   Musculoskeletal:  Positive for arthralgias. Negative for back pain and neck pain.   Neurological:  Positive for light-headedness. Negative for numbness and headaches.       Physical Exam     Initial Vitals [04/01/24 1536]   BP Pulse Resp Temp SpO2   (!) 148/67 103 16 97.7 °F (36.5 °C) 99 %      MAP       --         Physical Exam    Vitals reviewed.  Constitutional: She appears well-developed and well-nourished. She is active. She does not have a sickly appearance. She does not appear ill. No distress.   HENT:   Head: Normocephalic and atraumatic.   Neck:   Normal range of motion.  Cardiovascular:  Regular rhythm.           Pulmonary/Chest: Effort normal.   Mild bruising to left lateral rib cage with localized tenderness.  No palpable defects.  Lung sounds equal bilaterally.   Musculoskeletal:      Cervical back: Normal range of motion. No spinous process tenderness.      Comments: RLE cam walker and dressings intact.  Sensation intact to all toes.     Neurological: She is alert. GCS eye subscore is 4. GCS verbal subscore is 5. GCS motor subscore is 6.   Skin: Skin is warm and dry.   Psychiatric: She has a normal mood and affect. Her speech is normal and behavior is normal.         ED Course   Procedures  Labs Reviewed   CBC W/ AUTO DIFFERENTIAL - Abnormal; Notable for the following components:       Result Value    RBC 3.11 (*)     Hemoglobin 9.3 (*)     Hematocrit 28.9 (*)     Gran % 74.3 (*)     All other components within normal limits   COMPREHENSIVE METABOLIC PANEL - Abnormal; Notable for the following components:    CO2 20 (*)     Glucose 117 (*)     BUN 40 (*)     Creatinine 2.0 (*)     Albumin 3.2 (*)     Alkaline Phosphatase 136  (*)     eGFR 29 (*)     All other components within normal limits   MAGNESIUM   MAGNESIUM   CORTISOL, RANDOM   CORTISOL, RANDOM          Imaging Results              X-Ray Ribs 2 View Left (Final result)  Result time 04/01/24 18:07:53      Final result by Kleber Cruz MD (04/01/24 18:07:53)                   Impression:      Negative left rib series.      Electronically signed by: Kleber Cruz  Date:    04/01/2024  Time:    18:07               Narrative:    EXAMINATION:  XR RIBS 2 VIEW LEFT    CLINICAL HISTORY:  Unspecified fall, initial encounter    TECHNIQUE:  Two views of the left ribs were performed.    COMPARISON:  None.    FINDINGS:  Left ribs appear intact without displaced fracture.  No pneumothorax or effusion.                                       Medications   sodium chloride 0.9% flush 3 mL (has no administration in time range)   melatonin tablet 6 mg (has no administration in time range)   ondansetron injection 4 mg (has no administration in time range)   naloxone 0.4 mg/mL injection 0.02 mg (has no administration in time range)   glucose chewable tablet 16 g (has no administration in time range)   glucose chewable tablet 24 g (has no administration in time range)   glucagon (human recombinant) injection 1 mg (has no administration in time range)   enoxaparin injection 40 mg (has no administration in time range)   acetaminophen tablet 650 mg (has no administration in time range)   dextrose 10% bolus 125 mL 125 mL (has no administration in time range)   dextrose 10% bolus 250 mL 250 mL (has no administration in time range)   hydrocortisone sodium succinate injection 100 mg (100 mg Intravenous Given 4/1/24 1912)     Medical Decision Making  Patient presents with significant history of adrenal insufficiency, orthostatic hypotension, multiple recent falls, with concern of injury to right foot hardware after recent ORIF on 03/15.  Sent to ED by request of ortho requesting admission and plan to  proceed with surgery tomorrow.  Afebrile on arrival.  Noted to be severely orthostatic.    DDx:  Including but not limited to adrenal insufficiency, dehydration, IONA, electrolyte abnormality, hardware malfunction, fracture, dislocation    Amount and/or Complexity of Data Reviewed  Labs: ordered. Decision-making details documented in ED Course.  Radiology: ordered. Decision-making details documented in ED Course.    Risk  Prescription drug management.               ED Course as of 04/01/24 1940 Mon Apr 01, 2024   1809 Severely orthostatic with lying /88 and standing 99/54. [KS]   1809 CBC auto differential(!)  No elevation in WBC.  Stable H/H.  [KS]   1810 Comprehensive metabolic panel(!)  Noting creatinine 2.0, baseline 1.7.  K WNL at 4.1. [KS]   1810 Magnesium  WNL [KS]   1810 Cortisol  Pending.  Confirmed with lab; lab will be a send out [KS]   1811 Patient discussed with [facility] Medicine team, who also do report concern that endocrinology services not available and patient does have significant history of adrenal crisis.  Will plan discussed with Endocrinology via transfer center. [KS]   1832 X-Ray Ribs 2 View Left  No acute rib fractures visualized per radiology review [KS]   1925 Patient discussed with Endocrinology, Dr. Serrano, via transfer center with attending, Dr. Brunson.  Will plan to stress dose steroid accordingly with no significant indication for transfer at this time.  Dosing is discussed in endocrinology note 02/27, as discussed below:      In preparation for surgery with need stress doses of IV hydrocortisone.  Recommendation for anesthesia:  - 100 mg IV hydrocortisone prior to surgery on induction followed by 50 mg IV q8H hours  - POD 1-3 oral hydrocortisone 40 mg in the morning and 20 mg in the afternoon around 3 pm  - POD 4 onward return to home dose of oral hydrocortisone 15/5 mg.     [KS]   1928 Patient discussed with [facility] Medicine team, who has accepted patient for  admission. [KS]   1928 Patient discussed with attending, Dr. Brunson, who agrees with ED course and dispo. [KS]      ED Course User Index  [KS] Kojo Gong PA-C                           Clinical Impression:  Final diagnoses:  [W19.XXXA] Fall  [Z98.890] S/P foot surgery, right  [Z86.39] History of adrenal insufficiency  [I95.1] Orthostatic hypotension (Primary)          ED Disposition Condition    Observation                 Kojo Gong PA-C  04/01/24 1940

## 2024-04-02 NOTE — PROGRESS NOTES
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 She presented to the ED by request of Orthopedics, Dr. Goodson, for admission for surgical correction of right foot navicular fracture. Patient had ORIF performed with Dr. Goodson on 3/15. Patient admits to significant and lengthy history of severe orthostatic hypotension, and admits her "spells" of becoming lightheaded upon standing have severely worsened since surgery causing her to have numerous falls over past few weeks. She believes during these falls she re-injured her right foot causing hardware failure. From these falls she also does report contusing left elbow and left rib cage. Denying any recent head injury or LOC. No acute neck pain. No chest pain, palpitations, abdominal pain, nausea or vomiting. Patient also reports episodes of tachycardia that occur while sitting on the cough watching TV with alerts on her Apple watch of HR 110s-140s. She states that her falls have significantly increased over the last couple weeks up to 20 falls in the last 2 weeks. Some of these are accompanied by syncope and a disorientated feeling, other times her legs just give out and she falls. Patient's  states patient does not appear to have seizure activity during these episodes but she does start breathing really heavy and then wakes back up after 1-2 minutes. Patient has been under the care of cardiology and endocrinology. She " states that she was diagnosed with postural hypotension with vasovagal response. She has never been prescribed a Holter Monitor.    Overview/Hospital Course:  4/2/24 Stress dose steroids started prior to surgical procedure    Interval History: About to go to the OR    Review of Systems   Constitutional:  Negative for chills and fever.   HENT:  Negative for congestion.    Respiratory:  Negative for shortness of breath.    Cardiovascular:  Negative for chest pain.   Gastrointestinal:  Negative for abdominal pain, nausea and vomiting.   Genitourinary:  Negative for dysuria.   Musculoskeletal:         Right foot fracture   Skin:         Bruising - rib cage; surgical incision right foot   Neurological:  Positive for dizziness, syncope and light-headedness.   Psychiatric/Behavioral:  Negative for agitation and confusion. The patient is not nervous/anxious.      Objective:     Vital Signs (Most Recent):  Temp: 97.9 °F (36.6 °C) (04/02/24 1747)  Pulse: 94 (04/02/24 1747)  Resp: 18 (04/02/24 1747)  BP: 137/63 (04/02/24 1747)  SpO2: (!) 94 % (04/02/24 1747) Vital Signs (24h Range):  Temp:  [97.2 °F (36.2 °C)-98.1 °F (36.7 °C)] 97.9 °F (36.6 °C)  Pulse:  [] 94  Resp:  [10-23] 18  SpO2:  [94 %-100 %] 94 %  BP: ()/(43-81) 137/63     Weight: 62.1 kg (137 lb)  Body mass index is 21.46 kg/m².    Intake/Output Summary (Last 24 hours) at 4/2/2024 1752  Last data filed at 4/2/2024 1622  Gross per 24 hour   Intake 1200 ml   Output 750 ml   Net 450 ml         Physical Exam  Constitutional:       General: She is not in acute distress.  HENT:      Head: Normocephalic and atraumatic.   Eyes:      Extraocular Movements: Extraocular movements intact.      Pupils: Pupils are equal, round, and reactive to light.   Cardiovascular:      Rate and Rhythm: Normal rate and regular rhythm.      Pulses:           Popliteal pulses are 2+ on the right side.        Dorsalis pedis pulses are 2+ on the left side.      Heart sounds: Normal heart  sounds.   Pulmonary:      Effort: Pulmonary effort is normal. No respiratory distress.      Breath sounds: Normal breath sounds. No wheezing, rhonchi or rales.   Abdominal:      General: Bowel sounds are normal. There is no distension.      Palpations: Abdomen is soft.      Tenderness: There is no abdominal tenderness. There is no guarding or rebound.   Musculoskeletal:      Cervical back: No rigidity.      Right lower leg: No edema.      Left lower leg: No edema.   Skin:     General: Skin is warm.      Capillary Refill: Capillary refill takes 2 to 3 seconds.      Comments: Post op boot CDI   Neurological:      Mental Status: She is alert and oriented to person, place, and time.   Psychiatric:         Mood and Affect: Mood normal.         Behavior: Behavior normal.         Thought Content: Thought content normal.         Judgment: Judgment normal.             Significant Labs: All pertinent labs within the past 24 hours have been reviewed.  CBC:   Recent Labs   Lab 04/01/24 1657 04/02/24  0515   WBC 7.81 6.70   HGB 9.3* 9.2*   HCT 28.9* 27.7*    374     CMP:   Recent Labs   Lab 04/01/24 1657 04/02/24  0515    140   K 4.1 4.2    107   CO2 20* 23   * 94   BUN 40* 36*   CREATININE 2.0* 1.6*   CALCIUM 9.0 8.7   PROT 6.9  --    ALBUMIN 3.2*  --    BILITOT 0.2  --    ALKPHOS 136*  --    AST 22  --    ALT 13  --    ANIONGAP 12 10       Significant Imaging: I have reviewed all pertinent imaging results/findings within the past 24 hours.    Assessment/Plan:      * Orthostatic hypotension  171/80 > 117/67 > 99/54  Patient reports being dx w/ postural hypotension with vasovagal response  Likely cause of recurrent syncope/falls  Cardiology consult ordered given worsening of sx  Stress test ordered  Head CT: no acute abnormality  Endocrinology e-consult performed, appreciate recs:  100 iv hydrocortisone.  (Q8) until surgery  After surgery 50 q8 x1 day   Double home dose  x 3day  If still falling after  loading dose, something else is going on outside of endocrine.       History of adrenal insufficiency    Panhypopituitarism - Primary        Planned for repeat surgery so will need stress dose steroids given hx of adrenal insufficiency.  If hypotension and orthostatic hypotension does not improve on IV hydrocortisone would evaluate for other causes like infection     Recommendations :  - this patient will need stress dose steroids on induction with:              IV hydrocortisone 100 mg on induction followed by IV hydrocortisone 50 mg q8 hours   If hemodynamically stable:  - On POD 1 if tolerating oral meds change to oral hydrocortisone 40/20 mg with the guidance of inpatient endocrine service if needed   - on POD 3 decrease to home doses of hydrocortisone      If hemodynamically unstable continue IV hydrocortisone and consider endocrine consult or continuing supraphysiologic hydrocortisone until condition is improved          Fracture of right foot  S/p ORIF of right navicular fracture and iliac crest bone harvest 3/15  Patient returned today for evaluation with Dr. Goodson and sent in for hardware failure likely 2/2 multiple falls requiring revision fixation of hardware  Ortho following  NPO after MN  Likely procedure tomorrow  Revised cardiac risk index score: 0 points, class I risk, 3.9% 30-day risk of death, MI, or cardiac arrest    4/2/24  1. Navicular open reduction internal fixation, right  2. Naviculocuneiform fusion, right  3. Talonavicular joint open reduction internal fixation  4. Hardware removal, right foot  5. Iliac crest bone grafting, right hip    Recurrent falls  See orthostatic hypotension  Patient may benefit from PT/OT post-op      Stage 4 chronic kidney disease  Creatine stable for now. BMP reviewed- noted Estimated Creatinine Clearance: 30.2 mL/min (A) (based on SCr of 2 mg/dL (H)). according to latest data. Based on current GFR, CKD stage is stage 4 - GFR 15-29.  Monitor UOP and serial BMP  and adjust therapy as needed. Renally dose meds. Avoid nephrotoxic medications and procedures.    Factor V deficiency with DVT x 3  Xarelto o/h pending surgery      Secondary hypothyroidism  Continue synthroid      Secondary adrenal insufficiency  Endocrinology e-consult performed, appreciate recs:  100 iv hydrocortisone.  (Q8) until surgery  After surgery 50 q8 x1 day   Double home dose  x 3day  If still falling after loading dose, something else is going on outside of endocrine.          Nonintractable epilepsy without status epilepticus  Continue AEDs  No seizures in several years        VTE Risk Mitigation (From admission, onward)           Ordered     IP VTE HIGH RISK PATIENT  Once         04/01/24 1938     Place sequential compression device  Until discontinued         04/01/24 1938                    Discharge Planning   XIMENA:      Code Status: Full Code   Is the patient medically ready for discharge?:     Reason for patient still in hospital (select all that apply): Patient trending condition  Discharge Plan A: Home with family (OP PT)                  Luna Banda MD  Department of Hospital Medicine   [facility]

## 2024-04-02 NOTE — ASSESSMENT & PLAN NOTE
Creatine stable for now. BMP reviewed- noted Estimated Creatinine Clearance: 30.2 mL/min (A) (based on SCr of 2 mg/dL (H)). according to latest data. Based on current GFR, CKD stage is stage 4 - GFR 15-29.  Monitor UOP and serial BMP and adjust therapy as needed. Renally dose meds. Avoid nephrotoxic medications and procedures.

## 2024-04-02 NOTE — BRIEF OP NOTE
Brief operative Note:    Surgeon: Hamzah  Assisting:  Jas Ly PA-C    Diagnosis:  1. Fall  2. Fracture dislocation of the navicular, right  3. Hardware failure, navicular ORIF    Postoperative diagnosis: Same    Procedure:    1. Navicular open reduction internal fixation, right  2. Naviculocuneiform fusion, right  3. Talonavicular joint open reduction internal fixation  4. Hardware removal, right foot  5. Iliac crest bone grafting, right hip    Implants:  Implant Name Type Inv. Item Serial No.  Lot No. LRB No. Used Action   [id number]  PUTTY DBX 2.5CC [id number] [facility]  Right 1 Implanted   CANNULATED SCREW    SINDI [id number] Right 1 Implanted   LAPIPLASTY SPEED PLATE    [employer] [id number] Right 1 Implanted   5 HOLE PLATE      Right 1 Implanted   SCREW VARIAX NON-PERRY 2.7X18MM - [id number]  SCREW VARIAX NON-PERRY 2.7X18MM  [employer].  Right 1 Implanted   SCREW VARIAX PERRY FT 2.7X18MM - [id number]  SCREW VARIAX PERRY FT 2.7X18MM  [employer].  Right 2 Implanted   SCREW VARIAX NON PERRY 2.7X36MM - [id number]  SCREW VARIAX NON PERRY 2.7X36MM  [employer].  Right 1 Implanted   SCREW VARIAX NON PERRY 2.7X28MM - [id number]  SCREW VARIAX NON PERRY 2.7X28MM  [employer].  Right 1 Implanted   SCREW BONE LOCK T8 2.7X14MM - [id number]  SCREW BONE LOCK T8 2.7X14MM  [employer].  Right 1 Implanted   SCREW BONE LOCK T8 2.7X16MM - [id number]  SCREW BONE LOCK T8 2.7X16MM  [employer].  Right 1 Implanted   PIN STEINMANN SMOOTH 2.8N736GW - [id number]  PIN STEINMANN SMOOTH 2.5B420VY  [employer].  Right 2 Implanted and Explanted   PIN FIXATION ANCHORAGE - [id number]  PIN FIXATION ANCHORAGE  [employer].  Right 1 Implanted and Explanted   SCREW VARIAX NON-PERRY 2.7X30MM - [id number]  SCREW VARIAX NON-PERRY 2.7X30MM  [employer].  Right 1 Implanted   KIT AUGMENT BONE GRAFT 3.0CC - [id number]  KIT AUGMENT BONE GRAFT 3.0CC  [facility]   Right 1 Implanted       Disposition:  Awakened, extubated, taken to postanesthesia care unit    Plan:  Nonweightbearing to the right lower extremity in splint  Admit back to the hospitalist service  PT OT tomorrow  Restart Xarelto tomorrow  Stress dose steroids as per endocrinology  Thyroid medication as per endocrinology   24 hours of Ancef  Patient will likely need rehab given her frequent falls once her other medical issues have been appropriately triaged.  The mobility limitation cannot be sufficiently resolved by the use of a crutches or a walker. Patient's functional mobility deficit can be sufficiently resolved with the use of a wheelchair. Patient's mobility limitation significantly impairs their ability to participate in one of more activities of daily living. The use of a wheelchair will significantly improve the patient's ability to participate in MRADLS and the patient will use it on regular basis in the home.

## 2024-04-02 NOTE — CONSULTS
[facility] Surg  Cardiology  Consult Note    Patient Name: Thelma Nguyen  MRN: [id number]  Admission Date: 4/1/2024  Hospital Length of Stay: 0 days  Code Status: Full Code   Attending Provider: Luna Banda MD   Consulting Provider: NORIS Morales ANP  Primary Care Physician: Zachary Bender MD  Principal Problem:Orthostatic hypotension    Patient information was obtained from patient, past medical records, and ER records.     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 She is followed by Dr. Goodson and underwent surgery for fractured ankle with reports of recurrent falls and concern for displacement. She was admitted for repeat surgery today. She reports history of orthostatic hypotension and underwent tilt table and CHERISE in the past. She reports infrequent falls ie once per month or once every other month until her  most recent surgery. She reports about 20 falls in the past 2 weeks. She complains of dizziness with position changes described as a vertigo feeling and her legs go weak and typically will sit down or be eased to the floor by her . Prior to her surgery she was wearing thigh high compression stockings and reports it was helping however has been unable to wear them since her surgery. Labs CBC with H&H 9.2&27.8 BMP with creatinine 1.6 Orthostatics positive. Admitted to [facility] Medicine and Cardiology consulted for evaluation of orthostatic hypotension     Past Medical History:   Diagnosis Date    Allergic  rhinitis     Anemia     Arthritis     Basal cell carcinoma     Broken ankle     Cancer 1985    Clotting disorder     Disorder of kidney and ureter     DVT (deep venous thrombosis)     Factor V deficiency     Fracture of elbow     left    Glioblastoma multiforme of brain     Hypothyroidism     IBS (irritable bowel syndrome)     Osteoporosis     Panhypopituitarism     Peripheral polyneuropathy     Secondary adrenal insufficiency     Seizures     Thyroid disease        Past Surgical History:   Procedure Laterality Date    APPENDECTOMY      BRAIN SURGERY      for glioblastoma      SECTION      CHOLECYSTECTOMY  2007    CLOSURE OF DEFECT OF MOHS PROCEDURE Left 2018    Procedure: CLOSURE, MOHS PROCEDURE DEFECT SCALP  Flap closure;  Surgeon: Yrn Novak MD;  Location: [address];  Service: Plastics;  Laterality: Left;    COLONOSCOPY N/A 2019    Procedure: COLONOSCOPY Suprep;  Surgeon: Tony Mosher MD;  Location: [facility] ENDO;  Service: Endoscopy;  Laterality: N/A;    ESOPHAGOGASTRODUODENOSCOPY N/A 2019    Procedure: EGD/colon;  Surgeon: Tony Mosher MD;  Location: [facility];  Service: Endoscopy;  Laterality: N/A;    EXCISION OF GANGLION CYST OF HAND Left 2020    Procedure: EXCISION, GANGLION CYST, HAND;  Surgeon: Ross Drew Jr., MD;  Location: [facility] OR;  Service: Orthopedics;  Laterality: Left;    HYSTERECTOMY      ILIAC CREST BONE GRAFT Right 3/15/2024    Procedure: BONE GRAFT, ILIAC CREST;  Surgeon: Geoffrey Goodson MD;  Location: [facility];  Service: Orthopedics;  Laterality: Right;  acumed harvester    OPEN REDUCTION AND INTERNAL FIXATION (ORIF) OF FRACTURE OF TARSAL BONE Right 3/15/2024    Procedure: ORIF, FRACTURE, TARSAL BONE;  Surgeon: Geoffrey Goodson MD;  Location: [facility];  Service: Orthopedics;  Laterality: Right;  supine, mini C arm, Styker, block okay    SHOULDER SURGERY Right     TILT TABLE TEST N/A 07/15/2022     Procedure: TILT TABLE TEST;  Surgeon: Amol Kohler MD;  Location: [facility] EP LAB;  Service: Cardiology;  Laterality: N/A;  Orthostatic hypotension, dizziness, Tilt Table Test with EEG, Dr.Tiffany Roca (neuro), DM    TUBAL LIGATION  1998       Review of patient's allergies indicates:   Allergen Reactions    Neurontin [gabapentin]     Lactose     Soap Hives     Able to tolerate Dove only        No current facility-administered medications on file prior to encounter.     Current Outpatient Medications on File Prior to Encounter   Medication Sig    acetaminophen (TYLENOL) 500 MG tablet Take 1 tablet (500 mg total) by mouth every 6 (six) hours.    biotin 1 mg Cap Take 1 capsule by mouth once daily.    cholecalciferol, vitamin D3, (VITAMIN D3) 50 mcg (2,000 unit) Cap capsule Take 2,000 Units by mouth every evening.    cyanocobalamin (VITAMIN B-12) 100 MCG tablet Take 100 mcg by mouth once daily.    DULoxetine (CYMBALTA) 30 MG capsule TAKE 2 CAPSULES (60 MG TOTAL) BY MOUTH ONCE DAILY. (Patient taking differently: Take 60 mg by mouth every evening.)    ferrous sulfate 324 mg (65 mg iron) TbEC Take 325 mg by mouth nightly.     hydrocortisone (CORTEF) 10 MG Tab Take 1 and 1/2 tablet by mouth every morning and 1/2 tablet in the afternoon    hyoscyamine (LEVSIN/SL) 0.125 mg Subl PLACE 1 TABLET (0.125 MG TOTAL) UNDER THE TONGUE EVERY 4 (FOUR) HOURS AS NEEDED.    levothyroxine (SYNTHROID) 112 MCG tablet Take 1 tablet (112 mcg total) by mouth before breakfast.    naloxone (NARCAN) 4 mg/actuation Spry 4mg by nasal route as needed for opioid overdose; may repeat every 2-3 minutes in alternating nostrils until medical help arrives. Call 911    oxyCODONE (ROXICODONE) 5 MG immediate release tablet Take 1 tablet (5 mg total) by mouth every 4 (four) hours as needed for Pain.    phenytoin (DILANTIN) 100 MG ER capsule Take 2 capsules (200 mg total) by mouth nightly.    XARELTO 20 mg Tab TAKE 1 TABLET BY MOUTH EVERY DAY (Patient taking  differently: Take 20 mg by mouth every evening.)    calcitRIOL (ROCALTROL) 0.25 MCG Cap Take 1 capsule (0.25 mcg total) by mouth once daily.    dexamethasone (DECADRON) 4 mg/mL injection Inject 1 mL (4 mg total) into the muscle as needed (Signs and symtpoms of severe adrenal insufficiency). 3 ml syringe with 18 g needle  to draw  X 10 21 g 1 inch needle to inject x 10    diclofenac sodium (VOLTAREN) 1 % Gel Apply 2 g topically 3 (three) times daily as needed (apply to affected joint).    loratadine-pseudoephedrine  mg (CLARITIN-D 24-HOUR)  mg per 24 hr tablet Take 1 tablet by mouth as needed.     sulfamethoxazole-trimethoprim 800-160mg (BACTRIM DS) 800-160 mg Tab Take 1 tablet by mouth 2 (two) times daily. (Patient taking differently: Take 1 tablet by mouth 2 (two) times daily as needed.)     Family History       Problem Relation (Age of Onset)    Diabetes type II Mother, Father    Heart attack Maternal Grandfather    Heart disease Mother          Tobacco Use    Smoking status: Never    Smokeless tobacco: Never   Substance and Sexual Activity    Alcohol use: No    Drug use: No    Sexual activity: Not Currently     Review of Systems   Constitutional: Negative for chills, decreased appetite, diaphoresis and fever.   Cardiovascular:  Positive for near-syncope. Negative for chest pain, claudication, cyanosis, dyspnea on exertion, irregular heartbeat, leg swelling, orthopnea, palpitations, paroxysmal nocturnal dyspnea and syncope.   Respiratory:  Negative for cough, hemoptysis, shortness of breath and wheezing.    Gastrointestinal:  Negative for bloating, abdominal pain, constipation, diarrhea, melena, nausea and vomiting.   Neurological:  Positive for dizziness. Negative for weakness.     Objective:     Vital Signs (Most Recent):  Temp: 97.9 °F (36.6 °C) (04/02/24 0723)  Pulse: (!) 127 (04/02/24 0726)  Resp: 18 (04/02/24 0723)  BP: (!) 81/43 (04/02/24 0726)  SpO2: 95 % (04/02/24 0723) Vital Signs (24h  Range):  Temp:  [97.7 °F (36.5 °C)-98.1 °F (36.7 °C)] 97.9 °F (36.6 °C)  Pulse:  [] 127  Resp:  [12-20] 18  SpO2:  [95 %-100 %] 95 %  BP: ()/(43-81) 81/43     Weight: 62 kg (136 lb 11 oz)  Body mass index is 21.41 kg/m².    SpO2: 95 %         Intake/Output Summary (Last 24 hours) at 4/2/2024 1102  Last data filed at 4/2/2024 0152  Gross per 24 hour   Intake --   Output 300 ml   Net -300 ml       Lines/Drains/Airways       Drain  Duration             Female External Urinary Catheter w/ Suction 04/02/24 0930 <1 day              Peripheral Intravenous Line  Duration                  Peripheral IV - Single Lumen 04/01/24 1658 20 G Anterior;Right Forearm <1 day                     Physical Exam  Constitutional:       General: She is not in acute distress.     Appearance: She is well-developed.   Cardiovascular:      Rate and Rhythm: Normal rate and regular rhythm.      Heart sounds: No murmur heard.     No gallop.   Pulmonary:      Effort: Pulmonary effort is normal. No respiratory distress.      Breath sounds: Normal breath sounds. No wheezing.   Abdominal:      General: Bowel sounds are normal. There is no distension.      Palpations: Abdomen is soft.      Tenderness: There is no abdominal tenderness.   Skin:     General: Skin is warm and dry.   Neurological:      Mental Status: She is alert and oriented to person, place, and time.          Significant Labs: BMP:   Recent Labs   Lab 04/01/24 1657 04/02/24  0515   * 94    140   K 4.1 4.2    107   CO2 20* 23   BUN 40* 36*   CREATININE 2.0* 1.6*   CALCIUM 9.0 8.7   MG 2.0  --     and CBC   Recent Labs   Lab 04/01/24  1657 04/02/24  0515   WBC 7.81 6.70   HGB 9.3* 9.2*   HCT 28.9* 27.7*    374       Significant Imaging: Echocardiogram: Transthoracic echo (TTE) complete (Cupid Only): No results found for this or any previous visit.  Assessment and Plan:     * Orthostatic hypotension  - history of orthostatic hypotension; + tilt in the  past; scheduled to see Dr. Ramirez in May 2024  - orthostatics positive upon admission lying HR 98 /80 sitting  /67 standing  BP 99/54- anticipate IVF  - on Hydrocortisone as an outpatient and will continue; will add Midodrine 2.5mg po BID as well for now  - instructed on maneuvers to abort symptoms; encouraged liberal fluids, change positions slowly and use of thigh high or full length compression hose  - will hold off on DSE; EKG with no acute changes     Factor V deficiency with DVT x 3  - history of factor V on Xarelto as an outpatient  - currently on hold; anticipate DVT prophylaxis with either SQ Lovenox or Heparin vs resumption of Xarelto post operatively         VTE Risk Mitigation (From admission, onward)           Ordered     IP VTE HIGH RISK PATIENT  Once         04/01/24 1938     Place sequential compression device  Until discontinued         04/01/24 1938                    Thank you for your consult. I will follow-up with patient. Please contact us if you have any additional questions.    NORIS Morales, ANP  Cardiology   [facility] - Med Surg

## 2024-04-02 NOTE — TRANSFER OF CARE
"Anesthesia Transfer of Care Note    Patient: Thelma Nguyen    Procedure(s) Performed: Procedure(s) (LRB):  FUSION, JOINT, MIDFOOT (Right)    Patient location: PACU    Anesthesia Type: general    Transport from OR: Transported from OR on 6-10 L/min O2 by face mask with adequate spontaneous ventilation    Post pain: adequate analgesia    Post assessment: no apparent anesthetic complications and tolerated procedure well    Post vital signs: stable    Level of consciousness: awake, alert and oriented    Nausea/Vomiting: no nausea/vomiting    Complications: none    Transfer of care protocol was followed      Last vitals: Visit Vitals  BP (!) 169/77 (Patient Position: Lying)   Pulse 90   Temp 36.7 °C (98.1 °F) (Oral)   Resp 18   Ht 5' 7" (1.702 m)   Wt 62.1 kg (137 lb)   LMP 10/23/1997 (Exact Date)   SpO2 98%   BMI 21.46 kg/m²     "

## 2024-04-02 NOTE — NURSING
Report received from Aide in PACU. Patient received in stable condition in no acute distress. VS taken. Purewick in place. Cardiac monitoring in place. Regular diet resumed. R foot and R sided abdomen dressing CDI. Spouse at bedside. Will continue to monitor.

## 2024-04-02 NOTE — ASSESSMENT & PLAN NOTE
171/80 > 117/67 > 99/54  Patient reports being dx w/ postural hypotension with vasovagal response  Likely cause of recurrent syncope/falls  Cardiology consult ordered given worsening of sx  Stress test ordered  Head CT: no acute abnormality  Endocrinology e-consult performed, appreciate recs:  100 iv hydrocortisone.  (Q8) until surgery  After surgery 50 q8 x1 day   Double home dose  x 3day  If still falling after loading dose, something else is going on outside of endocrine.

## 2024-04-02 NOTE — PROGRESS NOTES
04/01/24 2133   Patient Request   Patient Requested to be connected to [facility]   Nurse Notification   Bedside Nurse Notified? Yes   Name of Bedside Nurse DILIA Calvo   Nurse Notfication Method Secure Chat   Nurse Notified Of Patient Request   Admission   Initial VN Admission Questions Complete   Shift   Pain Management Interventions pain management plan reviewed with patient/caregiver   Safety/Activity   Patient Rounds bed in low position;call light in patient/parent reach;clutter free environment maintained;visualized patient;placement of personal items at bedside   Safety Promotion/Fall Prevention assistive device/personal item within reach;Fall Risk reviewed with patient/family;side rails raised x 2   Positioning   Body Position supine   Head of Bed (HOB) Positioning HOB at 30-45 degrees     VN cued in to pt's room with permission. Pt's  at bedside.Admission questions completed with pt. Plan of care reviewed with pt and spouse. Call bell w/in reach. Instructed to call for needs/assist.

## 2024-04-02 NOTE — ASSESSMENT & PLAN NOTE
Planned for repeat surgery so will need stress dose steroids given hx of adrenal insufficiency.  If hypotension and orthostatic hypotension does not improve on IV hydrocortisone would evaluate for other causes like infection    Recommendations :  - this patient will need stress dose steroids on induction with:   IV hydrocortisone 100 mg on induction followed by IV hydrocortisone 50 mg q8 hours   If hemodynamically stable:  - On POD 1 if tolerating oral meds change to oral hydrocortisone 40/20 mg with the guidance of inpatient endocrine service if needed   - on POD 3 decrease to home doses of hydrocortisone     If hemodynamically unstable continue IV hydrocortisone and consider endocrine consult or continuing supraphysiologic hydrocortisone until condition is improved.

## 2024-04-02 NOTE — SUBJECTIVE & OBJECTIVE
Interval History: About to go to the OR    Review of Systems   Constitutional:  Negative for chills and fever.   HENT:  Negative for congestion.    Respiratory:  Negative for shortness of breath.    Cardiovascular:  Negative for chest pain.   Gastrointestinal:  Negative for abdominal pain, nausea and vomiting.   Genitourinary:  Negative for dysuria.   Musculoskeletal:         Right foot fracture   Skin:         Bruising - rib cage; surgical incision right foot   Neurological:  Positive for dizziness, syncope and light-headedness.   Psychiatric/Behavioral:  Negative for agitation and confusion. The patient is not nervous/anxious.      Objective:     Vital Signs (Most Recent):  Temp: 97.9 °F (36.6 °C) (04/02/24 1747)  Pulse: 94 (04/02/24 1747)  Resp: 18 (04/02/24 1747)  BP: 137/63 (04/02/24 1747)  SpO2: (!) 94 % (04/02/24 1747) Vital Signs (24h Range):  Temp:  [97.2 °F (36.2 °C)-98.1 °F (36.7 °C)] 97.9 °F (36.6 °C)  Pulse:  [] 94  Resp:  [10-23] 18  SpO2:  [94 %-100 %] 94 %  BP: ()/(43-81) 137/63     Weight: 62.1 kg (137 lb)  Body mass index is 21.46 kg/m².    Intake/Output Summary (Last 24 hours) at 4/2/2024 1752  Last data filed at 4/2/2024 1622  Gross per 24 hour   Intake 1200 ml   Output 750 ml   Net 450 ml         Physical Exam  Constitutional:       General: She is not in acute distress.  HENT:      Head: Normocephalic and atraumatic.   Eyes:      Extraocular Movements: Extraocular movements intact.      Pupils: Pupils are equal, round, and reactive to light.   Cardiovascular:      Rate and Rhythm: Normal rate and regular rhythm.      Pulses:           Popliteal pulses are 2+ on the right side.        Dorsalis pedis pulses are 2+ on the left side.      Heart sounds: Normal heart sounds.   Pulmonary:      Effort: Pulmonary effort is normal. No respiratory distress.      Breath sounds: Normal breath sounds. No wheezing, rhonchi or rales.   Abdominal:      General: Bowel sounds are normal. There is no  distension.      Palpations: Abdomen is soft.      Tenderness: There is no abdominal tenderness. There is no guarding or rebound.   Musculoskeletal:      Cervical back: No rigidity.      Right lower leg: No edema.      Left lower leg: No edema.   Skin:     General: Skin is warm.      Capillary Refill: Capillary refill takes 2 to 3 seconds.      Comments: Post op boot CDI   Neurological:      Mental Status: She is alert and oriented to person, place, and time.   Psychiatric:         Mood and Affect: Mood normal.         Behavior: Behavior normal.         Thought Content: Thought content normal.         Judgment: Judgment normal.             Significant Labs: All pertinent labs within the past 24 hours have been reviewed.  CBC:   Recent Labs   Lab 04/01/24 1657 04/02/24  0515   WBC 7.81 6.70   HGB 9.3* 9.2*   HCT 28.9* 27.7*    374     CMP:   Recent Labs   Lab 04/01/24 1657 04/02/24  0515    140   K 4.1 4.2    107   CO2 20* 23   * 94   BUN 40* 36*   CREATININE 2.0* 1.6*   CALCIUM 9.0 8.7   PROT 6.9  --    ALBUMIN 3.2*  --    BILITOT 0.2  --    ALKPHOS 136*  --    AST 22  --    ALT 13  --    ANIONGAP 12 10       Significant Imaging: I have reviewed all pertinent imaging results/findings within the past 24 hours.

## 2024-04-02 NOTE — PLAN OF CARE
AAOx4. Medications administered per MAR. NPO midnight. Tele monitored. Safety precautions maintained. Call bell within reach.

## 2024-04-02 NOTE — ASSESSMENT & PLAN NOTE
- history of orthostatic hypotension; + tilt in the past; scheduled to see Dr. Ramirez in May 2024  - orthostatics positive upon admission lying HR 98 /80 sitting  /67 standing  BP 99/54- anticipate IVF  - on Hydrocortisone as an outpatient and will continue; will add Midodrine 2.5mg po BID as well for now  - instructed on maneuvers to abort symptoms; encouraged liberal fluids, change positions slowly and use of thigh high or full length compression hose  - will hold off on DSE; EKG with no acute changes

## 2024-04-02 NOTE — HOSPITAL COURSE
4/2/24 Stress dose steroids started prior to surgical procedure  4/3/24 POD1 Resumed Xarelto, transitioned to PO hydrocortisone  4/4/24 POD 2 Neurology consult for increased falls  4/5/2024 Went for MRI today- pending results and Neurology recommendations  4/6/24 MRI brain unremarkable, orthostatics still positive,   4/7/24 Orthostatics BP lower than yesterday, concerns about going home  4/8/24 Peer to Peer denied. Working on SNF. Abdominal binder today  4/9/24 Awaiting SNF decision, BP elevated, mild dizziness noted   4/10 Pt worked with PT/OT on transfers, wheelchair mobility, reports dizziness stable, several BM's yesterday    4/11 Awaiting insurance authorization for SNF completed, will get CXR - No acute cardiopulmonary process, COVID - neg, place PPD.   Patient is stable for discharge, discharge instructions and medications have been reviewed.

## 2024-04-02 NOTE — ASSESSMENT & PLAN NOTE
Endocrinology e-consult performed, appreciate recs:  100 iv hydrocortisone.  (Q8) until surgery  After surgery 50 q8 x1 day   Double home dose  x 3day  If still falling after loading dose, something else is going on outside of endocrine.

## 2024-04-02 NOTE — ANESTHESIA PREPROCEDURE EVALUATION
04/02/2024  Thelma Nguyen is a 58 y.o., female.      Pre-op Assessment     I have reviewed the Nursing Notes.    I have reviewed the Medications.     Review of Systems  Anesthesia Hx:  No problems with previous Anesthesia             Denies Family Hx of Anesthesia complications.     Social:  Non-Smoker, No Alcohol Use       Hematology/Oncology:  Hematology Normal   Oncology Normal                                   EENT/Dental:  EENT/Dental Normal           Cardiovascular:  Cardiovascular Normal Exercise tolerance: good                        Shortness of Breath                          Pulmonary:      Shortness of breath                  Renal/:  Chronic Renal Disease        Kidney Function/Disease             Hepatic/GI:  Hepatic/GI Normal                 Musculoskeletal:  Musculoskeletal Normal                Neurological:    Neuromuscular Disease,   Seizures           Seizure Disorder                        Neuromuscular Disease   Endocrine:   Hypothyroidism       Hypothyroidism          Psych:  Psychiatric History                  Physical Exam  General: Well nourished    Airway:  Mallampati: II / II  Mouth Opening: Normal  TM Distance: Normal  Tongue: Normal  Neck ROM: Normal ROM    Dental:  Intact        Anesthesia Plan  Type of Anesthesia, risks & benefits discussed:    Anesthesia Type: Gen ETT  Intra-op Monitoring Plan: Standard ASA Monitors  Post Op Pain Control Plan: multimodal analgesia  Induction:  IV  Airway Plan: Direct, Post-Induction  Informed Consent: Informed consent signed with the Patient and all parties understand the risks and agree with anesthesia plan.  All questions answered.   ASA Score: 3    Ready For Surgery From Anesthesia Perspective.     .

## 2024-04-02 NOTE — PLAN OF CARE
went to meet with patient. Patient reports she is independent and lives at home with her spouse. Outpatient Therapy is ordered but she has not started yet. She had a procedure done with Dr. Goodson 3/15. Patient to has a revision scheduled today. Patient reports multiple falls at home since procedure. However, she informed  her falls have been constant even before procedure for months now (scheduled to see Cardiologist and already established with Endocrinologist). Patient stated MD mentioned possible placement at discharge. I informed patient therapy would have to work with her and make recommendations. Insurance company will need to approve. Also, patient would need to actually be able to participate in therapy too (currently admitted for orthostatic hypotension). She verbalized understanding.  will continue to follow. Patient encouraged to call with any questions or concerns.  will continue to follow patient through transitions of care and assist with any discharge needs.     Patient Contacts    Name Relation Home Work Mobile   Arie Nguyen Spouse [phone]     Future Appointments   Date Time Provider Department Center   4/26/2024 11:00 AM Kleber Liu, PT DOS OTPTB [place]   5/2/2024  1:30 PM Swetha Bay MD [facility] NEPHRO Sulaiman Gale   5/3/2024 12:00 PM Annemarie-Viktor Cuello MD [facility]   6/11/2024 11:00 AM Zachary Bender MD [facility] IM Sul[facility]an charles PCW   8/27/2024  3:00 PM GIOVANNI, DEXA1 [facility] BMD Sul[facility]an cahrles        04/02/24 0945   Discharge Assessment   Assessment Type Discharge Planning Assessment   Confirmed/corrected address, phone number and insurance Yes   Confirmed Demographics Correct on Facesheet   Source of Information patient;health record   People in Home significant other;spouse   Facility Arrived From: Home   Do you expect to return to your current living situation? Other (see comments)  (TBD)   Do you have help at home or  someone to help you manage your care at home? Yes   Who are your caregiver(s) and their phone number(s)? Arie Nguyen Spouse [phone]   Prior to hospitilization cognitive status: Alert/Oriented   Current cognitive status: Alert/Oriented   Walking or Climbing Stairs Difficulty yes   Walking or Climbing Stairs ambulation difficulty, requires equipment   Dressing/Bathing Difficulty no   Equipment Currently Used at Home other (see comments)  (KNEE SCOOTER)   Do you take prescription medications? Yes   Do you have prescription coverage? Yes   Do you have any problems affording any of your prescribed medications? No   Is the patient taking medications as prescribed? yes   Who is going to help you get home at discharge? Arie Nguyen Spouse [phone]   How do you get to doctors appointments? family or friend will provide   Are you on dialysis? No   Do you take coumadin? No   Discharge Plan A Home with family  (OP PT)   Discharge Plan B Home with family;Home Health   DME Needed Upon Discharge  other (see comments)  (TBD)   Discharge Plan discussed with: Patient   Transition of Care Barriers None     Itzel Jin RN    [phone]

## 2024-04-03 ENCOUNTER — PATIENT MESSAGE (OUTPATIENT)
Dept: ORTHOPEDICS | Facility: CLINIC | Age: 59
End: 2024-04-03
Payer: COMMERCIAL

## 2024-04-03 LAB
ANION GAP SERPL CALC-SCNC: 13 MMOL/L (ref 8–16)
BASOPHILS # BLD AUTO: 0.02 K/UL (ref 0–0.2)
BASOPHILS NFR BLD: 0.2 % (ref 0–1.9)
BUN SERPL-MCNC: 30 MG/DL (ref 6–20)
CALCIUM SERPL-MCNC: 7.8 MG/DL (ref 8.7–10.5)
CHLORIDE SERPL-SCNC: 108 MMOL/L (ref 95–110)
CO2 SERPL-SCNC: 20 MMOL/L (ref 23–29)
CREAT SERPL-MCNC: 1.7 MG/DL (ref 0.5–1.4)
DIFFERENTIAL METHOD BLD: ABNORMAL
EOSINOPHIL # BLD AUTO: 0 K/UL (ref 0–0.5)
EOSINOPHIL NFR BLD: 0 % (ref 0–8)
ERYTHROCYTE [DISTWIDTH] IN BLOOD BY AUTOMATED COUNT: 12.8 % (ref 11.5–14.5)
EST. GFR  (NO RACE VARIABLE): 35 ML/MIN/1.73 M^2
GLUCOSE SERPL-MCNC: 131 MG/DL (ref 70–110)
HCT VFR BLD AUTO: 25.9 % (ref 37–48.5)
HGB BLD-MCNC: 8.3 G/DL (ref 12–16)
IMM GRANULOCYTES # BLD AUTO: 0.04 K/UL (ref 0–0.04)
IMM GRANULOCYTES NFR BLD AUTO: 0.4 % (ref 0–0.5)
LYMPHOCYTES # BLD AUTO: 1.4 K/UL (ref 1–4.8)
LYMPHOCYTES NFR BLD: 13.5 % (ref 18–48)
MCH RBC QN AUTO: 30 PG (ref 27–31)
MCHC RBC AUTO-ENTMCNC: 32 G/DL (ref 32–36)
MCV RBC AUTO: 94 FL (ref 82–98)
MONOCYTES # BLD AUTO: 0.4 K/UL (ref 0.3–1)
MONOCYTES NFR BLD: 3.8 % (ref 4–15)
NEUTROPHILS # BLD AUTO: 8.3 K/UL (ref 1.8–7.7)
NEUTROPHILS NFR BLD: 82.1 % (ref 38–73)
NRBC BLD-RTO: 0 /100 WBC
PLATELET # BLD AUTO: 320 K/UL (ref 150–450)
PMV BLD AUTO: 9.1 FL (ref 9.2–12.9)
POTASSIUM SERPL-SCNC: 4.7 MMOL/L (ref 3.5–5.1)
RBC # BLD AUTO: 2.77 M/UL (ref 4–5.4)
SODIUM SERPL-SCNC: 141 MMOL/L (ref 136–145)
WBC # BLD AUTO: 10.06 K/UL (ref 3.9–12.7)

## 2024-04-03 PROCEDURE — 97162 PT EVAL MOD COMPLEX 30 MIN: CPT

## 2024-04-03 PROCEDURE — 97530 THERAPEUTIC ACTIVITIES: CPT

## 2024-04-03 PROCEDURE — G0378 HOSPITAL OBSERVATION PER HR: HCPCS

## 2024-04-03 PROCEDURE — 96366 THER/PROPH/DIAG IV INF ADDON: CPT

## 2024-04-03 PROCEDURE — 97535 SELF CARE MNGMENT TRAINING: CPT

## 2024-04-03 PROCEDURE — 80048 BASIC METABOLIC PNL TOTAL CA: CPT | Performed by: NURSE PRACTITIONER

## 2024-04-03 PROCEDURE — 63600175 PHARM REV CODE 636 W HCPCS: Performed by: INTERNAL MEDICINE

## 2024-04-03 PROCEDURE — 97165 OT EVAL LOW COMPLEX 30 MIN: CPT

## 2024-04-03 PROCEDURE — 36415 COLL VENOUS BLD VENIPUNCTURE: CPT | Performed by: NURSE PRACTITIONER

## 2024-04-03 PROCEDURE — 63600175 PHARM REV CODE 636 W HCPCS: Performed by: NURSE PRACTITIONER

## 2024-04-03 PROCEDURE — 25000003 PHARM REV CODE 250: Performed by: NURSE PRACTITIONER

## 2024-04-03 PROCEDURE — 96365 THER/PROPH/DIAG IV INF INIT: CPT

## 2024-04-03 PROCEDURE — 85025 COMPLETE CBC W/AUTO DIFF WBC: CPT | Performed by: NURSE PRACTITIONER

## 2024-04-03 PROCEDURE — 25000003 PHARM REV CODE 250: Performed by: INTERNAL MEDICINE

## 2024-04-03 PROCEDURE — 96376 TX/PRO/DX INJ SAME DRUG ADON: CPT

## 2024-04-03 RX ORDER — CEFAZOLIN SODIUM 2 G/50ML
2 SOLUTION INTRAVENOUS
Status: COMPLETED | OUTPATIENT
Start: 2024-04-03 | End: 2024-04-04

## 2024-04-03 RX ORDER — HYDROCORTISONE 5 MG/1
40 TABLET ORAL DAILY
Status: DISCONTINUED | OUTPATIENT
Start: 2024-04-03 | End: 2024-04-04

## 2024-04-03 RX ADMIN — LEVOTHYROXINE SODIUM 112 MCG: 112 TABLET ORAL at 06:04

## 2024-04-03 RX ADMIN — OXYCODONE 5 MG: 5 TABLET ORAL at 08:04

## 2024-04-03 RX ADMIN — CEFAZOLIN SODIUM 2 G: 2 SOLUTION INTRAVENOUS at 08:04

## 2024-04-03 RX ADMIN — ACETAMINOPHEN 500 MG: 500 TABLET ORAL at 11:04

## 2024-04-03 RX ADMIN — PHENYTOIN SODIUM 200 MG: 100 CAPSULE ORAL at 08:04

## 2024-04-03 RX ADMIN — OXYCODONE 5 MG: 5 TABLET ORAL at 03:04

## 2024-04-03 RX ADMIN — FERROUS SULFATE TAB 325 MG (65 MG ELEMENTAL FE) 1 EACH: 325 (65 FE) TAB at 08:04

## 2024-04-03 RX ADMIN — MIDODRINE HYDROCHLORIDE 2.5 MG: 2.5 TABLET ORAL at 08:04

## 2024-04-03 RX ADMIN — ACETAMINOPHEN 500 MG: 500 TABLET ORAL at 06:04

## 2024-04-03 RX ADMIN — OXYCODONE 5 MG: 5 TABLET ORAL at 06:04

## 2024-04-03 RX ADMIN — HYDROCORTISONE SODIUM SUCCINATE 100 MG: 100 INJECTION, POWDER, FOR SOLUTION INTRAMUSCULAR; INTRAVENOUS at 06:04

## 2024-04-03 RX ADMIN — RIVAROXABAN 20 MG: 20 TABLET, FILM COATED ORAL at 03:04

## 2024-04-03 RX ADMIN — ACETAMINOPHEN 500 MG: 500 TABLET ORAL at 05:04

## 2024-04-03 RX ADMIN — HYDROCORTISONE 40 MG: 5 TABLET ORAL at 11:04

## 2024-04-03 RX ADMIN — OXYCODONE 5 MG: 5 TABLET ORAL at 10:04

## 2024-04-03 RX ADMIN — CEFAZOLIN SODIUM 2 G: 2 SOLUTION INTRAVENOUS at 11:04

## 2024-04-03 RX ADMIN — DULOXETINE HYDROCHLORIDE 60 MG: 30 CAPSULE, DELAYED RELEASE ORAL at 08:04

## 2024-04-03 NOTE — PROGRESS NOTES
"[facility]           Pharmacy  Admission Medication Reconciliation     Based on information gathered for medication list, you may go to "Admission" then "Reconcile Home Medications" tabs to review and/or act upon those items.     The home medication list has been updated by the Pharmacy department.   Please read ALL comments highlighted in red.   Please address this information as you see fit.    Feel free to contact us if you have any questions or require assistance.    Home medication list has been compared to current inpatient medications. Please review the following discrepancies noted below:      Patient reports STILL TAKING the following medication(s) which was not ordered upon admit  Cyanocobalamin  Vitamin D3      Feel free to contact us if you have any questions or require assistance.    Anny Ramirez, PharmD  [phone]        "

## 2024-04-03 NOTE — PROGRESS NOTES
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 She presented to the ED by request of Orthopedics, Dr. Goodson, for admission for surgical correction of right foot navicular fracture. Patient had ORIF performed with Dr. Goodson on 3/15. Patient admits to significant and lengthy history of severe orthostatic hypotension, and admits her "spells" of becoming lightheaded upon standing have severely worsened since surgery causing her to have numerous falls over past few weeks. She believes during these falls she re-injured her right foot causing hardware failure. From these falls she also does report contusing left elbow and left rib cage. Denying any recent head injury or LOC. No acute neck pain. No chest pain, palpitations, abdominal pain, nausea or vomiting. Patient also reports episodes of tachycardia that occur while sitting on the cough watching TV with alerts on her Apple watch of HR 110s-140s. She states that her falls have significantly increased over the last couple weeks up to 20 falls in the last 2 weeks. Some of these are accompanied by syncope and a disorientated feeling, other times her legs just give out and she falls. Patient's  states patient does not appear to have seizure activity during these episodes but she does start breathing really heavy and then wakes back up after 1-2 minutes. Patient has been under the care of cardiology and endocrinology. She " states that she was diagnosed with postural hypotension with vasovagal response. She has never been prescribed a Holter Monitor.    Overview/Hospital Course:  4/2/24 Stress dose steroids started prior to surgical procedure  4/3/24 POD1 Resumed Xarelto, transitioned to PO hydrocortisone    Interval History: This morning her pain is well controled    Review of Systems   Constitutional:  Negative for chills and fever.   HENT:  Negative for congestion.    Respiratory:  Negative for shortness of breath.    Cardiovascular:  Negative for chest pain.   Gastrointestinal:  Negative for abdominal pain, nausea and vomiting.   Genitourinary:  Negative for dysuria.   Musculoskeletal:         Right foot fracture   Skin:         Bruising - rib cage; surgical incision right foot   Neurological:  Positive for dizziness, syncope and light-headedness.   Psychiatric/Behavioral:  Negative for agitation and confusion. The patient is not nervous/anxious.      Objective:     Vital Signs (Most Recent):  Temp: 97.9 °F (36.6 °C) (04/03/24 1551)  Pulse: 93 (04/03/24 1551)  Resp: 18 (04/03/24 1552)  BP: (!) 162/74 (04/03/24 1551)  SpO2: 99 % (04/03/24 1551) Vital Signs (24h Range):  Temp:  [97.7 °F (36.5 °C)-98.5 °F (36.9 °C)] 97.9 °F (36.6 °C)  Pulse:  [] 93  Resp:  [12-20] 18  SpO2:  [94 %-99 %] 99 %  BP: (110-162)/(53-74) 162/74     Weight: 70.5 kg (155 lb 6.8 oz)  Body mass index is 24.34 kg/m².    Intake/Output Summary (Last 24 hours) at 4/3/2024 1717  Last data filed at 4/3/2024 1220  Gross per 24 hour   Intake 120 ml   Output 350 ml   Net -230 ml         Physical Exam  Constitutional:       General: She is not in acute distress.  HENT:      Head: Normocephalic and atraumatic.   Eyes:      Extraocular Movements: Extraocular movements intact.      Pupils: Pupils are equal, round, and reactive to light.   Cardiovascular:      Rate and Rhythm: Normal rate and regular rhythm.      Pulses:           Popliteal pulses are 2+ on the right  "side.        Dorsalis pedis pulses are 2+ on the left side.      Heart sounds: Normal heart sounds.   Pulmonary:      Effort: Pulmonary effort is normal. No respiratory distress.      Breath sounds: Normal breath sounds. No wheezing, rhonchi or rales.   Abdominal:      General: Bowel sounds are normal. There is no distension.      Palpations: Abdomen is soft.      Tenderness: There is no abdominal tenderness. There is no guarding or rebound.   Musculoskeletal:      Cervical back: No rigidity.      Right lower leg: No edema.      Left lower leg: No edema.   Skin:     General: Skin is warm.      Capillary Refill: Capillary refill takes 2 to 3 seconds.      Comments: Post op boot CDI   Neurological:      Mental Status: She is alert and oriented to person, place, and time.   Psychiatric:         Mood and Affect: Mood normal.         Behavior: Behavior normal.         Thought Content: Thought content normal.         Judgment: Judgment normal.             Significant Labs: All pertinent labs within the past 24 hours have been reviewed.  Blood Culture: No results for input(s): "LABBLOO" in the last 48 hours.  CBC:   Recent Labs   Lab 04/02/24  0515 04/03/24  0500   WBC 6.70 10.06   HGB 9.2* 8.3*   HCT 27.7* 25.9*    320     CMP:   Recent Labs   Lab 04/02/24  0515 04/03/24  0500    141   K 4.2 4.7    108   CO2 23 20*   GLU 94 131*   BUN 36* 30*   CREATININE 1.6* 1.7*   CALCIUM 8.7 7.8*   ANIONGAP 10 13       Significant Imaging: I have reviewed all pertinent imaging results/findings within the past 24 hours.    Assessment/Plan:      * Orthostatic hypotension  171/80 > 117/67 > 99/54  Patient reports being dx w/ postural hypotension with vasovagal response  Likely cause of recurrent syncope/falls  Cardiology consult ordered given worsening of sx  Stress test ordered  Head CT: no acute abnormality  Endocrinology e-consult performed, appreciate recs:  100 iv hydrocortisone.  (Q8) until surgery  After surgery " 50 q8 x1 day   Double home dose  x 3day  If still falling after loading dose, something else is going on outside of endocrine.       History of adrenal insufficiency    Panhypopituitarism - Primary        Planned for repeat surgery so will need stress dose steroids given hx of adrenal insufficiency.  If hypotension and orthostatic hypotension does not improve on IV hydrocortisone would evaluate for other causes like infection     Recommendations :  - this patient will need stress dose steroids on induction with:              IV hydrocortisone 100 mg on induction followed by IV hydrocortisone 50 mg q8 hours   If hemodynamically stable:  - On POD 1 if tolerating oral meds change to oral hydrocortisone 40/20 mg with the guidance of inpatient endocrine service if needed   - on POD 3 decrease to home doses of hydrocortisone      If hemodynamically unstable continue IV hydrocortisone and consider endocrine consult or continuing supraphysiologic hydrocortisone until condition is improved          Fracture of right foot  S/p ORIF of right navicular fracture and iliac crest bone harvest 3/15  Patient returned today for evaluation with Dr. Goodson and sent in for hardware failure likely 2/2 multiple falls requiring revision fixation of hardware  Ortho following  NPO after MN  Likely procedure tomorrow  Revised cardiac risk index score: 0 points, class I risk, 3.9% 30-day risk of death, MI, or cardiac arrest    4/2/24  1. Navicular open reduction internal fixation, right  2. Naviculocuneiform fusion, right  3. Talonavicular joint open reduction internal fixation  4. Hardware removal, right foot  5. Iliac crest bone grafting, right hip    Recurrent falls  See orthostatic hypotension  Patient may benefit from PT/OT post-op  Will consult Endocrinology      Stage 4 chronic kidney disease  Creatine stable for now. BMP reviewed- noted Estimated Creatinine Clearance: 30.2 mL/min (A) (based on SCr of 2 mg/dL (H)). according to latest  data. Based on current GFR, CKD stage is stage 4 - GFR 15-29.  Monitor UOP and serial BMP and adjust therapy as needed. Renally dose meds. Avoid nephrotoxic medications and procedures.    Factor V deficiency with DVT x 3  Xarelto o/h pending surgery      Secondary hypothyroidism  Continue synthroid      Secondary adrenal insufficiency  Endocrinology e-consult performed, appreciate recs:  100 iv hydrocortisone.  (Q8) until surgery  After surgery 50 q8 x1 day   Double home dose  x 3day  If still falling after loading dose, something else is going on outside of endocrine.          Nonintractable epilepsy without status epilepticus  Continue AEDs  No seizures in several years        VTE Risk Mitigation (From admission, onward)           Ordered     rivaroxaban tablet 20 mg  with dinner         04/03/24 1013     IP VTE HIGH RISK PATIENT  Once         04/01/24 1938     Place sequential compression device  Until discontinued         04/01/24 1938                    Discharge Planning   XIMENA:      Code Status: Full Code   Is the patient medically ready for discharge?:     Reason for patient still in hospital (select all that apply): Patient trending condition and Consult recommendations  Discharge Plan A: Rehab   Discharge Delays: None known at this time              Luna Banda MD  Department of Hospital Medicine   [facility] Surg

## 2024-04-03 NOTE — PT/OT/SLP EVAL
Occupational Therapy   Evaluation/Treatment     Name: Thelma Nguyen  MRN: [id number]  Admitting Diagnosis: Orthostatic hypotension  Recent Surgery: Procedure(s) (LRB):  FUSION, JOINT, MIDFOOT (Right)  BONE GRAFT, ILIAC CREST (Right) 1 Day Post-Op    Recommendations:     Discharge Recommendations: High Intensity Therapy  Discharge Equipment Recommendations:  wheelchair, bedside commode (drop arm BSC)  Barriers to discharge:  None    Assessment:     Thelma Nguyen is a 58 y.o. female with a medical diagnosis of Orthostatic hypotension.  She presents with The primary encounter diagnosis was Orthostatic hypotension. Diagnoses of Fall, Chest pain, Syncope, S/P foot surgery, right, History of adrenal insufficiency, and Recurrent syncope were also pertinent to this visit.  . Performance deficits affecting function: weakness, impaired self care skills, impaired functional mobility, gait instability, impaired balance, decreased safety awareness, impaired endurance, decreased lower extremity function, impaired skin, edema, pain, impaired cardiopulmonary response to activity, impaired sensation, decreased ROM.      Pt would benefit from cont OT services in order to maximize functional independence. Recommending high intensity therapy at d/c with drop arm BSC and w/c with elevating leg rests. Pt currently requiring increased assist for ADLs; orthostatic hypotension also limiting OOB axs/functional mobility at this time. Will progress pt as able.     Rehab Prognosis: Good; patient would benefit from acute skilled OT services to address these deficits and reach maximum level of function.       Plan:     Patient to be seen 5 x/week to address the above listed problems via self-care/home management, therapeutic activities, therapeutic exercises  Plan of Care Expires: 05/03/24  Plan of Care Reviewed with: patient, spouse    Subjective     Chief Complaint: issues with dizziness and passing out; worried about  caregiver burden; R hip hurting from bone grafting   Patient/Family Comments/goals: increase function; caregiver respite services     Occupational Profile:  Pt lives in a [facility] [address] with .  is retired and able to assist at time of dc.  Prior to admission, patients level of function was minimally mobile d/t hx of R foot fracture. Pt reports she was transferrin NWB to knee scooter and using her knee scooter to access the bathroom.  Equipment used at home: bath bench (knee scooter).  DME owned (not currently used): none.  Upon discharge, patient will have assistance from .    Pain/Comfort:  Pain Rating 1: 5/10  Location - Side 1: Right  Location - Orientation 1: generalized  Location 1: foot  Pain Addressed 1: Reposition, Distraction, Cessation of Activity  Pain Rating Post-Intervention 1: 5/10    Patients cultural, spiritual, Voodoo conflicts given the current situation:      Objective:     Communicated with: nstaj prior to session.  Patient found supine with   upon OT entry to room.    General Precautions: Standard, fall  Orthopedic Precautions: RLE non weight bearing  Braces: N/A  Respiratory Status: Room air    Occupational Performance:    Bed Mobility:    Patient completed Scooting/Bridging with stand by assistance  Patient completed Supine to Sit with contact guard assistance  Patient completed Sit to Supine with contact guard assistance    Functional Mobility/Transfers:  Patient completed Toilet Transfer Squat Pivot technique with moderate assistance with  bedside commode  Functional Mobility: did not perform     Activities of Daily Living:  N/A     Cognitive/Visual Perceptual:  WFL     Physical Exam:  Balance:    -       WFL seated; poor standing/squatting   Postural examination/scapula alignment:    -       Rounded shoulders  Skin integrity: Wound RLE  Sensation:  impaired BLEs- reports polyneuropathy   Upper Extremity Range of Motion:   BUE appears WFL for pt's needs based on  function during session   Upper Extremity Strength: BUE grossly 4-/5 based on function       AMPA 6 Click ADL:  AMPAC Total Score: 18    Treatment & Education:  Pt found supine; educated pt and spouse on NWB RLE   Vitals taken throughout session 2/2 history of OH  Supine 134/69   Seated 99/53   Increased time seated 130/64 HR 11  Pt with initial reports of dizziness   Discussed and demo'd multiple options for t/fs with varying pieces of DME; discussed safety with performance 2/2 history of numerous falls due to OH   Participating in squat pivot t/f EOB<>BSC x 2 trials; increased assist required   Pt returned to supine 2/2 R hip pain from graft   Discussed other home safety concerns and d/c recs/DME     Patient left supine with all lines intact, call button in reach, bed alarm on, nsg notified, and nsg and spouse present    GOALS:   Multidisciplinary Problems       Occupational Therapy Goals          Problem: Occupational Therapy    Goal Priority Disciplines Outcome Interventions   Occupational Therapy Goal     OT, PT/OT Ongoing, Progressing    Description: Goals to be met by: 5/3/24     Patient will increase functional independence with ADLs by performing:    LE Dressing with Minimal Assistance.  Toileting from bedside commode with Minimal Assistance for hygiene and clothing management.   Supine to sit with Modified [place].  Stand pivot transfers with Contact Guard Assistance.  Squat pivot transfers with Contact Guard Assistance.  Toilet transfer to bedside commode with Contact Guard Assistance.  Increased functional strength to WFL for self care skills and functional mobility.  Upper extremity exercise program x10 reps per handout, with independence.                         History:     Past Medical History:   Diagnosis Date    Allergic rhinitis     Anemia 1987    Arthritis 2000    Basal cell carcinoma     Broken ankle     Cancer 1985    Clotting disorder 1993    Disorder of kidney and ureter      DVT (deep venous thrombosis)     Factor V deficiency     Fracture of elbow     left    Glioblastoma multiforme of brain     Hypothyroidism     IBS (irritable bowel syndrome)     Osteoporosis     Panhypopituitarism     Peripheral polyneuropathy     Secondary adrenal insufficiency     Seizures     Thyroid disease          Past Surgical History:   Procedure Laterality Date    APPENDECTOMY      BRAIN SURGERY      for glioblastoma      SECTION      CHOLECYSTECTOMY  2007    CLOSURE OF DEFECT OF MOHS PROCEDURE Left 2018    Procedure: CLOSURE, MOHS PROCEDURE DEFECT SCALP  Flap closure;  Surgeon: Yrn Novak MD;  Location: [address];  Service: Plastics;  Laterality: Left;    COLONOSCOPY N/A 2019    Procedure: COLONOSCOPY Suprep;  Surgeon: Tony Mosher MD;  Location: [facility];  Service: Endoscopy;  Laterality: N/A;    ESOPHAGOGASTRODUODENOSCOPY N/A 2019    Procedure: EGD/colon;  Surgeon: Tony Mosher MD;  Location: [facility];  Service: Endoscopy;  Laterality: N/A;    EXCISION OF GANGLION CYST OF HAND Left 2020    Procedure: EXCISION, GANGLION CYST, HAND;  Surgeon: Ross Drew Jr., MD;  Location: [facility];  Service: Orthopedics;  Laterality: Left;    HYSTERECTOMY  1999    ILIAC CREST BONE GRAFT Right 3/15/2024    Procedure: BONE GRAFT, ILIAC CREST;  Surgeon: Geoffrey Goodson MD;  Location: [facility];  Service: Orthopedics;  Laterality: Right;  acumed harvester    ILIAC CREST BONE GRAFT Right 2024    Procedure: BONE GRAFT, ILIAC CREST;  Surgeon: Geoffrey Goodson MD;  Location: [facility];  Service: Orthopedics;  Laterality: Right;    MIDFOOT ARTHRODESIS Right 2024    Procedure: FUSION, JOINT, MIDFOOT;  Surgeon: Geoffrey Goodson MD;  Location: [facility];  Service: Orthopedics;  Laterality: Right;  mini c arm    OPEN REDUCTION AND INTERNAL FIXATION (ORIF) OF FRACTURE OF TARSAL BONE Right 3/15/2024    Procedure: ORIF, FRACTURE, TARSAL BONE;  Surgeon:  Geoffrey Goodson MD;  [facility];  Service: Orthopedics;  Laterality: Right;  supine, mini C arm, [place], block okay    SHOULDER SURGERY Right     TILT TABLE TEST N/A 07/15/2022    Procedure: TILT TABLE TEST;  Surgeon: Amol Kohler MD;  Location: [facility];  Service: Cardiology;  Laterality: N/A;  Orthostatic hypotension, dizziness, Tilt Table Test with EEG, Dr.Tiffany Roca (neuro), DM    TUBAL LIGATION  1998       Time Tracking:     OT Date of Treatment: 04/03/24  OT Start Time: 1132  OT Stop Time: 1157  OT Total Time (min): 25 min    Billable Minutes:Evaluation 8  Self Care/Home Management 9  Therapeutic Activity 8    4/3/2024

## 2024-04-03 NOTE — PLAN OF CARE
AAOX4. Dressing to RLE clean, dry, and intact. Medications administered per MAR. Tele monitored. Safety precautions maintained. Call bell within reach.

## 2024-04-03 NOTE — PLAN OF CARE
Problem: Occupational Therapy  Goal: Occupational Therapy Goal  Description: Goals to be met by: 5/3/24     Patient will increase functional independence with ADLs by performing:    LE Dressing with Minimal Assistance.  Toileting from bedside commode with Minimal Assistance for hygiene and clothing management.   Supine to sit with Modified [place].  Stand pivot transfers with Contact Guard Assistance.  Squat pivot transfers with Contact Guard Assistance.  Toilet transfer to bedside commode with Contact Guard Assistance.  Increased functional strength to WFL for self care skills and functional mobility.  Upper extremity exercise program x10 reps per handout, with independence.    Outcome: Ongoing, Progressing       Pt would benefit from cont OT services in order to maximize functional independence. Recommending high intensity therapy at d/c with drop arm BSC and w/c with elevating leg rests. Pt currently requiring increased assist for ADLs; orthostatic hypotension also limiting OOB axs/functional mobility at this time. Will progress pt as able.

## 2024-04-03 NOTE — PT/OT/SLP EVAL
Physical Therapy Evaluation    Patient Name:  Thelma Nguyen   MRN:  [id number]    Recommendations:     Discharge Recommendations: High Intensity Therapy   Discharge Equipment Recommendations: wheelchair w/elevating leg rests, bedside commode (if dc home)   Barriers to discharge: None    This patient has a mobility limitation that significantly impairs their ability to participate in or or more mobility related activities of daily living (MRADL's) such as toileting, feeing, dressing, grooming and bathing in customary locations in the home. The mobility limitation cannot be sufficiently resolved by the use of a cane or walker. The use of a manual wheelchair will significantly improve this patient's ability to participate in MRADL's and the patient will use it on a regular basis in the home. This patient is willing to use a manual wheelchair in the home. There is a caregiver who is available, willing, and able to provide assistance with the wheelchair when needed.      Assessment:     Thelma Nguyen is a 58 y.o. female admitted with a medical diagnosis of Orthostatic hypotension.  She presents with the following impairments/functional limitations: weakness, impaired endurance, impaired functional mobility, gait instability, impaired balance, decreased lower extremity function, impaired cardiopulmonary response to activity, decreased ROM, pain, decreased upper extremity function, orthopedic precautions. PT evaluation completed this date, will continue to follow and progress patient as able. Pt currently requiring min a for bed to bsc transfer x 4 transfers. Educated patient on NWB LLE and expected therapy progression. Pt orthostatic this date, will continue to follow. Family and patient wanting HIT rather than home. Pt is appropriate for HIT at this time given her current level of function.   Rehab Prognosis: Good; patient would benefit from acute skilled PT services to address these deficits and  reach maximum level of function.    Recent Surgery: Procedure(s) (LRB):  FUSION, JOINT, MIDFOOT (Right)  BONE GRAFT, ILIAC CREST (Right) 1 Day Post-Op    Plan:     During this hospitalization, patient to be seen 4 x/week to address the identified rehab impairments via gait training, therapeutic activities, therapeutic exercises, therapeutic groups, neuromuscular re-education, wheelchair management/training and progress toward the following goals:    Plan of Care Expires:  04/17/24    Subjective     Chief Complaint: RLE pain   Patient/Family Comments/goals: to get stronger   Pain/Comfort:  Pain Rating 1: 5/10  Location - Side 1: Right  Location 1: ankle (also complained of R hip at end of session.)  Pain Rating Post-Intervention 1: 5/10    Patients cultural, spiritual, Caodaism conflicts given the current situation:      Living Environment:  Pt lives in a [facility] with .  is retired and able to assist at time of dc.  Prior to admission, patients level of function was minimally mobile d/t hx of R foot fracture. Pt reports she was transferrin NWB to knee scooter and using her knee scooter to access the bathroom.  Equipment used at home: bath bench (knee scooter).  DME owned (not currently used): none.  Upon discharge, patient will have assistance from .    Objective:     Communicated with RN prior to session.  Patient found supine with    upon PT entry to room.    General Precautions: Standard, fall  Orthopedic Precautions:RLE non weight bearing   Braces: N/A  Respiratory Status: Room air    Exams:  Cognitive Exam:  Patient is oriented to Person, Place, Time, and Situation  Postural Exam:  Patient presented with the following abnormalities: -       No postural abnormalities identified  Sensation:    -       Impaired  light/touch numbness d/t hx of polyneuropathy in BLEs   RLE ROM: WFL except R ankle in splint  RLE Strength: WFL except R ankle, in splint.   LLE ROM: WFL  LLE Strength:  Grossly 4/5    Functional Mobility:  Bed Mobility:     Supine to Sit: stand by assistance  Sit to Supine: contact guard assistance  Transfers:     Toilet Transfer: minimum assistance with  bedside commode  using  Squat Pivot      AM-PAC 6 CLICK MOBILITY  Total Score:16       Treatment & Education:  Patient educated on bedside commode transfers and NWB to RLE. Spouse and patient educated on limiting functional mobility to squat pivot transfers given hx of falls and orthostatics. BP taken and in supine BP: 134/69, sittin/5, after 5 min of eob sitting /64. PT demonstrated transfer technique of NWB to patient for squat pivot transfers. Pt required min vc's for transfers to maintain NWB LLE.  Will continue to follow and progress pt as able.     Patient left supine with all lines intact, call button in reach, and RN notified.    GOALS:   Multidisciplinary Problems       Physical Therapy Goals          Problem: Physical Therapy    Goal Priority Disciplines Outcome Goal Variances Interventions   Physical Therapy Goal     PT, PT/OT Ongoing, Progressing     Description: Goals to be met by: 24     Patient will increase functional independence with mobility by performin. Supine to sit with [place]  2. Sit to supine with [place]  3. Bed to chair transfer with Modified [place] using No Assistive Device  4. Wheelchair propulsion x200 feet with Modified [place] using bilateral uppper extremities                         History:     Past Medical History:   Diagnosis Date    Allergic rhinitis     Anemia     Arthritis     Basal cell carcinoma     Broken ankle     Cancer 1985    Clotting disorder     Disorder of kidney and ureter     DVT (deep venous thrombosis)     Factor V deficiency     Fracture of elbow     left    Glioblastoma multiforme of brain     Hypothyroidism     IBS (irritable bowel syndrome)     Osteoporosis     Panhypopituitarism     Peripheral polyneuropathy      Secondary adrenal insufficiency     Seizures     Thyroid disease        Past Surgical History:   Procedure Laterality Date    APPENDECTOMY      BRAIN SURGERY      for glioblastoma      SECTION      CHOLECYSTECTOMY  2007    CLOSURE OF DEFECT OF MOHS PROCEDURE Left 2018    Procedure: CLOSURE, MOHS PROCEDURE DEFECT SCALP  Flap closure;  Surgeon: Yrn Novak MD;  Location: [address];  Service: Plastics;  Laterality: Left;    COLONOSCOPY N/A 2019    Procedure: COLONOSCOPY Suprep;  Surgeon: Tony Mosher MD;  Location: [facility];  Service: Endoscopy;  Laterality: N/A;    ESOPHAGOGASTRODUODENOSCOPY N/A 2019    Procedure: EGD/colon;  Surgeon: Tony Mosher MD;  Location: [facility];  Service: Endoscopy;  Laterality: N/A;    EXCISION OF GANGLION CYST OF HAND Left 2020    Procedure: EXCISION, GANGLION CYST, HAND;  Surgeon: Ross Drew Jr., MD;  Location: [facility];  Service: Orthopedics;  Laterality: Left;    HYSTERECTOMY      ILIAC CREST BONE GRAFT Right 3/15/2024    Procedure: BONE GRAFT, ILIAC CREST;  Surgeon: Geoffrey Goodson MD;  Location: [facility];  Service: Orthopedics;  Laterality: Right;  acumed harvester    ILIAC CREST BONE GRAFT Right 2024    Procedure: BONE GRAFT, ILIAC CREST;  Surgeon: Geoffrey Goodson MD;  Location: [facility];  Service: Orthopedics;  Laterality: Right;    MIDFOOT ARTHRODESIS Right 2024    Procedure: FUSION, JOINT, MIDFOOT;  Surgeon: Geoffrey Goodson MD;  Location: [facility];  Service: Orthopedics;  Laterality: Right;  mini c arm    OPEN REDUCTION AND INTERNAL FIXATION (ORIF) OF FRACTURE OF TARSAL BONE Right 3/15/2024    Procedure: ORIF, FRACTURE, TARSAL BONE;  Surgeon: Geoffrey Goodson MD;  Location: [facility];  Service: Orthopedics;  Laterality: Right;  supine, mini C arm, Styker, block okay    SHOULDER SURGERY Right     TILT TABLE TEST N/A 07/15/2022    Procedure: TILT TABLE TEST;  Surgeon: Amol Kohler MD;   Location: [facility];  Service: Cardiology;  Laterality: N/A;  Orthostatic hypotension, dizziness, Tilt Table Test with EEG, Dr.Tiffany Roca (neuro), DM    TUBAL LIGATION  1998       Time Tracking:     PT Received On: 04/03/24  PT Start Time: 1132     PT Stop Time: 1157  PT Total Time (min): 25 min     Billable Minutes: Evaluation 10 and Therapeutic Activity 15      04/03/2024

## 2024-04-03 NOTE — PROGRESS NOTES
SUBJECTIVE:    Ms. Nguyen is status post hardware removal and revision open reduction internal fixation of her right navicular fracture as well as navicular cuneiform and talonavicular fusion with dorsal spanning plate.  She is doing well.  Pain is controlled.  She is on IV steroids.  She was receiving antibiotics.  She is going to restart her Xarelto today.    I discussed her condition with her  in the fact that she had upwards of 20 falls in the periods between week 1 and week 3 of her injury.  I think given these falls and the risks therein, she will require rehab following his hospital stay.        OBJECTIVE:      Vitals:    04/03/24 0436 04/03/24 0615 04/03/24 0816 04/03/24 1017   BP: 119/60  (!) 110/53    Pulse: 93  98    Resp: 20 17 20 20   Temp: 98.3 °F (36.8 °C)  98.2 °F (36.8 °C)    TempSrc: Oral      SpO2: 96%  95%    Weight:       Height:           Lower Extremity Exam  Right lower extremity clean dry and intact with splint in place.  She wiggles her toes, she fires FHL EHL, and her toes are warm and well-perfused.  Her sensation is diminished but it is at her baseline.     DIAGNOSTIC STUDIES:  X-rays from yesterday reveal revision open reduction internal fixation with naviculocuneiform as well as talonavicular fusion.  Removal of hardware.  Fracture fragments appear improved in alignment.    ASSESSMENT:   1. Status post revision open reduction internal fixation navicular fracture and navicular cuneiform talonavicular fusion, right lower extremity      PLAN:  -nonweightbearing to the right lower extremity   -PT OT today   -Mobility limitations which require wheelchair documented in brief operative note from yesterday  -Antibiotics today, 24 hours of Ancef  -Okay to restart anticoagulation orthopedic perspective as per my brief operative note.  -Follow up social work and case management recommendations for rehab and follow-up wheelchair  -Appreciate hospitalist management re IV steroids and  head injury as well as rest of care.    Geoffrey Goodson MD  [facility]  Orthopedic Surgery

## 2024-04-03 NOTE — OP NOTE
Orthopaedic Surgery Operative Report        DATE OF PROCEDURE:4/2/24  PREOPERATIVE DIAGNOSIS: Closed displaced fracture of the Right Navicular  Failed Open Reduction Internal Fixation Navicular, Right     POSTOPERATIVE DIAGNOSIS: same  PROCEDURE PERFORMED:  1.  Revision Open Reduction Internal Fixation, Right Navicular  2. Talonavicular Arthrodesis, Right Foot  3. Naviculocuneiform Arthrodesis, Right Foot  4. Iliac Crest Bone [place], Right Hip  5. Hardware Removal, Right Foot  6. Exam Under Anesthesia, Right Foot      SURGEON: Geoffrey Goodson M.D.   ASSISTANT: Jas Ly PA-C, SMA,  ANESTHESIA: General  ESTIMATED BLOOD LOSS: 20 cc.      INDICATIONS FOR PROCEDURE: The patient is 60 year old female who has a history of glioblastoma status post surgery and radiotherapy, hypothyroidism, panhypopituitarism, orthostatic hypertension, multiple DVTs on Xarelto, and peripheral neuropathy who sustained a fracture of the Navicular of unknown chronicity.  The fracture was initially treated with open reduction and internal fixation of this injury as well as iliac crest bone grafting.  During surgery, the fracture was noted to be well reduced and stable on range of motion testing.  Unfortunately postoperatively she fell roughly 20 times.  She presented 1 week postoperatively and her splint was clean and dry and her skin about the incision appeared normal.  There were no masses about the foot.  Unfortunately she presented her 3 week follow up with an obvious mass in the medial side of the foot, and x-rays confirmed loss of fixation of the hardware.  She is unsure which fall caused this.  She does note that she fell a few times and had to use her right foot to brace her fall.  She also notes passing out and is uncertain of all the events preceding and following the falls.  Therefore, she was indicated for the procedure above.  The risks and benefits of the procedure were discussed with the risks including bleeding, infection,  damage to surrounding structures, nonunion, malunion, fracture, DVTs, PEs, myocardial infarction, loss of limb, failure procedure, numerous other risks.  Her comorbidities contributing to his risk factors including her orthostatic hypertension and history of falls, her peripheral neuropathy, her factor 5 Leiden deficiency, history of glioblastoma multiforme, her panhypopituitarism requiring IV steroids, and other medical history were all discussed at length.  The alternatives to surgery were also discussed with the patient.  The risks of nonoperative management including open fracture, nonunion, loss of mobility, pain, and difficulty with ambulation were all discussed as well.  The patient opted for the procedure above.        PROCEDURE IN DETAIL: The patient was identified in the preoperative holding area and site was marked. The patient was wheeled into the Operating Room and general anesthesia was induced. The patient was placed into a supine position with the affected limb in the prime position. The affected limb was then prepped and draped in the usual sterile fashion. A timeout was taken to confirm the patient, site, surgery, surgeon and administration of preoperative antibiotics. All agreed and we proceeded.      We began with an iliac crest bone harvest.  We expanded the previous incision to roughly 3 cm in length, we encountered the fascia which had been neatly closed, we incised this with a knife, we accessed the iliac crest, we used irrigation to clear it of any Gel-Foam.  We then proceeded to harvest iliac crest with the use of a Reamer and curette.  We then placed Gelfoam in the incision, closed the fascia with 1. Vicryl, and closed the subcutaneous layer with 2-0 Monocryl suture and closed the skin with 3-0 nylon.    We then went about revising our navicular open reduction internal fixation. We then reaccessed the previous incision to the midfoot, of roughly 6cm in size. The EHL was retracted medially  with the neurovascular bundle. Our deep layer was neatly incised and the fracture was visualized.  There was significantly distracted fracture which appeared new, as well as the old fracture which appeared to have fixation from the two screws. The lateral screw and fracture fragment were displaced laterally. The three screws were removed without incident. The fracture was debrided and callus removed using a [place] and rongeur. The fracture was closed reduced with wires and a tenaculum clamp.  a 2.7mm interfragmentary screw was placed in lag by technique fashion. It had excellent purchase. Next a staple was applied dorsally, with care taken to avoid the TN joint. We used a hoemann and a freer placed in the joint to visualize that we were in bone and not in the TN joint.       Next, we went about the talonavicular and naviculocuneiform arthrodesis.  We expanded our incision proximally and distally by roughly 1-1/2 cm to access the cuneiform and talus.  We rereduced the navicular with the fixation in place. We opted to fuse the TN and NC joints. We prepped the TN and NC joints with osteotomes, curettes, and mallets. We placed the rest of our bone graft in the joints. We then compressed the joints using a combination of tenaculum clamps and compression plating.  This was done by placing 2 screws into the talus through the plate, and then drilling and placing a cortical screw in the compression slot of the 2.7 mm plate.  We noted compression about the joint surface.  We then clamped and pinned the NC joint.  We then plated this via using 2 more screws in the navicular and 2 screws in the cuneiform.    We then tested range of motion and performed an exam under anesthesia.  Inversion and eversion were reduced as expected given the dorsal plating of the medial column.  The navicular remained reduced and in excellent positioning during the range of motion.  We then performed      Our procedure complete, we proceeded to close.   We copiously irrigated.  Vancomycin powder was placed in the wound.  The tourniquet was let down and hemostasis achieved. The deep tissues were closed with 2-0 Monocryl, and the subcutaneous tissue was closed with 2-0 Monocryl.  The skin was closed with 3-0 nylon.   The patient was placed in a well padded splint. The patient was awoken from anesthesia having suffered no untoward event and taken to the postanesthesia care unit.     Postoperative plan:  Non-weight bearing to the Right lower extremity  Aspirin daily for prophylaxis   Oxycodone q6h prn for pain  Motrin OTC q6h for pain  Follow up in 2 weeks   Discharge home today

## 2024-04-03 NOTE — SUBJECTIVE & OBJECTIVE
Interval History: This morning her pain is well controled    Review of Systems   Constitutional:  Negative for chills and fever.   HENT:  Negative for congestion.    Respiratory:  Negative for shortness of breath.    Cardiovascular:  Negative for chest pain.   Gastrointestinal:  Negative for abdominal pain, nausea and vomiting.   Genitourinary:  Negative for dysuria.   Musculoskeletal:         Right foot fracture   Skin:         Bruising - rib cage; surgical incision right foot   Neurological:  Positive for dizziness, syncope and light-headedness.   Psychiatric/Behavioral:  Negative for agitation and confusion. The patient is not nervous/anxious.      Objective:     Vital Signs (Most Recent):  Temp: 97.9 °F (36.6 °C) (04/03/24 1551)  Pulse: 93 (04/03/24 1551)  Resp: 18 (04/03/24 1552)  BP: (!) 162/74 (04/03/24 1551)  SpO2: 99 % (04/03/24 1551) Vital Signs (24h Range):  Temp:  [97.7 °F (36.5 °C)-98.5 °F (36.9 °C)] 97.9 °F (36.6 °C)  Pulse:  [] 93  Resp:  [12-20] 18  SpO2:  [94 %-99 %] 99 %  BP: (110-162)/(53-74) 162/74     Weight: 70.5 kg (155 lb 6.8 oz)  Body mass index is 24.34 kg/m².    Intake/Output Summary (Last 24 hours) at 4/3/2024 1717  Last data filed at 4/3/2024 1220  Gross per 24 hour   Intake 120 ml   Output 350 ml   Net -230 ml         Physical Exam  Constitutional:       General: She is not in acute distress.  HENT:      Head: Normocephalic and atraumatic.   Eyes:      Extraocular Movements: Extraocular movements intact.      Pupils: Pupils are equal, round, and reactive to light.   Cardiovascular:      Rate and Rhythm: Normal rate and regular rhythm.      Pulses:           Popliteal pulses are 2+ on the right side.        Dorsalis pedis pulses are 2+ on the left side.      Heart sounds: Normal heart sounds.   Pulmonary:      Effort: Pulmonary effort is normal. No respiratory distress.      Breath sounds: Normal breath sounds. No wheezing, rhonchi or rales.   Abdominal:      General: Bowel sounds  "are normal. There is no distension.      Palpations: Abdomen is soft.      Tenderness: There is no abdominal tenderness. There is no guarding or rebound.   Musculoskeletal:      Cervical back: No rigidity.      Right lower leg: No edema.      Left lower leg: No edema.   Skin:     General: Skin is warm.      Capillary Refill: Capillary refill takes 2 to 3 seconds.      Comments: Post op boot CDI   Neurological:      Mental Status: She is alert and oriented to person, place, and time.   Psychiatric:         Mood and Affect: Mood normal.         Behavior: Behavior normal.         Thought Content: Thought content normal.         Judgment: Judgment normal.             Significant Labs: All pertinent labs within the past 24 hours have been reviewed.  Blood Culture: No results for input(s): "LABBLOO" in the last 48 hours.  CBC:   Recent Labs   Lab 04/02/24  0515 04/03/24  0500   WBC 6.70 10.06   HGB 9.2* 8.3*   HCT 27.7* 25.9*    320     CMP:   Recent Labs   Lab 04/02/24  0515 04/03/24  0500    141   K 4.2 4.7    108   CO2 23 20*   GLU 94 131*   BUN 36* 30*   CREATININE 1.6* 1.7*   CALCIUM 8.7 7.8*   ANIONGAP 10 13       Significant Imaging: I have reviewed all pertinent imaging results/findings within the past 24 hours.  "

## 2024-04-03 NOTE — PLAN OF CARE
Patient is AAOx4. Pt given medications as ordered per MAR. IV antibiotics given as scheduled. PRN Oxycodone given for RLE pain. RLE dressing elevated and CDI. Cardiac monitoring in place. Purewick in place. Safety maintained. Bed alarm set. Instructed to use call light for assistance. Will continue to monitor.        Problem: Fall Injury Risk  Goal: Absence of Fall and Fall-Related Injury  Outcome: Ongoing, Progressing     Problem: Skin Injury Risk Increased  Goal: Skin Health and Integrity  Outcome: Ongoing, Progressing     Problem: Impaired Wound Healing  Goal: Optimal Wound Healing  Outcome: Ongoing, Progressing

## 2024-04-03 NOTE — PLAN OF CARE
spoke with  OBS Supervisor Shakira regarding therapy recommendations. Referral sent to [facility] to review patient.  met with patient and informed her of her OBS status and placement. Patient/Family wanting placement in Rehab (spoke with therapists as well). I told her will send to Rehab to see if approved. However, insurance will have to give an authorization. Patient verbalized understanding. Patient encouraged to call with any questions or concerns.  will continue to follow patient through transitions of care and assist with any discharge needs.      spoke with Martha with [facility]. She stated she has submitted auth for patient. She is aware patient's steroids changed to oral.     Patient stated she will update her . I did try to call him, but unable to reach.    Patient Contacts    Name Relation Home Work Mobile   Arie Nguyen Spouse [phone]     Future Appointments   Date Time Provider Department Center   4/26/2024 11:00 AM [facility] [place]   5/2/2024  1:30 PM Swetha Bay MD [facility]RO [facility]   5/3/2024 12:00 PM Viktor Cohen MD [facility]   6/11/2024 11:00 AM Zachary Bender MD [facility] IM [facility]   8/27/2024  3:00 PM [facility]         04/03/24 1551   Discharge Reassessment   Assessment Type Discharge Planning Reassessment   Did the patient's condition or plan change since previous assessment? No   Discharge Plan discussed with: Patient   Discharge Plan A Rehab   Discharge Plan B Home with family;Home Health   DME Needed Upon Discharge  wheelchair;bedside commode   Transition of Care Barriers None   Why the patient remains in the hospital Requires continued medical care   Post-Acute Status   Post-Acute Authorization Placement   Post-Acute Placement Status Referrals Sent   Discharge Delays None known at this time     Itzel Jin RN    [phone]  [phone]

## 2024-04-03 NOTE — PLAN OF CARE
Problem: Physical Therapy  Goal: Physical Therapy Goal  Description: Goals to be met by: 24     Patient will increase functional independence with mobility by performin. Supine to sit with [place]  2. Sit to supine with [place]  3. Bed to chair transfer with Modified [place] using No Assistive Device  4. Wheelchair propulsion x200 feet with Modified [place] using bilateral uppper extremities    Outcome: Ongoing, Progressing     PT evaluation completed this date, will continue to follow and progress patient as able. Pt currently requiring min a for bed to bsc transfer x 4 transfers. Educated patient on NWB RLE and expected therapy progression. Pt orthostatic this date, will continue to follow. Family and patient wanting HIT rather than home. Pt is appropriate for HIT at this time given her current level of function.

## 2024-04-04 LAB
ANION GAP SERPL CALC-SCNC: 6 MMOL/L (ref 8–16)
BASOPHILS # BLD AUTO: 0.03 K/UL (ref 0–0.2)
BASOPHILS NFR BLD: 0.4 % (ref 0–1.9)
BUN SERPL-MCNC: 25 MG/DL (ref 6–20)
CALCIUM SERPL-MCNC: 7.7 MG/DL (ref 8.7–10.5)
CHLORIDE SERPL-SCNC: 113 MMOL/L (ref 95–110)
CO2 SERPL-SCNC: 23 MMOL/L (ref 23–29)
CREAT SERPL-MCNC: 1.6 MG/DL (ref 0.5–1.4)
DIFFERENTIAL METHOD BLD: ABNORMAL
EOSINOPHIL # BLD AUTO: 0.2 K/UL (ref 0–0.5)
EOSINOPHIL NFR BLD: 2.9 % (ref 0–8)
ERYTHROCYTE [DISTWIDTH] IN BLOOD BY AUTOMATED COUNT: 12.8 % (ref 11.5–14.5)
EST. GFR  (NO RACE VARIABLE): 37 ML/MIN/1.73 M^2
GLUCOSE SERPL-MCNC: 94 MG/DL (ref 70–110)
HCT VFR BLD AUTO: 25.8 % (ref 37–48.5)
HGB BLD-MCNC: 8.3 G/DL (ref 12–16)
IMM GRANULOCYTES # BLD AUTO: 0.03 K/UL (ref 0–0.04)
IMM GRANULOCYTES NFR BLD AUTO: 0.4 % (ref 0–0.5)
LYMPHOCYTES # BLD AUTO: 2.6 K/UL (ref 1–4.8)
LYMPHOCYTES NFR BLD: 33.1 % (ref 18–48)
MCH RBC QN AUTO: 29.9 PG (ref 27–31)
MCHC RBC AUTO-ENTMCNC: 32.2 G/DL (ref 32–36)
MCV RBC AUTO: 93 FL (ref 82–98)
MONOCYTES # BLD AUTO: 0.6 K/UL (ref 0.3–1)
MONOCYTES NFR BLD: 7.3 % (ref 4–15)
NEUTROPHILS # BLD AUTO: 4.5 K/UL (ref 1.8–7.7)
NEUTROPHILS NFR BLD: 55.9 % (ref 38–73)
NRBC BLD-RTO: 0 /100 WBC
PLATELET # BLD AUTO: 295 K/UL (ref 150–450)
PMV BLD AUTO: 8.9 FL (ref 9.2–12.9)
POTASSIUM SERPL-SCNC: 3.8 MMOL/L (ref 3.5–5.1)
RBC # BLD AUTO: 2.78 M/UL (ref 4–5.4)
SODIUM SERPL-SCNC: 142 MMOL/L (ref 136–145)
WBC # BLD AUTO: 7.98 K/UL (ref 3.9–12.7)

## 2024-04-04 PROCEDURE — 36415 COLL VENOUS BLD VENIPUNCTURE: CPT | Performed by: NURSE PRACTITIONER

## 2024-04-04 PROCEDURE — G0378 HOSPITAL OBSERVATION PER HR: HCPCS

## 2024-04-04 PROCEDURE — 97530 THERAPEUTIC ACTIVITIES: CPT | Mod: CO

## 2024-04-04 PROCEDURE — 96366 THER/PROPH/DIAG IV INF ADDON: CPT

## 2024-04-04 PROCEDURE — 63600175 PHARM REV CODE 636 W HCPCS: Performed by: INTERNAL MEDICINE

## 2024-04-04 PROCEDURE — 25000003 PHARM REV CODE 250: Performed by: INTERNAL MEDICINE

## 2024-04-04 PROCEDURE — 85025 COMPLETE CBC W/AUTO DIFF WBC: CPT | Performed by: NURSE PRACTITIONER

## 2024-04-04 PROCEDURE — 97530 THERAPEUTIC ACTIVITIES: CPT | Mod: CQ

## 2024-04-04 PROCEDURE — 25000003 PHARM REV CODE 250: Performed by: NURSE PRACTITIONER

## 2024-04-04 PROCEDURE — 80048 BASIC METABOLIC PNL TOTAL CA: CPT | Performed by: NURSE PRACTITIONER

## 2024-04-04 RX ORDER — HYDROCORTISONE 5 MG/1
20 TABLET ORAL ONCE
Status: COMPLETED | OUTPATIENT
Start: 2024-04-04 | End: 2024-04-04

## 2024-04-04 RX ORDER — HYDROCORTISONE 5 MG/1
20 TABLET ORAL NIGHTLY
Status: DISCONTINUED | OUTPATIENT
Start: 2024-04-04 | End: 2024-04-04

## 2024-04-04 RX ORDER — HYDROCORTISONE 5 MG/1
5 TABLET ORAL NIGHTLY
Status: DISCONTINUED | OUTPATIENT
Start: 2024-04-05 | End: 2024-04-07

## 2024-04-04 RX ORDER — HYDROCORTISONE 5 MG/1
15 TABLET ORAL DAILY
Status: DISCONTINUED | OUTPATIENT
Start: 2024-04-05 | End: 2024-04-11 | Stop reason: HOSPADM

## 2024-04-04 RX ORDER — MIDODRINE HYDROCHLORIDE 2.5 MG/1
2.5 TABLET ORAL 2 TIMES DAILY WITH MEALS
Status: DISCONTINUED | OUTPATIENT
Start: 2024-04-04 | End: 2024-04-11 | Stop reason: HOSPADM

## 2024-04-04 RX ADMIN — CEFAZOLIN SODIUM 2 G: 2 SOLUTION INTRAVENOUS at 03:04

## 2024-04-04 RX ADMIN — RIVAROXABAN 20 MG: 20 TABLET, FILM COATED ORAL at 05:04

## 2024-04-04 RX ADMIN — HYDROCORTISONE 20 MG: 5 TABLET ORAL at 05:04

## 2024-04-04 RX ADMIN — OXYCODONE 5 MG: 5 TABLET ORAL at 07:04

## 2024-04-04 RX ADMIN — ACETAMINOPHEN 500 MG: 500 TABLET ORAL at 05:04

## 2024-04-04 RX ADMIN — FERROUS SULFATE TAB 325 MG (65 MG ELEMENTAL FE) 1 EACH: 325 (65 FE) TAB at 08:04

## 2024-04-04 RX ADMIN — HYDROCORTISONE 40 MG: 5 TABLET ORAL at 09:04

## 2024-04-04 RX ADMIN — LEVOTHYROXINE SODIUM 112 MCG: 112 TABLET ORAL at 05:04

## 2024-04-04 RX ADMIN — ACETAMINOPHEN 500 MG: 500 TABLET ORAL at 11:04

## 2024-04-04 RX ADMIN — DULOXETINE HYDROCHLORIDE 60 MG: 30 CAPSULE, DELAYED RELEASE ORAL at 08:04

## 2024-04-04 RX ADMIN — PHENYTOIN SODIUM 200 MG: 100 CAPSULE ORAL at 08:04

## 2024-04-04 NOTE — ASSESSMENT & PLAN NOTE
171/80 > 117/67 > 99/54  Patient reports being dx w/ postural hypotension with vasovagal response  Likely cause of recurrent syncope/falls  Cardiology consult ordered given worsening of sx  Head CT: no acute abnormality  Had + tilt table testing in 2022  Midodrine added to steroids, monitor BP

## 2024-04-04 NOTE — PLAN OF CARE
AAOx4. Tele monitored. Dressing clean, dry, and intact. Medications administered per MAR. Safety precautions maintained. Call bell within reach.

## 2024-04-04 NOTE — PLAN OF CARE
Problem: Physical Therapy  Goal: Physical Therapy Goal  Description: Goals to be met by: 24     Patient will increase functional independence with mobility by performin. Supine to sit with [place]  2. Sit to supine with [place]  3. Bed to chair transfer with Modified [place] using No Assistive Device  4. Wheelchair propulsion x200 feet with Modified [place] using bilateral uppper extremities    Outcome: Ongoing, Progressing

## 2024-04-04 NOTE — PLAN OF CARE
went to meet with patient and spouse this morning. Patient is still in OBS status which I explained to patient and spouse. They still are wanting REHAB and stated Ortho wants placement as well. I informed both that [facility] has medically accepted her, but insurance will need to approve. Patient noted to already have OP Therapy appointments later this month. I did again reiterate to patient/family rehab criteria and expectations. All questions answered.  will follow-up on auth. Patient/Spouse encouraged to call with any questions or concerns.  will continue to follow patient through transitions of care and assist with any discharge needs.     Both are aware if Rehab not approved, can set up HH if not able to get to OP appointments. Patient will also need a wheelchair and BSC for home as well.     --Ortho follow-up requested.    1317-- reached out to Martha to notify if any updates regarding auth.    1322--Martha stated she checked at noon and auth still pending.    1335-- left message for  at [facility] [place] phone# [phone].    Patient Contacts    Name Relation Home Work Mobile   Arie Nguyen Spouse [phone]     Future Appointments   Date Time Provider Department Center   4/26/2024 11:00 AM Kleber Liu, [facility] OTPTRHB [place]   5/2/2024  1:30 PM Swetha Bay MD [facility] NEPHRO [facility]   5/3/2024 12:00 PM Annemarie-Viktor Cuello MD [facility]   6/11/2024 11:00 AM Zachary Bender MD [facility] IM [facility] PCW   8[facility]27[facility]2024  [facility] BMD [facility]         04/04/24 1048   Discharge Reassessment   Assessment Type Discharge Planning Reassessment   Did the patient's condition or plan change since previous assessment? No   Discharge Plan discussed with: Patient;Spouse/sig other   Discharge Plan A Rehab   Discharge Plan B Home with family;Home Health   DME Needed Upon Discharge  wheelchair;bedside  commode   Transition of Care Barriers None   Why the patient remains in the hospital Requires continued medical care   Post-Acute Status   Post-Acute Authorization Placement   Post-Acute Placement Status Pending payor review/awaiting authorization (if required)   Hospital Resources/Appts/Education Provided Appointments scheduled and added to AVS   Discharge Delays None known at this time     Itzel Jin RN    [phone]

## 2024-04-04 NOTE — PLAN OF CARE
Problem: Occupational Therapy  Goal: Occupational Therapy Goal  Description: Goals to be met by: 5/3/24     Patient will increase functional independence with ADLs by performing:    LE Dressing with Minimal Assistance.  Toileting from bedside commode with Minimal Assistance for hygiene and clothing management.   Supine to sit with Modified [place].  Stand pivot transfers with Contact Guard Assistance.  Squat pivot transfers with Contact Guard Assistance.  Toilet transfer to bedside commode with Contact Guard Assistance.  Increased functional strength to WFL for self care skills and functional mobility.  Upper extremity exercise program x10 reps per handout, with independence.    Outcome: Ongoing, Progressing   Thelma Nguyen is a 58 y.o. female with a medical diagnosis of Orthostatic hypotension.  Performance deficits affecting function are weakness, impaired endurance, impaired self care skills, impaired functional mobility, gait instability, impaired balance, decreased upper extremity function, decreased lower extremity function, pain, decreased ROM, impaired skin, edema.    Pt found in bed, agreeable to therapy.  Pt with a decrease in BP when changing positions however within normal range w/ no symptoms.  Pt able to progress to OOB in wc with CGA. Continue OT services to address functional goals, progressing as able.

## 2024-04-04 NOTE — PT/OT/SLP PROGRESS
"Occupational Therapy   Treatment    Name: Thelma Nguyen  MRN: [id number]  Admitting Diagnosis:  Orthostatic hypotension  2 Days Post-Op    Recommendations:     Discharge Recommendations: High Intensity Therapy  Discharge Equipment Recommendations:  wheelchair (drop arm bedside commode)  Barriers to discharge:  Other (Comment) (increased level of assistance)    Assessment:     Thelma Nguyen is a 58 y.o. female with a medical diagnosis of Orthostatic hypotension.  Performance deficits affecting function are weakness, impaired endurance, impaired self care skills, impaired functional mobility, gait instability, impaired balance, decreased upper extremity function, decreased lower extremity function, pain, decreased ROM, impaired skin, edema.    Pt found in bed, agreeable to therapy.  Pt with a decrease in BP when changing positions however within normal range w/ no symptoms.  Pt able to progress to OOB in wc with CGA. Continue OT services to address functional goals, progressing as able.      Rehab Prognosis:  Good; patient would benefit from acute skilled OT services to address these deficits and reach maximum level of function.       Plan:     Patient to be seen 3 x/week to address the above listed problems via self-care/home management, therapeutic activities, therapeutic exercises  Plan of Care Expires: 05/03/24  Plan of Care Reviewed with: patient    Subjective     Chief Complaint: "I have been dealing with OH for a while."  Patient/Family Comments/goals: to return to Mod I   Pain/Comfort:  Pain Rating 1:  (no reports of pain during session)    Objective:     Communicated with: RN prior to session.  Patient found HOB elevated with bed alarm, peripheral IV, telemetry upon OT entry to room.    General Precautions: Standard, fall    Orthopedic Precautions:RLE non weight bearing  Braces: N/A  Respiratory Status: Room air     Occupational Performance:     Bed Mobility:    Patient completed " Rolling/Turning to Right with stand by assistance  Patient completed Scooting/Bridging with stand by assistance  Patient completed Supine to Sit with stand by assistance HOB elevated and use of BR    Functional Mobility/Transfers:  Patient completed Bed > Wheelchair Transfer using Squat Pivot technique with contact guard assistance with no assistive device      AMPA 6 Click ADL: 18    Treatment & Education:  Assessed Orthostatics-see flowsheet for details.    Encouraged OOB in chair 1 hour and to call for assistance for back to bed. Pt verbalizes understanding.  RLE elevated.     *This patient would BENEFIT from a drop arm bedside commode, as pt is non ambulatory, in order to maintain medically prescribed precautions and thus not cause failure of the integrity of their recent surgical intervention and decrease risk of fall 2/2 orthostatic hypotension.         Patient left up in chair with all lines intact, call button in reach, and dtr present    GOALS:   Multidisciplinary Problems       Occupational Therapy Goals          Problem: Occupational Therapy    Goal Priority Disciplines Outcome Interventions   Occupational Therapy Goal     OT, PT/OT Ongoing, Progressing    Description: Goals to be met by: 5/3/24     Patient will increase functional independence with ADLs by performing:    LE Dressing with Minimal Assistance.  Toileting from bedside commode with Minimal Assistance for hygiene and clothing management.   Supine to sit with Modified [place].  Stand pivot transfers with Contact Guard Assistance.  Squat pivot transfers with Contact Guard Assistance.  Toilet transfer to bedside commode with Contact Guard Assistance.  Increased functional strength to WFL for self care skills and functional mobility.  Upper extremity exercise program x10 reps per handout, with independence.                         Time Tracking:     OT Date of Treatment: 04/04/24  OT Start Time: 1145  OT Stop Time: 1209  OT Total Time (min):  24 min    Billable Minutes:Therapeutic Activity 24            4/4/2024

## 2024-04-04 NOTE — PROGRESS NOTES
SUBJECTIVE:    Ms. Nguyen is status post hardware removal and revision open reduction internal fixation of her right navicular fracture as well as navicular cuneiform and talonavicular fusion with dorsal spanning plate and iliac crest bone grafting.    We again discussed her condition today.  I expressed my read isn't at the idea of her going home given her falls and orthostatic hypotension.      OBJECTIVE:      Vitals:    04/04/24 0328 04/04/24 0343 04/04/24 0726 04/04/24 0741   BP: 138/67   (!) 146/67   Pulse: 95 93  96   Resp: 20 18 17   Temp: 98.2 °F (36.8 °C)   97.9 °F (36.6 °C)   TempSrc: Oral   Oral   SpO2: 95%   95%   Weight:       Height:           Lower Extremity Exam  Right lower extremity clean dry and intact with splint in place.  She wiggles her toes, she fires FHL EHL, and her toes are warm and well-perfused.  Her sensation is diminished but it is at her baseline.    Right hip incision is clean dry and intact with Tegaderm in place     DIAGNOSTIC STUDIES:  X-rays from operating theater reveal revision open reduction internal fixation with naviculocuneiform as well as talonavicular fusion.  Removal of hardware.  Fracture fragments appear improved in alignment.    ASSESSMENT:   1. Status post revision open reduction internal fixation navicular fracture and navicular cuneiform talonavicular fusion, right lower extremity      PLAN:  -nonweightbearing to the right lower extremity   -PT OT today   -Mobility limitations will require wheelchair see brief op note documentation- only ones that we will fit in the patient's house must be less than 30 in wide  -on home Xarelto  -Follow up social work and case management recommendations for rehab and follow-up wheelchair  -Appreciate hospitalist management re IV steroids and head injury as well as rest of care.  -Endo previously consulted - appreciate recommendations we steroid taper.  Recommend consultation we additional bone health recommendation at this  juncture.    Geoffrey Goodson MD  [facility]  Orthopedic Surgery

## 2024-04-04 NOTE — SUBJECTIVE & OBJECTIVE
Interval History: Had some hip pain after therapy    Review of Systems   Constitutional:  Negative for chills and fever.   HENT:  Negative for congestion.    Respiratory:  Negative for shortness of breath.    Cardiovascular:  Negative for chest pain.   Gastrointestinal:  Negative for abdominal pain, nausea and vomiting.   Genitourinary:  Negative for dysuria.   Musculoskeletal:         Right foot fracture   Skin:         Bruising - rib cage; surgical incision right foot   Neurological:  Positive for dizziness and syncope. Negative for light-headedness.   Psychiatric/Behavioral:  Negative for agitation and confusion. The patient is not nervous/anxious.      Objective:     Vital Signs (Most Recent):  Temp: 98.4 °F (36.9 °C) (04/04/24 1531)  Pulse: 103 (04/04/24 1613)  Resp: 18 (04/04/24 1531)  BP: (!) 176/77 (04/04/24 1613)  SpO2: 95 % (04/04/24 1531) Vital Signs (24h Range):  Temp:  [97 °F (36.1 °C)-98.5 °F (36.9 °C)] 98.4 °F (36.9 °C)  Pulse:  [] 103  Resp:  [17-20] 18  SpO2:  [95 %-100 %] 95 %  BP: (127-176)/(59-77) 176/77     Weight: 71 kg (156 lb 8.4 oz)  Body mass index is 24.52 kg/m².    Intake/Output Summary (Last 24 hours) at 4/4/2024 1709  Last data filed at 4/4/2024 0559  Gross per 24 hour   Intake 147.03 ml   Output 950 ml   Net -802.97 ml         Physical Exam  Constitutional:       General: She is not in acute distress.  HENT:      Head: Normocephalic and atraumatic.   Eyes:      Extraocular Movements: Extraocular movements intact.      Pupils: Pupils are equal, round, and reactive to light.   Cardiovascular:      Rate and Rhythm: Normal rate and regular rhythm.      Pulses:           Popliteal pulses are 2+ on the right side.        Dorsalis pedis pulses are 2+ on the left side.      Heart sounds: Normal heart sounds.   Pulmonary:      Effort: Pulmonary effort is normal. No respiratory distress.      Breath sounds: Normal breath sounds. No wheezing, rhonchi or rales.   Abdominal:      General:  Bowel sounds are normal. There is no distension.      Palpations: Abdomen is soft.      Tenderness: There is no abdominal tenderness. There is no guarding or rebound.   Musculoskeletal:      Cervical back: No rigidity.      Right lower leg: No edema.      Left lower leg: No edema.   Skin:     General: Skin is warm.      Capillary Refill: Capillary refill takes 2 to 3 seconds.      Comments: Post op boot CDI   Neurological:      Mental Status: She is alert and oriented to person, place, and time.   Psychiatric:         Mood and Affect: Mood normal.         Behavior: Behavior normal.         Thought Content: Thought content normal.         Judgment: Judgment normal.             Significant Labs: All pertinent labs within the past 24 hours have been reviewed.  CBC:   Recent Labs   Lab 04/03/24  0500 04/04/24  0526   WBC 10.06 7.98   HGB 8.3* 8.3*   HCT 25.9* 25.8*    295     CMP:   Recent Labs   Lab 04/03/24  0500 04/04/24  0526    142   K 4.7 3.8    113*   CO2 20* 23   * 94   BUN 30* 25*   CREATININE 1.7* 1.6*   CALCIUM 7.8* 7.7*   ANIONGAP 13 6*       Significant Imaging: I have reviewed all pertinent imaging results/findings within the past 24 hours.

## 2024-04-04 NOTE — ASSESSMENT & PLAN NOTE
Creatine stable for now. BMP reviewed- noted Estimated Creatinine Clearance: 37.7 mL/min (A) (based on SCr of 1.6 mg/dL (H)). according to latest data. Based on current GFR, CKD stage is stage 4 - GFR 15-29.  Monitor UOP and serial BMP and adjust therapy as needed. Renally dose meds. Avoid nephrotoxic medications and procedures.

## 2024-04-04 NOTE — ASSESSMENT & PLAN NOTE
See orthostatic hypotension  Patient may benefit from PT/OT post-op  E-consult with Dr. Shaw with Endocrine  Plan for Neurology consult due to patients concern that her falls that used to be once a month are happening several times  Given patient's Xarelto use it will order MRI brain w/wo to look for other causes of falls given her hx of glioblastoma

## 2024-04-04 NOTE — PT/OT/SLP PROGRESS
Physical Therapy Treatment    Patient Name:  Thelma Nguyen   MRN:  [id number]    Recommendations:     Discharge Recommendations: High Intensity Therapy  Discharge Equipment Recommendations:  (TBD next level of care)  Barriers to discharge:  decreased mobility,strength and endurance    Assessment:     Thelma Nguyen is a 58 y.o. female admitted with a medical diagnosis of Orthostatic hypotension.  She presents with the following impairments/functional limitations: weakness, impaired endurance, impaired functional mobility, impaired balance, decreased lower extremity function, decreased ROM, impaired coordination, impaired skin, impaired joint extensibility, orthopedic precautions,pt with good participation and unable to ambulate at this time secondary to NWB status,pt will benefit from high intensity therapy upon discharge.    Rehab Prognosis: Good; patient would benefit from acute skilled PT services to address these deficits and reach maximum level of function.    Recent Surgery: Procedure(s) (LRB):  FUSION, JOINT, MIDFOOT (Right)  BONE GRAFT, ILIAC CREST (Right) 2 Days Post-Op    Plan:     During this hospitalization, patient to be seen 4 x/week to address the identified rehab impairments via gait training, therapeutic activities, therapeutic exercises, neuromuscular re-education and progress toward the following goals:    Plan of Care Expires:  04/17/24    Subjective     Chief Complaint: n/a  Patient/Family Comments/goals: pt agreeable to try RW with transfer.  Pain/Comfort:  Pain Rating 1:  (no c/o's)      Objective:     Communicated with nsg prior to session.  Patient found  up in w/c  with peripheral IV, PureWick, telemetry upon PT entry to room.     General Precautions: Standard, fall  Orthopedic Precautions: RLE non weight bearing  Braces: N/A  Respiratory Status: Room air     Functional Mobility:  Bed Mobility:     Sit to Supine: stand by assistance  Transfers:     Sit to Stand:   contact guard assistance, minimum assistance, and X 2 trials  with rolling walker  Balance: fair standing balance with RW      AM-PAC 6 CLICK MOBILITY  Turning over in bed (including adjusting bedclothes, sheets and blankets)?: 4  Sitting down on and standing up from a chair with arms (e.g., wheelchair, bedside commode, etc.): 3  Moving from lying on back to sitting on the side of the bed?: 3  Moving to and from a bed to a chair (including a wheelchair)?: 3  Need to walk in hospital room?: 1  Climbing 3-5 steps with a railing?: 1  Basic Mobility Total Score: 15       Treatment & Education: pt propelled w/c ~30' X 2 trials with S,sit-stand to RW X 2 trials with CGA/Min A and w/c-bed with RW NWB on R with Min A,R le quad sets X 10 reps.      Patient left supine with all lines intact, call button in reach, bed alarm on, pct notified, and daughter present..    GOALS: see general POC  Multidisciplinary Problems       Physical Therapy Goals          Problem: Physical Therapy    Goal Priority Disciplines Outcome Goal Variances Interventions   Physical Therapy Goal     PT, PT/OT Ongoing, Progressing     Description: Goals to be met by: 24     Patient will increase functional independence with mobility by performin. Supine to sit with [place]  2. Sit to supine with [place]  3. Bed to chair transfer with Modified [place] using No Assistive Device  4. Wheelchair propulsion x200 feet with Modified [place] using bilateral uppper extremities                         Time Tracking:     PT Received On: 24  PT Start Time: 1235     PT Stop Time: 1301  PT Total Time (min): 26 min     Billable Minutes: Therapeutic Activity 26    Treatment Type: Treatment  PT/PTA: PTA     Number of PTA visits since last PT visit: 2024

## 2024-04-04 NOTE — PLAN OF CARE
Assumed care of patient. Patient in no apparent distress on room air. Patient had minimal complaints of pain. Call light within reach, safety precautions maintained, bed alarm set.    Problem: Adult Inpatient Plan of Care  Goal: Plan of Care Review  Outcome: Ongoing, Progressing  Goal: Patient-Specific Goal (Individualized)  Outcome: Ongoing, Progressing  Goal: Absence of Hospital-Acquired Illness or Injury  Outcome: Ongoing, Progressing  Goal: Optimal Comfort and Wellbeing  Outcome: Ongoing, Progressing  Goal: Readiness for Transition of Care  Outcome: Ongoing, Progressing     Problem: Fall Injury Risk  Goal: Absence of Fall and Fall-Related Injury  Outcome: Ongoing, Progressing     Problem: Activity Intolerance  Goal: Enhanced Capacity and Energy  Outcome: Ongoing, Progressing     Problem: Seizure Disorder Comorbidity  Goal: Maintenance of Seizure Control  Outcome: Ongoing, Progressing     Problem: Skin Injury Risk Increased  Goal: Skin Health and Integrity  Outcome: Ongoing, Progressing     Problem: Impaired Wound Healing  Goal: Optimal Wound Healing  Outcome: Ongoing, Progressing     Problem: Bleeding (Surgery Nonspecified)  Goal: Absence of Bleeding  Outcome: Ongoing, Progressing     Problem: Bowel Motility Impaired (Surgery Nonspecified)  Goal: Effective Bowel Elimination  Outcome: Ongoing, Progressing     Problem: Fluid and Electrolyte Imbalance (Surgery Nonspecified)  Goal: Fluid and Electrolyte Balance  Outcome: Ongoing, Progressing     Problem: Infection (Surgery Nonspecified)  Goal: Absence of Infection Signs and Symptoms  Outcome: Ongoing, Progressing     Problem: Pain (Surgery Nonspecified)  Goal: Acceptable Pain Control  Outcome: Ongoing, Progressing

## 2024-04-04 NOTE — PROGRESS NOTES
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 She presented to the ED by request of Orthopedics, Dr. Goodson, for admission for surgical correction of right foot navicular fracture. Patient had ORIF performed with Dr. Goodson on 3/15. Patient admits to significant and lengthy history of severe orthostatic hypotension, and admits her "spells" of becoming lightheaded upon standing have severely worsened since surgery causing her to have numerous falls over past few weeks. She believes during these falls she re-injured her right foot causing hardware failure. From these falls she also does report contusing left elbow and left rib cage. Denying any recent head injury or LOC. No acute neck pain. No chest pain, palpitations, abdominal pain, nausea or vomiting. Patient also reports episodes of tachycardia that occur while sitting on the cough watching TV with alerts on her Apple watch of HR 110s-140s. She states that her falls have significantly increased over the last couple weeks up to 20 falls in the last 2 weeks. Some of these are accompanied by syncope and a disorientated feeling, other times her legs just give out and she falls. Patient's  states patient does not appear to have seizure activity during these episodes but she does start breathing really heavy and then wakes back up after 1-2 minutes. Patient has been under the care of cardiology and endocrinology. She " states that she was diagnosed with postural hypotension with vasovagal response. She has never been prescribed a Holter Monitor.    Overview/Hospital Course:  4/2/24 Stress dose steroids started prior to surgical procedure  4/3/24 POD1 Resumed Xarelto, transitioned to PO hydrocortisone  4/4/24 POD 2 Neurology consult for increased falls    No new subjective & objective note has been filed under this hospital service since the last note was generated.      Assessment/Plan:      * Orthostatic hypotension  171/80 > 117/67 > 99/54  Patient reports being dx w/ postural hypotension with vasovagal response  Likely cause of recurrent syncope/falls  Cardiology consult ordered given worsening of sx  Head CT: no acute abnormality  Had + tilt table testing in 2022  Midodrine added to steroids, monitor BP      History of adrenal insufficiency    Panhypopituitarism - Primary        Planned for repeat surgery so will need stress dose steroids given hx of adrenal insufficiency.  If hypotension and orthostatic hypotension does not improve on IV hydrocortisone would evaluate for other causes like infection     Recommendations :  - this patient will need stress dose steroids on induction with:              IV hydrocortisone 100 mg on induction followed by IV hydrocortisone 50 mg q8 hours   If hemodynamically stable:  - On POD 1 if tolerating oral meds change to oral hydrocortisone 40/20 mg with the guidance of inpatient endocrine service if needed   - on POD 3 decrease to home doses of hydrocortisone      If hemodynamically unstable continue IV hydrocortisone and consider endocrine consult or continuing supraphysiologic hydrocortisone until condition is improved          Fracture of right foot  S/p ORIF of right navicular fracture and iliac crest bone harvest 3/15  Patient returned today for evaluation with Dr. Goodson and sent in for hardware failure likely 2/2 multiple falls requiring revision fixation of hardware  Ortho  following  NPO after MN  Likely procedure tomorrow  Revised cardiac risk index score: 0 points, class I risk, 3.9% 30-day risk of death, MI, or cardiac arrest    4/2/24  1. Navicular open reduction internal fixation, right  2. Naviculocuneiform fusion, right  3. Talonavicular joint open reduction internal fixation  4. Hardware removal, right foot  5. Iliac crest bone grafting, right hip    Bedside Commode:   The beneficiary is confined to a single room. The patient is bedroom confined for an extended time and non-ambulatory.     Wheelchair  The patient has a mobility limitation that significantly impairs her ability to participate in one or more mobility related activities of daily living (MRADL's) such as toileting, feeding, dressing, grooming and bathing in customary locations in the home. The mobility limitation cannot be sufficiently resolved by the use of a cane or walker. The use of a manual wheelchair will significantly improve the patient's ability to participate in MRADLS and the patient will use it on regular basis in the home. The patient has expressed her willingness to use a manual wheelchair in the home. She also has a caregiver who is available, willing and able to provide assistance with the wheelchair when needed.     Recurrent falls  See orthostatic hypotension  Patient may benefit from PT/OT post-op  E-consult with Dr. Shaw with Endocrine  Plan for Neurology consult due to patients concern that her falls that used to be once a month are happening several times  Given patient's Xarelto use it will order MRI brain w/wo to look for other causes of falls given her hx of glioblastoma      Stage 4 chronic kidney disease  Creatine stable for now. BMP reviewed- noted Estimated Creatinine Clearance: 37.7 mL/min (A) (based on SCr of 1.6 mg/dL (H)). according to latest data. Based on current GFR, CKD stage is stage 4 - GFR 15-29.  Monitor UOP and serial BMP and adjust therapy as needed. Renally dose meds.  Avoid nephrotoxic medications and procedures.    Factor V deficiency with DVT x 3  Xarelto o/h pending surgery      Secondary hypothyroidism  Continue synthroid      Secondary adrenal insufficiency  Endocrinology e-consult performed, appreciate recs:  100 iv hydrocortisone.  (Q8) until surgery  After surgery 50 q8 x1 day   Double home dose  x 3day  If still falling after loading dose, something else is going on outside of endocrine.          Nonintractable epilepsy without status epilepticus  Continue AEDs  No seizures in several years        VTE Risk Mitigation (From admission, onward)           Ordered     rivaroxaban tablet 20 mg  with dinner         04/03/24 1013     IP VTE HIGH RISK PATIENT  Once         04/01/24 1938     Place sequential compression device  Until discontinued         04/01/24 1938                    Discharge Planning   XIMENA:      Code Status: Full Code   Is the patient medically ready for discharge?:     Reason for patient still in hospital (select all that apply): Patient trending condition, Imaging, and Consult recommendations  Discharge Plan A: Rehab   Discharge Delays: None known at this time              Luna Banda MD  Department of Hospital Medicine   [facility]

## 2024-04-05 LAB
ANION GAP SERPL CALC-SCNC: 13 MMOL/L (ref 8–16)
ANION GAP SERPL CALC-SCNC: 15 MMOL/L (ref 8–16)
BASOPHILS # BLD AUTO: 0.04 K/UL (ref 0–0.2)
BASOPHILS NFR BLD: 0.3 % (ref 0–1.9)
BUN SERPL-MCNC: 28 MG/DL (ref 6–20)
BUN SERPL-MCNC: 29 MG/DL (ref 6–20)
CALCIUM SERPL-MCNC: 8.7 MG/DL (ref 8.7–10.5)
CALCIUM SERPL-MCNC: 9 MG/DL (ref 8.7–10.5)
CHLORIDE SERPL-SCNC: 104 MMOL/L (ref 95–110)
CHLORIDE SERPL-SCNC: 106 MMOL/L (ref 95–110)
CO2 SERPL-SCNC: 21 MMOL/L (ref 23–29)
CO2 SERPL-SCNC: 21 MMOL/L (ref 23–29)
CREAT SERPL-MCNC: 1.6 MG/DL (ref 0.5–1.4)
CREAT SERPL-MCNC: 1.7 MG/DL (ref 0.5–1.4)
DIFFERENTIAL METHOD BLD: ABNORMAL
EOSINOPHIL # BLD AUTO: 0.5 K/UL (ref 0–0.5)
EOSINOPHIL NFR BLD: 4.3 % (ref 0–8)
ERYTHROCYTE [DISTWIDTH] IN BLOOD BY AUTOMATED COUNT: 12.8 % (ref 11.5–14.5)
EST. GFR  (NO RACE VARIABLE): 35 ML/MIN/1.73 M^2
EST. GFR  (NO RACE VARIABLE): 37 ML/MIN/1.73 M^2
FOLATE SERPL-MCNC: 7.5 NG/ML (ref 4–24)
GLUCOSE SERPL-MCNC: 116 MG/DL (ref 70–110)
GLUCOSE SERPL-MCNC: 118 MG/DL (ref 70–110)
HCT VFR BLD AUTO: 31.2 % (ref 37–48.5)
HGB BLD-MCNC: 10.1 G/DL (ref 12–16)
IMM GRANULOCYTES # BLD AUTO: 0.04 K/UL (ref 0–0.04)
IMM GRANULOCYTES NFR BLD AUTO: 0.3 % (ref 0–0.5)
LYMPHOCYTES # BLD AUTO: 2.5 K/UL (ref 1–4.8)
LYMPHOCYTES NFR BLD: 21.1 % (ref 18–48)
MCH RBC QN AUTO: 30.5 PG (ref 27–31)
MCHC RBC AUTO-ENTMCNC: 32.4 G/DL (ref 32–36)
MCV RBC AUTO: 94 FL (ref 82–98)
MONOCYTES # BLD AUTO: 0.7 K/UL (ref 0.3–1)
MONOCYTES NFR BLD: 5.6 % (ref 4–15)
NEUTROPHILS # BLD AUTO: 7.9 K/UL (ref 1.8–7.7)
NEUTROPHILS NFR BLD: 68.4 % (ref 38–73)
NRBC BLD-RTO: 0 /100 WBC
PHENYTOIN SERPL-MCNC: 3.9 UG/ML (ref 10–20)
PLATELET # BLD AUTO: 316 K/UL (ref 150–450)
PMV BLD AUTO: 8.9 FL (ref 9.2–12.9)
POTASSIUM SERPL-SCNC: 3.9 MMOL/L (ref 3.5–5.1)
POTASSIUM SERPL-SCNC: 4 MMOL/L (ref 3.5–5.1)
RBC # BLD AUTO: 3.31 M/UL (ref 4–5.4)
SODIUM SERPL-SCNC: 140 MMOL/L (ref 136–145)
SODIUM SERPL-SCNC: 140 MMOL/L (ref 136–145)
VIT B12 SERPL-MCNC: >2000 PG/ML (ref 210–950)
WBC # BLD AUTO: 11.62 K/UL (ref 3.9–12.7)

## 2024-04-05 PROCEDURE — 25000003 PHARM REV CODE 250: Performed by: INTERNAL MEDICINE

## 2024-04-05 PROCEDURE — G0378 HOSPITAL OBSERVATION PER HR: HCPCS

## 2024-04-05 PROCEDURE — 97110 THERAPEUTIC EXERCISES: CPT | Mod: CO

## 2024-04-05 PROCEDURE — 80185 ASSAY OF PHENYTOIN TOTAL: CPT | Performed by: INTERNAL MEDICINE

## 2024-04-05 PROCEDURE — 25000003 PHARM REV CODE 250: Performed by: NURSE PRACTITIONER

## 2024-04-05 PROCEDURE — 97110 THERAPEUTIC EXERCISES: CPT | Mod: CQ

## 2024-04-05 PROCEDURE — 80048 BASIC METABOLIC PNL TOTAL CA: CPT | Performed by: INTERNAL MEDICINE

## 2024-04-05 PROCEDURE — 82746 ASSAY OF FOLIC ACID SERUM: CPT | Performed by: INTERNAL MEDICINE

## 2024-04-05 PROCEDURE — A9585 GADOBUTROL INJECTION: HCPCS | Performed by: INTERNAL MEDICINE

## 2024-04-05 PROCEDURE — 97530 THERAPEUTIC ACTIVITIES: CPT | Mod: CO

## 2024-04-05 PROCEDURE — 99223 1ST HOSP IP/OBS HIGH 75: CPT | Mod: ,,, | Performed by: PSYCHIATRY & NEUROLOGY

## 2024-04-05 PROCEDURE — 82607 VITAMIN B-12: CPT | Performed by: INTERNAL MEDICINE

## 2024-04-05 PROCEDURE — 85025 COMPLETE CBC W/AUTO DIFF WBC: CPT | Performed by: INTERNAL MEDICINE

## 2024-04-05 PROCEDURE — 25500020 PHARM REV CODE 255: Performed by: INTERNAL MEDICINE

## 2024-04-05 PROCEDURE — 36415 COLL VENOUS BLD VENIPUNCTURE: CPT | Performed by: INTERNAL MEDICINE

## 2024-04-05 RX ORDER — GADOBUTROL 604.72 MG/ML
7.5 INJECTION INTRAVENOUS
Status: COMPLETED | OUTPATIENT
Start: 2024-04-05 | End: 2024-04-05

## 2024-04-05 RX ADMIN — GADOBUTROL 7.5 ML: 604.72 INJECTION INTRAVENOUS at 12:04

## 2024-04-05 RX ADMIN — LEVOTHYROXINE SODIUM 112 MCG: 112 TABLET ORAL at 05:04

## 2024-04-05 RX ADMIN — HYDROCORTISONE 15 MG: 5 TABLET ORAL at 08:04

## 2024-04-05 RX ADMIN — HYDROCORTISONE 5 MG: 5 TABLET ORAL at 08:04

## 2024-04-05 RX ADMIN — ACETAMINOPHEN 500 MG: 500 TABLET ORAL at 11:04

## 2024-04-05 RX ADMIN — PHENYTOIN SODIUM 200 MG: 100 CAPSULE ORAL at 08:04

## 2024-04-05 RX ADMIN — ACETAMINOPHEN 500 MG: 500 TABLET ORAL at 05:04

## 2024-04-05 RX ADMIN — FERROUS SULFATE TAB 325 MG (65 MG ELEMENTAL FE) 1 EACH: 325 (65 FE) TAB at 08:04

## 2024-04-05 RX ADMIN — MIDODRINE HYDROCHLORIDE 2.5 MG: 2.5 TABLET ORAL at 07:04

## 2024-04-05 RX ADMIN — MIDODRINE HYDROCHLORIDE 2.5 MG: 2.5 TABLET ORAL at 05:04

## 2024-04-05 RX ADMIN — DULOXETINE HYDROCHLORIDE 60 MG: 30 CAPSULE, DELAYED RELEASE ORAL at 08:04

## 2024-04-05 RX ADMIN — RIVAROXABAN 20 MG: 20 TABLET, FILM COATED ORAL at 05:04

## 2024-04-05 NOTE — PT/OT/SLP PROGRESS
Physical Therapy Treatment    Patient Name:  Thelma Nguyen   MRN:  [id number]    Recommendations:     Discharge Recommendations: High Intensity Therapy  Discharge Equipment Recommendations:  (TBD next level of care)  Barriers to discharge:  decreased mobility,strength and endurance    Assessment:     Thelma Nguyen is a 58 y.o. female admitted with a medical diagnosis of Orthostatic hypotension.  She presents with the following impairments/functional limitations: weakness, impaired endurance, impaired functional mobility, gait instability, impaired balance, decreased lower extremity function, decreased ROM, impaired coordination, impaired skin, orthopedic precautions, impaired joint extensibility,pt with good participation and requires  assistance with all mobility at this time,pt limited by NWB status limiting mobility and function,pt will benefit from high intensity therapy upon discharge.    Rehab Prognosis: Good; patient would benefit from acute skilled PT services to address these deficits and reach maximum level of function.    Recent Surgery: Procedure(s) (LRB):  FUSION, JOINT, MIDFOOT (Right)  BONE GRAFT, ILIAC CREST (Right) 3 Days Post-Op    Plan:     During this hospitalization, patient to be seen 4 x/week to address the identified rehab impairments via gait training, therapeutic activities, therapeutic exercises, neuromuscular re-education and progress toward the following goals:    Plan of Care Expires:  04/17/24    Subjective     Chief Complaint: n/a  Patient/Family Comments/goals: pt preparing to leave for MRI at another facility.  Pain/Comfort:  Pain Rating 1: 0/10      Objective:     Communicated with nsg prior to session.  Patient found up in chair with PureWick, peripheral IV, telemetry upon PT entry to room.     General Precautions: Standard, fall  Orthopedic Precautions: RLE weight bearing as tolerated(pt is NWB on R not WBAT)  Braces: N/A  Respiratory Status: Room air      Functional Mobility:  Gait: n/a      AM-PAC 6 CLICK MOBILITY  Turning over in bed (including adjusting bedclothes, sheets and blankets)?: 4  Sitting down on and standing up from a chair with arms (e.g., wheelchair, bedside commode, etc.): 3  Moving from lying on back to sitting on the side of the bed?: 3  Moving to and from a bed to a chair (including a wheelchair)?: 3  Need to walk in hospital room?: 1  Climbing 3-5 steps with a railing?: 1  Basic Mobility Total Score: 15       Treatment & Education: pt performed B le there ex in supine with assistance at R le X 10 reps,transport entered to take pt to MRI,pct to assist pt to b/s commode and take care of Purewick.      Patient left up in chair with all lines intact, call button in reach, pct notified, and transport present..    GOALS: see general POC  Multidisciplinary Problems       Physical Therapy Goals          Problem: Physical Therapy    Goal Priority Disciplines Outcome Goal Variances Interventions   Physical Therapy Goal     PT, PT/OT Ongoing, Progressing     Description: Goals to be met by: 24     Patient will increase functional independence with mobility by performin. Supine to sit with [place]  2. Sit to supine with [place]  3. Bed to chair transfer with Modified [place] using No Assistive Device  4. Wheelchair propulsion x200 feet with Modified [place] using bilateral uppper extremities                         Time Tracking:     PT Received On: 24  PT Start Time: 1030     PT Stop Time: 1042  PT Total Time (min): 12 min     Billable Minutes: Therapeutic Exercise 12    Treatment Type: Treatment  PT/PTA: PTA     Number of PTA visits since last PT visit: 2     2024

## 2024-04-05 NOTE — SUBJECTIVE & OBJECTIVE
Interval History: No new complaints, will be transferred to [place] for an MRI wwo contrast    Review of Systems   Constitutional:  Negative for chills and fever.   HENT:  Negative for congestion.    Respiratory:  Negative for shortness of breath.    Cardiovascular:  Negative for chest pain.   Gastrointestinal:  Negative for abdominal pain, nausea and vomiting.   Genitourinary:  Negative for dysuria.   Musculoskeletal:         Right foot fracture   Skin:         Bruising - rib cage; surgical incision right foot   Neurological:  Positive for dizziness and syncope. Negative for light-headedness.   Psychiatric/Behavioral:  Negative for agitation and confusion. The patient is not nervous/anxious.      Objective:     Vital Signs (Most Recent):  Temp: 98.3 °F (36.8 °C) (04/05/24 1525)  Pulse: 106 (04/05/24 1626)  Resp: 20 (04/05/24 1525)  BP: (!) 165/75 (04/05/24 1525)  SpO2: 96 % (04/05/24 1525) Vital Signs (24h Range):  Temp:  [98 °F (36.7 °C)-98.9 °F (37.2 °C)] 98.3 °F (36.8 °C)  Pulse:  [] 106  Resp:  [16-20] 20  SpO2:  [96 %-100 %] 96 %  BP: (149-181)/(68-85) 165/75     Weight: 69 kg (152 lb 1.9 oz)  Body mass index is 23.82 kg/m².    Intake/Output Summary (Last 24 hours) at 4/5/2024 1730  Last data filed at 4/5/2024 1713  Gross per 24 hour   Intake 418 ml   Output 1450 ml   Net -1032 ml         Physical Exam  Constitutional:       General: She is not in acute distress.  HENT:      Head: Normocephalic and atraumatic.   Eyes:      Extraocular Movements: Extraocular movements intact.      Pupils: Pupils are equal, round, and reactive to light.   Cardiovascular:      Rate and Rhythm: Normal rate and regular rhythm.      Pulses:           Popliteal pulses are 2+ on the right side.        Dorsalis pedis pulses are 2+ on the left side.      Heart sounds: Normal heart sounds.   Pulmonary:      Effort: Pulmonary effort is normal. No respiratory distress.      Breath sounds: Normal breath sounds. No wheezing,  rhonchi or rales.   Abdominal:      General: Bowel sounds are normal. There is no distension.      Palpations: Abdomen is soft.      Tenderness: There is no abdominal tenderness. There is no guarding or rebound.   Musculoskeletal:      Cervical back: No rigidity.      Right lower leg: No edema.      Left lower leg: No edema.   Skin:     General: Skin is warm.      Capillary Refill: Capillary refill takes 2 to 3 seconds.      Comments: Post op boot CDI   Neurological:      Mental Status: She is alert and oriented to person, place, and time.   Psychiatric:         Mood and Affect: Mood normal.         Behavior: Behavior normal.         Thought Content: Thought content normal.         Judgment: Judgment normal.             Significant Labs: All pertinent labs within the past 24 hours have been reviewed.  CBC:   Recent Labs   Lab 04/04/24  0526 04/05/24  1115   WBC 7.98 11.62   HGB 8.3* 10.1*   HCT 25.8* 31.2*    316     CMP:   Recent Labs   Lab 04/04/24  0526 04/05/24  1115    140  140   K 3.8 3.9  4.0   * 104  106   CO2 23 21*  21*   GLU 94 116*  118*   BUN 25* 28*  29*   CREATININE 1.6* 1.6*  1.7*   CALCIUM 7.7* 8.7  9.0   ANIONGAP 6* 15  13       Significant Imaging: I have reviewed all pertinent imaging results/findings within the past 24 hours.

## 2024-04-05 NOTE — NURSING
[facility] here with stretcher to transport pt to [place] for an MRI. Tele okay to be removed for transfer per MD order. Report to be called to [place] by bedside RN.

## 2024-04-05 NOTE — PLAN OF CARE
AOX4. VS stable. Safety maintained. Meds given per MAR. Pain treated with scheduled meds. Denies N/V at this time. Dressing to right hip and right lower leg CDI. Purewick in place draining clear yellow urine. Cardiac monitoring in place. Regular diet maintained. Resting quietly. SR up X 2. Call light in reach. Bed alarm set. Pt denies any further needs at this time. Plan of care ongoing.       Problem: Adult Inpatient Plan of Care  Goal: Plan of Care Review  Outcome: Ongoing, Progressing  Goal: Patient-Specific Goal (Individualized)  Outcome: Ongoing, Progressing     Problem: Seizure Disorder Comorbidity  Goal: Maintenance of Seizure Control  Outcome: Ongoing, Progressing     Problem: Impaired Wound Healing  Goal: Optimal Wound Healing  Outcome: Ongoing, Progressing     Problem: Bleeding (Surgery Nonspecified)  Goal: Absence of Bleeding  Outcome: Ongoing, Progressing     Problem: Infection (Surgery Nonspecified)  Goal: Absence of Infection Signs and Symptoms  Outcome: Ongoing, Progressing     Problem: Pain (Surgery Nonspecified)  Goal: Acceptable Pain Control  Outcome: Ongoing, Progressing

## 2024-04-05 NOTE — PROGRESS NOTES
SUBJECTIVE:    Ms. Nguyen is status post hardware removal and revision open reduction internal fixation of her right navicular fracture as well as navicular cuneiform and talonavicular fusion with dorsal spanning plate and iliac crest bone grafting.    She is getting an MRI per neurology      OBJECTIVE:      Vitals:    04/05/24 0124 04/05/24 0356 04/05/24 0402 04/05/24 0732   BP: (!) 164/75 (!) 162/79  (!) 151/82   Pulse:  97 88 89   Resp:  16  20   Temp:  98.1 °F (36.7 °C)  98 °F (36.7 °C)   TempSrc:  Oral     SpO2:  96%  96%   Weight:       Height:           Lower Extremity Exam  Right lower extremity clean dry and intact with splint in place.  She wiggles her toes, she fires FHL EHL, and her toes are warm and well-perfused.  Her sensation is diminished but it is at her baseline.    Right hip incision is clean dry and intact with Tegaderm in place     DIAGNOSTIC STUDIES:  X-rays from operating theater reveal revision open reduction internal fixation with naviculocuneiform as well as talonavicular fusion.  Removal of hardware.  Fracture fragments appear improved in alignment.    ASSESSMENT:   1. Status post revision open reduction internal fixation navicular fracture and navicular cuneiform talonavicular fusion, right lower extremity      PLAN:  -nonweightbearing to the right lower extremity   -PT OT today   -Mobility limitations will require wheelchair see brief op note documentation- only ones that we will fit in the patient's house must be less than 30 in wide  -on home Xarelto  -Follow up social work and case management recommendations for rehab and follow-up wheelchair  -Appreciate hospitalist management re IV steroids and head injury as well as rest of care.  -Endo previously consulted - appreciate recommendations we steroid taper.  Recommend consultation we additional bone health recommendation at this juncture.  -neuro consulted    Geoffrey Goodson MD  [facility]  Orthopedic Surgery

## 2024-04-05 NOTE — PLAN OF CARE
Problem: Occupational Therapy  Goal: Occupational Therapy Goal  Description: Goals to be met by: 5/3/24     Patient will increase functional independence with ADLs by performing:    LE Dressing with Minimal Assistance.  Toileting from bedside commode with Minimal Assistance for hygiene and clothing management.   Supine to sit with Modified [place].  Stand pivot transfers with Contact Guard Assistance.  Squat pivot transfers with Contact Guard Assistance.  Toilet transfer to bedside commode with Contact Guard Assistance.  Increased functional strength to [facility] for self care skills and functional mobility.  Upper extremity exercise program x10 reps per handout, with independence.    Outcome: Ongoing, Progressing   Thelma Nguyen is a 58 y.o. female with a medical diagnosis of Orthostatic hypotension.  Performance deficits affecting function are weakness, impaired endurance, impaired functional mobility, impaired self care skills, gait instability, impaired balance, decreased upper extremity function, decreased lower extremity function, pain, decreased ROM, impaired skin, edema, orthopedic precautions.    Pt found in bed, agreeable to therapy.  Pt is progressing well towards goals. Continue OT services to address functional goals, progressing as able.

## 2024-04-05 NOTE — ASSESSMENT & PLAN NOTE
S/p ORIF of right navicular fracture and iliac crest bone harvest 3/15  Patient returned today for evaluation with Dr. Goodson and sent in for hardware failure likely 2/2 multiple falls requiring revision fixation of hardware  Ortho following  NPO after MN  Likely procedure tomorrow  Revised cardiac risk index score: 0 points, class I risk, 3.9% 30-day risk of death, MI, or cardiac arrest    4/2/24  1. Navicular open reduction internal fixation, right  2. Naviculocuneiform fusion, right  3. Talonavicular joint open reduction internal fixation  4. Hardware removal, right foot  5. Iliac crest bone grafting, right hip    Bedside Commode:   The beneficiary is confined to a single room. The patient is bedroom confined for an extended time and non-ambulatory.     Wheelchair  The patient has a mobility limitation that significantly impairs her ability to participate in one or more mobility related activities of daily living (MRADL's) such as toileting, feeding, dressing, grooming and bathing in customary locations in the home. The mobility limitation cannot be sufficiently resolved by the use of a cane or walker. The use of a manual wheelchair will significantly improve the patient's ability to participate in MRADLS and the patient will use it on regular basis in the home. The patient has expressed her willingness to use a manual wheelchair in the home. She also has a caregiver who is available, willing and able to provide assistance with the wheelchair when needed.

## 2024-04-05 NOTE — PT/OT/SLP PROGRESS
"Occupational Therapy   Treatment    Name: Thelma Nguyen  MRN: [id number]  Admitting Diagnosis:  Orthostatic hypotension  3 Days Post-Op    Recommendations:     Discharge Recommendations: High Intensity Therapy  Discharge Equipment Recommendations:  bedside commode, wheelchair, walker, rolling  Barriers to discharge:  Other (Comment) (Increased level of assistance)    Assessment:     Thelma Nguyen is a 58 y.o. female with a medical diagnosis of Orthostatic hypotension.  Performance deficits affecting function are weakness, impaired endurance, impaired functional mobility, impaired self care skills, gait instability, impaired balance, decreased upper extremity function, decreased lower extremity function, pain, decreased ROM, impaired skin, edema, orthopedic precautions.    Pt found in bed, agreeable to therapy.  Pt is progressing well towards goals. Continue OT services to address functional goals, progressing as able.      Rehab Prognosis:  Good; patient would benefit from acute skilled OT services to address these deficits and reach maximum level of function.       Plan:     Patient to be seen 3 x/week to address the above listed problems via self-care/home management, therapeutic activities, therapeutic exercises  Plan of Care Expires: 05/03/24  Plan of Care Reviewed with: patient    Subjective     Chief Complaint: "I just get fearful in standing."  Patient/Family Comments/goals: to return to independence   Pain/Comfort:  Pain Rating 1: 0/10  Pain Rating Post-Intervention 1: 0/10    Objective:     Communicated with: RN prior to session.  Patient found HOB elevated with peripheral IV, PureWick, telemetry upon OT entry to room.    General Precautions: Standard, fall    Orthopedic Precautions:RLE non weight bearing  Braces: N/A  Respiratory Status: Room air     Occupational Performance:     Bed Mobility:    Patient completed Supine to Sit with modified independence     Functional " Mobility/Transfers:  Patient completed Sit <> Stand Transfer with minimum assistance  with  rolling walker w/ vcs for hand placement and tech to avoid wt bearing through RLE when sitting<>standing  Patient completed Bed <> Chair Transfer using Stand Pivot technique with minimum assistance with rolling walker  Functional Mobility: Pt took 2 hops to chair with Min A using RW.  Pt able to maintain RLE NWB'ing.     Activities of Daily Living:  Grooming: modified independence chair level       [facility] 6 Click ADL: 20    Treatment & Education:  Pt provided with, instructed on, and performed BUE yellow theraband exercises x 10 reps for shld flex, abd, hori abd/add, bicep curls and tricep extensions.  Pt tolerated well with short rest breaks secondary to muscle fatigue.   Encouraged OOB in chair 1-2 hours and to call for assistance for back to bed. Pt verbalizes understanding.  BLEs elevated.         Patient left up in chair with all lines intact, call button in reach, bed alarm on, and nurse notified    GOALS:   Multidisciplinary Problems       Occupational Therapy Goals          Problem: Occupational Therapy    Goal Priority Disciplines Outcome Interventions   Occupational Therapy Goal     OT, PT/OT Ongoing, Progressing    Description: Goals to be met by: 5/3/24     Patient will increase functional independence with ADLs by performing:    LE Dressing with Minimal Assistance.  Toileting from bedside commode with Minimal Assistance for hygiene and clothing management.   Supine to sit with Modified [place].  Stand pivot transfers with Contact Guard Assistance.  Squat pivot transfers with Contact Guard Assistance.  Toilet transfer to bedside commode with Contact Guard Assistance.  Increased functional strength to WFL for self care skills and functional mobility.  Upper extremity exercise program x10 reps per handout, with independence.                         Time Tracking:     OT Date of Treatment: 04/05/24  OT Start  Time: 0940  OT Stop Time: 1011  OT Total Time (min): 31 min    Billable Minutes:Therapeutic Activity 16  Therapeutic Exercise 15            4/5/2024

## 2024-04-05 NOTE — PROGRESS NOTES
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 She presented to the ED by request of Orthopedics, Dr. Goodson, for admission for surgical correction of right foot navicular fracture. Patient had ORIF performed with Dr. Goodson on 3/15. Patient admits to significant and lengthy history of severe orthostatic hypotension, and admits her "spells" of becoming lightheaded upon standing have severely worsened since surgery causing her to have numerous falls over past few weeks. She believes during these falls she re-injured her right foot causing hardware failure. From these falls she also does report contusing left elbow and left rib cage. Denying any recent head injury or LOC. No acute neck pain. No chest pain, palpitations, abdominal pain, nausea or vomiting. Patient also reports episodes of tachycardia that occur while sitting on the cough watching TV with alerts on her Apple watch of HR 110s-140s. She states that her falls have significantly increased over the last couple weeks up to 20 falls in the last 2 weeks. Some of these are accompanied by syncope and a disorientated feeling, other times her legs just give out and she falls. Patient's  states patient does not appear to have seizure activity during these episodes but she does start breathing really heavy and then wakes back up after 1-2 minutes. Patient has been under the care of cardiology and endocrinology. She " states that she was diagnosed with postural hypotension with vasovagal response. She has never been prescribed a Holter Monitor.    Overview/Hospital Course:  4/2/24 Stress dose steroids started prior to surgical procedure  4/3/24 POD1 Resumed Xarelto, transitioned to PO hydrocortisone  4/4/24 POD 2 Neurology consult for increased falls  4/5/2025 Went for MRI today- pending results and Neurology recommendations    Interval History: No new complaints, will be transferred to [place] for an MRI wwo contrast    Review of Systems   Constitutional:  Negative for chills and fever.   HENT:  Negative for congestion.    Respiratory:  Negative for shortness of breath.    Cardiovascular:  Negative for chest pain.   Gastrointestinal:  Negative for abdominal pain, nausea and vomiting.   Genitourinary:  Negative for dysuria.   Musculoskeletal:         Right foot fracture   Skin:         Bruising - rib cage; surgical incision right foot   Neurological:  Positive for dizziness and syncope. Negative for light-headedness.   Psychiatric/Behavioral:  Negative for agitation and confusion. The patient is not nervous/anxious.      Objective:     Vital Signs (Most Recent):  Temp: 98.3 °F (36.8 °C) (04/05/24 1525)  Pulse: 106 (04/05/24 1626)  Resp: 20 (04/05/24 1525)  BP: (!) 165/75 (04/05/24 1525)  SpO2: 96 % (04/05/24 1525) Vital Signs (24h Range):  Temp:  [98 °F (36.7 °C)-98.9 °F (37.2 °C)] 98.3 °F (36.8 °C)  Pulse:  [] 106  Resp:  [16-20] 20  SpO2:  [96 %-100 %] 96 %  BP: (149-181)/(68-85) 165/75     Weight: 69 kg (152 lb 1.9 oz)  Body mass index is 23.82 kg/m².    Intake/Output Summary (Last 24 hours) at 4/5/2024 1730  Last data filed at 4/5/2024 1713  Gross per 24 hour   Intake 418 ml   Output 1450 ml   Net -1032 ml         Physical Exam  Constitutional:       General: She is not in acute distress.  HENT:      Head: Normocephalic and atraumatic.   Eyes:      Extraocular Movements: Extraocular movements intact.      Pupils:  Pupils are equal, round, and reactive to light.   Cardiovascular:      Rate and Rhythm: Normal rate and regular rhythm.      Pulses:           Popliteal pulses are 2+ on the right side.        Dorsalis pedis pulses are 2+ on the left side.      Heart sounds: Normal heart sounds.   Pulmonary:      Effort: Pulmonary effort is normal. No respiratory distress.      Breath sounds: Normal breath sounds. No wheezing, rhonchi or rales.   Abdominal:      General: Bowel sounds are normal. There is no distension.      Palpations: Abdomen is soft.      Tenderness: There is no abdominal tenderness. There is no guarding or rebound.   Musculoskeletal:      Cervical back: No rigidity.      Right lower leg: No edema.      Left lower leg: No edema.   Skin:     General: Skin is warm.      Capillary Refill: Capillary refill takes 2 to 3 seconds.      Comments: Post op boot CDI   Neurological:      Mental Status: She is alert and oriented to person, place, and time.   Psychiatric:         Mood and Affect: Mood normal.         Behavior: Behavior normal.         Thought Content: Thought content normal.         Judgment: Judgment normal.             Significant Labs: All pertinent labs within the past 24 hours have been reviewed.  CBC:   Recent Labs   Lab 04/04/24  0526 04/05/24  1115   WBC 7.98 11.62   HGB 8.3* 10.1*   HCT 25.8* 31.2*    316     CMP:   Recent Labs   Lab 04/04/24  0526 04/05/24  1115    140  140   K 3.8 3.9  4.0   * 104  106   CO2 23 21*  21*   GLU 94 116*  118*   BUN 25* 28*  29*   CREATININE 1.6* 1.6*  1.7*   CALCIUM 7.7* 8.7  9.0   ANIONGAP 6* 15  13       Significant Imaging: I have reviewed all pertinent imaging results/findings within the past 24 hours.    Assessment/Plan:      * Orthostatic hypotension  171/80 > 117/67 > 99/54  Patient reports being dx w/ postural hypotension with vasovagal response  Likely cause of recurrent syncope/falls  Cardiology consult ordered given worsening of  sx  Head CT: no acute abnormality  Had + tilt table testing in 2022  Midodrine added to steroids, monitor BP      History of adrenal insufficiency    Panhypopituitarism - Primary        Planned for repeat surgery so will need stress dose steroids given hx of adrenal insufficiency.  If hypotension and orthostatic hypotension does not improve on IV hydrocortisone would evaluate for other causes like infection     Recommendations :  - this patient will need stress dose steroids on induction with:              IV hydrocortisone 100 mg on induction followed by IV hydrocortisone 50 mg q8 hours   If hemodynamically stable:  - On POD 1 if tolerating oral meds change to oral hydrocortisone 40/20 mg with the guidance of inpatient endocrine service if needed   - on POD 3 decrease to home doses of hydrocortisone      If hemodynamically unstable continue IV hydrocortisone and consider endocrine consult or continuing supraphysiologic hydrocortisone until condition is improved          Fracture of right foot  S/p ORIF of right navicular fracture and iliac crest bone harvest 3/15  Patient returned today for evaluation with Dr. Goodson and sent in for hardware failure likely 2/2 multiple falls requiring revision fixation of hardware  Ortho following  NPO after MN  Likely procedure tomorrow  Revised cardiac risk index score: 0 points, class I risk, 3.9% 30-day risk of death, MI, or cardiac arrest    4/2/24  1. Navicular open reduction internal fixation, right  2. Naviculocuneiform fusion, right  3. Talonavicular joint open reduction internal fixation  4. Hardware removal, right foot  5. Iliac crest bone grafting, right hip    Bedside Commode:   The beneficiary is confined to a single room. The patient is bedroom confined for an extended time and non-ambulatory.     Wheelchair  The patient has a mobility limitation that significantly impairs her ability to participate in one or more mobility related activities of daily living  (MRADL's) such as toileting, feeding, dressing, grooming and bathing in customary locations in the home. The mobility limitation cannot be sufficiently resolved by the use of a cane or walker. The use of a manual wheelchair will significantly improve the patient's ability to participate in MRADLS and the patient will use it on regular basis in the home. The patient has expressed her willingness to use a manual wheelchair in the home. She also has a caregiver who is available, willing and able to provide assistance with the wheelchair when needed.     Recurrent falls  See orthostatic hypotension  Patient may benefit from PT/OT post-op  E-consult with Dr. Shaw with Endocrine  Plan for Neurology consult due to patients concern that her falls that used to be once a month are happening several times  Given patient's Xarelto use it will order MRI brain w/wo to look for other causes of falls given her hx of glioblastoma      Stage 4 chronic kidney disease  Creatine stable for now. BMP reviewed- noted Estimated Creatinine Clearance: 37.7 mL/min (A) (based on SCr of 1.6 mg/dL (H)). according to latest data. Based on current GFR, CKD stage is stage 4 - GFR 15-29.  Monitor UOP and serial BMP and adjust therapy as needed. Renally dose meds. Avoid nephrotoxic medications and procedures.    Factor V deficiency with DVT x 3  Xarelto o/h pending surgery      Secondary hypothyroidism  Continue synthroid      Secondary adrenal insufficiency  Endocrinology e-consult performed, appreciate recs:  100 iv hydrocortisone.  (Q8) until surgery  After surgery 50 q8 x1 day   Double home dose  x 3day  If still falling after loading dose, something else is going on outside of endocrine.          Nonintractable epilepsy without status epilepticus  Continue AEDs  No seizures in several years  Phenytoin is subtherapeutic        VTE Risk Mitigation (From admission, onward)           Ordered     rivaroxaban tablet 20 mg  with dinner         04/03/24  1013     IP VTE HIGH RISK PATIENT  Once         04/01/24 1938     Place sequential compression device  Until discontinued         04/01/24 1938                    Discharge Planning   XIMENA:      Code Status: Full Code   Is the patient medically ready for discharge?:     Reason for patient still in hospital (select all that apply): Imaging and Consult recommendations  Discharge Plan A: Rehab   Discharge Delays: None known at this time              Luna Banda MD  Department of Hospital Medicine   [facility]

## 2024-04-05 NOTE — PLAN OF CARE
Problem: Physical Therapy  Goal: Physical Therapy Goal  Description: Goals to be met by: 24     Patient will increase functional independence with mobility by performin. Supine to sit with [place]  2. Sit to supine with [place]  3. Bed to chair transfer with Modified [place] using No Assistive Device  4. Wheelchair propulsion x200 feet with Modified [place] using bilateral uppper extremities    Outcome: Ongoing, Progressing

## 2024-04-05 NOTE — PLAN OF CARE
Per Martha at [facility], patient still pending auth. She saw Neuro consult in system and pending MRI (recurrent syncope).  will continue to follow.      rounded on patient this morning. Patient was being transferred for MRI. Neurology saw patient this morning, will await MRI results. Plan is still IPR, just waiting on auth as well.    1341--Per Nish, Patient is denied. Peer to peer is available. Insurance MD states that patient does not meet rehab criteria.     awaiting info for peer to peer.    --Dr. Banda will not be doing peer to peer. Dr. Goodson added to secure message. I also left a message on his cell phone to see if he would like to do the peer to peer today.     Peer to Peer [phone]  Deadline is 4/9/24 by 5pm.     1411--Dr. Goodson notified patient's insurance has denied her for IPR. He was given information to complete peer to peer. I told him to let me know of results. He believes patient does not need to be discharged and needs to go to a facility. I told him insurance company needs approval, peer to peer needs to be completed today by certain time. Patient still in Obs status too. Dr. Banda is waiting on MRI results.    Nish with [facility] stated if patient is denied IPR, they can do a family appeal from home.    5478-- met with patient and  Arie to discuss discharge plans. I updated them on above information. Still awaiting MRI results.  will update them once we hear back regarding peer to peer as well. Patient and family still hoping for IPR.     Dr. Goodson stated Peer to Peer scheduled for Monday 4/8. Dr. Shen and patient/ aware.    Patient Contacts    Name Relation Home Work Mobile   Arie Nguyen Spouse [phone]        Future Appointments   Date Time Provider Department Center   4/5/2024 11:30 AM [facility] MRI1 GE ARTIST 500 LBS LIMIT [facility] MRI [facility]   4/26/2024 11:00 AM Noe  Kleber, PT [facility] OTPTR[facility] [place]   5/2/2024  1:30 PM Swetha Bay MD [facility] NEPHRO [facility]   5/3/2024 12:00 PM Viktor Cohen MD [facility] ARRHYTH [place]   6/11/2024 11:00 AM Zachary Bender MD [facility] IM [facility] PCW   8/27/2024  3:00 PM KYLAH DAVILA [facility] BMD [facility]       04/05/24 1104   Discharge Reassessment   Assessment Type Discharge Planning Reassessment   Did the patient's condition or plan change since previous assessment? No   Discharge Plan discussed with: Patient   Discharge Plan A Rehab   Discharge Plan B Home Health;Home with family   DME Needed Upon Discharge  wheelchair;bedside commode   Transition of Care Barriers None   Why the patient remains in the hospital Requires continued medical care   Post-Acute Status   Post-Acute Authorization Placement   Post-Acute Placement Status Pending payor review/awaiting authorization (if required)   Hospital Resources/Appts/Education Provided Appointments scheduled and added to AVS   Discharge Delays None known at this time     Itzel Jin RN    [phone]

## 2024-04-05 NOTE — CONSULTS
Personally interviewed and examined the patient. I have reviewed the notes, assessments, and/or procedures performed by Dr. Steinberg, I concur with her/his documentation of Thelma Marita Nguyen has a complex medical history.  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   She is having multiple falls. Per talking to her and her  it seems that she feels very faint after being upright for more than 20-30 seconds and she collapses with brief loss of consciousness. Her balance is also impaired.    Her tilt table test on 7/15/2022 showed orthostatic hypotension with reflex sinus tachycardia. BP dropped to 79/46 with tilting.  Her recent vitals show BP of 181/81 lying. Yesterday her BP was 155/77 lying and decreased to 131/72 with sitting.    1) orthostatic hypotension  She started on midodrine yesterday and received IV fluids and was able to stand and walk with PT.    Recommend assessing for orthostatic hypotension with PT and titration of the midodrine. She needs to wear 40-50 mmhg compression stockings, please order if possible to get them while inpatient. Patient should sleep with elevate the head of the bed while sleeping to prevent rebound nocturnal hypertension. Once the IV fluids are discontinued she may benefit from salt supplementation and needs to maintain oral hydration.  Re consult endocrinology for recs regarding her steroid replacement. Patient  not tolerate Florinef but may try changing some of her hydrocortisone with prednisone to get a higher mineralocorticoid replacement.    Hopefully her medications can be adjusted while inpatient to the point that she is not needing IV fluids and not having falls.    2) neuropathy    Patient has decreased light touch and propioception on the lower extremities but it is not very severe. This likely is also contributing to the falls.    3) partial seizures with secondary generalization   Low phenytoin  levels.  Patient is very concerned about the subtherapeutic levels of phenytoin.  She has had subtherapeutic levels for a year and has had no seizures so she may not need the a higher dose but since she is very concerned about having seizures we recommend a small increase in her phenytoin dose.  She needs closer follow-up with her neurologist who was planning on changing to lacosamide under EMU monitoring. This would be best to prevent further bone loss as she is at high risk for fracture from falling.    NEUROLOGY FLOOR CONSULT    Reason for consult: Frequent falls    CC:  History of frequent falls    HPI:     Thelma Nguyen is a 58 y.o. 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 She presented to the ED after multiple falls at home.she report falling about 20 times last week and that was an increase from 3-5 per week. She report before falling she feels dizzy, denies any palpation or blurry vision. She was seen by orthopedic for right foot fracture as a result of her falls. She is s/p ORIF. Neurology was consulted for fall evaluation. Patient denies any hallucination, visual changes, headache, nausea and vomit. She report history of GBM that was successful resected in 1985. She had chemo and radiation at that time. After her radiation, she was found to have hypopituitarisms and adrenal insufficiency. She also report having polyneuropathy of her foot after radiation and chemo.     ROS: As per HPI    Histories:     Allergies:  Neurontin [gabapentin], Lactose, and Soap    Current Medications:    Current Facility-Administered Medications   Medication Dose Route Frequency Provider Last Rate Last Admin    acetaminophen tablet 500 mg  500 mg Oral Q6H Aide Whelan NP   500 mg at 04/05/24 0519    acetaminophen tablet 650 mg  650 mg Oral Q4H PRN Aide Whelan NP        dextrose 10% bolus 125  mL 125 mL  12.5 g Intravenous PRN Aide Whelan NP        dextrose 10% bolus 250 mL 250 mL  25 g Intravenous PRN Aide Whelan NP        diclofenac sodium 1 % gel 2 g  2 g Topical (Top) TID PRN Aide Whelan NP        DULoxetine DR capsule 60 mg  60 mg Oral QHS Aide Whelan NP   60 mg at 04/04/24 2027    fentaNYL 50 mcg/mL injection 25 mcg  25 mcg Intravenous Q5 Min PRN Jose Raul Franks MD        ferrous sulfate tablet 1 each  1 tablet Oral QHS Aide Whelan NP   1 each at 04/04/24 2027    glucagon (human recombinant) injection 1 mg  1 mg Intramuscular PRN Aide Whelan NP        glucose chewable tablet 16 g  16 g Oral PRN Aide Whelan NP        glucose chewable tablet 24 g  24 g Oral PRN Aide Whelan NP        hydrocortisone tablet 15 mg  15 mg Oral Daily Luna Banda MD   15 mg at 04/05/24 0800    hydrocortisone tablet 5 mg  5 mg Oral QHS Luna Banda MD        levothyroxine tablet 112 mcg  112 mcg Oral Before breakfast Aide Whelan NP   112 mcg at 04/05/24 0519    melatonin tablet 6 mg  6 mg Oral Nightly PRN Aide Whelan NP        midodrine tablet 2.5 mg  2.5 mg Oral BID WM Luna Banda MD   2.5 mg at 04/05/24 0754    naloxone 0.4 mg/mL injection 0.02 mg  0.02 mg Intravenous PRN Aide Whelan NP        ondansetron injection 4 mg  4 mg Intravenous Q8H PRN Aide Whelan NP        ondansetron injection 4 mg  4 mg Intravenous Daily PRN Jose Raul Franks MD        oxyCODONE immediate release tablet 5 mg  5 mg Oral Q4H PRN Aide Whelan NP   5 mg at 04/04/24 0726    phenytoin (DILANTIN) ER capsule 200 mg  200 mg Oral Nightly Aide Whelan NP   200 mg at 04/04/24 2027    rivaroxaban tablet 20 mg  20 mg Oral with dinner Luna Banda MD   20 mg at 04/04/24 1735    sodium chloride 0.9% flush 3 mL  3 mL Intravenous Q12H PRN Aide Whelan NP           Past Medical/Surgical/Family History:  Medical:   Past Medical History:   Diagnosis Date    Allergic rhinitis     Anemia  1987    Arthritis     Basal cell carcinoma     Broken ankle     Cancer 1985    Clotting disorder     Disorder of kidney and ureter     DVT (deep venous thrombosis)     Factor V deficiency     Fracture of elbow     left    Glioblastoma multiforme of brain     Hypothyroidism     IBS (irritable bowel syndrome)     Osteoporosis     Panhypopituitarism     Peripheral polyneuropathy     Secondary adrenal insufficiency     Seizures     Thyroid disease       Surgeries:   Past Surgical History:   Procedure Laterality Date    APPENDECTOMY      BRAIN SURGERY      for glioblastoma      SECTION  1998    CHOLECYSTECTOMY  2007    CLOSURE OF DEFECT OF MOHS PROCEDURE Left 2018    Procedure: CLOSURE, MOHS PROCEDURE DEFECT SCALP  Flap closure;  Surgeon: Yrn Novak MD;  Location: [address];  Service: Plastics;  Laterality: Left;    COLONOSCOPY N/A 2019    Procedure: COLONOSCOPY Suprep;  Surgeon: Tony Mosher MD;  Location: [facility];  Service: Endoscopy;  Laterality: N/A;    ESOPHAGOGASTRODUODENOSCOPY N/A 2019    Procedure: EGD/colon;  Surgeon: Tony Mosher MD;  Location: [facility];  Service: Endoscopy;  Laterality: N/A;    EXCISION OF GANGLION CYST OF HAND Left 2020    Procedure: EXCISION, GANGLION CYST, HAND;  Surgeon: Ross Drew Jr., MD;  Location: [facility];  Service: Orthopedics;  Laterality: Left;    HYSTERECTOMY      ILIAC CREST BONE GRAFT Right 3/15/2024    Procedure: BONE GRAFT, ILIAC CREST;  Surgeon: Geoffrey Goodson MD;  Location[facility];  Service: Orthopedics;  Laterality: Right;  acumed harvester    ILIAC CREST BONE GRAFT Right 2024    Procedure: BONE GRAFT, ILIAC CREST;  Surgeon: Geoffrey Goodson MD;  Location: [facility] OR;  Service: Orthopedics;  Laterality: Right;    MIDFOOT ARTHRODESIS Right 2024    Procedure: FUSION, JOINT, MIDFOOT;  Surgeon: Geoffrey Goodson MD;  Location: [facility] OR;  Service: Orthopedics;  Laterality: Right;   mini c arm    OPEN REDUCTION AND INTERNAL FIXATION (ORIF) OF FRACTURE OF TARSAL BONE Right 3/15/2024    Procedure: ORIF, FRACTURE, TARSAL BONE;  Surgeon: Geoffrey Goodson MD;  Location: [facility] OR;  Service: Orthopedics;  Laterality: Right;  supine, mini C arm, Styker, block okay    SHOULDER SURGERY Right     TILT TABLE TEST N/A 07/15/2022    Procedure: TILT TABLE TEST;  Surgeon: Amol Kohler MD;  Location: [facility] EP LAB;  Service: Cardiology;  Laterality: N/A;  Orthostatic hypotension, dizziness, Tilt Table Test with EEG, Dr.Tiffany Roca (neuro), DM    TUBAL LIGATION  1998      Family:   Family History   Problem Relation Age of Onset    Heart disease Mother     Diabetes type II Mother     Diabetes type II Father     Heart attack Maternal Grandfather      Current Evaluation:     Vital Signs:   Vitals:    04/05/24 0732   BP: (!) 151/82   Pulse: 89   Resp: 20   Temp: 98 °F (36.7 °C)        Neurological Examination   Orientation  Alert, awake, oriented to self, place, time, and situation.  Memory  Recent and remote memory intact.  Language  No dysarthria, No aphasia.   Cranial Nerves  PERRL, VF intact, EOMI, V1-V3 intact, symmetric facial expression, hearing grossly intact, SCM & TPZ 5/5, tongue midline, symmetric palate elevation.  Motor  5/5 strength in bilateral upper extremities  -Right leg with a cast   -Left leg strength 4/5  Sensory  Normal to light touch throughout  DTR   +2 symmetric  Cerebellar/Gait  Normal finger to nose    RADIOLOGY STUDIES:  I have personally reviewed the images performed.     HEAD CT: No acute lesion    BRAIN MRI: pending      Assessment:  Plan:     Thelma Nguyen is a 58 y.o. 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 She is followed by ortho and is s/p Right for ORIF. She is also followed by Endo for  hypopituitarism and adrenal insufficiency. Neuro consulted for fall history. Patient with history of GBM with successful resection. Denies any new headache, visual deficit. Will obtain MRI brain w wo coon for further eval leading to falls.    Assessment  -History of falls, on going and progressively getting worse      Plan  -Labs to obtain if not doing already: B.12, folate, TSH, A1c  -Phenytoin level low, can increase med to 250mg nightly  -Rest of care per primary team    Case discussed with Dr. Valorie Perdomo MD  [facility] PGY-4  [facility] Consult Service

## 2024-04-05 NOTE — PLAN OF CARE
Problem: Adult Inpatient Plan of Care  Goal: Plan of Care Review  Outcome: Ongoing, Progressing  Goal: Patient-Specific Goal (Individualized)  Outcome: Ongoing, Progressing  Goal: Absence of Hospital-Acquired Illness or Injury  Outcome: Ongoing, Progressing  Goal: Optimal Comfort and Wellbeing  Outcome: Ongoing, Progressing  Goal: Readiness for Transition of Care  Outcome: Ongoing, Progressing     Problem: Fall Injury Risk  Goal: Absence of Fall and Fall-Related Injury  Outcome: Ongoing, Progressing     Problem: Skin Injury Risk Increased  Goal: Skin Health and Integrity  Outcome: Ongoing, Progressing     Problem: Impaired Wound Healing  Goal: Optimal Wound Healing  Outcome: Ongoing, Progressing     Problem: Bowel Motility Impaired (Surgery Nonspecified)  Goal: Effective Bowel Elimination  Outcome: Ongoing, Progressing     Problem: Pain (Surgery Nonspecified)  Goal: Acceptable Pain Control  Outcome: Ongoing, Progressing

## 2024-04-06 LAB
ANION GAP SERPL CALC-SCNC: 12 MMOL/L (ref 8–16)
BASOPHILS # BLD AUTO: 0.03 K/UL (ref 0–0.2)
BASOPHILS NFR BLD: 0.4 % (ref 0–1.9)
BUN SERPL-MCNC: 28 MG/DL (ref 6–20)
CALCIUM SERPL-MCNC: 8.7 MG/DL (ref 8.7–10.5)
CHLORIDE SERPL-SCNC: 106 MMOL/L (ref 95–110)
CO2 SERPL-SCNC: 24 MMOL/L (ref 23–29)
CREAT SERPL-MCNC: 1.3 MG/DL (ref 0.5–1.4)
DIFFERENTIAL METHOD BLD: ABNORMAL
EOSINOPHIL # BLD AUTO: 0.5 K/UL (ref 0–0.5)
EOSINOPHIL NFR BLD: 7.5 % (ref 0–8)
ERYTHROCYTE [DISTWIDTH] IN BLOOD BY AUTOMATED COUNT: 12.7 % (ref 11.5–14.5)
EST. GFR  (NO RACE VARIABLE): 48 ML/MIN/1.73 M^2
GLUCOSE SERPL-MCNC: 78 MG/DL (ref 70–110)
HCT VFR BLD AUTO: 27.2 % (ref 37–48.5)
HGB BLD-MCNC: 8.9 G/DL (ref 12–16)
IMM GRANULOCYTES # BLD AUTO: 0.02 K/UL (ref 0–0.04)
IMM GRANULOCYTES NFR BLD AUTO: 0.3 % (ref 0–0.5)
LYMPHOCYTES # BLD AUTO: 2.5 K/UL (ref 1–4.8)
LYMPHOCYTES NFR BLD: 35.3 % (ref 18–48)
MCH RBC QN AUTO: 30.2 PG (ref 27–31)
MCHC RBC AUTO-ENTMCNC: 32.7 G/DL (ref 32–36)
MCV RBC AUTO: 92 FL (ref 82–98)
MONOCYTES # BLD AUTO: 0.4 K/UL (ref 0.3–1)
MONOCYTES NFR BLD: 5.8 % (ref 4–15)
NEUTROPHILS # BLD AUTO: 3.6 K/UL (ref 1.8–7.7)
NEUTROPHILS NFR BLD: 50.7 % (ref 38–73)
NRBC BLD-RTO: 0 /100 WBC
PLATELET # BLD AUTO: 292 K/UL (ref 150–450)
PMV BLD AUTO: 9 FL (ref 9.2–12.9)
POTASSIUM SERPL-SCNC: 4.3 MMOL/L (ref 3.5–5.1)
RBC # BLD AUTO: 2.95 M/UL (ref 4–5.4)
SODIUM SERPL-SCNC: 142 MMOL/L (ref 136–145)
WBC # BLD AUTO: 7.19 K/UL (ref 3.9–12.7)

## 2024-04-06 PROCEDURE — 25000003 PHARM REV CODE 250: Performed by: NURSE PRACTITIONER

## 2024-04-06 PROCEDURE — 85025 COMPLETE CBC W/AUTO DIFF WBC: CPT | Performed by: INTERNAL MEDICINE

## 2024-04-06 PROCEDURE — 80048 BASIC METABOLIC PNL TOTAL CA: CPT | Performed by: INTERNAL MEDICINE

## 2024-04-06 PROCEDURE — 97530 THERAPEUTIC ACTIVITIES: CPT | Mod: CQ

## 2024-04-06 PROCEDURE — 25000003 PHARM REV CODE 250: Performed by: INTERNAL MEDICINE

## 2024-04-06 PROCEDURE — 36415 COLL VENOUS BLD VENIPUNCTURE: CPT | Performed by: INTERNAL MEDICINE

## 2024-04-06 PROCEDURE — G0378 HOSPITAL OBSERVATION PER HR: HCPCS

## 2024-04-06 RX ADMIN — LEVOTHYROXINE SODIUM 112 MCG: 112 TABLET ORAL at 05:04

## 2024-04-06 RX ADMIN — DULOXETINE HYDROCHLORIDE 60 MG: 30 CAPSULE, DELAYED RELEASE ORAL at 08:04

## 2024-04-06 RX ADMIN — ACETAMINOPHEN 500 MG: 500 TABLET ORAL at 12:04

## 2024-04-06 RX ADMIN — MIDODRINE HYDROCHLORIDE 2.5 MG: 2.5 TABLET ORAL at 08:04

## 2024-04-06 RX ADMIN — MIDODRINE HYDROCHLORIDE 2.5 MG: 2.5 TABLET ORAL at 05:04

## 2024-04-06 RX ADMIN — RIVAROXABAN 20 MG: 20 TABLET, FILM COATED ORAL at 05:04

## 2024-04-06 RX ADMIN — ACETAMINOPHEN 500 MG: 500 TABLET ORAL at 05:04

## 2024-04-06 RX ADMIN — PHENYTOIN SODIUM 200 MG: 100 CAPSULE ORAL at 08:04

## 2024-04-06 RX ADMIN — ACETAMINOPHEN 500 MG: 500 TABLET ORAL at 11:04

## 2024-04-06 RX ADMIN — HYDROCORTISONE 15 MG: 5 TABLET ORAL at 08:04

## 2024-04-06 RX ADMIN — FERROUS SULFATE TAB 325 MG (65 MG ELEMENTAL FE) 1 EACH: 325 (65 FE) TAB at 08:04

## 2024-04-06 RX ADMIN — HYDROCORTISONE 5 MG: 5 TABLET ORAL at 08:04

## 2024-04-06 NOTE — ASSESSMENT & PLAN NOTE
See orthostatic hypotension  Patient may benefit from PT/OT post-op  E-consult with Dr. Shaw with Endocrine  Plan for Neurology consult due to patients concern that her falls that used to be once a month are happening several times  MRI brain unremarkable, no findings to explain symptoms

## 2024-04-06 NOTE — PT/OT/SLP PROGRESS
Physical Therapy Treatment    Patient Name:  Thelma Nguyen   MRN:  [id number]    Recommendations:     Discharge Recommendations: High Intensity Therapy  Discharge Equipment Recommendations: bedside commode, wheelchair, walker, rolling  Barriers to discharge:  decreased mobility,strength and endurance.    Assessment:     Thelma Nguyen is a 58 y.o. female admitted with a medical diagnosis of Orthostatic hypotension.  She presents with the following impairments/functional limitations: weakness, impaired endurance, decreased coordination, orthopedic precautions, decreased lower extremity function, impaired functional mobility, impaired self care skills, gait instability, decreased safety awareness.    Rehab Prognosis: Good; patient would benefit from acute skilled PT services to address these deficits and reach maximum level of function.    Recent Surgery: Procedure(s) (LRB):  FUSION, JOINT, MIDFOOT (Right)  BONE GRAFT, ILIAC CREST (Right) 4 Days Post-Op    Plan:     During this hospitalization, patient to be seen 4 x/week to address the identified rehab impairments via gait training, therapeutic activities, therapeutic exercises, neuromuscular re-education and progress toward the following goals:    Plan of Care Expires:  04/17/24    Subjective     Chief Complaint: Pt with no complaints or concerns at this time.   Patient/Family Comments/goals: Pt agreeable to PT treatment.   Pain/Comfort:  Pain Rating 1: 0/10      Objective:     Communicated with nursing prior to session.  Patient found HOB elevated with peripheral IV, telemetry, PureWick upon PT entry to room.     General Precautions: Standard, fall  Orthopedic Precautions: RLE non weight bearing  Braces: N/A  Respiratory Status: Room air     Functional Mobility:  Bed Mobility:     Supine to Sit: stand by assistance  Sit to Supine: stand by assistance  Transfers:     Sit to Stand:  contact guard assistance with rolling walker  Balance: Pt  with good sitting and fair standing balance.       AM-PAC 6 CLICK MOBILITY  Turning over in bed (including adjusting bedclothes, sheets and blankets)?: 4  Sitting down on and standing up from a chair with arms (e.g., wheelchair, bedside commode, etc.): 3  Moving from lying on back to sitting on the side of the bed?: 3  Moving to and from a bed to a chair (including a wheelchair)?: 3  Need to walk in hospital room?: 1  Climbing 3-5 steps with a railing?: 1  Basic Mobility Total Score: 15       Treatment & Education:      Patient left HOB elevated with all lines intact, call button in reach, and spouse present..    GOALS:   Multidisciplinary Problems       Physical Therapy Goals          Problem: Physical Therapy    Goal Priority Disciplines Outcome Goal Variances Interventions   Physical Therapy Goal     PT, PT/OT Ongoing, Progressing     Description: Goals to be met by: 24     Patient will increase functional independence with mobility by performin. Supine to sit with Cuming  2. Sit to supine with Cuming  3. Bed to chair transfer with Modified Cuming using No Assistive Device  4. Wheelchair propulsion x200 feet with Modified Cuming using bilateral uppper extremities                         Time Tracking:     PT Received On: 24  PT Start Time: 1054     PT Stop Time: 1113  PT Total Time (min): 19 min     Billable Minutes: Therapeutic Activity 19    Treatment Type: Treatment  PT/PTA: PTA     Number of PTA visits since last PT visit: 3     2024

## 2024-04-06 NOTE — SUBJECTIVE & OBJECTIVE
Interval History: dizzy while working with PT    Review of Systems   Constitutional:  Negative for chills and fever.   HENT:  Negative for congestion.    Respiratory:  Negative for shortness of breath.    Cardiovascular:  Negative for chest pain.   Gastrointestinal:  Negative for abdominal pain, nausea and vomiting.   Genitourinary:  Negative for dysuria.   Musculoskeletal:         Right foot fracture   Skin:         Bruising - rib cage; surgical incision right foot   Neurological:  Positive for dizziness. Negative for syncope and light-headedness.   Psychiatric/Behavioral:  Negative for agitation and confusion. The patient is not nervous/anxious.      Objective:     Vital Signs (Most Recent):  Temp: 98 °F (36.7 °C) (04/06/24 1508)  Pulse: 89 (04/06/24 1508)  Resp: 14 (04/06/24 1508)  BP: (!) 159/70 (04/06/24 1508)  SpO2: 100 % (04/06/24 1508) Vital Signs (24h Range):  Temp:  [97.7 °F (36.5 °C)-98.6 °F (37 °C)] 98 °F (36.7 °C)  Pulse:  [88-95] 89  Resp:  [14-20] 14  SpO2:  [97 %-100 %] 100 %  BP: ()/(46-79) 159/70     Weight: 71.9 kg (158 lb 8.2 oz)  Body mass index is 24.83 kg/m².    Intake/Output Summary (Last 24 hours) at 4/6/2024 1713  Last data filed at 4/6/2024 1200  Gross per 24 hour   Intake 720 ml   Output 50 ml   Net 670 ml         Physical Exam  Constitutional:       General: She is not in acute distress.  HENT:      Head: Normocephalic and atraumatic.   Eyes:      Extraocular Movements: Extraocular movements intact.      Pupils: Pupils are equal, round, and reactive to light.   Cardiovascular:      Rate and Rhythm: Normal rate and regular rhythm.      Pulses:           Popliteal pulses are 2+ on the right side.        Dorsalis pedis pulses are 2+ on the left side.      Heart sounds: Normal heart sounds.   Pulmonary:      Effort: Pulmonary effort is normal. No respiratory distress.      Breath sounds: Normal breath sounds. No wheezing, rhonchi or rales.   Abdominal:      General: Bowel sounds are  normal. There is no distension.      Palpations: Abdomen is soft.      Tenderness: There is no abdominal tenderness. There is no guarding or rebound.   Musculoskeletal:      Cervical back: No rigidity.      Right lower leg: No edema.      Left lower leg: No edema.   Skin:     General: Skin is warm.      Capillary Refill: Capillary refill takes 2 to 3 seconds.      Comments: Post op boot CDI   Neurological:      Mental Status: She is alert and oriented to person, place, and time.   Psychiatric:         Mood and Affect: Mood normal.         Behavior: Behavior normal.         Thought Content: Thought content normal.         Judgment: Judgment normal.             Significant Labs: All pertinent labs within the past 24 hours have been reviewed.  CBC:   Recent Labs   Lab 04/05/24  1115 04/06/24  0408   WBC 11.62 7.19   HGB 10.1* 8.9*   HCT 31.2* 27.2*    292     CMP:   Recent Labs   Lab 04/05/24  1115 04/06/24  0408     140 142   K 3.9  4.0 4.3     106 106   CO2 21*  21* 24   *  118* 78   BUN 28*  29* 28*   CREATININE 1.6*  1.7* 1.3   CALCIUM 8.7  9.0 8.7   ANIONGAP 15  13 12       Significant Imaging: I have reviewed all pertinent imaging results/findings within the past 24 hours.

## 2024-04-06 NOTE — ASSESSMENT & PLAN NOTE
Panhypopituitarism - Primary        Planned for repeat surgery so will need stress dose steroids given hx of adrenal insufficiency.  If hypotension and orthostatic hypotension does not improve on IV hydrocortisone would evaluate for other causes like infection     Recommendations :  - this patient will need stress dose steroids on induction with:              IV hydrocortisone 100 mg on induction followed by IV hydrocortisone 50 mg q8 hours   If hemodynamically stable:  - On POD 1 if tolerating oral meds change to oral hydrocortisone 40/20 mg with the guidance of inpatient endocrine service if needed   - on POD 3 decrease to home doses of hydrocortisone      If hemodynamically unstable continue IV hydrocortisone and consider endocrine consult or continuing supraphysiologic hydrocortisone until condition is improved        Plan for peer to peer on Monday

## 2024-04-06 NOTE — ASSESSMENT & PLAN NOTE
Continue AEDs  No seizures in several years  Phenytoin is subtherapeutic, will increase 250mg phenytoin

## 2024-04-06 NOTE — PROGRESS NOTES
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 She presented to the ED by request of Orthopedics, Dr. Goodson, for admission for surgical correction of right foot navicular fracture. Patient had ORIF performed with Dr. Goodson on 3/15. Patient admits to significant and lengthy history of severe orthostatic hypotension, and admits her "spells" of becoming lightheaded upon standing have severely worsened since surgery causing her to have numerous falls over past few weeks. She believes during these falls she re-injured her right foot causing hardware failure. From these falls she also does report contusing left elbow and left rib cage. Denying any recent head injury or LOC. No acute neck pain. No chest pain, palpitations, abdominal pain, nausea or vomiting. Patient also reports episodes of tachycardia that occur while sitting on the cough watching TV with alerts on her Apple watch of HR 110s-140s. She states that her falls have significantly increased over the last couple weeks up to 20 falls in the last 2 weeks. Some of these are accompanied by syncope and a disorientated feeling, other times her legs just give out and she falls. Patient's  states patient does not appear to have seizure activity during these episodes but she does start breathing really heavy and then wakes back up after 1-2 minutes. Patient has been under the care of cardiology and endocrinology. She " states that she was diagnosed with postural hypotension with vasovagal response. She has never been prescribed a Holter Monitor.    Overview/Hospital Course:  4/2/24 Stress dose steroids started prior to surgical procedure  4/3/24 POD1 Resumed Xarelto, transitioned to PO hydrocortisone  4/4/24 POD 2 Neurology consult for increased falls  4/5/2024 Went for MRI today- pending results and Neurology recommendations  4/6/24 MRI brain unremarkable, orthostatics still positive,     Interval History: dizzy while working with PT    Review of Systems   Constitutional:  Negative for chills and fever.   HENT:  Negative for congestion.    Respiratory:  Negative for shortness of breath.    Cardiovascular:  Negative for chest pain.   Gastrointestinal:  Negative for abdominal pain, nausea and vomiting.   Genitourinary:  Negative for dysuria.   Musculoskeletal:         Right foot fracture   Skin:         Bruising - rib cage; surgical incision right foot   Neurological:  Positive for dizziness. Negative for syncope and light-headedness.   Psychiatric/Behavioral:  Negative for agitation and confusion. The patient is not nervous/anxious.      Objective:     Vital Signs (Most Recent):  Temp: 98 °F (36.7 °C) (04/06/24 1508)  Pulse: 89 (04/06/24 1508)  Resp: 14 (04/06/24 1508)  BP: (!) 159/70 (04/06/24 1508)  SpO2: 100 % (04/06/24 1508) Vital Signs (24h Range):  Temp:  [97.7 °F (36.5 °C)-98.6 °F (37 °C)] 98 °F (36.7 °C)  Pulse:  [88-95] 89  Resp:  [14-20] 14  SpO2:  [97 %-100 %] 100 %  BP: ()/(46-79) 159/70     Weight: 71.9 kg (158 lb 8.2 oz)  Body mass index is 24.83 kg/m².    Intake/Output Summary (Last 24 hours) at 4/6/2024 1713  Last data filed at 4/6/2024 1200  Gross per 24 hour   Intake 720 ml   Output 50 ml   Net 670 ml         Physical Exam  Constitutional:       General: She is not in acute distress.  HENT:      Head: Normocephalic and atraumatic.   Eyes:      Extraocular Movements: Extraocular movements intact.       Pupils: Pupils are equal, round, and reactive to light.   Cardiovascular:      Rate and Rhythm: Normal rate and regular rhythm.      Pulses:           Popliteal pulses are 2+ on the right side.        Dorsalis pedis pulses are 2+ on the left side.      Heart sounds: Normal heart sounds.   Pulmonary:      Effort: Pulmonary effort is normal. No respiratory distress.      Breath sounds: Normal breath sounds. No wheezing, rhonchi or rales.   Abdominal:      General: Bowel sounds are normal. There is no distension.      Palpations: Abdomen is soft.      Tenderness: There is no abdominal tenderness. There is no guarding or rebound.   Musculoskeletal:      Cervical back: No rigidity.      Right lower leg: No edema.      Left lower leg: No edema.   Skin:     General: Skin is warm.      Capillary Refill: Capillary refill takes 2 to 3 seconds.      Comments: Post op boot CDI   Neurological:      Mental Status: She is alert and oriented to person, place, and time.   Psychiatric:         Mood and Affect: Mood normal.         Behavior: Behavior normal.         Thought Content: Thought content normal.         Judgment: Judgment normal.             Significant Labs: All pertinent labs within the past 24 hours have been reviewed.  CBC:   Recent Labs   Lab 04/05/24  1115 04/06/24  0408   WBC 11.62 7.19   HGB 10.1* 8.9*   HCT 31.2* 27.2*    292     CMP:   Recent Labs   Lab 04/05/24  1115 04/06/24  0408     140 142   K 3.9  4.0 4.3     106 106   CO2 21*  21* 24   *  118* 78   BUN 28*  29* 28*   CREATININE 1.6*  1.7* 1.3   CALCIUM 8.7  9.0 8.7   ANIONGAP 15  13 12       Significant Imaging: I have reviewed all pertinent imaging results/findings within the past 24 hours.    Assessment/Plan:      * Orthostatic hypotension  171/80 > 117/67 > 99/54  Patient reports being dx w/ postural hypotension with vasovagal response  Likely cause of recurrent syncope/falls  Cardiology consult ordered given worsening  of sx  Head CT: no acute abnormality  Had + tilt table testing in 2022  Midodrine added to steroids, monitor BP      History of adrenal insufficiency    Panhypopituitarism - Primary        Planned for repeat surgery so will need stress dose steroids given hx of adrenal insufficiency.  If hypotension and orthostatic hypotension does not improve on IV hydrocortisone would evaluate for other causes like infection     Recommendations :  - this patient will need stress dose steroids on induction with:              IV hydrocortisone 100 mg on induction followed by IV hydrocortisone 50 mg q8 hours   If hemodynamically stable:  - On POD 1 if tolerating oral meds change to oral hydrocortisone 40/20 mg with the guidance of inpatient endocrine service if needed   - on POD 3 decrease to home doses of hydrocortisone      If hemodynamically unstable continue IV hydrocortisone and consider endocrine consult or continuing supraphysiologic hydrocortisone until condition is improved        Plan for peer to peer on Monday    Fracture of right foot  S/p ORIF of right navicular fracture and iliac crest bone harvest 3/15  Patient returned today for evaluation with Dr. Goodson and sent in for hardware failure likely 2/2 multiple falls requiring revision fixation of hardware  Ortho following  NPO after MN  Likely procedure tomorrow  Revised cardiac risk index score: 0 points, class I risk, 3.9% 30-day risk of death, MI, or cardiac arrest    4/2/24  1. Navicular open reduction internal fixation, right  2. Naviculocuneiform fusion, right  3. Talonavicular joint open reduction internal fixation  4. Hardware removal, right foot  5. Iliac crest bone grafting, right hip    Bedside Commode:   The beneficiary is confined to a single room. The patient is bedroom confined for an extended time and non-ambulatory.     Wheelchair  The patient has a mobility limitation that significantly impairs her ability to participate in one or more mobility related  activities of daily living (MRADL's) such as toileting, feeding, dressing, grooming and bathing in customary locations in the home. The mobility limitation cannot be sufficiently resolved by the use of a cane or walker. The use of a manual wheelchair will significantly improve the patient's ability to participate in MRADLS and the patient will use it on regular basis in the home. The patient has expressed her willingness to use a manual wheelchair in the home. She also has a caregiver who is available, willing and able to provide assistance with the wheelchair when needed.     Recurrent falls  See orthostatic hypotension  Patient may benefit from PT/OT post-op  E-consult with Dr. Shaw with Endocrine  Plan for Neurology consult due to patients concern that her falls that used to be once a month are happening several times  MRI brain unremarkable, no findings to explain symptoms      Stage 4 chronic kidney disease  Creatine stable for now. BMP reviewed- noted Estimated Creatinine Clearance: 37.7 mL/min (A) (based on SCr of 1.6 mg/dL (H)). according to latest data. Based on current GFR, CKD stage is stage 4 - GFR 15-29.  Monitor UOP and serial BMP and adjust therapy as needed. Renally dose meds. Avoid nephrotoxic medications and procedures.    Factor V deficiency with DVT x 3  Xarelto o/h pending surgery      Secondary hypothyroidism  Continue synthroid      Secondary adrenal insufficiency  Endocrinology e-consult performed, appreciate recs:  100 iv hydrocortisone.  (Q8) until surgery  After surgery 50 q8 x1 day   Double home dose  x 3day  If still falling after loading dose, something else is going on outside of endocrine.          Nonintractable epilepsy without status epilepticus  Continue AEDs  No seizures in several years  Phenytoin is subtherapeutic, will increase 250mg phenytoin        VTE Risk Mitigation (From admission, onward)           Ordered     rivaroxaban tablet 20 mg  with dinner         04/03/24 1013      IP VTE HIGH RISK PATIENT  Once         04/01/24 1938     Place sequential compression device  Until discontinued         04/01/24 1938                    Discharge Planning   XIMENA:      Code Status: Full Code   Is the patient medically ready for discharge?:     Reason for patient still in hospital (select all that apply): Patient trending condition and Pending disposition  Discharge Plan A: Rehab   Discharge Delays: None known at this time              Luna Banda MD  Department of Hospital Medicine   [facility]

## 2024-04-07 LAB
ANION GAP SERPL CALC-SCNC: 10 MMOL/L (ref 8–16)
BASOPHILS # BLD AUTO: 0.02 K/UL (ref 0–0.2)
BASOPHILS NFR BLD: 0.3 % (ref 0–1.9)
BUN SERPL-MCNC: 25 MG/DL (ref 6–20)
CALCIUM SERPL-MCNC: 8.8 MG/DL (ref 8.7–10.5)
CHLORIDE SERPL-SCNC: 107 MMOL/L (ref 95–110)
CO2 SERPL-SCNC: 23 MMOL/L (ref 23–29)
CREAT SERPL-MCNC: 1.3 MG/DL (ref 0.5–1.4)
DIFFERENTIAL METHOD BLD: ABNORMAL
EOSINOPHIL # BLD AUTO: 0.6 K/UL (ref 0–0.5)
EOSINOPHIL NFR BLD: 8 % (ref 0–8)
ERYTHROCYTE [DISTWIDTH] IN BLOOD BY AUTOMATED COUNT: 12.7 % (ref 11.5–14.5)
EST. GFR  (NO RACE VARIABLE): 48 ML/MIN/1.73 M^2
GLUCOSE SERPL-MCNC: 91 MG/DL (ref 70–110)
HCT VFR BLD AUTO: 26.6 % (ref 37–48.5)
HGB BLD-MCNC: 8.8 G/DL (ref 12–16)
IMM GRANULOCYTES # BLD AUTO: 0.02 K/UL (ref 0–0.04)
IMM GRANULOCYTES NFR BLD AUTO: 0.3 % (ref 0–0.5)
LYMPHOCYTES # BLD AUTO: 2.6 K/UL (ref 1–4.8)
LYMPHOCYTES NFR BLD: 36.3 % (ref 18–48)
MCH RBC QN AUTO: 30.2 PG (ref 27–31)
MCHC RBC AUTO-ENTMCNC: 33.1 G/DL (ref 32–36)
MCV RBC AUTO: 91 FL (ref 82–98)
MONOCYTES # BLD AUTO: 0.5 K/UL (ref 0.3–1)
MONOCYTES NFR BLD: 6.5 % (ref 4–15)
NEUTROPHILS # BLD AUTO: 3.5 K/UL (ref 1.8–7.7)
NEUTROPHILS NFR BLD: 48.6 % (ref 38–73)
NRBC BLD-RTO: 0 /100 WBC
PLATELET # BLD AUTO: 285 K/UL (ref 150–450)
PMV BLD AUTO: 9.1 FL (ref 9.2–12.9)
POTASSIUM SERPL-SCNC: 3.7 MMOL/L (ref 3.5–5.1)
RBC # BLD AUTO: 2.91 M/UL (ref 4–5.4)
SODIUM SERPL-SCNC: 140 MMOL/L (ref 136–145)
WBC # BLD AUTO: 7.11 K/UL (ref 3.9–12.7)

## 2024-04-07 PROCEDURE — 36415 COLL VENOUS BLD VENIPUNCTURE: CPT | Performed by: INTERNAL MEDICINE

## 2024-04-07 PROCEDURE — 25000003 PHARM REV CODE 250: Performed by: NURSE PRACTITIONER

## 2024-04-07 PROCEDURE — G0378 HOSPITAL OBSERVATION PER HR: HCPCS

## 2024-04-07 PROCEDURE — 85025 COMPLETE CBC W/AUTO DIFF WBC: CPT | Performed by: INTERNAL MEDICINE

## 2024-04-07 PROCEDURE — 80048 BASIC METABOLIC PNL TOTAL CA: CPT | Performed by: INTERNAL MEDICINE

## 2024-04-07 PROCEDURE — 25000003 PHARM REV CODE 250: Performed by: INTERNAL MEDICINE

## 2024-04-07 RX ORDER — HYDROCORTISONE 5 MG/1
5 TABLET ORAL EVERY 24 HOURS
Status: DISCONTINUED | OUTPATIENT
Start: 2024-04-07 | End: 2024-04-11 | Stop reason: HOSPADM

## 2024-04-07 RX ADMIN — ACETAMINOPHEN 500 MG: 500 TABLET ORAL at 11:04

## 2024-04-07 RX ADMIN — HYDROCORTISONE 15 MG: 5 TABLET ORAL at 08:04

## 2024-04-07 RX ADMIN — LEVOTHYROXINE SODIUM 112 MCG: 112 TABLET ORAL at 05:04

## 2024-04-07 RX ADMIN — FERROUS SULFATE TAB 325 MG (65 MG ELEMENTAL FE) 1 EACH: 325 (65 FE) TAB at 08:04

## 2024-04-07 RX ADMIN — RIVAROXABAN 20 MG: 20 TABLET, FILM COATED ORAL at 04:04

## 2024-04-07 RX ADMIN — PHENYTOIN SODIUM 200 MG: 100 CAPSULE ORAL at 08:04

## 2024-04-07 RX ADMIN — MIDODRINE HYDROCHLORIDE 2.5 MG: 2.5 TABLET ORAL at 05:04

## 2024-04-07 RX ADMIN — DULOXETINE HYDROCHLORIDE 60 MG: 30 CAPSULE, DELAYED RELEASE ORAL at 08:04

## 2024-04-07 RX ADMIN — HYDROCORTISONE 5 MG: 5 TABLET ORAL at 05:04

## 2024-04-07 RX ADMIN — ACETAMINOPHEN 500 MG: 500 TABLET ORAL at 05:04

## 2024-04-07 NOTE — PLAN OF CARE
Pt is aaox4 with no complaints of pain or n/v. Patient positive for orthostatics. Patient got dizzy when standing and unable to stand for 5 minutes but dropped bp anyway. Pt up to BS with assist.

## 2024-04-07 NOTE — ASSESSMENT & PLAN NOTE
171/80 > 117/67 > 99/54  Patient reports being dx w/ postural hypotension with vasovagal response  Likely cause of recurrent syncope/falls  Cardiology consult ordered given worsening of sx  Head CT: no acute abnormality  Had + tilt table testing in 2022  Midodrine added to steroids  Will tolerate hypertension at rest and continue doses of midodrine for now

## 2024-04-07 NOTE — SUBJECTIVE & OBJECTIVE
Interval History: Had another episode of orthostatic hypotension, also notes that her neck and head hurt with these episodes. She was told that she has neuropathy in the past likely due to from chemotherapy    Review of Systems   Constitutional:  Negative for chills and fever.   HENT:  Negative for congestion.    Respiratory:  Negative for shortness of breath.    Cardiovascular:  Negative for chest pain.   Gastrointestinal:  Negative for abdominal pain, nausea and vomiting.   Genitourinary:  Negative for dysuria.   Musculoskeletal:         Right foot fracture   Skin:         Bruising - rib cage; surgical incision right foot   Neurological:  Positive for dizziness. Negative for syncope and light-headedness.   Psychiatric/Behavioral:  Negative for agitation and confusion. The patient is not nervous/anxious.      Objective:     Vital Signs (Most Recent):  Temp: 98.5 °F (36.9 °C) (04/07/24 1535)  Pulse: 93 (04/07/24 1535)  Resp: 12 (04/07/24 1535)  BP: (!) 173/82 (04/07/24 1535)  SpO2: 100 % (04/07/24 1535) Vital Signs (24h Range):  Temp:  [97.5 °F (36.4 °C)-98.5 °F (36.9 °C)] 98.5 °F (36.9 °C)  Pulse:  [] 93  Resp:  [12-20] 12  SpO2:  [95 %-100 %] 100 %  BP: ()/(32-82) 173/82     Weight: 71.4 kg (157 lb 6.5 oz)  Body mass index is 24.65 kg/m².    Intake/Output Summary (Last 24 hours) at 4/7/2024 1643  Last data filed at 4/7/2024 1400  Gross per 24 hour   Intake 960 ml   Output 650 ml   Net 310 ml         Physical Exam  Constitutional:       General: She is not in acute distress.  HENT:      Head: Normocephalic and atraumatic.   Eyes:      Extraocular Movements: Extraocular movements intact.      Pupils: Pupils are equal, round, and reactive to light.   Cardiovascular:      Rate and Rhythm: Normal rate and regular rhythm.      Pulses:           Popliteal pulses are 2+ on the right side.        Dorsalis pedis pulses are 2+ on the left side.      Heart sounds: Normal heart sounds.   Pulmonary:      Effort:  Pulmonary effort is normal. No respiratory distress.      Breath sounds: Normal breath sounds. No wheezing, rhonchi or rales.   Abdominal:      General: Bowel sounds are normal. There is no distension.      Palpations: Abdomen is soft.      Tenderness: There is no abdominal tenderness. There is no guarding or rebound.   Musculoskeletal:      Cervical back: No rigidity.      Right lower leg: No edema.      Left lower leg: No edema.   Skin:     General: Skin is warm.      Capillary Refill: Capillary refill takes 2 to 3 seconds.      Comments: Post op boot CDI   Neurological:      Mental Status: She is alert and oriented to person, place, and time.   Psychiatric:         Mood and Affect: Mood normal.         Behavior: Behavior normal.         Thought Content: Thought content normal.         Judgment: Judgment normal.             Significant Labs: All pertinent labs within the past 24 hours have been reviewed.  CBC:   Recent Labs   Lab 04/06/24  0408 04/07/24  0344   WBC 7.19 7.11   HGB 8.9* 8.8*   HCT 27.2* 26.6*    285     CMP:   Recent Labs   Lab 04/06/24  0408 04/07/24  0344    140   K 4.3 3.7    107   CO2 24 23   GLU 78 91   BUN 28* 25*   CREATININE 1.3 1.3   CALCIUM 8.7 8.8   ANIONGAP 12 10       Significant Imaging: I have reviewed all pertinent imaging results/findings within the past 24 hours.

## 2024-04-07 NOTE — ASSESSMENT & PLAN NOTE
Panhypopituitarism - Primary        Planned for repeat surgery so will need stress dose steroids given hx of adrenal insufficiency.  If hypotension and orthostatic hypotension does not improve on IV hydrocortisone would evaluate for other causes like infection     Recommendations :  - this patient will need stress dose steroids on induction with:              IV hydrocortisone 100 mg on induction followed by IV hydrocortisone 50 mg q8 hours   If hemodynamically stable:  - On POD 1 if tolerating oral meds change to oral hydrocortisone 40/20 mg with the guidance of inpatient endocrine service if needed   - on POD 3 decrease to home doses of hydrocortisone              Plan for peer to peer on Monday

## 2024-04-07 NOTE — PROGRESS NOTES
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 She presented to the ED by request of Orthopedics, Dr. Goodson, for admission for surgical correction of right foot navicular fracture. Patient had ORIF performed with Dr. Goodson on 3/15. Patient admits to significant and lengthy history of severe orthostatic hypotension, and admits her "spells" of becoming lightheaded upon standing have severely worsened since surgery causing her to have numerous falls over past few weeks. She believes during these falls she re-injured her right foot causing hardware failure. From these falls she also does report contusing left elbow and left rib cage. Denying any recent head injury or LOC. No acute neck pain. No chest pain, palpitations, abdominal pain, nausea or vomiting. Patient also reports episodes of tachycardia that occur while sitting on the cough watching TV with alerts on her Apple watch of HR 110s-140s. She states that her falls have significantly increased over the last couple weeks up to 20 falls in the last 2 weeks. Some of these are accompanied by syncope and a disorientated feeling, other times her legs just give out and she falls. Patient's  states patient does not appear to have seizure activity during these episodes but she does start breathing really heavy and then wakes back up after 1-2 minutes. Patient has been under the care of cardiology and endocrinology. She " states that she was diagnosed with postural hypotension with vasovagal response. She has never been prescribed a Holter Monitor.    Overview/Hospital Course:  4/2/24 Stress dose steroids started prior to surgical procedure  4/3/24 POD1 Resumed Xarelto, transitioned to PO hydrocortisone  4/4/24 POD 2 Neurology consult for increased falls  4/5/2024 Went for MRI today- pending results and Neurology recommendations  4/6/24 MRI brain unremarkable, orthostatics still positive,   4/7/24 Orthostatics BP lower than yesterday, concerns about    Interval History: Had another episode of orthostatic hypotension, also notes that her neck and head hurt with these episodes. She was told that she has neuropathy in the past likely due to from chemotherapy    Review of Systems   Constitutional:  Negative for chills and fever.   HENT:  Negative for congestion.    Respiratory:  Negative for shortness of breath.    Cardiovascular:  Negative for chest pain.   Gastrointestinal:  Negative for abdominal pain, nausea and vomiting.   Genitourinary:  Negative for dysuria.   Musculoskeletal:         Right foot fracture   Skin:         Bruising - rib cage; surgical incision right foot   Neurological:  Positive for dizziness. Negative for syncope and light-headedness.   Psychiatric/Behavioral:  Negative for agitation and confusion. The patient is not nervous/anxious.      Objective:     Vital Signs (Most Recent):  Temp: 98.5 °F (36.9 °C) (04/07/24 1535)  Pulse: 93 (04/07/24 1535)  Resp: 12 (04/07/24 1535)  BP: (!) 173/82 (04/07/24 1535)  SpO2: 100 % (04/07/24 1535) Vital Signs (24h Range):  Temp:  [97.5 °F (36.4 °C)-98.5 °F (36.9 °C)] 98.5 °F (36.9 °C)  Pulse:  [] 93  Resp:  [12-20] 12  SpO2:  [95 %-100 %] 100 %  BP: ()/(32-82) 173/82     Weight: 71.4 kg (157 lb 6.5 oz)  Body mass index is 24.65 kg/m².    Intake/Output Summary (Last 24 hours) at 4/7/2024 1643  Last data filed at 4/7/2024 1400  Gross per 24 hour   Intake 960 ml   Output  650 ml   Net 310 ml         Physical Exam  Constitutional:       General: She is not in acute distress.  HENT:      Head: Normocephalic and atraumatic.   Eyes:      Extraocular Movements: Extraocular movements intact.      Pupils: Pupils are equal, round, and reactive to light.   Cardiovascular:      Rate and Rhythm: Normal rate and regular rhythm.      Pulses:           Popliteal pulses are 2+ on the right side.        Dorsalis pedis pulses are 2+ on the left side.      Heart sounds: Normal heart sounds.   Pulmonary:      Effort: Pulmonary effort is normal. No respiratory distress.      Breath sounds: Normal breath sounds. No wheezing, rhonchi or rales.   Abdominal:      General: Bowel sounds are normal. There is no distension.      Palpations: Abdomen is soft.      Tenderness: There is no abdominal tenderness. There is no guarding or rebound.   Musculoskeletal:      Cervical back: No rigidity.      Right lower leg: No edema.      Left lower leg: No edema.   Skin:     General: Skin is warm.      Capillary Refill: Capillary refill takes 2 to 3 seconds.      Comments: Post op boot CDI   Neurological:      Mental Status: She is alert and oriented to person, place, and time.   Psychiatric:         Mood and Affect: Mood normal.         Behavior: Behavior normal.         Thought Content: Thought content normal.         Judgment: Judgment normal.             Significant Labs: All pertinent labs within the past 24 hours have been reviewed.  CBC:   Recent Labs   Lab 04/06/24  0408 04/07/24  0344   WBC 7.19 7.11   HGB 8.9* 8.8*   HCT 27.2* 26.6*    285     CMP:   Recent Labs   Lab 04/06/24  0408 04/07/24  0344    140   K 4.3 3.7    107   CO2 24 23   GLU 78 91   BUN 28* 25*   CREATININE 1.3 1.3   CALCIUM 8.7 8.8   ANIONGAP 12 10       Significant Imaging: I have reviewed all pertinent imaging results/findings within the past 24 hours.    Assessment/Plan:      * Orthostatic hypotension  171/80 > 117/67 >  99/54  Patient reports being dx w/ postural hypotension with vasovagal response  Likely cause of recurrent syncope/falls  Cardiology consult ordered given worsening of sx  Head CT: no acute abnormality  Had + tilt table testing in 2022  Midodrine added to steroids  Will tolerate hypertension at rest and continue doses of midodrine for now      History of adrenal insufficiency    Panhypopituitarism - Primary        Planned for repeat surgery so will need stress dose steroids given hx of adrenal insufficiency.  If hypotension and orthostatic hypotension does not improve on IV hydrocortisone would evaluate for other causes like infection     Recommendations :  - this patient will need stress dose steroids on induction with:              IV hydrocortisone 100 mg on induction followed by IV hydrocortisone 50 mg q8 hours   If hemodynamically stable:  - On POD 1 if tolerating oral meds change to oral hydrocortisone 40/20 mg with the guidance of inpatient endocrine service if needed   - on POD 3 decrease to home doses of hydrocortisone              Plan for peer to peer on Monday    Fracture of right foot  S/p ORIF of right navicular fracture and iliac crest bone harvest 3/15  Patient returned today for evaluation with Dr. Goodson and sent in for hardware failure likely 2/2 multiple falls requiring revision fixation of hardware  Ortho following  NPO after MN  Likely procedure tomorrow  Revised cardiac risk index score: 0 points, class I risk, 3.9% 30-day risk of death, MI, or cardiac arrest    4/2/24  1. Navicular open reduction internal fixation, right  2. Naviculocuneiform fusion, right  3. Talonavicular joint open reduction internal fixation  4. Hardware removal, right foot  5. Iliac crest bone grafting, right hip    Bedside Commode:   The beneficiary is confined to a single room. The patient is bedroom confined for an extended time and non-ambulatory.     Wheelchair  The patient has a mobility limitation that  significantly impairs her ability to participate in one or more mobility related activities of daily living (MRADL's) such as toileting, feeding, dressing, grooming and bathing in customary locations in the home. The mobility limitation cannot be sufficiently resolved by the use of a cane or walker. The use of a manual wheelchair will significantly improve the patient's ability to participate in MRADLS and the patient will use it on regular basis in the home. The patient has expressed her willingness to use a manual wheelchair in the home. She also has a caregiver who is available, willing and able to provide assistance with the wheelchair when needed.     Recurrent falls  See orthostatic hypotension  Patient may benefit from PT/OT post-op  E-consult with Dr. Shaw with Endocrine  Plan for Neurology consult due to patients concern that her falls that used to be once a month are happening several times  MRI brain unremarkable, no findings to explain symptoms      Stage 4 chronic kidney disease  Creatine stable for now. BMP reviewed- noted Estimated Creatinine Clearance: 37.7 mL/min (A) (based on SCr of 1.6 mg/dL (H)). according to latest data. Based on current GFR, CKD stage is stage 4 - GFR 15-29.  Monitor UOP and serial BMP and adjust therapy as needed. Renally dose meds. Avoid nephrotoxic medications and procedures.    Factor V deficiency with DVT x 3  Xarelto o/h pending surgery      Secondary hypothyroidism  Continue synthroid      Secondary adrenal insufficiency  Endocrinology e-consult performed, appreciate recs:  100 iv hydrocortisone.  (Q8) until surgery  After surgery 50 q8 x1 day   Double home dose  x 3day  If still falling after loading dose, something else is going on outside of endocrine.          Nonintractable epilepsy without status epilepticus  Continue AEDs  No seizures in several years  Phenytoin is subtherapeutic, will increase 250mg phenytoin        VTE Risk Mitigation (From admission, onward)            Ordered     rivaroxaban tablet 20 mg  with dinner         04/03/24 1013     IP VTE HIGH RISK PATIENT  Once         04/01/24 1938     Place sequential compression device  Until discontinued         04/01/24 1938                    Discharge Planning   XIMENA:      Code Status: Full Code   Is the patient medically ready for discharge?:     Reason for patient still in hospital (select all that apply): Patient new problem and Patient trending condition  Discharge Plan A: Rehab   Discharge Delays: None known at this time              Luna Banda MD  Department of Hospital Medicine   [facility]

## 2024-04-07 NOTE — PLAN OF CARE
RLE elevated on pillow,splint and dressing intact and dry,c/oheadache tylenol admin w fair relief,purewick in place tele in progress,poc reviewed,bed alarm set.

## 2024-04-08 LAB
ANION GAP SERPL CALC-SCNC: 10 MMOL/L (ref 8–16)
BASOPHILS # BLD AUTO: 0.03 K/UL (ref 0–0.2)
BASOPHILS NFR BLD: 0.4 % (ref 0–1.9)
BUN SERPL-MCNC: 25 MG/DL (ref 6–20)
CALCIUM SERPL-MCNC: 8.7 MG/DL (ref 8.7–10.5)
CHLORIDE SERPL-SCNC: 107 MMOL/L (ref 95–110)
CO2 SERPL-SCNC: 22 MMOL/L (ref 23–29)
CREAT SERPL-MCNC: 1.4 MG/DL (ref 0.5–1.4)
DIFFERENTIAL METHOD BLD: ABNORMAL
EOSINOPHIL # BLD AUTO: 0.6 K/UL (ref 0–0.5)
EOSINOPHIL NFR BLD: 9.2 % (ref 0–8)
ERYTHROCYTE [DISTWIDTH] IN BLOOD BY AUTOMATED COUNT: 12.7 % (ref 11.5–14.5)
EST. GFR  (NO RACE VARIABLE): 44 ML/MIN/1.73 M^2
GLUCOSE SERPL-MCNC: 87 MG/DL (ref 70–110)
HCT VFR BLD AUTO: 26.8 % (ref 37–48.5)
HGB BLD-MCNC: 8.9 G/DL (ref 12–16)
IMM GRANULOCYTES # BLD AUTO: 0.02 K/UL (ref 0–0.04)
IMM GRANULOCYTES NFR BLD AUTO: 0.3 % (ref 0–0.5)
LYMPHOCYTES # BLD AUTO: 2.7 K/UL (ref 1–4.8)
LYMPHOCYTES NFR BLD: 39.3 % (ref 18–48)
MCH RBC QN AUTO: 30.4 PG (ref 27–31)
MCHC RBC AUTO-ENTMCNC: 33.2 G/DL (ref 32–36)
MCV RBC AUTO: 92 FL (ref 82–98)
MONOCYTES # BLD AUTO: 0.5 K/UL (ref 0.3–1)
MONOCYTES NFR BLD: 6.5 % (ref 4–15)
NEUTROPHILS # BLD AUTO: 3.1 K/UL (ref 1.8–7.7)
NEUTROPHILS NFR BLD: 44.3 % (ref 38–73)
NRBC BLD-RTO: 0 /100 WBC
PLATELET # BLD AUTO: 253 K/UL (ref 150–450)
PMV BLD AUTO: 8.8 FL (ref 9.2–12.9)
POTASSIUM SERPL-SCNC: 3.7 MMOL/L (ref 3.5–5.1)
RBC # BLD AUTO: 2.93 M/UL (ref 4–5.4)
SODIUM SERPL-SCNC: 139 MMOL/L (ref 136–145)
WBC # BLD AUTO: 6.92 K/UL (ref 3.9–12.7)

## 2024-04-08 PROCEDURE — 85025 COMPLETE CBC W/AUTO DIFF WBC: CPT | Performed by: INTERNAL MEDICINE

## 2024-04-08 PROCEDURE — 25000003 PHARM REV CODE 250: Performed by: INTERNAL MEDICINE

## 2024-04-08 PROCEDURE — 97530 THERAPEUTIC ACTIVITIES: CPT | Mod: CO

## 2024-04-08 PROCEDURE — 25000003 PHARM REV CODE 250: Performed by: NURSE PRACTITIONER

## 2024-04-08 PROCEDURE — 36415 COLL VENOUS BLD VENIPUNCTURE: CPT | Performed by: INTERNAL MEDICINE

## 2024-04-08 PROCEDURE — 80048 BASIC METABOLIC PNL TOTAL CA: CPT | Performed by: INTERNAL MEDICINE

## 2024-04-08 PROCEDURE — 97535 SELF CARE MNGMENT TRAINING: CPT | Mod: CO

## 2024-04-08 PROCEDURE — G0378 HOSPITAL OBSERVATION PER HR: HCPCS

## 2024-04-08 PROCEDURE — 97530 THERAPEUTIC ACTIVITIES: CPT

## 2024-04-08 RX ADMIN — Medication 6 MG: at 08:04

## 2024-04-08 RX ADMIN — MIDODRINE HYDROCHLORIDE 2.5 MG: 2.5 TABLET ORAL at 04:04

## 2024-04-08 RX ADMIN — FERROUS SULFATE TAB 325 MG (65 MG ELEMENTAL FE) 1 EACH: 325 (65 FE) TAB at 08:04

## 2024-04-08 RX ADMIN — HYDROCORTISONE 15 MG: 5 TABLET ORAL at 09:04

## 2024-04-08 RX ADMIN — HYDROCORTISONE 5 MG: 5 TABLET ORAL at 09:04

## 2024-04-08 RX ADMIN — DULOXETINE HYDROCHLORIDE 60 MG: 30 CAPSULE, DELAYED RELEASE ORAL at 08:04

## 2024-04-08 RX ADMIN — ACETAMINOPHEN 500 MG: 500 TABLET ORAL at 06:04

## 2024-04-08 RX ADMIN — MIDODRINE HYDROCHLORIDE 2.5 MG: 2.5 TABLET ORAL at 09:04

## 2024-04-08 RX ADMIN — ACETAMINOPHEN 500 MG: 500 TABLET ORAL at 11:04

## 2024-04-08 RX ADMIN — PHENYTOIN SODIUM 200 MG: 100 CAPSULE ORAL at 08:04

## 2024-04-08 RX ADMIN — ACETAMINOPHEN 500 MG: 500 TABLET ORAL at 04:04

## 2024-04-08 RX ADMIN — RIVAROXABAN 20 MG: 20 TABLET, FILM COATED ORAL at 04:04

## 2024-04-08 RX ADMIN — LEVOTHYROXINE SODIUM 112 MCG: 112 TABLET ORAL at 04:04

## 2024-04-08 NOTE — PT/OT/SLP PROGRESS
"Occupational Therapy   Treatment    Name: Thelma Nguyen  MRN: [id number]  Admitting Diagnosis:  Orthostatic hypotension  6 Days Post-Op    Recommendations:     Discharge Recommendations: High Intensity Therapy  Discharge Equipment Recommendations:  bedside commode, wheelchair, walker, rolling (drop arm BSC)  Barriers to discharge:  Other (Comment), Decreased caregiver support (Increased level of assistance)    Assessment:     Thelma Nguyen is a 58 y.o. female with a medical diagnosis of Orthostatic hypotension.  Performance deficits affecting function are weakness, impaired endurance, impaired self care skills, impaired functional mobility, gait instability, impaired balance, decreased upper extremity function, decreased lower extremity function, pain, impaired skin, edema, orthopedic precautions.    Pt found on BSC, agreeable to therapy. Pt limited by symptomatic OH in standing. Pt may benefit from abdominal binder and compression hose to prevent BP drop in sitting/standing. Notified nurse Janet who will attempt to get orders from MD. Continue OT services to address functional goals, progressing as able.      Rehab Prognosis:  Good; patient would benefit from acute skilled OT services to address these deficits and reach maximum level of function.       Plan:     Patient to be seen 3 x/week to address the above listed problems via self-care/home management, therapeutic exercises, therapeutic activities  Plan of Care Expires: 05/03/24  Plan of Care Reviewed with: patient    Subjective     Chief Complaint: "I'm worried about going home and falling on my leg when my BP drops."  Patient/Family Comments/goals: to get BP stabilized   Pain/Comfort:  Pain Rating 1: 0/10  Pain Rating Post-Intervention 1: 0/10    Objective:     Communicated with: N prior to session.  Patient found  on BSC  with peripheral IV, telemetry upon OT entry to room.    General Precautions: Standard, fall    Orthopedic " Precautions:RLE non weight bearing  Braces: N/A  Respiratory Status: Room air     Occupational Performance:     Bed Mobility:    Patient completed Rolling/Turning to Left with  stand by assistance  Patient completed Supine to Sit with stand by assistance  Patient completed Sit to Supine with stand by assistance     Functional Mobility/Transfers:  Patient completed Sit <> Stand Transfer with contact guard assistance with rolling walker and vcs to maintain RLE NWB  Patient completed Toilet Transfer with Min A stand pivot.  In standing, pt became very weak and less responsive.  Quickly and safety sat pt on EOB.  Pt with increased alertness once seated.      Activities of Daily Living:  Grooming: independence chair level  Lower Body Dressing: stand by assistance don L sock in Figure 4 position  Toileting: minimum assistance for clothing mgmt       [facility] 6 Click ADL: 20    Treatment & Education:  Once BP stable, encouraged OOB in chair. Performed squat pivot transfer vs stand pivot in attempts to prevent drop in BP.  BLEs elevated.  Suggested use of abdominal binder and Oh hose for OH mgmt to pt and nurse. Nurse to notify MD.   Encouraged OOB in chair 1-2 hours and to call for assistance for back to bed. Pt verbalizes understanding.       04/08/24 1010 04/08/24 1013 04/08/24 1020   Vital Signs   Pulse (!) 130 (!) 144 106   SpO2 (!) 89 %  --   --    BP (!) 110/56 (!) 88/47 (!) 157/73   BP Location Right arm Right arm Right arm   Patient Position Sitting on BSC for over 10 min per pt report.  *mild dizziness Sitting after stand pivot transfer  *decrease responsiveness, increased weakness Lying      04/08/24 1027   Vital Signs   Pulse 108   SpO2  --    /64   BP Location Right arm   Patient Position Sitting in chair after squat pivot transfer       Patient left up in chair with all lines intact, call button in reach, bed alarm on, nurse notified, and spouse present    GOALS:   Multidisciplinary Problems        Occupational Therapy Goals          Problem: Occupational Therapy    Goal Priority Disciplines Outcome Interventions   Occupational Therapy Goal     OT, PT/OT Ongoing, Progressing    Description: Goals to be met by: 5/3/24     Patient will increase functional independence with ADLs by performing:    LE Dressing with Minimal Assistance.  Toileting from bedside commode with Minimal Assistance for hygiene and clothing management.   Supine to sit with Modified [place].  Stand pivot transfers with Contact Guard Assistance.  Squat pivot transfers with Contact Guard Assistance.  Toilet transfer to bedside commode with Contact Guard Assistance.  Increased functional strength to WFL for self care skills and functional mobility.  Upper extremity exercise program x10 reps per handout, with independence.                         Time Tracking:     OT Date of Treatment: 04/08/24  OT Start Time: 1000  OT Stop Time: 1038  OT Total Time (min): 38 min    Billable Minutes:Self Care/Home Management 10  Therapeutic Activity 28          4/8/2024

## 2024-04-08 NOTE — PT/OT/SLP PROGRESS
Physical Therapy      Patient Name:  Thelma Nguyen   MRN:  [id number]    Patient not seen today secondary to Other (Comment). Pt just put up in chair by OT, will return to assist pt back to bed around 11:30. Will follow-up as able.

## 2024-04-08 NOTE — PLAN OF CARE
Problem: Occupational Therapy  Goal: Occupational Therapy Goal  Description: Goals to be met by: 5/3/24     Patient will increase functional independence with ADLs by performing:    LE Dressing with Minimal Assistance.  Toileting from bedside commode with Minimal Assistance for hygiene and clothing management.   Supine to sit with Modified [place].  Stand pivot transfers with Contact Guard Assistance.  Squat pivot transfers with Contact Guard Assistance.  Toilet transfer to bedside commode with Contact Guard Assistance.  Increased functional strength to WFL for self care skills and functional mobility.  Upper extremity exercise program x10 reps per handout, with independence.    Outcome: Ongoing, Progressing   Thelma Nguyen is a 58 y.o. female with a medical diagnosis of Orthostatic hypotension.  Performance deficits affecting function are weakness, impaired endurance, impaired self care skills, impaired functional mobility, gait instability, impaired balance, decreased upper extremity function, decreased lower extremity function, pain, impaired skin, edema, orthopedic precautions.    Pt found on BSC, agreeable to therapy. Pt limited by symptomatic OH in standing. Pt may benefit from abdominal binder and compression hose to prevent BP drop in sitting/standing. Notified nurse Janet who will attempt to get orders from MD. Continue OT services to address functional goals, progressing as able.      04/08/24 1010 04/08/24 1013 04/08/24 1020    Vital Signs   Pulse (!) 130 (!) 144 106   SpO2 (!) 89 %  --   --    BP (!) 110/56 (!) 88/47 (!) 157/73   BP Location Right arm Right arm Right arm   Patient Position Sitting on BSC for over 10 min per pt report.  *mild dizziness Sitting after stand pivot transfer  *decrease responsiveness, increased weakness Lying        04/08/24 1027   Vital Signs   Pulse 108   SpO2  --    /64   BP Location Right arm   Patient Position Sitting in chair after squat  pivot transfer

## 2024-04-08 NOTE — PROGRESS NOTES
SUBJECTIVE:    Ms. Nguyen is status post revision ORIF of the navicular as well as talonavicular and naviculocuneiform fusions of the right lower extremity with iliac crest bone grafting.    She is doing well.  She feels like she is improving with transfers.  In the hip or ankle.    I spoke with her insurance provider who denied her inpatient rehab clamp,   however they agreed that she needs either an LTAC or skilled nursing facility.  They advised we set up another phone call to discuss this.  I have relayed this to the .      OBJECTIVE:      Vitals:    04/08/24 0019 04/08/24 0331 04/08/24 0432 04/08/24 0739   BP: (!) 167/81  (!) 141/69 139/70   Pulse: 82 91 91 90   Resp: 19  20 18   Temp: 98 °F (36.7 °C)  97.8 °F (36.6 °C) 97.7 °F (36.5 °C)   TempSrc: Oral  Oral Oral   SpO2: (!) 94%  96% 97%   Weight:       Height:           Lower Extremity Exam  Right lower extremity clean dry and intact with splint in place.  She wiggles her toes, she fires FHL EHL, and her toes are warm and well-perfused.  Her sensation is diminished but it is at her baseline.    Right hip incision is clean dry and intact with Tegaderm in place     DIAGNOSTIC STUDIES:  X-rays from operating theater reveal revision open reduction internal fixation with naviculocuneiform as well as talonavicular fusion.  Removal of hardware.  Fracture fragments appear improved in alignment.    ASSESSMENT:   1. Status post revision open reduction internal fixation navicular fracture and navicular cuneiform talonavicular fusion, right lower extremity      PLAN:  -nonweightbearing to the right lower extremity   -PT OT  -Mobility limitations will require wheelchair see brief op note documentation- only ones that we will fit in the patient's house must be less than 30 inches wide  -on home Xarelto  -Follow up social work and case management recommendations for placement and follow-up wheelchair  -Appreciate hospitalist management     Geoffrey Goodson  MD  [facility]  Orthopedic Surgery

## 2024-04-08 NOTE — PROGRESS NOTES
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 She presented to the ED by request of Orthopedics, Dr. Goodson, for admission for surgical correction of right foot navicular fracture. Patient had ORIF performed with Dr. Goodson on 3/15. Patient admits to significant and lengthy history of severe orthostatic hypotension, and admits her "spells" of becoming lightheaded upon standing have severely worsened since surgery causing her to have numerous falls over past few weeks. She believes during these falls she re-injured her right foot causing hardware failure. From these falls she also does report contusing left elbow and left rib cage. Denying any recent head injury or LOC. No acute neck pain. No chest pain, palpitations, abdominal pain, nausea or vomiting. Patient also reports episodes of tachycardia that occur while sitting on the cough watching TV with alerts on her Apple watch of HR 110s-140s. She states that her falls have significantly increased over the last couple weeks up to 20 falls in the last 2 weeks. Some of these are accompanied by syncope and a disorientated feeling, other times her legs just give out and she falls. Patient's  states patient does not appear to have seizure activity during these episodes but she does start breathing really heavy and then wakes back up after 1-2 minutes. Patient has been under the care of cardiology and endocrinology. She " states that she was diagnosed with postural hypotension with vasovagal response. She has never been prescribed a Holter Monitor.    Overview/Hospital Course:  4/2/24 Stress dose steroids started prior to surgical procedure  4/3/24 POD1 Resumed Xarelto, transitioned to PO hydrocortisone  4/4/24 POD 2 Neurology consult for increased falls  4/5/2024 Went for MRI today- pending results and Neurology recommendations  4/6/24 MRI brain unremarkable, orthostatics still positive,   4/7/24 Orthostatics BP lower than yesterday, concerns about going home  4/8/24 Peer to Peer denied. Working on SNF. Abdominal binder today    Interval History: Dizziness today and Physical Therapist had to help her back to bed due to unsteadiness. Orthostatics positive    Plan for abdominal binder    Review of Systems   Constitutional:  Negative for chills and fever.   HENT:  Negative for congestion.    Respiratory:  Negative for shortness of breath.    Cardiovascular:  Negative for chest pain.   Gastrointestinal:  Negative for abdominal pain, nausea and vomiting.   Genitourinary:  Negative for dysuria.   Musculoskeletal:         Right foot fracture   Skin:         Bruising - rib cage; surgical incision right foot   Neurological:  Positive for dizziness. Negative for syncope and light-headedness.   Psychiatric/Behavioral:  Negative for agitation and confusion. The patient is not nervous/anxious.      Objective:     Vital Signs (Most Recent):  Temp: 98.2 °F (36.8 °C) (04/08/24 1549)  Pulse: 109 (04/08/24 1619)  Resp: 18 (04/08/24 1548)  BP: (!) 188/87 (04/08/24 1548)  SpO2: 99 % (04/08/24 1548) Vital Signs (24h Range):  Temp:  [9.2 °F (-12.7 °C)-98.4 °F (36.9 °C)] 98.2 °F (36.8 °C)  Pulse:  [] 109  Resp:  [18-20] 18  SpO2:  [89 %-100 %] 99 %  BP: ()/(47-87) 188/87     Weight: 73.4 kg (161 lb 13.1 oz)  Body mass index is 25.34 kg/m².    Intake/Output Summary (Last 24 hours) at 4/8/2024 1633  Last data filed at 4/8/2024 1200  Gross per 24  "hour   Intake 600 ml   Output 400 ml   Net 200 ml         Physical Exam  Constitutional:       General: She is not in acute distress.  HENT:      Head: Normocephalic and atraumatic.   Eyes:      Extraocular Movements: Extraocular movements intact.      Pupils: Pupils are equal, round, and reactive to light.   Cardiovascular:      Rate and Rhythm: Normal rate and regular rhythm.      Pulses:           Popliteal pulses are 2+ on the right side.        Dorsalis pedis pulses are 2+ on the left side.      Heart sounds: Normal heart sounds.   Pulmonary:      Effort: Pulmonary effort is normal. No respiratory distress.      Breath sounds: Normal breath sounds. No wheezing, rhonchi or rales.   Abdominal:      General: Bowel sounds are normal. There is no distension.      Palpations: Abdomen is soft.      Tenderness: There is no abdominal tenderness. There is no guarding or rebound.   Musculoskeletal:      Cervical back: No rigidity.      Right lower leg: No edema.      Left lower leg: No edema.   Skin:     General: Skin is warm.      Capillary Refill: Capillary refill takes 2 to 3 seconds.      Comments: Post op boot CDI   Neurological:      Mental Status: She is alert and oriented to person, place, and time.   Psychiatric:         Mood and Affect: Mood normal.         Behavior: Behavior normal.         Thought Content: Thought content normal.         Judgment: Judgment normal.             Significant Labs: All pertinent labs within the past 24 hours have been reviewed.  CBC:   Recent Labs   Lab 04/07/24  0344 04/08/24  0332   WBC 7.11 6.92   HGB 8.8* 8.9*   HCT 26.6* 26.8*    253     CMP:   Recent Labs   Lab 04/07/24  0344 04/08/24  0332    139   K 3.7 3.7    107   CO2 23 22*   GLU 91 87   BUN 25* 25*   CREATININE 1.3 1.4   CALCIUM 8.8 8.7   ANIONGAP 10 10     POCT Glucose: No results for input(s): "POCTGLUCOSE" in the last 48 hours.  TSH:   Recent Labs   Lab 04/01/24  1348   TSH 1.716       Significant " Imaging: I have reviewed all pertinent imaging results/findings within the past 24 hours.    Assessment/Plan:      * Orthostatic hypotension  171/80 > 117/67 > 99/54  Patient reports being dx w/ postural hypotension with vasovagal response  Likely cause of recurrent syncope/falls  Cardiology consult ordered given worsening of sx  Head CT: no acute abnormality  Had + tilt table testing in 2022  Midodrine added to steroids  Will tolerate hypertension at rest and continue doses of midodrine for now  Compression stockings up to thighs on L leg, abdominal binder  PT/OT recommend IPR as Orthostatic hypotension can be addressed with intensive therapy  Denied, will request SNF      History of adrenal insufficiency    Panhypopituitarism - Primary        Planned for repeat surgery so will need stress dose steroids given hx of adrenal insufficiency.  If hypotension and orthostatic hypotension does not improve on IV hydrocortisone would evaluate for other causes like infection     Recommendations :  - this patient will need stress dose steroids on induction with:              IV hydrocortisone 100 mg on induction followed by IV hydrocortisone 50 mg q8 hours   If hemodynamically stable:  - On POD 1 if tolerating oral meds change to oral hydrocortisone 40/20 mg with the guidance of inpatient endocrine service if needed   - on POD 3 decrease to home doses of hydrocortisone              Plan for peer to peer on Monday    Fracture of right foot  S/p ORIF of right navicular fracture and iliac crest bone harvest 3/15  Patient returned today for evaluation with Dr. Goodson and sent in for hardware failure likely 2/2 multiple falls requiring revision fixation of hardware  Ortho following  NPO after MN  Likely procedure tomorrow  Revised cardiac risk index score: 0 points, class I risk, 3.9% 30-day risk of death, MI, or cardiac arrest    4/2/24  1. Navicular open reduction internal fixation, right  2. Naviculocuneiform fusion, right  3.  Talonavicular joint open reduction internal fixation  4. Hardware removal, right foot  5. Iliac crest bone grafting, right hip    Bedside Commode:   The beneficiary is confined to a single room. The patient is bedroom confined for an extended time and non-ambulatory.     Wheelchair  The patient has a mobility limitation that significantly impairs her ability to participate in one or more mobility related activities of daily living (MRADL's) such as toileting, feeding, dressing, grooming and bathing in customary locations in the home. The mobility limitation cannot be sufficiently resolved by the use of a cane or walker. The use of a manual wheelchair will significantly improve the patient's ability to participate in MRADLS and the patient will use it on regular basis in the home. The patient has expressed her willingness to use a manual wheelchair in the home. She also has a caregiver who is available, willing and able to provide assistance with the wheelchair when needed.     Recurrent falls  See orthostatic hypotension  Patient may benefit from PT/OT post-op  E-consult with Dr. Shaw with Endocrine  Plan for Neurology consult due to patients concern that her falls that used to be once a month are happening several times  MRI brain unremarkable, no findings to explain symptoms      Stage 4 chronic kidney disease  Creatine stable for now. BMP reviewed- noted Estimated Creatinine Clearance: 43.1 mL/min (based on SCr of 1.4 mg/dL). according to latest data. Based on current GFR, CKD stage is stage 4 - GFR 15-29.  Monitor UOP and serial BMP and adjust therapy as needed. Renally dose meds. Avoid nephrotoxic medications and procedures.    Factor V deficiency with DVT x 3  Xarelto o/h pending surgery      Secondary hypothyroidism  Continue synthroid      Secondary adrenal insufficiency  Endocrinology e-consult performed, appreciate recs:  100 iv hydrocortisone.  (Q8) until surgery  After surgery 50 q8 x1 day   Double home  dose  x 3day  If still falling after loading dose, something else is going on outside of endocrine.          Nonintractable epilepsy without status epilepticus  Continue AEDs  No seizures in several years  Phenytoin is subtherapeutic, will increase 250mg phenytoin per Neurology at discharge (250mg is not available on [facility] formulary)  Close Neurology follow up          VTE Risk Mitigation (From admission, onward)           Ordered     rivaroxaban tablet 20 mg  with dinner         04/03/24 1013     IP VTE HIGH RISK PATIENT  Once         04/01/24 1938     Place sequential compression device  Until discontinued         04/01/24 1938                    Discharge Planning   XIMENA:      Code Status: Full Code   Is the patient medically ready for discharge?:     Reason for patient still in hospital (select all that apply): Patient trending condition, Laboratory test, PT / OT recommendations, and Pending disposition  Discharge Plan A: Skilled Nursing Facility   Discharge Delays: None known at this time              Luna Banda MD  Department of Hospital Medicine   [facility]

## 2024-04-08 NOTE — ASSESSMENT & PLAN NOTE
171/80 > 117/67 > 99/54  Patient reports being dx w/ postural hypotension with vasovagal response  Likely cause of recurrent syncope/falls  Cardiology consult ordered given worsening of sx  Head CT: no acute abnormality  Had + tilt table testing in 2022  Midodrine added to steroids  Will tolerate hypertension at rest and continue doses of midodrine for now  Compression stockings up to thighs on L leg, abdominal binder  PT/OT recommend IPR as Orthostatic hypotension can be addressed with intensive therapy  Denied, will request SNF

## 2024-04-08 NOTE — ASSESSMENT & PLAN NOTE
Creatine stable for now. BMP reviewed- noted Estimated Creatinine Clearance: 43.1 mL/min (based on SCr of 1.4 mg/dL). according to latest data. Based on current GFR, CKD stage is stage 4 - GFR 15-29.  Monitor UOP and serial BMP and adjust therapy as needed. Renally dose meds. Avoid nephrotoxic medications and procedures.

## 2024-04-08 NOTE — PLAN OF CARE
SW received updates from previous CM.    Pt denied IPR and P2P was completed 4/8/24. Pt denied IPR on P2P as well.   Pt approved HHS and has accepting HHS agency and recommended DME approved and ready to be delivered if Pt has to D/C home.  Pt wanting to see if approved for SNF.    SW rounded with MD.    Pt stable waiting on D/C options.   SW discussed with Pt and Pt spouse that SNF is still pending and referral had been made to [facility] and [facility]. SW explained if SNF is denied that Pt and SW would began working on D/C home with [facility] and recommended DME. Pt and spouse agreed with plan to see what SNF route says.     JESSI sent referrals to SNF [facility] and [facility].    [facility] denied due to bed availability.   [facility] denied Pt medically and age.     JESSI sent referrals to various local SNF including  swing bed, [facility] swing bed and [facility] swing bed.       04/08/24 1509   Post-Acute Status   Post-Acute Authorization Placement   Post-Acute Placement Status Referrals Sent   Discharge Delays None known at this time   Discharge Plan   Discharge Plan A Skilled Nursing Facility     Ale Pimentel LMSW  Phone: [phone]  [facility]

## 2024-04-08 NOTE — PT/OT/SLP PROGRESS
Physical Therapy Treatment    Patient Name:  Thelma Nguyen   MRN:  [id number]    Recommendations:     Discharge Recommendations: High Intensity Therapy  Discharge Equipment Recommendations: bedside commode, wheelchair, walker, rolling  Barriers to discharge: None    This patient has a mobility limitation that significantly impairs their ability to participate in or or more mobility related activities of daily living (MRADL's) such as toileting, feeing, dressing, grooming and bathing in customary locations in the home. The mobility limitation cannot be sufficiently resolved by the use of a cane or walker. The use of a manual wheelchair will significantly improve this patient's ability to participate in MRADL's and the patient will use it on a regular basis in the home. This patient is willing to use a manual wheelchair in the home. There is a caregiver who is available, willing, and able to provide assistance with the wheelchair when needed.    Assessment:     Thelma Nguyen is a 58 y.o. female admitted with a medical diagnosis of Orthostatic hypotension.  She presents with the following impairments/functional limitations: weakness, impaired endurance, impaired functional mobility, decreased lower extremity function, pain, decreased ROM, impaired muscle length, impaired joint extensibility, orthopedic precautions, gait instability, impaired balance. Pt progressing towards rehab goals, but limited by activity tolerance and orthostatic hypotension. Overall, pt reported feeling fatigued after sitting up in chair x 1 hour and patient continues to get orthostatic per discussion with RN. Discussed use of abdominal binder and belinda hose to improve overall orthostatic hypotension. Patient still requiring cues to maintain NWB to RLE as well as min a to complete stand pivot transfer back to bed.     Rehab Prognosis: Fair; patient would benefit from acute skilled PT services to address these deficits and  reach maximum level of function.    Recent Surgery: Procedure(s) (LRB):  FUSION, JOINT, MIDFOOT (Right)  BONE GRAFT, ILIAC CREST (Right) 6 Days Post-Op    Plan:     During this hospitalization, patient to be seen 4 x/week to address the identified rehab impairments via gait training, therapeutic activities, therapeutic exercises, wheelchair management/training, neuromuscular re-education and progress toward the following goals:    Plan of Care Expires:  04/17/24    Subjective     Chief Complaint: headache  Patient/Family Comments/goals: to go to rehab    Pain/Comfort:  Pain Rating 1: 6/10  Location 1: head (headache)  Pain Rating Post-Intervention 1: 6/10 (headache)      Objective:     Communicated with RN prior to session.  Patient found supine with peripheral IV, telemetry upon PT entry to room.     General Precautions: Standard, fall  Orthopedic Precautions: RLE non weight bearing  Braces: N/A  Respiratory Status: Room air     Functional Mobility:  Bed Mobility:     Sit to Supine: independence  Transfers:   chair to bed, stand pivot transfer without AD, min a. Pt requires vc's to maintain NWB status to RLE.   Balance: Good static and dynamic sitting balance, fair static and dynamic standing balance without AD.       AM-PAC 6 CLICK MOBILITY  Turning over in bed (including adjusting bedclothes, sheets and blankets)?: 4  Sitting down on and standing up from a chair with arms (e.g., wheelchair, bedside commode, etc.): 3  Moving from lying on back to sitting on the side of the bed?: 4  Moving to and from a bed to a chair (including a wheelchair)?: 3  Need to walk in hospital room?: 2  Climbing 3-5 steps with a railing?: 1  Basic Mobility Total Score: 17       Treatment & Education:  See above for further treatment details. Pt remains unsafe to dc home d/t orthostasis and decreased standing balance. At this time, she is still requiring min a for transfers as well cueing to maintain NWB status.  reports he feels  unable to care for patient at home in her current state.     Patient left supine with all lines intact, call button in reach, RN notified, and  present..    GOALS:   Multidisciplinary Problems       Physical Therapy Goals          Problem: Physical Therapy    Goal Priority Disciplines Outcome Goal Variances Interventions   Physical Therapy Goal     PT, PT/OT Ongoing, Progressing     Description: Goals to be met by: 24     Patient will increase functional independence with mobility by performin. Supine to sit with [place]  2. Sit to supine with [place]  3. Bed to chair transfer with Modified [place] using No Assistive Device  4. Wheelchair propulsion x200 feet with Modified [place] using bilateral uppper extremities                         Time Tracking:     PT Received On: 24  PT Start Time: 1131     PT Stop Time: 1145  PT Total Time (min): 14 min     Billable Minutes: Therapeutic Activity 14    Treatment Type: Treatment  PT/PTA: PTA     Number of PTA visits since last PT visit: 0     2024

## 2024-04-08 NOTE — ASSESSMENT & PLAN NOTE
Continue AEDs  No seizures in several years  Phenytoin is subtherapeutic, will increase 250mg phenytoin per Neurology at discharge ([place] [facility] [place]  Close Neurology follow up

## 2024-04-08 NOTE — SUBJECTIVE & OBJECTIVE
Interval History: Dizziness today and Physical Therapist had to help her back to bed due to unsteadiness. Orthostatics positive    Plan for abdominal binder    Review of Systems   Constitutional:  Negative for chills and fever.   HENT:  Negative for congestion.    Respiratory:  Negative for shortness of breath.    Cardiovascular:  Negative for chest pain.   Gastrointestinal:  Negative for abdominal pain, nausea and vomiting.   Genitourinary:  Negative for dysuria.   Musculoskeletal:         Right foot fracture   Skin:         Bruising - rib cage; surgical incision right foot   Neurological:  Positive for dizziness. Negative for syncope and light-headedness.   Psychiatric/Behavioral:  Negative for agitation and confusion. The patient is not nervous/anxious.      Objective:     Vital Signs (Most Recent):  Temp: 98.2 °F (36.8 °C) (04/08/24 1549)  Pulse: 109 (04/08/24 1619)  Resp: 18 (04/08/24 1548)  BP: (!) 188/87 (04/08/24 1548)  SpO2: 99 % (04/08/24 1548) Vital Signs (24h Range):  Temp:  [9.2 °F (-12.7 °C)-98.4 °F (36.9 °C)] 98.2 °F (36.8 °C)  Pulse:  [] 109  Resp:  [18-20] 18  SpO2:  [89 %-100 %] 99 %  BP: ()/(47-87) 188/87     Weight: 73.4 kg (161 lb 13.1 oz)  Body mass index is 25.34 kg/m².    Intake/Output Summary (Last 24 hours) at 4/8/2024 1633  Last data filed at 4/8/2024 1200  Gross per 24 hour   Intake 600 ml   Output 400 ml   Net 200 ml         Physical Exam  Constitutional:       General: She is not in acute distress.  HENT:      Head: Normocephalic and atraumatic.   Eyes:      Extraocular Movements: Extraocular movements intact.      Pupils: Pupils are equal, round, and reactive to light.   Cardiovascular:      Rate and Rhythm: Normal rate and regular rhythm.      Pulses:           Popliteal pulses are 2+ on the right side.        Dorsalis pedis pulses are 2+ on the left side.      Heart sounds: Normal heart sounds.   Pulmonary:      Effort: Pulmonary effort is normal. No respiratory distress.  "     Breath sounds: Normal breath sounds. No wheezing, rhonchi or rales.   Abdominal:      General: Bowel sounds are normal. There is no distension.      Palpations: Abdomen is soft.      Tenderness: There is no abdominal tenderness. There is no guarding or rebound.   Musculoskeletal:      Cervical back: No rigidity.      Right lower leg: No edema.      Left lower leg: No edema.   Skin:     General: Skin is warm.      Capillary Refill: Capillary refill takes 2 to 3 seconds.      Comments: Post op boot CDI   Neurological:      Mental Status: She is alert and oriented to person, place, and time.   Psychiatric:         Mood and Affect: Mood normal.         Behavior: Behavior normal.         Thought Content: Thought content normal.         Judgment: Judgment normal.             Significant Labs: All pertinent labs within the past 24 hours have been reviewed.  CBC:   Recent Labs   Lab 04/07/24  0344 04/08/24  0332   WBC 7.11 6.92   HGB 8.8* 8.9*   HCT 26.6* 26.8*    253     CMP:   Recent Labs   Lab 04/07/24  0344 04/08/24  0332    139   K 3.7 3.7    107   CO2 23 22*   GLU 91 87   BUN 25* 25*   CREATININE 1.3 1.4   CALCIUM 8.8 8.7   ANIONGAP 10 10     POCT Glucose: No results for input(s): "POCTGLUCOSE" in the last 48 hours.  TSH:   Recent Labs   Lab 04/01/24  1348   TSH 1.716       Significant Imaging: I have reviewed all pertinent imaging results/findings within the past 24 hours.  "

## 2024-04-09 LAB
ANION GAP SERPL CALC-SCNC: 12 MMOL/L (ref 8–16)
BASOPHILS # BLD AUTO: 0.04 K/UL (ref 0–0.2)
BASOPHILS NFR BLD: 0.6 % (ref 0–1.9)
BUN SERPL-MCNC: 26 MG/DL (ref 6–20)
CALCIUM SERPL-MCNC: 9.2 MG/DL (ref 8.7–10.5)
CHLORIDE SERPL-SCNC: 105 MMOL/L (ref 95–110)
CO2 SERPL-SCNC: 23 MMOL/L (ref 23–29)
CREAT SERPL-MCNC: 1.5 MG/DL (ref 0.5–1.4)
DIFFERENTIAL METHOD BLD: ABNORMAL
EOSINOPHIL # BLD AUTO: 0.6 K/UL (ref 0–0.5)
EOSINOPHIL NFR BLD: 8.4 % (ref 0–8)
ERYTHROCYTE [DISTWIDTH] IN BLOOD BY AUTOMATED COUNT: 12.9 % (ref 11.5–14.5)
EST. GFR  (NO RACE VARIABLE): 40 ML/MIN/1.73 M^2
ESTIMATED AVG GLUCOSE: 123 MG/DL (ref 68–131)
GLUCOSE SERPL-MCNC: 86 MG/DL (ref 70–110)
HBA1C MFR BLD: 5.9 % (ref 4–5.6)
HCT VFR BLD AUTO: 27.2 % (ref 37–48.5)
HGB BLD-MCNC: 9 G/DL (ref 12–16)
IMM GRANULOCYTES # BLD AUTO: 0.02 K/UL (ref 0–0.04)
IMM GRANULOCYTES NFR BLD AUTO: 0.3 % (ref 0–0.5)
LYMPHOCYTES # BLD AUTO: 2.5 K/UL (ref 1–4.8)
LYMPHOCYTES NFR BLD: 36.7 % (ref 18–48)
MCH RBC QN AUTO: 30.4 PG (ref 27–31)
MCHC RBC AUTO-ENTMCNC: 33.1 G/DL (ref 32–36)
MCV RBC AUTO: 92 FL (ref 82–98)
MONOCYTES # BLD AUTO: 0.5 K/UL (ref 0.3–1)
MONOCYTES NFR BLD: 7.4 % (ref 4–15)
NEUTROPHILS # BLD AUTO: 3.2 K/UL (ref 1.8–7.7)
NEUTROPHILS NFR BLD: 46.6 % (ref 38–73)
NRBC BLD-RTO: 0 /100 WBC
PLATELET # BLD AUTO: 273 K/UL (ref 150–450)
PMV BLD AUTO: 9.5 FL (ref 9.2–12.9)
POTASSIUM SERPL-SCNC: 4.2 MMOL/L (ref 3.5–5.1)
RBC # BLD AUTO: 2.96 M/UL (ref 4–5.4)
SODIUM SERPL-SCNC: 140 MMOL/L (ref 136–145)
WBC # BLD AUTO: 6.76 K/UL (ref 3.9–12.7)

## 2024-04-09 PROCEDURE — 80048 BASIC METABOLIC PNL TOTAL CA: CPT | Performed by: INTERNAL MEDICINE

## 2024-04-09 PROCEDURE — 25000003 PHARM REV CODE 250: Performed by: NURSE PRACTITIONER

## 2024-04-09 PROCEDURE — 97530 THERAPEUTIC ACTIVITIES: CPT | Mod: CO

## 2024-04-09 PROCEDURE — 85025 COMPLETE CBC W/AUTO DIFF WBC: CPT | Performed by: INTERNAL MEDICINE

## 2024-04-09 PROCEDURE — 36415 COLL VENOUS BLD VENIPUNCTURE: CPT | Performed by: INTERNAL MEDICINE

## 2024-04-09 PROCEDURE — 21400001 HC TELEMETRY ROOM

## 2024-04-09 PROCEDURE — 83036 HEMOGLOBIN GLYCOSYLATED A1C: CPT | Performed by: INTERNAL MEDICINE

## 2024-04-09 PROCEDURE — 25000003 PHARM REV CODE 250: Performed by: INTERNAL MEDICINE

## 2024-04-09 RX ORDER — CALCIUM CARBONATE 200(500)MG
500 TABLET,CHEWABLE ORAL 3 TIMES DAILY PRN
Status: DISCONTINUED | OUTPATIENT
Start: 2024-04-09 | End: 2024-04-11 | Stop reason: HOSPADM

## 2024-04-09 RX ADMIN — RIVAROXABAN 20 MG: 20 TABLET, FILM COATED ORAL at 05:04

## 2024-04-09 RX ADMIN — LEVOTHYROXINE SODIUM 112 MCG: 112 TABLET ORAL at 05:04

## 2024-04-09 RX ADMIN — Medication 6 MG: at 08:04

## 2024-04-09 RX ADMIN — MIDODRINE HYDROCHLORIDE 2.5 MG: 2.5 TABLET ORAL at 08:04

## 2024-04-09 RX ADMIN — ACETAMINOPHEN 500 MG: 500 TABLET ORAL at 12:04

## 2024-04-09 RX ADMIN — HYDROCORTISONE 5 MG: 5 TABLET ORAL at 08:04

## 2024-04-09 RX ADMIN — ACETAMINOPHEN 500 MG: 500 TABLET ORAL at 06:04

## 2024-04-09 RX ADMIN — FERROUS SULFATE TAB 325 MG (65 MG ELEMENTAL FE) 1 EACH: 325 (65 FE) TAB at 08:04

## 2024-04-09 RX ADMIN — CALCIUM CARBONATE (ANTACID) CHEW TAB 500 MG 500 MG: 500 CHEW TAB at 07:04

## 2024-04-09 RX ADMIN — HYDROCORTISONE 15 MG: 5 TABLET ORAL at 08:04

## 2024-04-09 RX ADMIN — ACETAMINOPHEN 500 MG: 500 TABLET ORAL at 05:04

## 2024-04-09 RX ADMIN — DULOXETINE HYDROCHLORIDE 60 MG: 30 CAPSULE, DELAYED RELEASE ORAL at 08:04

## 2024-04-09 RX ADMIN — PHENYTOIN SODIUM 200 MG: 100 CAPSULE ORAL at 08:04

## 2024-04-09 NOTE — ASSESSMENT & PLAN NOTE
171/80 > 117/67 > 99/54  Patient reports being dx w/ postural hypotension with vasovagal response  Likely cause of recurrent syncope/falls  Cardiology consult ordered given worsening of sx  Head CT: no acute abnormality  Had + tilt table testing in 2022  Midodrine added to steroids  Will tolerate hypertension at rest and continue doses of midodrine for now  Compression stockings up to thighs on L leg, abdominal binder  PT/OT recommend IPR as Orthostatic hypotension can be addressed with intensive therapy  Denied, will request SNF  4/9 awaiting SNF placement

## 2024-04-09 NOTE — SUBJECTIVE & OBJECTIVE
Interval History: Patient resting in bed, reports she is feeling well, had  a BM today, tolerating sitting up in chair earlier.     Review of Systems   All other systems reviewed and are negative.    Objective:     Vital Signs (Most Recent):  Temp: 97.9 °F (36.6 °C) (04/09/24 1524)  Pulse: 90 (04/09/24 1524)  Resp: 20 (04/09/24 1524)  BP: (!) 187/83 (04/09/24 1524)  SpO2: 98 % (04/09/24 1524) Vital Signs (24h Range):  Temp:  [97.5 °F (36.4 °C)-98.3 °F (36.8 °C)] 97.9 °F (36.6 °C)  Pulse:  [] 90  Resp:  [19-21] 20  SpO2:  [94 %-100 %] 98 %  BP: ()/(50-83) 187/83     Weight: 73.4 kg (161 lb 13.1 oz)  Body mass index is 25.34 kg/m².    Intake/Output Summary (Last 24 hours) at 4/9/2024 1612  Last data filed at 4/9/2024 0832  Gross per 24 hour   Intake 280 ml   Output 200 ml   Net 80 ml         Physical Exam  Vitals reviewed.   Constitutional:       Appearance: Normal appearance. She is normal weight.   HENT:      Head: Normocephalic and atraumatic.      Mouth/Throat:      Mouth: Mucous membranes are moist.   Eyes:      Extraocular Movements: Extraocular movements intact.   Cardiovascular:      Rate and Rhythm: Normal rate and regular rhythm.      Pulses: Normal pulses.      Heart sounds: Normal heart sounds.   Pulmonary:      Effort: Pulmonary effort is normal.      Breath sounds: Normal breath sounds.   Abdominal:      General: Abdomen is flat. Bowel sounds are normal.      Palpations: Abdomen is soft.   Genitourinary:     Comments: Deferred, pt voids   Musculoskeletal:         General: Normal range of motion.      Cervical back: Normal range of motion and neck supple.      Comments: S/p R foot ORIF    Skin:     General: Skin is warm and dry.      Comments: Post-op dressing intact   Neurological:      General: No focal deficit present.      Mental Status: She is alert and oriented to person, place, and time.   Psychiatric:         Mood and Affect: Mood normal.             Significant Labs: All pertinent  labs within the past 24 hours have been reviewed.  A1C:   Recent Labs   Lab 04/09/24  0238   HGBA1C 5.9*     BMP:   Recent Labs   Lab 04/09/24  0237   GLU 86      K 4.2      CO2 23   BUN 26*   CREATININE 1.5*   CALCIUM 9.2     CBC:   Recent Labs   Lab 04/08/24  0332 04/09/24  0238   WBC 6.92 6.76   HGB 8.9* 9.0*   HCT 26.8* 27.2*    273       Significant Imaging: I have reviewed all pertinent imaging results/findings within the past 24 hours.

## 2024-04-09 NOTE — PT/OT/SLP PROGRESS
"Occupational Therapy   Treatment    Name: Thelma Nguyen  MRN: [id number]  Admitting Diagnosis:  Orthostatic hypotension  7 Days Post-Op    Recommendations:     Discharge Recommendations: High Intensity Therapy  Discharge Equipment Recommendations:  walker, rolling, wheelchair (drop arm bedside commode)  Barriers to discharge:  Decreased caregiver support, Other (Comment) (Increased level of assistance/OH/High fall risk)    Assessment:     Thelma Nguyen is a 58 y.o. female with a medical diagnosis of Orthostatic hypotension.  Performance deficits affecting function are weakness, impaired endurance, impaired self care skills, impaired functional mobility, gait instability, impaired balance, decreased upper extremity function, decreased lower extremity function, pain, decreased ROM, impaired skin, edema, orthopedic precautions.    Pt found in bed, agreeable to therapy.  Pt continues with OH with positional changes however less symptomatic with abdominal brace and Oh hose donned. Continue OT services to address functional goals, progressing as able.        Rehab Prognosis:  Good; patient would benefit from acute skilled OT services to address these deficits and reach maximum level of function.       Plan:     Patient to be seen 3 x/week to address the above listed problems via self-care/home management, therapeutic activities, therapeutic exercises  Plan of Care Expires: 05/03/24  Plan of Care Reviewed with: patient    Subjective     Chief Complaint: "I just feel like I need more time before I go home."  Patient/Family Comments/goals: to get stronger and manage OH.   Pain/Comfort:  Pain Rating 1: 0/10  Pain Rating Post-Intervention 1: 0/10    Objective:     Communicated with: RN prior to session.  Patient found HOB elevated with peripheral IV, telemetry upon OT entry to room.    General Precautions: Standard, fall    Orthopedic Precautions:RLE non weight bearing  Braces: N/A  Respiratory " Status: Room air     Occupational Performance:     Bed Mobility:    Patient completed Sit to Supine with modified independence use of bed rail, HOB slightly elevated    Functional Mobility/Transfers:  Patient completed Sit <> Stand Transfer with minimum assistance  with  rolling walker and vcs to maintain RLE NWB'ing.  Pt unable to tolerate static stand > 1 min 2/2 dizziness.    Patient completed Bed <> Chair Transfer using Squat Pivot technique with minimum assistance with no assistive device  Functional Mobility: deferred 2/2 OH      [facility] 6 Click ADL: 20    Treatment & Education:  Donned abdominal binder in sitting and Oh hosed donned prior to sitting EOB in attempt to prevent OH.    BP dropped with each transition however pt less symptomatic.  BP quickly increases in sitting position and dizziness resolves.    Rec squat pivot transfers only to prevent significant drop in BP, decrease ing risk of falls.   Encouraged OOB in chair 1-2 hours and to call for assistance for back to bed. Pt verbalizes understanding.  BLEs elevated.       Patient left up in chair with all lines intact, call button in reach, bed alarm on, and nurse notified    GOALS:   Multidisciplinary Problems       Occupational Therapy Goals          Problem: Occupational Therapy    Goal Priority Disciplines Outcome Interventions   Occupational Therapy Goal     OT, PT/OT Ongoing, Progressing    Description: Goals to be met by: 5/3/24     Patient will increase functional independence with ADLs by performing:    LE Dressing with Minimal Assistance.  Toileting from bedside commode with Minimal Assistance for hygiene and clothing management.   Supine to sit with Modified Esmeralda.  Stand pivot transfers with Contact Guard Assistance.  Squat pivot transfers with Contact Guard Assistance.  Toilet transfer to bedside commode with Contact Guard Assistance.  Increased functional strength to [facility] for self care skills and functional mobility.  Upper  extremity exercise program x10 reps per handout, with independence.                         Time Tracking:     OT Date of Treatment: 04/09/24  OT Start Time: 1045  OT Stop Time: 1115  OT Total Time (min): 30 min    Billable Minutes:Therapeutic Activity 30            4/9/2024

## 2024-04-09 NOTE — ASSESSMENT & PLAN NOTE
Panhypopituitarism - Primary        Planned for repeat surgery so will need stress dose steroids given hx of adrenal insufficiency.  If hypotension and orthostatic hypotension does not improve on IV hydrocortisone would evaluate for other causes like infection     Recommendations :  - this patient will need stress dose steroids on induction with:              IV hydrocortisone 100 mg on induction followed by IV hydrocortisone 50 mg q8 hours   If hemodynamically stable:  - On POD 1 if tolerating oral meds change to oral hydrocortisone 40/20 mg with the guidance of inpatient endocrine service if needed   - on POD 3 decrease to home doses of hydrocortisone              Plan for peer to peer on Monday 4/9 peer to peer denied, awaiting SNF acceptance

## 2024-04-09 NOTE — SUBJECTIVE & OBJECTIVE
Interval History:     Review of Systems  Objective:     Vital Signs (Most Recent):  Temp: 97.9 °F (36.6 °C) (04/09/24 1524)  Pulse: 90 (04/09/24 1524)  Resp: 20 (04/09/24 1524)  BP: (!) 187/83 (04/09/24 1524)  SpO2: 98 % (04/09/24 1524) Vital Signs (24h Range):  Temp:  [97.5 °F (36.4 °C)-98.3 °F (36.8 °C)] 97.9 °F (36.6 °C)  Pulse:  [] 90  Resp:  [19-21] 20  SpO2:  [94 %-100 %] 98 %  BP: ()/(50-83) 187/83     Weight: 73.4 kg (161 lb 13.1 oz)  Body mass index is 25.34 kg/m².    Intake/Output Summary (Last 24 hours) at 4/9/2024 1629  Last data filed at 4/9/2024 1230  Gross per 24 hour   Intake 520 ml   Output 200 ml   Net 320 ml         Physical Exam  Vitals reviewed.   Constitutional:       Appearance: Normal appearance. She is normal weight.   HENT:      Head: Normocephalic and atraumatic.      Mouth/Throat:      Mouth: Mucous membranes are moist.   Eyes:      Extraocular Movements: Extraocular movements intact.   Cardiovascular:      Rate and Rhythm: Normal rate and regular rhythm.      Pulses: Normal pulses.      Heart sounds: Normal heart sounds.   Pulmonary:      Effort: Pulmonary effort is normal.      Breath sounds: Normal breath sounds.   Abdominal:      General: Bowel sounds are normal.      Palpations: Abdomen is soft.   Genitourinary:     Comments: Deferred, pt voids   Musculoskeletal:         General: Normal range of motion.      Cervical back: Normal range of motion and neck supple.      Comments: S/p R ORIF    Skin:     General: Skin is warm and dry.      Comments: Post-op dressing intact    Neurological:      General: No focal deficit present.      Mental Status: She is alert and oriented to person, place, and time.   Psychiatric:         Mood and Affect: Mood normal.             Significant Labs:   Recent Labs   Lab 04/09/24  0238   WBC 6.76   RBC 2.96*   HGB 9.0*   HCT 27.2*      MCV 92   MCH 30.4   MCHC 33.1      Recent Labs   Lab 04/09/24  0237      K 4.2      CO2 23    BUN 26*   CREATININE 1.5*   CALCIUM 9.2        Significant Imaging:

## 2024-04-09 NOTE — PT/OT/SLP PROGRESS
Physical Therapy      Patient Name:  Thelma Nguyen   MRN:  [id number]    Patient not seen today secondary to Patient unwilling to participate. PT reporting she already worked on transfer training with OT and refused to participate in therapy session this date. Educated on potential need for family training with  should skilled nursing placement get denied. Will follow-up as able.

## 2024-04-09 NOTE — PLAN OF CARE
Problem: Adult Inpatient Plan of Care  Goal: Plan of Care Review  Outcome: Ongoing, Progressing  Goal: Patient-Specific Goal (Individualized)  Outcome: Ongoing, Progressing  Goal: Absence of Hospital-Acquired Illness or Injury  Outcome: Ongoing, Progressing  Goal: Optimal Comfort and Wellbeing  Outcome: Ongoing, Progressing  Goal: Readiness for Transition of Care  Outcome: Ongoing, Progressing     Problem: Fall Injury Risk  Goal: Absence of Fall and Fall-Related Injury  Outcome: Ongoing, Progressing     Problem: Activity Intolerance  Goal: Enhanced Capacity and Energy  Outcome: Ongoing, Progressing     Problem: Seizure Disorder Comorbidity  Goal: Maintenance of Seizure Control  Outcome: Ongoing, Progressing     Problem: Skin Injury Risk Increased  Goal: Skin Health and Integrity  Outcome: Ongoing, Progressing     Problem: Impaired Wound Healing  Goal: Optimal Wound Healing  Outcome: Ongoing, Progressing     Problem: Bleeding (Surgery Nonspecified)  Goal: Absence of Bleeding  Outcome: Ongoing, Progressing     Problem: Bowel Motility Impaired (Surgery Nonspecified)  Goal: Effective Bowel Elimination  Outcome: Ongoing, Progressing     Problem: Fluid and Electrolyte Imbalance (Surgery Nonspecified)  Goal: Fluid and Electrolyte Balance  Outcome: Ongoing, Progressing     Problem: Infection (Surgery Nonspecified)  Goal: Absence of Infection Signs and Symptoms  Outcome: Ongoing, Progressing     Problem: Pain (Surgery Nonspecified)  Goal: Acceptable Pain Control  Outcome: Ongoing, Progressing     Problem: Infection  Goal: Absence of Infection Signs and Symptoms  Outcome: Ongoing, Progressing       POC reviewed with pt, following- VSS, NADN, pt resting quietly this shift. No falls or injuries noted, CB in reach at all times. Instructed to call for needs not met on rounds, v/u.

## 2024-04-09 NOTE — PLAN OF CARE
Problem: Occupational Therapy  Goal: Occupational Therapy Goal  Description: Goals to be met by: 5/3/24     Patient will increase functional independence with ADLs by performing:    LE Dressing with Minimal Assistance.  Toileting from bedside commode with Minimal Assistance for hygiene and clothing management.   Supine to sit with Modified [place].  Stand pivot transfers with Contact Guard Assistance.  Squat pivot transfers with Contact Guard Assistance.  Toilet transfer to bedside commode with Contact Guard Assistance.  Increased functional strength to [facility] for self care skills and functional mobility.  Upper extremity exercise program x10 reps per handout, with independence.    Outcome: Ongoing, Progressing   Thelma Nguyen is a 58 y.o. female with a medical diagnosis of Orthostatic hypotension.  Performance deficits affecting function are weakness, impaired endurance, impaired self care skills, impaired functional mobility, gait instability, impaired balance, decreased upper extremity function, decreased lower extremity function, pain, decreased ROM, impaired skin, edema, orthopedic precautions.               Pt found in bed, agreeable to therapy.  Pt continues with OH with positional changes however less symptomatic with abdominal brace and Oh hose donned. Continue OT services to address functional goals, progressing as able.

## 2024-04-09 NOTE — PLAN OF CARE
Visited pt and spouse at bedside. Plan is for Rehab or SNF pending insurance approval. Spouse is agreeable to co-pay if needed depending on length of stay.  Future Appointments   Date Time Provider Department Center   4/16/2024 11:45 AM Geoffrey Goodson MD [facility] ORTHO [facility]   5/3/2024 12:00 PM Viktor Cohen MD [facility]   5/14/2024 10:00 AM [facility] [place]   5/23/2024  1:30 PM Swetha Bay MD [facility] NEPHRO [facility]   6/11/2024 11:00 AM Zachary Bender MD [facility] IM [facility] PCW   8/27/2024  3:00 PM KYLAH DAVILA [facility] BMD [facility]      04/09/24 1612   Post-Acute Status   Post-Acute Authorization Placement   Post-Acute Placement Status Pending payor medical review/second level review   Hospital Resources/Appts/Education Provided Appointments scheduled and added to AVS   Discharge Delays   (Placement and insurance Auth Approval)   Discharge Plan   Discharge Plan A Rehab;Skilled Nursing Facility   Discharge Plan B Home;Home with family;Home Health

## 2024-04-09 NOTE — ASSESSMENT & PLAN NOTE
Creatine stable for now. BMP reviewed- noted Estimated Creatinine Clearance: 40.2 mL/min (A) (based on SCr of 1.5 mg/dL (H)). according to latest data. Based on current GFR, CKD stage is stage 4 - GFR 15-29.  Monitor UOP and serial BMP and adjust therapy as needed. Renally dose meds. Avoid nephrotoxic medications and procedures.

## 2024-04-09 NOTE — PROGRESS NOTES
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 She presented to the ED by request of Orthopedics, Dr. Goodson, for admission for surgical correction of right foot navicular fracture. Patient had ORIF performed with Dr. Goodson on 3/15. Patient admits to significant and lengthy history of severe orthostatic hypotension, and admits her "spells" of becoming lightheaded upon standing have severely worsened since surgery causing her to have numerous falls over past few weeks. She believes during these falls she re-injured her right foot causing hardware failure. From these falls she also does report contusing left elbow and left rib cage. Denying any recent head injury or LOC. No acute neck pain. No chest pain, palpitations, abdominal pain, nausea or vomiting. Patient also reports episodes of tachycardia that occur while sitting on the cough watching TV with alerts on her Apple watch of HR 110s-140s. She states that her falls have significantly increased over the last couple weeks up to 20 falls in the last 2 weeks. Some of these are accompanied by syncope and a disorientated feeling, other times her legs just give out and she falls. Patient's  states patient does not appear to have seizure activity during these episodes but she does start breathing really heavy and then wakes back up after 1-2 minutes. Patient has been under the care of cardiology and endocrinology. She " states that she was diagnosed with postural hypotension with vasovagal response. She has never been prescribed a Holter Monitor.    Overview/Hospital Course:  4/2/24 Stress dose steroids started prior to surgical procedure  4/3/24 POD1 Resumed Xarelto, transitioned to PO hydrocortisone  4/4/24 POD 2 Neurology consult for increased falls  4/5/2024 Went for MRI today- pending results and Neurology recommendations  4/6/24 MRI brain unremarkable, orthostatics still positive,   4/7/24 Orthostatics BP lower than yesterday, concerns about going home  4/8/24 Peer to Peer denied. Working on SNF. Abdominal binder today  4/9/24 Awaiting SNF decision, BP elevated, mild dizziness noted     Interval History:     Review of Systems  Objective:     Vital Signs (Most Recent):  Temp: 97.9 °F (36.6 °C) (04/09/24 1524)  Pulse: 90 (04/09/24 1524)  Resp: 20 (04/09/24 1524)  BP: (!) 187/83 (04/09/24 1524)  SpO2: 98 % (04/09/24 1524) Vital Signs (24h Range):  Temp:  [97.5 °F (36.4 °C)-98.3 °F (36.8 °C)] 97.9 °F (36.6 °C)  Pulse:  [] 90  Resp:  [19-21] 20  SpO2:  [94 %-100 %] 98 %  BP: ()/(50-83) 187/83     Weight: 73.4 kg (161 lb 13.1 oz)  Body mass index is 25.34 kg/m².    Intake/Output Summary (Last 24 hours) at 4/9/2024 1629  Last data filed at 4/9/2024 1230  Gross per 24 hour   Intake 520 ml   Output 200 ml   Net 320 ml         Physical Exam  Vitals reviewed.   Constitutional:       Appearance: Normal appearance. She is normal weight.   HENT:      Head: Normocephalic and atraumatic.      Mouth/Throat:      Mouth: Mucous membranes are moist.   Eyes:      Extraocular Movements: Extraocular movements intact.   Cardiovascular:      Rate and Rhythm: Normal rate and regular rhythm.      Pulses: Normal pulses.      Heart sounds: Normal heart sounds.   Pulmonary:      Effort: Pulmonary effort is normal.      Breath sounds: Normal breath sounds.   Abdominal:      General: Bowel sounds are normal.      Palpations: Abdomen is soft.    Genitourinary:     Comments: Deferred, pt voids   Musculoskeletal:         General: Normal range of motion.      Cervical back: Normal range of motion and neck supple.      Comments: S/p R ORIF    Skin:     General: Skin is warm and dry.      Comments: Post-op dressing intact    Neurological:      General: No focal deficit present.      Mental Status: She is alert and oriented to person, place, and time.   Psychiatric:         Mood and Affect: Mood normal.             Significant Labs:   Recent Labs   Lab 04/09/24  0238   WBC 6.76   RBC 2.96*   HGB 9.0*   HCT 27.2*      MCV 92   MCH 30.4   MCHC 33.1      Recent Labs   Lab 04/09/24  0237      K 4.2      CO2 23   BUN 26*   CREATININE 1.5*   CALCIUM 9.2        Significant Imaging:     Assessment/Plan:      * Orthostatic hypotension  171/80 > 117/67 > 99/54  Patient reports being dx w/ postural hypotension with vasovagal response  Likely cause of recurrent syncope/falls  Cardiology consult ordered given worsening of sx  Head CT: no acute abnormality  Had + tilt table testing in 2022  Midodrine added to steroids  Will tolerate hypertension at rest and continue doses of midodrine for now  Compression stockings up to thighs on L leg, abdominal binder  PT/OT recommend IPR as Orthostatic hypotension can be addressed with intensive therapy  Denied, will request SNF  4/9 awaiting SNF placement       Recurrent syncope        History of adrenal insufficiency    Panhypopituitarism - Primary        Planned for repeat surgery so will need stress dose steroids given hx of adrenal insufficiency.  If hypotension and orthostatic hypotension does not improve on IV hydrocortisone would evaluate for other causes like infection     Recommendations :  - this patient will need stress dose steroids on induction with:              IV hydrocortisone 100 mg on induction followed by IV hydrocortisone 50 mg q8 hours   If hemodynamically stable:  - On POD 1 if tolerating oral  meds change to oral hydrocortisone 40/20 mg with the guidance of inpatient endocrine service if needed   - on POD 3 decrease to home doses of hydrocortisone              Plan for peer to peer on Monday 4/9 peer to peer denied, awaiting SNF acceptance     Fracture of right foot  S/p ORIF of right navicular fracture and iliac crest bone harvest 3/15  Patient returned today for evaluation with Dr. Goodson and sent in for hardware failure likely 2/2 multiple falls requiring revision fixation of hardware  Ortho following  NPO after MN  Likely procedure tomorrow  Revised cardiac risk index score: 0 points, class I risk, 3.9% 30-day risk of death, MI, or cardiac arrest    4/2/24  1. Navicular open reduction internal fixation, right  2. Naviculocuneiform fusion, right  3. Talonavicular joint open reduction internal fixation  4. Hardware removal, right foot  5. Iliac crest bone grafting, right hip    Bedside Commode:   The beneficiary is confined to a single room. The patient is bedroom confined for an extended time and non-ambulatory.     Wheelchair  The patient has a mobility limitation that significantly impairs her ability to participate in one or more mobility related activities of daily living (MRADL's) such as toileting, feeding, dressing, grooming and bathing in customary locations in the home. The mobility limitation cannot be sufficiently resolved by the use of a cane or walker. The use of a manual wheelchair will significantly improve the patient's ability to participate in MRADLS and the patient will use it on regular basis in the home. The patient has expressed her willingness to use a manual wheelchair in the home. She also has a caregiver who is available, willing and able to provide assistance with the wheelchair when needed.     Recurrent falls  See orthostatic hypotension  Patient may benefit from PT/OT post-op  E-consult with Dr. Shaw with Endocrine  Plan for Neurology consult due to patients concern  that her falls that used to be once a month are happening several times  MRI brain unremarkable, no findings to explain symptoms      Stage 4 chronic kidney disease  Creatine stable for now. BMP reviewed- noted Estimated Creatinine Clearance: 40.2 mL/min (A) (based on SCr of 1.5 mg/dL (H)). according to latest data. Based on current GFR, CKD stage is stage 4 - GFR 15-29.  Monitor UOP and serial BMP and adjust therapy as needed. Renally dose meds. Avoid nephrotoxic medications and procedures.    Factor V deficiency with DVT x 3  Xarelto o/h pending surgery  Has been resumed post-op       Secondary hypothyroidism  Continue synthroid  TSH wnl       Secondary adrenal insufficiency  Endocrinology e-consult performed, appreciate recs:  100 iv hydrocortisone.  (Q8) until surgery  After surgery 50 q8 x1 day   Double home dose  x 3day  If still falling after loading dose, something else is going on outside of endocrine.          Nonintractable epilepsy without status epilepticus  Continue AEDs  No seizures in several years  Phenytoin is subtherapeutic, will increase 250mg phenytoin per Neurology at discharge (250mg is not available on [employer] formulary)  Close Neurology follow up          VTE Risk Mitigation (From admission, onward)           Ordered     Place IRMA hose  Until discontinued        Comments: Thigh high    04/09/24 1126     rivaroxaban tablet 20 mg  with dinner         04/03/24 1013     IP VTE HIGH RISK PATIENT  Once         04/01/24 1938     Place sequential compression device  Until discontinued         04/01/24 1938                    Discharge Planning   XIMENA:      Code Status: Full Code   Is the patient medically ready for discharge?:     Reason for patient still in hospital (select all that apply): Treatment, PT / OT recommendations, and Pending disposition  Discharge Plan A: Rehab, Skilled Nursing Facility   Discharge Delays:  (Placement and insurance Auth Approval)              Rajni Lobo  NP  Department of [facility] Medicine   [facility]     Regular Diet

## 2024-04-09 NOTE — PLAN OF CARE
AOX4. Pt continues to be orthostatic when standing up. Worked w/ OT up to chair. Refused PT. C/o abdominal discomfort. Bedside commode next to bed. Meds given per MAR.  Pending placement.  by bedside. Safety measures in place.

## 2024-04-09 NOTE — PLAN OF CARE
AOX4. Pt worked w/ PT/OT, up to chair & dizzy/orthostatic while standing. C/o of pain to right foot, pain given per MAR. Abdominal binder suggested per OT while transferring pt to help w/ orthostatic BP. Pending SNF. Safety protocol in place.

## 2024-04-09 NOTE — PROGRESS NOTES
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 She presented to the ED by request of Orthopedics, Dr. Goodson, for admission for surgical correction of right foot navicular fracture. Patient had ORIF performed with Dr. Goodson on 3/15. Patient admits to significant and lengthy history of severe orthostatic hypotension, and admits her "spells" of becoming lightheaded upon standing have severely worsened since surgery causing her to have numerous falls over past few weeks. She believes during these falls she re-injured her right foot causing hardware failure. From these falls she also does report contusing left elbow and left rib cage. Denying any recent head injury or LOC. No acute neck pain. No chest pain, palpitations, abdominal pain, nausea or vomiting. Patient also reports episodes of tachycardia that occur while sitting on the cough watching TV with alerts on her Apple watch of HR 110s-140s. She states that her falls have significantly increased over the last couple weeks up to 20 falls in the last 2 weeks. Some of these are accompanied by syncope and a disorientated feeling, other times her legs just give out and she falls. Patient's  states patient does not appear to have seizure activity during these episodes but she does start breathing really heavy and then wakes back up after 1-2 minutes. Patient has been under the care of cardiology and endocrinology. She " states that she was diagnosed with postural hypotension with vasovagal response. She has never been prescribed a Holter Monitor.    Overview/Hospital Course:  4/2/24 Stress dose steroids started prior to surgical procedure  4/3/24 POD1 Resumed Xarelto, transitioned to PO hydrocortisone  4/4/24 POD 2 Neurology consult for increased falls  4/5/2024 Went for MRI today- pending results and Neurology recommendations  4/6/24 MRI brain unremarkable, orthostatics still positive,   4/7/24 Orthostatics BP lower than yesterday, concerns about going home  4/8/24 Peer to Peer denied. Working on SNF. Abdominal binder today  4/9/24 Awaiting SNF decision, BP elevated, mild dizziness noted     No new subjective & objective note has been filed under this hospital service since the last note was generated.      Assessment/Plan:      * Orthostatic hypotension  171/80 > 117/67 > 99/54  Patient reports being dx w/ postural hypotension with vasovagal response  Likely cause of recurrent syncope/falls  Cardiology consult ordered given worsening of sx  Head CT: no acute abnormality  Had + tilt table testing in 2022  Midodrine added to steroids  Will tolerate hypertension at rest and continue doses of midodrine for now  Compression stockings up to thighs on L leg, abdominal binder  PT/OT recommend IPR as Orthostatic hypotension can be addressed with intensive therapy  Denied, will request SNF  4/9 awaiting SNF placement       Recurrent syncope        History of adrenal insufficiency    Panhypopituitarism - Primary        Planned for repeat surgery so will need stress dose steroids given hx of adrenal insufficiency.  If hypotension and orthostatic hypotension does not improve on IV hydrocortisone would evaluate for other causes like infection     Recommendations :  - this patient will need stress dose steroids on induction with:              IV hydrocortisone 100 mg on induction followed by IV hydrocortisone 50 mg q8 hours   If hemodynamically  stable:  - On POD 1 if tolerating oral meds change to oral hydrocortisone 40/20 mg with the guidance of inpatient endocrine service if needed   - on POD 3 decrease to home doses of hydrocortisone              Plan for peer to peer on Monday 4/9 peer to peer denied, awaiting SNF acceptance     Fracture of right foot  S/p ORIF of right navicular fracture and iliac crest bone harvest 3/15  Patient returned today for evaluation with Dr. Goodson and sent in for hardware failure likely 2/2 multiple falls requiring revision fixation of hardware  Ortho following  NPO after MN  Likely procedure tomorrow  Revised cardiac risk index score: 0 points, class I risk, 3.9% 30-day risk of death, MI, or cardiac arrest    4/2/24  1. Navicular open reduction internal fixation, right  2. Naviculocuneiform fusion, right  3. Talonavicular joint open reduction internal fixation  4. Hardware removal, right foot  5. Iliac crest bone grafting, right hip    Bedside Commode:   The beneficiary is confined to a single room. The patient is bedroom confined for an extended time and non-ambulatory.     Wheelchair  The patient has a mobility limitation that significantly impairs her ability to participate in one or more mobility related activities of daily living (MRADL's) such as toileting, feeding, dressing, grooming and bathing in customary locations in the home. The mobility limitation cannot be sufficiently resolved by the use of a cane or walker. The use of a manual wheelchair will significantly improve the patient's ability to participate in MRADLS and the patient will use it on regular basis in the home. The patient has expressed her willingness to use a manual wheelchair in the home. She also has a caregiver who is available, willing and able to provide assistance with the wheelchair when needed.     Recurrent falls  See orthostatic hypotension  Patient may benefit from PT/OT post-op  E-consult with Dr. Shaw with Endocrine  Plan for  Neurology consult due to patients concern that her falls that used to be once a month are happening several times  MRI brain unremarkable, no findings to explain symptoms      Stage 4 chronic kidney disease  Creatine stable for now. BMP reviewed- noted Estimated Creatinine Clearance: 40.2 mL/min (A) (based on SCr of 1.5 mg/dL (H)). according to latest data. Based on current GFR, CKD stage is stage 4 - GFR 15-29.  Monitor UOP and serial BMP and adjust therapy as needed. Renally dose meds. Avoid nephrotoxic medications and procedures.    Factor V deficiency with DVT x 3  Xarelto o/h pending surgery  Has been resumed post-op       Secondary hypothyroidism  Continue synthroid  TSH wnl       Secondary adrenal insufficiency  Endocrinology e-consult performed, appreciate recs:  100 iv hydrocortisone.  (Q8) until surgery  After surgery 50 q8 x1 day   Double home dose  x 3day  If still falling after loading dose, something else is going on outside of endocrine.          Nonintractable epilepsy without status epilepticus  Continue AEDs  No seizures in several years  Phenytoin is subtherapeutic, will increase 250mg phenytoin per Neurology at discharge (250mg is not available on [employer] formulary)  Close Neurology follow up          VTE Risk Mitigation (From admission, onward)           Ordered     Place IRMA hose  Until discontinued        Comments: Thigh high    04/09/24 1126     rivaroxaban tablet 20 mg  with dinner         04/03/24 1013     IP VTE HIGH RISK PATIENT  Once         04/01/24 1938     Place sequential compression device  Until discontinued         04/01/24 1938                    Discharge Planning   XIMENA:      Code Status: Full Code   Is the patient medically ready for discharge?:     Reason for patient still in hospital (select all that apply): Treatment, PT / OT recommendations, and Pending disposition  Discharge Plan A: Rehab, Skilled Nursing Facility   Discharge Delays:  (Placement and insurance Auth  Approval)              Rajni Lobo NP  Department of Hospital Medicine   [facility] Surg

## 2024-04-10 PROCEDURE — 97110 THERAPEUTIC EXERCISES: CPT | Mod: CQ

## 2024-04-10 PROCEDURE — 25000003 PHARM REV CODE 250: Performed by: INTERNAL MEDICINE

## 2024-04-10 PROCEDURE — 21400001 HC TELEMETRY ROOM

## 2024-04-10 PROCEDURE — 97530 THERAPEUTIC ACTIVITIES: CPT | Mod: CQ

## 2024-04-10 PROCEDURE — 97530 THERAPEUTIC ACTIVITIES: CPT | Mod: CO

## 2024-04-10 PROCEDURE — 25000003 PHARM REV CODE 250: Performed by: NURSE PRACTITIONER

## 2024-04-10 PROCEDURE — 97535 SELF CARE MNGMENT TRAINING: CPT | Mod: CO

## 2024-04-10 RX ADMIN — HYDROCORTISONE 15 MG: 5 TABLET ORAL at 08:04

## 2024-04-10 RX ADMIN — MIDODRINE HYDROCHLORIDE 2.5 MG: 2.5 TABLET ORAL at 05:04

## 2024-04-10 RX ADMIN — MIDODRINE HYDROCHLORIDE 2.5 MG: 2.5 TABLET ORAL at 08:04

## 2024-04-10 RX ADMIN — RIVAROXABAN 20 MG: 20 TABLET, FILM COATED ORAL at 05:04

## 2024-04-10 RX ADMIN — ACETAMINOPHEN 500 MG: 500 TABLET ORAL at 06:04

## 2024-04-10 RX ADMIN — FERROUS SULFATE TAB 325 MG (65 MG ELEMENTAL FE) 1 EACH: 325 (65 FE) TAB at 08:04

## 2024-04-10 RX ADMIN — HYDROCORTISONE 5 MG: 5 TABLET ORAL at 08:04

## 2024-04-10 RX ADMIN — LEVOTHYROXINE SODIUM 112 MCG: 112 TABLET ORAL at 06:04

## 2024-04-10 RX ADMIN — PHENYTOIN SODIUM 200 MG: 100 CAPSULE ORAL at 08:04

## 2024-04-10 RX ADMIN — DULOXETINE HYDROCHLORIDE 60 MG: 30 CAPSULE, DELAYED RELEASE ORAL at 08:04

## 2024-04-10 RX ADMIN — ACETAMINOPHEN 500 MG: 500 TABLET ORAL at 11:04

## 2024-04-10 RX ADMIN — ACETAMINOPHEN 500 MG: 500 TABLET ORAL at 05:04

## 2024-04-10 NOTE — ASSESSMENT & PLAN NOTE
Panhypopituitarism - Primary        Planned for repeat surgery so will need stress dose steroids given hx of adrenal insufficiency.  If hypotension and orthostatic hypotension does not improve on IV hydrocortisone would evaluate for other causes like infection     Recommendations :  - this patient will need stress dose steroids on induction with:              IV hydrocortisone 100 mg on induction followed by IV hydrocortisone 50 mg q8 hours   If hemodynamically stable:  - On POD 1 if tolerating oral meds change to oral hydrocortisone 40/20 mg with the guidance of inpatient endocrine service if needed   - on POD 3 decrease to home doses of hydrocortisone              - on home dose of hydrocortisone     Plan for peer to peer on Monday 4/9 peer to peer denied, awaiting SNF acceptance

## 2024-04-10 NOTE — PLAN OF CARE
Pt and spouse is agreeable to make co-payment if [facility] approves SNF placement. Spoke with Jorge at [employer] [facility]. Auth was submitted today. Spouse plans to visit facility and make payment if Auth approved. LOCET was completed today, awaiting 142.   Future Appointments   Date Time Provider Department Center   4/16/2024 11:45 AM Geoffrey Goodson MD [facility] ORTHO [facility]   5/3/2024 12:00 PM Viktor Cohen MD [facility]   5/14/2024 10:00 AM Kleber Liu, PT [facility] [place]   5/23/2024  1:30 PM Swetha Bay MD [facility] NEPHRO [facility]   6/11/2024 11:00 AM Zachary Bender MD [facility] IM [facility] PCW   8/27/2024  3:00 PM GIOVANNI, DEXA1 [facility]      04/10/24 1317   Rounds   Attendance Provider;Nurse    Discharge Plan A Skilled Nursing Facility   Why the patient remains in the hospital Insurance issues;Placement issues   Transition of Care Barriers None       3:14 PM   PASRR/142 sent to [facility]

## 2024-04-10 NOTE — PROGRESS NOTES
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 She presented to the ED by request of Orthopedics, Dr. Goodson, for admission for surgical correction of right foot navicular fracture. Patient had ORIF performed with Dr. Goodson on 3/15. Patient admits to significant and lengthy history of severe orthostatic hypotension, and admits her "spells" of becoming lightheaded upon standing have severely worsened since surgery causing her to have numerous falls over past few weeks. She believes during these falls she re-injured her right foot causing hardware failure. From these falls she also does report contusing left elbow and left rib cage. Denying any recent head injury or LOC. No acute neck pain. No chest pain, palpitations, abdominal pain, nausea or vomiting. Patient also reports episodes of tachycardia that occur while sitting on the cough watching TV with alerts on her Apple watch of HR 110s-140s. She states that her falls have significantly increased over the last couple weeks up to 20 falls in the last 2 weeks. Some of these are accompanied by syncope and a disorientated feeling, other times her legs just give out and she falls. Patient's  states patient does not appear to have seizure activity during these episodes but she does start breathing really heavy and then wakes back up after 1-2 minutes. Patient has been under the care of cardiology and endocrinology. She " states that she was diagnosed with postural hypotension with vasovagal response. She has never been prescribed a Holter Monitor.    Overview/Hospital Course:  4/2/24 Stress dose steroids started prior to surgical procedure  4/3/24 POD1 Resumed Xarelto, transitioned to PO hydrocortisone  4/4/24 POD 2 Neurology consult for increased falls  4/5/2024 Went for MRI today- pending results and Neurology recommendations  4/6/24 MRI brain unremarkable, orthostatics still positive,   4/7/24 Orthostatics BP lower than yesterday, concerns about going home  4/8/24 Peer to Peer denied. Working on SNF. Abdominal binder today  4/9/24 Awaiting SNF decision, BP elevated, mild dizziness noted   4/10 Pt worked with PT/OT on transfers, wheelchair mobility, reports dizziness stable, several BM's yesterday     Interval History: Patient resting quietly after PT session this am, R foot elevated, will request her  bring thigh-high hose from home.     Review of Systems  Objective:     Vital Signs (Most Recent):  Temp: 97.7 °F (36.5 °C) (04/10/24 0717)  Pulse: 87 (04/10/24 0717)  Resp: 18 (04/10/24 0717)  BP: (!) 152/70 (04/10/24 0717)  SpO2: 96 % (04/10/24 0717) Vital Signs (24h Range):  Temp:  [97.7 °F (36.5 °C)-98.4 °F (36.9 °C)] 97.7 °F (36.5 °C)  Pulse:  [74-92] 87  Resp:  [18-20] 18  SpO2:  [96 %-98 %] 96 %  BP: (152-187)/(67-83) 152/70     Weight: 73.4 kg (161 lb 13.1 oz)  Body mass index is 25.34 kg/m².  No intake or output data in the 24 hours ending 04/10/24 1233      Physical Exam  Vitals reviewed.   Constitutional:       Appearance: Normal appearance. She is normal weight.   HENT:      Head: Normocephalic and atraumatic.      Mouth/Throat:      Mouth: Mucous membranes are moist.   Eyes:      Extraocular Movements: Extraocular movements intact.   Cardiovascular:      Rate and Rhythm: Normal rate and regular rhythm.      Pulses: Normal pulses.      Heart sounds: Normal heart sounds.   Pulmonary:      Effort: Pulmonary effort is  normal.      Breath sounds: Normal breath sounds.   Abdominal:      General: Abdomen is flat. Bowel sounds are normal.      Palpations: Abdomen is soft.   Genitourinary:     Comments: Deferred, pt voids   Musculoskeletal:         General: Normal range of motion.      Cervical back: Normal range of motion and neck supple.      Comments: S/p R ORIF    Skin:     General: Skin is warm and dry.      Capillary Refill: Capillary refill takes less than 2 seconds.      Comments: Dressing, soft cast to R foot intact    Neurological:      General: No focal deficit present.      Mental Status: She is alert and oriented to person, place, and time.   Psychiatric:         Mood and Affect: Mood normal.             Significant Labs: All pertinent labs within the past 24 hours have been reviewed.  A1C:   Recent Labs   Lab 04/09/24 0238   HGBA1C 5.9*     BMP:   Recent Labs   Lab 04/09/24 0237   GLU 86      K 4.2      CO2 23   BUN 26*   CREATININE 1.5*   CALCIUM 9.2     CBC:   Recent Labs   Lab 04/09/24 0238   WBC 6.76   HGB 9.0*   HCT 27.2*          Significant Imaging: I have reviewed all pertinent imaging results/findings within the past 24 hours.      Assessment/Plan:      * Orthostatic hypotension  171/80 > 117/67 > 99/54  Patient reports being dx w/ postural hypotension with vasovagal response  Likely cause of recurrent syncope/falls  Cardiology consult ordered given worsening of sx  Head CT: no acute abnormality  Had + tilt table testing in 2022  Midodrine added to steroids  Will tolerate hypertension at rest and continue doses of midodrine for now  Compression stockings up to thighs on L leg, abdominal binder  PT/OT recommend IPR as Orthostatic hypotension can be addressed with intensive therapy  Denied, will request SNF  4/9 awaiting SNF placement   4/10 will request thigh-high hose brought from home       Recurrent syncope        History of adrenal insufficiency    Panhypopituitarism - Primary         Planned for repeat surgery so will need stress dose steroids given hx of adrenal insufficiency.  If hypotension and orthostatic hypotension does not improve on IV hydrocortisone would evaluate for other causes like infection     Recommendations :  - this patient will need stress dose steroids on induction with:              IV hydrocortisone 100 mg on induction followed by IV hydrocortisone 50 mg q8 hours   If hemodynamically stable:  - On POD 1 if tolerating oral meds change to oral hydrocortisone 40/20 mg with the guidance of inpatient endocrine service if needed   - on POD 3 decrease to home doses of hydrocortisone              - on home dose of hydrocortisone     Plan for peer to peer on Monday 4/9 peer to peer denied, awaiting SNF acceptance     Fracture of right foot  S/p ORIF of right navicular fracture and iliac crest bone harvest 3/15  Patient returned today for evaluation with Dr. Goodson and sent in for hardware failure likely 2/2 multiple falls requiring revision fixation of hardware  Ortho following  NPO after MN  Likely procedure tomorrow  Revised cardiac risk index score: 0 points, class I risk, 3.9% 30-day risk of death, MI, or cardiac arrest    4/2/24  1. Navicular open reduction internal fixation, right  2. Naviculocuneiform fusion, right  3. Talonavicular joint open reduction internal fixation  4. Hardware removal, right foot  5. Iliac crest bone grafting, right hip    Bedside Commode:   The beneficiary is confined to a single room. The patient is bedroom confined for an extended time and non-ambulatory.     Wheelchair  The patient has a mobility limitation that significantly impairs her ability to participate in one or more mobility related activities of daily living (MRADL's) such as toileting, feeding, dressing, grooming and bathing in customary locations in the home. The mobility limitation cannot be sufficiently resolved by the use of a cane or walker. The use of a manual wheelchair will  significantly improve the patient's ability to participate in MRADLS and the patient will use it on regular basis in the home. The patient has expressed her willingness to use a manual wheelchair in the home. She also has a caregiver who is available, willing and able to provide assistance with the wheelchair when needed.     Recurrent falls  See orthostatic hypotension  Patient may benefit from PT/OT post-op  E-consult with Dr. Shaw with Endocrine  Plan for Neurology consult due to patients concern that her falls that used to be once a month are happening several times  MRI brain unremarkable, no findings to explain symptoms  PT/OT consulted, following       Stage 4 chronic kidney disease  Creatine stable for now. BMP reviewed- noted Estimated Creatinine Clearance: 40.2 mL/min (A) (based on SCr of 1.5 mg/dL (H)). according to latest data. Based on current GFR, CKD stage is stage 4 - GFR 15-29.  Monitor UOP and serial BMP and adjust therapy as needed. Renally dose meds. Avoid nephrotoxic medications and procedures.    Factor V deficiency with DVT x 3  Xarelto o/h pending surgery  Has been resumed post-op       Secondary hypothyroidism  Continue synthroid  TSH wnl       Secondary adrenal insufficiency  Endocrinology e-consult performed, appreciate recs:  100 iv hydrocortisone.  (Q8) until surgery  After surgery 50 q8 x1 day   Double home dose  x 3day  If still falling after loading dose, something else is going on outside of endocrine.   4/10 on home dose          Nonintractable epilepsy without status epilepticus  Continue AEDs  No seizures in several years  Phenytoin is subtherapeutic, will increase 250mg phenytoin per Neurology at discharge (250mg is not available on [employer] formulary)  Close Neurology follow up          VTE Risk Mitigation (From admission, onward)           Ordered     Place IRMA hose  Until discontinued        Comments: Thigh high    04/09/24 1126     rivaroxaban tablet 20 mg  with dinner          04/03/24 1013     IP VTE HIGH RISK PATIENT  Once         04/01/24 1938     Place sequential compression device  Until discontinued         04/01/24 1938                    Discharge Planning   XIMENA:      Code Status: Full Code   Is the patient medically ready for discharge?:     Reason for patient still in hospital (select all that apply): Pending disposition  Discharge Plan A: Rehab, Skilled Nursing Facility   Discharge Delays:  (Placement and insurance Auth Approval)              Rajni Lobo NP  Department of Hospital Medicine   [facility]

## 2024-04-10 NOTE — ASSESSMENT & PLAN NOTE
171/80 > 117/67 > 99/54  Patient reports being dx w/ postural hypotension with vasovagal response  Likely cause of recurrent syncope/falls  Cardiology consult ordered given worsening of sx  Head CT: no acute abnormality  Had + tilt table testing in 2022  Midodrine added to steroids  Will tolerate hypertension at rest and continue doses of midodrine for now  Compression stockings up to thighs on L leg, abdominal binder  PT/OT recommend IPR as Orthostatic hypotension can be addressed with intensive therapy  Denied, will request SNF  4/9 awaiting SNF placement   4/10 will request thigh-high hose brought from home

## 2024-04-10 NOTE — PLAN OF CARE
AAOx4. No c/o pain, numbness to BLE. Tolerating regular diet. Up to BSC w/assist. R foot dressing CDI. Cardiac monitoring maintained. Bed locked in lowest position, bed alarm set and call bell within reach.

## 2024-04-10 NOTE — ASSESSMENT & PLAN NOTE
Continue AEDs  No seizures in several years  Phenytoin is subtherapeutic, will increase 250mg phenytoin per Neurology at discharge (250mg is not available on [facility] formulary)  Close Neurology follow up

## 2024-04-10 NOTE — PT/OT/SLP PROGRESS
"Occupational Therapy   Treatment    Name: hTelma Nguyen  MRN: [id number]  Admitting Diagnosis:  Orthostatic hypotension  8 Days Post-Op    Recommendations:     Discharge Recommendations: High Intensity Therapy  Discharge Equipment Recommendations:  wheelchair, bedside commode, walker, rolling  Barriers to discharge:  Decreased caregiver support, Other (Comment) (Increased level of assistance)    Assessment:     Thelma Nguyen is a 58 y.o. female with a medical diagnosis of Orthostatic hypotension.  Performance deficits affecting function are weakness, impaired endurance, impaired self care skills, impaired functional mobility, gait instability, impaired balance, decreased upper extremity function, decreased lower extremity function, pain, decreased ROM, impaired skin, edema, orthopedic precautions.    Pt found in bed, agreeable to therapy.  Pt progressing towards goals. Continue OT services to address functional goals, progressing as able.    Rehab Prognosis:  Good; patient would benefit from acute skilled OT services to address these deficits and reach maximum level of function.       Plan:     Patient to be seen 3 x/week to address the above listed problems via self-care/home management, therapeutic activities, therapeutic exercises  Plan of Care Expires: 05/03/24  Plan of Care Reviewed with: patient    Subjective     Chief Complaint: "I need someone with me all the time."  Patient/Family Comments/goals: to get more independent   Pain/Comfort:  Pain Rating 1: 0/10  Pain Rating Post-Intervention 1: 0/10    Objective:     Communicated with: RN prior to session.  Patient found HOB elevated with bed alarm, peripheral IV, telemetry upon OT entry to room.    General Precautions: Standard, fall    Orthopedic Precautions:RLE non weight bearing  Braces: N/A  Respiratory Status: Room air     Occupational Performance:     Bed Mobility:    Patient completed Rolling/Turning to Left with  modified " independence  Patient completed Supine to Sit with modified independence     Functional Mobility/Transfers:  Patient completed Bed <> Chair Transfer using Squat Pivot technique with stand by assistance with no assistive device w/ vcs to maintain RLE NWB    Activities of Daily Living:  Lower Body Dressing: minimum assistance discussed modified dressing techniques to increase independence and reduce symptoms of OH therefore reducing falls. Suggested 1. donning pants/underwear/socks/shoes in long sitting in bed and bridging or wt shifting to pull up pants or 2. sitting EOB and using Figure 4 position then wt shifting for pullup over hips. Encouraged to avoid standing for pull up 2/2 OH and RLE NWB.  Also demonstrated use of reacher to avoid bending.          [facility] 6 Click ADL: 21    Treatment & Education:  Pt sat EOB at Mod I level.   Encouraged OOB in chair 1 hour and to call for assistance for back to bed. Pt verbalizes understanding.        Patient left up in chair with all lines intact, call button in reach, chair alarm on, and nurse notified    GOALS:   Multidisciplinary Problems       Occupational Therapy Goals          Problem: Occupational Therapy    Goal Priority Disciplines Outcome Interventions   Occupational Therapy Goal     OT, PT/OT Ongoing, Progressing    Description: Goals to be met by: 5/3/24     Patient will increase functional independence with ADLs by performing:    LE Dressing with Minimal Assistance.  Toileting from bedside commode with Minimal Assistance for hygiene and clothing management.   Supine to sit with Modified [place].  Stand pivot transfers with Contact Guard Assistance.  Squat pivot transfers with Contact Guard Assistance.  Toilet transfer to bedside commode with Contact Guard Assistance.  Increased functional strength to WFL for self care skills and functional mobility.  Upper extremity exercise program x10 reps per handout, with independence.                         Time  Tracking:     OT Date of Treatment: 04/10/24  OT Start Time: 1038  OT Stop Time: 1102  OT Total Time (min): 24 min    Billable Minutes:Self Care/Home Management 12  Therapeutic Activity 12            4/10/2024

## 2024-04-10 NOTE — PLAN OF CARE
Problem: Occupational Therapy  Goal: Occupational Therapy Goal  Description: Goals to be met by: 5/3/24     Patient will increase functional independence with ADLs by performing:    LE Dressing with Minimal Assistance.  Toileting from bedside commode with Minimal Assistance for hygiene and clothing management.   Supine to sit with Modified [place].  Stand pivot transfers with Contact Guard Assistance.  Squat pivot transfers with Contact Guard Assistance.  Toilet transfer to bedside commode with Contact Guard Assistance.  Increased functional strength to WFL for self care skills and functional mobility.  Upper extremity exercise program x10 reps per handout, with independence.    Outcome: Ongoing, Progressing   Thelma Nguyen is a 58 y.o. female with a medical diagnosis of Orthostatic hypotension.  Performance deficits affecting function are weakness, impaired endurance, impaired self care skills, impaired functional mobility, gait instability, impaired balance, decreased upper extremity function, decreased lower extremity function, pain, decreased ROM, impaired skin, edema, orthopedic precautions.    Pt found in bed, agreeable to therapy.  Pt progressing towards goals. Continue OT services to address functional goals, progressing as able.

## 2024-04-10 NOTE — SUBJECTIVE & OBJECTIVE
Interval History: Patient resting quietly after PT session this am, R foot elevated, will request her  bring thigh-high hose from home.     Review of Systems  Objective:     Vital Signs (Most Recent):  Temp: 97.7 °F (36.5 °C) (04/10/24 0717)  Pulse: 87 (04/10/24 0717)  Resp: 18 (04/10/24 0717)  BP: (!) 152/70 (04/10/24 0717)  SpO2: 96 % (04/10/24 0717) Vital Signs (24h Range):  Temp:  [97.7 °F (36.5 °C)-98.4 °F (36.9 °C)] 97.7 °F (36.5 °C)  Pulse:  [74-92] 87  Resp:  [18-20] 18  SpO2:  [96 %-98 %] 96 %  BP: (152-187)/(67-83) 152/70     Weight: 73.4 kg (161 lb 13.1 oz)  Body mass index is 25.34 kg/m².  No intake or output data in the 24 hours ending 04/10/24 1233      Physical Exam  Vitals reviewed.   Constitutional:       Appearance: Normal appearance. She is normal weight.   HENT:      Head: Normocephalic and atraumatic.      Mouth/Throat:      Mouth: Mucous membranes are moist.   Eyes:      Extraocular Movements: Extraocular movements intact.   Cardiovascular:      Rate and Rhythm: Normal rate and regular rhythm.      Pulses: Normal pulses.      Heart sounds: Normal heart sounds.   Pulmonary:      Effort: Pulmonary effort is normal.      Breath sounds: Normal breath sounds.   Abdominal:      General: Abdomen is flat. Bowel sounds are normal.      Palpations: Abdomen is soft.   Genitourinary:     Comments: Deferred, pt voids   Musculoskeletal:         General: Normal range of motion.      Cervical back: Normal range of motion and neck supple.      Comments: S/p R ORIF    Skin:     General: Skin is warm and dry.      Capillary Refill: Capillary refill takes less than 2 seconds.      Comments: Dressing, soft cast to R foot intact    Neurological:      General: No focal deficit present.      Mental Status: She is alert and oriented to person, place, and time.   Psychiatric:         Mood and Affect: Mood normal.             Significant Labs: All pertinent labs within the past 24 hours have been reviewed.  A1C:    Recent Labs   Lab 04/09/24 0238   HGBA1C 5.9*     BMP:   Recent Labs   Lab 04/09/24 0237   GLU 86      K 4.2      CO2 23   BUN 26*   CREATININE 1.5*   CALCIUM 9.2     CBC:   Recent Labs   Lab 04/09/24 0238   WBC 6.76   HGB 9.0*   HCT 27.2*          Significant Imaging: I have reviewed all pertinent imaging results/findings within the past 24 hours.

## 2024-04-10 NOTE — PT/OT/SLP PROGRESS
Physical Therapy Treatment    Patient Name:  Thelma Nguyen   MRN:  [id number]    Recommendations:     Discharge Recommendations: High Intensity Therapy  Discharge Equipment Recommendations:  (TBD next level of care)  Barriers to discharge: Decreased caregiver support and decreased mobility,strength,endurance and NWB status    Assessment:     Thelma Nguyen is a 58 y.o. female admitted with a medical diagnosis of Orthostatic hypotension.  She presents with the following impairments/functional limitations: weakness, impaired endurance, impaired functional mobility, impaired balance, decreased lower extremity function, decreased ROM, impaired coordination, impaired skin, impaired joint extensibility, orthopedic precautions,pt with good participation and requires assistance for safety with t/f's,pt's  works,pt will benefit from high intensity/mod int therapy upon discharge.    Rehab Prognosis: Good; patient would benefit from acute skilled PT services to address these deficits and reach maximum level of function.    Recent Surgery: Procedure(s) (LRB):  FUSION, JOINT, MIDFOOT (Right)  BONE GRAFT, ILIAC CREST (Right) 8 Days Post-Op    Plan:     During this hospitalization, patient to be seen 4 x/week to address the identified rehab impairments via therapeutic activities, therapeutic exercises, neuromuscular re-education, wheelchair management/training and progress toward the following goals:    Plan of Care Expires:  04/17/24    Subjective     Chief Complaint: n/a  Patient/Family Comments/goals: pt states she was turned down for Rehab and trying for SNF now.  Pain/Comfort:  Pain Rating 1: 0/10      Objective:     Communicated with nsg prior to session.  Patient found supine with bed alarm, peripheral IV, telemetry upon PT entry to room.     General Precautions: Standard, fall  Orthopedic Precautions: RLE non weight bearing  Braces: N/A  Respiratory Status: Room air     Functional  Mobility:  Bed Mobility:     Supine to Sit: modified independence  Sit to Supine: modified independence  Transfers:     Sit to Stand:  stand by assistance and contact guard assistance with rolling walker  Bed to Chair: contact guard assistance with  no AD  using  Stand Pivot  Balance: good sitting balance      AM-PAC 6 CLICK MOBILITY  Turning over in bed (including adjusting bedclothes, sheets and blankets)?: 4  Sitting down on and standing up from a chair with arms (e.g., wheelchair, bedside commode, etc.): 3  Moving from lying on back to sitting on the side of the bed?: 4  Moving to and from a bed to a chair (including a wheelchair)?: 3  Need to walk in hospital room?: 2  Climbing 3-5 steps with a railing?: 1  Basic Mobility Total Score: 17       Treatment & Education: donned abdominal binder,pt propelled w/c ~80' X 2 trials with B ue;s and SBA,pt sat EOB ~6 mins with S and performed le seated laq's and ap's,pt's needs and requests met.      Patient left supine with all lines intact, call button in reach, and bed alarm on..    GOALS: see general POC  Multidisciplinary Problems       Physical Therapy Goals          Problem: Physical Therapy    Goal Priority Disciplines Outcome Goal Variances Interventions   Physical Therapy Goal     PT, PT/OT Ongoing, Progressing     Description: Goals to be met by: 24     Patient will increase functional independence with mobility by performin. Supine to sit with [place]  2. Sit to supine with [place]  3. Bed to chair transfer with Modified [place] using No Assistive Device  4. Wheelchair propulsion x200 feet with Modified [place] using bilateral uppper extremities                         Time Tracking:     PT Received On: 04/10/24  PT Start Time: 931     PT Stop Time:   PT Total Time (min): 35 min     Billable Minutes: Therapeutic Activity 24 and Therapeutic Exercise 11    Treatment Type: Treatment  PT/PTA: PTA     Number of PTA visits since  last PT visit: 1     04/10/2024

## 2024-04-10 NOTE — ASSESSMENT & PLAN NOTE
See orthostatic hypotension  Patient may benefit from PT/OT post-op  E-consult with Dr. Shaw with Endocrine  Plan for Neurology consult due to patients concern that her falls that used to be once a month are happening several times  MRI brain unremarkable, no findings to explain symptoms  PT/OT consulted, following

## 2024-04-10 NOTE — ASSESSMENT & PLAN NOTE
Endocrinology e-consult performed, appreciate recs:  100 iv hydrocortisone.  (Q8) until surgery  After surgery 50 q8 x1 day   Double home dose  x 3day  If still falling after loading dose, something else is going on outside of endocrine.   4/10 on home dose

## 2024-04-11 ENCOUNTER — PATIENT MESSAGE (OUTPATIENT)
Dept: NEUROLOGY | Facility: CLINIC | Age: 59
End: 2024-04-11
Payer: COMMERCIAL

## 2024-04-11 VITALS
HEIGHT: 67 IN | TEMPERATURE: 99 F | OXYGEN SATURATION: 98 % | SYSTOLIC BLOOD PRESSURE: 144 MMHG | DIASTOLIC BLOOD PRESSURE: 67 MMHG | RESPIRATION RATE: 20 BRPM | HEART RATE: 88 BPM | BODY MASS INDEX: 24.56 KG/M2 | WEIGHT: 156.5 LBS

## 2024-04-11 LAB — SARS-COV-2 RDRP RESP QL NAA+PROBE: NEGATIVE

## 2024-04-11 PROCEDURE — 97530 THERAPEUTIC ACTIVITIES: CPT | Mod: CO

## 2024-04-11 PROCEDURE — 97110 THERAPEUTIC EXERCISES: CPT | Mod: CQ

## 2024-04-11 PROCEDURE — 30200315 PPD INTRADERMAL TEST REV CODE 302: Performed by: NURSE PRACTITIONER

## 2024-04-11 PROCEDURE — 97110 THERAPEUTIC EXERCISES: CPT | Mod: CO

## 2024-04-11 PROCEDURE — 86580 TB INTRADERMAL TEST: CPT | Performed by: NURSE PRACTITIONER

## 2024-04-11 PROCEDURE — 25000003 PHARM REV CODE 250: Performed by: INTERNAL MEDICINE

## 2024-04-11 PROCEDURE — 25000003 PHARM REV CODE 250: Performed by: NURSE PRACTITIONER

## 2024-04-11 PROCEDURE — U0002 COVID-19 LAB TEST NON-CDC: HCPCS | Performed by: NURSE PRACTITIONER

## 2024-04-11 PROCEDURE — 97530 THERAPEUTIC ACTIVITIES: CPT | Mod: CQ

## 2024-04-11 RX ORDER — CALCIUM CARBONATE 200(500)MG
500 TABLET,CHEWABLE ORAL 3 TIMES DAILY PRN
Qty: 60 TABLET | Refills: 0 | Status: SHIPPED | OUTPATIENT
Start: 2024-04-11 | End: 2024-10-18

## 2024-04-11 RX ORDER — TALC
6 POWDER (GRAM) TOPICAL NIGHTLY PRN
Qty: 60 TABLET | Refills: 0 | Status: SHIPPED | OUTPATIENT
Start: 2024-04-11 | End: 2024-05-11

## 2024-04-11 RX ORDER — MIDODRINE HYDROCHLORIDE 2.5 MG/1
2.5 TABLET ORAL 2 TIMES DAILY WITH MEALS
Qty: 60 TABLET | Refills: 1 | Status: SHIPPED | OUTPATIENT
Start: 2024-04-11 | End: 2024-05-03

## 2024-04-11 RX ADMIN — ACETAMINOPHEN 500 MG: 500 TABLET ORAL at 12:04

## 2024-04-11 RX ADMIN — MIDODRINE HYDROCHLORIDE 2.5 MG: 2.5 TABLET ORAL at 07:04

## 2024-04-11 RX ADMIN — Medication 6 MG: at 12:04

## 2024-04-11 RX ADMIN — HYDROCORTISONE 15 MG: 5 TABLET ORAL at 09:04

## 2024-04-11 RX ADMIN — TUBERCULIN PURIFIED PROTEIN DERIVATIVE 5 UNITS: 5 INJECTION, SOLUTION INTRADERMAL at 02:04

## 2024-04-11 RX ADMIN — LEVOTHYROXINE SODIUM 112 MCG: 112 TABLET ORAL at 06:04

## 2024-04-11 RX ADMIN — ACETAMINOPHEN 500 MG: 500 TABLET ORAL at 06:04

## 2024-04-11 RX ADMIN — HYDROCORTISONE 5 MG: 5 TABLET ORAL at 09:04

## 2024-04-11 NOTE — PT/OT/SLP PROGRESS
Physical Therapy Treatment    Patient Name:  Thelma Nguyen   MRN:  [id number]    Recommendations:     Discharge Recommendations: Moderate Intensity Therapy (pt denied high intensity)  Discharge Equipment Recommendations:  (TBD)  Barriers to discharge: Decreased caregiver support and decreased mobility,strength and endurance    Assessment:     Thelma Nguyen is a 58 y.o. female admitted with a medical diagnosis of Orthostatic hypotension.  She presents with the following impairments/functional limitations: weakness, impaired endurance, impaired functional mobility, impaired balance, decreased lower extremity function, pain, decreased ROM, impaired coordination, impaired skin, impaired joint extensibility, orthopedic precautions,pt with good participation with limited mobility at this time burak with NWB precautions,pt will benefit from continuing PT services upon discharge.    Rehab Prognosis: Good; patient would benefit from acute skilled PT services to address these deficits and reach maximum level of function.    Recent Surgery: Procedure(s) (LRB):  FUSION, JOINT, MIDFOOT (Right)  BONE GRAFT, ILIAC CREST (Right) 9 Days Post-Op    Plan:     During this hospitalization, patient to be seen 4 x/week to address the identified rehab impairments via gait training, therapeutic activities, therapeutic exercises, neuromuscular re-education and progress toward the following goals:    Plan of Care Expires:  04/17/24    Subjective     Chief Complaint: n/a  Patient/Family Comments/goals: pt may leave today or tomorrow.  Pain/Comfort:  Pain Rating 1: 4/10  Location - Side 1: Right  Location - Orientation 1: generalized  Location 1: foot  Pain Addressed 1: Reposition, Distraction  Pain Rating Post-Intervention 1: 4/10      Objective:     Communicated with nsg prior to session.  Patient found supine with bed alarm, peripheral IV, telemetry upon PT entry to room.     General Precautions: Standard,  fall  Orthopedic Precautions: RLE non weight bearing  Braces: N/A  Respiratory Status: Room air     Functional Mobility:  Bed Mobility:     Supine to Sit: modified independence  Sit to Supine: modified independence  Transfers:     Sit to Stand:  stand by assistance and contact guard assistance with rolling walker  Bed to Chair: stand by assistance and contact guard assistance with  no AD  using  Stand Pivot  Balance: good sitting balance      AM-PAC 6 CLICK MOBILITY  Turning over in bed (including adjusting bedclothes, sheets and blankets)?: 4  Sitting down on and standing up from a chair with arms (e.g., wheelchair, bedside commode, etc.): 3  Moving from lying on back to sitting on the side of the bed?: 4  Moving to and from a bed to a chair (including a wheelchair)?: 3  Need to walk in hospital room?: 2  Climbing 3-5 steps with a railing?: 1  Basic Mobility Total Score: 17       Treatment & Education: le seated ex's X 15 reps inc: ap,laq's and hip flex,pt with increased sitting time,pt propelled w/c ~100' X 1 and ~50' X 1 with B ue's and S,pt's needs and requests met.      Patient left supine with all lines intact and call button in reach..    GOALS: see general POC  Multidisciplinary Problems       Physical Therapy Goals          Problem: Physical Therapy    Goal Priority Disciplines Outcome Goal Variances Interventions   Physical Therapy Goal     PT, PT/OT Ongoing, Progressing     Description: Goals to be met by: 24     Patient will increase functional independence with mobility by performin. Supine to sit with [place]  2. Sit to supine with [place]  3. Bed to chair transfer with Modified [place] using No Assistive Device  4. Wheelchair propulsion x200 feet with Modified [place] using bilateral uppper extremities                         Time Tracking:     PT Received On: 24  PT Start Time: 938     PT Stop Time: 1010  PT Total Time (min): 32 min     Billable Minutes:  Therapeutic Activity 23 and Therapeutic Exercise 9    Treatment Type: Treatment  PT/PTA: PTA     Number of PTA visits since last PT visit: 2     04/11/2024

## 2024-04-11 NOTE — PLAN OF CARE
Problem: Physical Therapy  Goal: Physical Therapy Goal  Description: Goals to be met by: 24     Patient will increase functional independence with mobility by performin. Supine to sit with [place]  2. Sit to supine with [place]  3. Bed to chair transfer with Modified [place] using No Assistive Device  4. Wheelchair propulsion x200 feet with Modified [place] using bilateral uppper extremities    Outcome: Ongoing, Progressing

## 2024-04-11 NOTE — PT/OT/SLP PROGRESS
"Occupational Therapy   Treatment    Name: Thelma Nguyen  MRN: [id number]  Admitting Diagnosis:  Orthostatic hypotension  9 Days Post-Op    Recommendations:     Discharge Recommendations: Moderate Intensity Therapy  Discharge Equipment Recommendations:  wheelchair, bedside commode, walker, rolling  Barriers to discharge:  Decreased caregiver support, Other (Comment) (increased level of assistance)    Assessment:     Thelma Nguyen is a 58 y.o. female with a medical diagnosis of Orthostatic hypotension.  Performance deficits affecting function are weakness, impaired endurance, impaired self care skills, gait instability, impaired balance, impaired functional mobility, decreased lower extremity function, decreased upper extremity function, pain, impaired skin, orthopedic precautions.    Pt found in bed, agreeable to therapy.  Pt progressing towards goals.  Continue OT services to address functional goals, progressing as able.     Rehab Prognosis:  Good; patient would benefit from acute skilled OT services to address these deficits and reach maximum level of function.       Plan:     Patient to be seen 3 x/week to address the above listed problems via self-care/home management, therapeutic activities, therapeutic exercises  Plan of Care Expires: 05/03/24  Plan of Care Reviewed with: patient    Subjective     Chief Complaint: "I am leaving today or tomorrow."  Patient/Family Comments/goals: to go to rehab   Pain/Comfort:  Pain Rating 1: 0/10  Pain Rating Post-Intervention 1: 0/10    Objective:     Communicated with: RN prior to session.  Patient found HOB elevated with bed alarm, peripheral IV, telemetry upon OT entry to room.    General Precautions: Standard, fall    Orthopedic Precautions:RLE non weight bearing  Braces: N/A  Respiratory Status: Room air     Occupational Performance:     Bed Mobility:    Patient completed Supine to Sit with modified independence     Functional " Mobility/Transfers:  Patient completed Bed <> Chair Transfer using Squat Pivot technique with stand by assistance with no assistive device        [facility] 6 Click ADL: 21    Treatment & Education:  Pt provided with, instructed on, and performed OCTAVIOE yellow theraband exercises x 10 reps for shld flex, abd, hori abd/add, bicep curls and tricep extensions.  Pt tolerated well with no complaints.     Patient left up in chair with all lines intact, call button in reach, and RN notified    GOALS:   Multidisciplinary Problems       Occupational Therapy Goals          Problem: Occupational Therapy    Goal Priority Disciplines Outcome Interventions   Occupational Therapy Goal     OT, PT/OT Ongoing, Progressing    Description: Goals to be met by: 5/3/24     Patient will increase functional independence with ADLs by performing:    LE Dressing with Minimal Assistance.  Toileting from bedside commode with Minimal Assistance for hygiene and clothing management.   Supine to sit with Modified [place].  Stand pivot transfers with Contact Guard Assistance.  Squat pivot transfers with Contact Guard Assistance.  Toilet transfer to bedside commode with Contact Guard Assistance.  Increased functional strength to WFL for self care skills and functional mobility.  Upper extremity exercise program x10 reps per handout, with independence.                         Time Tracking:     OT Date of Treatment: 04/11/24  OT Start Time: 1458  OT Stop Time: 1514  OT Total Time (min): 16 min    Billable Minutes:Therapeutic Activity 8  Therapeutic Exercise 8            4/11/2024

## 2024-04-11 NOTE — DISCHARGE SUMMARY
"[facility] Medicine  Discharge Summary      Patient Name: Thelma Nguyen  MRN: [id number]  DARA: [id number]  Patient Class: IP- Inpatient  Admission Date: 4/1/2024  Hospital Length of Stay: 2 days  Discharge Date and Time: 4/11/2024  5:16 PM  Attending Physician: Russell Palafox MD  Discharging Provider: Rajni Lobo NP  Primary Care Provider: Zachary Bender MD    Primary Care Team: Networked reference to record PCT     HPI:   Thelma Nguyen (DeAnn) is a 58-year-old female w/ a significant PMH including but not limited to glioblastoma removal, panhypoparathyroidism, adrenal insufficiency, CKD, 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 She presented to the ED by request of Orthopedics, Dr. Goodson, for admission for surgical correction of right foot navicular fracture. Patient had ORIF performed with Dr. Goodson on 3/15. Patient admits to significant and lengthy history of severe orthostatic hypotension, and admits her "spells" of becoming lightheaded upon standing have severely worsened since surgery causing her to have numerous falls over past few weeks. She believes during these falls she re-injured her right foot causing hardware failure. From these falls she also does report contusing left elbow and left rib cage. Denying any recent head injury or LOC. No acute neck pain. No chest pain, palpitations, abdominal pain, nausea or vomiting. Patient also reports episodes of tachycardia that occur while sitting on the cough watching TV with alerts on her Apple watch of HR 110s-140s. She states that her falls have significantly increased over the last couple weeks up to 20 falls in the last 2 weeks. Some of these are accompanied by syncope and a disorientated feeling, other times her legs just give out and she falls. Patient's  states patient does not appear to have seizure activity during these episodes but she does start breathing really heavy and " then wakes back up after 1-2 minutes. Patient has been under the care of cardiology and endocrinology. She states that she was diagnosed with postural hypotension with vasovagal response. She has never been prescribed a Holter Monitor.    Procedure(s) (LRB):  FUSION, JOINT, MIDFOOT (Right)  BONE GRAFT, ILIAC CREST (Right)      Hospital Course:   4/2/24 Stress dose steroids started prior to surgical procedure  4/3/24 POD1 Resumed Xarelto, transitioned to PO hydrocortisone  4/4/24 POD 2 Neurology consult for increased falls  4/5/2024 Went for MRI today- pending results and Neurology recommendations  4/6/24 MRI brain unremarkable, orthostatics still positive,   4/7/24 Orthostatics BP lower than yesterday, concerns about going home  4/8/24 Peer to Peer denied. Working on SNF. Abdominal binder today  4/9/24 Awaiting SNF decision, BP elevated, mild dizziness noted   4/10 Pt worked with PT/OT on transfers, wheelchair mobility, reports dizziness stable, several BM's yesterday    4/11 Awaiting insurance authorization for SNF completed, will get CXR - No acute cardiopulmonary process, COVID - neg, place PPD.   Patient is stable for discharge, discharge instructions and medications have been reviewed.      Goals of Care Treatment Preferences:  Code Status: Full Code      Consults:   Consults (From admission, onward)          Status Ordering Provider     Inpatient consult to [facility]  Once        Provider:  Jairo Rodriguez MD    Completed DIVYA BUCHANAN     Cardiology-[facility]  Once        Provider:  (Not yet assigned)    ADOLPH Alexis     Inpatient consult to Orthopedic Surgery  Once        Provider:  (Not yet assigned)    ADOLPH Alexis            Neuro  Nonintractable epilepsy without status epilepticus  Continue AEDs  No seizures in several years  Phenytoin is subtherapeutic, will increase 250mg phenytoin per Neurology at discharge (250mg is not available on [facility] formulary)  Close Neurology  follow up        Cardiac/Vascular  * Orthostatic hypotension  171/80 > 117/67 > 99/54  Patient reports being dx w/ postural hypotension with vasovagal response  Likely cause of recurrent syncope/falls  Cardiology consult ordered given worsening of sx  Head CT: no acute abnormality  Had + tilt table testing in 2022  Midodrine added to steroids  Will tolerate hypertension at rest and continue doses of midodrine for now  Compression stockings up to thighs on L leg, abdominal binder  PT/OT recommend IPR as Orthostatic hypotension can be addressed with intensive therapy  Denied, will request SNF  4/9 awaiting SNF placement   4/10 will request thigh-high hose brought from home       Renal/  Stage 4 chronic kidney disease  Creatine stable for now. BMP reviewed- noted Estimated Creatinine Clearance: 40.2 mL/min (A) (based on SCr of 1.5 mg/dL (H)). according to latest data. Based on current GFR, CKD stage is stage 4 - GFR 15-29.  Monitor UOP and serial BMP and adjust therapy as needed. Renally dose meds. Avoid nephrotoxic medications and procedures.    Endocrine  History of adrenal insufficiency    Panhypopituitarism - Primary        Planned for repeat surgery so will need stress dose steroids given hx of adrenal insufficiency.  If hypotension and orthostatic hypotension does not improve on IV hydrocortisone would evaluate for other causes like infection     Recommendations :  - this patient will need stress dose steroids on induction with:              IV hydrocortisone 100 mg on induction followed by IV hydrocortisone 50 mg q8 hours   If hemodynamically stable:  - On POD 1 if tolerating oral meds change to oral hydrocortisone 40/20 mg with the guidance of inpatient endocrine service if needed   - on POD 3 decrease to home doses of hydrocortisone              - on home dose of hydrocortisone     Plan for peer to peer on Monday 4/9 peer to peer denied, awaiting SNF acceptance   4/11 will discharge on home dose      Secondary hypothyroidism  Continue synthroid  TSH wnl       Secondary adrenal insufficiency  Endocrinology e-consult performed, appreciate recs:  100 iv hydrocortisone.  (Q8) until surgery  After surgery 50 q8 x1 day   Double home dose  x 3day  If still falling after loading dose, something else is going on outside of endocrine.   4/10 on home dose          Orthopedic  Fracture of right foot  S/p ORIF of right navicular fracture and iliac crest bone harvest 3/15  Patient returned today for evaluation with Dr. Goodson and sent in for hardware failure likely 2/2 multiple falls requiring revision fixation of hardware  Ortho following  NPO after MN  Likely procedure tomorrow  Revised cardiac risk index score: 0 points, class I risk, 3.9% 30-day risk of death, MI, or cardiac arrest    4/2/24  1. Navicular open reduction internal fixation, right  2. Naviculocuneiform fusion, right  3. Talonavicular joint open reduction internal fixation  4. Hardware removal, right foot  5. Iliac crest bone grafting, right hip    Bedside Commode:   The beneficiary is confined to a single room. The patient is bedroom confined for an extended time and non-ambulatory.     Wheelchair  The patient has a mobility limitation that significantly impairs her ability to participate in one or more mobility related activities of daily living (MRADL's) such as toileting, feeding, dressing, grooming and bathing in customary locations in the home. The mobility limitation cannot be sufficiently resolved by the use of a cane or walker. The use of a manual wheelchair will significantly improve the patient's ability to participate in MRADLS and the patient will use it on regular basis in the home. The patient has expressed her willingness to use a manual wheelchair in the home. She also has a caregiver who is available, willing and able to provide assistance with the wheelchair when needed.     Other  Recurrent syncope  Patient reports being dx w/  "postural hypotension with vasovagal response  Likely cause of recurrent syncope/falls      Recurrent falls  See orthostatic hypotension  Patient may benefit from PT/OT post-op  E-consult with Dr. Shaw with Endocrine  Plan for Neurology consult due to patients concern that her falls that used to be once a month are happening several times  MRI brain unremarkable, no findings to explain symptoms  PT/OT consulted, following   Patient is nonweightbearing, non ambulatory, wheelchair mobility       Factor V deficiency with DVT x 3  Xarelto o/h pending surgery  Has been resumed post-op         Final Active Diagnoses:    Diagnosis Date Noted POA    PRINCIPAL PROBLEM:  Orthostatic hypotension [I95.1] 02/15/2022 Yes    History of adrenal insufficiency [Z86.39] 04/02/2024 Yes    Recurrent syncope [R55] 04/02/2024 Yes    Fracture of right foot [S92.901A] 03/15/2024 Yes    Recurrent falls [R29.6] 01/14/2022 Not Applicable    Stage 4 chronic kidney disease [N18.4] 03/28/2019 Yes    Factor V deficiency with DVT x 3 [D68.2] 05/05/2016 Yes    Secondary hypothyroidism [E03.8] 05/05/2016 Yes    Secondary adrenal insufficiency [E27.49] 05/05/2016 Yes    Nonintractable epilepsy without status epilepticus [G40.909] 03/25/2015 Yes      Problems Resolved During this Admission:       Discharged Condition: good    Disposition: Skilled Nursing Facility    Follow Up: As scheduled     Patient Instructions:      WHEELCHAIR FOR HOME USE     Order Specific Question Answer Comments   Hours in W/C per day: 12    Type of Wheelchair: Standard    Size(Width): 18"(STD adult)    Leg Support: Elevating leg rests    Lap Belt: Buckle    Accessories: Anti-tippers    Cushion: Basic    Reclining Back No    Height: 5' 7" (1.702 m)    Weight: 71 kg (156 lb 8.4 oz)    Does patient have medical equipment at home? bath bench knee scooter   Length of need (1-99 months): 99    Please check all that apply: Caregiver is capable and willing to operate wheelchair safely.  " "  Please check all that apply: Patient mobility limitations cannot be sufficiently resolved by the use of other ambulatory therapies.      COMMODE FOR HOME USE     Order Specific Question Answer Comments   Type: Drop arm    Type: need to transfer    Height: 5' 7" (1.702 m)    Weight: 71 kg (156 lb 8.4 oz)    Does patient have medical equipment at home? bath bench knee scooter   Length of need (1-99 months): 99      WHEELCHAIR FOR HOME USE     Order Specific Question Answer Comments   Hours in W/C per day: 12    Type of Wheelchair: Standard    Size(Width): 18"(STD adult)    Leg Support: Elevating leg rests    Lap Belt: Buckle    Accessories: Anti-tippers    Cushion: Basic    Reclining Back No    Height: 5' 7" (1.702 m)    Weight: 69 kg (152 lb 1.9 oz)    Does patient have medical equipment at home? bath bench    Length of need (1-99 months): 3    Please check all that apply: Caregiver is capable and willing to operate wheelchair safely.    Please check all that apply: Patient mobility limitations cannot be sufficiently resolved by the use of other ambulatory therapies.      COMMODE FOR HOME USE     Order Specific Question Answer Comments   Type: Drop arm    Type: need to transfer    Height: 5' 7" (1.702 m)    Weight: 69 kg (152 lb 1.9 oz)    Does patient have medical equipment at home? bath bench    Length of need (1-99 months): 99      COMPRESSION STOCKINGS     Order Specific Question Answer Comments   Pressure amount: 40+ mmHg        Significant Diagnostic Studies: Labs: CMP No results for input(s): "NA", "K", "CL", "CO2", "GLU", "BUN", "CREATININE", "CALCIUM", "PROT", "ALBUMIN", "BILITOT", "ALKPHOS", "AST", "ALT", "ANIONGAP", "ESTGFRAFRICA", "EGFRNONAA" in the last 48 hours. and CBC No results for input(s): "WBC", "HGB", "HCT", "PLT" in the last 48 hours.  Radiology: X-Ray: CXR: X-Ray Chest 1 View (CXR): No results found for this visit on 04/01/24. and XR R foot, XR Ribs  MRI: Brain w & w/o   CT scan:  CT head w/o " & CT R Foot   Ultrasound: Ayan Carotids  Cardiac Graphics: ECG: NSR    Pending Diagnostic Studies:       Procedure Component Value Units Date/Time    SURG FL Surgery Fluoro Usage [[id number]] Resulted: 04/02/24 1633    Order Status: Sent Lab Status: In process Updated: 04/02/24 1635           Medications:  Transfer Medications (for Discharge Readmit only):   No current facility-administered medications for this encounter.     Current Outpatient Medications   Medication Sig Dispense Refill    acetaminophen (TYLENOL) 500 MG tablet Take 1 tablet (500 mg total) by mouth every 6 (six) hours. 30 tablet 1    cholecalciferol, vitamin D3, (VITAMIN D3) 50 mcg (2,000 unit) Cap capsule Take 2,000 Units by mouth every evening.      cyanocobalamin (VITAMIN B-12) 100 MCG tablet Take 100 mcg by mouth once daily.      DULoxetine (CYMBALTA) 30 MG capsule TAKE 2 CAPSULES (60 MG TOTAL) BY MOUTH ONCE DAILY. (Patient taking differently: Take 60 mg by mouth every evening.) 180 capsule 1    ferrous sulfate 324 mg (65 mg iron) TbEC Take 325 mg by mouth nightly.       hydrocortisone (CORTEF) 10 MG Tab Take 1 and 1/2 tablet by mouth every morning and 1/2 tablet in the afternoon      hyoscyamine (LEVSIN/SL) 0.125 mg Subl PLACE 1 TABLET (0.125 MG TOTAL) UNDER THE TONGUE EVERY 4 (FOUR) HOURS AS NEEDED. 360 tablet 2    levothyroxine (SYNTHROID) 112 MCG tablet Take 1 tablet (112 mcg total) by mouth before breakfast. 30 tablet 11    phenytoin (DILANTIN) 100 MG ER capsule Take 2 capsules (200 mg total) by mouth nightly. 180 capsule 3    XARELTO 20 mg Tab TAKE 1 TABLET BY MOUTH EVERY DAY (Patient taking differently: Take 20 mg by mouth every evening.) 90 tablet 4    calcitRIOL (ROCALTROL) 0.25 MCG Cap Take 1 capsule (0.25 mcg total) by mouth once daily. 30 capsule 3    calcium carbonate (TUMS) 200 mg calcium (500 mg) chewable tablet Take 1 tablet (500 mg total) by mouth 3 (three) times daily as needed for Heartburn. 60 tablet 0    melatonin (MELATIN)  3 mg tablet Take 2 tablets (6 mg total) by mouth nightly as needed for Insomnia. 60 tablet 0    midodrine (PROAMATINE) 2.5 MG Tab Take 1 tablet (2.5 mg total) by mouth 2 (two) times daily with meals. 60 tablet 1       Indwelling Lines/Drains at time of discharge:   Lines/Drains/Airways       None                   Time spent on the discharge of patient: 60 minutes         Rajni Lobo NP  Department of Hospital Medicine  [facility]

## 2024-04-11 NOTE — PLAN OF CARE
Pt and spouse agreeable with discharge today to [facility] for SNF. Auth approved by [facility],  spouse signed paperwork and paid co-pay at facility. Pt will be transported with  department  [facility].   Future Appointments   Date Time Provider Department Center   4/16/2024 11:45 AM Geoffrey Goodson MD [facility] ORTHO [facility]   5/3/2024 12:00 PM Viktor Cohen MD [facility]   5/14/2024 10:00 AM Kleber Liu, PT [facility] O[facility] [place]   5/23/2024  1:30 PM Swetha Bay MD [facility] NEPHRO [facility]   6/11/2024 11:00 AM Zachary Bender MD [facility] IM [facility] PCW   8/27/2024  3:00 PM KYLAH DAVILA [facility] BMD [facility]      04/11/24 1448   Final Note   Assessment Type Final Discharge Note   Anticipated Discharge Disposition SNF  ([facility])   What phone number can be called within the next 1-3 days to see how you are doing after discharge? [phone]   Hospital Resources/Appts/Education Provided Appointments scheduled and added to AVS   Post-Acute Status   Post-Acute Authorization Placement   Post-Acute Placement Status Set-up Complete/Auth obtained   Discharge Delays None known at this time

## 2024-04-11 NOTE — PLAN OF CARE
[facility]     Department of Hospital Medicine     [address] [place] [address]     [phone] after hours  [phone] fax       NURSING HOME ORDERS    04/11/2024    Admit to Nursing Home:     Skilled Bed                                                  Diagnoses:  Active Hospital Problems    Diagnosis  POA    *Orthostatic hypotension [I95.1]  Yes    History of adrenal insufficiency [Z86.39]  Yes    Recurrent syncope [R55]  Yes    Fracture of right foot [S92.901A]  Yes    Recurrent falls [R29.6]  Not Applicable    Stage 4 chronic kidney disease [N18.4]  Yes    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       Patient is homebound due to:  Orthostatic hypotension    Allergies:  Review of patient's allergies indicates:   Allergen Reactions    Neurontin [gabapentin]     Lactose     Soap Hives     Able to tolerate Dove only        Vitals:       Every shift (Skilled Nursing patients)     Orthostatic BP daily           Diet: Regular       Acitivities:      - Up in a chair each morning as tolerated   - May use self-propelled wheelchair   - Non-Weight bearing: R foot     - Bedside commode     LABS:  Per facility protocol    CMP, CBC each month for 3 months              Phenytoin level in 1 month and every 3 months    TSH every year    Nursing Precautions:       - Fall precautions per nursing home protocol   - Seizure precautions per nursing home protocol    - Thigh high 40-50 mmhg compression hose    - Abdominal binder       CONSULTS:     Physical Therapy to evaluate and treat     Occupational Therapy to evaluate and treat       Bedside Commode:   The beneficiary is confined to a single room. The patient is bedroom  confined for an extended time and non-ambulatory.     Wheelchair  The patient has a mobility limitation that significantly impairs her ability to participate in one or more mobility related activities of daily living (MRADL's) such as toileting, feeding, dressing, grooming and bathing in customary locations in the home. The mobility limitation cannot be sufficiently resolved by the use of a cane or walker. The use of a manual wheelchair will significantly improve the patient's ability to participate in MRADLS and the patient will use it on regular basis in the home. The patient has expressed her willingness to use a manual wheelchair in the home. She also has a caregiver who is available, willing and able to provide assistance with the wheelchair when needed           MISCELLANEOUS CARE:        WOUND CARE:    R foot surgical wound, post-op splint intact   Keep dry and leave in place until follow-up next Tuesday with Orthopedic surgery       Medications: Discontinue all previous medication orders, if any. See new list below.       Medication List        START taking these medications      CALCIUM ANTACID 200 mg calcium (500 mg) chewable tablet  Generic drug: calcium carbonate  Take 1 tablet (500 mg total) by mouth 3 (three) times daily as needed for Heartburn.     melatonin 3 mg tablet  Commonly known as: MELATIN  Take 2 tablets (6 mg total) by mouth nightly as needed for Insomnia.     midodrine 2.5 MG Tab  Commonly known as: PROAMATINE  Take 1 tablet (2.5 mg total) by mouth 2 (two) times daily with meals.            CHANGE how you take these medications      DULoxetine 30 MG capsule  Commonly known as: CYMBALTA  TAKE 2 CAPSULES (60 MG TOTAL) BY MOUTH ONCE DAILY.  What changed: when to take this     XARELTO 20 mg Tab  Generic drug: rivaroxaban  TAKE 1 TABLET BY MOUTH EVERY DAY  What changed:   how much to take  when to take this            CONTINUE taking these medications      acetaminophen 500 MG tablet  Commonly  known as: TYLENOL  Take 1 tablet (500 mg total) by mouth every 6 (six) hours.     calcitRIOL 0.25 MCG Cap  Commonly known as: ROCALTROL  Take 1 capsule (0.25 mcg total) by mouth once daily.     cholecalciferol (vitamin D3) 50 mcg (2,000 unit) Cap capsule  Commonly known as: VITAMIN D3  Take 2,000 Units by mouth every evening.     ferrous sulfate 324 mg (65 mg iron) Tbec  Take 325 mg by mouth nightly.     hydrocortisone 10 MG Tab  Commonly known as: CORTEF  Take 1 and 1/2 tablet by mouth every morning and 1/2 tablet in the afternoon     hyoscyamine 0.125 mg Subl  Commonly known as: Levsin  PLACE 1 TABLET (0.125 MG TOTAL) UNDER THE TONGUE EVERY 4 (FOUR) HOURS AS NEEDED.     levothyroxine 112 MCG tablet  Commonly known as: SYNTHROID  Take 1 tablet (112 mcg total) by mouth before breakfast.     phenytoin 100 MG ER capsule  Commonly known as: DILANTIN  Take 2 capsules (200 mg total) by mouth nightly.     VITAMIN B-12 100 MCG tablet  Generic drug: cyanocobalamin  Take 100 mcg by mouth once daily.            STOP taking these medications      biotin 1 mg Cap     dexAMETHasone 4 mg/mL injection  Commonly known as: DECADRON     diclofenac sodium 1 % Gel  Commonly known as: VOLTAREN     loratadine-pseudoephedrine  mg  mg per 24 hr tablet  Commonly known as: CLARITIN-D 24-hour     naloxone 4 mg/actuation Spry  Commonly known as: NARCAN     oxyCODONE 5 MG immediate release tablet  Commonly known as: ROXICODONE     sulfamethoxazole-trimethoprim 800-160mg 800-160 mg Tab  Commonly known as: BACTRIM DS                    _________________________________  Rajni Lobo, VASILE  04/11/2024

## 2024-04-11 NOTE — PLAN OF CARE
Problem: Adult Inpatient Plan of Care  Goal: Plan of Care Review  Outcome: Ongoing, Progressing   Patient AAox4. Scheduled tylenol given as ordered for pain to right leg. Rigth leg in ace bandage /splint. Able to wiggle toes. Patient with complaints of numbness due to hx of neuropathy. Last bm on 4/10. No piv , ok per MD. Left leg belinda stocking. Guaze dressing with tegaderm to right hip, c/d/I. Patient got up during the night with PCT assistance to use bedside commode, handled well. Safety snured. Call light within reach

## 2024-04-11 NOTE — PROGRESS NOTES
SUBJECTIVE:    Ms. Nguyen is status post revision ORIF of the navicular as well as talonavicular and naviculocuneiform fusions of the right lower extremity with iliac crest bone grafting.    She is doing well.  She feels like she is improving with transfers. No pain in the hip or ankle.    OBJECTIVE:      Vitals:    04/11/24 0413 04/11/24 0431 04/11/24 0700 04/11/24 0701   BP: (!) 116/59  (!) 147/66 (!) 147/66   Pulse: 87 78 89 89   Resp: 20  20 20   Temp: 97.8 °F (36.6 °C)  97.8 °F (36.6 °C) 97.8 °F (36.6 °C)   TempSrc: Oral      SpO2: 97%  98% 98%   Weight:       Height:           Lower Extremity Exam  Right lower extremity clean dry and intact with splint in place.  She wiggles her toes, she fires FHL EHL, and her toes are warm and well-perfused.  Her sensation is diminished but it is at her baseline.    Right hip incision is clean dry and intact with Tegaderm in place     DIAGNOSTIC STUDIES:  X-rays from operating theater reveal revision open reduction internal fixation with naviculocuneiform as well as talonavicular fusion.  Removal of hardware.  Fracture fragments appear improved in alignment.    ASSESSMENT:   1. Status post revision open reduction internal fixation navicular fracture and navicular cuneiform talonavicular fusion, right lower extremity      PLAN:  -nonweightbearing to the right lower extremity   -PT OT  -Mobility limitations will require wheelchair see brief op note documentation- only ones that we will fit in the patient's house must be less than 30 inches wide  -on home Xarelto  -Follow up social work and case management recommendations for placement and follow-up wheelchair  -Appreciate hospitalist management   -pending placement today or tomorrow - follow up next Tuesday.    Geoffrey Goodson MD  [facility]  Orthopedic Surgery

## 2024-04-11 NOTE — PLAN OF CARE
Problem: Occupational Therapy  Goal: Occupational Therapy Goal  Description: Goals to be met by: 5/3/24     Patient will increase functional independence with ADLs by performing:    LE Dressing with Minimal Assistance.  Toileting from bedside commode with Minimal Assistance for hygiene and clothing management.   Supine to sit with Modified [place].  Stand pivot transfers with Contact Guard Assistance.  Squat pivot transfers with Contact Guard Assistance.  Toilet transfer to bedside commode with Contact Guard Assistance.  Increased functional strength to WFL for self care skills and functional mobility.  Upper extremity exercise program x10 reps per handout, with independence.    Outcome: Ongoing, Progressing   Thelma Nguyen is a 58 y.o. female with a medical diagnosis of Orthostatic hypotension.  Performance deficits affecting function are weakness, impaired endurance, impaired self care skills, gait instability, impaired balance, impaired functional mobility, decreased lower extremity function, decreased upper extremity function, pain, impaired skin, orthopedic precautions.    Pt found in bed, agreeable to therapy.  Pt progressing towards goals.  Continue OT services to address functional goals, progressing as able.

## 2024-04-12 NOTE — ASSESSMENT & PLAN NOTE
Panhypopituitarism - Primary        Planned for repeat surgery so will need stress dose steroids given hx of adrenal insufficiency.  If hypotension and orthostatic hypotension does not improve on IV hydrocortisone would evaluate for other causes like infection     Recommendations :  - this patient will need stress dose steroids on induction with:              IV hydrocortisone 100 mg on induction followed by IV hydrocortisone 50 mg q8 hours   If hemodynamically stable:  - On POD 1 if tolerating oral meds change to oral hydrocortisone 40/20 mg with the guidance of inpatient endocrine service if needed   - on POD 3 decrease to home doses of hydrocortisone              - on home dose of hydrocortisone     Plan for peer to peer on Monday 4/9 peer to peer denied, awaiting SNF acceptance   4/11 will discharge on home dose

## 2024-04-12 NOTE — ASSESSMENT & PLAN NOTE
Continue AEDs  No seizures in several years  Phenytoin is subtherapeutic, will increase 250mg phenytoin per Neurology at discharge (250mg is not available on [facility] formulary)  Close Neurology follow up

## 2024-04-12 NOTE — ASSESSMENT & PLAN NOTE
See orthostatic hypotension  Patient may benefit from PT/OT post-op  E-consult with Dr. Shaw with Endocrine  Plan for Neurology consult due to patients concern that her falls that used to be once a month are happening several times  MRI brain unremarkable, no findings to explain symptoms  PT/OT consulted, following   Patient is nonweightbearing, non ambulatory, wheelchair mobility

## 2024-04-12 NOTE — ASSESSMENT & PLAN NOTE
Patient reports being dx w/ postural hypotension with vasovagal response  Likely cause of recurrent syncope/falls

## 2024-04-16 ENCOUNTER — OFFICE VISIT (OUTPATIENT)
Dept: ORTHOPEDICS | Facility: CLINIC | Age: 59
End: 2024-04-16
Payer: COMMERCIAL

## 2024-04-16 ENCOUNTER — HOSPITAL ENCOUNTER (OUTPATIENT)
Dept: RADIOLOGY | Facility: HOSPITAL | Age: 59
Discharge: HOME | End: 2024-04-16
Attending: SURGERY
Payer: COMMERCIAL

## 2024-04-16 VITALS — HEIGHT: 67 IN | WEIGHT: 156.5 LBS | BODY MASS INDEX: 24.56 KG/M2

## 2024-04-16 DIAGNOSIS — S92.251A CLOSED DISPLACED FRACTURE OF NAVICULAR BONE OF RIGHT FOOT, INITIAL ENCOUNTER: ICD-10-CM

## 2024-04-16 DIAGNOSIS — S92.251A CLOSED DISPLACED FRACTURE OF NAVICULAR BONE OF RIGHT FOOT, INITIAL ENCOUNTER: Primary | ICD-10-CM

## 2024-04-16 PROCEDURE — 1159F MED LIST DOCD IN RCRD: CPT | Mod: CPTII,S$GLB,, | Performed by: SURGERY

## 2024-04-16 PROCEDURE — 73630 X-RAY EXAM OF FOOT: CPT | Mod: 26,RT,, | Performed by: RADIOLOGY

## 2024-04-16 PROCEDURE — 99999 PR PBB SHADOW E&M-EST. PATIENT-LVL III: CPT | Mod: PBBFAC,,, | Performed by: SURGERY

## 2024-04-16 PROCEDURE — 3044F HG A1C LEVEL LT 7.0%: CPT | Mod: CPTII,S$GLB,, | Performed by: SURGERY

## 2024-04-16 PROCEDURE — 99024 POSTOP FOLLOW-UP VISIT: CPT | Mod: S$GLB,,, | Performed by: SURGERY

## 2024-04-16 PROCEDURE — 3066F NEPHROPATHY DOC TX: CPT | Mod: CPTII,S$GLB,, | Performed by: SURGERY

## 2024-04-16 PROCEDURE — 73630 X-RAY EXAM OF FOOT: CPT | Mod: TC,FY,RT

## 2024-04-16 NOTE — PROGRESS NOTES
SUBJECTIVE:    Thelma Nguyen  is here today for a follow up visit.  Two weeks status post revision orif navicular and TN and NC fusion.  Doing well.  Per patient report: no fevers, chills, shortness of breath, chest pain, or increased ankle pain.  Presents for further follow up. In splint, also in SNF facility.      OBJECTIVE:    There were no vitals filed for this visit.      Lower Extremity Exam  Splint removed.  Incisions clean dry and intact, no erythema, drainage, exudate, minimal ecchymosis.  Sutures in place.  Fires FHL, EHL, TA, GSC.  Sensation intact to light touch throughout foot.  Neurovascularly intact.     DIAGNOSTIC STUDIES:  x-rays of the foot in cast obtained. Hardware intact, no residual displacement.    ASSESSMENT:   1. Status post procedure above      PLAN:  Plan      Continue ASA for DVT PPX.   Sutures removed, NWB Operative extremity in fiberglass below knee plaster cast  PT ordered - NWB RLE, for on transfers  Follow up in 1.5 weeks with standing weight bearing x-rays operative foot/ankle 3 views  Discussed precautions for cast.  Do not stick objects and cast, do not get cast wet, okay for small amount of Benadryl if itching, for redness erythema increased swelling, increased pain, fevers, chills, drainage, report immediately to the emergency room.    Geoffrey Goodson MD  [facility]  Orthopedic Surgery      This note was done with voice recognition software. Please excuse any errors missed in proof reading.

## 2024-04-22 ENCOUNTER — TELEPHONE (OUTPATIENT)
Dept: NEPHROLOGY | Facility: CLINIC | Age: 59
End: 2024-04-22
Payer: COMMERCIAL

## 2024-04-22 ENCOUNTER — PATIENT MESSAGE (OUTPATIENT)
Dept: INTERNAL MEDICINE | Facility: CLINIC | Age: 59
End: 2024-04-22
Payer: COMMERCIAL

## 2024-04-25 DIAGNOSIS — S92.251A CLOSED DISPLACED FRACTURE OF NAVICULAR BONE OF RIGHT FOOT, INITIAL ENCOUNTER: Primary | ICD-10-CM

## 2024-04-26 ENCOUNTER — HOSPITAL ENCOUNTER (OUTPATIENT)
Dept: RADIOLOGY | Facility: HOSPITAL | Age: 59
Discharge: HOME | End: 2024-04-26
Attending: SURGERY
Payer: COMMERCIAL

## 2024-04-26 ENCOUNTER — OFFICE VISIT (OUTPATIENT)
Dept: ORTHOPEDICS | Facility: CLINIC | Age: 59
End: 2024-04-26
Payer: COMMERCIAL

## 2024-04-26 DIAGNOSIS — S92.251A CLOSED DISPLACED FRACTURE OF NAVICULAR BONE OF RIGHT FOOT, INITIAL ENCOUNTER: Primary | ICD-10-CM

## 2024-04-26 DIAGNOSIS — S92.251A CLOSED DISPLACED FRACTURE OF NAVICULAR BONE OF RIGHT FOOT, INITIAL ENCOUNTER: ICD-10-CM

## 2024-04-26 PROCEDURE — 3066F NEPHROPATHY DOC TX: CPT | Mod: CPTII,S$GLB,, | Performed by: SURGERY

## 2024-04-26 PROCEDURE — 73630 X-RAY EXAM OF FOOT: CPT | Mod: TC,RT

## 2024-04-26 PROCEDURE — 29405 APPL SHORT LEG CAST: CPT | Mod: 58,RT,S$GLB, | Performed by: SURGERY

## 2024-04-26 PROCEDURE — 3044F HG A1C LEVEL LT 7.0%: CPT | Mod: CPTII,S$GLB,, | Performed by: SURGERY

## 2024-04-26 PROCEDURE — 99024 POSTOP FOLLOW-UP VISIT: CPT | Mod: S$GLB,,, | Performed by: SURGERY

## 2024-04-26 PROCEDURE — 99999 PR PBB SHADOW E&M-EST. PATIENT-LVL I: CPT | Mod: PBBFAC,,, | Performed by: SURGERY

## 2024-04-26 PROCEDURE — 73630 X-RAY EXAM OF FOOT: CPT | Mod: 26,RT,, | Performed by: RADIOLOGY

## 2024-04-26 NOTE — PROGRESS NOTES
SUBJECTIVE:    Ms. Nguyen is here today for a follow up visit. 3.5 weeks status post revision orif navicular and TN and NC fusion with dorsal bridge plate.  Doing well.  Per patient report: no fevers, chills, shortness of breath, chest pain, or increased ankle pain.  Presents for further follow up. In splint, also in SNF facility.  We would like to be discharged from her facility in the next 1-2 weeks.  Has been participating in PT.  Able to perform transfers on her own.  No falls since 2nd procedure.        OBJECTIVE:      There were no vitals filed for this visit.    Lower Extremity Exam  Incisions clean dry and intact, well healing, Steri-Strips removed, sensation at baseline, denies any pain, no erythema, no swelling, fires FHL, EHL, TA, GSC.  Iliac crest harvest site healing well, no erythema, incision appears appropriate.     DIAGNOSTIC STUDIES:  Three-view x-ray of the right foot shows hardware in place stable on appropriate, minimal change compared to the previous image.    ASSESSMENT:   1. Status post revision navicular open reduction internal fixation and separate procedures above.      PLAN:  Diagnoses and all orders for this visit:    Closed displaced fracture of navicular bone of right foot, initial encounter  -     Cancel: X-Ray Foot Complete 3 view Right; Future      Continue nonweightbearing.  Fiberglass cast replaced today.  Continue DVT prophylaxis.  Follow up in 2 weeks for further management.  All questions answered, she and her  expressed understanding.    Geoffrey Goodson MD  [facility]  Orthopedic Surgery      This note was done with voice recognition software. Please excuse any errors missed in proof reading.

## 2024-04-29 NOTE — PROGRESS NOTES
RT fiberglass SLC removal and application ordered by Dr. Goodson. Skin intact with no redness or bruising.

## 2024-05-01 ENCOUNTER — PATIENT MESSAGE (OUTPATIENT)
Dept: CARDIOLOGY | Facility: CLINIC | Age: 59
End: 2024-05-01
Payer: COMMERCIAL

## 2024-05-03 ENCOUNTER — OFFICE VISIT (OUTPATIENT)
Dept: CARDIOLOGY | Facility: CLINIC | Age: 59
End: 2024-05-03
Payer: COMMERCIAL

## 2024-05-03 VITALS
BODY MASS INDEX: 24.52 KG/M2 | SYSTOLIC BLOOD PRESSURE: 142 MMHG | OXYGEN SATURATION: 100 % | DIASTOLIC BLOOD PRESSURE: 78 MMHG | HEIGHT: 67 IN | HEART RATE: 85 BPM

## 2024-05-03 DIAGNOSIS — E23.0 PANHYPOPITUITARISM: ICD-10-CM

## 2024-05-03 DIAGNOSIS — R42 DIZZINESS: ICD-10-CM

## 2024-05-03 DIAGNOSIS — R63.6 LOW BODY WEIGHT DUE TO INADEQUATE CALORIC INTAKE: ICD-10-CM

## 2024-05-03 DIAGNOSIS — N18.4 STAGE 4 CHRONIC KIDNEY DISEASE: ICD-10-CM

## 2024-05-03 DIAGNOSIS — I95.1 ORTHOSTATIC HYPOTENSION: Primary | ICD-10-CM

## 2024-05-03 DIAGNOSIS — R19.5 POSITIVE FIT (FECAL IMMUNOCHEMICAL TEST): ICD-10-CM

## 2024-05-03 DIAGNOSIS — E21.3 HYPERPARATHYROIDISM: ICD-10-CM

## 2024-05-03 DIAGNOSIS — G40.209 PARTIAL SYMPTOMATIC EPILEPSY WITH COMPLEX PARTIAL SEIZURES, NOT INTRACTABLE, WITHOUT STATUS EPILEPTICUS: ICD-10-CM

## 2024-05-03 DIAGNOSIS — G90.9 AUTONOMIC DYSFUNCTION: ICD-10-CM

## 2024-05-03 DIAGNOSIS — Z74.09 IMPAIRED FUNCTIONAL MOBILITY, BALANCE, GAIT, AND ENDURANCE: ICD-10-CM

## 2024-05-03 PROCEDURE — 3008F BODY MASS INDEX DOCD: CPT | Mod: CPTII,S$GLB,, | Performed by: INTERNAL MEDICINE

## 2024-05-03 PROCEDURE — 1111F DSCHRG MED/CURRENT MED MERGE: CPT | Mod: CPTII,S$GLB,, | Performed by: INTERNAL MEDICINE

## 2024-05-03 PROCEDURE — 3077F SYST BP >= 140 MM HG: CPT | Mod: CPTII,S$GLB,, | Performed by: INTERNAL MEDICINE

## 2024-05-03 PROCEDURE — 3044F HG A1C LEVEL LT 7.0%: CPT | Mod: CPTII,S$GLB,, | Performed by: INTERNAL MEDICINE

## 2024-05-03 PROCEDURE — 99999 PR PBB SHADOW E&M-EST. PATIENT-LVL IV: CPT | Mod: PBBFAC,,, | Performed by: INTERNAL MEDICINE

## 2024-05-03 PROCEDURE — 3078F DIAST BP <80 MM HG: CPT | Mod: CPTII,S$GLB,, | Performed by: INTERNAL MEDICINE

## 2024-05-03 PROCEDURE — 99205 OFFICE O/P NEW HI 60 MIN: CPT | Mod: S$GLB,,, | Performed by: INTERNAL MEDICINE

## 2024-05-03 PROCEDURE — 3066F NEPHROPATHY DOC TX: CPT | Mod: CPTII,S$GLB,, | Performed by: INTERNAL MEDICINE

## 2024-05-03 RX ORDER — MIDODRINE HYDROCHLORIDE 2.5 MG/1
2.5 TABLET ORAL EVERY 4 HOURS
Qty: 180 TABLET | Refills: 11 | Status: SHIPPED | OUTPATIENT
Start: 2024-05-03 | End: 2024-06-14

## 2024-05-03 NOTE — PROGRESS NOTES
"  Subjective:   Patient ID:  Thelma Nguyen is a 58 y.o. female     Chief complaint:syncope    HPI  New patient to me. 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     She presented to the ED by request of Orthopedics, Dr. Goodson, for admission for surgical correction of right foot navicular fracture. Patient had ORIF performed with Dr. Goodson on 3/15.     Patient admits to significant and lengthy history of severe orthostatic hypotension, and admits her "spells" of becoming lightheaded upon standing have severely worsened since surgery causing her to have numerous falls over past few weeks. She believes during these falls she re-injured her right foot causing hardware failure. From these falls she also does report contusing left elbow and left rib cage. Denying any recent head injury.     Patient also reports episodes of tachycardia that occur while sitting on the cough watching TV with alerts on her Apple watch of HR 110s-140s.   She states that her falls have significantly increased over the last couple weeks up to 20 falls in the last 2 weeks. Some of these are accompanied by syncope and a disoriented feeling, other times her legs just give out and she falls.   10Patient's  states patient does not appear to have seizure activity during these episodes but she does start breathing really heavy and then wakes back up after 1-2 minutes. Patient has been under the care of cardiology and endocrinology. She states that she was diagnosed with postural hypotension with " "vasovagal response. She has never been prescribed a Holter Monitor.       Tilt test performed and interpreted by Dr. Franklin on 07/15/2022:  Initial supine HR: 95 bpm  Initial supine BP: 146/72 mmHg  Initial tilt (60 deg) HR: 118 bpm  Initial tilt (60 deg) BP: 95/59 mmHg  Maximum heart rate seen at 14: (min:sec) after procedure start.  Maximum heart rate: 131 bpm  BP during maximum HR: 84/42 mmHg  Minimum blood pressure seen at 7: (min:sec) after procedure start.  HR during minimum BP: 127 bpm  Minimum blood pressure: 79/46 mmHg  Symptoms seen on initial tilt include: "Tired," heavy legs, sleepy.. The test was stopped due to end of test duration.      I personally reviewed the HUT data.  Of import is that the diastolic blood pressure never went up with tilting and in fact it dropped significantly even a 30°.  In addition, heart rate increased to 126 beats per minute within a minute of tilting to 60°.  No C fiber reactions were noted.  No further pharmacologically stimulated testing was performed.  No impedance measurements performed.    Additional details gleaned from today's interview :  The diagnosis of orthostatic hypotension was made approximately 2 to 2 and half years ago.  Four 5 years ago she had the glioblastoma removed received chemotherapy and radiation (Dr. Champ Celaya).    Since then she is on Synthroid and hydrocortisone.  Per MRI, the pituitary said to be normal.  Her cortisol level was 13.9 on 04/01/2024.  On March 15th, she had 3 falls and had the navicular ORIF she fell again and we broke her right foot  Prior to that she was having a fall roughly every month or 2.    She gets orthostatic dizziness daily and has to sit down.    She takes midodrine 2.5 mg in the morning and again sometime between 3 and 5:00 p.m. if her blood pressure is not high.  Until recently she was on phenytoin.    Current Outpatient Medications   Medication Sig Dispense Refill    acetaminophen (TYLENOL) 500 MG tablet Take 1 " tablet (500 mg total) by mouth every 6 (six) hours. 30 tablet 1    calcitRIOL (ROCALTROL) 0.25 MCG Cap Take 1 capsule (0.25 mcg total) by mouth once daily. 30 capsule 3    calcium carbonate (TUMS) 200 mg calcium (500 mg) chewable tablet Take 1 tablet (500 mg total) by mouth 3 (three) times daily as needed for Heartburn. 60 tablet 0    cholecalciferol, vitamin D3, (VITAMIN D3) 50 mcg (2,000 unit) Cap capsule Take 2,000 Units by mouth every evening.      cyanocobalamin (VITAMIN B-12) 100 MCG tablet Take 100 mcg by mouth once daily.      DULoxetine (CYMBALTA) 30 MG capsule TAKE 2 CAPSULES (60 MG TOTAL) BY MOUTH ONCE DAILY. (Patient taking differently: Take 60 mg by mouth every evening.) 180 capsule 1    ferrous sulfate 324 mg (65 mg iron) TbEC Take 325 mg by mouth nightly.       hydrocortisone (CORTEF) 10 MG Tab Take 1 and 1/2 tablet by mouth every morning and 1/2 tablet in the afternoon      hyoscyamine (LEVSIN/SL) 0.125 mg Subl PLACE 1 TABLET (0.125 MG TOTAL) UNDER THE TONGUE EVERY 4 (FOUR) HOURS AS NEEDED. 360 tablet 2    levothyroxine (SYNTHROID) 112 MCG tablet Take 1 tablet (112 mcg total) by mouth before breakfast. 30 tablet 11    melatonin (MELATIN) 3 mg tablet Take 2 tablets (6 mg total) by mouth nightly as needed for Insomnia. 60 tablet 0    phenytoin (DILANTIN) 100 MG ER capsule Take 2 capsules (200 mg total) by mouth nightly. 180 capsule 3    XARELTO 20 mg Tab TAKE 1 TABLET BY MOUTH EVERY DAY 90 tablet 4    midodrine (PROAMATINE) 2.5 MG Tab Take 1 tablet (2.5 mg total) by mouth every 4 (four) hours. 180 tablet 11     No current facility-administered medications for this visit.       Review of Systems     Constitutional: Reviewed  for decreased appetite, weight gain and weight loss.   HENT: Reviewed for nosebleeds.    Eyes:  Reviewed for blurred vision and visual disturbance.   Cardiovascular: Reviewed for chest pain, claudication, cyanosis,dyspnea on exertion, leg swelling, orthopnea,paroxysmal nocturnal  dyspnearregular heartbeats, palpitations, near-syncope, and syncope.   Respiratory: Reviewed for cough, shortness of breath, wheezing, sleep disturbances due to breathing and snoring, .    Endocrine: Reviewed for heat intolerance.   Hematologic/Lymphatic: Reviewed for easy bruisability/bleeding.   Skin: Reviewed for rash.   Musculoskeletal: Reviewed for muscle weakness and myalgias.   Gastrointestinal: Reviewed for abdominal pain, anorexia, melena, nausea and vomiting.   Genitourinary: Reviewed for menorrhagia, frequency, nocturia and incontinence.   Neurological: Reviewed for excessive daytime sleepiness, dizziness, vertigo, weakness, headaches, loss of balance and seizures,   Psychiatric/Behavioral:  Reviewed for insomnia, altered mental status, depression, anxiety and nervousness.       All symptoms reviewed above were negative except for fatigue, hearing loss (uses hearing aids) palpitations, cough, bloating, constipation alternating with diarrhea, heartburn, rectal bleeding, polyuria and nocturia easy bruisability, daytime sleepiness, dizziness, weakness, headaches, lightheadedness, loss of balance, seizures, multiple musculoskeletal complaints, anxiety     Social History     Tobacco Use   Smoking Status Never   Smokeless Tobacco Never       reports no history of alcohol use.   Past Medical History:   Diagnosis Date    Allergic rhinitis     Anemia 1987    Arthritis 2000    Basal cell carcinoma     Broken ankle     Cancer 1985    Clotting disorder 1993    Disorder of kidney and ureter     DVT (deep venous thrombosis)     Factor V deficiency     Fracture of elbow     left    Glioblastoma multiforme of brain     Hypothyroidism     IBS (irritable bowel syndrome) 1975    Osteoporosis     Panhypopituitarism     Peripheral polyneuropathy     Secondary adrenal insufficiency     Seizures 1995    Thyroid disease 1987     Family History   Problem Relation Name Age of Onset    Heart disease Mother      Diabetes type II  Mother      Diabetes type II Father      Heart attack Maternal Grandfather       Social History     Socioeconomic History    Marital status:     Number of children: 2   Occupational History    Occupation:    Tobacco Use    Smoking status: Never    Smokeless tobacco: Never   Substance and Sexual Activity    Alcohol use: No    Drug use: No    Sexual activity: Not Currently   Social History Narrative     29 years, lives at home with .      Social Determinants of Health     Financial Resource Strain: Low Risk  (2023)    Overall Financial Resource Strain (CARDIA)     Difficulty of Paying Living Expenses: Not hard at all   Food Insecurity: No Food Insecurity (2023)    Hunger Vital Sign     Worried About Running Out of Food in the Last Year: Never true     Ran Out of Food in the Last Year: Never true   Transportation Needs: Unmet Transportation Needs (2023)    PRAPARE - Transportation     Lack of Transportation (Medical): Yes     Lack of Transportation (Non-Medical): Yes   Physical Activity: Insufficiently Active (2023)    Exercise Vital Sign     Days of Exercise per Week: 3 days     Minutes of Exercise per Session: 20 min   Stress: No Stress Concern Present (2023)    [employer] [place] [employer] - Occupational Stress Questionnaire     Feeling of Stress : Only a little   Housing Stability: Low Risk  (2023)    Housing Stability Vital Sign     Unable to Pay for Housing in the Last Year: No     Number of Places Lived in the Last Year: 1     Unstable Housing in the Last Year: No     Past Surgical History:   Procedure Laterality Date    APPENDECTOMY      BRAIN SURGERY      for glioblastoma      SECTION      CHOLECYSTECTOMY      CLOSURE OF DEFECT OF MOHS PROCEDURE Left 2018    Procedure: CLOSURE, MOHS PROCEDURE DEFECT SCALP  Flap closure;  Surgeon: Yrn Novak MD;  Location: [facility] OR [address] [place] [address];  Service:  Plastics;  Laterality: Left;    COLONOSCOPY N/A 04/23/2019    Procedure: COLONOSCOPY Suprep;  Surgeon: Tony Mosher MD;  Location: [facility] ENDO;  Service: Endoscopy;  Laterality: N/A;    ESOPHAGOGASTRODUODENOSCOPY N/A 04/23/2019    Procedure: EGD/colon;  Surgeon: Tony Mosher MD;  Location: [facility] ENDO;  Service: Endoscopy;  Laterality: N/A;    EXCISION OF GANGLION CYST OF HAND Left 01/07/2020    Procedure: EXCISION, GANGLION CYST, HAND;  Surgeon: Ross Drew Jr., MD;  Location: [facility] OR;  Service: Orthopedics;  Laterality: Left;    HYSTERECTOMY  1999    ILIAC CREST BONE GRAFT Right 3/15/2024    Procedure: BONE GRAFT, ILIAC CREST;  Surgeon: Geoffrey Goodson MD;  Location: [facility] OR;  Service: Orthopedics;  Laterality: Right;  acumed harvester    ILIAC CREST BONE GRAFT Right 4/2/2024    Procedure: BONE GRAFT, ILIAC CREST;  Surgeon: Geoffrey Goodson MD;  Location: [facility] OR;  Service: Orthopedics;  Laterality: Right;    MIDFOOT ARTHRODESIS Right 4/2/2024    Procedure: FUSION, JOINT, MIDFOOT;  Surgeon: Geoffrey Goodson MD;  Location: [facility] OR;  Service: Orthopedics;  Laterality: Right;  mini c arm    OPEN REDUCTION AND INTERNAL FIXATION (ORIF) OF FRACTURE OF TARSAL BONE Right 3/15/2024    Procedure: ORIF, FRACTURE, TARSAL BONE;  Surgeon: Geoffrey Goodson MD;  Location: [facility] OR;  Service: Orthopedics;  Laterality: Right;  supine, mini C arm, Styker, block okay    SHOULDER SURGERY Right     TILT TABLE TEST N/A 07/15/2022    Procedure: TILT TABLE TEST;  Surgeon: Amol Kohler MD;  Location: [facility] EP LAB;  Service: Cardiology;  Laterality: N/A;  Orthostatic hypotension, dizziness, Tilt Table Test with EEG, Dr.Tiffany Roca (neuro), DM    TUBAL LIGATION  1998       Objective:   Physical Exam  Vitals and nursing note reviewed.   Constitutional:       Appearance: She is well-developed.   HENT:      Head: Normocephalic and atraumatic.      Right Ear: External ear normal.      Left Ear: External ear normal.   Neck:      Thyroid:  "No thyromegaly.   Cardiovascular:      Rate and Rhythm: Normal rate and regular rhythm.      Pulses: Intact distal pulses.           Carotid pulses are 2+ on the right side and 2+ on the left side.       Radial pulses are 2+ on the right side and 2+ on the left side.        Dorsalis pedis pulses are 2+ on the right side and 2+ on the left side.        Posterior tibial pulses are 2+ on the right side and 2+ on the left side.      Heart sounds: Normal heart sounds. No midsystolic click and no opening snap. No murmur heard.     No friction rub. No gallop. No S3 or S4 sounds.      Comments:  Orthostatic BP measurements  Sitting:                            BP  108/68          HR  118  Standing 1 min:                       75/52                 133  Test terminated due to weakness  Standing 3 min:                                        Standing 5 min:                                            Pulmonary:      Effort: Pulmonary effort is normal.      Breath sounds: Normal breath sounds.   Abdominal:      General: There is no distension.      Palpations: Abdomen is soft.      Tenderness: There is no abdominal tenderness.   Musculoskeletal:      Cervical back: Normal range of motion and neck supple.      Right lower leg: No swelling.      Left lower leg: No swelling.      Right ankle: No swelling.      Left ankle: No swelling.   Skin:     General: Skin is warm.      Findings: No rash.      Nails: There is no clubbing.   Neurological:      Mental Status: She is alert and oriented to person, place, and time.      Cranial Nerves: No cranial nerve deficit.      Gait: Gait normal.   Psychiatric:         Speech: Speech normal.         Behavior: Behavior normal.         Thought Content: Thought content normal.       BP (!) 142/78 (BP Location: Left arm, Patient Position: Sitting, BP Method: Small (Manual))   Pulse 85   Ht 5' 7" (1.702 m)   LMP 10/23/1997 (Exact Date)   SpO2 100%   BMI 24.52 kg/m²          No results found for " "this or any previous visit.    WBC   Date Value Ref Range Status   04/09/2024 6.76 3.90 - 12.70 K/uL Final     Hematocrit   Date Value Ref Range Status   04/09/2024 27.2 (L) 37.0 - 48.5 % Final     Hemoglobin   Date Value Ref Range Status   04/09/2024 9.0 (L) 12.0 - 16.0 g/dL Final     Lab Results   Component Value Date     04/09/2024     Lab Results   Component Value Date    CREATININE 1.5 (H) 04/09/2024    EGFRNORACEVR 40 (A) 04/09/2024    K 4.2 04/09/2024     No results found for: "BNP"      Assessment:    Oliveira due to partial efferent autonomic dysfunction and evaluate her for pituitary failure.  1. Orthostatic hypotension    2. Dizziness    3. Impaired functional mobility, balance, gait, and endurance    4. Partial symptomatic epilepsy with complex partial seizures, not intractable, without status epilepticus    5. Low body weight due to inadequate caloric intake    6. Panhypopituitarism    7. Positive FIT (fecal immunochemical test)    8. Stage 4 chronic kidney disease    9. Hyperparathyroidism    10. Autonomic dysfunction        Plan:    We will try to confirm the putative diagnosis of autonomic dysfunction.  Orders Placed This Encounter   Procedures    PREALBUMIN     Standing Status:   Future     Standing Expiration Date:   8/1/2025    CATECHOLAMINES, FRACTIONATED, PLASMA     Standing Status:   Future     Standing Expiration Date:   8/1/2025    Catecholamines, fractionated, Urine 24 Hours     Standing Status:   Future     Standing Expiration Date:   5/3/2025     Order Specific Question:   Collection Duration     Answer:   24 Hours     Order Specific Question:   Specimen Source     Answer:   Urine    Metanephrines, urine 24 Hours     Standing Status:   Future     Standing Expiration Date:   5/3/2025     Order Specific Question:   Collection Duration     Answer:   24 Hours     Order Specific Question:   Specimen Source     Answer:   Urine    Calcitriol     Standing Status:   Future     Standing " Expiration Date:   7/2/2025    PTH, intact     Standing Status:   Future     Standing Expiration Date:   8/1/2025     No follow-ups on file.  Medications Discontinued During This Encounter   Medication Reason    midodrine (PROAMATINE) 2.5 MG Tab      Outpatient Encounter Medications as of 5/3/2024   Medication Sig Dispense Refill    acetaminophen (TYLENOL) 500 MG tablet Take 1 tablet (500 mg total) by mouth every 6 (six) hours. 30 tablet 1    calcitRIOL (ROCALTROL) 0.25 MCG Cap Take 1 capsule (0.25 mcg total) by mouth once daily. 30 capsule 3    calcium carbonate (TUMS) 200 mg calcium (500 mg) chewable tablet Take 1 tablet (500 mg total) by mouth 3 (three) times daily as needed for Heartburn. 60 tablet 0    cholecalciferol, vitamin D3, (VITAMIN D3) 50 mcg (2,000 unit) Cap capsule Take 2,000 Units by mouth every evening.      cyanocobalamin (VITAMIN B-12) 100 MCG tablet Take 100 mcg by mouth once daily.      DULoxetine (CYMBALTA) 30 MG capsule TAKE 2 CAPSULES (60 MG TOTAL) BY MOUTH ONCE DAILY. (Patient taking differently: Take 60 mg by mouth every evening.) 180 capsule 1    ferrous sulfate 324 mg (65 mg iron) TbEC Take 325 mg by mouth nightly.       hydrocortisone (CORTEF) 10 MG Tab Take 1 and 1/2 tablet by mouth every morning and 1/2 tablet in the afternoon      hyoscyamine (LEVSIN/SL) 0.125 mg Subl PLACE 1 TABLET (0.125 MG TOTAL) UNDER THE TONGUE EVERY 4 (FOUR) HOURS AS NEEDED. 360 tablet 2    levothyroxine (SYNTHROID) 112 MCG tablet Take 1 tablet (112 mcg total) by mouth before breakfast. 30 tablet 11    melatonin (MELATIN) 3 mg tablet Take 2 tablets (6 mg total) by mouth nightly as needed for Insomnia. 60 tablet 0    phenytoin (DILANTIN) 100 MG ER capsule Take 2 capsules (200 mg total) by mouth nightly. 180 capsule 3    XARELTO 20 mg Tab TAKE 1 TABLET BY MOUTH EVERY DAY 90 tablet 4    [DISCONTINUED] midodrine (PROAMATINE) 2.5 MG Tab Take 1 tablet (2.5 mg total) by mouth 2 (two) times daily with meals. 60 tablet 1     midodrine (PROAMATINE) 2.5 MG Tab Take 1 tablet (2.5 mg total) by mouth every 4 (four) hours. 180 tablet 11    [DISCONTINUED] biotin 1 mg Cap Take 1 capsule by mouth once daily.      [DISCONTINUED] dexamethasone (DECADRON) 4 mg/mL injection Inject 1 mL (4 mg total) into the muscle as needed (Signs and symtpoms of severe adrenal insufficiency). 3 ml syringe with 18 g needle  to draw  X 10 21 g 1 inch needle to inject x 10 1 mL 2    [DISCONTINUED] diclofenac sodium (VOLTAREN) 1 % Gel Apply 2 g topically 3 (three) times daily as needed (apply to affected joint).      [DISCONTINUED] loratadine-pseudoephedrine  mg (CLARITIN-D 24-HOUR)  mg per 24 hr tablet Take 1 tablet by mouth as needed.       [DISCONTINUED] naloxone (NARCAN) 4 mg/actuation Spry 4mg by nasal route as needed for opioid overdose; may repeat every 2-3 minutes in alternating nostrils until medical help arrives. Call 911 1 each 11    [DISCONTINUED] oxyCODONE (ROXICODONE) 5 MG immediate release tablet Take 1 tablet (5 mg total) by mouth every 4 (four) hours as needed for Pain. 28 tablet 0    [DISCONTINUED] sulfamethoxazole-trimethoprim 800-160mg (BACTRIM DS) 800-160 mg Tab Take 1 tablet by mouth 2 (two) times daily. (Patient taking differently: Take 1 tablet by mouth 2 (two) times daily as needed.) 10 tablet 0    [DISCONTINUED] acetaminophen tablet 500 mg       [DISCONTINUED] acetaminophen tablet 650 mg       [DISCONTINUED] calcium carbonate 200 mg calcium (500 mg) chewable tablet 500 mg       [DISCONTINUED] dextrose 10% bolus 125 mL 125 mL       [DISCONTINUED] dextrose 10% bolus 250 mL 250 mL       [DISCONTINUED] diclofenac sodium 1 % gel 2 g       [DISCONTINUED] DULoxetine DR capsule 60 mg       [DISCONTINUED] fentaNYL 50 mcg/mL injection 25 mcg       [DISCONTINUED] ferrous sulfate tablet 1 each       [DISCONTINUED] glucagon (human recombinant) injection 1 mg       [DISCONTINUED] glucose chewable tablet 16 g       [DISCONTINUED] glucose  chewable tablet 24 g       [DISCONTINUED] hydrocortisone tablet 15 mg       [DISCONTINUED] hydrocortisone tablet 5 mg       [DISCONTINUED] hydrocortisone tablet 5 mg       [DISCONTINUED] HYDROmorphone (PF) injection 0.2 mg       [DISCONTINUED] levothyroxine tablet 112 mcg       [DISCONTINUED] melatonin tablet 6 mg       [DISCONTINUED] midodrine tablet 2.5 mg       [DISCONTINUED] naloxone 0.4 mg/mL injection 0.02 mg       [DISCONTINUED] ondansetron injection 4 mg       [DISCONTINUED] ondansetron injection 4 mg       [DISCONTINUED] oxyCODONE immediate release tablet 5 mg       [DISCONTINUED] oxyCODONE-acetaminophen 5-325 mg per tablet 1 tablet       [DISCONTINUED] phenytoin (DILANTIN) ER capsule 200 mg       [DISCONTINUED] prochlorperazine injection Soln 5 mg       [DISCONTINUED] rivaroxaban tablet 20 mg       [DISCONTINUED] sodium chloride 0.9% flush 3 mL        No facility-administered encounter medications on file as of 5/3/2024.     Medication List with Changes/Refills   Current Medications    ACETAMINOPHEN (TYLENOL) 500 MG TABLET    Take 1 tablet (500 mg total) by mouth every 6 (six) hours.    CALCITRIOL (ROCALTROL) 0.25 MCG CAP    Take 1 capsule (0.25 mcg total) by mouth once daily.    CALCIUM CARBONATE (TUMS) 200 MG CALCIUM (500 MG) CHEWABLE TABLET    Take 1 tablet (500 mg total) by mouth 3 (three) times daily as needed for Heartburn.    CHOLECALCIFEROL, VITAMIN D3, (VITAMIN D3) 50 MCG (2,000 UNIT) CAP CAPSULE    Take 2,000 Units by mouth every evening.    CYANOCOBALAMIN (VITAMIN B-12) 100 MCG TABLET    Take 100 mcg by mouth once daily.    DULOXETINE (CYMBALTA) 30 MG CAPSULE    TAKE 2 CAPSULES (60 MG TOTAL) BY MOUTH ONCE DAILY.    FERROUS SULFATE 324 MG (65 MG IRON) TBEC    Take 325 mg by mouth nightly.     HYDROCORTISONE (CORTEF) 10 MG TAB    Take 1 and 1/2 tablet by mouth every morning and 1/2 tablet in the afternoon    HYOSCYAMINE (LEVSIN/SL) 0.125 MG SUBL    PLACE 1 TABLET (0.125 MG TOTAL) UNDER THE TONGUE  EVERY 4 (FOUR) HOURS AS NEEDED.    LEVOTHYROXINE (SYNTHROID) 112 MCG TABLET    Take 1 tablet (112 mcg total) by mouth before breakfast.    MELATONIN (MELATIN) 3 MG TABLET    Take 2 tablets (6 mg total) by mouth nightly as needed for Insomnia.    PHENYTOIN (DILANTIN) 100 MG ER CAPSULE    Take 2 capsules (200 mg total) by mouth nightly.    XARELTO 20 MG TAB    TAKE 1 TABLET BY MOUTH EVERY DAY   Changed and/or Refilled Medications    Modified Medication Previous Medication    MIDODRINE (PROAMATINE) 2.5 MG TAB midodrine (PROAMATINE) 2.5 MG Tab       Take 1 tablet (2.5 mg total) by mouth every 4 (four) hours.    Take 1 tablet (2.5 mg total) by mouth 2 (two) times daily with meals.        This note is at least partially dictated using the M*Modal Fluency Direct word recognition program. There are word recognition mistakes that are occasionally missed on review.   Pre-visit chart review: 20 min    Face to face time: 30 min, > 50% of which spent in education    I have reviewed the actual images of all relevant ECG, rhythm, holter and long term monitoring tracings available in EPIC, MUSE  and in legacy as well as outside records.   I have reviewed the actual images of all relevant CXRays.   I have directly evaluated all relevant data pertaining to any CIED.     Post visit chart documentation and care coordination: 15 min

## 2024-05-06 ENCOUNTER — PATIENT MESSAGE (OUTPATIENT)
Dept: NEPHROLOGY | Facility: CLINIC | Age: 59
End: 2024-05-06
Payer: COMMERCIAL

## 2024-05-06 RX ORDER — CALCITRIOL 0.25 UG/1
0.25 CAPSULE ORAL DAILY
Qty: 30 CAPSULE | Refills: 3
Start: 2024-05-06 | End: 2024-05-07 | Stop reason: SDUPTHER

## 2024-05-07 ENCOUNTER — OFFICE VISIT (OUTPATIENT)
Dept: ORTHOPEDICS | Facility: CLINIC | Age: 59
End: 2024-05-07
Payer: COMMERCIAL

## 2024-05-07 ENCOUNTER — HOSPITAL ENCOUNTER (OUTPATIENT)
Dept: RADIOLOGY | Facility: HOSPITAL | Age: 59
Discharge: HOME | End: 2024-05-07
Attending: SURGERY
Payer: COMMERCIAL

## 2024-05-07 VITALS — HEIGHT: 67 IN | BODY MASS INDEX: 24.56 KG/M2 | WEIGHT: 156.5 LBS

## 2024-05-07 DIAGNOSIS — M79.671 RIGHT FOOT PAIN: ICD-10-CM

## 2024-05-07 PROCEDURE — 99024 POSTOP FOLLOW-UP VISIT: CPT | Mod: S$GLB,,, | Performed by: SURGERY

## 2024-05-07 PROCEDURE — 73630 X-RAY EXAM OF FOOT: CPT | Mod: TC,PN,RT

## 2024-05-07 PROCEDURE — 99999 PR PBB SHADOW E&M-EST. PATIENT-LVL III: CPT | Mod: PBBFAC,,, | Performed by: SURGERY

## 2024-05-07 PROCEDURE — 73630 X-RAY EXAM OF FOOT: CPT | Mod: 26,RT,, | Performed by: RADIOLOGY

## 2024-05-07 PROCEDURE — 1159F MED LIST DOCD IN RCRD: CPT | Mod: CPTII,S$GLB,, | Performed by: SURGERY

## 2024-05-07 PROCEDURE — 3066F NEPHROPATHY DOC TX: CPT | Mod: CPTII,S$GLB,, | Performed by: SURGERY

## 2024-05-07 PROCEDURE — 3044F HG A1C LEVEL LT 7.0%: CPT | Mod: CPTII,S$GLB,, | Performed by: SURGERY

## 2024-05-07 RX ORDER — CALCITRIOL 0.25 UG/1
0.25 CAPSULE ORAL DAILY
Start: 2024-05-07 | End: 2024-05-09 | Stop reason: SDUPTHER

## 2024-05-07 NOTE — PROGRESS NOTES
"SUBJECTIVE:    Ms. Nguyen is here today for a follow up visit.  Five weeks status post revision ORIF navicular, talonavicular and navicular cuneiform fusion.  Discharged from post acute care facility.  Presents for further follow up.        OBJECTIVE:      Vitals:    05/07/24 1055   Weight: 71 kg (156 lb 8.4 oz)   Height: 5' 7" (1.702 m)       Lower Extremity Exam  Right lower extremity wounds clean dry and intact.  Healing appropriate.  Sensation at baseline.  Fires FHL, EHL, TA, GSC.  Palpable pulses.      DIAGNOSTIC STUDIES:  X-rays demonstrate no change in alignment status post revision ORIF navicular fracture with dorsal bridge plate    ASSESSMENT:   1. Status post procedure above      PLAN:  Thelma Reagan" was seen today for pain.    Diagnoses and all orders for this visit:    Right foot pain  -     X-Ray Foot Complete Right; Future        We will transition her to a Cam boot.  She can change the Steri-Strips on her wound twice weekly.  Incisions appear to be healing well.  Follow up in 2 weeks with x-rays of the right foot.  Continue nonweightbearing.  Order placed for physical therapy.    Geoffrey Goodson MD  [facility]  Orthopedic Surgery      This note was done with voice recognition software. Please excuse any errors missed in proof reading.     "

## 2024-05-14 RX ORDER — CALCITRIOL 0.25 UG/1
0.25 CAPSULE ORAL DAILY
Qty: 30 CAPSULE | Refills: 3
Start: 2024-05-14 | End: 2024-05-22 | Stop reason: SDUPTHER

## 2024-05-15 DIAGNOSIS — M79.671 RIGHT FOOT PAIN: Primary | ICD-10-CM

## 2024-05-16 ENCOUNTER — PATIENT MESSAGE (OUTPATIENT)
Dept: CARDIOLOGY | Facility: CLINIC | Age: 59
End: 2024-05-16
Payer: COMMERCIAL

## 2024-05-20 ENCOUNTER — PATIENT MESSAGE (OUTPATIENT)
Dept: CARDIOLOGY | Facility: CLINIC | Age: 59
End: 2024-05-20
Payer: COMMERCIAL

## 2024-05-20 ENCOUNTER — PATIENT MESSAGE (OUTPATIENT)
Dept: NEPHROLOGY | Facility: CLINIC | Age: 59
End: 2024-05-20
Payer: COMMERCIAL

## 2024-05-21 ENCOUNTER — HOSPITAL ENCOUNTER (OUTPATIENT)
Dept: RADIOLOGY | Facility: HOSPITAL | Age: 59
Discharge: HOME | End: 2024-05-21
Attending: SURGERY
Payer: COMMERCIAL

## 2024-05-21 ENCOUNTER — OFFICE VISIT (OUTPATIENT)
Dept: ORTHOPEDICS | Facility: CLINIC | Age: 59
End: 2024-05-21
Payer: COMMERCIAL

## 2024-05-21 VITALS — HEIGHT: 67 IN | BODY MASS INDEX: 24.56 KG/M2 | WEIGHT: 156.5 LBS

## 2024-05-21 DIAGNOSIS — M79.671 RIGHT FOOT PAIN: ICD-10-CM

## 2024-05-21 DIAGNOSIS — S92.251G: Primary | ICD-10-CM

## 2024-05-21 DIAGNOSIS — S92.251A CLOSED DISPLACED FRACTURE OF NAVICULAR BONE OF RIGHT FOOT, INITIAL ENCOUNTER: ICD-10-CM

## 2024-05-21 PROCEDURE — 3044F HG A1C LEVEL LT 7.0%: CPT | Mod: CPTII,S$GLB,, | Performed by: SURGERY

## 2024-05-21 PROCEDURE — 1159F MED LIST DOCD IN RCRD: CPT | Mod: CPTII,S$GLB,, | Performed by: SURGERY

## 2024-05-21 PROCEDURE — 99024 POSTOP FOLLOW-UP VISIT: CPT | Mod: S$GLB,,, | Performed by: SURGERY

## 2024-05-21 PROCEDURE — 73630 X-RAY EXAM OF FOOT: CPT | Mod: 26,RT,, | Performed by: RADIOLOGY

## 2024-05-21 PROCEDURE — 73630 X-RAY EXAM OF FOOT: CPT | Mod: TC,PN,RT

## 2024-05-21 PROCEDURE — 99999 PR PBB SHADOW E&M-EST. PATIENT-LVL III: CPT | Mod: PBBFAC,,, | Performed by: SURGERY

## 2024-05-21 PROCEDURE — 3066F NEPHROPATHY DOC TX: CPT | Mod: CPTII,S$GLB,, | Performed by: SURGERY

## 2024-05-21 NOTE — PROGRESS NOTES
"SUBJECTIVE:    Ms. Nguyen is here today for a follow up visit.  Seven weeks status post revision ORIF navicular, talonavicular and navicular cuneiform fusion.  Discharged from post acute care facility.  Presents for further follow up.  Doing well.  Had an episode where she put all her weight down on her foot while avoiding a fall.  Stated did not feel any significant pain after the episode.        OBJECTIVE:      Vitals:    05/21/24 1001   Weight: 71 kg (156 lb 8.4 oz)   Height: 5' 7" (1.702 m)   PainSc:   3       Lower Extremity Exam  Right lower extremity wounds clean dry and intact.  Healing appropriate.  Covered with 1 Steri-Strips, removed, incisions are closed.  Sensation at baseline.  Fires FHL, EHL, TA, GSC.  Palpable pulses.      DIAGNOSTIC STUDIES:  X-rays demonstrate no change in alignment status post revision ORIF navicular fracture with dorsal bridge plate, lucency about talus screws.  Unchanged from previous.    ASSESSMENT:   1. Status post procedure above      PLAN:  There are no diagnoses linked to this encounter.      We will transition her to a Cam boot.  She no longer requires any Steri-Strips.  If any drainage or erythema she should call the office.  Overall Incisions appear to be healing well.  Discussed lucency about the talus hardware.  Discussed possibility of screw breakage, plate lift-off, and further management of this in the future.  We will continue to observe it at this time.  Order placed for outpatient physical therapy.  We would like her to go for range of motion.  Okay for active and passive range of motion.  Nonweightbearing.  Follow up in 2-3 weeks with x-rays of the right foot.  Continue nonweightbearing in cam boot- anticipate three-month nonweightbearing.  Report to ER immediately for any falls, or change in symptoms/pain levels.    Geoffrey Goodson MD  [facility]  Orthopedic Surgery      This note was done with voice recognition software. Please excuse any errors " missed in proof reading.

## 2024-05-22 RX ORDER — CALCITRIOL 0.25 UG/1
0.25 CAPSULE ORAL DAILY
Start: 2024-05-22 | End: 2024-06-03 | Stop reason: SDUPTHER

## 2024-05-31 ENCOUNTER — TELEPHONE (OUTPATIENT)
Dept: RHEUMATOLOGY | Facility: CLINIC | Age: 59
End: 2024-05-31
Payer: COMMERCIAL

## 2024-06-03 DIAGNOSIS — M79.671 RIGHT FOOT PAIN: Primary | ICD-10-CM

## 2024-06-03 RX ORDER — CALCITRIOL 0.25 UG/1
0.25 CAPSULE ORAL DAILY
Qty: 30 CAPSULE | Refills: 3 | Status: SHIPPED | OUTPATIENT
Start: 2024-06-03 | End: 2024-10-01

## 2024-06-04 ENCOUNTER — PATIENT MESSAGE (OUTPATIENT)
Dept: CARDIOLOGY | Facility: CLINIC | Age: 59
End: 2024-06-04
Payer: COMMERCIAL

## 2024-06-04 DIAGNOSIS — N18.4 STAGE 4 CHRONIC KIDNEY DISEASE: Primary | ICD-10-CM

## 2024-06-06 PROBLEM — M79.671 RIGHT FOOT PAIN: Status: ACTIVE | Noted: 2024-06-06

## 2024-06-10 ENCOUNTER — HOSPITAL ENCOUNTER (OUTPATIENT)
Dept: RADIOLOGY | Facility: HOSPITAL | Age: 59
Discharge: HOME | End: 2024-06-10
Attending: SURGERY
Payer: COMMERCIAL

## 2024-06-10 ENCOUNTER — PATIENT MESSAGE (OUTPATIENT)
Dept: INTERNAL MEDICINE | Facility: CLINIC | Age: 59
End: 2024-06-10
Payer: COMMERCIAL

## 2024-06-10 ENCOUNTER — OFFICE VISIT (OUTPATIENT)
Dept: ORTHOPEDICS | Facility: CLINIC | Age: 59
End: 2024-06-10
Payer: COMMERCIAL

## 2024-06-10 VITALS — HEIGHT: 67 IN | BODY MASS INDEX: 24.56 KG/M2 | WEIGHT: 156.5 LBS

## 2024-06-10 DIAGNOSIS — S92.251A CLOSED DISPLACED FRACTURE OF NAVICULAR BONE OF RIGHT FOOT, INITIAL ENCOUNTER: Primary | ICD-10-CM

## 2024-06-10 DIAGNOSIS — M79.671 RIGHT FOOT PAIN: ICD-10-CM

## 2024-06-10 PROCEDURE — 99999 PR PBB SHADOW E&M-EST. PATIENT-LVL III: CPT | Mod: PBBFAC,,, | Performed by: SURGERY

## 2024-06-10 PROCEDURE — 3044F HG A1C LEVEL LT 7.0%: CPT | Mod: CPTII,S$GLB,, | Performed by: SURGERY

## 2024-06-10 PROCEDURE — 73630 X-RAY EXAM OF FOOT: CPT | Mod: TC,PN,RT

## 2024-06-10 PROCEDURE — 3066F NEPHROPATHY DOC TX: CPT | Mod: CPTII,S$GLB,, | Performed by: SURGERY

## 2024-06-10 PROCEDURE — 99024 POSTOP FOLLOW-UP VISIT: CPT | Mod: S$GLB,,, | Performed by: SURGERY

## 2024-06-10 PROCEDURE — 73630 X-RAY EXAM OF FOOT: CPT | Mod: 26,RT,, | Performed by: RADIOLOGY

## 2024-06-11 ENCOUNTER — PATIENT MESSAGE (OUTPATIENT)
Dept: INTERNAL MEDICINE | Facility: CLINIC | Age: 59
End: 2024-06-11

## 2024-06-11 ENCOUNTER — OFFICE VISIT (OUTPATIENT)
Dept: INTERNAL MEDICINE | Facility: CLINIC | Age: 59
End: 2024-06-11
Payer: COMMERCIAL

## 2024-06-11 VITALS
BODY MASS INDEX: 22.18 KG/M2 | WEIGHT: 141.31 LBS | HEIGHT: 67 IN | OXYGEN SATURATION: 97 % | HEART RATE: 100 BPM | SYSTOLIC BLOOD PRESSURE: 136 MMHG | DIASTOLIC BLOOD PRESSURE: 58 MMHG

## 2024-06-11 DIAGNOSIS — E23.0 PANHYPOPITUITARISM: ICD-10-CM

## 2024-06-11 DIAGNOSIS — K59.1 FUNCTIONAL DIARRHEA: ICD-10-CM

## 2024-06-11 DIAGNOSIS — E23.0 PANHYPOPITUITARISM: Primary | ICD-10-CM

## 2024-06-11 DIAGNOSIS — I95.1 DYSAUTONOMIA ORTHOSTATIC HYPOTENSION SYNDROME: ICD-10-CM

## 2024-06-11 PROCEDURE — 3078F DIAST BP <80 MM HG: CPT | Mod: CPTII,S$GLB,, | Performed by: INTERNAL MEDICINE

## 2024-06-11 PROCEDURE — 1160F RVW MEDS BY RX/DR IN RCRD: CPT | Mod: CPTII,S$GLB,, | Performed by: INTERNAL MEDICINE

## 2024-06-11 PROCEDURE — 3008F BODY MASS INDEX DOCD: CPT | Mod: CPTII,S$GLB,, | Performed by: INTERNAL MEDICINE

## 2024-06-11 PROCEDURE — 3075F SYST BP GE 130 - 139MM HG: CPT | Mod: CPTII,S$GLB,, | Performed by: INTERNAL MEDICINE

## 2024-06-11 PROCEDURE — 99215 OFFICE O/P EST HI 40 MIN: CPT | Mod: S$GLB,,, | Performed by: INTERNAL MEDICINE

## 2024-06-11 PROCEDURE — 99999 PR PBB SHADOW E&M-EST. PATIENT-LVL V: CPT | Mod: PBBFAC,,, | Performed by: INTERNAL MEDICINE

## 2024-06-11 PROCEDURE — 3066F NEPHROPATHY DOC TX: CPT | Mod: CPTII,S$GLB,, | Performed by: INTERNAL MEDICINE

## 2024-06-11 PROCEDURE — 3044F HG A1C LEVEL LT 7.0%: CPT | Mod: CPTII,S$GLB,, | Performed by: INTERNAL MEDICINE

## 2024-06-11 PROCEDURE — 1159F MED LIST DOCD IN RCRD: CPT | Mod: CPTII,S$GLB,, | Performed by: INTERNAL MEDICINE

## 2024-06-11 RX ORDER — DEXAMETHASONE SODIUM PHOSPHATE 4 MG/ML
INJECTION, SOLUTION INTRA-ARTICULAR; INTRALESIONAL; INTRAMUSCULAR; INTRAVENOUS; SOFT TISSUE
Qty: 1 ML | Refills: 11 | OUTPATIENT
Start: 2024-06-11

## 2024-06-11 RX ORDER — DEXAMETHASONE SODIUM PHOSPHATE 4 MG/ML
INJECTION, SOLUTION INTRA-ARTICULAR; INTRALESIONAL; INTRAMUSCULAR; INTRAVENOUS; SOFT TISSUE
Qty: 1 ML | Refills: 11 | Status: SHIPPED | OUTPATIENT
Start: 2024-06-11

## 2024-06-11 RX ORDER — DEXAMETHASONE SODIUM PHOSPHATE 4 MG/ML
INJECTION, SOLUTION INTRA-ARTICULAR; INTRALESIONAL; INTRAMUSCULAR; INTRAVENOUS; SOFT TISSUE
Qty: 1 ML | Refills: 11 | Status: SHIPPED | OUTPATIENT
Start: 2024-06-11 | End: 2024-06-11 | Stop reason: SDUPTHER

## 2024-06-11 RX ORDER — DIPHENOXYLATE HYDROCHLORIDE AND ATROPINE SULFATE 2.5; .025 MG/1; MG/1
1 TABLET ORAL 4 TIMES DAILY PRN
Qty: 120 TABLET | Refills: 0 | Status: SHIPPED | OUTPATIENT
Start: 2024-06-11 | End: 2024-07-11

## 2024-06-11 NOTE — PROGRESS NOTES
"SUBJECTIVE:    Ms. Nguyen is here today for a follow up visit.  Seven weeks status post revision ORIF navicular, talonavicular and navicular cuneiform fusion.  Discharged from post acute care facility.  Presents for further follow up.  Doing well - is standing on her foot to shower without pain.         OBJECTIVE:      Vitals:    06/10/24 1445   Weight: 71 kg (156 lb 8.4 oz)   Height: 5' 7" (1.702 m)   PainSc:   3       Lower Extremity Exam  Right lower extremity wounds clean dry and intact and completely healed.  Sensation at baseline.  Fires FHL, EHL, TA, GSC.  Palpable pulses.      DIAGNOSTIC STUDIES:  X-rays demonstrate no change in alignment status post revision ORIF navicular fracture with dorsal bridge plate, lucency about talus screws.  Unchanged from previous.    ASSESSMENT:   1. Status post procedure above      PLAN:  There are no diagnoses linked to this encounter.    Continue PT and Cam boot. Follow up at the 12 week [facility] for standing weight bearing right foot x-rays.  Continue NWB.  Anticipate transitioning to WBAT at 12 week [facility].      Geoffrey Goodson MD  [facility]  Orthopedic Surgery      This note was done with voice recognition software. Please excuse any errors missed in proof reading.       "

## 2024-06-11 NOTE — TELEPHONE ENCOUNTER
Refill Routing Note   Medication(s) are not appropriate for processing by [facility] for the following reason(s):        Outside of protocol    [facility] action(s):  Route        Medication Therapy Plan: Pharmacy comment: Product Backordered/Unavailable:UNABLE TO GET RX IN STOCK.      Appointments  past 12m or future 3m with PCP    Date Provider   Last Visit   6/11/2024 Zachary Bender MD   Next Visit   9/12/2024 Zachary Bender MD   ED visits in past 90 days: 0        Note composed:2:39 PM 06/11/2024

## 2024-06-11 NOTE — PROGRESS NOTES
"    CHIEF COMPLAINT     Chief Complaint   Patient presents with    Follow-up     Refills       HPI     Thelma Nguyen is a 58 y.o. female history of glioblastoma s/p resection, complicated by panhypopituitarism and neuropathy, IBS, CKD 3, depression, hx of skin cancer and factor 5 leiden deficiency here today for     Since last visit, seen by Dr. Cohen, working dx autonomic dysfunction, started on midodrine. Hard to tell how well it's working since she is NWB because of her ankle fx. Reports she will be allowed to walk in boot in 2 weeks.      Personally Reviewed Patient's Medical, surgical, family and social hx. Changes updated in esolidar.  Care Team updated in Epic    Review of Systems:  Review of Systems    Health Maintenance:   Reviewed with patient  Due for the following:      PHYSICAL EXAM     BP (!) 136/58 (BP Location: Right arm, Patient Position: Sitting)   Pulse 100   Ht 5' 7" (1.702 m)   Wt 64.1 kg (141 lb 5 oz)   LMP 10/23/1997 (Exact Date)   SpO2 97%   BMI 22.13 kg/m²     Gen: Well Appearing, NAD  HEENT: PERR, EOMI  Neck: FROM, no thyromegaly, no cervical adenopathy  CVD: RRR, no M/R/G  Pulm: Normal work of breathing, CTAB, no wheezing  Abd:  Soft, NT, ND non TTP, no mass  MSK: no LE edema  Neuro: A&Ox3, gait normal, speech normal  Mood; Mood normal, behavior normal, thought process linear       LABS     Labs reviewed; Notable for  Lab Results   Component Value Date    CREATININE 1.6 (H) 06/10/2024    BUN 47 (H) 06/10/2024     06/10/2024    K 4.4 06/10/2024     06/10/2024    CO2 23 06/10/2024     Lab Results   Component Value Date    PTH 97.4 (H) 06/10/2024    CALCIUM 9.4 06/10/2024    PHOS 3.8 06/10/2024     Lab Results   Component Value Date    WBC 8.32 06/10/2024    HGB 10.3 (L) 06/10/2024    HCT 32.4 (L) 06/10/2024    MCV 94 06/10/2024     06/10/2024       Urinary catecholamines low  ASSESSMENT     1. Panhypopituitarism  Ambulatory referral/consult to " Endocrinology    dexAMETHasone (DECADRON) 4 mg/mL injection      2. Dysautonomia orthostatic hypotension syndrome        3. Functional diarrhea  diphenoxylate-atropine 2.5-0.025 mg (LOMOTIL) 2.5-0.025 mg per tablet              Plan     Thelma Nguyen is a 58 y.o. female with history of glioblastoma s/p resection, complicated by panhypopituitarism and neuropathy, IBS, CKD 3, depression, hx of skin cancer and factor 5 leiden deficiency here today for   1. Panhypopituitarism  Refilled, referred to endocrine since previous provider lef  - Ambulatory referral/consult to Endocrinology; Future  - dexAMETHasone (DECADRON) 4 mg/mL injection; Inject 1ml (4mg) into the muscle for symptoms of severe adrenal insufficiency. 3ml syringe w/18 g needle to draw x10 and 21g 1 inch needle to injection x10  Dispense: 1 mL; Refill: 11    2. Dysautonomia orthostatic hypotension syndrome  Continue to use midodrine and monitor BP. Has cardiology f/u later this week.    3. Functional diarrhea  - diphenoxylate-atropine 2.5-0.025 mg (LOMOTIL) 2.5-0.025 mg per tablet; Take 1 tablet by mouth 4 (four) times daily as needed for Diarrhea.  Dispense: 120 tablet; Refill: 0    >40 minutes of professional services provided as part of today's visit.    Zachary Bender MD

## 2024-06-13 ENCOUNTER — OFFICE VISIT (OUTPATIENT)
Dept: NEPHROLOGY | Facility: CLINIC | Age: 59
End: 2024-06-13
Payer: COMMERCIAL

## 2024-06-13 VITALS
SYSTOLIC BLOOD PRESSURE: 117 MMHG | DIASTOLIC BLOOD PRESSURE: 77 MMHG | HEART RATE: 107 BPM | BODY MASS INDEX: 21.77 KG/M2 | WEIGHT: 139 LBS | OXYGEN SATURATION: 98 %

## 2024-06-13 DIAGNOSIS — N18.32 STAGE 3B CHRONIC KIDNEY DISEASE: Primary | ICD-10-CM

## 2024-06-13 DIAGNOSIS — Z86.39 HISTORY OF ADRENAL INSUFFICIENCY: ICD-10-CM

## 2024-06-13 DIAGNOSIS — E23.0 PANHYPOPITUITARISM: ICD-10-CM

## 2024-06-13 DIAGNOSIS — I95.1 ORTHOSTATIC HYPOTENSION: ICD-10-CM

## 2024-06-13 DIAGNOSIS — E27.49 SECONDARY ADRENAL INSUFFICIENCY: ICD-10-CM

## 2024-06-13 PROCEDURE — 3008F BODY MASS INDEX DOCD: CPT | Mod: CPTII,S$GLB,,

## 2024-06-13 PROCEDURE — 3074F SYST BP LT 130 MM HG: CPT | Mod: CPTII,S$GLB,,

## 2024-06-13 PROCEDURE — 99999 PR PBB SHADOW E&M-EST. PATIENT-LVL IV: CPT | Mod: PBBFAC,,,

## 2024-06-13 PROCEDURE — 3078F DIAST BP <80 MM HG: CPT | Mod: CPTII,S$GLB,,

## 2024-06-13 PROCEDURE — 3066F NEPHROPATHY DOC TX: CPT | Mod: CPTII,S$GLB,,

## 2024-06-13 PROCEDURE — 3044F HG A1C LEVEL LT 7.0%: CPT | Mod: CPTII,S$GLB,,

## 2024-06-13 PROCEDURE — 99214 OFFICE O/P EST MOD 30 MIN: CPT | Mod: S$GLB,,,

## 2024-06-13 RX ORDER — CALCIUM CARB/MAGNESIUM CARB 311-232MG
1 TABLET ORAL NIGHTLY
COMMUNITY

## 2024-06-13 RX ORDER — ERGOCALCIFEROL 1.25 MG/1
50000 CAPSULE ORAL
Qty: 7 CAPSULE | Refills: 0 | Status: SHIPPED | OUTPATIENT
Start: 2024-06-13

## 2024-06-13 RX ORDER — FERROUS SULFATE 325(65) MG
325 TABLET ORAL 2 TIMES DAILY
Qty: 60 TABLET | Refills: 6 | Status: SHIPPED | OUTPATIENT
Start: 2024-06-13

## 2024-06-13 NOTE — PROGRESS NOTES
.Name: Thelma Nguyen  : 1965  Date of Service: 2024     Reason for visit: CKD 3b  Chief Complaint   Patient presents with    Chronic Kidney Disease       HPI: Patient is a 58 y.o. female presents for a follow up vist for evaluation of her chronic kidney disease 2/2 NSAIDS use. She doesn't have any history of DM, HTN. 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 She has adrenal insufficency for which she is taking Hydrocortisone, PRN Decadron and Midodrine 2.5mg Q4h.     The patient denies SOB, LE edema, hematuria or foamy urine.      She has frequent episodes of orthostatic hypotension.    PMH:   Past Medical History:   Diagnosis Date    Allergic rhinitis     Anemia     Arthritis     Basal cell carcinoma     Broken ankle     Cancer     Clotting disorder     Disorder of kidney and ureter     DVT (deep venous thrombosis)     Factor V deficiency     Fracture of elbow     left    Glioblastoma multiforme of brain     Hypothyroidism     IBS (irritable bowel syndrome)     Osteoporosis     Panhypopituitarism     Peripheral polyneuropathy     Secondary adrenal insufficiency     Seizures     Thyroid disease        Surgical History:   Past Surgical History:   Procedure Laterality Date    APPENDECTOMY      BRAIN SURGERY      for glioblastoma      SECTION      CHOLECYSTECTOMY  2007    CLOSURE OF DEFECT OF MOHS PROCEDURE Left 2018    Procedure: CLOSURE, MOHS PROCEDURE DEFECT SCALP  Flap closure;  Surgeon: Yrn Novak MD;  Location: [facility] OR [address] [place] [address];  Service: Plastics;  Laterality: Left;    COLONOSCOPY N/A 2019    Procedure: COLONOSCOPY Suprep;  Surgeon: Tony Mosher MD;  Location: [facility];  Service: Endoscopy;  Laterality: N/A;    ESOPHAGOGASTRODUODENOSCOPY N/A 2019    Procedure: EGD/colon;  Surgeon: Tony Mosher MD;  Location: [facility];  Service: Endoscopy;  Laterality: N/A;    EXCISION  OF GANGLION CYST OF HAND Left 01/07/2020    Procedure: EXCISION, GANGLION CYST, HAND;  Surgeon: Ross Drew Jr., MD;  Location: [facility] OR;  Service: Orthopedics;  Laterality: Left;    HYSTERECTOMY  1999    ILIAC CREST BONE GRAFT Right 3/15/2024    Procedure: BONE GRAFT, ILIAC CREST;  Surgeon: Geoffrey Goodson MD;  Location: [facility] OR;  Service: Orthopedics;  Laterality: Right;  acumed harvester    ILIAC CREST BONE GRAFT Right 4/2/2024    Procedure: BONE GRAFT, ILIAC CREST;  Surgeon: Geoffrey Goodson MD;  Location: [facility] OR;  Service: Orthopedics;  Laterality: Right;    MIDFOOT ARTHRODESIS Right 4/2/2024    Procedure: FUSION, JOINT, MIDFOOT;  Surgeon: Geoffrey Goodson MD;  Location: [facility] OR;  Service: Orthopedics;  Laterality: Right;  mini c arm    OPEN REDUCTION AND INTERNAL FIXATION (ORIF) OF FRACTURE OF TARSAL BONE Right 3/15/2024    Procedure: ORIF, FRACTURE, TARSAL BONE;  Surgeon: Geoffrey Goodson MD;  Location: [facility] OR;  Service: Orthopedics;  Laterality: Right;  supine, mini C arm, Styker, block okay    SHOULDER SURGERY Right     TILT TABLE TEST N/A 07/15/2022    Procedure: TILT TABLE TEST;  Surgeon: Amol Kohler MD;  Location: [facility] EP LAB;  Service: Cardiology;  Laterality: N/A;  Orthostatic hypotension, dizziness, Tilt Table Test with EEG, Dr.Tiffany Roca (neuro), DM    TUBAL LIGATION  1998       Allergies:   Review of patient's allergies indicates:   Allergen Reactions    Neurontin [gabapentin]     Lactose     Soap Hives     Able to tolerate Dove only        Medications:     Current Outpatient Medications:     acetaminophen (TYLENOL) 500 MG tablet, Take 1 tablet (500 mg total) by mouth every 6 (six) hours., Disp: 30 tablet, Rfl: 1    calcitRIOL (ROCALTROL) 0.25 MCG Cap, Take 1 capsule (0.25 mcg total) by mouth once daily., Disp: 30 capsule, Rfl: 3    calcium carbonate (TUMS) 200 mg calcium (500 mg) chewable tablet, Take 1 tablet (500 mg total) by mouth 3 (three) times daily as needed for Heartburn., Disp:  60 tablet, Rfl: 0    cholecalciferol, vitamin D3, (VITAMIN D3) 50 mcg (2,000 unit) Cap capsule, Take 2,000 Units by mouth every evening., Disp: , Rfl:     cyanocobalamin (VITAMIN B-12) 100 MCG tablet, Take 100 mcg by mouth once daily., Disp: , Rfl:     dexAMETHasone (DECADRON) 4 mg/mL injection, Inject 1ml (4mg) into the muscle for symptoms of severe adrenal insufficiency. 3ml syringe w/18 g needle to draw x10 and 21g 1 inch needle to injection x10, Disp: 1 mL, Rfl: 11    diphenoxylate-atropine 2.5-0.025 mg (LOMOTIL) 2.5-0.025 mg per tablet, Take 1 tablet by mouth 4 (four) times daily as needed for Diarrhea., Disp: 120 tablet, Rfl: 0    DULoxetine (CYMBALTA) 30 MG capsule, TAKE 2 CAPSULES (60 MG TOTAL) BY MOUTH ONCE DAILY. (Patient taking differently: Take 60 mg by mouth every evening.), Disp: 180 capsule, Rfl: 1    ferrous sulfate 324 mg (65 mg iron) TbEC, Take 325 mg by mouth nightly. , Disp: , Rfl:     hydrocortisone (CORTEF) 10 MG Tab, Take 1 and 1/2 tablet by mouth every morning and 1/2 tablet in the afternoon, Disp: , Rfl:     hyoscyamine (LEVSIN/SL) 0.125 mg Subl, PLACE 1 TABLET (0.125 MG TOTAL) UNDER THE TONGUE EVERY 4 (FOUR) HOURS AS NEEDED., Disp: 360 tablet, Rfl: 2    levothyroxine (SYNTHROID) 112 MCG tablet, Take 1 tablet (112 mcg total) by mouth before breakfast., Disp: 30 tablet, Rfl: 11    midodrine (PROAMATINE) 2.5 MG Tab, Take 1 tablet (2.5 mg total) by mouth every 4 (four) hours., Disp: 180 tablet, Rfl: 11    phenytoin (DILANTIN) 100 MG ER capsule, Take 2 capsules (200 mg total) by mouth nightly., Disp: 180 capsule, Rfl: 3    UNABLE TO FIND, Take 1 tablet by mouth Daily. medication name: Biotin 2500mcg, Disp: , Rfl:     XARELTO 20 mg Tab, TAKE 1 TABLET BY MOUTH EVERY DAY (Patient taking differently: Take 20 mg by mouth every evening.), Disp: 90 tablet, Rfl: 4    BIOTIN ORAL, Take by mouth., Disp: , Rfl:     ergocalciferol (ERGOCALCIFEROL) 50,000 unit Cap, Take 1 capsule (50,000 Units total) by  mouth every 7 days., Disp: 7 capsule, Rfl: 0    ferrous sulfate (IRON, FERROUS SULFATE,) 325 mg (65 mg iron) Tab tablet, Take 1 tablet (325 mg total) by mouth 2 (two) times daily., Disp: 60 tablet, Rfl: 6    melatonin 5 mg TbDL, Take 1 tablet by mouth nightly., Disp: , Rfl:     Family History:   Family History   Problem Relation Name Age of Onset    Heart disease Mother      Diabetes type II Mother      Diabetes type II Father      Heart attack Maternal Grandfather         Social History:   Social History     Socioeconomic History    Marital status:     Number of children: 2   Occupational History    Occupation:    Tobacco Use    Smoking status: Never    Smokeless tobacco: Never   Substance and Sexual Activity    Alcohol use: No    Drug use: No    Sexual activity: Not Currently   Social History Narrative     30 years, lives at home with .      Social Determinants of Health     Financial Resource Strain: Low Risk  (12/11/2023)    Overall Financial Resource Strain (CARDIA)     Difficulty of Paying Living Expenses: Not hard at all   Food Insecurity: No Food Insecurity (12/11/2023)    Hunger Vital Sign     Worried About Running Out of Food in the Last Year: Never true     Ran Out of Food in the Last Year: Never true   Transportation Needs: Unmet Transportation Needs (12/11/2023)    PRAPARE - Transportation     Lack of Transportation (Medical): Yes     Lack of Transportation (Non-Medical): Yes   Physical Activity: Insufficiently Active (12/11/2023)    Exercise Vital Sign     Days of Exercise per Week: 3 days     Minutes of Exercise per Session: 20 min   Stress: No Stress Concern Present (12/11/2023)    [employer] [place] [employer] - Occupational Stress Questionnaire     Feeling of Stress : Only a little   Housing Stability: Low Risk  (12/11/2023)    Housing Stability Vital Sign     Unable to Pay for Housing in the Last Year: No     Number of Places Lived in the Last Year:  1     Unstable Housing in the Last Year: No       Review of Systems:   Review of Systems   Constitutional: Negative.    HENT: Negative.     Eyes: Negative.    Respiratory: Negative.     Cardiovascular: Negative.    Gastrointestinal: Negative.    Genitourinary: Negative.    Musculoskeletal:  Positive for falls.   Skin: Negative.    Neurological:  Positive for dizziness.     Vitals:   Vitals:    06/13/24 1413   BP: 117/77   Pulse: 107   SpO2: 98%   Weight: 63 kg (139 lb)     Body mass index is 21.77 kg/m².    Physical Exam:   Physical Exam  Constitutional:       General: She is not in acute distress.     Appearance: Normal appearance.   Cardiovascular:      Rate and Rhythm: Normal rate.   Pulmonary:      Effort: Pulmonary effort is normal. No respiratory distress.      Breath sounds: No wheezing or rales.   Abdominal:      Palpations: Abdomen is soft.   Musculoskeletal:      Right lower leg: No edema.      Left lower leg: No edema.   Skin:     General: Skin is warm.   Neurological:      General: No focal deficit present.      Mental Status: She is alert and oriented to person, place, and time.   .BMP  Lab Results   Component Value Date     06/10/2024    K 4.4 06/10/2024     06/10/2024    CO2 23 06/10/2024    BUN 47 (H) 06/10/2024    CREATININE 1.6 (H) 06/10/2024    CALCIUM 9.4 06/10/2024    ANIONGAP 13 06/10/2024    EGFRNORACEVR 37.4 (A) 06/10/2024     Lab Results   Component Value Date    WBC 8.32 06/10/2024    HGB 10.3 (L) 06/10/2024    HCT 32.4 (L) 06/10/2024    MCV 94 06/10/2024     06/10/2024     Lab Results   Component Value Date    PTH 97.4 (H) 06/10/2024    CALCIUM 9.4 06/10/2024    PHOS 3.8 06/10/2024         Assessment/Plan:   1. Stage 3b chronic kidney disease        Summary;    1); Chronic Kidney Disease Stage 3b;  -Baseline Scr 1.3-1.5, UPCR 0.08, stable and non progressive  -If she continues to have recurring hypotensive episodes, her CKD can progress due to recurring episodes of  IONA.    2); Anemia in CKD;  -Hgb 10.2, would suggest P/O Ferrous Sulphate BID    3):Bone Mineral Disease;  - Would switch her to Ergocalciferol 70 K units weekly for 7 weeks  -Would stop Calcitriol 0.25 mcg.    4); Per Endocrinology for Adrenal and thyroid disorders    Disposition: Follow-up in 12 weeks      Swetha Bay MD  Nephrology Fellow    Discussed with staff Dr. Tinajero      Discussed with

## 2024-06-14 ENCOUNTER — OFFICE VISIT (OUTPATIENT)
Dept: CARDIOLOGY | Facility: CLINIC | Age: 59
End: 2024-06-14
Payer: COMMERCIAL

## 2024-06-14 VITALS
OXYGEN SATURATION: 99 % | DIASTOLIC BLOOD PRESSURE: 62 MMHG | BODY MASS INDEX: 21.77 KG/M2 | HEART RATE: 138 BPM | HEIGHT: 67 IN | SYSTOLIC BLOOD PRESSURE: 96 MMHG

## 2024-06-14 DIAGNOSIS — G40.209 PARTIAL SYMPTOMATIC EPILEPSY WITH COMPLEX PARTIAL SEIZURES, NOT INTRACTABLE, WITHOUT STATUS EPILEPTICUS: ICD-10-CM

## 2024-06-14 DIAGNOSIS — K58.9 IRRITABLE BOWEL SYNDROME, UNSPECIFIED TYPE: ICD-10-CM

## 2024-06-14 DIAGNOSIS — Z86.39 HISTORY OF ADRENAL INSUFFICIENCY: ICD-10-CM

## 2024-06-14 DIAGNOSIS — I95.1 ORTHOSTATIC HYPOTENSION: Primary | ICD-10-CM

## 2024-06-14 DIAGNOSIS — R55 RECURRENT SYNCOPE: ICD-10-CM

## 2024-06-14 DIAGNOSIS — E27.49 SECONDARY ADRENAL INSUFFICIENCY: ICD-10-CM

## 2024-06-14 DIAGNOSIS — R06.02 SHORTNESS OF BREATH: ICD-10-CM

## 2024-06-14 DIAGNOSIS — D68.2 FACTOR V DEFICIENCY: ICD-10-CM

## 2024-06-14 DIAGNOSIS — E23.0 PANHYPOPITUITARISM: ICD-10-CM

## 2024-06-14 DIAGNOSIS — N18.4 STAGE 4 CHRONIC KIDNEY DISEASE: ICD-10-CM

## 2024-06-14 DIAGNOSIS — R42 DIZZINESS: ICD-10-CM

## 2024-06-14 PROCEDURE — 3074F SYST BP LT 130 MM HG: CPT | Mod: CPTII,S$GLB,, | Performed by: INTERNAL MEDICINE

## 2024-06-14 PROCEDURE — 99215 OFFICE O/P EST HI 40 MIN: CPT | Mod: S$GLB,,, | Performed by: INTERNAL MEDICINE

## 2024-06-14 PROCEDURE — 99999 PR PBB SHADOW E&M-EST. PATIENT-LVL V: CPT | Mod: PBBFAC,,, | Performed by: INTERNAL MEDICINE

## 2024-06-14 PROCEDURE — 3066F NEPHROPATHY DOC TX: CPT | Mod: CPTII,S$GLB,, | Performed by: INTERNAL MEDICINE

## 2024-06-14 PROCEDURE — 3008F BODY MASS INDEX DOCD: CPT | Mod: CPTII,S$GLB,, | Performed by: INTERNAL MEDICINE

## 2024-06-14 PROCEDURE — 1159F MED LIST DOCD IN RCRD: CPT | Mod: CPTII,S$GLB,, | Performed by: INTERNAL MEDICINE

## 2024-06-14 PROCEDURE — 3078F DIAST BP <80 MM HG: CPT | Mod: CPTII,S$GLB,, | Performed by: INTERNAL MEDICINE

## 2024-06-14 PROCEDURE — 3044F HG A1C LEVEL LT 7.0%: CPT | Mod: CPTII,S$GLB,, | Performed by: INTERNAL MEDICINE

## 2024-06-14 PROCEDURE — 1160F RVW MEDS BY RX/DR IN RCRD: CPT | Mod: CPTII,S$GLB,, | Performed by: INTERNAL MEDICINE

## 2024-06-14 RX ORDER — MIDODRINE HYDROCHLORIDE 5 MG/1
TABLET ORAL
Qty: 360 TABLET | Refills: 3 | Status: SHIPPED | OUTPATIENT
Start: 2024-06-14

## 2024-06-14 NOTE — PROGRESS NOTES
"  Subjective:   Patient ID:  Thelma Nguyen is a 58 y.o. female     Chief complaint: near syncope    HPI  New patient to me. 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      She presented to the ED by request of Orthopedics, Dr. Goodson, for admission for surgical correction of right foot navicular fracture. Patient had ORIF performed with Dr. Goodson on 3/15.      Patient admits to significant and lengthy history of severe orthostatic hypotension, and admits her "spells" of becoming lightheaded upon standing have severely worsened since surgery causing her to have numerous falls over past few weeks. She believes during these falls she re-injured her right foot causing hardware failure. From these falls she also does report contusing left elbow and left rib cage. Denying any recent head injury.      Patient also reports episodes of tachycardia that occur while sitting on the cough watching TV with alerts on her Apple watch of HR 110s-140s.   She states that her falls have significantly increased over the last couple weeks up to 20 falls in the last 2 weeks. Some of these are accompanied by syncope and a disoriented feeling, other times her legs just give out and she falls.   10Patient's  states patient does not appear to have seizure activity during these episodes but she does start breathing really heavy and then wakes back up after 1-2 minutes. Patient has been under the care of cardiology and endocrinology. She states that she was diagnosed with postural " "hypotension with vasovagal response. She has never been prescribed a Holter Monitor.        Tilt test performed and interpreted by Dr. Franklin on 07/15/2022:  Initial supine HR: 95 bpm  Initial supine BP: 146/72 mmHg  Initial tilt (60 deg) HR: 118 bpm  Initial tilt (60 deg) BP: 95/59 mmHg  Maximum heart rate seen at 14: (min:sec) after procedure start.  Maximum heart rate: 131 bpm  BP during maximum HR: 84/42 mmHg  Minimum blood pressure seen at 7: (min:sec) after procedure start.  HR during minimum BP: 127 bpm  Minimum blood pressure: 79/46 mmHg  Symptoms seen on initial tilt include: "Tired," heavy legs, sleepy.. The test was stopped due to end of test duration.       I personally reviewed the HUT data.  Of import is that the diastolic blood pressure never went up with tilting and in fact it dropped significantly even a 30°.  In addition, heart rate increased to 126 beats per minute within a minute of tilting to 60°.  No C fiber reactions were noted.  No further pharmacologically stimulated testing was performed.  No impedance measurements performed.     Additional details gleaned from today's interview :  The diagnosis of orthostatic hypotension was made approximately 2 to 2 and half years ago.  4- 5 years ago she had the glioblastoma removed received chemotherapy and radiation (Dr. Champ Celaya).    Since then she is on Synthroid and hydrocortisone.  Per MRI, the pituitary said to be normal.  Her cortisol level was 13.9 on 04/01/2024.  On March 15th, she had 3 falls and had a navicular ORIF; she fell again and re broke her right foot  Prior to that she was having a fall roughly every month or 2.    She gets orthostatic dizziness daily and has to sit down.    She takes midodrine 2.5 mg in the morning and again sometime between 3 and 5:00 p.m. if her blood pressure is not high.  Until recently she was on phenytoin.  >>  POTS due to partial efferent autonomic dysfunction and evaluate her for pituitary failure. "   >>   We will try to confirm the putative diagnosis of autonomic dysfunction.     Update 06/30/2024 :  Plasma catecholamines were WNL - mid to low range. Urine excretion of catecholamines and metanephrines were on the low side.  PTH is better but still somewhat elevated.  Vitamin D is on the low side.    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      Current Outpatient Medications   Medication Sig    acetaminophen (TYLENOL) 500 MG tablet Take 1 tablet (500 mg total) by mouth every 6 (six) hours.    BIOTIN ORAL Take by mouth.    calcitRIOL (ROCALTROL) 0.25 MCG Cap Take 1 capsule (0.25 mcg total) by mouth once daily.    cholecalciferol, vitamin D3, (VITAMIN D3) 50 mcg (2,000 unit) Cap capsule Take 2,000 Units by mouth every evening.    cyanocobalamin (VITAMIN B-12) 100 MCG tablet Take 100 mcg by mouth once daily.    dexAMETHasone (DECADRON) 4 mg/mL injection Inject 1ml (4mg) into the muscle for symptoms of severe adrenal insufficiency. 3ml syringe w/18 g needle to draw x10 and 21g 1 inch needle to injection x10    diphenoxylate-atropine 2.5-0.025 mg (LOMOTIL) 2.5-0.025 mg per tablet Take 1 tablet by mouth 4 (four) times daily as needed for Diarrhea.    DULoxetine (CYMBALTA) 30 MG capsule TAKE 2 CAPSULES (60 MG TOTAL) BY MOUTH ONCE DAILY. (Patient taking differently: Take 60 mg by mouth every evening.)    ergocalciferol (ERGOCALCIFEROL) 50,000 unit Cap Take 1 capsule (50,000 Units total) by mouth every 7 days.    ferrous sulfate (IRON, FERROUS SULFATE,) 325 mg (65 mg iron) Tab tablet Take 1 tablet (325 mg total) by mouth 2 (two) times daily.    ferrous sulfate 324 mg (65 mg iron) TbEC Take 325 mg by mouth nightly.     hydrocortisone (CORTEF) 10 MG Tab Take 1 and 1/2 tablet by mouth every morning and 1/2 tablet in the afternoon    hyoscyamine (LEVSIN/SL) 0.125 mg Subl PLACE 1 TABLET (0.125 MG TOTAL) UNDER THE TONGUE EVERY 4 (FOUR) HOURS AS NEEDED.    levothyroxine  (SYNTHROID) 112 MCG tablet Take 1 tablet (112 mcg total) by mouth before breakfast.    melatonin 5 mg TbDL Take 1 tablet by mouth nightly.    phenytoin (DILANTIN) 100 MG ER capsule Take 2 capsules (200 mg total) by mouth nightly.    calcium carbonate (TUMS) 200 mg calcium (500 mg) chewable tablet Take 1 tablet (500 mg total) by mouth 3 (three) times daily as needed for Heartburn.    ivabradine (CORLANOR) 5 mg Tab Take 1 tablet (5 mg total) by mouth 2 (two) times daily.    ivabradine (CORLANOR) 5 mg Tab Take 1 tablet (5 mg total) by mouth 2 (two) times daily.    midodrine (PROAMATINE) 5 MG Tab Take 4 doses a day 3 hours apart    rivaroxaban (XARELTO) 20 mg Tab Take 1 tablet (20 mg total) by mouth once daily.    UNABLE TO FIND Take 1 tablet by mouth Daily. medication name: Biotin 2500mcg     No current facility-administered medications for this visit.     Review of Systems     Constitutional: Reviewed  for decreased appetite, weight gain and weight loss.   HENT: Reviewed for nosebleeds.    Eyes:  Reviewed for blurred vision and visual disturbance.   Cardiovascular: Reviewed for chest pain, claudication, cyanosis,dyspnea on exertion, leg swelling, orthopnea,paroxysmal nocturnal dyspnearregular heartbeats, palpitations, near-syncope, and syncope.   Respiratory: Reviewed for cough, shortness of breath, wheezing, sleep disturbances due to breathing and snoring, .    Endocrine: Reviewed for heat intolerance.   Hematologic/Lymphatic: Reviewed for easy bruisability/bleeding.   Skin: Reviewed for rash.   Musculoskeletal: Reviewed for muscle weakness and myalgias.   Gastrointestinal: Reviewed for abdominal pain, anorexia, melena, nausea and vomiting.   Genitourinary: Reviewed for menorrhagia, frequency, nocturia and incontinence.   Neurological: Reviewed for excessive daytime sleepiness, dizziness, vertigo, weakness, headaches, loss of balance and seizures,   Psychiatric/Behavioral:  Reviewed for insomnia, altered mental  status, depression, anxiety and nervousness.       All symptoms reviewed above were negative except for cough, constipation alternating with diarrhea, abdominal pain, heartburn, daytime sleepiness, dizziness, headaches, lightheadedness, numbness, and a host of arthritic complaints as well as anxiety.     Social History     Tobacco Use   Smoking Status Never   Smokeless Tobacco Never        Objective:     Physical Exam  Vitals and nursing note reviewed.   Constitutional:       Appearance: She is well-developed.   HENT:      Head: Normocephalic and atraumatic.      Right Ear: External ear normal.      Left Ear: External ear normal.   Neck:      Thyroid: No thyromegaly.   Cardiovascular:      Rate and Rhythm: Normal rate and regular rhythm.      Pulses: Intact distal pulses.           Carotid pulses are 2+ on the right side and 2+ on the left side.       Radial pulses are 2+ on the right side and 2+ on the left side.        Dorsalis pedis pulses are 2+ on the right side and 2+ on the left side.        Posterior tibial pulses are 2+ on the right side and 2+ on the left side.      Heart sounds: Normal heart sounds. No midsystolic click and no opening snap. No murmur heard.     No friction rub. No gallop. No S3 or S4 sounds.      Comments:  Orthostatic BP measurements  Sitting:                            BP  112/68          HR  95  Standing 1 min:                     100/68                 125  Standing 3 min:                     96/62                 138  Standing 5 min:                     Not completed                       Pulmonary:      Effort: Pulmonary effort is normal.      Breath sounds: Normal breath sounds.   Abdominal:      General: There is no distension.      Palpations: Abdomen is soft.      Tenderness: There is no abdominal tenderness.   Musculoskeletal:      Cervical back: Normal range of motion and neck supple.      Right lower leg: No swelling.      Left lower leg: No swelling.      Right ankle: No  "swelling.      Left ankle: No swelling.   Skin:     General: Skin is warm.      Findings: No rash.      Nails: There is no clubbing.   Neurological:      Mental Status: She is alert and oriented to person, place, and time.      Cranial Nerves: No cranial nerve deficit.      Gait: Gait normal.   Psychiatric:         Speech: Speech normal.         Behavior: Behavior normal.         Thought Content: Thought content normal.       BP 96/62 (Patient Position: Standing)   Pulse (!) 138   Ht 5' 7" (1.702 m)   LMP 10/23/1997 (Exact Date)   SpO2 99%   BMI 21.77 kg/m²       No results found for this or any previous visit.    WBC   Date Value Ref Range Status   06/10/2024 8.32 3.90 - 12.70 K/uL Final     Hematocrit   Date Value Ref Range Status   06/10/2024 32.4 (L) 37.0 - 48.5 % Final     Hemoglobin   Date Value Ref Range Status   06/10/2024 10.3 (L) 12.0 - 16.0 g/dL Final     Lab Results   Component Value Date     06/10/2024     Lab Results   Component Value Date    CREATININE 1.6 (H) 06/10/2024    EGFRNORACEVR 37.4 (A) 06/10/2024    K 4.4 06/10/2024     No results found for: "BNP"         reports no history of alcohol use.  Past Medical History:   Diagnosis Date    Allergic rhinitis     Anemia     Arthritis     Basal cell carcinoma     Broken ankle     Cancer 1985    Clotting disorder     Disorder of kidney and ureter     DVT (deep venous thrombosis)     Factor V deficiency     Fracture of elbow     left    Glioblastoma multiforme of brain     Hypothyroidism     IBS (irritable bowel syndrome)     Osteoporosis     Panhypopituitarism     Peripheral polyneuropathy     Secondary adrenal insufficiency     Seizures     Thyroid disease 1987     Past Surgical History:   Procedure Laterality Date    APPENDECTOMY      BRAIN SURGERY      for glioblastoma      SECTION      CHOLECYSTECTOMY  2007    CLOSURE OF DEFECT OF MOHS PROCEDURE Left 2018    Procedure: CLOSURE, MOHS PROCEDURE " DEFECT SCALP  Flap closure;  Surgeon: Yrn Novak MD;  Location: [facility] 2ND FLR;  Service: Plastics;  Laterality: Left;    COLONOSCOPY N/A 04/23/2019    Procedure: COLONOSCOPY Suprep;  Surgeon: Tony Mosher MD;  Location: [facility] ENDO;  Service: Endoscopy;  Laterality: N/A;    ESOPHAGOGASTRODUODENOSCOPY N/A 04/23/2019    Procedure: EGD/colon;  Surgeon: Tony Mosher MD;  Location: [facility] ENDO;  Service: Endoscopy;  Laterality: N/A;    EXCISION OF GANGLION CYST OF HAND Left 01/07/2020    Procedure: EXCISION, GANGLION CYST, HAND;  Surgeon: Ross Drew Jr., MD;  Location: [facility] OR;  Service: Orthopedics;  Laterality: Left;    HYSTERECTOMY  1999    ILIAC CREST BONE GRAFT Right 3/15/2024    Procedure: BONE GRAFT, ILIAC CREST;  Surgeon: Geoffrey Goodson MD;  Location: [facility];  Service: Orthopedics;  Laterality: Right;  acumed harvester    ILIAC CREST BONE GRAFT Right 4/2/2024    Procedure: BONE GRAFT, ILIAC CREST;  Surgeon: Geoffrey Goodson MD;  Location: [facility] OR;  Service: Orthopedics;  Laterality: Right;    MIDFOOT ARTHRODESIS Right 4/2/2024    Procedure: FUSION, JOINT, MIDFOOT;  Surgeon: Geoffrey Goodson MD;  Location: [facility];  Service: Orthopedics;  Laterality: Right;  mini c arm    OPEN REDUCTION AND INTERNAL FIXATION (ORIF) OF FRACTURE OF TARSAL BONE Right 3/15/2024    Procedure: ORIF, FRACTURE, TARSAL BONE;  Surgeon: Geoffrey Goodson MD;  [facility]: [facility] OR;  Service: Orthopedics;  Laterality: Right;  supine, mini C arm, Styker, block okay    SHOULDER SURGERY Right     TILT TABLE TEST N/A 07/15/2022    Procedure: TILT TABLE TEST;  Surgeon: Amol Kohler MD;  Location: [facility] EP LAB;  Service: Cardiology;  Laterality: N/A;  Orthostatic hypotension, dizziness, Tilt Table Test with EEG, Dr.Tiffany Roca (neuro), DM    TUBAL LIGATION  1998     Family History   Problem Relation Name Age of Onset    Heart disease Mother      Diabetes type II Mother      Diabetes type II Father      Heart attack Maternal  "Grandfather         Assessment:   Borderline adrenal medullary function.  This explains her symptomatology.  1. Orthostatic hypotension    2. Recurrent syncope    3. Secondary adrenal insufficiency    4. Panhypopituitarism    5. Shortness of breath    6. Stage 4 chronic kidney disease    7. Partial symptomatic epilepsy with complex partial seizures, not intractable, without status epilepticus    8. History of adrenal insufficiency    9. Factor V deficiency with DVT x 3    10. Dizziness    11. Irritable bowel syndrome, unspecified type        Plan:   Will treat her with midodrine and Corlanor.  Specifically midodrine 2.5 mg q.3 hours when awake and Corlanor 5 mg p.o. twice a day.  In addition, we will increase her vitamin-D supplementation by 50%.  Finally, she needs to have protein  diet.    As to other recommendations:   I had a long talk with patient and discussed the various treatment options available. Effective symptom relief and long term management rely heavily on instituting life style changes and "holistic" measures.   Pharmalogical treatment is used as a supplemental therapy rather than a primary approach.    As to practical measures to avoid initiation of syncopal/near syncopal events:        Avoid sudden standing from a supine or sitting position especially in the morning after awakening.  You might want to move and tense the muscles of your shoulders, neck and lower extremities prior to standing.  This has to be done for a full minute (which seems like a very long time when one is doing boring activities). Sometimes, a short 15 second period of exercising the legs could back fire and enhance the likelihood of initiating an event.              Sleep on a bed that is tilted up (place 6 inch blocks to raise the head of the bed) helps decrease the dehydration that results from increased renal (kidney) filtration associated with the supine position.            Avoid prolonged still standing (keep tensing " and  moving the legs while in a line etc.), prolonged car rides without hydration nor lower body activity etc..            Avoid excessive heat, excessively hot showers, alcohol intake etc.        Although they are often impractical and uncomfortable, properly measured lower body garments with sequential counter pressure can be quite helpful.  In general, we recommend starting with knee-high Jobst stockings that provide 35 mm of counter pressure.  >>  Specifically, wear support stockings as follows:       Start with knee highs, toe +/- heel cut outs +/- side zippers.   For best performance,apply in am, after showering, while lying in bed: Massage your legs towards your abdomen then roll the stockings up towards the knees, making sure that the pressure is higher at the ankles than at the shins and knees.  Buy at least 4 pairs and rotate them each day, hand wash, cold water, gentle detergent, drip dry.    In the [facility] area, fitted graduated counter-pressure lower body garments (knee, thigh or waist high stockings, abdominal binders etc) can be obtained at a reduced, affordable price from:                    # [facility] [employer], [address] [place] [address]                                                           Tel:    [phone]       Fax: [phone]                    # [employer]                       #  clinic                                              # [facility]                                                # [facility]                                # [place]                                     In the [place] area:                   # [employer]·  [address]. Tel  [phone]                   # [employer]: [address] · Tel [phone]                   # [facility]: [id number] [facility] · [phone]  [phone]    Or on the internet:                     [employer]                    [email or web address]                    [employer] (their own brand: [employer])    As to practical measures to avoid worsened unpleasant symptoms around acute events:      As soon as you recognize symptoms that could lead to a significant dizziness/near syncopal/syncopal event, you should immediately lie down wherever you are supporting herself with the closest wall, sturdy furniture etc. it is not advisable to try and get to the soft couch or bed. The symptoms that you feel in the aftermath will be greatly reduced in duration if you act immediately.  You should thrive to overcome your embarrassment when these events happen in public and react your symptoms rather than to your social environment.     After you get on to a secure spot, stay down and if possible, raise your legs above the level of your head/shoulders and stayed there for 15 to 20 minutes.  Do not allow good Samaritans to try and prop you up.  This will only restart the syncopal reaction.     In most cases, medical help and or visits to the emergency room are unnecessary.  Once you recover, simply increase your fluid intake by trying to ingest perhaps a L of water and or your favorite electrolytic drink within the hour after your symptoms.  Be wary the rest of the day and certainly avoid driving because symptoms tend to recur more frequently within the 24 to 48 hours following any syncopal event.    As to the holistic measures for long-term management:     1.Sleep:  Establish an appropriate sleep schedule.  No jet lag sleep.  My estimate is that patient needs at least 8  hours of restful sleep a night/day. This should be allocated fully and patient should aim to go to bed and wake up at the same time each day using an alarm clock to awaken them.  After a month of regular 8  hour nights, if they still awaken only after the alarm rings (and snoozes), they  "to increase their time allotment by at least 30 minutes and repeat the same process for a month. At the end of each month, the allotment should be similarly increased by 30 more minutes as needed each month until patient starts waking up reliably on their own, beating the alarm clock   Again, the schedule should be the same on both week days and weekends.    As to the turning in routine, patient should avoid using any blue light devices (cell phones, computers, TVs etc) while in bed.    2. Diet:  Diet should favor a high protein caloric intake (meats of course but grains -especially chickpeas, cheeses, greek yogurt are healthy options that are "power packed" with proteins).     Of course, a high salt high fluid intake is recommended (considers supplements such as Pedialyte adult etc.).    3. Exercise:     .After many years of working with patients with orthostatic symptoms, a retiring physical therapist we worked with put together a booklet of exercises recommended for improving lower body venous/vascular health/tone. We will supply a copy. The recommended time commitment for these exercises is 20 min a day, 5 times a week.       Other excellent forms of exercise that will be helpful include:          Swimming (sander, competitive style swimming preferably under guidance of a "gentle"  - indoor pool at a [facility] or a [facility] for instance).          Resistance weight training (NOT weightlifting -ie no clean and jerk, clean and press or snatch moves) and low weight dumbbells.     4. Mental health measures:       Orthostatic hypotension often causes significant anxiety and the PTSD like syndrome.  In that case, patients are encouraged to seek professional psychiatric help as needed.        In general however, simple measures of daily relaxation are very helpful in helping reducing symptomatology.  We recommend a daily 30 minutes break of outdoors vegging" soaking up fresh air and sunlight while sipping on a refreshing " non alcoholic drink..                      No orders of the defined types were placed in this encounter.      No follow-ups on file.    Medications Discontinued During This Encounter   Medication Reason    midodrine (PROAMATINE) 2.5 MG Tab        Medications Ordered This Encounter   Medications    ivabradine (CORLANOR) 5 mg Tab     Sig: Take 1 tablet (5 mg total) by mouth 2 (two) times daily.     Dispense:  180 tablet     Refill:  3    midodrine (PROAMATINE) 5 MG Tab     Sig: Take 4 doses a day 3 hours apart     Dispense:  360 tablet     Refill:  3       Medication List with Changes/Refills   New Medications    IVABRADINE (CORLANOR) 5 MG TAB    Take 1 tablet (5 mg total) by mouth 2 (two) times daily.    IVABRADINE (CORLANOR) 5 MG TAB    Take 1 tablet (5 mg total) by mouth 2 (two) times daily.    MIDODRINE (PROAMATINE) 5 MG TAB    Take 4 doses a day 3 hours apart   Current Medications    ACETAMINOPHEN (TYLENOL) 500 MG TABLET    Take 1 tablet (500 mg total) by mouth every 6 (six) hours.    BIOTIN ORAL    Take by mouth.    CALCITRIOL (ROCALTROL) 0.25 MCG CAP    Take 1 capsule (0.25 mcg total) by mouth once daily.    CALCIUM CARBONATE (TUMS) 200 MG CALCIUM (500 MG) CHEWABLE TABLET    Take 1 tablet (500 mg total) by mouth 3 (three) times daily as needed for Heartburn.    CHOLECALCIFEROL, VITAMIN D3, (VITAMIN D3) 50 MCG (2,000 UNIT) CAP CAPSULE    Take 2,000 Units by mouth every evening.    CYANOCOBALAMIN (VITAMIN B-12) 100 MCG TABLET    Take 100 mcg by mouth once daily.    DEXAMETHASONE (DECADRON) 4 MG/ML INJECTION    Inject 1ml (4mg) into the muscle for symptoms of severe adrenal insufficiency. 3ml syringe w/18 g needle to draw x10 and 21g 1 inch needle to injection x10    DIPHENOXYLATE-ATROPINE 2.5-0.025 MG (LOMOTIL) 2.5-0.025 MG PER TABLET    Take 1 tablet by mouth 4 (four) times daily as needed for Diarrhea.    DULOXETINE (CYMBALTA) 30 MG CAPSULE    TAKE 2 CAPSULES (60 MG TOTAL) BY MOUTH ONCE DAILY.    ERGOCALCIFEROL  (ERGOCALCIFEROL) 50,000 UNIT CAP    Take 1 capsule (50,000 Units total) by mouth every 7 days.    FERROUS SULFATE (IRON, FERROUS SULFATE,) 325 MG (65 MG IRON) TAB TABLET    Take 1 tablet (325 mg total) by mouth 2 (two) times daily.    FERROUS SULFATE 324 MG (65 MG IRON) TBEC    Take 325 mg by mouth nightly.     HYDROCORTISONE (CORTEF) 10 MG TAB    Take 1 and 1/2 tablet by mouth every morning and 1/2 tablet in the afternoon    HYOSCYAMINE (LEVSIN/SL) 0.125 MG SUBL    PLACE 1 TABLET (0.125 MG TOTAL) UNDER THE TONGUE EVERY 4 (FOUR) HOURS AS NEEDED.    LEVOTHYROXINE (SYNTHROID) 112 MCG TABLET    Take 1 tablet (112 mcg total) by mouth before breakfast.    MELATONIN 5 MG TBDL    Take 1 tablet by mouth nightly.    PHENYTOIN (DILANTIN) 100 MG ER CAPSULE    Take 2 capsules (200 mg total) by mouth nightly.    UNABLE TO FIND    Take 1 tablet by mouth Daily. medication name: Biotin 2500mcg   Changed and/or Refilled Medications    Modified Medication Previous Medication    RIVAROXABAN (XARELTO) 20 MG TAB XARELTO 20 mg Tab       Take 1 tablet (20 mg total) by mouth once daily.    TAKE 1 TABLET BY MOUTH EVERY DAY   Discontinued Medications    IVABRADINE 5 MG/5 ML SOLN    Take 5 mg by mouth 2 (two) times daily.    MIDODRINE (PROAMATINE) 2.5 MG TAB    Take 1 tablet (2.5 mg total) by mouth every 4 (four) hours.        This note is at least partially dictated using the M*Modal Fluency Direct word recognition program. There are word recognition mistakes that are occasionally missed on review.     Pre-visit chart review: 25 min    Face to face time: 25 min, > 50% of which spent in education    I have reviewed the actual images of all relevant ECG, rhythm, holter and long term monitoring tracings available in EPIC, MUSE  and in legacy as well as outside records.   I have reviewed the actual images of all relevant CXRays.   I have directly evaluated all relevant data pertaining to any CIED.     Post visit chart documentation and care  coordination: 10 min

## 2024-06-18 NOTE — PROGRESS NOTES
I have reviewed the notes, assessments, and/or procedures performed by  and I concur with her/his documentation of Thelma Nguyen.  Date of Service: 6/13/2024     58 y.o.with chronic kidney disease  stage 3b 2/2 NSAIDS use.   Serum creatinine between 1.3-1.6 mg/dL    Long standing  DM, HTN.    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     Adrenal insufficiency, on Hydrocortisone,

## 2024-06-19 DIAGNOSIS — S92.251A CLOSED DISPLACED FRACTURE OF NAVICULAR BONE OF RIGHT FOOT, INITIAL ENCOUNTER: Primary | ICD-10-CM

## 2024-06-25 ENCOUNTER — OFFICE VISIT (OUTPATIENT)
Dept: ORTHOPEDICS | Facility: CLINIC | Age: 59
End: 2024-06-25
Payer: COMMERCIAL

## 2024-06-25 ENCOUNTER — HOSPITAL ENCOUNTER (OUTPATIENT)
Dept: RADIOLOGY | Facility: HOSPITAL | Age: 59
Discharge: HOME | End: 2024-06-25
Attending: SURGERY
Payer: COMMERCIAL

## 2024-06-25 VITALS — BODY MASS INDEX: 21.8 KG/M2 | WEIGHT: 138.88 LBS | HEIGHT: 67 IN

## 2024-06-25 DIAGNOSIS — S92.251G: Primary | ICD-10-CM

## 2024-06-25 DIAGNOSIS — S92.251A CLOSED DISPLACED FRACTURE OF NAVICULAR BONE OF RIGHT FOOT, INITIAL ENCOUNTER: ICD-10-CM

## 2024-06-25 PROCEDURE — 99024 POSTOP FOLLOW-UP VISIT: CPT | Mod: S$GLB,,, | Performed by: SURGERY

## 2024-06-25 PROCEDURE — 99999 PR PBB SHADOW E&M-EST. PATIENT-LVL II: CPT | Mod: PBBFAC,,, | Performed by: SURGERY

## 2024-06-25 PROCEDURE — 73630 X-RAY EXAM OF FOOT: CPT | Mod: TC,PN,RT

## 2024-06-25 PROCEDURE — 3044F HG A1C LEVEL LT 7.0%: CPT | Mod: CPTII,S$GLB,, | Performed by: SURGERY

## 2024-06-25 PROCEDURE — 73630 X-RAY EXAM OF FOOT: CPT | Mod: 26,RT,, | Performed by: STUDENT IN AN ORGANIZED HEALTH CARE EDUCATION/TRAINING PROGRAM

## 2024-06-25 PROCEDURE — 3066F NEPHROPATHY DOC TX: CPT | Mod: CPTII,S$GLB,, | Performed by: SURGERY

## 2024-06-27 NOTE — PROGRESS NOTES
"SUBJECTIVE:    Ms. Nguyen is here today for a follow up visit.12 weeks status post navicular ORIF and dorsal bridge plate.  Doing well. No pain.        OBJECTIVE:      Vitals:    06/25/24 1109   Weight: 63 kg (138 lb 14.2 oz)   Height: 5' 7" (1.702 m)   PainSc:   2       Lower Extremity Exam  Alert, oriented  Incisions well healed  30 plantarflexion, 20 dorsiflexion  Denies pain, nontender to palpation  Stands without pain  NV at baseline     DIAGNOSTIC STUDIES: x-rays demonstrate healing navicular fracture and dorsal bridge plate unchanged from previous - mild luncency about talar screws. Weight bearing views. Independently interpreted by me    ASSESSMENT:   1. S/p revision navicular ORIF and bridge plate      PLAN:  She can begin weight bearing as tolerated in the boot - boot wean with PT and progress to weightbearing as tolerated.    Follow up in 4-6 weeks for further management    Discussed again screw lucency in Talus, dorsal bridge plate. If screws or plate should break and/or become painful we can remove them. If they break and plate is stable or maintains osseous or fibrous stability then can likely leave in place as she has had multiple surgeries and is a high risk patient given her GBM and need for steroid tapers, etc. She would like to avoid surgery as she is able to at this time.    Follow up in 4-6 weeks with x-rays 3 views right foot weightbearing.    Geoffrey Goodson MD  [facility]  Orthopedic Surgery      This note was done with voice recognition software. Please excuse any errors missed in proof reading.     "

## 2024-07-07 ENCOUNTER — PATIENT MESSAGE (OUTPATIENT)
Dept: INTERNAL MEDICINE | Facility: CLINIC | Age: 59
End: 2024-07-07
Payer: COMMERCIAL

## 2024-07-07 DIAGNOSIS — G40.209 PARTIAL SYMPTOMATIC EPILEPSY WITH COMPLEX PARTIAL SEIZURES, NOT INTRACTABLE, WITHOUT STATUS EPILEPTICUS: Primary | ICD-10-CM

## 2024-07-08 ENCOUNTER — PATIENT MESSAGE (OUTPATIENT)
Dept: CARDIOLOGY | Facility: CLINIC | Age: 59
End: 2024-07-08
Payer: COMMERCIAL

## 2024-07-08 ENCOUNTER — TELEPHONE (OUTPATIENT)
Dept: CARDIOLOGY | Facility: CLINIC | Age: 59
End: 2024-07-08
Payer: COMMERCIAL

## 2024-07-08 DIAGNOSIS — R55 RECURRENT SYNCOPE: Primary | ICD-10-CM

## 2024-07-08 RX ORDER — IVABRADINE 7.5 MG/1
7.5 TABLET, FILM COATED ORAL 2 TIMES DAILY
Qty: 60 TABLET | Refills: 11 | Status: SHIPPED | OUTPATIENT
Start: 2024-07-08

## 2024-07-08 RX ORDER — ERGOCALCIFEROL 1.25 MG/1
50000 CAPSULE ORAL
Qty: 7 CAPSULE | Refills: 0 | Status: CANCELLED | OUTPATIENT
Start: 2024-07-08

## 2024-07-09 ENCOUNTER — PATIENT MESSAGE (OUTPATIENT)
Dept: NEPHROLOGY | Facility: CLINIC | Age: 59
End: 2024-07-09
Payer: COMMERCIAL

## 2024-07-16 ENCOUNTER — PATIENT MESSAGE (OUTPATIENT)
Dept: CARDIOLOGY | Facility: CLINIC | Age: 59
End: 2024-07-16
Payer: COMMERCIAL

## 2024-07-17 NOTE — PROGRESS NOTES
Progress Note  Nephrology      Referring physician: Chandra Mariano MD    Reason for visit: CKD     SUBJECTIVE:   56 y.o. female   has a past medical history of Allergic rhinitis, Anemia (1987), Arthritis (2000), Basal cell carcinoma, Broken ankle, Cancer (1985), Clotting disorder (1993), Disorder of kidney and ureter, DVT (deep venous thrombosis), Factor V deficiency, Fracture of elbow, Glioblastoma multiforme of brain, Hypothyroidism, IBS (irritable bowel syndrome) (1975), Osteoporosis, Panhypopituitarism, Peripheral polyneuropathy, Secondary adrenal insufficiency, Seizures (1995), and Thyroid disease (1987). who has been following up in renal clinic for ckd. Patient feels tiered today, no urinary or cardiac symptoms, no NSAIDs intake.        OBJECTIVE:     Vitals:    03/29/22 1026   BP: 132/68   Pulse: 89          Physical Exam:  General: no distress, well nourished  HENT: PERRLA, Normal mouth nose and ears.  Neck: no JVD and thyroid not enlarged, symmetric, no tenderness/mass/nodules  Lungs: clear to auscultation bilaterally and normal respiratory effort  Cardiovascular: regular rate and rhythm, S1, S2 normal, no murmur, click, rub or gallop.   Abdomen: soft, non-tender non-distented; bowel sounds normal  Skin: No rashes or lesions  Musculoskeletal:no edema in LE, no deformities.   Lymph Nodes: No cervical or supraclavicular adenopathy  Neurologic: Normal strength and tone. No focal numbness or weakness    Dialysis Access: Not applicable.        Medications:    Current Outpatient Medications:     acetaminophen (TYLENOL) 325 MG tablet, Take 650 mg by mouth every 6 (six) hours as needed for Pain. , Disp: , Rfl:     alendronate (FOSAMAX) 70 MG tablet, Take 1 tablet every 7 days, Disp: 12 tablet, Rfl: 1    biotin 1 mg Cap, Take by mouth., Disp: , Rfl:     ERGOCALCIFEROL, VITAMIN D2, (VITAMIN D ORAL), Take 1 capsule by mouth nightly. , Disp: , Rfl:     ferrous sulfate 324 mg (65 mg iron) TbEC, Take 325 mg by mouth  nightly. , Disp: , Rfl:     hydrocortisone (CORTEF) 10 MG Tab, Take one and half tablets int he morning and half a tablet in the afternoon. Double the dose in case of illness., Disp: 75 tablet, Rfl: 6    levothyroxine (SYNTHROID) 100 MCG tablet, Take 1 tablet (100 mcg total) by mouth before breakfast., Disp: 30 tablet, Rfl: 11    loratadine-pseudoephedrine  mg (CLARITIN-D 24-HOUR)  mg per 24 hr tablet, Take 1 tablet by mouth as needed. , Disp: , Rfl:     mupirocin (BACTROBAN) 2 % ointment, 2 (two) times daily. Apply to affected area, Disp: , Rfl:     phenytoin (DILANTIN) 100 MG ER capsule, TAKE 4 CAPSULES (400 MG) EVERY DAY AT BEDTIME, Disp: 360 capsule, Rfl: 3    rivaroxaban (XARELTO) 20 mg Tab, Take 1 tablet (20 mg total) by mouth once daily., Disp: 90 tablet, Rfl: 4    sertraline (ZOLOFT) 50 MG tablet, Take 1 tablet (50 mg total) by mouth once daily., Disp: 30 tablet, Rfl: 11         Laboratory:  Lab Results   Component Value Date    CREATININE 1.51 (H) 03/28/2022       Prot/Creat Ratio, Urine   Date Value Ref Range Status   05/25/2021 0.08 0.00 - 0.20 Final   09/02/2020 0.07 0.00 - 0.20 Final   02/28/2020 Unable to calculate 0.00 - 0.20 Final       Lab Results   Component Value Date     03/28/2022    K 4.9 03/28/2022    CO2 23 03/28/2022       last PTH   Lab Results   Component Value Date    .6 (H) 03/28/2022    CALCIUM 8.3 (L) 03/28/2022    PHOS 3.2 03/28/2022       Lab Results   Component Value Date    HGB 10.0 (L) 03/28/2022        No results found for: HGBA1C    Lab Results   Component Value Date    LDLCALC 116.0 03/28/2022       Other Labs were reviewed      ASSESSMENT/PLAN:     CKD IIIB/A1  -likely from previous NSAIDs intake  -Cr baseline ~ 1.3 - 1.5 with eGFR ~ 40-45 ml/min, stable  -UPCR 70 mg/g  -Renal US ckd    IONA on CKD can be from hypotension    Anemia  -from ckd  -Hgb goal ~ 10  -Iron stores not available    Secondary Hyperparathyroidism  -Phos/Ca acceptable  -PTH  acceptable  -Vit D acceptable    Acid/Base  -No Metabolic acidosis      Hyperkalemia, improved  -likely from adrenal insuffeciency due to hypopituitarism from brain tumor resection 1987  -on Cortef               PLAN:  -Check Iron panel and B12  -Avoid NSAIDs intake        RTC in 6 months with labs      BRENNON RUSSELL MD  NEPHROLOGY ATTENDING           PRINCIPAL DISCHARGE DIAGNOSIS  Diagnosis: Morbid obesity due to excess calories  Assessment and Plan of Treatment: Mechanical complication with gastrointestinal device     PRINCIPAL DISCHARGE DIAGNOSIS  Diagnosis: Morbid obesity due to excess calories  Assessment and Plan of Treatment: Mechanical complication with gastrointestinal device      SECONDARY DISCHARGE DIAGNOSES  Diagnosis: Atrial fibrillation  Assessment and Plan of Treatment: PLEASE follow up with your cardiologist as soon as possible regarding staying on Xarelto or changing to another anticoagulant such as Eliquis because of your bariatric surgery.     PRINCIPAL DISCHARGE DIAGNOSIS  Diagnosis: Morbid obesity due to excess calories  Assessment and Plan of Treatment: Mechanical complication with gastrointestinal device      SECONDARY DISCHARGE DIAGNOSES  Diagnosis: Atrial fibrillation  Assessment and Plan of Treatment: PLEASE continue taking Xarelto as directed.

## 2024-07-18 ENCOUNTER — PATIENT MESSAGE (OUTPATIENT)
Dept: INTERNAL MEDICINE | Facility: CLINIC | Age: 59
End: 2024-07-18
Payer: COMMERCIAL

## 2024-07-18 ENCOUNTER — PATIENT MESSAGE (OUTPATIENT)
Dept: CARDIOLOGY | Facility: CLINIC | Age: 59
End: 2024-07-18
Payer: COMMERCIAL

## 2024-07-18 DIAGNOSIS — Z12.31 BREAST CANCER SCREENING BY MAMMOGRAM: Primary | ICD-10-CM

## 2024-07-18 DIAGNOSIS — Z00.00 ANNUAL PHYSICAL EXAM: ICD-10-CM

## 2024-07-25 ENCOUNTER — PATIENT MESSAGE (OUTPATIENT)
Dept: ORTHOPEDICS | Facility: CLINIC | Age: 59
End: 2024-07-25
Payer: COMMERCIAL

## 2024-08-01 DIAGNOSIS — S92.251G: Primary | ICD-10-CM

## 2024-08-06 ENCOUNTER — OFFICE VISIT (OUTPATIENT)
Dept: ORTHOPEDICS | Facility: CLINIC | Age: 59
End: 2024-08-06
Payer: COMMERCIAL

## 2024-08-06 ENCOUNTER — HOSPITAL ENCOUNTER (OUTPATIENT)
Dept: RADIOLOGY | Facility: HOSPITAL | Age: 59
Discharge: HOME | End: 2024-08-06
Attending: SURGERY
Payer: COMMERCIAL

## 2024-08-06 ENCOUNTER — PATIENT MESSAGE (OUTPATIENT)
Dept: CARDIOLOGY | Facility: CLINIC | Age: 59
End: 2024-08-06
Payer: COMMERCIAL

## 2024-08-06 VITALS — BODY MASS INDEX: 21.69 KG/M2 | WEIGHT: 138.19 LBS | HEIGHT: 67 IN

## 2024-08-06 DIAGNOSIS — S92.251A CLOSED DISPLACED FRACTURE OF NAVICULAR BONE OF RIGHT FOOT, INITIAL ENCOUNTER: Primary | ICD-10-CM

## 2024-08-06 DIAGNOSIS — S92.251G: ICD-10-CM

## 2024-08-06 PROCEDURE — 3044F HG A1C LEVEL LT 7.0%: CPT | Mod: CPTII,S$GLB,, | Performed by: SURGERY

## 2024-08-06 PROCEDURE — 1159F MED LIST DOCD IN RCRD: CPT | Mod: CPTII,S$GLB,, | Performed by: SURGERY

## 2024-08-06 PROCEDURE — 99999 PR PBB SHADOW E&M-EST. PATIENT-LVL III: CPT | Mod: PBBFAC,,, | Performed by: SURGERY

## 2024-08-06 PROCEDURE — 73630 X-RAY EXAM OF FOOT: CPT | Mod: TC,PN,RT

## 2024-08-06 PROCEDURE — 73630 X-RAY EXAM OF FOOT: CPT | Mod: 26,RT,, | Performed by: RADIOLOGY

## 2024-08-06 PROCEDURE — 99213 OFFICE O/P EST LOW 20 MIN: CPT | Mod: S$GLB,,, | Performed by: SURGERY

## 2024-08-06 PROCEDURE — 3066F NEPHROPATHY DOC TX: CPT | Mod: CPTII,S$GLB,, | Performed by: SURGERY

## 2024-08-06 PROCEDURE — 3008F BODY MASS INDEX DOCD: CPT | Mod: CPTII,S$GLB,, | Performed by: SURGERY

## 2024-08-08 ENCOUNTER — PATIENT MESSAGE (OUTPATIENT)
Dept: AUDIOLOGY | Facility: CLINIC | Age: 59
End: 2024-08-08
Payer: COMMERCIAL

## 2024-08-12 ENCOUNTER — PATIENT MESSAGE (OUTPATIENT)
Dept: ORTHOPEDICS | Facility: CLINIC | Age: 59
End: 2024-08-12
Payer: COMMERCIAL

## 2024-08-20 ENCOUNTER — PATIENT MESSAGE (OUTPATIENT)
Dept: ORTHOPEDICS | Facility: CLINIC | Age: 59
End: 2024-08-20
Payer: COMMERCIAL

## 2024-08-27 ENCOUNTER — HOSPITAL ENCOUNTER (OUTPATIENT)
Dept: RADIOLOGY | Facility: CLINIC | Age: 59
Discharge: HOME | End: 2024-08-27
Attending: INTERNAL MEDICINE
Payer: COMMERCIAL

## 2024-08-27 DIAGNOSIS — M81.8 OTHER OSTEOPOROSIS WITHOUT CURRENT PATHOLOGICAL FRACTURE: ICD-10-CM

## 2024-08-27 PROCEDURE — 77080 DXA BONE DENSITY AXIAL: CPT | Mod: 26,,, | Performed by: INTERNAL MEDICINE

## 2024-08-27 PROCEDURE — 77080 DXA BONE DENSITY AXIAL: CPT | Mod: TC

## 2024-09-05 ENCOUNTER — OFFICE VISIT (OUTPATIENT)
Dept: NEUROLOGY | Facility: CLINIC | Age: 59
End: 2024-09-05
Payer: COMMERCIAL

## 2024-09-05 VITALS
SYSTOLIC BLOOD PRESSURE: 138 MMHG | WEIGHT: 138 LBS | DIASTOLIC BLOOD PRESSURE: 84 MMHG | HEIGHT: 67 IN | BODY MASS INDEX: 21.66 KG/M2 | HEART RATE: 112 BPM

## 2024-09-05 DIAGNOSIS — G40.209 PARTIAL SYMPTOMATIC EPILEPSY WITH COMPLEX PARTIAL SEIZURES, NOT INTRACTABLE, WITHOUT STATUS EPILEPTICUS: Primary | ICD-10-CM

## 2024-09-05 PROCEDURE — 3079F DIAST BP 80-89 MM HG: CPT | Mod: CPTII,S$GLB,, | Performed by: STUDENT IN AN ORGANIZED HEALTH CARE EDUCATION/TRAINING PROGRAM

## 2024-09-05 PROCEDURE — 99999 PR PBB SHADOW E&M-EST. PATIENT-LVL V: CPT | Mod: PBBFAC,,, | Performed by: STUDENT IN AN ORGANIZED HEALTH CARE EDUCATION/TRAINING PROGRAM

## 2024-09-05 PROCEDURE — 3075F SYST BP GE 130 - 139MM HG: CPT | Mod: CPTII,S$GLB,, | Performed by: STUDENT IN AN ORGANIZED HEALTH CARE EDUCATION/TRAINING PROGRAM

## 2024-09-05 PROCEDURE — 3066F NEPHROPATHY DOC TX: CPT | Mod: CPTII,S$GLB,, | Performed by: STUDENT IN AN ORGANIZED HEALTH CARE EDUCATION/TRAINING PROGRAM

## 2024-09-05 PROCEDURE — 1159F MED LIST DOCD IN RCRD: CPT | Mod: CPTII,S$GLB,, | Performed by: STUDENT IN AN ORGANIZED HEALTH CARE EDUCATION/TRAINING PROGRAM

## 2024-09-05 PROCEDURE — 3044F HG A1C LEVEL LT 7.0%: CPT | Mod: CPTII,S$GLB,, | Performed by: STUDENT IN AN ORGANIZED HEALTH CARE EDUCATION/TRAINING PROGRAM

## 2024-09-05 PROCEDURE — 99215 OFFICE O/P EST HI 40 MIN: CPT | Mod: S$GLB,,, | Performed by: STUDENT IN AN ORGANIZED HEALTH CARE EDUCATION/TRAINING PROGRAM

## 2024-09-05 PROCEDURE — 3008F BODY MASS INDEX DOCD: CPT | Mod: CPTII,S$GLB,, | Performed by: STUDENT IN AN ORGANIZED HEALTH CARE EDUCATION/TRAINING PROGRAM

## 2024-09-05 RX ORDER — LACOSAMIDE 50 MG/1
150 TABLET ORAL EVERY 12 HOURS
Qty: 180 TABLET | Refills: 11 | Status: SHIPPED | OUTPATIENT
Start: 2024-09-05 | End: 2025-09-05

## 2024-09-05 RX ORDER — LACOSAMIDE 100 MG/1
100 TABLET ORAL EVERY 12 HOURS
Qty: 14 TABLET | Refills: 0 | Status: SHIPPED | OUTPATIENT
Start: 2024-09-05 | End: 2024-09-12

## 2024-09-05 NOTE — PATIENT INSTRUCTIONS
Week 1: Vimpat 100mg twice daily + Dilantin 200mg ER daily  Week 2: Viimpat 150mg twice daily + Dilantin 100mg ER daily  Week 3: Vimpat 150mg twice daily + stop Dilantin

## 2024-09-05 NOTE — PROGRESS NOTES
[facility]  Clinic Note    Date of Service: 9/5/2024  Patient seen at the request of: Zachary Bender MD    Reason for Consultation  Epilepsy    Assessment:  Thelma Nguyen is a 58 y.o. female who presents with epilepsy.    Patient presents with a history of epilepsy that started in 1984 secondary to glioblastoma in the left temporal lobe.  The glioblastoma was resected in 1985.  Patient was initially started on phenytoin for her seizures with an attempt to wean off the medication after being 4 years seizure-free.  Patient subsequently had another convulsion in 1995.  She has had no other seizures since being back on Dilantin.    Although the patient has been seizure-free on phenytoin, she has since developed osteoporosis.  Due to the significant side effect profile of Dilantin, will transition her to lacosamide.  Patient has concurrent kidney disease and we will dose accordingly.      Calcitriol 26 @ 5/2024  CrCl is 38    Plan:    - switch from Dilantin to Vimpat   - Week 1: Vimpat 100mg twice daily + Dilantin 200mg ER daily  - Week 2: Viimpat 150mg twice daily + Dilantin 100mg ER daily  - Week 3: Vimpat 150mg twice daily + stop Dilantin    - RTC in 2 months        SEIZURE/EVENT PRECAUTIONS INCLUDE:  NO DRIVING until approximately 3-6 months have passed WITHOUT a seizure.   If you have a seizure, you will need to wait another 3 months to be eligible to drive again.  Avoid bathing or swimming alone or unsupervised.  Avoid cooking over large ranges or open flames.    Avoid climbing up heights unsupervised.  Avoid operating heavy machinery.     SEIZURE SAFETY:  When an epileptic seizure occurs, the following should be done to prevent injuries:  Do not hold or tie the person down.  Do not place anything in the person's mouth or try to force the teeth apart. The person is not in danger of swallowing his tongue.  Do not pour any liquid into the persons mouth or offer food or medicines until he is  fully awake.  If possible, turn the person on his side during the seizure.  Place something soft under the person's head, loosen tight clothing, and clear the area of sharp or hard objects.  Stay with the person until the seizure ends. Let the person rest until he is fully awake.  Use a watch to time how long the seizure lasts.   Watch the type of movement and position of the person's head or eyes during the seizure.        Signed:    Buddy Kumar MD  Neurology/Epilepsy  09/05/2024 9:20 AM      HPI:  Thelma Nguyen is a 58 y.o. female with   Past Medical History:   Diagnosis Date    Allergic rhinitis     Anemia 1987    Arthritis 2000    Basal cell carcinoma     Broken ankle     Cancer 1985    Clotting disorder 1993    Disorder of kidney and ureter     DVT (deep venous thrombosis)     Factor V deficiency     Fracture of elbow     left    Glioblastoma multiforme of brain     Hypothyroidism     IBS (irritable bowel syndrome) 1975    Osteoporosis     Panhypopituitarism     Peripheral polyneuropathy     Secondary adrenal insufficiency     Seizures 1995    Thyroid disease 1987     58 y.o. 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    First seizure: in the 1984 due to glioblastoma.  Surgery was in 1985.    Semiology: First seizures were generalized convulsions.  Patient was then on Dilantin for 4 years and then was discontinued.  Patient then had another convulsion in 1995.  Patient has also had some staring episodes that have resolved.    1st type:   - Aura: none  - Triggers: none identified  - Frequency: last seizure was in the 1990s  - Maximum time seizure free: currently over 25 years    Seizure Risk Factors:   Temporal lobe GBM s/p resection    Previous Anti-Epileptic Medication:    Current medications: Dilantin  Last taken: today  Past medications: gabapentin  Driving:       Patient  was diagnosed in February with a broken right foot.  She got surgery and then subsequently re-broke the foot and got surgery again.  DXA scan in 2024 was consistent with osteoporosis.      This is the extent of the patient's complaints at this time.    TSH   Date Value Ref Range Status   2024 1.716 0.400 - 4.000 uIU/mL Final   2021 <0.026 (L) 0.400 - 4.000 uIU/mL Final     Comment:     Warning:  Heterophilic antibodies in serum or plasma of   certain individuals are known to cause interference with   immunoassays. These antibodies may be present in blood samples   from individuals regularly exposed to animal or who have been   treated with animal products.     Patients taking high doses of supplemental biotin may have  negatively biased results.        Vitamin B-12   Date Value Ref Range Status   2024 >2000 (H) 210 - 950 pg/mL Final   2023 >2000 (H) 210 - 950 pg/mL Final         Review of Systems:  ROS negative unless noted in HPI    Past Surgical History:  Past Surgical History:   Procedure Laterality Date    APPENDECTOMY      BRAIN SURGERY      for glioblastoma      SECTION      CHOLECYSTECTOMY      CLOSURE OF DEFECT OF MOHS PROCEDURE Left 2018    Procedure: CLOSURE, MOHS PROCEDURE DEFECT SCALP  Flap closure;  Surgeon: Yrn Novak MD;  Location: [address];  Service: Plastics;  Laterality: Left;    COLONOSCOPY N/A 2019    Procedure: COLONOSCOPY Suprep;  Surgeon: Tony Mosher MD;  Location: [facility];  Service: Endoscopy;  Laterality: N/A;    ESOPHAGOGASTRODUODENOSCOPY N/A 2019    Procedure: EGD/colon;  Surgeon: Tony Mosher MD;  Location: [facility];  Service: Endoscopy;  Laterality: N/A;    EXCISION OF GANGLION CYST OF HAND Left 2020    Procedure: EXCISION, GANGLION CYST, HAND;  Surgeon: Ross Drew Jr., MD;  Location: [facility];  Service: Orthopedics;  Laterality: Left;    HYSTERECTOMY      ILIAC CREST BONE GRAFT  Right 3/15/2024    Procedure: BONE GRAFT, ILIAC CREST;  Surgeon: Geoffrey Goodson MD;  Location: [facility] OR;  Service: Orthopedics;  Laterality: Right;  acumed harvester    ILIAC CREST BONE GRAFT Right 4/2/2024    Procedure: BONE GRAFT, ILIAC CREST;  Surgeon: Geoffrey Goodson MD;  Location: [facility] OR;  Service: Orthopedics;  Laterality: Right;    MIDFOOT ARTHRODESIS Right 4/2/2024    Procedure: FUSION, JOINT, MIDFOOT;  Surgeon: Geoffrey Goodson MD;  Location: [facility] OR;  Service: Orthopedics;  Laterality: Right;  mini c arm    OPEN REDUCTION AND INTERNAL FIXATION (ORIF) OF FRACTURE OF TARSAL BONE Right 3/15/2024    Procedure: ORIF, FRACTURE, TARSAL BONE;  Surgeon: Geoffrey Goodson MD;  Location: [facility] OR;  Service: Orthopedics;  Laterality: Right;  supine, mini C arm, Styker, block okay    SHOULDER SURGERY Right     TILT TABLE TEST N/A 07/15/2022    Procedure: TILT TABLE TEST;  Surgeon: Amol Kohler MD;  Location: [facility];  Service: Cardiology;  Laterality: N/A;  Orthostatic hypotension, dizziness, Tilt Table Test with EEG, Dr.Tiffany Roca (neuro), DM    TUBAL LIGATION  1998       Family History:  Family History   Problem Relation Name Age of Onset    Heart disease Mother      Diabetes type II Mother      Diabetes type II Father      Heart attack Maternal Grandfather         Social History:  Social History     Tobacco Use    Smoking status: Never    Smokeless tobacco: Never   Substance Use Topics    Alcohol use: No    Drug use: No       Allergies:  Neurontin [gabapentin], Lactose, and Soap    Outpatient Medications:  Prior to Admission medications    Medication Sig Start Date End Date Taking? Authorizing Provider   acetaminophen (TYLENOL) 500 MG tablet Take 1 tablet (500 mg total) by mouth every 6 (six) hours. 3/21/24   Geoffrey Goodson MD   BIOTIN ORAL Take by mouth.    Provider, Historical   calcitRIOL (ROCALTROL) 0.25 MCG Cap Take 1 capsule (0.25 mcg total) by mouth once daily. 6/3/24 10/1/24  Hanna Tinajero MD    calcitRIOL (ROCALTROL) 0.25 MCG Cap Take 1 capsule (0.25 mcg total) by mouth once daily. for 90 doses 9/4/24 12/3/24  Swetha Bay MD   calcium carbonate (TUMS) 200 mg calcium (500 mg) chewable tablet Take 1 tablet (500 mg total) by mouth 3 (three) times daily as needed for Heartburn. 4/11/24 6/13/24  Rajni Lobo, NP   cholecalciferol, vitamin D3, (VITAMIN D3) 50 mcg (2,000 unit) Cap capsule Take 2,000 Units by mouth every evening.    Provider, Historical   cyanocobalamin (VITAMIN B-12) 100 MCG tablet Take 100 mcg by mouth once daily.    Provider, Historical   dexAMETHasone (DECADRON) 4 mg/mL injection Inject 1ml (4mg) into the muscle for symptoms of severe adrenal insufficiency. 3ml syringe w/18 g needle to draw x10 and 21g 1 inch needle to injection x10 6/11/24   Zachary Bender MD   DULoxetine (CYMBALTA) 30 MG capsule TAKE 2 CAPSULES (60 MG TOTAL) BY MOUTH ONCE DAILY.  Patient taking differently: Take 60 mg by mouth every evening. 3/15/24   Sagar Taylor MD   ergocalciferol (ERGOCALCIFEROL) 50,000 unit Cap Take 1 capsule (50,000 Units total) by mouth every 7 days. 6/13/24   Hanna Tinajero MD   ferrous sulfate (IRON, FERROUS SULFATE,) 325 mg (65 mg iron) Tab tablet Take 1 tablet (325 mg total) by mouth 2 (two) times daily. 6/13/24   Hanna Tinajero MD   ferrous sulfate 324 mg (65 mg iron) TbEC Take 325 mg by mouth nightly.     Provider, Historical   hydrocortisone (CORTEF) 10 MG Tab Take 1 and 1/2 tablet by mouth every morning and 1/2 tablet in the afternoon    Provider, Historical   hyoscyamine (LEVSIN/SL) 0.125 mg Subl PLACE 1 TABLET (0.125 MG TOTAL) UNDER THE TONGUE EVERY 4 (FOUR) HOURS AS NEEDED. 8/28/23   Zachary Bender MD   ivabradine (CORLANOR) 5 mg Tab Take 1 tablet (5 mg total) by mouth 2 (two) times daily. 6/14/24   Viktor Cohen MD   ivabradine (CORLANOR) 5 mg Tab Take 1 tablet (5 mg total) by mouth 2 (two) times daily. 6/17/24   Viktor Cohen MD  "  ivabradine (CORLANOR) 7.5 mg Tab Take 1 tablet (7.5 mg total) by mouth 2 (two) times daily. 7/8/24   May, HADLEY Yancey-PATRICIA   levothyroxine (SYNTHROID) 112 MCG tablet Take 1 tablet (112 mcg total) by mouth before breakfast. 2/27/24 2/26/25  Joan Serrano MD   melatonin 5 mg TbDL Take 1 tablet by mouth nightly.    Provider, Historical   midodrine (PROAMATINE) 5 MG Tab Take 4 doses a day 3 hours apart 6/14/24   Viktor Cohen MD   phenytoin (DILANTIN) 100 MG ER capsule Take 2 capsules (200 mg total) by mouth nightly. 12/28/23 12/27/24  Cecile Walton PA-C   rivaroxaban (XARELTO) 20 mg Tab Take 1 tablet (20 mg total) by mouth once daily. 6/17/24   Zachary Bender MD   UNABLE TO FIND Take 1 tablet by mouth Daily. medication name: Biotin 2500mcg    Provider, Historical       Physical exam:    Vitals: /84 (BP Location: Right arm, Patient Position: Sitting, BP Method: Medium (Automatic))   Pulse (!) 112   Ht 5' 7" (1.702 m)   Wt 62.6 kg (138 lb)   LMP 10/23/1997 (Exact Date)   BMI 21.61 kg/m²     General:   Sitting in chair, in no distress, well-nourished, well-developed, appears stated age.  Head/Neck:   Normocephalic,atraumatic  Pulm:  Non-labored breathing     Mental Status: Alert and oriented to person, time, place, situation. Speech spontaneous and fluent without paraphasias; no dysarthria  CN:  II: visual fields full  III, IV, VI: EOM intact without nystagmus or diplopia.   V: Sensation to light touch full and symmetric in V1-3. [place] contraction full bilaterally.   VII: Facial movement full and symmetric.   VIII: Hearing grossly normal to conversation.  IX, X: Palate midline with symmetric elevation.    XI: SCM and trapezius: 5/5 bilaterally.   XII: Tongue midline without fasciculations.  Motor: Normal bulk and tone throughout all four extremities.   RUE: D: 5/5; B: 5/5; T:  5/5; WF:5/5; WE:  5/5; IO: 5/5   LUE: D: 5/5; B: 5/5; T:  5/5; WF: 5/5; WE:  5/5; IO: 5/5   RLE: HF: 5/5, KE: " 5/5, KF: 5/5, DF: 5/5, PF: 5/5  LLE: HF: 5/5, KE: 5/5, KF: 5/5, DF: 5/5, PF: 5/5  No tremors   Sensory: Intact and symmetric to light touch throughout.  Reflexes: RUE: Triceps 2+, biceps 2+, brachioradialis 2+  LUE: Triceps 2+, biceps 2+ brachioradialis 2+  RLE: Knee 2+, ankle 2+  LLE: Knee 2+, ankle 2+  Coordination:  Intact and symmetric to finger-to-nose and heel-to-shin.  Gait:  Intact to casual gait.    Imaging:  All pertinent imaging was personally reviewed.    Results for orders placed during the hospital encounter of 05/19/16    MRI Brain Without Contrast    Narrative  MRI BRAIN WITHOUT CONTRAST    Date: 05/19/16 09:57:28    History:  Seizures., epilepsy and history of brain tumor    Technique:  Standard multiplanar noncontrast sequences of the brain no previous for comparison.    Findings:  The ventricles are nonenlarged.  There is minor periventricular and pontine white matter hyperintensity.    There are postoperative changes consistent with a left craniotomy site.    There is a small cystic area of encephalomalacia involving the posterior left temporal lobe cortex grossly measuring approximately 1.5 x 3.0 cm in size.  The marginal signal is hyperintense.  No restricted diffusion is seen.  Without contrast difficult to assess for recurrent or residual tumor mass.  Please correlate with surgical history.    No additional findings.  IMPRESSION:  No acute intracranial process.  Evidence of left craniotomy with focal area of postoperative encephalomalacia left posterior temporal lobe convexity.  Minor residual marginal T2 signal hyperintensity.    Minor periventricular and pontine white matter hyperintensity, usually related to small vessel ischemic degeneration.  ______________________________________    Electronically signed by: FRANSICO GRANT MD  Date:     05/19/16  Time:    11:08    No results found for this or any previous visit.    Results for orders placed during the hospital encounter of  04/01/24    MRI Brain W WO Contrast    Narrative  EXAMINATION:  MRI BRAIN W WO CONTRAST    CLINICAL HISTORY:  Syncope, recurrent;    TECHNIQUE:  Multiplanar multisequence MR imaging of the brain was performed before and after the administration of 7.5 mL Gadavist intravenous contrast.    COMPARISON:  CT head 04/01/2024.  MRI brain 05/28/2021 and 05/19/2016.    FINDINGS:  Parenchyma: There is no restricted diffusion to suggest acute or subacute ischemic infarct.Few nonspecific foci of T2/FLAIR hyperintense signal in the frontal parietal white matter and lisandro may reflect sequela of chronic small vessel ischemic disease.Left temporal and occipital lobe encephalomalacia and gliosis, suggested small volume remote blood products, as before.    Additional comments: There is no midline shift, abnormal extra-axial fluid collection, or acute intracranial hemorrhage. The basal cisterns are patent.    Ventricles: Normal.    Flow voids: The normal major intracranial arterial flow voids are visualized.    Enhancement: No abnormal intra-axial or extra-axial enhancement is identified.  Question developmental venous anomaly in the left upper lobe.    Sinuses and mastoid air cells: Relatively modest paranasal sinus mucosal thickening with small retention cysts.    Orbits: Normal    Midline structures: The pituitary and craniocervical junction are normal.    Marrow: Normal.  Redemonstrated remote left craniotomy sequela.    Impression  1. No definite acute intracranial findings identified to account for the patient's reported symptoms.  2. Overall, no significant change relative to prior MRI performed 05/28/2021.      Electronically signed by: Humza Mckeon  Date:    04/06/2024  Time:    09:01      Results for orders placed during the hospital encounter of 05/28/21    MRI Brain W WO Contrast    Narrative  EXAMINATION:  MRI BRAIN W WO CONTRAST    CLINICAL HISTORY:  Brain/CNS neoplasm, surveillance;  Malignant neoplasm of brain,  unspecified    FINDINGS:  The diffusion sequence is normal without evidence of acute ischemia or infarct.  Pituitary craniocervical junction and midline structures are unremarkable.  There is some blood products lining the left resection cavity likely hemosiderin.  There is minimal small vessel ischemic change.  All skull base flow voids are accounted for posterior fossa structures are unremarkable.  Post-contrast images demonstrate no significant abnormal enhancement.    Impression  Stable postsurgical change.  There is no evidence of recurrent or residual neoplasm.      Electronically signed by: Juan Guthrie MD  Date:    05/28/2021  Time:    14:07      Results for orders placed during the hospital encounter of 11/20/17    MRI Brain W WO Contrast    Narrative  MRI BRAIN W WO CONTRAST,    Date: 11/20/17 14:35:29    History:   C71.9 Malignant neoplasm of brain, unspecified;, G40.909 Epilepsy, unspecified, not intractable, without status epilepticus;    Technique:  Standard multiplanar pre and post contrast sequences of the brain performed with 6 cc Gadavist.    Comparison: 05/19/2016 noncontrast MR    Findings:  The ventricles are nondilated.  The corpus callosum is intact.    There is a focal area of cystic encephalomalacia involving the superficial cortex of the posterior left temporal lobe.  This area measures approximately 1.6 x 2.6 cm transaxially.  There is marginal increased T2 signal.  No abnormal enhancement is seen.  No associated restricted diffusion is seen either.    No significant hemosiderin deposition is seen.    Central pontine white matter hyperintensity is present.  IMPRESSION:  Postsurgical encephalomalacia left temporal lobe with marginal hyperintense T2 signal.  No abnormal enhancement or mass effect.  No detrimental interval change.      Electronically signed by: FRANSICO GRANT MD  Date:     11/20/17  Time:    16:10        I spent a total of 50 minutes on the day of the visit. This includes  face to face time and non-face to face time preparing to see the patient (eg, review of tests), obtaining and/or reviewing separately obtained history, documenting clinical information in the electronic or other health record, independently interpreting results and communicating results to the patient/family/caregiver, or care coordinator.

## 2024-09-06 DIAGNOSIS — E83.9 CHRONIC KIDNEY DISEASE-MINERAL AND BONE DISORDER: Primary | ICD-10-CM

## 2024-09-06 DIAGNOSIS — N18.9 CHRONIC KIDNEY DISEASE-MINERAL AND BONE DISORDER: Primary | ICD-10-CM

## 2024-09-06 DIAGNOSIS — N18.32 STAGE 3B CHRONIC KIDNEY DISEASE: Primary | ICD-10-CM

## 2024-09-06 DIAGNOSIS — M89.9 CHRONIC KIDNEY DISEASE-MINERAL AND BONE DISORDER: Primary | ICD-10-CM

## 2024-09-07 ENCOUNTER — HOSPITAL ENCOUNTER (EMERGENCY)
Facility: HOSPITAL | Age: 59
Discharge: HOME | End: 2024-09-08
Attending: STUDENT IN AN ORGANIZED HEALTH CARE EDUCATION/TRAINING PROGRAM
Payer: COMMERCIAL

## 2024-09-07 VITALS
BODY MASS INDEX: 21.66 KG/M2 | HEIGHT: 67 IN | RESPIRATION RATE: 16 BRPM | HEART RATE: 100 BPM | DIASTOLIC BLOOD PRESSURE: 64 MMHG | TEMPERATURE: 98 F | WEIGHT: 138 LBS | SYSTOLIC BLOOD PRESSURE: 138 MMHG | OXYGEN SATURATION: 98 %

## 2024-09-07 DIAGNOSIS — G89.29 CHRONIC BILATERAL LOW BACK PAIN WITH BILATERAL SCIATICA: ICD-10-CM

## 2024-09-07 DIAGNOSIS — G62.9 PERIPHERAL POLYNEUROPATHY: ICD-10-CM

## 2024-09-07 DIAGNOSIS — G89.29 CHRONIC NECK PAIN: ICD-10-CM

## 2024-09-07 DIAGNOSIS — M54.41 CHRONIC BILATERAL LOW BACK PAIN WITH BILATERAL SCIATICA: ICD-10-CM

## 2024-09-07 DIAGNOSIS — M54.2 CHRONIC NECK PAIN: ICD-10-CM

## 2024-09-07 DIAGNOSIS — R03.0 ELEVATED BLOOD PRESSURE READING: Primary | ICD-10-CM

## 2024-09-07 DIAGNOSIS — M54.42 CHRONIC BILATERAL LOW BACK PAIN WITH BILATERAL SCIATICA: ICD-10-CM

## 2024-09-07 PROCEDURE — 99281 EMR DPT VST MAYX REQ PHY/QHP: CPT

## 2024-09-08 ENCOUNTER — PATIENT MESSAGE (OUTPATIENT)
Dept: CARDIOLOGY | Facility: CLINIC | Age: 59
End: 2024-09-08
Payer: COMMERCIAL

## 2024-09-08 NOTE — ED PROVIDER NOTES
NAME:  Thelma Nguyen  CSN:     [id number]  MRN:    [id number]  ADMIT DATE: 9/7/2024        eMERGENCY dEPARTMENT eNCOUnter    CHIEF COMPLAINT    Chief Complaint   Patient presents with    Hypertension     Pt presents to the ED c/o HTN. Pt states she does not have a hx of HTN. She states when she was at home her BP was 204/105. In triage BP is 138/64. Denies headache, blurred vision, dizziness.        HPI    Thelma Nguyen is a 58 y.o. female with a past medical history of  has a past medical history of Allergic rhinitis, Anemia (1987), Arthritis (2000), Basal cell carcinoma, Broken ankle, Cancer (1985), Clotting disorder (1993), Disorder of kidney and ureter, DVT (deep venous thrombosis), Factor V deficiency, Fracture of elbow, Glioblastoma multiforme of brain, Hypothyroidism, IBS (irritable bowel syndrome) (1975), Osteoporosis, Panhypopituitarism, Peripheral polyneuropathy, Secondary adrenal insufficiency, Seizures (1995), and Thyroid disease (1987).     she presents to the ED due to concern for an elevated blood pressure reading at home.  States that she was just checking it as she does this daily a few times a day.  Usually has issue with low blood pressure and was concerned that it was high.  Did check it a few times on the same wrist, did not switch arms.  She states she actually takes midodrine for low blood pressure and typically does not have issues like this.  At this time is asymptomatic.      HPI       PAST MEDICAL HISTORY  Past Medical History:   Diagnosis Date    Allergic rhinitis     Anemia 1987    Arthritis 2000    Basal cell carcinoma     Broken ankle     Cancer 1985    Clotting disorder 1993    Disorder of kidney and ureter     DVT (deep venous thrombosis)     Factor V deficiency     Fracture of elbow     left    Glioblastoma multiforme of brain     Hypothyroidism     IBS (irritable bowel syndrome) 1975    Osteoporosis     Panhypopituitarism     Peripheral polyneuropathy      Secondary adrenal insufficiency     Seizures     Thyroid disease        SURGICAL HISTORY    Past Surgical History:   Procedure Laterality Date    APPENDECTOMY      BRAIN SURGERY      for glioblastoma      SECTION      CHOLECYSTECTOMY  2007    CLOSURE OF DEFECT OF MOHS PROCEDURE Left 2018    Procedure: CLOSURE, MOHS PROCEDURE DEFECT SCALP  Flap closure;  Surgeon: Yrn Novak MD;  Location: [address];  Service: Plastics;  Laterality: Left;    COLONOSCOPY N/A 2019    Procedure: COLONOSCOPY Suprep;  Surgeon: Tony Mosher MD;  Location: [facility];  Service: Endoscopy;  Laterality: N/A;    ESOPHAGOGASTRODUODENOSCOPY N/A 2019    Procedure: EGD/colon;  Surgeon: Tony Mosher MD;  Location: [facility];  Service: Endoscopy;  Laterality: N/A;    EXCISION OF GANGLION CYST OF HAND Left 2020    Procedure: EXCISION, GANGLION CYST, HAND;  Surgeon: Ross Drew Jr., MD;  Location: [facility];  Service: Orthopedics;  Laterality: Left;    HYSTERECTOMY  1999    ILIAC CREST BONE GRAFT Right 3/15/2024    Procedure: BONE GRAFT, ILIAC CREST;  Surgeon: Geoffrey Goodson MD;  Location: [facility];  Service: Orthopedics;  Laterality: Right;  acumed harvester    ILIAC CREST BONE GRAFT Right 2024    Procedure: BONE GRAFT, ILIAC CREST;  Surgeon: Geoffrey Goodson MD;  Location: [facility];  Service: Orthopedics;  Laterality: Right;    MIDFOOT ARTHRODESIS Right 2024    Procedure: FUSION, JOINT, MIDFOOT;  Surgeon: Geoffrey Goodson MD;  Location: [facility];  Service: Orthopedics;  Laterality: Right;  mini c arm    OPEN REDUCTION AND INTERNAL FIXATION (ORIF) OF FRACTURE OF TARSAL BONE Right 3/15/2024    Procedure: ORIF, FRACTURE, TARSAL BONE;  Surgeon: Geoffrey Goodson MD;  Location: [facility];  Service: Orthopedics;  Laterality: Right;  supine, mini C arm, Styker, block okay    SHOULDER SURGERY Right     TILT TABLE TEST N/A 07/15/2022    Procedure: TILT TABLE TEST;  Surgeon: Amol ORTEGA  MD Jose F;  Location: [facility];  Service: Cardiology;  Laterality: N/A;  Orthostatic hypotension, dizziness, Tilt Table Test with EEG, Dr.Tiffany Roca (neuro), DM    TUBAL LIGATION  1998       FAMILY HISTORY    Family History   Problem Relation Name Age of Onset    Heart disease Mother      Diabetes type II Mother      Diabetes type II Father      Heart attack Maternal Grandfather         SOCIAL HISTORY    Social History     Socioeconomic History    Marital status:     Number of children: 2   Occupational History    Occupation:    Tobacco Use    Smoking status: Never    Smokeless tobacco: Never   Substance and Sexual Activity    Alcohol use: No    Drug use: No    Sexual activity: Not Currently   Social History Narrative     30 years, lives at home with .      Social Determinants of Health     Financial Resource Strain: Low Risk  (12/11/2023)    Overall Financial Resource Strain (CARDIA)     Difficulty of Paying Living Expenses: Not hard at all   Food Insecurity: No Food Insecurity (12/11/2023)    Hunger Vital Sign     Worried About Running Out of Food in the Last Year: Never true     Ran Out of Food in the Last Year: Never true   Transportation Needs: Unmet Transportation Needs (12/11/2023)    PRAPARE - Transportation     Lack of Transportation (Medical): Yes     Lack of Transportation (Non-Medical): Yes   Physical Activity: Insufficiently Active (12/11/2023)    Exercise Vital Sign     Days of Exercise per Week: 3 days     Minutes of Exercise per Session: 20 min   Stress: No Stress Concern Present (12/11/2023)    [employer] [place] [employer] - Occupational Stress Questionnaire     Feeling of Stress : Only a little   Housing Stability: Low Risk  (12/11/2023)    Housing Stability Vital Sign     Unable to Pay for Housing in the Last Year: No     Number of Places Lived in the Last Year: 1     Unstable Housing in the Last Year: No       MEDICATIONS  Current Outpatient  Medications   Medication Instructions    acetaminophen (TYLENOL) 500 mg, Oral, Every 6 hours    BIOTIN ORAL Oral    calcitRIOL (ROCALTROL) 0.25 mcg, Oral, Daily    calcitRIOL (ROCALTROL) 0.25 mcg, Oral, Daily    CALCIUM ANTACID 500 mg, Oral, 3 times daily PRN    cholecalciferol (vitamin D3) (VITAMIN D3) 2,000 Units, Oral, Nightly    CORLANOR 7.5 mg, Oral, 2 times daily    cyanocobalamin (VITAMIN B-12) 100 mcg, Oral, Daily    dexAMETHasone (DECADRON) 4 mg/mL injection Inject 1ml (4mg) into the muscle for symptoms of severe adrenal insufficiency. 3ml syringe w/18 g needle to draw x10 and 21g 1 inch needle to injection x10    DULoxetine (CYMBALTA) 60 mg, Oral, Daily    ergocalciferol (ERGOCALCIFEROL) 50,000 Units, Oral, Every 7 days    ferrous sulfate (IRON (FERROUS SULFATE)) 325 mg, Oral, 2 times daily    ferrous sulfate 325 mg, Oral, Nightly    hydrocortisone (CORTEF) 10 MG Tab Take 1 and 1/2 tablet by mouth every morning and 1/2 tablet in the afternoon    hyoscyamine 0.125 mg, Sublingual, Every 4 hours PRN    ivabradine (CORLANOR) 5 mg, Oral, 2 times daily    ivabradine (CORLANOR) 5 mg, Oral, 2 times daily    lacosamide (VIMPAT) 100 mg, Oral, Every 12 hours    lacosamide (VIMPAT) 150 mg, Oral, Every 12 hours    levothyroxine (SYNTHROID) 112 mcg, Oral, Before breakfast    melatonin 5 mg TbDL 1 tablet, Oral, Nightly    midodrine (PROAMATINE) 5 MG Tab Take 4 doses a day 3 hours apart    phenytoin (DILANTIN) 200 mg, Oral, Nightly    rivaroxaban (XARELTO) 20 mg, Oral, Daily    UNABLE TO FIND 1 tablet, Oral, Daily, medication name: Biotin 2500mcg       ALLERGIES    Review of patient's allergies indicates:   Allergen Reactions    Neurontin [gabapentin]     Lactose     Soap Hives     Able to tolerate Dove only          REVIEW OF SYSTEMS   Review of Systems       PHYSICAL EXAM    Reviewed Triage Note    VITAL SIGNS:   ED Triage Vitals [09/07/24 0264]   Enc Vitals Group      /64      Pulse 100      Resp 16      Temp  "97.9 °F (36.6 °C)      Temp src       SpO2 98 %      Weight 138 lb      Height 5' 7"      Head Circumference       Peak Flow       Pain Score       Pain Loc       Pain Education       Exclude from Growth Chart        Patient Vitals for the past 24 hrs:   BP Temp Pulse Resp SpO2 Height Weight   09/07/24 2341 -- -- -- -- -- 5' 7" (1.702 m) 62.6 kg (138 lb 0.1 oz)   09/07/24 2254 138/64 97.9 °F (36.6 °C) 100 16 98 % 5' 7" (1.702 m) 62.6 kg (138 lb)       Physical Exam    Nursing note and vitals reviewed.  Constitutional: She appears well-developed and well-nourished.   HENT:   Head: Normocephalic and atraumatic.   Eyes: EOM are normal. Pupils are equal, round, and reactive to light.   Neck: Neck supple.   Normal range of motion.  Cardiovascular:  Normal rate and regular rhythm.           Pulmonary/Chest: Breath sounds normal. No respiratory distress.   Musculoskeletal:         General: Normal range of motion.      Cervical back: Normal range of motion and neck supple.     Neurological: She is alert and oriented to person, place, and time.   Skin: Skin is warm and dry.   Psychiatric: She has a normal mood and affect.                EKG     Interpreted by EM physician if performed:               LABS  Pertinent labs reviewed. (See chart for details)   Labs Reviewed - No data to display      RADIOLOGY          Imaging Results    None           PROCEDURES    Procedures      ED COURSE & MEDICAL DECISION MAKING    Pertinent Labs & Imaging studies reviewed. (See chart for details and specific orders.)          Summary of review of records:       Medical Decision Making    Thelma Nguyen is a 58 y.o. female who arrives with concern for elevated blood pressure reading at home.  Normotensive during her stay in the ED today.  Nonfocal exam and no current complaints.  Advise continued monitoring of blood pressure at home and follow up with primary care as needed.  Do not feel there is any indication for imaging or lab " work at this time.    No acute emergent medical condition has been identified. The patient appears to be low risk for an emergent medical condition is appropriate for discharge with outpatient f/u as detailed in discharge instructions for reevaluation and possible continued outpatient workup and management. I have discussed the workup with the patient, who has verbalized understanding of the plan and need for outpatient follow-up.  This evaluation does not preclude the development of an emergent condition so in addition, I have advised the patient that they can return to the ED at any time with worsening or change of their symptoms, or with any other medical complaint.               Medications - No data to display               FINAL IMPRESSION    Final diagnoses:  [R03.0] Elevated blood pressure reading (Primary)       DISPOSITION  Patient discharged in stable condition        ED Prescriptions    None       Follow-up Information       Follow up With Specialties Details Why Contact Info    Zachary Bender MD Internal Medicine Schedule an appointment as soon as possible for a visit in 2 days  [address]  [place] [id number] [phone]      [facility] Emergency Medicine  As needed, If symptoms worsen [address] [facility] [address] [phone]              DISCLAIMER: This note was prepared with [employer] voice recognition transcription software. Garbled syntax, mangled pronouns, and other bizarre constructions may be attributed to that software system.             Vilma Brunson, DO  09/08/24 0134

## 2024-09-08 NOTE — ED NOTES
Thelma Nguyen, a 58 y.o. female presents to the ED w/ complaint of Hypertension. Denies chest pain, abdominal pain, shortness of breath or any other concerning symptoms. Reports she monitors blood pressure at home and takes medication for orthostatic hypotension.    Triage note:  Chief Complaint   Patient presents with    Hypertension     Pt presents to the ED c/o HTN. Pt states she does not have a hx of HTN. She states when she was at home her BP was 204/105. In triage BP is 138/64. Denies headache, blurred vision, dizziness.      Review of patient's allergies indicates:   Allergen Reactions    Neurontin [gabapentin]     Lactose     Soap Hives     Able to tolerate Dove only      Past Medical History:   Diagnosis Date    Allergic rhinitis     Anemia 1987    Arthritis 2000    Basal cell carcinoma     Broken ankle     Cancer 1985    Clotting disorder 1993    Disorder of kidney and ureter     DVT (deep venous thrombosis)     Factor V deficiency     Fracture of elbow     left    Glioblastoma multiforme of brain     Hypothyroidism     IBS (irritable bowel syndrome) 1975    Osteoporosis     Panhypopituitarism     Peripheral polyneuropathy     Secondary adrenal insufficiency     Seizures 1995    Thyroid disease 1987

## 2024-09-08 NOTE — TELEPHONE ENCOUNTER
Refill Routing Note   Medication(s) are not appropriate for processing by [facility] for the following reason(s):        No active prescription written by provider    [facility] action(s):  Defer               Appointments  past 12m or future 3m with PCP    Date Provider   Last Visit   6/11/2024 Zachary Bender MD   Next Visit   9/12/2024 Zachary Bender MD   ED visits in past 90 days: 1        Note composed:2:11 AM 09/08/2024

## 2024-09-08 NOTE — ED NOTES
Given discharge instructions and reviewed follow up appointments. Verbalized understanding and expressed thanks.

## 2024-09-08 NOTE — TELEPHONE ENCOUNTER
No care due was identified.  [facility] Embedded Care Due Messages. Reference number: [id number].   9/08/2024 2:09:16 AM CDT

## 2024-09-08 NOTE — DISCHARGE INSTRUCTIONS
Your blood pressure was elevated today. Ideally, your blood pressure should be 120/80 consistently.     There is no evidence of any significant issues directly related to your blood pressure currently.  However, your blood pressure will need to be rechecked and managed closely by your primary care doctor. Try to check this once daily at home and write it down to bring to your next doctor's appointment.     Return to the ER if you experience any severe chest pain, shortness of breath, weakness to 1 side of your body, difficulty speaking, facial droop, or any other concerning symptoms.

## 2024-09-09 ENCOUNTER — OFFICE VISIT (OUTPATIENT)
Dept: OBSTETRICS AND GYNECOLOGY | Facility: CLINIC | Age: 59
End: 2024-09-09
Payer: COMMERCIAL

## 2024-09-09 ENCOUNTER — TELEPHONE (OUTPATIENT)
Dept: CARDIOLOGY | Facility: CLINIC | Age: 59
End: 2024-09-09
Payer: COMMERCIAL

## 2024-09-09 VITALS
BODY MASS INDEX: 22.37 KG/M2 | SYSTOLIC BLOOD PRESSURE: 120 MMHG | DIASTOLIC BLOOD PRESSURE: 69 MMHG | WEIGHT: 142.88 LBS

## 2024-09-09 DIAGNOSIS — Z01.419 WELL WOMAN EXAM WITH ROUTINE GYNECOLOGICAL EXAM: Primary | ICD-10-CM

## 2024-09-09 DIAGNOSIS — M80.00XS AGE-RELATED OSTEOPOROSIS WITH CURRENT PATHOLOGICAL FRACTURE, SEQUELA: ICD-10-CM

## 2024-09-09 DIAGNOSIS — Z01.419 WELL WOMAN EXAM: ICD-10-CM

## 2024-09-09 PROCEDURE — 99999 PR PBB SHADOW E&M-EST. PATIENT-LVL IV: CPT | Mod: PBBFAC,,, | Performed by: OBSTETRICS & GYNECOLOGY

## 2024-09-09 PROCEDURE — 1160F RVW MEDS BY RX/DR IN RCRD: CPT | Mod: CPTII,S$GLB,, | Performed by: OBSTETRICS & GYNECOLOGY

## 2024-09-09 PROCEDURE — 3078F DIAST BP <80 MM HG: CPT | Mod: CPTII,S$GLB,, | Performed by: OBSTETRICS & GYNECOLOGY

## 2024-09-09 PROCEDURE — 3074F SYST BP LT 130 MM HG: CPT | Mod: CPTII,S$GLB,, | Performed by: OBSTETRICS & GYNECOLOGY

## 2024-09-09 PROCEDURE — 3008F BODY MASS INDEX DOCD: CPT | Mod: CPTII,S$GLB,, | Performed by: OBSTETRICS & GYNECOLOGY

## 2024-09-09 PROCEDURE — 1159F MED LIST DOCD IN RCRD: CPT | Mod: CPTII,S$GLB,, | Performed by: OBSTETRICS & GYNECOLOGY

## 2024-09-09 PROCEDURE — 3044F HG A1C LEVEL LT 7.0%: CPT | Mod: CPTII,S$GLB,, | Performed by: OBSTETRICS & GYNECOLOGY

## 2024-09-09 PROCEDURE — 3066F NEPHROPATHY DOC TX: CPT | Mod: CPTII,S$GLB,, | Performed by: OBSTETRICS & GYNECOLOGY

## 2024-09-09 PROCEDURE — 99396 PREV VISIT EST AGE 40-64: CPT | Mod: S$GLB,,, | Performed by: OBSTETRICS & GYNECOLOGY

## 2024-09-09 RX ORDER — DULOXETIN HYDROCHLORIDE 30 MG/1
60 CAPSULE, DELAYED RELEASE ORAL NIGHTLY
Qty: 180 CAPSULE | Refills: 3 | Status: SHIPPED | OUTPATIENT
Start: 2024-09-09 | End: 2025-09-04

## 2024-09-09 NOTE — PROGRESS NOTES
CC: Annual check-up    SUBJECTIVE:   58 y.o. female   for annual routine Pap and checkup. Patient's last menstrual period was 10/23/1997 (exact date)..  She has no unusual complaints and noticed a bruise on left breast, not sure how it got there.        Past Medical History:   Diagnosis Date    Allergic rhinitis     Anemia     Arthritis     Basal cell carcinoma     Broken ankle     Cancer 1985    Clotting disorder     Disorder of kidney and ureter     DVT (deep venous thrombosis)     Factor V deficiency     Fracture of elbow     left    Glioblastoma multiforme of brain     Hypothyroidism     IBS (irritable bowel syndrome)     Osteoporosis     Panhypopituitarism     Peripheral polyneuropathy     Secondary adrenal insufficiency     Seizures     Thyroid disease      Past Surgical History:   Procedure Laterality Date    APPENDECTOMY      BRAIN SURGERY      for glioblastoma      SECTION      CHOLECYSTECTOMY      CLOSURE OF DEFECT OF MOHS PROCEDURE Left 2018    Procedure: CLOSURE, MOHS PROCEDURE DEFECT SCALP  Flap closure;  Surgeon: Yrn Novak MD;  Location: [address];  Service: Plastics;  Laterality: Left;    COLONOSCOPY N/A 2019    Procedure: COLONOSCOPY Suprep;  Surgeon: Tony Mosher MD;  Location: [facility];  Service: Endoscopy;  Laterality: N/A;    ESOPHAGOGASTRODUODENOSCOPY N/A 2019    Procedure: EGD/colon;  Surgeon: Tony Mosher MD;  Location: [facility];  Service: Endoscopy;  Laterality: N/A;    EXCISION OF GANGLION CYST OF HAND Left 2020    Procedure: EXCISION, GANGLION CYST, HAND;  Surgeon: Ross Drew Jr., MD;  Location: [facility];  Service: Orthopedics;  Laterality: Left;    HYSTERECTOMY      ILIAC CREST BONE GRAFT Right 3/15/2024    Procedure: BONE GRAFT, ILIAC CREST;  Surgeon: Geoffrey Goodson MD;  Location: [facility];  Service: Orthopedics;  Laterality: Right;  acumed harvester    ILIAC CREST BONE GRAFT  Right 4/2/2024    Procedure: BONE GRAFT, ILIAC CREST;  Surgeon: Geoffrey Goodson MD;  Location: [facility];  Service: Orthopedics;  Laterality: Right;    MIDFOOT ARTHRODESIS Right 4/2/2024    Procedure: FUSION, JOINT, MIDFOOT;  Surgeon: Geoffrey Goodson MD;  Location: [facility];  Service: Orthopedics;  Laterality: Right;  mini c arm    OPEN REDUCTION AND INTERNAL FIXATION (ORIF) OF FRACTURE OF TARSAL BONE Right 3/15/2024    Procedure: ORIF, FRACTURE, TARSAL BONE;  Surgeon: Geoffrey Goodson MD;  Location: [facility];  Service: Orthopedics;  Laterality: Right;  supine, mini C arm, Styker, block okay    SHOULDER SURGERY Right     TILT TABLE TEST N/A 07/15/2022    Procedure: TILT TABLE TEST;  Surgeon: Amol Kohler MD;  Location: [facility];  Service: Cardiology;  Laterality: N/A;  Orthostatic hypotension, dizziness, Tilt Table Test with EEG, Dr.Tiffany Roca (neuro), DM    TUBAL LIGATION  1998     Social History     Socioeconomic History    Marital status:     Number of children: 2   Occupational History    Occupation:    Tobacco Use    Smoking status: Never    Smokeless tobacco: Never   Substance and Sexual Activity    Alcohol use: No    Drug use: No    Sexual activity: Not Currently   Social History Narrative     29 years, lives at home with .      Social Determinants of Health     Financial Resource Strain: Low Risk  (12/11/2023)    Overall Financial Resource Strain (CARDIA)     Difficulty of Paying Living Expenses: Not hard at all   Food Insecurity: No Food Insecurity (12/11/2023)    Hunger Vital Sign     Worried About Running Out of Food in the Last Year: Never true     Ran Out of Food in the Last Year: Never true   Transportation Needs: Unmet Transportation Needs (12/11/2023)    PRAPARE - Transportation     Lack of Transportation (Medical): Yes     Lack of Transportation (Non-Medical): Yes   Physical Activity: Insufficiently Active (12/11/2023)    Exercise Vital Sign     Days of  Exercise per Week: 3 days     Minutes of Exercise per Session: 20 min   Stress: No Stress Concern Present (2023)    [employer] - Occupational Stress Questionnaire     Feeling of Stress : Only a little   Housing Stability: Low Risk  (2023)    Housing Stability Vital Sign     Unable to Pay for Housing in the Last Year: No     Number of Places Lived in the Last Year: 1     Unstable Housing in the Last Year: No     Family History   Problem Relation Name Age of Onset    Heart disease Mother      Diabetes type II Mother      Diabetes type II Father      Heart attack Maternal Grandfather       OB History    Para Term  AB Living   1 1 1         SAB IAB Ectopic Multiple Live Births                  # Outcome Date GA Lbr Jeronimo/2nd Weight Sex Type Anes PTL Lv   1 Term      CS-Unspec            Current Outpatient Medications   Medication Sig Dispense Refill    acetaminophen (TYLENOL) 500 MG tablet Take 1 tablet (500 mg total) by mouth every 6 (six) hours. 30 tablet 1    BIOTIN ORAL Take by mouth.      calcitRIOL (ROCALTROL) 0.25 MCG Cap Take 1 capsule (0.25 mcg total) by mouth once daily. 30 capsule 3    calcitRIOL (ROCALTROL) 0.25 MCG Cap Take 1 capsule (0.25 mcg total) by mouth once daily. for 90 doses 30 capsule 2    calcium carbonate (TUMS) 200 mg calcium (500 mg) chewable tablet Take 1 tablet (500 mg total) by mouth 3 (three) times daily as needed for Heartburn. 60 tablet 0    cholecalciferol, vitamin D3, (VITAMIN D3) 50 mcg (2,000 unit) Cap capsule Take 2,000 Units by mouth every evening.      cyanocobalamin (VITAMIN B-12) 100 MCG tablet Take 100 mcg by mouth once daily.      dexAMETHasone (DECADRON) 4 mg/mL injection Inject 1ml (4mg) into the muscle for symptoms of severe adrenal insufficiency. 3ml syringe w/18 g needle to draw x10 and 21g 1 inch needle to injection x10 1 mL 11    DULoxetine (CYMBALTA) 30 MG capsule Take 2 capsules (60 mg total) by mouth every  evening. 180 capsule 3    ergocalciferol (ERGOCALCIFEROL) 50,000 unit Cap Take 1 capsule (50,000 Units total) by mouth every 7 days. 7 capsule 0    ferrous sulfate (IRON, FERROUS SULFATE,) 325 mg (65 mg iron) Tab tablet Take 1 tablet (325 mg total) by mouth 2 (two) times daily. 60 tablet 6    ferrous sulfate 324 mg (65 mg iron) TbEC Take 325 mg by mouth nightly.       hydrocortisone (CORTEF) 10 MG Tab Take 1 and 1/2 tablet by mouth every morning and 1/2 tablet in the afternoon      hyoscyamine (LEVSIN/SL) 0.125 mg Subl PLACE 1 TABLET (0.125 MG TOTAL) UNDER THE TONGUE EVERY 4 (FOUR) HOURS AS NEEDED. 360 tablet 2    ivabradine (CORLANOR) 5 mg Tab Take 1 tablet (5 mg total) by mouth 2 (two) times daily. 180 tablet 3    ivabradine (CORLANOR) 5 mg Tab Take 1 tablet (5 mg total) by mouth 2 (two) times daily. 60 tablet 11    ivabradine (CORLANOR) 7.5 mg Tab Take 1 tablet (7.5 mg total) by mouth 2 (two) times daily. 60 tablet 11    lacosamide (VIMPAT) 100 mg Tab Take 1 tablet (100 mg total) by mouth every 12 (twelve) hours. for 7 days 14 tablet 0    lacosamide (VIMPAT) 50 mg Tab Take 3 tablets (150 mg total) by mouth every 12 (twelve) hours. 180 tablet 11    levothyroxine (SYNTHROID) 112 MCG tablet Take 1 tablet (112 mcg total) by mouth before breakfast. 30 tablet 11    melatonin 5 mg TbDL Take 1 tablet by mouth nightly.      midodrine (PROAMATINE) 5 MG Tab Take 4 doses a day 3 hours apart 360 tablet 3    phenytoin (DILANTIN) 100 MG ER capsule Take 2 capsules (200 mg total) by mouth nightly. 180 capsule 3    rivaroxaban (XARELTO) 20 mg Tab Take 1 tablet (20 mg total) by mouth once daily. 90 tablet 4    UNABLE TO FIND Take 1 tablet by mouth Daily. medication name: Biotin 2500mcg       No current facility-administered medications for this visit.     Allergies: Neurontin [gabapentin], Lactose, and Soap     ROS:  Constitutional: no weight loss, weight gain, fever, fatigue  Eyes:  No vision changes, glasses/contacts  ENT/Mouth:  No ulcers, sinus problems, ears ringing, headache  Cardiovascular: No inability to lie flat, chest pain, exercise intolerance, swelling, heart palpitations  Respiratory: No wheezing, coughing blood, shortness of breath, or cough  Gastrointestinal: No diarrhea, bloody stool, nausea/vomiting, constipation, gas, hemorrhoids  Genitourinary: No blood in urine, painful urination, urgency of urination, frequency of urination, incomplete emptying, incontinence, abnormal bleeding, painful periods, heavy periods, vaginal discharge, vaginal odor, painful intercourse, sexual problems, bleeding after intercourse.  Musculoskeletal: No muscle weakness  Skin/Breast: No painful breasts, nipple discharge, masses, rash, ulcers  Neurological: No passing out, seizures, numbness, headache  Endocrine: No diabetes, hypothyroid, hyperthyroid, hot flashes, hair loss, abnormal hair growth, ance  Psychiatric: No depression, crying  Hematologic: No bruises, bleeding, swollen lymph nodes, anemia.      OBJECTIVE:   The patient appears well, alert, oriented x 3, in no distress.  /69   Wt 64.8 kg (142 lb 13.7 oz)   LMP 10/23/1997 (Exact Date)   BMI 22.37 kg/m²   NECK: no thyromegaly, trachea midline  SKIN: no acne, striae, hirsutism  BREAST EXAM: breasts appear normal, no suspicious masses, no skin or nipple changes or axillary nodes, resolving bruise on upper outer quad of left breastb with 1cm sub q suspect hematoma  ABDOMEN: no hernias, masses, or hepatosplenomegaly  GENITALIA: normal external genitalia, no erythema, no discharge  URETHRA: normal urethra, normal urethral meatus  VAGINA: mucosal atrophy  CERVIX: no lesions or cervical motion tenderness  UTERUS: uterus absent  ADNEXA: normal adnexa and no mass, fullness, tenderness  Result:  Mammo Digital Screening Bilat w/ Bonilla     History:  Patient is 58 y.o. and is seen for a screening mammogram.        Films Compared:  Compared to: 08/03/2023 Mammo Digital Screening Bilat w/ Bonilla and  06/06/2022 Mammo Digital Screening Bilat w/ Bonilla      Findings:  This procedure was performed using tomosynthesis.   Computer-aided detection was utilized in the interpretation of this examination.     The breasts are almost entirely fatty. There is no evidence of suspicious masses, microcalcifications or architectural distortion.     Impression:   No mammographic evidence of malignancy.     BI-RADS Category 1: Negative     Recommendation:  Routine screening mammogram in 1 year is recommended.     Your estimated lifetime risk of breast cancer (to age 85) based on Tyrer-Cuzick risk assessment model is 3.39%.  According to the American Cancer Society, patients with a lifetime breast cancer risk of 20% or higher might benefit from supplemental screening tests, such as screening breast MRI.                       Exam Ended: 08/05/24 13:18 CDT             ASSESSMENT:   well woman  1. Well woman exam with routine gynecological exam    2. Well woman exam    3. Age-related osteoporosis with current pathological fracture, sequela        PLAN:   mammogram  pap smear  return 1  onth to check breast lump/hematoma  Orders Placed This Encounter    Liquid-Based Pap Smear, Screening     Discuss osteoporosis tx with PCP, has appt this week

## 2024-09-10 ENCOUNTER — TELEPHONE (OUTPATIENT)
Dept: OBSTETRICS AND GYNECOLOGY | Facility: CLINIC | Age: 59
End: 2024-09-10
Payer: COMMERCIAL

## 2024-09-10 DIAGNOSIS — N18.32 STAGE 3B CHRONIC KIDNEY DISEASE: Primary | ICD-10-CM

## 2024-09-10 NOTE — TELEPHONE ENCOUNTER
Spoke with pt to get her setup for a f/u visit in 4-6weeks . Scheduled for oct 7th at 11:15am. Pt verbalized understanding.

## 2024-09-12 ENCOUNTER — TELEPHONE (OUTPATIENT)
Dept: NEPHROLOGY | Facility: CLINIC | Age: 59
End: 2024-09-12
Payer: COMMERCIAL

## 2024-09-12 ENCOUNTER — OFFICE VISIT (OUTPATIENT)
Dept: NEPHROLOGY | Facility: CLINIC | Age: 59
End: 2024-09-12
Payer: COMMERCIAL

## 2024-09-12 ENCOUNTER — OFFICE VISIT (OUTPATIENT)
Dept: INTERNAL MEDICINE | Facility: CLINIC | Age: 59
End: 2024-09-12
Payer: COMMERCIAL

## 2024-09-12 VITALS
HEART RATE: 115 BPM | DIASTOLIC BLOOD PRESSURE: 60 MMHG | HEIGHT: 68 IN | SYSTOLIC BLOOD PRESSURE: 106 MMHG | BODY MASS INDEX: 21.62 KG/M2 | WEIGHT: 142.63 LBS | OXYGEN SATURATION: 99 %

## 2024-09-12 VITALS
BODY MASS INDEX: 21.69 KG/M2 | DIASTOLIC BLOOD PRESSURE: 88 MMHG | OXYGEN SATURATION: 99 % | HEART RATE: 91 BPM | SYSTOLIC BLOOD PRESSURE: 168 MMHG | WEIGHT: 142.63 LBS

## 2024-09-12 DIAGNOSIS — N18.32 CKD STAGE 3B, GFR 30-44 ML/MIN: ICD-10-CM

## 2024-09-12 DIAGNOSIS — E83.9 CHRONIC KIDNEY DISEASE-MINERAL AND BONE DISORDER: Primary | ICD-10-CM

## 2024-09-12 DIAGNOSIS — I95.1 DYSAUTONOMIA ORTHOSTATIC HYPOTENSION SYNDROME: Primary | ICD-10-CM

## 2024-09-12 DIAGNOSIS — M89.9 CHRONIC KIDNEY DISEASE-MINERAL AND BONE DISORDER: Primary | ICD-10-CM

## 2024-09-12 DIAGNOSIS — N18.32 STAGE 3B CHRONIC KIDNEY DISEASE: Primary | ICD-10-CM

## 2024-09-12 DIAGNOSIS — N18.9 CHRONIC KIDNEY DISEASE-MINERAL AND BONE DISORDER: Primary | ICD-10-CM

## 2024-09-12 PROCEDURE — 3044F HG A1C LEVEL LT 7.0%: CPT | Mod: CPTII,S$GLB,, | Performed by: INTERNAL MEDICINE

## 2024-09-12 PROCEDURE — 3008F BODY MASS INDEX DOCD: CPT | Mod: CPTII,S$GLB,, | Performed by: INTERNAL MEDICINE

## 2024-09-12 PROCEDURE — 3074F SYST BP LT 130 MM HG: CPT | Mod: CPTII,S$GLB,, | Performed by: INTERNAL MEDICINE

## 2024-09-12 PROCEDURE — 99999 PR PBB SHADOW E&M-EST. PATIENT-LVL V: CPT | Mod: PBBFAC,,,

## 2024-09-12 PROCEDURE — 3078F DIAST BP <80 MM HG: CPT | Mod: CPTII,S$GLB,, | Performed by: INTERNAL MEDICINE

## 2024-09-12 PROCEDURE — 99215 OFFICE O/P EST HI 40 MIN: CPT | Mod: S$GLB,,, | Performed by: INTERNAL MEDICINE

## 2024-09-12 PROCEDURE — 1159F MED LIST DOCD IN RCRD: CPT | Mod: CPTII,S$GLB,, | Performed by: INTERNAL MEDICINE

## 2024-09-12 PROCEDURE — 99999 PR PBB SHADOW E&M-EST. PATIENT-LVL V: CPT | Mod: PBBFAC,,, | Performed by: INTERNAL MEDICINE

## 2024-09-12 PROCEDURE — 3066F NEPHROPATHY DOC TX: CPT | Mod: CPTII,S$GLB,, | Performed by: INTERNAL MEDICINE

## 2024-09-12 NOTE — PROGRESS NOTES
"    CHIEF COMPLAINT     Chief Complaint   Patient presents with    Follow-up       HPI     Thelma Marita Nguyen is a 58 y.o. female  history of glioblastoma s/p resection, complicated by panhypopituitarism and neuropathy, IBS, CKD 3, depression, hx of skin cancer and factor 5 leiden deficiency here today for        Since last visit    Seen by cards- started on midodrine 2.5mg q3 and corlanor 5mg BID  Had 1 fall in the month of August.  Occurred in the middle of the night several hours after her last midodrine dose with low p.o. intake that day.  During the day when she takes her midodrine her sitting blood pressure is elevated but normal to low upon standing.  She had an ED visit for elevated blood pressure in sitting position.  Uses both wrist and arm cuff at home.  Gets higher readings on her wrist cuff    Seen by neuro- has transitioning to Vimpat, dilantin was discontinued     Seen by ortho- WBAT and PT    Personally Reviewed Patient's Medical, surgical, family and social hx. Changes updated in [facility].  Care Team updated in Epic    Review of Systems:  Review of Systems   Neurological:  Positive for dizziness and light-headedness.       Health Maintenance:   Reviewed with patient  Due for the following:      PHYSICAL EXAM     /60 (BP Location: Right arm, Patient Position: Standing, BP Method: Medium (Manual))   Pulse (!) 115   Ht 5' 8" (1.727 m)   Wt 64.7 kg (142 lb 10.2 oz)   LMP 10/23/1997 (Exact Date)   SpO2 99%   BMI 21.69 kg/m²     Gen: Well Appearing, NAD  HEENT: PERR, EOMI  Neck: FROM, no thyromegaly, no cervical adenopathy  CVD: RRR, no M/R/G  Pulm: Normal work of breathing, CTAB, no wheezing  Abd:  Soft, NT, ND non TTP, no mass  MSK: no LE edema  Neuro: A&Ox3, gait normal, speech normal  Mood; Mood normal, behavior normal, thought process linear       LABS     Labs reviewed; Notable for  Lab Results   Component Value Date    WBC 8.11 09/09/2024    HGB 10.6 (L) 09/09/2024    HCT 32.8 (L) " 09/09/2024    MCV 95 09/09/2024     09/09/2024       Lab Results   Component Value Date    CREATININE 1.8 (H) 09/09/2024    BUN 44 (H) 09/09/2024     09/09/2024    K 3.8 09/09/2024     09/09/2024    CO2 19 (L) 09/09/2024     Microalbumin 34.9    ASSESSMENT     1. Dysautonomia orthostatic hypotension syndrome        2. CKD stage 3b, GFR 30-44 ml/min                Plan     Thelma Nguyen is a 58 y.o. female with  1. Dysautonomia orthostatic hypotension syndrome  Think we are making progress.  She had 1 fainting episode in August that occurred in the setting of low p.o. intake secondary to GI symptoms going on that day  I asked continue both standing and sitting blood pressures and tracking symptoms  Hoping that we can titrate midodrine to dose it allows us to keep her map high while standing without causing hypertension while sitting down    2. CKD stage 3b, GFR 30-44 ml/min  , GFR stable bicarb dipped has nephrology appointment later today will see if we need to start sodium bicarb      41 minutes of professional services provided as part of today's visit.    Zachary Bender MD

## 2024-09-12 NOTE — PROGRESS NOTES
.Name: Thelma Nguyen  : 1965  Date of Service: 2024     Reason for visit: F/U for CKD 3B  HPI;  The patient is a 58-year-old female here for follow-up of CKD 3B (baseline Scr 1.5-1.7) due to NSAID use. She has no history of DM or HTN but has panhypopituitarism following glioblastoma excision. She is on Hydrocortisone, PRN Decadron, and Midodrine 2.5mg Q4h for adrenal insufficiency. The patient denies SOB, LE edema, hematuria, or foamy urine but reports frequent episodes of orthostatic hypotension.    2024;   CKD non progressive, HgB 11.3, , no proteinuria, PO4 3.6  Check Vitamin D levels, Start Calcitriol 0.25mg daily  U/S retroperitoneal (previous: bilateral cysts)  Refer to renal dietician for low potassium diet    2024;  CKD stable, non progressive, at baseline Scr, no proteinuria  Recurrent hypotension could lead to IONA and CKD progression.  Hgb 10.2, start Ferrous Sulfate 325mg (65mg iron) BID.  Switch to Ergocalciferol 50 K units weekly for 7 weeks.  Stop Calcitriol 0.25 mcg.    Today's visit 2024;  Reported high BP, 160/88 today in clinic on sitting  Her Midodrine has been increased to 5 mg QID by her Cardiologist, Hydrocortisone taking per Endocrinology  No edema in legs, chest clear, no issues with urination reported  Scr is 1.8, almost near to baseline, no proteinuria, no new issues reported      PMH:   Past Medical History:   Diagnosis Date    Allergic rhinitis     Anemia     Arthritis     Basal cell carcinoma     Broken ankle     Cancer     Clotting disorder     Disorder of kidney and ureter     DVT (deep venous thrombosis)     Factor V deficiency     Fracture of elbow     left    Glioblastoma multiforme of brain     Hypothyroidism     IBS (irritable bowel syndrome)     Osteoporosis     Panhypopituitarism     Peripheral polyneuropathy     Secondary adrenal insufficiency     Seizures     Thyroid disease        Surgical History:    Past Surgical History:   Procedure Laterality Date    APPENDECTOMY      BRAIN SURGERY      for glioblastoma      SECTION      CHOLECYSTECTOMY  2007    CLOSURE OF DEFECT OF MOHS PROCEDURE Left 2018    Procedure: CLOSURE, MOHS PROCEDURE DEFECT SCALP  Flap closure;  Surgeon: Yrn Novak MD;  [address];  Service: Plastics;  Laterality: Left;    COLONOSCOPY N/A 2019    Procedure: COLONOSCOPY Suprep;  Surgeon: Tony Mosher MD;  Location: [facility];  Service: Endoscopy;  Laterality: N/A;    ESOPHAGOGASTRODUODENOSCOPY N/A 2019    Procedure: EGD/colon;  Surgeon: Tony Mosher MD;  Location: [facility] ENDO;  Service: Endoscopy;  Laterality: N/A;    EXCISION OF GANGLION CYST OF HAND Left 2020    Procedure: EXCISION, GANGLION CYST, HAND;  Surgeon: Ross Drew Jr., MD;  Location: [facility];  Service: Orthopedics;  Laterality: Left;    HYSTERECTOMY      ILIAC CREST BONE GRAFT Right 3/15/2024    Procedure: BONE GRAFT, ILIAC CREST;  Surgeon: Geoffrey Goodson MD;  Location: [facility];  Service: Orthopedics;  Laterality: Right;  acumed harvester    ILIAC CREST BONE GRAFT Right 2024    Procedure: BONE GRAFT, ILIAC CREST;  Surgeon: Geoffrey Goodson MD;  Location: [facility];  Service: Orthopedics;  Laterality: Right;    MIDFOOT ARTHRODESIS Right 2024    Procedure: FUSION, JOINT, MIDFOOT;  Surgeon: Geoffrey Goodson MD;  Location: [facility];  Service: Orthopedics;  Laterality: Right;  mini c arm    OPEN REDUCTION AND INTERNAL FIXATION (ORIF) OF FRACTURE OF TARSAL BONE Right 3/15/2024    Procedure: ORIF, FRACTURE, TARSAL BONE;  Surgeon: Geoffrey Goodson MD;  Location: [facility];  Service: Orthopedics;  Laterality: Right;  supine, mini C arm, Styker, block okay    SHOULDER SURGERY Right     TILT TABLE TEST N/A 07/15/2022    Procedure: TILT TABLE TEST;  Surgeon: Amol Kohler MD;  [facility];  Service: Cardiology;  Laterality: N/A;  Orthostatic  hypotension, dizziness, Tilt Table Test with EEG, Dr.Tiffany Roca (neuro), DM    TUBAL LIGATION  1998       Allergies:   Review of patient's allergies indicates:   Allergen Reactions    Neurontin [gabapentin]     Lactose     Soap Hives     Able to tolerate Dove only        Medications:     Current Outpatient Medications:     acetaminophen (TYLENOL) 500 MG tablet, Take 1 tablet (500 mg total) by mouth every 6 (six) hours., Disp: 30 tablet, Rfl: 1    BIOTIN ORAL, Take by mouth., Disp: , Rfl:     calcitRIOL (ROCALTROL) 0.25 MCG Cap, Take 1 capsule (0.25 mcg total) by mouth once daily. for 90 doses, Disp: 30 capsule, Rfl: 2    calcium carbonate (TUMS) 200 mg calcium (500 mg) chewable tablet, Take 1 tablet (500 mg total) by mouth 3 (three) times daily as needed for Heartburn., Disp: 60 tablet, Rfl: 0    COVID-19 (COMIRNATY 2024-25, 12Y UP,,PF,) 30 mcg/0.3 mL IM vaccine (>/= 11 yo), Inject into the muscle., Disp: 0.3 mL, Rfl: 0    cyanocobalamin (VITAMIN B-12) 100 MCG tablet, Take 100 mcg by mouth once daily., Disp: , Rfl:     DULoxetine (CYMBALTA) 30 MG capsule, Take 2 capsules (60 mg total) by mouth every evening., Disp: 180 capsule, Rfl: 3    ferrous sulfate (IRON, FERROUS SULFATE,) 325 mg (65 mg iron) Tab tablet, Take 1 tablet (325 mg total) by mouth 2 (two) times daily., Disp: 60 tablet, Rfl: 6    ferrous sulfate 324 mg (65 mg iron) TbEC, Take 325 mg by mouth nightly. , Disp: , Rfl:     hydrocortisone (CORTEF) 10 MG Tab, Take 1 and 1/2 tablet by mouth every morning and 1/2 tablet in the afternoon, Disp: , Rfl:     hyoscyamine (LEVSIN/SL) 0.125 mg Subl, PLACE 1 TABLET (0.125 MG TOTAL) UNDER THE TONGUE EVERY 4 (FOUR) HOURS AS NEEDED., Disp: 360 tablet, Rfl: 2    influenza (FLUARIX TRIV [id number], PF,) 45 mcg (15 mcg x 3)/0.5 mL IM vaccine (> or = 6 mo), Inject 0.5 mLs into the muscle once. for 1 dose, Disp: 0.5 mL, Rfl: 0    ivabradine (CORLANOR) 5 mg Tab, Take 1 tablet (5 mg total) by mouth 2 (two) times daily., Disp:  180 tablet, Rfl: 3    ivabradine (CORLANOR) 5 mg Tab, Take 1 tablet (5 mg total) by mouth 2 (two) times daily., Disp: 60 tablet, Rfl: 11    ivabradine (CORLANOR) 7.5 mg Tab, Take 1 tablet (7.5 mg total) by mouth 2 (two) times daily., Disp: 60 tablet, Rfl: 11    lacosamide (VIMPAT) 100 mg Tab, Take 1 tablet (100 mg total) by mouth every 12 (twelve) hours. for 7 days, Disp: 14 tablet, Rfl: 0    lacosamide (VIMPAT) 50 mg Tab, Take 3 tablets (150 mg total) by mouth every 12 (twelve) hours., Disp: 180 tablet, Rfl: 11    levothyroxine (SYNTHROID) 112 MCG tablet, Take 1 tablet (112 mcg total) by mouth before breakfast., Disp: 30 tablet, Rfl: 11    melatonin 5 mg TbDL, Take 1 tablet by mouth nightly., Disp: , Rfl:     midodrine (PROAMATINE) 5 MG Tab, Take 4 doses a day 3 hours apart, Disp: 360 tablet, Rfl: 3    phenytoin (DILANTIN) 100 MG ER capsule, Take 2 capsules (200 mg total) by mouth nightly., Disp: 180 capsule, Rfl: 3    rivaroxaban (XARELTO) 20 mg Tab, Take 1 tablet (20 mg total) by mouth once daily., Disp: 90 tablet, Rfl: 4    UNABLE TO FIND, Take 1 tablet by mouth Daily. medication name: Biotin 2500mcg, Disp: , Rfl:     cholecalciferol, vitamin D3, (VITAMIN D3) 50 mcg (2,000 unit) Cap capsule, Take 2,000 Units by mouth every evening. (Patient not taking: Reported on 9/12/2024), Disp: , Rfl:     dexAMETHasone (DECADRON) 4 mg/mL injection, Inject 1ml (4mg) into the muscle for symptoms of severe adrenal insufficiency. 3ml syringe w/18 g needle to draw x10 and 21g 1 inch needle to injection x10 (Patient not taking: Reported on 9/12/2024), Disp: 1 mL, Rfl: 11    ergocalciferol (ERGOCALCIFEROL) 50,000 unit Cap, Take 1 capsule (50,000 Units total) by mouth every 7 days. (Patient not taking: Reported on 9/12/2024), Disp: 7 capsule, Rfl: 0    Family History:   Family History   Problem Relation Name Age of Onset    Heart disease Mother      Diabetes type II Mother      Diabetes type II Father      Heart attack Maternal  Grandfather         Social History:   Social History     Socioeconomic History    Marital status:     Number of children: 2   Occupational History    Occupation:    Tobacco Use    Smoking status: Never    Smokeless tobacco: Never   Substance and Sexual Activity    Alcohol use: No    Drug use: No    Sexual activity: Not Currently   Social History Narrative     30 years, lives at home with .      Social Determinants of Health     Financial Resource Strain: Low Risk  (12/11/2023)    Overall Financial Resource Strain (CARDIA)     Difficulty of Paying Living Expenses: Not hard at all   Food Insecurity: No Food Insecurity (12/11/2023)    Hunger Vital Sign     Worried About Running Out of Food in the Last Year: Never true     Ran Out of Food in the Last Year: Never true   Transportation Needs: Unmet Transportation Needs (12/11/2023)    PRAPARE - Transportation     Lack of Transportation (Medical): Yes     Lack of Transportation (Non-Medical): Yes   Physical Activity: Insufficiently Active (12/11/2023)    Exercise Vital Sign     Days of Exercise per Week: 3 days     Minutes of Exercise per Session: 20 min   Stress: No Stress Concern Present (12/11/2023)    [employer] - Occupational Stress Questionnaire     Feeling of Stress : Only a little   Housing Stability: Low Risk  (12/11/2023)    Housing Stability Vital Sign     Unable to Pay for Housing in the Last Year: No     Number of Places Lived in the Last Year: 1     Unstable Housing in the Last Year: No       Review of Systems:   Review of Systems   Constitutional: Negative.    HENT: Negative.     Eyes: Negative.    Respiratory: Negative.     Cardiovascular: Negative.    Gastrointestinal: Negative.    Genitourinary: Negative.    Musculoskeletal: Negative.    Skin: Negative.    Neurological: Negative.        Vitals:   Vitals:    09/12/24 1258   BP: (!) 168/88   Pulse: 91   SpO2: 99%   Weight: 64.7 kg (142 lb  10.2 oz)     Body mass index is 21.69 kg/m².    Physical Exam:   Physical Exam  Constitutional:       Appearance: Normal appearance. She is not ill-appearing.   Cardiovascular:      Rate and Rhythm: Normal rate.      Pulses: Normal pulses.   Pulmonary:      Effort: Pulmonary effort is normal.   Musculoskeletal:      Right lower leg: No edema.      Left lower leg: No edema.   Skin:     General: Skin is warm.   Neurological:      General: No focal deficit present.      Mental Status: She is alert and oriented to person, place, and time.         Assessment/Plan:   1. Stage 3b chronic kidney disease  CBC Auto Differential    Basic Metabolic Panel    PTH, intact    Vitamin D    US Retroperitoneal Complete    Urinalysis    Protein / creatinine ratio, urine    IRON AND TIBC    Ferritin        SUMMARY;    1); Chronic Kidney Disease Stage 3B;  -Scr 1.8, near baseline, no proteinuria, CKD at this time stable and non progressive  -Avoid hypotensive episodes, rapid drops in BP as it may worse the progression    2); Anemia in CKD;  -HgB is 10.6, continue P/O Iron OTC  -Will do iron studies and ferritin on next repeat labs    3); Secondary Hyperparathroidism;  -Start taking P/O Vitamin D (Ergocalciferol) 4000 units daily  -Will repeat the Vit D levels on next visit    4); Metabolic Acidosis in CKD;  -Latest CO2 is 19, will repeat the labs  -If trend in CO2 remains low on the repeat labs, will then start her on NaHCO3  -Try to consume plant based proteins > animal based proteins    5); Hypotensive Episodes;  -Per Cardiology, on Midodrine 5mg QID    6); Cystic Kidney Disease;  -B/L cysts on previous U/S in 01/2024  -Will repeat the U/S with next labs      Future Plan; F/U in 3 months with repeat labs (CBC, BMP, PTH, Vit D, U/A, UPCR, U/S Retroperitoneum)      Swetha Bay MD  Nephrology Fellow    Discussed with Dr. Dinh

## 2024-09-19 ENCOUNTER — CLINICAL SUPPORT (OUTPATIENT)
Dept: AUDIOLOGY | Facility: CLINIC | Age: 59
End: 2024-09-19
Payer: COMMERCIAL

## 2024-09-19 DIAGNOSIS — H90.3 SENSORINEURAL HEARING LOSS, BILATERAL: Primary | ICD-10-CM

## 2024-09-19 RX ORDER — CALCITRIOL 0.25 UG/1
0.25 CAPSULE ORAL DAILY
Qty: 90 CAPSULE | Refills: 1 | Status: SHIPPED | OUTPATIENT
Start: 2024-09-19 | End: 2025-01-17

## 2024-09-19 NOTE — PROGRESS NOTES
Hearing Aid Consultation      Mrs. Thelma Nguyen was seen today for a hearing aid consultation. Mrs. Nguyen was previously fit with ReSound Quattro hearing aids in 2020 and is now interested in newer technology. Hearing aid options were presented and discussed along with the different levels of technology. Mrs. Nguyen preferred staying with ReSound and the ALECIA style. She acknowledged understanding of the non-refundable $250 deposit, the 30 day trial period, and the fact we do not file insurance on behalf of the patient. Discussed bundled versus itemized options, and Mrs. Nguyen decided on itemized.     Mrs. Nguyen has [employer] insurance and mentioned having a hearing aid benefit with them. It was discussed in detail that we do not file insurance on behalf of the patient and that she may need to see an audiologist who does. It was recommended to Mrs. Nguyen that she should call her insurance company prior to moving forward with purchasing hearing aids to verify coverage and possibility of reimbursement. She disagreed and decided to go ahead and purchase hearing aids from us.     Mrs. Nguyen decided that she would like to move forward with ReSound Nexia 9 hearing aids. She was scheduled to come in for her hearing aid fitting on 10/2 and was told to call and come in sooner if needed.

## 2024-09-20 ENCOUNTER — PATIENT MESSAGE (OUTPATIENT)
Dept: AUDIOLOGY | Facility: CLINIC | Age: 59
End: 2024-09-20
Payer: COMMERCIAL

## 2024-09-20 NOTE — TELEPHONE ENCOUNTER
Returned Mrs. Nguyen's call. She acknowledged that there was a discussion at her last appointment about how she may need to go to another clinic to use her hearing aid benefit. Mrs. Nguyen called her insurance and was told that. She would like to cancel her hearing aid order and will call if she needs anything else.

## 2024-09-24 ENCOUNTER — PATIENT MESSAGE (OUTPATIENT)
Dept: CARDIOLOGY | Facility: CLINIC | Age: 59
End: 2024-09-24
Payer: COMMERCIAL

## 2024-09-25 ENCOUNTER — PATIENT MESSAGE (OUTPATIENT)
Dept: NEPHROLOGY | Facility: CLINIC | Age: 59
End: 2024-09-25
Payer: COMMERCIAL

## 2024-09-25 ENCOUNTER — PATIENT MESSAGE (OUTPATIENT)
Dept: INTERNAL MEDICINE | Facility: CLINIC | Age: 59
End: 2024-09-25
Payer: COMMERCIAL

## 2024-09-27 ENCOUNTER — PATIENT MESSAGE (OUTPATIENT)
Dept: CARDIOLOGY | Facility: CLINIC | Age: 59
End: 2024-09-27
Payer: COMMERCIAL

## 2024-09-30 ENCOUNTER — PATIENT MESSAGE (OUTPATIENT)
Dept: INTERNAL MEDICINE | Facility: CLINIC | Age: 59
End: 2024-09-30
Payer: COMMERCIAL

## 2024-10-02 ENCOUNTER — PATIENT MESSAGE (OUTPATIENT)
Dept: ORTHOPEDICS | Facility: CLINIC | Age: 59
End: 2024-10-02
Payer: COMMERCIAL

## 2024-10-07 ENCOUNTER — PATIENT MESSAGE (OUTPATIENT)
Dept: INTERNAL MEDICINE | Facility: CLINIC | Age: 59
End: 2024-10-07
Payer: COMMERCIAL

## 2024-10-07 ENCOUNTER — OFFICE VISIT (OUTPATIENT)
Dept: OBSTETRICS AND GYNECOLOGY | Facility: CLINIC | Age: 59
End: 2024-10-07
Payer: COMMERCIAL

## 2024-10-07 ENCOUNTER — PATIENT MESSAGE (OUTPATIENT)
Dept: CARDIOLOGY | Facility: CLINIC | Age: 59
End: 2024-10-07
Payer: COMMERCIAL

## 2024-10-07 VITALS — SYSTOLIC BLOOD PRESSURE: 113 MMHG | DIASTOLIC BLOOD PRESSURE: 70 MMHG

## 2024-10-07 DIAGNOSIS — N64.89 BREAST HEMATOMA: Primary | ICD-10-CM

## 2024-10-07 PROCEDURE — 3078F DIAST BP <80 MM HG: CPT | Mod: CPTII,S$GLB,, | Performed by: OBSTETRICS & GYNECOLOGY

## 2024-10-07 PROCEDURE — 3066F NEPHROPATHY DOC TX: CPT | Mod: CPTII,S$GLB,, | Performed by: OBSTETRICS & GYNECOLOGY

## 2024-10-07 PROCEDURE — 99213 OFFICE O/P EST LOW 20 MIN: CPT | Mod: S$GLB,,, | Performed by: OBSTETRICS & GYNECOLOGY

## 2024-10-07 PROCEDURE — 3044F HG A1C LEVEL LT 7.0%: CPT | Mod: CPTII,S$GLB,, | Performed by: OBSTETRICS & GYNECOLOGY

## 2024-10-07 PROCEDURE — 1159F MED LIST DOCD IN RCRD: CPT | Mod: CPTII,S$GLB,, | Performed by: OBSTETRICS & GYNECOLOGY

## 2024-10-07 PROCEDURE — 1160F RVW MEDS BY RX/DR IN RCRD: CPT | Mod: CPTII,S$GLB,, | Performed by: OBSTETRICS & GYNECOLOGY

## 2024-10-07 PROCEDURE — 99999 PR PBB SHADOW E&M-EST. PATIENT-LVL IV: CPT | Mod: PBBFAC,,, | Performed by: OBSTETRICS & GYNECOLOGY

## 2024-10-07 PROCEDURE — 3074F SYST BP LT 130 MM HG: CPT | Mod: CPTII,S$GLB,, | Performed by: OBSTETRICS & GYNECOLOGY

## 2024-10-07 NOTE — PROGRESS NOTES
CC:  Chief Complaint   Patient presents with    f/u breast lump       HPI:    58 y.o.   OB History          1    Para   1    Term   1            AB        Living             SAB        IAB        Ectopic        Multiple        Live Births                   Complaining of: had a bruise hematome on left Breast 24, unsure how it got there. Plan was to give t time to resolve and recheck. Pt is here for breast recheck    (Not in a hospital admission)      Review of patient's allergies indicates:   Allergen Reactions    Neurontin [gabapentin]     Lactose     Soap Hives     Able to tolerate Dove only         Past Medical History:   Diagnosis Date    Allergic rhinitis     Anemia     Arthritis     Basal cell carcinoma     Broken ankle     Cancer     Clotting disorder     Disorder of kidney and ureter     DVT (deep venous thrombosis)     Factor V deficiency     Fracture of elbow     left    Glioblastoma multiforme of brain     Hypothyroidism     IBS (irritable bowel syndrome)     Osteoporosis     Panhypopituitarism     Peripheral polyneuropathy     Secondary adrenal insufficiency     Seizures     Thyroid disease      Past Surgical History:   Procedure Laterality Date    APPENDECTOMY      BRAIN SURGERY      for glioblastoma      SECTION      CHOLECYSTECTOMY      CLOSURE OF DEFECT OF MOHS PROCEDURE Left 2018    Procedure: CLOSURE, MOHS PROCEDURE DEFECT SCALP  Flap closure;  Surgeon: Yrn Novak MD;  Location: [facility] OR [address] [place] [address];  Service: Plastics;  Laterality: Left;    COLONOSCOPY N/A 2019    Procedure: COLONOSCOPY Suprep;  Surgeon: Tony Mosher MD;  Location: [facility];  Service: Endoscopy;  Laterality: N/A;    ESOPHAGOGASTRODUODENOSCOPY N/A 2019    Procedure: EGD/colon;  Surgeon: Tony Mosher MD;  Location: [facility] ENDO;  Service: Endoscopy;  Laterality: N/A;    EXCISION OF GANGLION CYST OF HAND Left 2020    Procedure:  EXCISION, GANGLION CYST, HAND;  Surgeon: Ross Drew Jr., MD;  Location: [facility] OR;  Service: Orthopedics;  Laterality: Left;    HYSTERECTOMY  1999    ILIAC CREST BONE GRAFT Right 3/15/2024    Procedure: BONE GRAFT, ILIAC CREST;  Surgeon: Geoffrey Goodson MD;  Location: [facility] OR;  Service: Orthopedics;  Laterality: Right;  acumed harvester    ILIAC CREST BONE GRAFT Right 4/2/2024    Procedure: BONE GRAFT, ILIAC CREST;  Surgeon: Geoffrey Goodson MD;  Location: [facility] OR;  Service: Orthopedics;  Laterality: Right;    MIDFOOT ARTHRODESIS Right 4/2/2024    Procedure: FUSION, JOINT, MIDFOOT;  Surgeon: Geoffrey Goodson MD;  Location: [facility] OR;  Service: Orthopedics;  Laterality: Right;  mini c arm    OPEN REDUCTION AND INTERNAL FIXATION (ORIF) OF FRACTURE OF TARSAL BONE Right 3/15/2024    Procedure: ORIF, FRACTURE, TARSAL BONE;  Surgeon: Geoffrey Goodson MD;  Location: [facility] OR;  Service: Orthopedics;  Laterality: Right;  supine, mini C arm, Styker, block okay    SHOULDER SURGERY Right     TILT TABLE TEST N/A 07/15/2022    Procedure: TILT TABLE TEST;  Surgeon: Amol Kohler MD;  Location: [facility] EP LAB;  Service: Cardiology;  Laterality: N/A;  Orthostatic hypotension, dizziness, Tilt Table Test with EEG, Dr.Tiffany Roca (neuro), DM    TUBAL LIGATION  1998     Family History   Problem Relation Name Age of Onset    Heart disease Mother      Diabetes type II Mother      Diabetes type II Father      Heart attack Maternal Grandfather       Social History     Tobacco Use    Smoking status: Never    Smokeless tobacco: Never   Substance Use Topics    Alcohol use: No    Drug use: No     ROS:  GENERAL: Feeling well overall. Denies fever or chills.   SKIN: Denies rash or lesions.   HEAD: Denies head injury or headache.   NODES: Denies enlarged lymph nodes.   CHEST: Denies chest pain or shortness of breath.   CARDIOVASCULAR: Denies palpitations or left sided chest pain.    ABDOMEN: Denies diarrhea, nausea, vomiting or rectal bleeding.    URINARY: No dysuria, hematuria, or burning on urination.  REPRODUCTIVE: See HPI.   BREASTS: Denies pain, lumps, or nipple discharge.   HEMATOLOGIC: No easy bruisability or excessive bleeding.   MUSCULOSKELETAL: Denies joint pain or swelling.   NEUROLOGIC: Denies syncope or weakness.   PSYCHIATRIC: Denies depression, anxiety or mood swings.      PE: /70 (Patient Position: Sitting)   LMP 10/23/1997 (Exact Date)      APPEARANCE: Well nourished, well developed, in no acute distress.  SKIN: Normal skin turgor, no lesions.  NECK: Neck symmetric without masses or thyromegaly.  NODES: No inguinal, cervical, axillary or femoral lymph node enlargement.  CARDIOVASCULAR: Normal S1, S2. No rubs, murmurs or gallops.  NEUROLOGIC: Normal mood and affect. No depression or anxiety.   ABDOMEN: Soft. No tenderness or masses. No hepatosplenomegaly. No hernias.  RESPIRATORY: Normal respiratory effort with no retractions or use of accessory muscles.  BREASTS: Symmetrical, no skin changes or visible lesions. No palpable masses, nipple discharge, or adenopathy bilaterally. Hematoma completely resolved    Mammo Digital Screening Bilat w/ Bonilla     History:  Patient is 58 y.o. and is seen for a screening mammogram.        Films Compared:  Compared to: 08/03/2023 Mammo Digital Screening Bilat w/ Bonilla and 06/06/2022 Mammo Digital Screening Bilat w/ Bonilla      Findings:  This procedure was performed using tomosynthesis.   Computer-aided detection was utilized in the interpretation of this examination.     The breasts are almost entirely fatty. There is no evidence of suspicious masses, microcalcifications or architectural distortion.     Impression:   No mammographic evidence of malignancy.     BI-RADS Category 1: Negative     Recommendation:  Routine screening mammogram in 1 year is recommended.     Your estimated lifetime risk of breast cancer (to age 85) based on Tyrer-Cuzick risk assessment model is 3.39%.  According to the American  "Cancer Society, patients with a lifetime breast cancer risk of 20% or higher might benefit from supplemental screening tests, such as screening breast MRI.                    Exam Ended: 08/05/24 13:18 CDT Last Resulted: 08/06/24 08:54 CD     ASSESSMENT/ PLAN    Thelmacharles Reagan" was seen today for f/u breast lump.    Diagnoses and all orders for this visit:    Breast hematoma      Resolved  Rtc 1 yr      Jairo Esquivel MD    "

## 2024-10-12 DIAGNOSIS — S92.251A CLOSED DISPLACED FRACTURE OF NAVICULAR BONE OF RIGHT FOOT, INITIAL ENCOUNTER: Primary | ICD-10-CM

## 2024-10-14 ENCOUNTER — PATIENT MESSAGE (OUTPATIENT)
Dept: INTERNAL MEDICINE | Facility: CLINIC | Age: 59
End: 2024-10-14
Payer: COMMERCIAL

## 2024-10-14 ENCOUNTER — PATIENT MESSAGE (OUTPATIENT)
Dept: NEUROLOGY | Facility: CLINIC | Age: 59
End: 2024-10-14
Payer: COMMERCIAL

## 2024-10-15 ENCOUNTER — OFFICE VISIT (OUTPATIENT)
Dept: ORTHOPEDICS | Facility: CLINIC | Age: 59
End: 2024-10-15
Payer: COMMERCIAL

## 2024-10-15 ENCOUNTER — HOSPITAL ENCOUNTER (OUTPATIENT)
Dept: RADIOLOGY | Facility: HOSPITAL | Age: 59
Discharge: HOME | End: 2024-10-15
Attending: SURGERY
Payer: COMMERCIAL

## 2024-10-15 VITALS — HEIGHT: 68 IN | BODY MASS INDEX: 21.69 KG/M2

## 2024-10-15 DIAGNOSIS — S92.251A CLOSED DISPLACED FRACTURE OF NAVICULAR BONE OF RIGHT FOOT, INITIAL ENCOUNTER: ICD-10-CM

## 2024-10-15 DIAGNOSIS — S92.251A CLOSED DISPLACED FRACTURE OF NAVICULAR BONE OF RIGHT FOOT, INITIAL ENCOUNTER: Primary | ICD-10-CM

## 2024-10-15 PROCEDURE — 3008F BODY MASS INDEX DOCD: CPT | Mod: CPTII,S$GLB,, | Performed by: SURGERY

## 2024-10-15 PROCEDURE — 73630 X-RAY EXAM OF FOOT: CPT | Mod: 26,RT,, | Performed by: RADIOLOGY

## 2024-10-15 PROCEDURE — 99213 OFFICE O/P EST LOW 20 MIN: CPT | Mod: S$GLB,,, | Performed by: SURGERY

## 2024-10-15 PROCEDURE — 73610 X-RAY EXAM OF ANKLE: CPT | Mod: 26,RT,, | Performed by: RADIOLOGY

## 2024-10-15 PROCEDURE — 99999 PR PBB SHADOW E&M-EST. PATIENT-LVL III: CPT | Mod: PBBFAC,,, | Performed by: SURGERY

## 2024-10-15 PROCEDURE — 1159F MED LIST DOCD IN RCRD: CPT | Mod: CPTII,S$GLB,, | Performed by: SURGERY

## 2024-10-15 PROCEDURE — 73610 X-RAY EXAM OF ANKLE: CPT | Mod: TC,PN,RT

## 2024-10-15 PROCEDURE — 3066F NEPHROPATHY DOC TX: CPT | Mod: CPTII,S$GLB,, | Performed by: SURGERY

## 2024-10-15 PROCEDURE — 3044F HG A1C LEVEL LT 7.0%: CPT | Mod: CPTII,S$GLB,, | Performed by: SURGERY

## 2024-10-15 PROCEDURE — 73630 X-RAY EXAM OF FOOT: CPT | Mod: TC,PN,RT

## 2024-10-16 DIAGNOSIS — G90.9 AUTONOMIC DYSFUNCTION: Primary | ICD-10-CM

## 2024-10-16 NOTE — PROGRESS NOTES
"SUBJECTIVE:    Ms. Nguyen is here today for a follow up visit.  Doing well. No pain.  Four months and 4 days status post revision ORIF of the navicular and dorsal bridge plating of talus to cuneiform.  Doing well.  Ambulating in normal shoes.  No pain, minimal swelling.  She is still having falls secondary to her hypotension.  These are being worked up. She is seeing endocrinology, cardiology, and neurology.   Unforunately this is still bothering her to the degree that she is very lightheaded today and unable to stand for x-rays.      OBJECTIVE:      Vitals:    10/15/24 1336   Height: 5' 8" (1.727 m)       Lower Extremity Exam  Alert, oriented  Incisions well healed  30 plantarflexion, 20 dorsiflexion, equal to contralateral side  Denies pain, nontender to palpation  Stands without pain, mild pes planus deformity.  Corrects with heel rise.  NV at baseline     DIAGNOSTIC STUDIES:  New standing weightbearing films obtained previously.  x-rays demonstrate healing navicular fracture and dorsal bridge plate unchanged from previous imaging.  Mild lucency about talar screw.    10/15/24 - x-rays nonweight bearing obtained due to patients dizziness and lightheadedness. These reveal hardware in place. No changes in alignment. There is a dorsal os that was visible previously. The plate appears intact, there appears to be compression and fusion at elements of the TN joint and NC joint. I reviewed her images with the radiologist and the patient and her .    ASSESSMENT:   1. S/p revision navicular ORIF and bridge plate      PLAN:  Continue normal shoe, continue weightbear as tolerated. Follow up at the 1 year florina vs PRN.    Follow up in 4-6 weeks for further management    Discussed again screw lucency in Talus, dorsal bridge plate, os superiorly. If screws or plate should break and/or become painful we can remove them. If they break and plate is stable or maintains osseous or fibrous stability then can likely leave in " place as she has had multiple surgeries and is a high risk patient given her GBM and need for steroid tapers, etc. She would like to avoid surgery as she is able to at this time.      Geoffrey Goodson MD  [facility]  Orthopedic Surgery      This note was done with voice recognition software. Please excuse any errors missed in proof reading.

## 2024-10-17 ENCOUNTER — PATIENT MESSAGE (OUTPATIENT)
Dept: CARDIOLOGY | Facility: CLINIC | Age: 59
End: 2024-10-17
Payer: COMMERCIAL

## 2024-10-17 ENCOUNTER — PATIENT MESSAGE (OUTPATIENT)
Dept: INTERNAL MEDICINE | Facility: CLINIC | Age: 59
End: 2024-10-17
Payer: COMMERCIAL

## 2024-10-17 ENCOUNTER — OFFICE VISIT (OUTPATIENT)
Dept: PAIN MEDICINE | Facility: OTHER | Age: 59
End: 2024-10-17
Payer: COMMERCIAL

## 2024-10-17 DIAGNOSIS — G89.4 CHRONIC PAIN SYNDROME: Primary | ICD-10-CM

## 2024-10-17 DIAGNOSIS — I95.1 ORTHOSTATIC HYPOTENSION: Primary | ICD-10-CM

## 2024-10-17 DIAGNOSIS — R26.9 ABNORMALITY OF GAIT: ICD-10-CM

## 2024-10-17 DIAGNOSIS — M54.16 LUMBAR RADICULOPATHY: ICD-10-CM

## 2024-10-17 DIAGNOSIS — G62.9 PERIPHERAL POLYNEUROPATHY: ICD-10-CM

## 2024-10-17 DIAGNOSIS — R68.89 DECREASED MOTOR ACTIVITY: ICD-10-CM

## 2024-10-17 PROCEDURE — 3044F HG A1C LEVEL LT 7.0%: CPT | Mod: CPTII,,, | Performed by: NURSE PRACTITIONER

## 2024-10-17 PROCEDURE — 99214 OFFICE O/P EST MOD 30 MIN: CPT | Mod: ,,, | Performed by: NURSE PRACTITIONER

## 2024-10-17 PROCEDURE — 3066F NEPHROPATHY DOC TX: CPT | Mod: CPTII,,, | Performed by: NURSE PRACTITIONER

## 2024-10-17 PROCEDURE — 1159F MED LIST DOCD IN RCRD: CPT | Mod: CPTII,,, | Performed by: NURSE PRACTITIONER

## 2024-10-17 PROCEDURE — 1160F RVW MEDS BY RX/DR IN RCRD: CPT | Mod: CPTII,,, | Performed by: NURSE PRACTITIONER

## 2024-10-17 NOTE — PROGRESS NOTES
[facility]    Follow Up Visit Note    Subjective:       Chief Complaint Requiring Rehabilitation: Chronic Pain    Consulted by: No ref. provider found      10/17/2024 12 month post FRP visit:  Thelma Nguyen returns to clinic today for 1 year post FRP appointment. She fractured her right foot earlier this year. She underwent ORIF in March. She a second surgery in April. She has not driven since February. She was non-weightbearing for 12 weeks post surgery. She also notes significant episodes of orthostatic hypotension since August, She has had several falls. She has recently increased midodrine. She also notes a recent change of her seizure medication. She is currently on hold for PT due to falls. She is doing the breathing exercises from the program. She doing stretches.     HPI:     Thelma Nguyen is a 58 y.o. female who presents today for the [facility] Program Medical Screening Visit. Thelma Nguyen was referred by No ref. provider found with associated diagnosis of chronic pain.     She began having low back pain approximately 10 years ago. She was diagnosed with an anular tear in the disc at that time. Her pain would be worse with bending. She had episodes of her back locking up on her. She was treated conservatively, including NSAIDs. She was later diagnosed with CKD. She stopped NSAIDs which did cause worsened back pain. She had a series of injections in 2014 with benefit. She has intermittent episodes of low back pain. She does have radiating pain into the right leg. Her pain is worse with prolonged sitting and standing. She does find relief with bending forward. Of note, she has a past medical history of glioblastoma at age 19. 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 She also has neuropathy to the legs. She was recently diagnosed with orthostatic hypotension. See below for additional details.        Chronic  Pain History:      Ambulatory status:  Fully ambulatory, gets out of breath intermittently, negative cardiology/pulmonology        Balance problems?  Yes, vestibular exercises with PT      Fainting/Syncope/POTS?  Yes, October 2022.   Tilt table test shows Orthostatic Hypotension      Physical Therapy?  Completed in 2022      Exercise Habits?  No       Alternative/Complementary Therapies (massage, yoga, shailesh chi, acupuncture, guided imagery, chiropractic care, hypnosis, biofeedback, herbs, supplements, dietary approaches)?  TENs unit  Yoga- made her imbalance worse  Massage- possibly helpful, not consistent      Current pain medications:  Tylenol   Cymbalta       Pain management injections:  Previous ESIs 8 years ago      Relevant surgeries:  1995- Brain surgery for tumor resection     Any upcoming surgeries or procedures? No       Working/Employed?   for [email or web address]       Sleep: difficulty falling and staying asleep (intermittent)     KAMILA? No      Sleep Aids? No       Mental Health Hx/Tx  Endorses anxiety.     Stress/Stress Mgmt comments: [employer], jigsaw puzzles, [employer]     Inpatient Psychiatric Tx? No     SI? No       Social Hx/Connections:     2 children- was initially told she would not be able to have children. Did IVF with friend as a surrogate. Daughter was born, she was found to be 8 weeks pregnant. Son and Daughter are 6 months apart.      Health Habits:      Smoking Status: No       Alcohol use: No     Illegal/illicit drug use: No      Substance abuse hx?: No       15. Aside from what has been discussed, do you have any other medical issues that you or your doctors would consider unstable at this time?   No        Past Medical History:   Diagnosis Date    Allergic rhinitis     Anemia 1987    Arthritis 2000    Basal cell carcinoma     Broken ankle     Cancer 1985    Clotting disorder 1993    Disorder of kidney and ureter     DVT (deep venous thrombosis)     Factor V deficiency      Fracture of elbow     left    Glioblastoma multiforme of brain     Hypothyroidism     IBS (irritable bowel syndrome)     Osteoporosis     Panhypopituitarism     Peripheral polyneuropathy     Secondary adrenal insufficiency     Seizures     Thyroid disease        Past Surgical History:   Procedure Laterality Date    APPENDECTOMY      BRAIN SURGERY      for glioblastoma      SECTION      CHOLECYSTECTOMY  2007    CLOSURE OF DEFECT OF MOHS PROCEDURE Left 2018    Procedure: CLOSURE, MOHS PROCEDURE DEFECT SCALP  Flap closure;  Surgeon: Yrn Novak MD;  Location: [address];  Service: Plastics;  Laterality: Left;    COLONOSCOPY N/A 2019    Procedure: COLONOSCOPY Suprep;  Surgeon: Tony Mosher MD;  Location: [facility];  Service: Endoscopy;  Laterality: N/A;    ESOPHAGOGASTRODUODENOSCOPY N/A 2019    Procedure: EGD/colon;  Surgeon: Tony Mosher MD;  Location: [facility] END[facility];  Service: Endoscopy;  Laterality: N/A;    EXCISION OF GANGLION CYST OF HAND Left 2020    Procedure: EXCISION, GANGLION CYST, HAND;  Surgeon: Ross Drew Jr., MD;  Location: [facility];  Service: Orthopedics;  Laterality: Left;    HYSTERECTOMY  1999    ILIAC CREST BONE GRAFT Right 3/15/2024    Procedure: BONE GRAFT, ILIAC CREST;  Surgeon: Geoffrey Goodson MD;  Location: [facility];  Service: Orthopedics;  Laterality: Right;  acumed harvester    ILIAC CREST BONE GRAFT Right 2024    Procedure: BONE GRAFT, ILIAC CREST;  Surgeon: Geoffrey Goodson MD;  Location: [facility];  Service: Orthopedics;  Laterality: Right;    MIDFOOT ARTHRODESIS Right 2024    Procedure: FUSION, JOINT, MIDFOOT;  Surgeon: Geoffrey Goodson MD;  Location: [facility];  Service: Orthopedics;  Laterality: Right;  mini c arm    OPEN REDUCTION AND INTERNAL FIXATION (ORIF) OF FRACTURE OF TARSAL BONE Right 3/15/2024    Procedure: ORIF, FRACTURE, TARSAL BONE;  Surgeon: Geoffrey Goodson MD;  Location: [facility];  Service:  Orthopedics;  Laterality: Right;  supine, mini C arm, Styker, block okay    SHOULDER SURGERY Right     TILT TABLE TEST N/A 07/15/2022    Procedure: TILT TABLE TEST;  Surgeon: Amol Kohler MD;  Location: [facility];  Service: Cardiology;  Laterality: N/A;  Orthostatic hypotension, dizziness, Tilt Table Test with EEG, Dr.Tiffany Roca (neuro), DM    TUBAL LIGATION  1998       Review of patient's allergies indicates:   Allergen Reactions    Neurontin [gabapentin]     Lactose     Soap Hives     Able to tolerate Dove only        Current Outpatient Medications   Medication Sig Dispense Refill    acetaminophen (TYLENOL) 500 MG tablet Take 1 tablet (500 mg total) by mouth every 6 (six) hours. 30 tablet 1    BIOTIN ORAL Take by mouth.      calcitRIOL (ROCALTROL) 0.25 MCG Cap TAKE 1 CAPSULE (0.25 MCG TOTAL) BY MOUTH ONCE DAILY. 90 capsule 1    calcitRIOL (ROCALTROL) 0.25 MCG Cap Take 1 capsule (0.25 mcg total) by mouth once daily. for 90 doses 30 capsule 2    calcium carbonate (TUMS) 200 mg calcium (500 mg) chewable tablet Take 1 tablet (500 mg total) by mouth 3 (three) times daily as needed for Heartburn. 60 tablet 0    cholecalciferol, vitamin D3, (VITAMIN D3) 50 mcg (2,000 unit) Cap capsule Take 2,000 Units by mouth every evening.      COVID-19 (COMIRNATY 2024-25, 12Y UP,,PF,) 30 mcg/0.3 mL IM vaccine (>/= 11 yo) Inject into the muscle. 0.3 mL 0    cyanocobalamin (VITAMIN B-12) 100 MCG tablet Take 100 mcg by mouth once daily.      dexAMETHasone (DECADRON) 4 mg/mL injection Inject 1ml (4mg) into the muscle for symptoms of severe adrenal insufficiency. 3ml syringe w/18 g needle to draw x10 and 21g 1 inch needle to injection x10 1 mL 11    DULoxetine (CYMBALTA) 30 MG capsule Take 2 capsules (60 mg total) by mouth every evening. 180 capsule 3    ergocalciferol (ERGOCALCIFEROL) 50,000 unit Cap Take 1 capsule (50,000 Units total) by mouth every 7 days. 7 capsule 0    ferrous sulfate (IRON, FERROUS SULFATE,) 325 mg (65 mg  iron) Tab tablet Take 1 tablet (325 mg total) by mouth 2 (two) times daily. 60 tablet 6    ferrous sulfate 324 mg (65 mg iron) TbEC Take 325 mg by mouth nightly.       hydrocortisone (CORTEF) 10 MG Tab Take 1 and 1/2 tablet by mouth every morning and 1/2 tablet in the afternoon      hyoscyamine (LEVSIN/SL) 0.125 mg Subl PLACE 1 TABLET (0.125 MG TOTAL) UNDER THE TONGUE EVERY 4 (FOUR) HOURS AS NEEDED. 360 tablet 2    ivabradine (CORLANOR) 5 mg Tab Take 1 tablet (5 mg total) by mouth 2 (two) times daily. 180 tablet 3    ivabradine (CORLANOR) 5 mg Tab Take 1 tablet (5 mg total) by mouth 2 (two) times daily. 60 tablet 11    ivabradine (CORLANOR) 7.5 mg Tab Take 1 tablet (7.5 mg total) by mouth 2 (two) times daily. 60 tablet 11    lacosamide (VIMPAT) 50 mg Tab Take 3 tablets (150 mg total) by mouth every 12 (twelve) hours. 180 tablet 11    levothyroxine (SYNTHROID) 112 MCG tablet Take 1 tablet (112 mcg total) by mouth before breakfast. 30 tablet 11    melatonin 5 mg TbDL Take 1 tablet by mouth nightly.      midodrine (PROAMATINE) 5 MG Tab Take 4 doses a day 3 hours apart 360 tablet 3    phenytoin (DILANTIN) 100 MG ER capsule Take 2 capsules (200 mg total) by mouth nightly. 180 capsule 3    rivaroxaban (XARELTO) 20 mg Tab Take 1 tablet (20 mg total) by mouth once daily. 90 tablet 4    UNABLE TO FIND Take 1 tablet by mouth Daily. medication name: Biotin 2500mcg       No current facility-administered medications for this visit.       Family History   Problem Relation Name Age of Onset    Heart disease Mother      Diabetes type II Mother      Diabetes type II Father      Heart attack Maternal Grandfather         Social History     Socioeconomic History    Marital status:     Number of children: 2   Occupational History    Occupation:    Tobacco Use    Smoking status: Never    Smokeless tobacco: Never   Substance and Sexual Activity    Alcohol use: No    Drug use: No    Sexual activity: Not Currently    Social History Narrative     30 years, lives at home with .      Social Drivers of Health     Financial Resource Strain: Low Risk  (12/11/2023)    Overall Financial Resource Strain (CARDIA)     Difficulty of Paying Living Expenses: Not hard at all   Food Insecurity: No Food Insecurity (12/11/2023)    Hunger Vital Sign     Worried About Running Out of Food in the Last Year: Never true     Ran Out of Food in the Last Year: Never true   Transportation Needs: Unmet Transportation Needs (12/11/2023)    PRAPARE - Transportation     Lack of Transportation (Medical): Yes     Lack of Transportation (Non-Medical): Yes   Physical Activity: Insufficiently Active (12/11/2023)    Exercise Vital Sign     Days of Exercise per Week: 3 days     Minutes of Exercise per Session: 20 min   Stress: No Stress Concern Present (12/11/2023)    [employer] - Occupational Stress Questionnaire     Feeling of Stress : Only a little   Housing Stability: Low Risk  (12/11/2023)    Housing Stability Vital Sign     Unable to Pay for Housing in the Last Year: No     Number of Places Lived in the Last Year: 1     Unstable Housing in the Last Year: No              Objective:        GEN: Well developed, well nourished. No acute distress. Fully alert, oriented, and appropriate. Speech normal rayna.   PSYCH: Normal affect. Thought content appropriate.  CHEST: Breathing symmetric. No audible wheezing.  SKIN: Warm, dry. No rash or discoloration on exposed areas.       Imaging:      Xray Lumbar Spine 5/18/2023:  FINDINGS:  There are 5 non-rib-bearing lumbar spine vertebrae.  There is no fracture or subluxation.  Alignment is normal.  Vertebral body heights are maintained.  There is mild disc height loss at L4-L5.  There are degenerative changes.  There is no evidence of dynamic instability.  There are vascular calcifications.  There are quadrant surgical clips.  Remaining visualized osseous structures are  intact.     Impression:     No acute osseous abnormality.     Degenerative changes most significant at L4-L5.    Assessment:     Encounter Diagnoses   Name Primary?    Chronic pain syndrome Yes    Lumbar radiculopathy     Peripheral polyneuropathy     Decreased motor activity     Abnormality of gait          Plan:     Diagnoses and all orders for this visit:    Chronic pain syndrome    Lumbar radiculopathy    Peripheral polyneuropathy    Decreased motor activity    Abnormality of gait      Thelma Nguyen returns today for 12 month f/u. We discussed how she is integrating the program into daily life. See HPI for details.     We discussed how consistent use of the skills learned in the program leads to lasting positive results. We discussed possible tune up sessions. Will hold at this time due to BP and falls.     RTC PRN.     I spent a total of 30 minutes with the patient, and greater than 50% of the time was spent in counseling and education.     The above plan and management options were discussed at length with patient. Patient is in agreement with the above and verbalized understanding. It will be communicated with the referring physician via electronic record, fax, or mail.    Bibi Alvarado NP  10/17/2024

## 2024-10-18 ENCOUNTER — OFFICE VISIT (OUTPATIENT)
Dept: CARDIOLOGY | Facility: CLINIC | Age: 59
End: 2024-10-18
Payer: COMMERCIAL

## 2024-10-18 ENCOUNTER — HOSPITAL ENCOUNTER (OUTPATIENT)
Dept: CARDIOLOGY | Facility: HOSPITAL | Age: 59
Discharge: HOME | End: 2024-10-18
Attending: INTERNAL MEDICINE
Payer: COMMERCIAL

## 2024-10-18 VITALS
HEART RATE: 81 BPM | SYSTOLIC BLOOD PRESSURE: 156 MMHG | BODY MASS INDEX: 21.48 KG/M2 | WEIGHT: 141.75 LBS | DIASTOLIC BLOOD PRESSURE: 73 MMHG | HEIGHT: 68 IN | OXYGEN SATURATION: 96 %

## 2024-10-18 DIAGNOSIS — I95.1 ORTHOSTATIC HYPOTENSION: ICD-10-CM

## 2024-10-18 DIAGNOSIS — R42 DIZZINESS: ICD-10-CM

## 2024-10-18 DIAGNOSIS — G62.9 PERIPHERAL POLYNEUROPATHY: ICD-10-CM

## 2024-10-18 DIAGNOSIS — G62.0 DRUG-INDUCED PERIPHERAL NEUROPATHY: ICD-10-CM

## 2024-10-18 DIAGNOSIS — E46 MALNUTRITION, UNSPECIFIED TYPE: ICD-10-CM

## 2024-10-18 DIAGNOSIS — E27.49 SECONDARY ADRENAL INSUFFICIENCY: ICD-10-CM

## 2024-10-18 DIAGNOSIS — Z86.39 HISTORY OF ADRENAL INSUFFICIENCY: ICD-10-CM

## 2024-10-18 DIAGNOSIS — I95.1 ORTHOSTATIC HYPOTENSION: Primary | ICD-10-CM

## 2024-10-18 DIAGNOSIS — E23.0 PANHYPOPITUITARISM: ICD-10-CM

## 2024-10-18 DIAGNOSIS — R55 RECURRENT SYNCOPE: ICD-10-CM

## 2024-10-18 PROCEDURE — 1159F MED LIST DOCD IN RCRD: CPT | Mod: CPTII,S$GLB,, | Performed by: INTERNAL MEDICINE

## 2024-10-18 PROCEDURE — 93010 ELECTROCARDIOGRAM REPORT: CPT | Mod: ,,, | Performed by: INTERNAL MEDICINE

## 2024-10-18 PROCEDURE — 3008F BODY MASS INDEX DOCD: CPT | Mod: CPTII,S$GLB,, | Performed by: INTERNAL MEDICINE

## 2024-10-18 PROCEDURE — 99215 OFFICE O/P EST HI 40 MIN: CPT | Mod: S$GLB,,, | Performed by: INTERNAL MEDICINE

## 2024-10-18 PROCEDURE — 1160F RVW MEDS BY RX/DR IN RCRD: CPT | Mod: CPTII,S$GLB,, | Performed by: INTERNAL MEDICINE

## 2024-10-18 PROCEDURE — 3077F SYST BP >= 140 MM HG: CPT | Mod: CPTII,S$GLB,, | Performed by: INTERNAL MEDICINE

## 2024-10-18 PROCEDURE — 93005 ELECTROCARDIOGRAM TRACING: CPT

## 2024-10-18 PROCEDURE — 3078F DIAST BP <80 MM HG: CPT | Mod: CPTII,S$GLB,, | Performed by: INTERNAL MEDICINE

## 2024-10-18 PROCEDURE — 99999 PR PBB SHADOW E&M-EST. PATIENT-LVL III: CPT | Mod: PBBFAC,,, | Performed by: INTERNAL MEDICINE

## 2024-10-18 PROCEDURE — 3066F NEPHROPATHY DOC TX: CPT | Mod: CPTII,S$GLB,, | Performed by: INTERNAL MEDICINE

## 2024-10-18 PROCEDURE — 3044F HG A1C LEVEL LT 7.0%: CPT | Mod: CPTII,S$GLB,, | Performed by: INTERNAL MEDICINE

## 2024-10-18 RX ORDER — PYRIDOSTIGMINE BROMIDE 60 MG/1
60 TABLET ORAL EVERY 4 HOURS
Qty: 180 TABLET | Refills: 11 | Status: SHIPPED | OUTPATIENT
Start: 2024-10-18 | End: 2025-10-18

## 2024-10-18 NOTE — PROGRESS NOTES
"    Subjective:   Patient ID:  Thelma Nguyen is a 58 y.o. female     Chief complaint:Shortness of Breath      HPI  New patient to me. 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      She presented to the ED by request of Orthopedics, Dr. Goodson, for admission for surgical correction of right foot navicular fracture. Patient had ORIF performed with Dr. Goodson on 3/15.      Patient admits to significant and lengthy history of severe orthostatic hypotension, and admits her "spells" of becoming lightheaded upon standing have severely worsened since surgery causing her to have numerous falls over past few weeks. She believes during these falls she re-injured her right foot causing hardware failure. From these falls she also does report contusing left elbow and left rib cage. Denying any recent head injury.      Patient also reports episodes of tachycardia that occur while sitting on the cough watching TV with alerts on her Apple watch of HR 110s-140s.   She states that her falls have significantly increased over the last couple weeks up to 20 falls in the last 2 weeks. Some of these are accompanied by syncope and a disoriented feeling, other times her legs just give out and she falls.   10Patient's  states patient does not appear to have seizure activity during these episodes but she does start breathing really heavy and then wakes back up after 1-2 minutes. Patient has been under the care of cardiology and endocrinology. She states that she was diagnosed with postural " "hypotension with vasovagal response. She has never been prescribed a Holter Monitor.        Tilt test performed and interpreted by Dr. Franklin on 07/15/2022:  Initial supine HR: 95 bpm  Initial supine BP: 146/72 mmHg  Initial tilt (60 deg) HR: 118 bpm  Initial tilt (60 deg) BP: 95/59 mmHg  Maximum heart rate seen at 14: (min:sec) after procedure start.  Maximum heart rate: 131 bpm  BP during maximum HR: 84/42 mmHg  Minimum blood pressure seen at 7: (min:sec) after procedure start.  HR during minimum BP: 127 bpm  Minimum blood pressure: 79/46 mmHg  Symptoms seen on initial tilt include: "Tired," heavy legs, sleepy.. The test was stopped due to end of test duration.       I personally reviewed the HUT data.  Of import is that the diastolic blood pressure never went up with tilting and in fact it dropped significantly even a 30°.  In addition, heart rate increased to 126 beats per minute within a minute of tilting to 60°.  No C fiber reactions were noted.  No further pharmacologically stimulated testing was performed.  No impedance measurements performed.     Additional details gleaned from today's interview :  The diagnosis of orthostatic hypotension was made approximately 2 to 2 and half years ago.  4- 5 years ago she had the glioblastoma removed received chemotherapy and radiation (Dr. Champ Celaya).    Since then she is on Synthroid and hydrocortisone.  Per MRI, the pituitary said to be normal.  Her cortisol level was 13.9 on 04/01/2024.  On March 15th, she had 3 falls and had a navicular ORIF; she fell again and re broke her right foot  Prior to that she was having a fall roughly every month or 2.    She gets orthostatic dizziness daily and has to sit down.    She takes midodrine 2.5 mg in the morning and again sometime between 3 and 5:00 p.m. if her blood pressure is not high.  Until recently she was on phenytoin.  >>  POTS due to partial efferent autonomic dysfunction and evaluate her for pituitary failure. "   >>   We will try to confirm the putative diagnosis of autonomic dysfunction.      Update 06/30/2024 :  Plasma catecholamines were WNL - mid to low range. Urine excretion of catecholamines and metanephrines were on the low side.  PTH is better but still somewhat elevated.  Vitamin D is on the low side.    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     >>  Borderline adrenal medullary function. This  may explain her symptomatology.   >>  Will treat her with midodrine and Corlanor.  Specifically midodrine 2.5 mg q.3 hours when awake and Corlanor 5 mg p.o. twice a day.    Update 10/23/2024 :  Does not feel well.  Constantly getting orthostatic.  She brought in a list of blood pressures in the range from a low of no 5/68 to high of 222/84 while sitting and a low of 92 over 58 to high of 138/65 when standing.  There is always a significant drop in systolic blood pressure with standing and it could be as high as 90 mmHg.  Standing pulses were always high with the lowest being 97 and the highest 127.  I have reviewed the actual image of the ECG tracing obtained today and it shows NSR with normal intervals. HR is 83.      Current Outpatient Medications   Medication Sig    acetaminophen (TYLENOL) 500 MG tablet Take 1 tablet (500 mg total) by mouth every 6 (six) hours.    BIOTIN ORAL Take by mouth.    calcitRIOL (ROCALTROL) 0.25 MCG Cap TAKE 1 CAPSULE (0.25 MCG TOTAL) BY MOUTH ONCE DAILY.    calcitRIOL (ROCALTROL) 0.25 MCG Cap Take 1 capsule (0.25 mcg total) by mouth once daily. for 90 doses    calcium carbonate (TUMS) 200 mg calcium (500 mg) chewable tablet Take 1 tablet (500 mg total) by mouth 3 (three) times daily as needed for Heartburn.    cholecalciferol, vitamin D3, (VITAMIN D3) 50 mcg (2,000 unit) Cap capsule Take 2,000 Units by mouth every evening.    COVID-19 (COMIRNATY 2024-25, 12Y UP,,PF,) 30 mcg/0.3 mL IM vaccine (>/= 13 yo) Inject into the muscle.    cyanocobalamin  (VITAMIN B-12) 100 MCG tablet Take 100 mcg by mouth once daily.    dexAMETHasone (DECADRON) 4 mg/mL injection Inject 1ml (4mg) into the muscle for symptoms of severe adrenal insufficiency. 3ml syringe w/18 g needle to draw x10 and 21g 1 inch needle to injection x10    DULoxetine (CYMBALTA) 30 MG capsule Take 2 capsules (60 mg total) by mouth every evening.    ergocalciferol (ERGOCALCIFEROL) 50,000 unit Cap Take 1 capsule (50,000 Units total) by mouth every 7 days.    ferrous sulfate (IRON, FERROUS SULFATE,) 325 mg (65 mg iron) Tab tablet Take 1 tablet (325 mg total) by mouth 2 (two) times daily.    ferrous sulfate 324 mg (65 mg iron) TbEC Take 325 mg by mouth nightly.     hydrocortisone (CORTEF) 10 MG Tab Take 1 and 1/2 tablet by mouth every morning and 1/2 tablet in the afternoon    hyoscyamine (LEVSIN/SL) 0.125 mg Subl PLACE 1 TABLET (0.125 MG TOTAL) UNDER THE TONGUE EVERY 4 (FOUR) HOURS AS NEEDED.    ivabradine (CORLANOR) 5 mg Tab Take 1 tablet (5 mg total) by mouth 2 (two) times daily.    ivabradine (CORLANOR) 5 mg Tab Take 1 tablet (5 mg total) by mouth 2 (two) times daily.    ivabradine (CORLANOR) 7.5 mg Tab Take 1 tablet (7.5 mg total) by mouth 2 (two) times daily.    lacosamide (VIMPAT) 50 mg Tab Take 3 tablets (150 mg total) by mouth every 12 (twelve) hours.    levothyroxine (SYNTHROID) 112 MCG tablet Take 1 tablet (112 mcg total) by mouth before breakfast.    melatonin 5 mg TbDL Take 1 tablet by mouth nightly.    midodrine (PROAMATINE) 5 MG Tab Take 4 doses a day 3 hours apart    phenytoin (DILANTIN) 100 MG ER capsule Take 2 capsules (200 mg total) by mouth nightly.    rivaroxaban (XARELTO) 20 mg Tab Take 1 tablet (20 mg total) by mouth once daily.    UNABLE TO FIND Take 1 tablet by mouth Daily. medication name: Biotin 2500mcg    pyridostigmine (MESTINON) 60 mg Tab Take 1 tablet (60 mg total) by mouth every 4 (four) hours.     No current facility-administered medications for this visit.     Review of  Systems     Constitutional: Reviewed  for decreased appetite, weight gain and weight loss.   HENT: Reviewed for nosebleeds.    Eyes:  Reviewed for blurred vision and visual disturbance.   Cardiovascular: Reviewed for chest pain, claudication, cyanosis,dyspnea on exertion, leg swelling, orthopnea,paroxysmal nocturnal dyspnearregular heartbeats, palpitations, near-syncope, and syncope.   Respiratory: Reviewed for cough, shortness of breath, wheezing, sleep disturbances due to breathing and snoring, .    Endocrine: Reviewed for heat intolerance.   Hematologic/Lymphatic: Reviewed for easy bruisability/bleeding.   Skin: Reviewed for rash.   Musculoskeletal: Reviewed for muscle weakness and myalgias.   Gastrointestinal: Reviewed for abdominal pain, anorexia, melena, nausea and vomiting.   Genitourinary: Reviewed for menorrhagia, frequency, nocturia and incontinence.   Neurological: Reviewed for excessive daytime sleepiness, dizziness, vertigo, weakness, headaches, loss of balance and seizures,   Psychiatric/Behavioral:  Reviewed for insomnia, altered mental status, depression, anxiety and nervousness.       All symptoms reviewed above were negative except for fatigue, nosebleeds, shortness of breath, leg swelling, palpitations, fainting, cough, sputum, constipation, heartburn, urinary frequency and nocturia, easy bruisability, daytime sleepiness, headaches, loss of balance, arthritic complaints and anxiety.       Social History     Tobacco Use   Smoking Status Never   Smokeless Tobacco Never        Objective:     Physical Exam  Vitals and nursing note reviewed.   Constitutional:       Appearance: She is well-developed.      Comments: Looks weak, tired   HENT:      Head: Normocephalic and atraumatic.      Right Ear: External ear normal.      Left Ear: External ear normal.   Neck:      Thyroid: No thyromegaly.   Cardiovascular:      Rate and Rhythm: Normal rate and regular rhythm.      Pulses: Intact distal pulses.           " Carotid pulses are 2+ on the right side and 2+ on the left side.       Radial pulses are 2+ on the right side and 2+ on the left side.        Dorsalis pedis pulses are 2+ on the right side and 2+ on the left side.        Posterior tibial pulses are 2+ on the right side and 2+ on the left side.      Heart sounds: Normal heart sounds. No midsystolic click and no opening snap. No murmur heard.     No friction rub. No gallop. No S3 or S4 sounds.      Comments:  Orthostatic BP measurements  Sitting:                            BP  156/75          HR  83  Standing 1 min:                     111/62                 100  Standing 3 min:                     115/64                 102  Standing 5 min:    Could not stand for 5 minutes                     Pulmonary:      Effort: Pulmonary effort is normal.      Breath sounds: Normal breath sounds.   Abdominal:      General: There is no distension.      Palpations: Abdomen is soft.      Tenderness: There is no abdominal tenderness.   Musculoskeletal:      Cervical back: Normal range of motion and neck supple.      Right lower leg: No swelling.      Left lower leg: No swelling.      Right ankle: No swelling.      Left ankle: No swelling.   Skin:     General: Skin is warm.      Findings: No rash.      Nails: There is no clubbing.   Neurological:      Mental Status: She is alert and oriented to person, place, and time.      Cranial Nerves: No cranial nerve deficit.      Gait: Gait normal.   Psychiatric:         Speech: Speech normal.         Behavior: Behavior normal.         Thought Content: Thought content normal.       BP (!) 156/73 (BP Location: Left arm, Patient Position: Sitting)   Pulse 81   Ht 5' 8" (1.727 m)   Wt 64.3 kg (141 lb 12.1 oz)   LMP 10/23/1997 (Exact Date)   SpO2 96%   BMI 21.55 kg/m²       No results found for this or any previous visit.    WBC   Date Value Ref Range Status   09/09/2024 8.11 3.90 - 12.70 K/uL Final     Hematocrit   Date Value Ref Range " "Status   2024 32.8 (L) 37.0 - 48.5 % Final     Hemoglobin   Date Value Ref Range Status   2024 10.6 (L) 12.0 - 16.0 g/dL Final     Lab Results   Component Value Date     2024     Lab Results   Component Value Date    CREATININE 1.8 (H) 2024    EGFRNORACEVR 32.5 (A) 2024    K 3.8 2024     No results found for: "BNP"         reports no history of alcohol use.  Past Medical History:   Diagnosis Date    Allergic rhinitis     Anemia     Arthritis     Basal cell carcinoma     Broken ankle     Cancer     Clotting disorder     Disorder of kidney and ureter     DVT (deep venous thrombosis)     Factor V deficiency     Fracture of elbow     left    Glioblastoma multiforme of brain     Hypothyroidism     IBS (irritable bowel syndrome)     Osteoporosis     Panhypopituitarism     Peripheral polyneuropathy     Secondary adrenal insufficiency     Seizures     Thyroid disease      Past Surgical History:   Procedure Laterality Date    APPENDECTOMY      BRAIN SURGERY      for glioblastoma      SECTION      CHOLECYSTECTOMY      CLOSURE OF DEFECT OF MOHS PROCEDURE Left 2018    Procedure: CLOSURE, MOHS PROCEDURE DEFECT SCALP  Flap closure;  Surgeon: Yrn Novak MD;  Location: [address];  Service: Plastics;  Laterality: Left;    COLONOSCOPY N/A 2019    Procedure: COLONOSCOPY Suprep;  Surgeon: Tony Mosher MD;  Location: [facility];  Service: Endoscopy;  Laterality: N/A;    ESOPHAGOGASTRODUODENOSCOPY N/A 2019    Procedure: EGD/colon;  Surgeon: Tony Mosher MD;  Location: [facility];  Service: Endoscopy;  Laterality: N/A;    EXCISION OF GANGLION CYST OF HAND Left 2020    Procedure: EXCISION, GANGLION CYST, HAND;  Surgeon: Ross Drew Jr., MD;  Location: [facility];  Service: Orthopedics;  Laterality: Left;    HYSTERECTOMY      ILIAC CREST BONE GRAFT Right 3/15/2024    Procedure: BONE GRAFT, ILIAC " CREST;  Surgeon: Geoffrey Goodson MD;  Location: [facility] OR;  Service: Orthopedics;  Laterality: Right;  acumed harvester    ILIAC CREST BONE GRAFT Right 4/2/2024    Procedure: BONE GRAFT, ILIAC CREST;  Surgeon: Geoffrey Goodson MD;  Location: [facility] OR;  Service: Orthopedics;  Laterality: Right;    MIDFOOT ARTHRODESIS Right 4/2/2024    Procedure: FUSION, JOINT, MIDFOOT;  Surgeon: Geoffrey Goodson MD;  Location: [facility] OR;  Service: Orthopedics;  Laterality: Right;  mini c arm    OPEN REDUCTION AND INTERNAL FIXATION (ORIF) OF FRACTURE OF TARSAL BONE Right 3/15/2024    Procedure: ORIF, FRACTURE, TARSAL BONE;  Surgeon: Geoffrey Goodson MD;  Location: [facility] OR;  Service: Orthopedics;  Laterality: Right;  supine, mini C arm, Styker, block okay    SHOULDER SURGERY Right     TILT TABLE TEST N/A 07/15/2022    Procedure: TILT TABLE TEST;  Surgeon: Amol Kohler MD;  Location: [facility] EP LAB;  Service: Cardiology;  Laterality: N/A;  Orthostatic hypotension, dizziness, Tilt Table Test with EEG, Dr.Tiffany Roca (neuro), DM    TUBAL LIGATION  1998     Family History   Problem Relation Name Age of Onset    Heart disease Mother      Diabetes type II Mother      Diabetes type II Father      Heart attack Maternal Grandfather         Assessment:   Autonomic dysfunction with hypotensive episodes and labile hypertension.  1. Orthostatic hypotension    2. Recurrent syncope    3. Malnutrition, unspecified type    4. Dizziness    5. Drug-induced peripheral neuropathy    6. History of adrenal insufficiency    7. Panhypopituitarism    8. Peripheral polyneuropathy    9. Secondary adrenal insufficiency        Plan:      Long talk about condition, preventive methods etc..  Orders Placed This Encounter   Procedures    PREALBUMIN     Standing Status:   Future     Number of Occurrences:   1     Standing Expiration Date:   1/16/2026     Order Specific Question:   Send normal result to authorizing provider's In Basket if patient is active on MyChart:      Answer:   Yes       No follow-ups on file.    There are no discontinued medications.    Medications Ordered This Encounter   Medications    pyridostigmine (MESTINON) 60 mg Tab     Sig: Take 1 tablet (60 mg total) by mouth every 4 (four) hours.     Dispense:  180 tablet     Refill:  11       Medication List with Changes/Refills   New Medications    PYRIDOSTIGMINE (MESTINON) 60 MG TAB    Take 1 tablet (60 mg total) by mouth every 4 (four) hours.   Current Medications    ACETAMINOPHEN (TYLENOL) 500 MG TABLET    Take 1 tablet (500 mg total) by mouth every 6 (six) hours.    BIOTIN ORAL    Take by mouth.    CALCITRIOL (ROCALTROL) 0.25 MCG CAP    TAKE 1 CAPSULE (0.25 MCG TOTAL) BY MOUTH ONCE DAILY.    CALCITRIOL (ROCALTROL) 0.25 MCG CAP    Take 1 capsule (0.25 mcg total) by mouth once daily. for 90 doses    CALCIUM CARBONATE (TUMS) 200 MG CALCIUM (500 MG) CHEWABLE TABLET    Take 1 tablet (500 mg total) by mouth 3 (three) times daily as needed for Heartburn.    CHOLECALCIFEROL, VITAMIN D3, (VITAMIN D3) 50 MCG (2,000 UNIT) CAP CAPSULE    Take 2,000 Units by mouth every evening.    COVID-19 (COMIRNATY 2024-25, 12Y UP,,PF,) 30 MCG/0.3 ML IM VACCINE (>/= 13 YO)    Inject into the muscle.    CYANOCOBALAMIN (VITAMIN B-12) 100 MCG TABLET    Take 100 mcg by mouth once daily.    DEXAMETHASONE (DECADRON) 4 MG/ML INJECTION    Inject 1ml (4mg) into the muscle for symptoms of severe adrenal insufficiency. 3ml syringe w/18 g needle to draw x10 and 21g 1 inch needle to injection x10    DULOXETINE (CYMBALTA) 30 MG CAPSULE    Take 2 capsules (60 mg total) by mouth every evening.    ERGOCALCIFEROL (ERGOCALCIFEROL) 50,000 UNIT CAP    Take 1 capsule (50,000 Units total) by mouth every 7 days.    FERROUS SULFATE (IRON, FERROUS SULFATE,) 325 MG (65 MG IRON) TAB TABLET    Take 1 tablet (325 mg total) by mouth 2 (two) times daily.    FERROUS SULFATE 324 MG (65 MG IRON) TBEC    Take 325 mg by mouth nightly.     HYDROCORTISONE (CORTEF) 10 MG TAB     Take 1 and 1/2 tablet by mouth every morning and 1/2 tablet in the afternoon    HYOSCYAMINE (LEVSIN/SL) 0.125 MG SUBL    PLACE 1 TABLET (0.125 MG TOTAL) UNDER THE TONGUE EVERY 4 (FOUR) HOURS AS NEEDED.    IVABRADINE (CORLANOR) 5 MG TAB    Take 1 tablet (5 mg total) by mouth 2 (two) times daily.    IVABRADINE (CORLANOR) 5 MG TAB    Take 1 tablet (5 mg total) by mouth 2 (two) times daily.    IVABRADINE (CORLANOR) 7.5 MG TAB    Take 1 tablet (7.5 mg total) by mouth 2 (two) times daily.    LACOSAMIDE (VIMPAT) 50 MG TAB    Take 3 tablets (150 mg total) by mouth every 12 (twelve) hours.    LEVOTHYROXINE (SYNTHROID) 112 MCG TABLET    Take 1 tablet (112 mcg total) by mouth before breakfast.    MELATONIN 5 MG TBDL    Take 1 tablet by mouth nightly.    MIDODRINE (PROAMATINE) 5 MG TAB    Take 4 doses a day 3 hours apart    PHENYTOIN (DILANTIN) 100 MG ER CAPSULE    Take 2 capsules (200 mg total) by mouth nightly.    RIVAROXABAN (XARELTO) 20 MG TAB    Take 1 tablet (20 mg total) by mouth once daily.    UNABLE TO FIND    Take 1 tablet by mouth Daily. medication name: Biotin 2500mcg        This note is at least partially dictated using the M*Modal Fluency Direct word recognition program. There are word recognition mistakes that are occasionally missed on review.     Pre-visit chart review: 12 min    Face to face time: 35 min, > 50% of which spent in education    I have reviewed the actual images of all relevant ECG, rhythm, holter and long term monitoring tracings available in EPIC, MUSE  and in legacy as well as outside records.       Post visit chart documentation and care coordination: 12 min

## 2024-10-19 LAB
OHS QRS DURATION: 100 MS
OHS QTC CALCULATION: 444 MS

## 2024-10-24 ENCOUNTER — TELEPHONE (OUTPATIENT)
Dept: CARDIOLOGY | Facility: CLINIC | Age: 59
End: 2024-10-24
Payer: COMMERCIAL

## 2024-10-24 ENCOUNTER — PATIENT MESSAGE (OUTPATIENT)
Dept: CARDIOLOGY | Facility: CLINIC | Age: 59
End: 2024-10-24
Payer: COMMERCIAL

## 2024-10-24 NOTE — TELEPHONE ENCOUNTER
Message sent via portal pb  ----- Message from Viktor Cohen MD sent at 10/23/2024  8:57 PM CDT -----  See comments below and call patient to discuss.   Please close encounter when done -- no need to route back to me.  Thanks  Her pre-albumin is surprisingly good.  Keep up the good work with nutrition.

## 2024-11-06 NOTE — PROGRESS NOTES
Subjective:    11/07/2024     The patient's last visit with Dr. Serrano was on Feb 2024    Patient ID: Thelma Nguyen is a 58 y.o. female.    Chief Complaint:  Hypopituitarism    History of Present Illness  Ms. Nguyen is a 58 y.o. female with hx of GBM in remission, CDK, orthostatin hypotenison, IBS, factor 5 leiden deficiency (on xarelto), osteoporosis who is here for a follow-up visit for evaluation of panhypopituitarism    Diagnosed with a glioblastoma multiforme in [facility] 1985 (pathology report not available), treated with surgery and radiation and developed panhypopituitarism.  Note she also has seizure disorder.        Interval hx:  Dilantin was recently discontinued, changed to lacosamide.    Had ORIF for R foot fx March 2024. Had a new fracture on that same foot April 2024.  Now ambulates with wheelchair to prevent falls.    Imaging:  Brain MRI 4/2024 - no residual tumor, normal pituitary    Regarding secondary adrenal insufficiency   On Hydrocortisone 15 mg daily, 5 mg in the afternoon  No unexplained weight loss, does have orthostatic sxs under investigation with cards  Aware of sick day rules  has medical alert tag on smart watch    Regarding secondary hypothyroidism:  On Levothyroxine 112 mcg in the morning.Taking appropriately, does not miss medications.      Lab Results   Component Value Date    TSH 1.716 04/01/2024    FREET4 0.98 04/01/2024       Thyroid Symptoms  + fatigue     Weight change:  [x]  Gain []  Loss  []  Denies   Last 2 years gained about 20 lb     Temperature intolerance:  [x]  Cold (chronic) []  Hot   []  Denies     GI:  []  Diarrhea [x]  Constipation []  Denies       Integument:  []  Hair loss [x]  Dry skin  []  Denies     Other:  []  Palpitation [x]  Tremor    []  Increased anxiety    []  Denies        Dxa 08/2024  Fracture Risk (FRAX adjusted for Trabecular Bone Score)  25% risk of a major osteoporotic fracture in the next 10 years.  5.3% risk of hip  fracture in the next 10 years.  -Glucocorticoids, fracture, low BMI,      Impression:  *Osteoporosis based on T-score between -1.0 and -2.5 and elevated risk based on FRAX  *Fracture risk is very high due to calculated 10 year risk of hip fracture >4.5% (FRAX).    Was on fosamax 70 mg weekly from  2015- 8/2022   On drug holiday now     Takes vitamin D daily - 2K IU, also takes calcium  No hx of nephrolithiasis.       2016: Fractured right ankle.   2024: Fractured R foot x2    Lab Results   Component Value Date    CALCIUM 9.3 09/09/2024    ALBUMIN 3.6 09/09/2024    ESTGFRAFRICA 44.5 (A) 03/28/2022    EGFRNONAA 38.6 (A) 03/28/2022    PHOS 3.1 09/09/2024    ALKPHOS 136 (H) 04/01/2024    [id number] 31 01/17/2024    .4 (H) 09/09/2024      No symptoms of DI (denies polyuria/polydipsia).       Has never been on growth hormone    Last IGF-1:   Lab Results   Component Value Date    SOMATMDN 165 07/27/2021      Objective:     Vitals:    11/07/24 0911   BP: (!) 140/72   Pulse: 91   SpO2: 98%     BP: reports hx of low BP  BP Readings from Last 3 Encounters:   11/07/24 (!) 140/72   10/18/24 (!) 156/73   10/07/24 113/70     Wt Readings from Last 1 Encounters:   10/18/24 1150 64.3 kg (141 lb 12.1 oz)     Constitutional:  Pleasant,  in no acute distress.   HENT:   Eyes:     No scleral icterus.   Respiratory:   Effort normal   Neurological:  normal speech  Psych:  Normal mood and affect.         Lab Review:   Lab Results   Component Value Date    HGBA1C 5.9 (H) 04/09/2024       Assessment and Plan     Problem List Items Addressed This Visit          Endocrine    Panhypopituitarism    Secondary adrenal insufficiency - Primary     Controlled on HC 15/5  Does have emergency dex injection to use if needed  Does have medical alert   Reinforced sick day dose regimen         Secondary hypothyroidism     Last FT4 within normal limits  Continue levothyroxine 112 mcg daily  Taking it appropriately            Orthopedic     Age-related osteoporosis with current pathological fracture with delayed healing     Risk factors include  race, menopause, repeated fractures  Plan work up of accelerated bone loss to include 24 urine calcium, TSH, PTH, Vit D, CMP, phosphorus    Discussed options for therapy. Given very high fracture risk, would recommend anabolic therapy.  Recommended either Evenity or Forteo. Discussed side effects from each option.  Reviewed the potential risk for cardiovascular events with Evenity (romosozumab) and equivocal results in clinical trial. Romosozumab should be avoided if the patient experienced a stroke or MI within the preceding 12 months.     Patient opted for Evenity. We will get it started in [facility].    RDA of calcium and Vitamin D discussed    Fall precautions emphasized  Weight bearing exercises encouraged    DXA scan in one year         Relevant Orders    Comprehensive Metabolic Panel     RTC 1 yr    Gaviota Tian MD

## 2024-11-07 ENCOUNTER — OFFICE VISIT (OUTPATIENT)
Dept: ENDOCRINOLOGY | Facility: CLINIC | Age: 59
End: 2024-11-07
Payer: COMMERCIAL

## 2024-11-07 VITALS — DIASTOLIC BLOOD PRESSURE: 72 MMHG | HEART RATE: 91 BPM | OXYGEN SATURATION: 98 % | SYSTOLIC BLOOD PRESSURE: 140 MMHG

## 2024-11-07 DIAGNOSIS — E23.0 PANHYPOPITUITARISM: ICD-10-CM

## 2024-11-07 DIAGNOSIS — E27.49 SECONDARY ADRENAL INSUFFICIENCY: Primary | ICD-10-CM

## 2024-11-07 DIAGNOSIS — E03.8 SECONDARY HYPOTHYROIDISM: ICD-10-CM

## 2024-11-07 DIAGNOSIS — M80.00XG AGE-RELATED OSTEOPOROSIS WITH CURRENT PATHOLOGICAL FRACTURE WITH DELAYED HEALING: ICD-10-CM

## 2024-11-07 PROCEDURE — 3078F DIAST BP <80 MM HG: CPT | Mod: CPTII,S$GLB,, | Performed by: STUDENT IN AN ORGANIZED HEALTH CARE EDUCATION/TRAINING PROGRAM

## 2024-11-07 PROCEDURE — 3066F NEPHROPATHY DOC TX: CPT | Mod: CPTII,S$GLB,, | Performed by: STUDENT IN AN ORGANIZED HEALTH CARE EDUCATION/TRAINING PROGRAM

## 2024-11-07 PROCEDURE — 3044F HG A1C LEVEL LT 7.0%: CPT | Mod: CPTII,S$GLB,, | Performed by: STUDENT IN AN ORGANIZED HEALTH CARE EDUCATION/TRAINING PROGRAM

## 2024-11-07 PROCEDURE — 99214 OFFICE O/P EST MOD 30 MIN: CPT | Mod: S$GLB,,, | Performed by: STUDENT IN AN ORGANIZED HEALTH CARE EDUCATION/TRAINING PROGRAM

## 2024-11-07 PROCEDURE — 1159F MED LIST DOCD IN RCRD: CPT | Mod: CPTII,S$GLB,, | Performed by: STUDENT IN AN ORGANIZED HEALTH CARE EDUCATION/TRAINING PROGRAM

## 2024-11-07 PROCEDURE — 99999 PR PBB SHADOW E&M-EST. PATIENT-LVL V: CPT | Mod: PBBFAC,,, | Performed by: STUDENT IN AN ORGANIZED HEALTH CARE EDUCATION/TRAINING PROGRAM

## 2024-11-07 PROCEDURE — 3077F SYST BP >= 140 MM HG: CPT | Mod: CPTII,S$GLB,, | Performed by: STUDENT IN AN ORGANIZED HEALTH CARE EDUCATION/TRAINING PROGRAM

## 2024-11-07 PROCEDURE — G2211 COMPLEX E/M VISIT ADD ON: HCPCS | Mod: S$GLB,,, | Performed by: STUDENT IN AN ORGANIZED HEALTH CARE EDUCATION/TRAINING PROGRAM

## 2024-11-07 NOTE — ASSESSMENT & PLAN NOTE
Controlled on HC 15/5  Does have emergency dex injection to use if needed  Does have medical alert   Reinforced sick day dose regimen

## 2024-11-07 NOTE — PROGRESS NOTES
I have seen the patient, reviewed the fellow's history and physical, assessment, plan, and progress note. I have personally interviewed and examined the patient at bedside and agree with the findings.     Patient has frequent falls due to orthostatic hypotension and has multiple fractures within the past year, putting her at very high risk for fractures.  Discussed options for treatments including benefits and risks of osteoporosis therapies.  Specifically, we had  Evenity and Forteo as the top to treatment options.  She is agreeable to start Evenity, so I placed the treatment plan to evaluate insurance coverage.    Amol Yañez MD  Endocrinology Staff

## 2024-11-07 NOTE — ASSESSMENT & PLAN NOTE
Risk factors include  race, menopause, repeated fractures  Plan work up of accelerated bone loss to include 24 urine calcium, TSH, PTH, Vit D, CMP, phosphorus    Discussed options for therapy. Given very high fracture risk, would recommend anabolic therapy.  Recommended either Evenity or Forteo. Discussed side effects from each option.  Reviewed the potential risk for cardiovascular events with Evenity (romosozumab) and equivocal results in clinical trial. Romosozumab should be avoided if the patient experienced a stroke or MI within the preceding 12 months.     Patient opted for Evenity. We will get it started in [facility].    RDA of calcium and Vitamin D discussed    Fall precautions emphasized  Weight bearing exercises encouraged    DXA scan in one year

## 2024-11-12 ENCOUNTER — HOSPITAL ENCOUNTER (OUTPATIENT)
Dept: RADIOLOGY | Facility: HOSPITAL | Age: 59
Discharge: HOME | End: 2024-11-12
Attending: INTERNAL MEDICINE
Payer: COMMERCIAL

## 2024-11-12 ENCOUNTER — PATIENT MESSAGE (OUTPATIENT)
Dept: INTERNAL MEDICINE | Facility: CLINIC | Age: 59
End: 2024-11-12

## 2024-11-12 ENCOUNTER — OFFICE VISIT (OUTPATIENT)
Dept: INTERNAL MEDICINE | Facility: CLINIC | Age: 59
End: 2024-11-12
Payer: COMMERCIAL

## 2024-11-12 VITALS
HEIGHT: 68 IN | HEART RATE: 78 BPM | SYSTOLIC BLOOD PRESSURE: 143 MMHG | BODY MASS INDEX: 21.02 KG/M2 | DIASTOLIC BLOOD PRESSURE: 88 MMHG | OXYGEN SATURATION: 99 % | WEIGHT: 138.69 LBS

## 2024-11-12 DIAGNOSIS — M79.661 PAIN IN RIGHT SHIN: ICD-10-CM

## 2024-11-12 DIAGNOSIS — I95.1 DYSAUTONOMIA ORTHOSTATIC HYPOTENSION SYNDROME: Primary | ICD-10-CM

## 2024-11-12 PROCEDURE — 3066F NEPHROPATHY DOC TX: CPT | Mod: CPTII,S$GLB,, | Performed by: INTERNAL MEDICINE

## 2024-11-12 PROCEDURE — 3077F SYST BP >= 140 MM HG: CPT | Mod: CPTII,S$GLB,, | Performed by: INTERNAL MEDICINE

## 2024-11-12 PROCEDURE — 99215 OFFICE O/P EST HI 40 MIN: CPT | Mod: S$GLB,,, | Performed by: INTERNAL MEDICINE

## 2024-11-12 PROCEDURE — 73590 X-RAY EXAM OF LOWER LEG: CPT | Mod: TC,RT

## 2024-11-12 PROCEDURE — 3079F DIAST BP 80-89 MM HG: CPT | Mod: CPTII,S$GLB,, | Performed by: INTERNAL MEDICINE

## 2024-11-12 PROCEDURE — 3044F HG A1C LEVEL LT 7.0%: CPT | Mod: CPTII,S$GLB,, | Performed by: INTERNAL MEDICINE

## 2024-11-12 PROCEDURE — 1159F MED LIST DOCD IN RCRD: CPT | Mod: CPTII,S$GLB,, | Performed by: INTERNAL MEDICINE

## 2024-11-12 PROCEDURE — 1160F RVW MEDS BY RX/DR IN RCRD: CPT | Mod: CPTII,S$GLB,, | Performed by: INTERNAL MEDICINE

## 2024-11-12 PROCEDURE — 73590 X-RAY EXAM OF LOWER LEG: CPT | Mod: 26,RT,, | Performed by: RADIOLOGY

## 2024-11-12 PROCEDURE — 3008F BODY MASS INDEX DOCD: CPT | Mod: CPTII,S$GLB,, | Performed by: INTERNAL MEDICINE

## 2024-11-12 PROCEDURE — 99999 PR PBB SHADOW E&M-EST. PATIENT-LVL III: CPT | Mod: PBBFAC,,, | Performed by: INTERNAL MEDICINE

## 2024-11-12 NOTE — PROGRESS NOTES
"    CHIEF COMPLAINT     Chief Complaint   Patient presents with    Follow-up       HPI     Thelma Nguyen is a 58 y.o. female history of glioblastoma s/p resection, complicated by panhypopituitarism and neuropathy, IBS, CKD 3, depression, hx of skin cancer and factor 5 leiden deficiency here today for     Last seen by cardiologist on 10/18.  Started on Mestinon 60 mg every 4 hours  She is on midodrine 10 mg every 3 hours while awake and Corlanor 7.5 mg b.i.d.    Continuing to have further episode where she gets lightheaded after standing for approximately 30s.  Multiple episodes of feeling weak with position changes.   Sometimes start with heavy breathing, and spaces out.      reports she hasn't stood independently for 5 minutes for past 6 weeks.    Also having more brain fog.    She was also seen by endocrinologist for pan hypo pick and   history of frailty fractures.  Decision to start the vicinity  Personally Reviewed Patient's Medical, surgical, family and social hx. Changes updated in [facility].  Care Team updated in Epic    Review of Systems:  Review of Systems   Musculoskeletal:  Positive for gait problem.   Neurological:  Positive for weakness and light-headedness.       Health Maintenance:   Reviewed with patient  Due for the following:      PHYSICAL EXAM     BP (!) 143/88 (BP Location: Left arm, Patient Position: Sitting)   Pulse 78   Ht 5' 8" (1.727 m)   Wt 62.9 kg (138 lb 10.7 oz)   LMP 10/23/1997 (Exact Date)   SpO2 99%   BMI 21.08 kg/m²     Gen: Well Appearing, NAD  HEENT: PERR, EOMI  Neck: FROM, no thyromegaly, no cervical adenopathy  CVD: RRR, no M/R/G  Pulm: Normal work of breathing, CTAB, no wheezing  Abd:  Soft, NT, ND non TTP, no mass  MSK: no LE edema  Neuro: A&Ox3, gait normal, speech normal  Mood; Mood normal, behavior normal, thought process linear       LABS     Labs reviewed; Notable for  Lab Results   Component Value Date    WBC 8.11 09/09/2024    HGB 10.6 (L) " 09/09/2024    HCT 32.8 (L) 09/09/2024    MCV 95 09/09/2024     09/09/2024         Chemistry        Component Value Date/Time     09/09/2024 1050    K 3.8 09/09/2024 1050     09/09/2024 1050    CO2 19 (L) 09/09/2024 1050    BUN 44 (H) 09/09/2024 1050    BUN 25 (H) 12/01/2014 1325    CREATININE 1.8 (H) 09/09/2024 1050     (H) 09/09/2024 1050        Component Value Date/Time    CALCIUM 9.3 09/09/2024 1050    ALKPHOS 136 (H) 04/01/2024 1657    ALKPHOS 124 12/01/2014 1325    AST 22 04/01/2024 1657    AST 31 03/16/2016 1535    ALT 13 04/01/2024 1657    BILITOT 0.2 04/01/2024 1657    ESTGFRAFRICA 44.5 (A) 03/28/2022 1311    EGFRNONAA 38.6 (A) 03/28/2022 1311          ASSESSMENT     1. Dysautonomia orthostatic hypotension syndrome        2. Pain in right shin  X-Ray Tibia Fibula 2 View Right              Plan     Thelma Nguyen is a 58 y.o. female with history of glioblastoma s/p resection, complicated by panhypopituitarism and neuropathy, IBS, CKD 3, depression, hx of skin cancer and factor 5 leiden deficiency   1. Dysautonomia orthostatic hypotension syndrome  Worsening symptoms over past few months. Addition of mestinon did not improve sx and she is having more anticholinergic SE with medication. Sent message to cardiologist asking for further recs.  Sent message to to neurologist reporting worsening sx since starting vimpat.    2. Pain in right shin  Recent fall with hx of low impact fracture. Will get xxray  - X-Ray Tibia Fibula 2 View Right; Future    40 minutes of professional services provided as part of today's visit.    Zachary Bender MD

## 2024-11-12 NOTE — Clinical Note
Wanted to reach out about one of our mutual patients.  She is reporting more brain fog since switching from phenytoin to vimpat.  Also has had decline in overall functional status since making medication change.  Desmond

## 2024-11-13 ENCOUNTER — PATIENT MESSAGE (OUTPATIENT)
Dept: NEUROLOGY | Facility: CLINIC | Age: 59
End: 2024-11-13
Payer: COMMERCIAL

## 2024-11-13 DIAGNOSIS — G40.209 PARTIAL SYMPTOMATIC EPILEPSY WITH COMPLEX PARTIAL SEIZURES, NOT INTRACTABLE, WITHOUT STATUS EPILEPTICUS: ICD-10-CM

## 2024-11-13 RX ORDER — LACOSAMIDE 50 MG/1
100 TABLET ORAL EVERY 12 HOURS
Qty: 120 TABLET | Refills: 11 | Status: SHIPPED | OUTPATIENT
Start: 2024-11-13 | End: 2025-11-13

## 2024-11-13 NOTE — PROGRESS NOTES
Decreased Vimpat to 100mg twice daily due to brain fog since switching to Vimpat from phenytoin.  Initial Vimpat dose was 150 mg twice daily.      Buddy Kumar MD  Neurology/Epilepsy

## 2024-11-19 ENCOUNTER — PATIENT MESSAGE (OUTPATIENT)
Dept: INTERNAL MEDICINE | Facility: CLINIC | Age: 59
End: 2024-11-19
Payer: COMMERCIAL

## 2024-11-19 DIAGNOSIS — I95.1 DYSAUTONOMIA ORTHOSTATIC HYPOTENSION SYNDROME: Primary | ICD-10-CM

## 2024-12-02 ENCOUNTER — OFFICE VISIT (OUTPATIENT)
Dept: CARDIOLOGY | Facility: CLINIC | Age: 59
End: 2024-12-02
Payer: COMMERCIAL

## 2024-12-02 VITALS
HEART RATE: 88 BPM | OXYGEN SATURATION: 100 % | WEIGHT: 138.75 LBS | SYSTOLIC BLOOD PRESSURE: 150 MMHG | HEIGHT: 68 IN | BODY MASS INDEX: 21.03 KG/M2 | DIASTOLIC BLOOD PRESSURE: 76 MMHG

## 2024-12-02 DIAGNOSIS — I95.1 ORTHOSTATIC HYPOTENSION: Primary | ICD-10-CM

## 2024-12-02 DIAGNOSIS — D68.2 FACTOR V DEFICIENCY: ICD-10-CM

## 2024-12-02 DIAGNOSIS — I95.1 DYSAUTONOMIA ORTHOSTATIC HYPOTENSION SYNDROME: ICD-10-CM

## 2024-12-02 PROCEDURE — 3008F BODY MASS INDEX DOCD: CPT | Mod: CPTII,S$GLB,, | Performed by: INTERNAL MEDICINE

## 2024-12-02 PROCEDURE — 3078F DIAST BP <80 MM HG: CPT | Mod: CPTII,S$GLB,, | Performed by: INTERNAL MEDICINE

## 2024-12-02 PROCEDURE — 3077F SYST BP >= 140 MM HG: CPT | Mod: CPTII,S$GLB,, | Performed by: INTERNAL MEDICINE

## 2024-12-02 PROCEDURE — 3066F NEPHROPATHY DOC TX: CPT | Mod: CPTII,S$GLB,, | Performed by: INTERNAL MEDICINE

## 2024-12-02 PROCEDURE — 1159F MED LIST DOCD IN RCRD: CPT | Mod: CPTII,S$GLB,, | Performed by: INTERNAL MEDICINE

## 2024-12-02 PROCEDURE — 3044F HG A1C LEVEL LT 7.0%: CPT | Mod: CPTII,S$GLB,, | Performed by: INTERNAL MEDICINE

## 2024-12-02 PROCEDURE — 99999 PR PBB SHADOW E&M-EST. PATIENT-LVL V: CPT | Mod: PBBFAC,,, | Performed by: INTERNAL MEDICINE

## 2024-12-02 PROCEDURE — 99214 OFFICE O/P EST MOD 30 MIN: CPT | Mod: S$GLB,,, | Performed by: INTERNAL MEDICINE

## 2024-12-02 NOTE — PROGRESS NOTES
Cardiology Clinic Visit    History of Present Illness:       Thelma Nguyen is a pleasant 58 y.o. female with PMH noted below in assessment/plan  Who presents for evaluation of dysautonomia/orthostatic hypotension.  Previously seen by Curly Cohen does not have a new electrophysiologist.  Since last visit with them, was switched from Dilantin to Vimpat.  Feels that her orthostasis/number syncopal episodes have worsened since the switch.  During Thanksgiving dinner had a collapse leading to fall and head strike.  Did not go to UC/ED.  Still with bruising around her left eye left side of her face however no other neurological deficits at this time.    History obtained by patient interview and supplemented by nursing documentation and chart review.   Objective   PMHx:  has a past medical history of Allergic rhinitis, Anemia (), Arthritis (), Basal cell carcinoma, Broken ankle, Cancer (), Clotting disorder (), Disorder of kidney and ureter, DVT (deep venous thrombosis), Factor V deficiency, Fracture of elbow, Glioblastoma multiforme of brain, Hypothyroidism, IBS (irritable bowel syndrome) (), Osteoporosis, Panhypopituitarism, Peripheral polyneuropathy, Secondary adrenal insufficiency, Seizures (), and Thyroid disease ().   SurgHx:  has a past surgical history that includes Hysterectomy (); Brain surgery (); Appendectomy;  section (); Cholecystectomy (); Tubal ligation (); Closure of defect of Mohs procedure (Left, 2018); Esophagogastroduodenoscopy (N/A, 2019); Colonoscopy (N/A, 2019); Excision of ganglion cyst of hand (Left, 2020); Tilt table test (N/A, 07/15/2022); Shoulder surgery (Right); Open reduction and internal fixation (ORIF) of fracture of tarsal bone (Right, 3/15/2024); Iliac crest bone graft (Right, 3/15/2024); Midfoot arthrodesis (Right, 2024); and Iliac crest bone graft (Right, 2024).   FamHx:  family history includes Diabetes type II in her father and mother; Heart attack in her maternal grandfather; Heart disease in her mother.   SocialHx:  reports that she has never smoked. She has never used smokeless tobacco. She reports that she does not drink alcohol and does not use drugs.  Home Meds:  Current Outpatient Medications   Medication Instructions    acetaminophen (TYLENOL) 500 mg, Oral, Every 6 hours    BIOTIN ORAL Take by mouth.    calcitRIOL (ROCALTROL) 0.25 mcg, Oral, Daily    calcitRIOL (ROCALTROL) 0.25 mcg, Oral, Daily    CALCIUM ANTACID 500 mg, Oral, 3 times daily PRN    cholecalciferol (vitamin D3) (VITAMIN D3) 2,000 Units, Nightly    CORLANOR 7.5 mg, Oral, 2 times daily    COVID-19 (COMIRNATY 2024-25, 12Y UP,,PF,) 30 mcg/0.3 mL IM vaccine (>/= 13 yo) Intramuscular    cyanocobalamin (VITAMIN B-12) 100 mcg, Daily    dexAMETHasone (DECADRON) 4 mg/mL injection Inject 1ml (4mg) into the muscle for symptoms of severe adrenal insufficiency. 3ml syringe w/18 g needle to draw x10 and 21g 1 inch needle to injection x10    DULoxetine (CYMBALTA) 60 mg, Oral, Nightly    ergocalciferol (ERGOCALCIFEROL) 50,000 Units, Oral, Every 7 days    ferrous sulfate (IRON (FERROUS SULFATE)) 325 mg, Oral, 2 times daily    ferrous sulfate 325 mg, Nightly    hydrocortisone (CORTEF) 10 MG Tab Take 1 and 1/2 tablet by mouth every morning and 1/2 tablet in the afternoon    hyoscyamine 0.125 mg, Sublingual, Every 4 hours PRN    ivabradine (CORLANOR) 5 mg, Oral, 2 times daily    ivabradine (CORLANOR) 5 mg, Oral, 2 times daily    lacosamide (VIMPAT) 100 mg, Oral, Every 12 hours    levothyroxine (SYNTHROID) 112 mcg, Oral, Before breakfast    melatonin 5 mg TbDL 1 tablet, Nightly    midodrine (PROAMATINE) 5 MG Tab Take 4 doses a day 3 hours apart    pyridostigmine (MESTINON) 60 mg, Oral, Every 4 hours    rivaroxaban (XARELTO) 20 mg, Oral, Daily    UNABLE TO FIND 1 tablet, Daily     Objective/Exam:   BP (!) 150/76 (BP Location: Right  "arm)   Pulse 88   Ht 5' 8" (1.727 m)   Wt 62.9 kg (138 lb 12.5 oz)   LMP 10/23/1997 (Exact Date)   SpO2 100%   BMI 21.10 kg/m²    Wt Readings from Last 4 Encounters:   12/02/24 62.9 kg (138 lb 12.5 oz)   11/15/24 62.9 kg (138 lb 10.7 oz)   11/12/24 62.9 kg (138 lb 10.7 oz)   10/18/24 64.3 kg (141 lb 12.1 oz)      Constitutional: NAD, Atraumatic, Conversant   HEENT: MMM, Sclera anicteric, No JVD   Cardiovascular: RRR, no murmurs noted, no chest tenderness to palpation, 2+ radial pulses b/l  Pulmonary: normal respiratory effort, CTAB, no crackles, wheezes  Abdominal: S/NT/ND  Musculoskeletal: No lower extremity edema noted b/l  Neurological: No gross neurological deficits  Skin: W/D/I  Psych: Appropriate affect, normal mood  Labs/Imaging/Procedures   Personally reviewed  Lab Results   Component Value Date     09/09/2024    K 3.8 09/09/2024     09/09/2024    CO2 19 (L) 09/09/2024    BUN 44 (H) 09/09/2024    BUN 25 (H) 12/01/2014    CREATININE 1.8 (H) 09/09/2024    ANIONGAP 12 09/09/2024     Lab Results   Component Value Date    HGBA1C 5.9 (H) 04/09/2024     No results found for: "BNP", "BNPTRIAGEBLO"   Lab Results   Component Value Date    WBC 8.11 09/09/2024    HGB 10.6 (L) 09/09/2024    HCT 32.8 (L) 09/09/2024     09/09/2024    GRAN 3.6 09/09/2024    GRAN 44.9 09/09/2024     Lab Results   Component Value Date    CHOL 204 (H) 03/28/2022    HDL 59 03/28/2022    LDLCALC 116.0 03/28/2022    LDLCALC 117.0 09/27/2018    LDLCALC 154.2 12/01/2014    TRIG 145 03/28/2022     Lab Results   Component Value Date    TSH 1.716 04/01/2024     No results found for: "APOLIPOPROTE"  No results found for: "LIPOA"       TTE:  No results found for this or any previous visit.    Stress Testing:   No results found for this or any previous visit.     Coronary Angiogram:  No results found for this or any previous visit.      Personal:   is retired      34 minutes were spent on this visit " including time personally:  -Reviewing Medical records & lab results  -Independently reviewing and interpreting (if not documented by myself) EKGs, echocardiograms, catherizations   -Obtaining a history, performing a relevant exam, counseling/educating the patient/family  -Documenting clinical information in the EMR including ordering of tests  -Care coordination and communicating with other health care providers       Problem List:     1. Orthostatic hypotension    2. Dysautonomia orthostatic hypotension syndrome    3. Factor V deficiency with DVT x 3      Assessment/Plan:   Thelma Nguyen is a 58 y.o. female with a PMHx of Glioblastoma (s/p removal), Panhypoparathyroidism, Adrenal insufficiency,Factor V Leiden (DVT x3 on Xarelto), CKDIV, orthostatic hypotension w/ VV leading to recurrent falls with likely Autonomic dysfunction previously followed by Viktor Cohen. Sx of OH worsened with conversion of dilantin to vimpat.     -Continues to wear compression stockings 20-30. -Compliant with fluid salt intake.  -On dexamethasone per endo  -On ivabradine 7.5mg daily and midodrine 5  -did not tolerate pyridostigmine  -Compliant with xarelto for DVT (no reoccurrance since 1990s)-advised fall precautions  -TTE  -amb referral to EP for further workup of OH and syncope.    Thank you for the opportunity to care for this patient. Please don't hesitate to reach out with any questions/concerns        Tj Armstrong MD  Interventional Cardiology  [facility]  [place]

## 2024-12-09 RX ORDER — FERROUS SULFATE 325(65) MG
TABLET ORAL 2 TIMES DAILY
Qty: 180 TABLET | Refills: 2 | Status: SHIPPED | OUTPATIENT
Start: 2024-12-09

## 2024-12-10 ENCOUNTER — PATIENT MESSAGE (OUTPATIENT)
Dept: INTERNAL MEDICINE | Facility: CLINIC | Age: 59
End: 2024-12-10
Payer: COMMERCIAL

## 2024-12-11 ENCOUNTER — TELEPHONE (OUTPATIENT)
Dept: INTERNAL MEDICINE | Facility: CLINIC | Age: 59
End: 2024-12-11
Payer: COMMERCIAL

## 2024-12-11 DIAGNOSIS — N18.4 CKD (CHRONIC KIDNEY DISEASE) STAGE 4, GFR 15-29 ML/MIN: Primary | ICD-10-CM

## 2024-12-11 NOTE — TELEPHONE ENCOUNTER
----- Message from Emily sent at 12/10/2024  3:54 PM CST -----  Contact: Self/ [phone]  [email or web address]     Patient is calling for Medical Advice regarding:status check on medication     Pharmacy name and phone#: [id number] - [place] - [address] [facility]  [address] [place] [address]  Phone: [phone] Fax: [phone]       Patient wants a call back or thru [facility]: call back     Comments: pt said that she is calling in regards to needing to get a rx for  midodrine (PROAMATINE) 5 MG Tab pt stated that she is almost out of the medication . Please advise     Please advise patient replies from provider may take up to 48 hours.

## 2024-12-11 NOTE — PROGRESS NOTES
Called about recent BMP with Scr 2.3-->1.8  According to her, she is having low P/O intake, significant orthostatic episodes (more than 40-50 mmhg), no issues with urination, no nausea/vomiting, diarrhea.  Will schedule another Renal function panel.  Advised good P/O hydration, intake.

## 2024-12-11 NOTE — TELEPHONE ENCOUNTER
Refill Routing Note   Medication(s) are not appropriate for processing by [facility] for the following reason(s):        Outside of protocol    [facility] action(s):  Route               Appointments  past 12m or future 3m with PCP    Date Provider   Last Visit   11/12/2024 Zachary Bender MD   Next Visit   Visit date not found Zachary Bender MD   ED visits in past 90 days: 0        Note composed:10:30 AM 12/11/2024

## 2024-12-12 ENCOUNTER — LAB VISIT (OUTPATIENT)
Dept: LAB | Facility: HOSPITAL | Age: 59
End: 2024-12-12
Payer: COMMERCIAL

## 2024-12-12 ENCOUNTER — OFFICE VISIT (OUTPATIENT)
Dept: NEPHROLOGY | Facility: CLINIC | Age: 59
End: 2024-12-12
Payer: COMMERCIAL

## 2024-12-12 VITALS
DIASTOLIC BLOOD PRESSURE: 75 MMHG | BODY MASS INDEX: 21.02 KG/M2 | WEIGHT: 138.69 LBS | OXYGEN SATURATION: 95 % | HEART RATE: 96 BPM | SYSTOLIC BLOOD PRESSURE: 184 MMHG | HEIGHT: 68 IN | RESPIRATION RATE: 18 BRPM

## 2024-12-12 DIAGNOSIS — N18.32 STAGE 3B CHRONIC KIDNEY DISEASE: Primary | ICD-10-CM

## 2024-12-12 DIAGNOSIS — D63.1 ANEMIA IN CHRONIC KIDNEY DISEASE, UNSPECIFIED CKD STAGE: ICD-10-CM

## 2024-12-12 DIAGNOSIS — N18.4 CKD (CHRONIC KIDNEY DISEASE) STAGE 4, GFR 15-29 ML/MIN: ICD-10-CM

## 2024-12-12 DIAGNOSIS — E03.8 SECONDARY HYPOTHYROIDISM: ICD-10-CM

## 2024-12-12 DIAGNOSIS — N18.9 ANEMIA IN CHRONIC KIDNEY DISEASE, UNSPECIFIED CKD STAGE: ICD-10-CM

## 2024-12-12 LAB
C3 SERPL-MCNC: 150 MG/DL (ref 50–180)
C4 SERPL-MCNC: 32 MG/DL (ref 11–44)
IGA SERPL-MCNC: 272 MG/DL (ref 40–350)

## 2024-12-12 PROCEDURE — 84165 PROTEIN E-PHORESIS SERUM: CPT | Mod: 26,,, | Performed by: PATHOLOGY

## 2024-12-12 PROCEDURE — 86334 IMMUNOFIX E-PHORESIS SERUM: CPT

## 2024-12-12 PROCEDURE — 83521 IG LIGHT CHAINS FREE EACH: CPT | Mod: 59

## 2024-12-12 PROCEDURE — 82784 ASSAY IGA/IGD/IGG/IGM EACH: CPT

## 2024-12-12 PROCEDURE — 86255 FLUORESCENT ANTIBODY SCREEN: CPT | Mod: 59

## 2024-12-12 PROCEDURE — 86036 ANCA SCREEN EACH ANTIBODY: CPT

## 2024-12-12 PROCEDURE — 86038 ANTINUCLEAR ANTIBODIES: CPT

## 2024-12-12 PROCEDURE — 86334 IMMUNOFIX E-PHORESIS SERUM: CPT | Mod: 26,,, | Performed by: PATHOLOGY

## 2024-12-12 PROCEDURE — 86255 FLUORESCENT ANTIBODY SCREEN: CPT

## 2024-12-12 PROCEDURE — 84165 PROTEIN E-PHORESIS SERUM: CPT

## 2024-12-12 PROCEDURE — 86160 COMPLEMENT ANTIGEN: CPT | Mod: 59

## 2024-12-12 PROCEDURE — 83516 IMMUNOASSAY NONANTIBODY: CPT | Mod: 59

## 2024-12-12 PROCEDURE — 36415 COLL VENOUS BLD VENIPUNCTURE: CPT

## 2024-12-12 PROCEDURE — 86160 COMPLEMENT ANTIGEN: CPT

## 2024-12-12 PROCEDURE — 83516 IMMUNOASSAY NONANTIBODY: CPT

## 2024-12-12 PROCEDURE — 99999 PR PBB SHADOW E&M-EST. PATIENT-LVL IV: CPT | Mod: PBBFAC,,,

## 2024-12-12 PROCEDURE — 83520 IMMUNOASSAY QUANT NOS NONAB: CPT

## 2024-12-12 RX ORDER — MIDODRINE HYDROCHLORIDE 5 MG/1
TABLET ORAL
Qty: 360 TABLET | Refills: 3 | Status: SHIPPED | OUTPATIENT
Start: 2024-12-12 | End: 2024-12-13 | Stop reason: SDUPTHER

## 2024-12-12 NOTE — PROGRESS NOTES
[facility]  Clinic Note    Date of Service: 12/13/2024  Patient seen at the request of: No ref. provider found    Reason for Consultation  Epilepsy    Assessment:  Thelma Nguyen is a 58 y.o. female who presents with epilepsy.    Patient presents with a history of epilepsy that started in 1984 secondary to glioblastoma in the left temporal lobe.  The glioblastoma was resected in 1985.  Patient was initially started on phenytoin for her seizures with an attempt to wean off the medication after being 4 years seizure-free.  Patient subsequently had another convulsion in 1995.  She has had no other seizures since being back on Dilantin.    Although the patient has been seizure-free on phenytoin, she has since developed osteoporosis.  Due to the significant side effect profile of Dilantin, tried switching to lacosamide first.  Lacosamide lead to increased dizziness, orthostatic hypotension, and an increase in falls.  Will transition to levetiracetam.    Patient has concurrent kidney disease and we will dose accordingly.  Creatinine 2.2 (12/13/2024)  CrCl is 28 mL/min    Plan:    - switch off of Lacosamide 100mg twice daily  - switch to keppra 250mg twice daily      - RTC in 2 months      SEIZURE/EVENT PRECAUTIONS INCLUDE:  NO DRIVING until approximately 3-6 months have passed WITHOUT a seizure.   If you have a seizure, you will need to wait another 3 months to be eligible to drive again.  Avoid bathing or swimming alone or unsupervised.  Avoid cooking over large ranges or open flames.    Avoid climbing up heights unsupervised.  Avoid operating heavy machinery.     SEIZURE SAFETY:  When an epileptic seizure occurs, the following should be done to prevent injuries:  Do not hold or tie the person down.  Do not place anything in the person's mouth or try to force the teeth apart. The person is not in danger of swallowing his tongue.  Do not pour any liquid into the persons mouth or offer food or  medicines until he is fully awake.  If possible, turn the person on his side during the seizure.  Place something soft under the person's head, loosen tight clothing, and clear the area of sharp or hard objects.  Stay with the person until the seizure ends. Let the person rest until he is fully awake.  Use a watch to time how long the seizure lasts.   Watch the type of movement and position of the person's head or eyes during the seizure.      Signed:    Buddy Kumar MD  Neurology/Epilepsy  12/13/2024 9:20 AM      Interval Events:  - they report episodes of staring when standing up from sitting position associated with falls        HPI:  Thelma Nguyen is a 58 y.o. female with   Past Medical History:   Diagnosis Date    Allergic rhinitis     Anemia 1987    Arthritis 2000    Basal cell carcinoma     Broken ankle     Cancer 1985    Clotting disorder 1993    Disorder of kidney and ureter     DVT (deep venous thrombosis)     Factor V deficiency     Fracture of elbow     left    Glioblastoma multiforme of brain     Hypothyroidism     IBS (irritable bowel syndrome) 1975    Osteoporosis     Panhypopituitarism     Peripheral polyneuropathy     Secondary adrenal insufficiency     Seizures 1995    Thyroid disease 1987     58 y.o. 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    First seizure: in the 1984 due to glioblastoma.  Surgery was in 1985.    Semiology: First seizures were generalized convulsions.  Patient was then on Dilantin for 4 years and then was discontinued.  Patient then had another convulsion in 1995.  Patient has also had some staring episodes that have resolved.    1st type:   - Aura: none  - Triggers: none identified  - Frequency: last seizure was in the 1990s  - Maximum time seizure free: currently over 25 years    Seizure Risk Factors:   Temporal lobe GBM s/p  resection    Previous Anti-Epileptic Medication:    Current medications: Dilantin  Past medications: gabapentin, phenobarbital  Driving:       Patient was diagnosed in February with a broken right foot.  She got surgery and then subsequently re-broke the foot and got surgery again.  DXA scan in 2024 was consistent with osteoporosis.      This is the extent of the patient's complaints at this time.    TSH   Date Value Ref Range Status   2024 1.716 0.400 - 4.000 uIU/mL Final   2021 <0.026 (L) 0.400 - 4.000 uIU/mL Final     Comment:     Warning:  Heterophilic antibodies in serum or plasma of   certain individuals are known to cause interference with   immunoassays. These antibodies may be present in blood samples   from individuals regularly exposed to animal or who have been   treated with animal products.     Patients taking high doses of supplemental biotin may have  negatively biased results.        Vitamin B-12   Date Value Ref Range Status   2024 >2000 (H) 210 - 950 pg/mL Final   2023 >2000 (H) 210 - 950 pg/mL Final         Review of Systems:  ROS negative unless noted in HPI    Past Surgical History:  Past Surgical History:   Procedure Laterality Date    APPENDECTOMY      BRAIN SURGERY      for glioblastoma      SECTION      CHOLECYSTECTOMY      CLOSURE OF DEFECT OF MOHS PROCEDURE Left 2018    Procedure: CLOSURE, MOHS PROCEDURE DEFECT SCALP  Flap closure;  Surgeon: Yrn Novak MD;  Location: [facility] OR [address] [place] [address];  Service: Plastics;  Laterality: Left;    COLONOSCOPY N/A 2019    Procedure: COLONOSCOPY Suprep;  Surgeon: Tony Msoher MD;  Location: [facility];  Service: Endoscopy;  Laterality: N/A;    ESOPHAGOGASTRODUODENOSCOPY N/A 2019    Procedure: EGD/colon;  Surgeon: Tony Mosher MD;  Location: [facility];  Service: Endoscopy;  Laterality: N/A;    EXCISION OF GANGLION CYST OF HAND Left 2020    Procedure: EXCISION, GANGLION CYST,  HAND;  Surgeon: Ross Drew Jr., MD;  Location: [facility] OR;  Service: Orthopedics;  Laterality: Left;    HYSTERECTOMY  1999    ILIAC CREST BONE GRAFT Right 3/15/2024    Procedure: BONE GRAFT, ILIAC CREST;  Surgeon: Geoffrey Goodson MD;  Location: [facility] OR;  Service: Orthopedics;  Laterality: Right;  acumed harvester    ILIAC CREST BONE GRAFT Right 4/2/2024    Procedure: BONE GRAFT, ILIAC CREST;  Surgeon: Geoffrey Goodson MD;  Location: [facility] OR;  Service: Orthopedics;  Laterality: Right;    MIDFOOT ARTHRODESIS Right 4/2/2024    Procedure: FUSION, JOINT, MIDFOOT;  Surgeon: Geoffrey Goodson MD;  Location: [facility] OR;  Service: Orthopedics;  Laterality: Right;  mini c arm    OPEN REDUCTION AND INTERNAL FIXATION (ORIF) OF FRACTURE OF TARSAL BONE Right 3/15/2024    Procedure: ORIF, FRACTURE, TARSAL BONE;  Surgeon: Geoffrey Goodson MD;  Location: [facility] OR;  Service: Orthopedics;  Laterality: Right;  supine, mini C arm, Styker, block okay    SHOULDER SURGERY Right     TILT TABLE TEST N/A 07/15/2022    Procedure: TILT TABLE TEST;  Surgeon: Amol Kohler MD;  Location: [facility] EP LAB;  Service: Cardiology;  Laterality: N/A;  Orthostatic hypotension, dizziness, Tilt Table Test with EEG, Dr.Tiffany Roca (neuro), DM    TUBAL LIGATION  1998       Family History:  Family History   Problem Relation Name Age of Onset    Heart disease Mother      Diabetes type II Mother      Diabetes type II Father      Heart attack Maternal Grandfather         Social History:  Social History     Tobacco Use    Smoking status: Never    Smokeless tobacco: Never   Substance Use Topics    Alcohol use: No    Drug use: No       Allergies:  Neurontin [gabapentin], Lactose, and Soap    Outpatient Medications:  Prior to Admission medications    Medication Sig Start Date End Date Taking? Authorizing Provider   acetaminophen (TYLENOL) 500 MG tablet Take 1 tablet (500 mg total) by mouth every 6 (six) hours. 3/21/24   Geoffrey Goodson MD   BIOTIN ORAL Take by mouth.     Provider, Historical   calcitRIOL (ROCALTROL) 0.25 MCG Cap Take 1 capsule (0.25 mcg total) by mouth once daily. 6/3/24 10/1/24  Hanna Tinajero MD   calcitRIOL (ROCALTROL) 0.25 MCG Cap Take 1 capsule (0.25 mcg total) by mouth once daily. for 90 doses 9/4/24 12/3/24  Swetha Bay MD   calcium carbonate (TUMS) 200 mg calcium (500 mg) chewable tablet Take 1 tablet (500 mg total) by mouth 3 (three) times daily as needed for Heartburn. 4/11/24 6/13/24  Rajni Lobo NP   cholecalciferol, vitamin D3, (VITAMIN D3) 50 mcg (2,000 unit) Cap capsule Take 2,000 Units by mouth every evening.    Provider, Historical   cyanocobalamin (VITAMIN B-12) 100 MCG tablet Take 100 mcg by mouth once daily.    Provider, Historical   dexAMETHasone (DECADRON) 4 mg/mL injection Inject 1ml (4mg) into the muscle for symptoms of severe adrenal insufficiency. 3ml syringe w/18 g needle to draw x10 and 21g 1 inch needle to injection x10 6/11/24   Zachary Bender MD   DULoxetine (CYMBALTA) 30 MG capsule TAKE 2 CAPSULES (60 MG TOTAL) BY MOUTH ONCE DAILY.  Patient taking differently: Take 60 mg by mouth every evening. 3/15/24   Sagar Taylor MD   ergocalciferol (ERGOCALCIFEROL) 50,000 unit Cap Take 1 capsule (50,000 Units total) by mouth every 7 days. 6/13/24   Hanna Tinajero MD   ferrous sulfate (IRON, FERROUS SULFATE,) 325 mg (65 mg iron) Tab tablet Take 1 tablet (325 mg total) by mouth 2 (two) times daily. 6/13/24   Hanna Tinajero MD   ferrous sulfate 324 mg (65 mg iron) TbEC Take 325 mg by mouth nightly.     Provider, Historical   hydrocortisone (CORTEF) 10 MG Tab Take 1 and 1/2 tablet by mouth every morning and 1/2 tablet in the afternoon    Provider, Historical   hyoscyamine (LEVSIN/SL) 0.125 mg Subl PLACE 1 TABLET (0.125 MG TOTAL) UNDER THE TONGUE EVERY 4 (FOUR) HOURS AS NEEDED. 8/28/23   Zachary Bender MD   ivabradine (CORLANOR) 5 mg Tab Take 1 tablet (5 mg total) by mouth 2 (two) times daily. 6/14/24    "Viktor Cohen MD   ivabradine (CORLANOR) 5 mg Tab Take 1 tablet (5 mg total) by mouth 2 (two) times daily. 6/17/24   Viktor Cohen MD   ivabradine (CORLANOR) 7.5 mg Tab Take 1 tablet (7.5 mg total) by mouth 2 (two) times daily. 7/8/24   May, HADLEY Yancey-PATRICIA   levothyroxine (SYNTHROID) 112 MCG tablet Take 1 tablet (112 mcg total) by mouth before breakfast. 2/27/24 2/26/25  Joan Serrano MD   melatonin 5 mg TbDL Take 1 tablet by mouth nightly.    Provider, Historical   midodrine (PROAMATINE) 5 MG Tab Take 4 doses a day 3 hours apart 6/14/24   Viktor Cohen MD   phenytoin (DILANTIN) 100 MG ER capsule Take 2 capsules (200 mg total) by mouth nightly. 12/28/23 12/27/24  Cecile Walton PA-C   rivaroxaban (XARELTO) 20 mg Tab Take 1 tablet (20 mg total) by mouth once daily. 6/17/24   Zachary Bender MD   UNABLE TO FIND Take 1 tablet by mouth Daily. medication name: Biotin 2500mcg    Provider, Historical       Physical exam:    Vitals: BP (!) 144/84 (Patient Position: Sitting)   Pulse 101   Ht 5' 8" (1.727 m)   Wt 62.9 kg (138 lb 10.7 oz)   LMP 10/23/1997 (Exact Date)   BMI 21.08 kg/m²     General:   Sitting in chair, in no distress, well-nourished, well-developed, appears stated age.  Head/Neck:   Normocephalic,atraumatic  Pulm:  Non-labored breathing     Mental Status: Alert and oriented to person, time, place, situation. Speech spontaneous and fluent without paraphasias; no dysarthria  CN:  II: visual fields full  III, IV, VI: EOM intact without nystagmus or diplopia.   V: Sensation to light touch full and symmetric in V1-3. [place] contraction full bilaterally.   VII: Facial movement full and symmetric.   VIII: Hearing grossly normal to conversation.  IX, X: Palate midline with symmetric elevation.    XI: SCM and trapezius: 5/5 bilaterally.   XII: Tongue midline without fasciculations.  Motor: Normal bulk and tone throughout all four extremities.   RUE: D: 5/5; B: 5/5; T:  5/5; " WF:5/5; WE:  5/5; IO: 5/5   LUE: D: 5/5; B: 5/5; T:  5/5; WF: 5/5; WE:  5/5; IO: 5/5   RLE: HF: 5/5, KE: 5/5, KF: 5/5, DF: 5/5, PF: 5/5  LLE: HF: 5/5, KE: 5/5, KF: 5/5, DF: 5/5, PF: 5/5  No tremors   Sensory: Intact and symmetric to light touch throughout.  Reflexes: RUE: Triceps 2+, biceps 2+, brachioradialis 2+  LUE: Triceps 2+, biceps 2+ brachioradialis 2+  RLE: Knee 2+, ankle 2+  LLE: Knee 2+, ankle 2+  Coordination:  Intact and symmetric to finger-to-nose and heel-to-shin.  Gait:  Intact to casual gait.    Imaging:  All pertinent imaging was personally reviewed.    Results for orders placed during the hospital encounter of 05/19/16    MRI Brain Without Contrast    Narrative  MRI BRAIN WITHOUT CONTRAST    Date: 05/19/16 09:57:28    History:  Seizures., epilepsy and history of brain tumor    Technique:  Standard multiplanar noncontrast sequences of the brain no previous for comparison.    Findings:  The ventricles are nonenlarged.  There is minor periventricular and pontine white matter hyperintensity.    There are postoperative changes consistent with a left craniotomy site.    There is a small cystic area of encephalomalacia involving the posterior left temporal lobe cortex grossly measuring approximately 1.5 x 3.0 cm in size.  The marginal signal is hyperintense.  No restricted diffusion is seen.  Without contrast difficult to assess for recurrent or residual tumor mass.  Please correlate with surgical history.    No additional findings.  IMPRESSION:  No acute intracranial process.  Evidence of left craniotomy with focal area of postoperative encephalomalacia left posterior temporal lobe convexity.  Minor residual marginal T2 signal hyperintensity.    Minor periventricular and pontine white matter hyperintensity, usually related to small vessel ischemic degeneration.  ______________________________________    Electronically signed by: FRANSICO GRANT MD  Date:     05/19/16  Time:    11:08    No results found  for this or any previous visit.    Results for orders placed during the hospital encounter of 04/01/24    MRI Brain W WO Contrast    Narrative  EXAMINATION:  MRI BRAIN W WO CONTRAST    CLINICAL HISTORY:  Syncope, recurrent;    TECHNIQUE:  Multiplanar multisequence MR imaging of the brain was performed before and after the administration of 7.5 mL Gadavist intravenous contrast.    COMPARISON:  CT head 04/01/2024.  MRI brain 05/28/2021 and 05/19/2016.    FINDINGS:  Parenchyma: There is no restricted diffusion to suggest acute or subacute ischemic infarct.Few nonspecific foci of T2/FLAIR hyperintense signal in the frontal parietal white matter and lisandro may reflect sequela of chronic small vessel ischemic disease.Left temporal and occipital lobe encephalomalacia and gliosis, suggested small volume remote blood products, as before.    Additional comments: There is no midline shift, abnormal extra-axial fluid collection, or acute intracranial hemorrhage. The basal cisterns are patent.    Ventricles: Normal.    Flow voids: The normal major intracranial arterial flow voids are visualized.    Enhancement: No abnormal intra-axial or extra-axial enhancement is identified.  Question developmental venous anomaly in the left upper lobe.    Sinuses and mastoid air cells: Relatively modest paranasal sinus mucosal thickening with small retention cysts.    Orbits: Normal    Midline structures: The pituitary and craniocervical junction are normal.    Marrow: Normal.  Redemonstrated remote left craniotomy sequela.    Impression  1. No definite acute intracranial findings identified to account for the patient's reported symptoms.  2. Overall, no significant change relative to prior MRI performed 05/28/2021.      Electronically signed by: Humza Mckeon  Date:    04/06/2024  Time:    09:01      Results for orders placed during the hospital encounter of 05/28/21    MRI Brain W WO Contrast    Narrative  EXAMINATION:  MRI BRAIN W WO  CONTRAST    CLINICAL HISTORY:  Brain/CNS neoplasm, surveillance;  Malignant neoplasm of brain, unspecified    FINDINGS:  The diffusion sequence is normal without evidence of acute ischemia or infarct.  Pituitary craniocervical junction and midline structures are unremarkable.  There is some blood products lining the left resection cavity likely hemosiderin.  There is minimal small vessel ischemic change.  All skull base flow voids are accounted for posterior fossa structures are unremarkable.  Post-contrast images demonstrate no significant abnormal enhancement.    Impression  Stable postsurgical change.  There is no evidence of recurrent or residual neoplasm.      Electronically signed by: Juan Guthrie MD  Date:    05/28/2021  Time:    14:07      Results for orders placed during the hospital encounter of 11/20/17    MRI Brain W WO Contrast    Narrative  MRI BRAIN W WO CONTRAST,    Date: 11/20/17 14:35:29    History:   C71.9 Malignant neoplasm of brain, unspecified;, G40.909 Epilepsy, unspecified, not intractable, without status epilepticus;    Technique:  Standard multiplanar pre and post contrast sequences of the brain performed with 6 cc Gadavist.    Comparison: 05/19/2016 noncontrast MR    Findings:  The ventricles are nondilated.  The corpus callosum is intact.    There is a focal area of cystic encephalomalacia involving the superficial cortex of the posterior left temporal lobe.  This area measures approximately 1.6 x 2.6 cm transaxially.  There is marginal increased T2 signal.  No abnormal enhancement is seen.  No associated restricted diffusion is seen either.    No significant hemosiderin deposition is seen.    Central pontine white matter hyperintensity is present.  IMPRESSION:  Postsurgical encephalomalacia left temporal lobe with marginal hyperintense T2 signal.  No abnormal enhancement or mass effect.  No detrimental interval change.      Electronically signed by: FRANSICO GRANT MD  Date:      11/20/17  Time:    16:10        I spent a total of 24 minutes on the day of the visit. This includes face to face time and non-face to face time preparing to see the patient (eg, review of tests), obtaining and/or reviewing separately obtained history, documenting clinical information in the electronic or other health record, independently interpreting results and communicating results to the patient/family/caregiver, or care coordinator.

## 2024-12-12 NOTE — PROGRESS NOTES
Name: Thelma Nguyen  : 1965  Date of Service: 2024     Reason for visit: F/U for CKD 3B      HPI;  The patient is a 58-year-old female here for follow-up of CKD 3B (baseline Scr 1.5-1.7) due to NSAID use. She has no history of DM or HTN but has panhypopituitarism following glioblastoma excision. She is on Hydrocortisone, PRN Decadron, and Midodrine 2.5mg Q4h for adrenal insufficiency. The patient denies SOB, LE edema, hematuria, or foamy urine but reports frequent episodes of orthostatic hypotension.     2024;   CKD non progressive, HgB 11.3, , no proteinuria, PO4 3.6  Check Vitamin D levels, Start Calcitriol 0.25mg daily  U/S retroperitoneal (previous: bilateral cysts)  Refer to renal dietician for low potassium diet     2024;  CKD stable, non progressive, at baseline Scr, no proteinuria  Recurrent hypotension could lead to IONA and CKD progression.  Hgb 10.2, start Ferrous Sulfate 325mg (65mg iron) BID.  Switch to Ergocalciferol 50 K units weekly for 7 weeks.  Stop Calcitriol 0.25 mcg.     2024;  Reported high BP, 160/88 today in clinic on sitting  Her Midodrine has been increased to 5 mg QID by her Cardiologist, Hydrocortisone taking per Endocrinology  No edema in legs, chest clear, no issues with urination reported  Scr is 1.8, almost near to baseline, no proteinuria, no new issues reported     2024;  Recent Scr 2.2-->1.8, reported low P/O intake, significant orthostatic drops (184/75 to 121/60), new onset of increased urinary frequency. Recent UPCR 0.4, previously no proteinuria,   PMH:   Past Medical History:   Diagnosis Date    Allergic rhinitis     Anemia     Arthritis     Basal cell carcinoma     Broken ankle     Cancer     Clotting disorder     Disorder of kidney and ureter     DVT (deep venous thrombosis)     Factor V deficiency     Fracture of elbow     left    Glioblastoma multiforme of brain     Hypothyroidism     IBS (irritable  bowel syndrome)     Osteoporosis     Panhypopituitarism     Peripheral polyneuropathy     Secondary adrenal insufficiency     Seizures     Thyroid disease        Surgical History:   Past Surgical History:   Procedure Laterality Date    APPENDECTOMY      BRAIN SURGERY      for glioblastoma      SECTION      CHOLECYSTECTOMY  2007    CLOSURE OF DEFECT OF MOHS PROCEDURE Left 2018    Procedure: CLOSURE, MOHS PROCEDURE DEFECT SCALP  Flap closure;  Surgeon: Yrn Novak MD;  Location: [address];  Service: Plastics;  Laterality: Left;    COLONOSCOPY N/A 2019    Procedure: COLONOSCOPY Suprep;  Surgeon: Tony Mosher MD;  Location: [facility] ENDO;  Service: Endoscopy;  Laterality: N/A;    ESOPHAGOGASTRODUODENOSCOPY N/A 2019    Procedure: EGD/colon;  Surgeon: Tony Mosher MD;  Location: [facility];  Service: Endoscopy;  Laterality: N/A;    EXCISION OF GANGLION CYST OF HAND Left 2020    Procedure: EXCISION, GANGLION CYST, HAND;  Surgeon: Ross Drew Jr., MD;  Location: [facility];  Service: Orthopedics;  Laterality: Left;    HYSTERECTOMY  1999    ILIAC CREST BONE GRAFT Right 3/15/2024    Procedure: BONE GRAFT, ILIAC CREST;  Surgeon: Geoffrey Goodson MD;  Location: [facility] OR;  Service: Orthopedics;  Laterality: Right;  acumed harvester    ILIAC CREST BONE GRAFT Right 2024    Procedure: BONE GRAFT, ILIAC CREST;  Surgeon: Geoffrey Goodson MD;  Location: [facility];  Service: Orthopedics;  Laterality: Right;    MIDFOOT ARTHRODESIS Right 2024    Procedure: FUSION, JOINT, MIDFOOT;  Surgeon: Geoffrey Goodson MD;  Location: [facility];  Service: Orthopedics;  Laterality: Right;  mini c arm    OPEN REDUCTION AND INTERNAL FIXATION (ORIF) OF FRACTURE OF TARSAL BONE Right 3/15/2024    Procedure: ORIF, FRACTURE, TARSAL BONE;  Surgeon: Geoffrey Goodson MD;  Location: [facility];  Service: Orthopedics;  Laterality: Right;  supine, mini C arm, Styker, block okay    SHOULDER  SURGERY Right     TILT TABLE TEST N/A 07/15/2022    Procedure: TILT TABLE TEST;  Surgeon: Amol Kohler MD;  Location: [facility];  Service: Cardiology;  Laterality: N/A;  Orthostatic hypotension, dizziness, Tilt Table Test with EEG, Dr.Tiffany Roca (neuro), DM    TUBAL LIGATION  1998       Allergies:   Review of patient's allergies indicates:   Allergen Reactions    Neurontin [gabapentin]     Lactose     Soap Hives     Able to tolerate Dove only        Medications:     Current Outpatient Medications:     acetaminophen (TYLENOL) 500 MG tablet, Take 1 tablet (500 mg total) by mouth every 6 (six) hours., Disp: 30 tablet, Rfl: 1    BIOTIN ORAL, Take by mouth., Disp: , Rfl:     calcium carbonate (TUMS) 200 mg calcium (500 mg) chewable tablet, Take 1 tablet (500 mg total) by mouth 3 (three) times daily as needed for Heartburn., Disp: 60 tablet, Rfl: 0    cholecalciferol, vitamin D3, (VITAMIN D3) 50 mcg (2,000 unit) Cap capsule, Take 2,000 Units by mouth every evening., Disp: , Rfl:     COVID-19 (COMIRNATY 2024-25, 12Y UP,,PF,) 30 mcg/0.3 mL IM vaccine (>/= 11 yo), Inject into the muscle., Disp: 0.3 mL, Rfl: 0    cyanocobalamin (VITAMIN B-12) 100 MCG tablet, Take 100 mcg by mouth once daily., Disp: , Rfl:     dexAMETHasone (DECADRON) 4 mg/mL injection, Inject 1ml (4mg) into the muscle for symptoms of severe adrenal insufficiency. 3ml syringe w/18 g needle to draw x10 and 21g 1 inch needle to injection x10, Disp: 1 mL, Rfl: 11    DULoxetine (CYMBALTA) 30 MG capsule, Take 2 capsules (60 mg total) by mouth every evening., Disp: 180 capsule, Rfl: 3    ergocalciferol (ERGOCALCIFEROL) 50,000 unit Cap, Take 1 capsule (50,000 Units total) by mouth every 7 days., Disp: 7 capsule, Rfl: 0    ferrous sulfate (FEOSOL) 325 mg (65 mg iron) Tab tablet, TAKE 1 TABLET BY MOUTH TWICE A DAY, Disp: 180 tablet, Rfl: 2    ferrous sulfate 324 mg (65 mg iron) TbEC, Take 325 mg by mouth nightly., Disp: , Rfl:     hydrocortisone (CORTEF) 10 MG  Tab, Take 1 and 1/2 tablet by mouth every morning and 1/2 tablet in the afternoon, Disp: , Rfl:     hyoscyamine (LEVSIN/SL) 0.125 mg Subl, PLACE 1 TABLET (0.125 MG TOTAL) UNDER THE TONGUE EVERY 4 (FOUR) HOURS AS NEEDED., Disp: 360 tablet, Rfl: 2    ivabradine (CORLANOR) 7.5 mg Tab, Take 1 tablet (7.5 mg total) by mouth 2 (two) times daily., Disp: 60 tablet, Rfl: 11    lacosamide (VIMPAT) 50 mg Tab, Take 2 tablets (100 mg total) by mouth every 12 (twelve) hours., Disp: 120 tablet, Rfl: 11    levothyroxine (SYNTHROID) 112 MCG tablet, Take 1 tablet (112 mcg total) by mouth before breakfast., Disp: 30 tablet, Rfl: 11    melatonin 5 mg TbDL, Take 1 tablet by mouth nightly., Disp: , Rfl:     midodrine (PROAMATINE) 5 MG Tab, Take 4 doses a day 3 hours apart, Disp: 360 tablet, Rfl: 3    rivaroxaban (XARELTO) 20 mg Tab, Take 1 tablet (20 mg total) by mouth once daily., Disp: 90 tablet, Rfl: 4    UNABLE TO FIND, Take 1 tablet by mouth Daily. medication name: Biotin 2500mcg, Disp: , Rfl:     Family History:   Family History   Problem Relation Name Age of Onset    Heart disease Mother      Diabetes type II Mother      Diabetes type II Father      Heart attack Maternal Grandfather         Social History:   Social History     Socioeconomic History    Marital status:     Number of children: 2   Occupational History    Occupation:    Tobacco Use    Smoking status: Never    Smokeless tobacco: Never   Substance and Sexual Activity    Alcohol use: No    Drug use: No    Sexual activity: Not Currently   Social History Narrative     30 years, lives at home with .      Social Drivers of Health     Financial Resource Strain: Low Risk  (12/11/2023)    Overall Financial Resource Strain (CARDIA)     Difficulty of Paying Living Expenses: Not hard at all   Food Insecurity: No Food Insecurity (12/11/2023)    Hunger Vital Sign     Worried About Running Out of Food in the Last Year: Never true     Ran Out of  "Food in the Last Year: Never true   Transportation Needs: Unmet Transportation Needs (12/11/2023)    PRAPARE - Transportation     Lack of Transportation (Medical): Yes     Lack of Transportation (Non-Medical): Yes   Physical Activity: Insufficiently Active (12/11/2023)    Exercise Vital Sign     Days of Exercise per Week: 3 days     Minutes of Exercise per Session: 20 min   Stress: No Stress Concern Present (12/11/2023)    [employer] - Occupational Stress Questionnaire     Feeling of Stress : Only a little   Housing Stability: Low Risk  (12/11/2023)    Housing Stability Vital Sign     Unable to Pay for Housing in the Last Year: No     Number of Places Lived in the Last Year: 1     Unstable Housing in the Last Year: No       Review of Systems:   Review of Systems   Constitutional: Negative.    HENT: Negative.     Eyes: Negative.    Respiratory: Negative.     Cardiovascular: Negative.    Gastrointestinal: Negative.    Genitourinary: Negative.    Skin: Negative.      Vitals:   Vitals:    12/12/24 1500   BP: (!) 184/75   Patient Position: Sitting   Pulse: 96   Resp: 18   SpO2: 95%   Weight: 62.9 kg (138 lb 10.7 oz)   Height: 5' 8" (1.727 m)     Body mass index is 21.08 kg/m².    Physical Exam:   Physical Exam  Constitutional:       Appearance: Normal appearance.   Pulmonary:      Effort: Pulmonary effort is normal. No respiratory distress.   Musculoskeletal:         General: No swelling.      Right lower leg: No edema.      Left lower leg: No edema.   Skin:     General: Skin is warm.   Neurological:      General: No focal deficit present.      Mental Status: She is alert and oriented to person, place, and time.   Psychiatric:         Mood and Affect: Mood normal.         Behavior: Behavior normal.     Labs:     Imaging:     Assessment/Plan:   1. CKD (chronic kidney disease) stage 4, GFR 15-29 ml/min          SUMMARY;  1); Chronic Kidney Disease Stage 3B;  -Scr 2.2, baseline 1.5-1.7, IONA on " CKD, UPCR 0.4, new onset  -Will do whole work up for IONA with proteinuria (ALEX reflex, C3, C4, Anti PLA2R, KETAN, FLC, SPEP,  ANCA, MPO, PR3, repeat UPCR)  -Will do UA, urine microscopy for cast/cells  -Will do Genetic testing for CKD work up (consent obtained from the patient)  -Avoid hypotensive episodes, rapid drops in BP as it may worse the progression     2); Anemia in CKD;  -HgB is 9.6, continue P/O Iron OTC     3); Secondary Hyperparathroidism;  -On OTC Ergoclciferol     4); Metabolic Acidosis in CKD;  -Latest CO2 is 23     5); Hypotensive Episodes;  -Per Cardiology, on Midodrine 10 mg QID     6); Cystic Kidney Disease;  -B/L multiple simple renal cysts        Future Plan; F/U in 2 weeks with repeat RFT in 1 week       Swetha Bay MD  Nephrology Fellow      Discussed with Dr. Oliveros

## 2024-12-13 ENCOUNTER — OFFICE VISIT (OUTPATIENT)
Dept: NEUROLOGY | Facility: CLINIC | Age: 59
End: 2024-12-13
Payer: COMMERCIAL

## 2024-12-13 VITALS
HEIGHT: 68 IN | HEART RATE: 101 BPM | DIASTOLIC BLOOD PRESSURE: 84 MMHG | SYSTOLIC BLOOD PRESSURE: 144 MMHG | BODY MASS INDEX: 21.02 KG/M2 | WEIGHT: 138.69 LBS

## 2024-12-13 DIAGNOSIS — I95.1 ORTHOSTATIC HYPOTENSION: ICD-10-CM

## 2024-12-13 DIAGNOSIS — G40.209 PARTIAL SYMPTOMATIC EPILEPSY WITH COMPLEX PARTIAL SEIZURES, NOT INTRACTABLE, WITHOUT STATUS EPILEPTICUS: Primary | ICD-10-CM

## 2024-12-13 LAB
ALBUMIN SERPL ELPH-MCNC: 4.16 G/DL (ref 3.35–5.55)
ALPHA1 GLOB SERPL ELPH-MCNC: 0.35 G/DL (ref 0.17–0.41)
ALPHA2 GLOB SERPL ELPH-MCNC: 0.8 G/DL (ref 0.43–0.99)
ANA SER QL IF: NORMAL
B-GLOBULIN SERPL ELPH-MCNC: 0.97 G/DL (ref 0.5–1.1)
GAMMA GLOB SERPL ELPH-MCNC: 0.92 G/DL (ref 0.67–1.58)
INTERPRETATION SERPL IFE-IMP: NORMAL
KAPPA LC SER QL IA: 4.72 MG/DL (ref 0.33–1.94)
KAPPA LC/LAMBDA SER IA: 1.37 (ref 0.26–1.65)
LAMBDA LC SER QL IA: 3.44 MG/DL (ref 0.57–2.63)
PATHOLOGIST INTERPRETATION IFE: NORMAL
PATHOLOGIST INTERPRETATION SPE: NORMAL
PROT SERPL-MCNC: 7.2 G/DL (ref 6–8.4)

## 2024-12-13 PROCEDURE — 99999 PR PBB SHADOW E&M-EST. PATIENT-LVL IV: CPT | Mod: PBBFAC,,, | Performed by: STUDENT IN AN ORGANIZED HEALTH CARE EDUCATION/TRAINING PROGRAM

## 2024-12-13 RX ORDER — LEVETIRACETAM 250 MG/1
250 TABLET ORAL 2 TIMES DAILY
Qty: 120 TABLET | Refills: 5 | Status: SHIPPED | OUTPATIENT
Start: 2024-12-13 | End: 2025-12-13

## 2024-12-13 RX ORDER — MIDODRINE HYDROCHLORIDE 10 MG/1
10 TABLET ORAL 4 TIMES DAILY
Qty: 240 TABLET | Refills: 1 | Status: SHIPPED | OUTPATIENT
Start: 2024-12-13

## 2024-12-13 RX ORDER — MIDODRINE HYDROCHLORIDE 5 MG/1
TABLET ORAL
Qty: 360 TABLET | Refills: 3 | Status: SHIPPED | OUTPATIENT
Start: 2024-12-13 | End: 2024-12-13

## 2024-12-16 ENCOUNTER — PATIENT MESSAGE (OUTPATIENT)
Dept: INTERNAL MEDICINE | Facility: CLINIC | Age: 59
End: 2024-12-16
Payer: COMMERCIAL

## 2024-12-16 DIAGNOSIS — B37.0 ORAL THRUSH: Primary | ICD-10-CM

## 2024-12-16 LAB
ANCA AB TITR SER IF: NORMAL TITER
P-ANCA TITR SER IF: NORMAL TITER
PROTEINASE3 IGG SER-ACNC: <0.2 U

## 2024-12-17 LAB
MYELOPEROXIDASE AB SER-ACNC: <0.2 U/ML
PHOSPHOLIPASE A2 RECEPTOR, ELISA: <2 RU/ML
PLA2R IGG SERPL QL IF: NEGATIVE
THSD7A IGG SERPL QL IF: NEGATIVE

## 2024-12-19 RX ORDER — NYSTATIN 100000 [USP'U]/ML
4 SUSPENSION ORAL 4 TIMES DAILY
Qty: 160 ML | Refills: 0 | Status: SHIPPED | OUTPATIENT
Start: 2024-12-19 | End: 2024-12-29

## 2024-12-23 ENCOUNTER — PATIENT MESSAGE (OUTPATIENT)
Dept: NEPHROLOGY | Facility: CLINIC | Age: 59
End: 2024-12-23
Payer: COMMERCIAL

## 2024-12-27 ENCOUNTER — HOSPITAL ENCOUNTER (OUTPATIENT)
Dept: CARDIOLOGY | Facility: HOSPITAL | Age: 59
Discharge: HOME | End: 2024-12-27
Attending: INTERNAL MEDICINE
Payer: COMMERCIAL

## 2024-12-27 VITALS — WEIGHT: 138 LBS | BODY MASS INDEX: 20.92 KG/M2 | HEIGHT: 68 IN

## 2024-12-27 DIAGNOSIS — I95.1 ORTHOSTATIC HYPOTENSION: ICD-10-CM

## 2024-12-27 LAB
APICAL FOUR CHAMBER EJECTION FRACTION: 64 %
APICAL TWO CHAMBER EJECTION FRACTION: 65 %
ASCENDING AORTA: 2.38 CM
AV INDEX (PROSTH): 0.84
AV MEAN GRADIENT: 3.4 MMHG
AV PEAK GRADIENT: 4.8 MMHG
AV VALVE AREA BY VELOCITY RATIO: 2.9 CM²
AV VALVE AREA: 2.6 CM²
AV VELOCITY RATIO: 0.91
BSA FOR ECHO PROCEDURE: 1.73 M2
CV ECHO LV RWT: 0.41 CM
DOP CALC AO PEAK VEL: 1.1 M/S
DOP CALC AO VTI: 25.5 CM
DOP CALC LVOT AREA: 3.1 CM2
DOP CALC LVOT DIAMETER: 2 CM
DOP CALC LVOT PEAK VEL: 1 M/S
DOP CALC LVOT STROKE VOLUME: 67.5 CM3
DOP CALC MV VTI: 34.4 CM
DOP CALCLVOT PEAK VEL VTI: 21.5 CM
E WAVE DECELERATION TIME: 167.64 MSEC
E/A RATIO: 0.96
E/E' RATIO: 14 M/S
ECHO LV POSTERIOR WALL: 0.8 CM (ref 0.6–1.1)
FRACTIONAL SHORTENING: 35.9 % (ref 28–44)
INTERVENTRICULAR SEPTUM: 0.7 CM (ref 0.6–1.1)
IVC DIAMETER: 1.84 CM
LEFT ATRIUM AREA SYSTOLIC (APICAL 2 CHAMBER): 17.45 CM2
LEFT ATRIUM AREA SYSTOLIC (APICAL 4 CHAMBER): 14.2 CM2
LEFT ATRIUM VOLUME INDEX MOD: 21.6 ML/M2
LEFT ATRIUM VOLUME MOD: 37.78 ML
LEFT INTERNAL DIMENSION IN SYSTOLE: 2.5 CM (ref 2.1–4)
LEFT VENTRICLE DIASTOLIC VOLUME INDEX: 37.9 ML/M2
LEFT VENTRICLE DIASTOLIC VOLUME: 66.33 ML
LEFT VENTRICLE END DIASTOLIC VOLUME APICAL 2 CHAMBER: 64.66 ML
LEFT VENTRICLE END DIASTOLIC VOLUME APICAL 4 CHAMBER: 44.39 ML
LEFT VENTRICLE END SYSTOLIC VOLUME APICAL 2 CHAMBER: 44.18 ML
LEFT VENTRICLE END SYSTOLIC VOLUME APICAL 4 CHAMBER: 31.43 ML
LEFT VENTRICLE MASS INDEX: 47 G/M2
LEFT VENTRICLE SYSTOLIC VOLUME INDEX: 12.5 ML/M2
LEFT VENTRICLE SYSTOLIC VOLUME: 21.89 ML
LEFT VENTRICULAR INTERNAL DIMENSION IN DIASTOLE: 3.9 CM (ref 3.5–6)
LEFT VENTRICULAR MASS: 82.3 G
LV LATERAL E/E' RATIO: 13 M/S
LV SEPTAL E/E' RATIO: 15.17 M/S
LVED V (TEICH): 66.33 ML
LVES V (TEICH): 21.89 ML
LVOT MG: 1.83 MMHG
LVOT MV: 0.64 CM/S
MR PISA EROA: 0.14 CM2
MV MEAN GRADIENT: 1 MMHG
MV PEAK A VEL: 0.95 M/S
MV PEAK E VEL: 0.91 M/S
MV PEAK GRADIENT: 3 MMHG
MV VALVE AREA BY CONTINUITY EQUATION: 1.96 CM2
OHS CV RV/LV RATIO: 0.79 CM
OHS LV EJECTION FRACTION SIMPSONS BIPLANE MOD: 65 %
PISA MRMAX VEL: 6.24 M/S
PISA RADIUS: 0.4 CM
PISA TR MAX VEL: 1.93 M/S
PISA VN NYQUIST MS: 0.86 M/S
PISA VN NYQUIST: 0.86 M/S
PULM VEIN S/D RATIO: 1.29
PV MV: 0.68 M/S
PV PEAK D VEL: 0.52 M/S
PV PEAK GRADIENT: 3 MMHG
PV PEAK S VEL: 0.67 M/S
PV PEAK VELOCITY: 0.89 M/S
RA VOL SYS: 23.74 ML
RIGHT ATRIAL AREA: 10.6 CM2
RIGHT ATRIUM VOLUME AREA LENGTH APICAL 4 CHAMBER: 22.35 ML
RIGHT VENTRICLE DIASTOLIC BASEL DIMENSION: 3.1 CM
RV TISSUE DOPPLER FREE WALL SYSTOLIC VELOCITY 1 (APICAL 4 CHAMBER VIEW): 10.17 CM/S
SINUS: 2.31 CM
STJ: 1.92 CM
TDI LATERAL: 0.07 M/S
TDI SEPTAL: 0.06 M/S
TDI: 0.07 M/S
TR MAX PG: 15 MMHG
TRICUSPID ANNULAR PLANE SYSTOLIC EXCURSION: 1.85 CM
Z-SCORE OF LEFT VENTRICULAR DIMENSION IN END DIASTOLE: -2.16
Z-SCORE OF LEFT VENTRICULAR DIMENSION IN END SYSTOLE: -1.43

## 2024-12-27 PROCEDURE — 93306 TTE W/DOPPLER COMPLETE: CPT | Mod: 26,,, | Performed by: INTERNAL MEDICINE

## 2024-12-27 PROCEDURE — 93306 TTE W/DOPPLER COMPLETE: CPT

## 2024-12-29 LAB
APICAL FOUR CHAMBER EJECTION FRACTION: 64 %
APICAL TWO CHAMBER EJECTION FRACTION: 65 %
ASCENDING AORTA: 2.38 CM
AV INDEX (PROSTH): 0.84
AV MEAN GRADIENT: 3.4 MMHG
AV PEAK GRADIENT: 4.8 MMHG
AV VALVE AREA BY VELOCITY RATIO: 2.9 CM²
AV VALVE AREA: 2.6 CM²
AV VELOCITY RATIO: 0.91
BSA FOR ECHO PROCEDURE: 1.73 M2
CV ECHO LV RWT: 0.41 CM
DOP CALC AO PEAK VEL: 1.1 M/S
DOP CALC AO VTI: 25.5 CM
DOP CALC LVOT AREA: 3.1 CM2
DOP CALC LVOT DIAMETER: 2 CM
DOP CALC LVOT PEAK VEL: 1 M/S
DOP CALC LVOT STROKE VOLUME: 67.5 CM3
DOP CALC MV VTI: 34.4 CM
DOP CALCLVOT PEAK VEL VTI: 21.5 CM
E WAVE DECELERATION TIME: 167.64 MSEC
E/A RATIO: 0.96
E/E' RATIO: 14 M/S
ECHO LV POSTERIOR WALL: 0.8 CM (ref 0.6–1.1)
FRACTIONAL SHORTENING: 35.9 % (ref 28–44)
INTERVENTRICULAR SEPTUM: 0.7 CM (ref 0.6–1.1)
IVC DIAMETER: 1.84 CM
LEFT ATRIUM AREA SYSTOLIC (APICAL 2 CHAMBER): 17.45 CM2
LEFT ATRIUM AREA SYSTOLIC (APICAL 4 CHAMBER): 14.2 CM2
LEFT ATRIUM VOLUME INDEX MOD: 21.6 ML/M2
LEFT ATRIUM VOLUME MOD: 37.78 ML
LEFT INTERNAL DIMENSION IN SYSTOLE: 2.5 CM (ref 2.1–4)
LEFT VENTRICLE DIASTOLIC VOLUME INDEX: 37.9 ML/M2
LEFT VENTRICLE DIASTOLIC VOLUME: 66.33 ML
LEFT VENTRICLE END DIASTOLIC VOLUME APICAL 2 CHAMBER: 64.66 ML
LEFT VENTRICLE END DIASTOLIC VOLUME APICAL 4 CHAMBER: 44.39 ML
LEFT VENTRICLE END SYSTOLIC VOLUME APICAL 2 CHAMBER: 44.18 ML
LEFT VENTRICLE END SYSTOLIC VOLUME APICAL 4 CHAMBER: 31.43 ML
LEFT VENTRICLE MASS INDEX: 47 G/M2
LEFT VENTRICLE SYSTOLIC VOLUME INDEX: 12.5 ML/M2
LEFT VENTRICLE SYSTOLIC VOLUME: 21.89 ML
LEFT VENTRICULAR INTERNAL DIMENSION IN DIASTOLE: 3.9 CM (ref 3.5–6)
LEFT VENTRICULAR MASS: 82.3 G
LV LATERAL E/E' RATIO: 13 M/S
LV SEPTAL E/E' RATIO: 15.17 M/S
LVED V (TEICH): 66.33 ML
LVES V (TEICH): 21.89 ML
LVOT MG: 1.83 MMHG
LVOT MV: 0.64 CM/S
MR PISA EROA: 0.14 CM2
MV MEAN GRADIENT: 1 MMHG
MV PEAK A VEL: 0.95 M/S
MV PEAK E VEL: 0.91 M/S
MV PEAK GRADIENT: 3 MMHG
MV VALVE AREA BY CONTINUITY EQUATION: 1.96 CM2
OHS CV RV/LV RATIO: 0.79 CM
OHS LV EJECTION FRACTION SIMPSONS BIPLANE MOD: 65 %
PISA MRMAX VEL: 6.24 M/S
PISA RADIUS: 0.4 CM
PISA TR MAX VEL: 1.93 M/S
PISA VN NYQUIST MS: 0.86 M/S
PISA VN NYQUIST: 0.86 M/S
PULM VEIN S/D RATIO: 1.29
PV MV: 0.68 M/S
PV PEAK D VEL: 0.52 M/S
PV PEAK GRADIENT: 3 MMHG
PV PEAK S VEL: 0.67 M/S
PV PEAK VELOCITY: 0.89 M/S
RA PRESSURE ESTIMATED: 3 MMHG
RA VOL SYS: 23.74 ML
RIGHT ATRIAL AREA: 10.6 CM2
RIGHT ATRIUM VOLUME AREA LENGTH APICAL 4 CHAMBER: 22.35 ML
RIGHT VENTRICLE DIASTOLIC BASEL DIMENSION: 3.1 CM
RV TB RVSP: 5 MMHG
RV TISSUE DOPPLER FREE WALL SYSTOLIC VELOCITY 1 (APICAL 4 CHAMBER VIEW): 10.17 CM/S
SINUS: 2.31 CM
STJ: 1.92 CM
TDI LATERAL: 0.07 M/S
TDI SEPTAL: 0.06 M/S
TDI: 0.07 M/S
TR MAX PG: 15 MMHG
TRICUSPID ANNULAR PLANE SYSTOLIC EXCURSION: 1.85 CM
TV REST PULMONARY ARTERY PRESSURE: 18 MMHG
Z-SCORE OF LEFT VENTRICULAR DIMENSION IN END DIASTOLE: -2.16
Z-SCORE OF LEFT VENTRICULAR DIMENSION IN END SYSTOLE: -1.43

## 2024-12-30 DIAGNOSIS — N18.32 STAGE 3B CHRONIC KIDNEY DISEASE: Primary | ICD-10-CM

## 2025-01-16 ENCOUNTER — PATIENT MESSAGE (OUTPATIENT)
Dept: INTERNAL MEDICINE | Facility: CLINIC | Age: 60
End: 2025-01-16
Payer: COMMERCIAL

## 2025-01-16 DIAGNOSIS — I95.1 DYSAUTONOMIA ORTHOSTATIC HYPOTENSION SYNDROME: Primary | ICD-10-CM

## 2025-01-17 DIAGNOSIS — R55 RECURRENT SYNCOPE: Primary | ICD-10-CM

## 2025-01-17 NOTE — TELEPHONE ENCOUNTER
Patient has scheduled her Cardiologist and Neurologist appointment, and I have scheduled  the patient a follow up appointment with.

## 2025-01-27 ENCOUNTER — OFFICE VISIT (OUTPATIENT)
Dept: INTERNAL MEDICINE | Facility: CLINIC | Age: 60
End: 2025-01-27
Payer: COMMERCIAL

## 2025-01-27 VITALS
BODY MASS INDEX: 21.09 KG/M2 | DIASTOLIC BLOOD PRESSURE: 85 MMHG | SYSTOLIC BLOOD PRESSURE: 133 MMHG | WEIGHT: 139.13 LBS | HEIGHT: 68 IN | HEART RATE: 62 BPM | OXYGEN SATURATION: 99 %

## 2025-01-27 DIAGNOSIS — R29.6 FREQUENT FALLS: ICD-10-CM

## 2025-01-27 DIAGNOSIS — S09.90XA HEADACHE DUE TO INJURY OF HEAD AND NECK: Primary | ICD-10-CM

## 2025-01-27 DIAGNOSIS — S19.9XXA HEADACHE DUE TO INJURY OF HEAD AND NECK: Primary | ICD-10-CM

## 2025-01-27 PROCEDURE — 99213 OFFICE O/P EST LOW 20 MIN: CPT | Mod: S$GLB,,, | Performed by: INTERNAL MEDICINE

## 2025-01-27 PROCEDURE — 3075F SYST BP GE 130 - 139MM HG: CPT | Mod: CPTII,S$GLB,, | Performed by: INTERNAL MEDICINE

## 2025-01-27 PROCEDURE — 99999 PR PBB SHADOW E&M-EST. PATIENT-LVL III: CPT | Mod: PBBFAC,,, | Performed by: INTERNAL MEDICINE

## 2025-01-27 PROCEDURE — 3079F DIAST BP 80-89 MM HG: CPT | Mod: CPTII,S$GLB,, | Performed by: INTERNAL MEDICINE

## 2025-01-27 PROCEDURE — 3008F BODY MASS INDEX DOCD: CPT | Mod: CPTII,S$GLB,, | Performed by: INTERNAL MEDICINE

## 2025-01-27 NOTE — PROGRESS NOTES
Subjective:       Patient ID: Thelma Nguyen is a 59 y.o. female.    Chief Complaint: Fall     Thelma Nguyen is a 59 y.o. female history of glioblastoma s/p resection, complicated by panhypopituitarism and neuropathy, IBS, CKD 3, depression, hx of skin cancer and factor 5 leiden deficiency here today for     Has hx of orthostatic hypotension and frequent falls. Had a fall two days and hit her head. She is on Xarelto.   Has mild headache but otherwise denies any nausea, vomiting or vision changes.     Fall  Pertinent negatives include no abdominal pain, headaches, hematuria, nausea or vomiting.     Review of Systems   Constitutional:  Negative for activity change, appetite change and chills.   HENT:  Negative for ear pain, sinus pressure/congestion and sneezing.    Respiratory:  Negative for cough and shortness of breath.    Cardiovascular:  Negative for chest pain, palpitations and leg swelling.   Gastrointestinal:  Negative for abdominal distention, abdominal pain, constipation, diarrhea, nausea and vomiting.   Genitourinary:  Negative for dysuria and hematuria.   Musculoskeletal:  Negative for arthralgias, back pain and myalgias.   Neurological:  Negative for dizziness and headaches.   Psychiatric/Behavioral:  Negative for agitation. The patient is not nervous/anxious.            Past Medical History:   Diagnosis Date    Allergic rhinitis     Anemia 1987    Arthritis 2000    Basal cell carcinoma     Broken ankle     Cancer 1985    Clotting disorder 1993    Disorder of kidney and ureter     DVT (deep venous thrombosis)     Factor V deficiency     Fracture of elbow     left    Glioblastoma multiforme of brain     Hypothyroidism     IBS (irritable bowel syndrome) 1975    Osteoporosis     Panhypopituitarism     Peripheral polyneuropathy     Secondary adrenal insufficiency     Seizures 1995    Thyroid disease 1987     Past Surgical History:   Procedure Laterality Date    APPENDECTOMY       BRAIN SURGERY      for glioblastoma      SECTION      CHOLECYSTECTOMY  2007    CLOSURE OF DEFECT OF MOHS PROCEDURE Left 2018    Procedure: CLOSURE, MOHS PROCEDURE DEFECT SCALP  Flap closure;  Surgeon: Yrn Novak MD;  [address];  Service: Plastics;  Laterality: Left;    COLONOSCOPY N/A 2019    Procedure: COLONOSCOPY Suprep;  Surgeon: Tony Mosher MD;  Location: [facility] ENDO;  Service: Endoscopy;  Laterality: N/A;    ESOPHAGOGASTRODUODENOSCOPY N/A 2019    Procedure: EGD/colon;  Surgeon: Tony Mosher MD;  Location: [facility] ENDO;  Service: Endoscopy;  Laterality: N/A;    EXCISION OF GANGLION CYST OF HAND Left 2020    Procedure: EXCISION, GANGLION CYST, HAND;  Surgeon: Ross Drew Jr., MD;  Location: [facility];  Service: Orthopedics;  Laterality: Left;    HYSTERECTOMY      ILIAC CREST BONE GRAFT Right 3/15/2024    Procedure: BONE GRAFT, ILIAC CREST;  Surgeon: Geoffrey Goodson MD;  Location: [facility];  Service: Orthopedics;  Laterality: Right;  acumed harvester    ILIAC CREST BONE GRAFT Right 2024    Procedure: BONE GRAFT, ILIAC CREST;  Surgeon: Geoffrey Goodson MD;  Location: [facility];  Service: Orthopedics;  Laterality: Right;    MIDFOOT ARTHRODESIS Right 2024    Procedure: FUSION, JOINT, MIDFOOT;  Surgeon: Geoffrey Goodson MD;  Location: [facility];  Service: Orthopedics;  Laterality: Right;  mini c arm    OPEN REDUCTION AND INTERNAL FIXATION (ORIF) OF FRACTURE OF TARSAL BONE Right 3/15/2024    Procedure: ORIF, FRACTURE, TARSAL BONE;  Surgeon: Geoffrey Goodson MD;  Location: [facility];  Service: Orthopedics;  Laterality: Right;  supine, mini C arm, Styker, block okay    SHOULDER SURGERY Right     TILT TABLE TEST N/A 07/15/2022    Procedure: TILT TABLE TEST;  Surgeon: Amol Kohler MD;  Location: [facility] LAB;  Service: Cardiology;  Laterality: N/A;  Orthostatic hypotension, dizziness, Tilt Table Test with EEG, Dr.Tiffany Roca (neuro), DM     TUBAL LIGATION  1998      Patient Active Problem List   Diagnosis    Panhypopituitarism    IBS (irritable bowel syndrome)    Nonintractable epilepsy without status epilepticus    Allergic rhinitis due to pollen    Peripheral polyneuropathy    Age-related osteoporosis with current pathological fracture with delayed healing    Sacroiliac joint pain    Myofascial muscle pain    Secondary adrenal insufficiency    Secondary hypothyroidism    Factor V deficiency with DVT x 3    Ganglion cyst of dorsum of left wrist    Mohs defect    Mohs defect of left scalp    Stage 3b chronic kidney disease    Positive FIT (fecal immunochemical test)    Left wrist pain    Ganglion cyst    Hyperkalemia    Recurrent falls    Shortness of breath    Dizziness    Mood disorder due to medical condition    Orthostatic hypotension    Low body weight due to inadequate caloric intake    Pain aggravated by activities of daily living    Poor trunk control    Poor posture    Decreased strength of trunk and back    Impaired functional mobility, balance, gait, and endurance    Drug-induced peripheral neuropathy    Fracture of right foot    History of adrenal insufficiency    Recurrent syncope    Right foot pain    Decreased range of motion of right shoulder    Impaired strength of upper extremity    Impaired activities of daily living        Objective:      Physical Exam  Constitutional:       Appearance: Normal appearance.   HENT:      Head: Normocephalic.   Cardiovascular:      Rate and Rhythm: Normal rate and regular rhythm.      Pulses: Normal pulses.      Heart sounds: Normal heart sounds.   Pulmonary:      Effort: Pulmonary effort is normal.      Breath sounds: Normal breath sounds.   Abdominal:      General: Abdomen is flat. Bowel sounds are normal.      Palpations: Abdomen is soft.   Musculoskeletal:         General: Normal range of motion.      Cervical back: Normal range of motion and neck supple.   Skin:     General: Skin is warm and dry.  "  Neurological:      General: No focal deficit present.      Mental Status: She is alert and oriented to person, place, and time.   Psychiatric:         Mood and Affect: Mood normal.         Assessment:       Problem List Items Addressed This Visit    None  Visit Diagnoses         Headache due to injury of head and neck    -  Primary    Relevant Orders    CT Head Without Contrast (Completed)      Frequent falls        Relevant Orders    CT Head Without Contrast (Completed)            Plan:         Thelma Reagan" was seen today for fall and black out.    Diagnoses and all orders for this visit:    Headache due to injury of head and neck  Frequent falls  -     CT Head Without Contrast; Future     Fall and hit her head. Exam is normal but given she is on anticoagulation will check stat CT head.             Amparo Mcdonald MD   Internal Medicine   Primary Care      "

## 2025-01-31 DIAGNOSIS — E27.49 OTHER ADRENOCORTICAL INSUFFICIENCY: ICD-10-CM

## 2025-01-31 RX ORDER — HYDROCORTISONE 10 MG/1
TABLET ORAL
Qty: 75 TABLET | Refills: 2 | Status: SHIPPED | OUTPATIENT
Start: 2025-01-31

## 2025-01-31 NOTE — TELEPHONE ENCOUNTER
Refill Routing Note   Medication(s) are not appropriate for processing by [facility] for the following reason(s):        Outside of protocol    [facility] action(s):  Route               Appointments  past 12m or future 3m with PCP    Date Provider   Last Visit   11/12/2024 Zachary Bender MD   Next Visit   2/18/2025 Zachary Bender MD   ED visits in past 90 days: 0        Note composed:8:17 AM 01/31/2025

## 2025-02-03 ENCOUNTER — TELEPHONE (OUTPATIENT)
Dept: ELECTROPHYSIOLOGY | Facility: CLINIC | Age: 60
End: 2025-02-03
Payer: COMMERCIAL

## 2025-02-03 NOTE — TELEPHONE ENCOUNTER
Patient booked an appointment with Dr. White. She was a patient of Dr. Avitia for POTS. I called the patient and let her know that Dr. White does not specialize in POTS. I gave her 2 names, Dr. Onofre in neuro here and Dr. Braulio Aggarwal in the [facility] system. Patient asked me to send a message to her PCP Dr. Bender to let him know that. I sent the message to his staff. Patients appointment with Dr. White cancelled for today. Patient verbalized understanding.

## 2025-02-03 NOTE — TELEPHONE ENCOUNTER
----- Message from DILIA Sarabia sent at 2/3/2025  8:23 AM CST -----    ----- Message -----  From: Gabriel White MD  Sent: 2/2/2025   9:40 AM CST  To: No Mckeon RN    This is a former Annemarie Khushboo visit that does not have a history of arrhythmias. Is there any way you can get them off my scheduled? Not sure what I am going to offer them.    GP

## 2025-02-07 ENCOUNTER — TELEPHONE (OUTPATIENT)
Dept: INTERNAL MEDICINE | Facility: CLINIC | Age: 60
End: 2025-02-07
Payer: COMMERCIAL

## 2025-02-07 NOTE — TELEPHONE ENCOUNTER
Patient states that she had a cardiology appointment scheduled at [facility] , but they have cancelled her appointment. The patient said that her appointment was cancelled because she was scheduled to see the wrong doctor. Patient states that someone from [facility] called her , and told her that she needs to see a Orthostatic hypertension cardiologist. Patient said that someone should have reached out to you with the doctors names that the patient can see.

## 2025-02-07 NOTE — TELEPHONE ENCOUNTER
----- Message from Emily sent at 2/7/2025  2:38 PM CST -----  Contact: Self/ [phone]  [email or web address]     Patient is calling for Medical Advice regarding:Cardiologist     Patient wants a call back or thru [facility]: Call back     Comments: pt said that she is calling in regards to she was told by Cardiology that she needs to be seen by another doctor , pt told them to contact Dr Bender office with the dr's names so she can get scheduled for an appt     Please advise patient replies from provider may take up to 48 hours.

## 2025-02-11 ENCOUNTER — PATIENT MESSAGE (OUTPATIENT)
Dept: INTERNAL MEDICINE | Facility: CLINIC | Age: 60
End: 2025-02-11
Payer: COMMERCIAL

## 2025-02-13 NOTE — PROGRESS NOTES
[facility]  Clinic Note    Date of Service: 2/13/2025  Patient seen at the request of: No ref. provider found    Reason for Consultation  Epilepsy    Assessment:  Thelma Nguyen is a 59 y.o. female who presents with epilepsy.    Patient presents with a history of epilepsy that started in 1984 secondary to glioblastoma in the left temporal lobe.  The glioblastoma was resected in 1985.  Patient was initially started on phenytoin for her seizures with an attempt to wean off the medication after being 4 years seizure-free.  Patient subsequently had another convulsion in 1995.  She has had no other seizures since being back on Dilantin.    Although the patient has been seizure-free on phenytoin, she has since developed osteoporosis.  Due to the significant side effect profile of Dilantin, tried switching to lacosamide first.  Lacosamide lead to increased dizziness, orthostatic hypotension, and an increase in falls.  Will transition to levetiracetam.    After switching from Dilantin, patient reports new potential hallucinations (hearing crickets and sometimes a duck sound).  Patient had hallucinations of voices prior to discovering the GBM.  Query focal seizure.    Patient has concurrent kidney disease and we will dose accordingly.  Creatinine 2.2 (12/13/2024)  CrCl is 28 mL/min    Cr 1.7 (2/14/2025)  CrCl is now 37 mL/min    Family history of migraine in mother and daughter.  Patient reports daily headache    Plan:    - increase Keppra to 500mg twice daily (based on CrCl)  - keppra level  - physical therapy  - EMU monitoring    - amitriptyline 10 mg nightly      - RTC in 2-3 months      SEIZURE/EVENT PRECAUTIONS INCLUDE:  NO DRIVING until approximately 3-6 months have passed WITHOUT a seizure.   If you have a seizure, you will need to wait another 3 months to be eligible to drive again.  Avoid bathing or swimming alone or unsupervised.  Avoid cooking over large ranges or open flames.    Avoid climbing  up heights unsupervised.  Avoid operating heavy machinery.     SEIZURE SAFETY:  When an epileptic seizure occurs, the following should be done to prevent injuries:  Do not hold or tie the person down.  Do not place anything in the person's mouth or try to force the teeth apart. The person is not in danger of swallowing his tongue.  Do not pour any liquid into the persons mouth or offer food or medicines until he is fully awake.  If possible, turn the person on his side during the seizure.  Place something soft under the person's head, loosen tight clothing, and clear the area of sharp or hard objects.  Stay with the person until the seizure ends. Let the person rest until he is fully awake.  Use a watch to time how long the seizure lasts.   Watch the type of movement and position of the person's head or eyes during the seizure.      Signed:    Buddy Kumar MD  Neurology/Epilepsy  02/13/2025 9:20 AM      Interval Events:  - they report episodes of staring when standing up from sitting position associated with falls        HPI:  Thelma Nguyen is a 59 y.o. female with   Past Medical History:   Diagnosis Date    Allergic rhinitis     Anemia 1987    Arthritis 2000    Basal cell carcinoma     Broken ankle     Cancer 1985    Clotting disorder 1993    Disorder of kidney and ureter     DVT (deep venous thrombosis)     Factor V deficiency     Fracture of elbow     left    Glioblastoma multiforme of brain     Hypothyroidism     IBS (irritable bowel syndrome) 1975    Osteoporosis     Panhypopituitarism     Peripheral polyneuropathy     Secondary adrenal insufficiency     Seizures 1995    Thyroid disease 1987     58 y.o. 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    First seizure: in the 1984 due to glioblastoma.  Surgery was in 1985.    Semiology: First seizures were generalized convulsions.   Patient was then on Dilantin for 4 years and then was discontinued.  Patient then had another convulsion in .  Patient has also had some staring episodes that have resolved.    1st type:   - Aura: none  - Triggers: none identified  - Frequency: last seizure was in the   - Maximum time seizure free: currently over 25 years    Seizure Risk Factors:   Temporal lobe GBM s/p resection    Previous Anti-Epileptic Medication:    Current medications: Dilantin  Past medications: gabapentin, phenobarbital  Driving:       Patient was diagnosed in February with a broken right foot.  She got surgery and then subsequently re-broke the foot and got surgery again.  DXA scan in 2024 was consistent with osteoporosis.      This is the extent of the patient's complaints at this time.    TSH   Date Value Ref Range Status   2024 1.716 0.400 - 4.000 uIU/mL Final   2021 <0.026 (L) 0.400 - 4.000 uIU/mL Final     Comment:     Warning:  Heterophilic antibodies in serum or plasma of   certain individuals are known to cause interference with   immunoassays. These antibodies may be present in blood samples   from individuals regularly exposed to animal or who have been   treated with animal products.     Patients taking high doses of supplemental biotin may have  negatively biased results.        Vitamin B-12   Date Value Ref Range Status   2024 >2000 (H) 210 - 950 pg/mL Final   2023 >2000 (H) 210 - 950 pg/mL Final         Review of Systems:  ROS negative unless noted in HPI    Past Surgical History:  Past Surgical History:   Procedure Laterality Date    APPENDECTOMY      BRAIN SURGERY      for glioblastoma      SECTION      CHOLECYSTECTOMY      CLOSURE OF DEFECT OF MOHS PROCEDURE Left 2018    Procedure: CLOSURE, MOHS PROCEDURE DEFECT SCALP  Flap closure;  Surgeon: Yrn Novak MD;  Location: [facility] OR [address] [place] [address];  Service: Plastics;  Laterality: Left;    COLONOSCOPY N/A  04/23/2019    Procedure: COLONOSCOPY Suprep;  Surgeon: Tony Mosher MD;  Location: [facility] ENDO;  Service: Endoscopy;  Laterality: N/A;    ESOPHAGOGASTRODUODENOSCOPY N/A 04/23/2019    Procedure: EGD/colon;  Surgeon: Tony Mosher MD;  Location: [facility] ENDO;  Service: Endoscopy;  Laterality: N/A;    EXCISION OF GANGLION CYST OF HAND Left 01/07/2020    Procedure: EXCISION, GANGLION CYST, HAND;  Surgeon: Ross Drew Jr., MD;  Location: [facility] OR;  Service: Orthopedics;  Laterality: Left;    HYSTERECTOMY  1999    ILIAC CREST BONE GRAFT Right 3/15/2024    Procedure: BONE GRAFT, ILIAC CREST;  Surgeon: Geoffrey Goodson MD;  Location: [facility] OR;  Service: Orthopedics;  Laterality: Right;  acumed harvester    ILIAC CREST BONE GRAFT Right 4/2/2024    Procedure: BONE GRAFT, ILIAC CREST;  Surgeon: Geoffrey Goodson MD;  Location: [facility] OR;  Service: Orthopedics;  Laterality: Right;    MIDFOOT ARTHRODESIS Right 4/2/2024    Procedure: FUSION, JOINT, MIDFOOT;  Surgeon: Geoffrey Goodson MD;  Location: [facility] OR;  Service: Orthopedics;  Laterality: Right;  mini c arm    OPEN REDUCTION AND INTERNAL FIXATION (ORIF) OF FRACTURE OF TARSAL BONE Right 3/15/2024    Procedure: ORIF, FRACTURE, TARSAL BONE;  Surgeon: Geoffrey Goodson MD;  Location: [facility] OR;  Service: Orthopedics;  Laterality: Right;  supine, mini C arm, Styker, block okay    SHOULDER SURGERY Right     TILT TABLE TEST N/A 07/15/2022    Procedure: TILT TABLE TEST;  Surgeon: Amol Kohler MD;  Location: [facility] EP LAB;  Service: Cardiology;  Laterality: N/A;  Orthostatic hypotension, dizziness, Tilt Table Test with EEG, Dr.Tiffany Roca (neuro), DM    TUBAL LIGATION  1998       Family History:  Family History   Problem Relation Name Age of Onset    Heart disease Mother      Diabetes type II Mother      Diabetes type II Father      Heart attack Maternal Grandfather         Social History:  Social History     Tobacco Use    Smoking status: Never    Smokeless tobacco: Never   Substance  Use Topics    Alcohol use: No    Drug use: No       Allergies:  Neurontin [gabapentin], Lactose, and Soap    Outpatient Medications:  Prior to Admission medications    Medication Sig Start Date End Date Taking? Authorizing Provider   acetaminophen (TYLENOL) 500 MG tablet Take 1 tablet (500 mg total) by mouth every 6 (six) hours. 3/21/24   Geoffrey Goodson MD   BIOTIN ORAL Take by mouth.    Provider, Historical   calcitRIOL (ROCALTROL) 0.25 MCG Cap Take 1 capsule (0.25 mcg total) by mouth once daily. 6/3/24 10/1/24  Hanna Tinajero MD   calcitRIOL (ROCALTROL) 0.25 MCG Cap Take 1 capsule (0.25 mcg total) by mouth once daily. for 90 doses 9/4/24 12/3/24  Swetha Bay MD   calcium carbonate (TUMS) 200 mg calcium (500 mg) chewable tablet Take 1 tablet (500 mg total) by mouth 3 (three) times daily as needed for Heartburn. 4/11/24 6/13/24  Rajni Lobo, NP   cholecalciferol, vitamin D3, (VITAMIN D3) 50 mcg (2,000 unit) Cap capsule Take 2,000 Units by mouth every evening.    Provider, Historical   cyanocobalamin (VITAMIN B-12) 100 MCG tablet Take 100 mcg by mouth once daily.    Provider, Historical   dexAMETHasone (DECADRON) 4 mg/mL injection Inject 1ml (4mg) into the muscle for symptoms of severe adrenal insufficiency. 3ml syringe w/18 g needle to draw x10 and 21g 1 inch needle to injection x10 6/11/24   Zachary Bender MD   DULoxetine (CYMBALTA) 30 MG capsule TAKE 2 CAPSULES (60 MG TOTAL) BY MOUTH ONCE DAILY.  Patient taking differently: Take 60 mg by mouth every evening. 3/15/24   Sagar Taylor MD   ergocalciferol (ERGOCALCIFEROL) 50,000 unit Cap Take 1 capsule (50,000 Units total) by mouth every 7 days. 6/13/24   Hanna Tinajero MD   ferrous sulfate (IRON, FERROUS SULFATE,) 325 mg (65 mg iron) Tab tablet Take 1 tablet (325 mg total) by mouth 2 (two) times daily. 6/13/24   Hanna Tinajero MD   ferrous sulfate 324 mg (65 mg iron) TbEC Take 325 mg by mouth nightly.     Provider,  Historical   hydrocortisone (CORTEF) 10 MG Tab Take 1 and 1/2 tablet by mouth every morning and 1/2 tablet in the afternoon    Provider, Historical   hyoscyamine (LEVSIN/SL) 0.125 mg Subl PLACE 1 TABLET (0.125 MG TOTAL) UNDER THE TONGUE EVERY 4 (FOUR) HOURS AS NEEDED. 8/28/23   Zachary Bender MD   ivabradine (CORLANOR) 5 mg Tab Take 1 tablet (5 mg total) by mouth 2 (two) times daily. 6/14/24   Viktor Cohen MD   ivabradine (CORLANOR) 5 mg Tab Take 1 tablet (5 mg total) by mouth 2 (two) times daily. 6/17/24   Viktor Cohen MD   ivabradine (CORLANOR) 7.5 mg Tab Take 1 tablet (7.5 mg total) by mouth 2 (two) times daily. 7/8/24   May, HADLEY Yancey-PATRICIA   levothyroxine (SYNTHROID) 112 MCG tablet Take 1 tablet (112 mcg total) by mouth before breakfast. 2/27/24 2/26/25  Joan Serrano MD   melatonin 5 mg TbDL Take 1 tablet by mouth nightly.    Provider, Historical   midodrine (PROAMATINE) 5 MG Tab Take 4 doses a day 3 hours apart 6/14/24   Viktor Cohen MD   phenytoin (DILANTIN) 100 MG ER capsule Take 2 capsules (200 mg total) by mouth nightly. 12/28/23 12/27/24  Cecile Walton PA-C   rivaroxaban (XARELTO) 20 mg Tab Take 1 tablet (20 mg total) by mouth once daily. 6/17/24   Zachary Bender MD   UNABLE TO FIND Take 1 tablet by mouth Daily. medication name: Biotin 2500mcg    Provider, Historical       Physical exam:    Vitals: LMP 10/23/1997 (Exact Date)     General:   Sitting in chair, in no distress, well-nourished, well-developed, appears stated age.  Head/Neck:   Normocephalic,atraumatic  Pulm:  Non-labored breathing     Mental Status: Alert and oriented to person, time, place, situation. Speech spontaneous and fluent without paraphasias; no dysarthria  CN:  II: visual fields full  III, IV, VI: EOM intact without nystagmus or diplopia.   V: Sensation to light touch full and symmetric in V1-3. [place] contraction full bilaterally.   VII: Facial movement full and symmetric.   VIII:  Hearing grossly normal to conversation.  IX, X: Palate midline with symmetric elevation.    XI: SCM and trapezius: 5/5 bilaterally.   XII: Tongue midline without fasciculations.  Motor: Normal bulk and tone throughout all four extremities.   RUE: D: 5/5; B: 5/5; T:  5/5; WF:5/5; WE:  5/5; IO: 5/5   LUE: D: 5/5; B: 5/5; T:  5/5; WF: 5/5; WE:  5/5; IO: 5/5   RLE: HF: 5/5, KE: 5/5, KF: 5/5, DF: 5/5, PF: 5/5  LLE: HF: 5/5, KE: 5/5, KF: 5/5, DF: 5/5, PF: 5/5  No tremors   Sensory: Intact and symmetric to light touch throughout.  Reflexes: RUE: Triceps 2+, biceps 2+, brachioradialis 2+  LUE: Triceps 2+, biceps 2+ brachioradialis 2+  RLE: Knee 2+, ankle 2+  LLE: Knee 2+, ankle 2+  Coordination:  Intact and symmetric to finger-to-nose and heel-to-shin.  Gait:  Intact to casual gait.    Imaging:  All pertinent imaging was personally reviewed.    Results for orders placed during the hospital encounter of 05/19/16    MRI Brain Without Contrast    Narrative  MRI BRAIN WITHOUT CONTRAST    Date: 05/19/16 09:57:28    History:  Seizures., epilepsy and history of brain tumor    Technique:  Standard multiplanar noncontrast sequences of the brain no previous for comparison.    Findings:  The ventricles are nonenlarged.  There is minor periventricular and pontine white matter hyperintensity.    There are postoperative changes consistent with a left craniotomy site.    There is a small cystic area of encephalomalacia involving the posterior left temporal lobe cortex grossly measuring approximately 1.5 x 3.0 cm in size.  The marginal signal is hyperintense.  No restricted diffusion is seen.  Without contrast difficult to assess for recurrent or residual tumor mass.  Please correlate with surgical history.    No additional findings.  IMPRESSION:  No acute intracranial process.  Evidence of left craniotomy with focal area of postoperative encephalomalacia left posterior temporal lobe convexity.  Minor residual marginal T2 signal  hyperintensity.    Minor periventricular and pontine white matter hyperintensity, usually related to small vessel ischemic degeneration.  ______________________________________    Electronically signed by: FRANSICO GRANT MD  Date:     05/19/16  Time:    11:08    No results found for this or any previous visit.    Results for orders placed during the hospital encounter of 04/01/24    MRI Brain W WO Contrast    Narrative  EXAMINATION:  MRI BRAIN W WO CONTRAST    CLINICAL HISTORY:  Syncope, recurrent;    TECHNIQUE:  Multiplanar multisequence MR imaging of the brain was performed before and after the administration of 7.5 mL Gadavist intravenous contrast.    COMPARISON:  CT head 04/01/2024.  MRI brain 05/28/2021 and 05/19/2016.    FINDINGS:  Parenchyma: There is no restricted diffusion to suggest acute or subacute ischemic infarct.Few nonspecific foci of T2/FLAIR hyperintense signal in the frontal parietal white matter and lisandro may reflect sequela of chronic small vessel ischemic disease.Left temporal and occipital lobe encephalomalacia and gliosis, suggested small volume remote blood products, as before.    Additional comments: There is no midline shift, abnormal extra-axial fluid collection, or acute intracranial hemorrhage. The basal cisterns are patent.    Ventricles: Normal.    Flow voids: The normal major intracranial arterial flow voids are visualized.    Enhancement: No abnormal intra-axial or extra-axial enhancement is identified.  Question developmental venous anomaly in the left upper lobe.    Sinuses and mastoid air cells: Relatively modest paranasal sinus mucosal thickening with small retention cysts.    Orbits: Normal    Midline structures: The pituitary and craniocervical junction are normal.    Marrow: Normal.  Redemonstrated remote left craniotomy sequela.    Impression  1. No definite acute intracranial findings identified to account for the patient's reported symptoms.  2. Overall, no significant  change relative to prior MRI performed 05/28/2021.      Electronically signed by: Humza Mckeon  Date:    04/06/2024  Time:    09:01      Results for orders placed during the hospital encounter of 05/28/21    MRI Brain W WO Contrast    Narrative  EXAMINATION:  MRI BRAIN W WO CONTRAST    CLINICAL HISTORY:  Brain/CNS neoplasm, surveillance;  Malignant neoplasm of brain, unspecified    FINDINGS:  The diffusion sequence is normal without evidence of acute ischemia or infarct.  Pituitary craniocervical junction and midline structures are unremarkable.  There is some blood products lining the left resection cavity likely hemosiderin.  There is minimal small vessel ischemic change.  All skull base flow voids are accounted for posterior fossa structures are unremarkable.  Post-contrast images demonstrate no significant abnormal enhancement.    Impression  Stable postsurgical change.  There is no evidence of recurrent or residual neoplasm.      Electronically signed by: Juan Guthrie MD  Date:    05/28/2021  Time:    14:07      Results for orders placed during the hospital encounter of 11/20/17    MRI Brain W WO Contrast    Narrative  MRI BRAIN W WO CONTRAST,    Date: 11/20/17 14:35:29    History:   C71.9 Malignant neoplasm of brain, unspecified;, G40.909 Epilepsy, unspecified, not intractable, without status epilepticus;    Technique:  Standard multiplanar pre and post contrast sequences of the brain performed with 6 cc Gadavist.    Comparison: 05/19/2016 noncontrast MR    Findings:  The ventricles are nondilated.  The corpus callosum is intact.    There is a focal area of cystic encephalomalacia involving the superficial cortex of the posterior left temporal lobe.  This area measures approximately 1.6 x 2.6 cm transaxially.  There is marginal increased T2 signal.  No abnormal enhancement is seen.  No associated restricted diffusion is seen either.    No significant hemosiderin deposition is seen.    Central pontine white  matter hyperintensity is present.  IMPRESSION:  Postsurgical encephalomalacia left temporal lobe with marginal hyperintense T2 signal.  No abnormal enhancement or mass effect.  No detrimental interval change.      Electronically signed by: FRANSICO GRANT MD  Date:     11/20/17  Time:    16:10        I spent a total of 47 minutes on the day of the visit. This includes face to face time and non-face to face time preparing to see the patient (eg, review of tests), obtaining and/or reviewing separately obtained history, documenting clinical information in the electronic or other health record, independently interpreting results and communicating results to the patient/family/caregiver, or care coordinator.

## 2025-02-14 ENCOUNTER — PATIENT MESSAGE (OUTPATIENT)
Dept: DIABETES | Facility: CLINIC | Age: 60
End: 2025-02-14
Payer: COMMERCIAL

## 2025-02-17 ENCOUNTER — TELEPHONE (OUTPATIENT)
Dept: NEUROLOGY | Facility: CLINIC | Age: 60
End: 2025-02-17
Payer: COMMERCIAL

## 2025-02-17 ENCOUNTER — LAB VISIT (OUTPATIENT)
Dept: LAB | Facility: HOSPITAL | Age: 60
End: 2025-02-17
Attending: STUDENT IN AN ORGANIZED HEALTH CARE EDUCATION/TRAINING PROGRAM
Payer: COMMERCIAL

## 2025-02-17 ENCOUNTER — OFFICE VISIT (OUTPATIENT)
Dept: NEUROLOGY | Facility: CLINIC | Age: 60
End: 2025-02-17
Payer: COMMERCIAL

## 2025-02-17 VITALS
BODY MASS INDEX: 21.72 KG/M2 | HEART RATE: 99 BPM | WEIGHT: 143.31 LBS | SYSTOLIC BLOOD PRESSURE: 129 MMHG | DIASTOLIC BLOOD PRESSURE: 71 MMHG | HEIGHT: 68 IN

## 2025-02-17 DIAGNOSIS — G40.209 PARTIAL SYMPTOMATIC EPILEPSY WITH COMPLEX PARTIAL SEIZURES, NOT INTRACTABLE, WITHOUT STATUS EPILEPTICUS: Primary | ICD-10-CM

## 2025-02-17 DIAGNOSIS — G40.209 PARTIAL SYMPTOMATIC EPILEPSY WITH COMPLEX PARTIAL SEIZURES, NOT INTRACTABLE, WITHOUT STATUS EPILEPTICUS: ICD-10-CM

## 2025-02-17 PROCEDURE — 3074F SYST BP LT 130 MM HG: CPT | Mod: CPTII,S$GLB,, | Performed by: STUDENT IN AN ORGANIZED HEALTH CARE EDUCATION/TRAINING PROGRAM

## 2025-02-17 PROCEDURE — 3078F DIAST BP <80 MM HG: CPT | Mod: CPTII,S$GLB,, | Performed by: STUDENT IN AN ORGANIZED HEALTH CARE EDUCATION/TRAINING PROGRAM

## 2025-02-17 PROCEDURE — 80177 DRUG SCRN QUAN LEVETIRACETAM: CPT | Performed by: STUDENT IN AN ORGANIZED HEALTH CARE EDUCATION/TRAINING PROGRAM

## 2025-02-17 PROCEDURE — 3008F BODY MASS INDEX DOCD: CPT | Mod: CPTII,S$GLB,, | Performed by: STUDENT IN AN ORGANIZED HEALTH CARE EDUCATION/TRAINING PROGRAM

## 2025-02-17 PROCEDURE — 1159F MED LIST DOCD IN RCRD: CPT | Mod: CPTII,S$GLB,, | Performed by: STUDENT IN AN ORGANIZED HEALTH CARE EDUCATION/TRAINING PROGRAM

## 2025-02-17 PROCEDURE — 36415 COLL VENOUS BLD VENIPUNCTURE: CPT | Performed by: STUDENT IN AN ORGANIZED HEALTH CARE EDUCATION/TRAINING PROGRAM

## 2025-02-17 RX ORDER — AMITRIPTYLINE HYDROCHLORIDE 10 MG/1
10 TABLET, FILM COATED ORAL NIGHTLY
Qty: 30 TABLET | Refills: 11 | Status: SHIPPED | OUTPATIENT
Start: 2025-02-17 | End: 2026-02-17

## 2025-02-17 RX ORDER — LEVETIRACETAM 500 MG/1
500 TABLET ORAL 2 TIMES DAILY
Qty: 120 TABLET | Refills: 5 | Status: SHIPPED | OUTPATIENT
Start: 2025-02-17 | End: 2026-02-17

## 2025-02-17 NOTE — TELEPHONE ENCOUNTER
----- Message from DILIA Benitez sent at 2/17/2025  2:32 PM CST -----  Regarding: RE: EMU for spell capture  Right now I am scheduling in April as first available dates  ----- Message -----  From: Buddy Kumar MD  Sent: 2/17/2025   2:29 PM CST  To: Li Mckeon RN; Eli Pink RN  Subject: EMU for spell capture                            Hi how soon can we get this patient in the EMU for spell capture

## 2025-02-18 ENCOUNTER — TELEPHONE (OUTPATIENT)
Dept: NEUROLOGY | Facility: CLINIC | Age: 60
End: 2025-02-18
Payer: COMMERCIAL

## 2025-02-18 ENCOUNTER — OFFICE VISIT (OUTPATIENT)
Dept: INTERNAL MEDICINE | Facility: CLINIC | Age: 60
End: 2025-02-18
Payer: COMMERCIAL

## 2025-02-18 VITALS
WEIGHT: 137.38 LBS | BODY MASS INDEX: 20.82 KG/M2 | DIASTOLIC BLOOD PRESSURE: 74 MMHG | OXYGEN SATURATION: 99 % | SYSTOLIC BLOOD PRESSURE: 130 MMHG | HEIGHT: 68 IN | HEART RATE: 83 BPM

## 2025-02-18 DIAGNOSIS — G40.209 PARTIAL SYMPTOMATIC EPILEPSY WITH COMPLEX PARTIAL SEIZURES, NOT INTRACTABLE, WITHOUT STATUS EPILEPTICUS: Primary | ICD-10-CM

## 2025-02-18 DIAGNOSIS — D23.30 DERMOID CYST OF FACE: ICD-10-CM

## 2025-02-18 DIAGNOSIS — D68.2 FACTOR V DEFICIENCY: ICD-10-CM

## 2025-02-18 DIAGNOSIS — I95.1 DYSAUTONOMIA ORTHOSTATIC HYPOTENSION SYNDROME: Primary | ICD-10-CM

## 2025-02-18 DIAGNOSIS — N18.32 STAGE 3B CHRONIC KIDNEY DISEASE: ICD-10-CM

## 2025-02-18 NOTE — TELEPHONE ENCOUNTER
EMU scheduled 4/15/25, 11am. Specifically requested scheduling during this week of April. Aware an adult is required to accompany her for the duration including overnight. Aware she will be connected to lines to her head, chest, arm, have an IV placed; will not be able to leave the room until all equipment is removed at discharge. Instructions thoroughly discussed; mailed via [employer]. Reports she does have orthostatic hypotension with passing out upon standing.

## 2025-02-18 NOTE — PROGRESS NOTES
"    CHIEF COMPLAINT     Chief Complaint   Patient presents with    Follow-up       HPI     Thelma Marita Nguyen is a 59 y.o. female history of glioblastoma s/p resection, complicated by panhypopituitarism and neuropathy, IBS, CKD 3, depression, hx of skin cancer and factor 5 leiden deficiency here today for     Orthostatic HypoTN  Having elevated BP sitting, low/nl standing.  Bad days 15 feet tolerance, better days.  Getting dressed wipes her out.    Since last visit-her cardiologist has retired she has not reestablished yet  Seen by neurology yesterday-Keppra was increased to 500 b.i.d.  trialed on amitriptyline 10 mg nightly plan is to do some EMG monitoring    Seen by endocrinology for osteoporosis plan to start envinity s/p first injection    Personally Reviewed Patient's Medical, surgical, family and social hx. Changes updated in [facility].  Care Team updated in Epic    Review of Systems:  Review of Systems   Musculoskeletal:  Positive for gait problem.   Neurological:  Positive for weakness and light-headedness.   Psychiatric/Behavioral:  Positive for confusion.        Health Maintenance:   Reviewed with patient  Due for the following:      PHYSICAL EXAM     /74 (BP Location: Left arm, Patient Position: Sitting)   Pulse 83   Ht 5' 8" (1.727 m)   Wt 62.3 kg (137 lb 5.6 oz)   LMP 10/23/1997 (Exact Date)   SpO2 99%   BMI 20.88 kg/m²     Gen: Well Appearing, NAD  HEENT: PERR, EOMI  Neck: FROM, no thyromegaly, no cervical adenopathy  CVD: RRR, no M/R/G  Pulm: Normal work of breathing, CTAB, no wheezing  Abd:  Soft, NT, ND non TTP, no mass  MSK: no LE edema  Neuro: A&Ox3, in wheelchair speech normal  Mood; Mood normal, behavior normal, thought process linear       LABS     Labs reviewed; Notable for    Chemistry        Component Value Date/Time     02/14/2025 1412    K 4.6 02/14/2025 1412     02/14/2025 1412    CO2 23 02/14/2025 1412    BUN 40 (H) 02/14/2025 1412    BUN 25 (H) 12/01/2014 " 1325    CREATININE 1.7 (H) 02/14/2025 1412     (H) 02/14/2025 1412        Component Value Date/Time    CALCIUM 9.5 02/14/2025 1412    ALKPHOS 119 02/14/2025 1412    ALKPHOS 124 12/01/2014 1325    AST 23 02/14/2025 1412    AST 31 03/16/2016 1535    ALT 20 02/14/2025 1412    BILITOT 0.2 02/14/2025 1412    ESTGFRAFRICA 44.5 (A) 03/28/2022 1311    EGFRNONAA 38.6 (A) 03/28/2022 1311          ASSESSMENT     1. Dysautonomia orthostatic hypotension syndrome        2. Dermoid cyst of face  Ambulatory referral/consult to Plastic Surgery      3. Factor V deficiency        4. Stage 3b chronic kidney disease                Plan     Thelma Nguyen is a 59 y.o. female with history of glioblastoma s/p resection, complicated by panhypopituitarism and neuropathy, IBS, CKD 3, depression, hx of skin cancer and factor 5 leiden deficiency   1. Dysautonomia orthostatic hypotension syndrome  Still having significant intolerance to standing.  Having 60 point falls and blood pressure transferring from sitting to standing  Has appt with new cardiologist in May  Continue midodrine and ivabradine 7.5mg BID    2. Dermoid cyst of face  Referral to plastics  - Ambulatory referral/consult to Plastic Surgery; Future    3. Factor V deficiency  Continue anticoagulation with rivaroxaban    4. Stage 3b chronic kidney disease  GFR stable.  Underwent evaluation with Nephrology for other causes of CKD.  Initial workup was negative.  Control risk factors avoid nephrotoxic medications    >40 minutes of professional services provided as part of today's visit.    Zachary Bender MD

## 2025-02-21 LAB — LEVETIRACETAM SERPL-MCNC: 23.9 UG/ML (ref 3–60)

## 2025-02-24 ENCOUNTER — OFFICE VISIT (OUTPATIENT)
Dept: ORTHOPEDICS | Facility: CLINIC | Age: 60
End: 2025-02-24
Payer: COMMERCIAL

## 2025-02-24 DIAGNOSIS — M25.511 ACUTE PAIN OF RIGHT SHOULDER: Primary | ICD-10-CM

## 2025-02-24 PROCEDURE — 99999 PR PBB SHADOW E&M-EST. PATIENT-LVL III: CPT | Mod: PBBFAC,,, | Performed by: SURGERY

## 2025-02-24 RX ORDER — METHYLPREDNISOLONE ACETATE 40 MG/ML
40 INJECTION, SUSPENSION INTRA-ARTICULAR; INTRALESIONAL; INTRAMUSCULAR; SOFT TISSUE
Status: DISCONTINUED | OUTPATIENT
Start: 2025-02-24 | End: 2025-02-24 | Stop reason: HOSPADM

## 2025-02-24 RX ADMIN — METHYLPREDNISOLONE ACETATE 40 MG: 40 INJECTION, SUSPENSION INTRA-ARTICULAR; INTRALESIONAL; INTRAMUSCULAR; SOFT TISSUE at 11:02

## 2025-02-24 NOTE — PROGRESS NOTES
SUBJECTIVE:    Ms. Nguyen is here today for a follow up visit.  She presents for right shoulder pain.  She has been having pain since a few months.  It is mostly with lifting objects.  She notes she is still having frequent falls secondary to her postural hypotension.  Regarding her foot she is Doing well. No pain.  Able to ambulate normally within the confines of her home, unfortunately her hypotension and frequent falls have left her with minimal ambulation and requiring significant levels of 8 around the house.    OBJECTIVE:      Vitals:    02/24/25 1141   PainSc:   7   PainLoc: Generalized       Upper extremity:  Positive Jonah's, negative belly press, internal rotation is reduced, unable to perform a lift-off test, positive AC joint tenderness.  Neurovascularly intact.    Lower Extremity Exam  Alert, oriented  Incisions well healed  30 plantarflexion, 20 dorsiflexion, equal to contralateral side  Denies pain, nontender to palpation  Stands without pain, mild pes planus deformity.  Corrects with heel rise.  NV at baseline     DIAGNOSTIC STUDIES:  New standing weightbearing films obtained previously.  x-rays demonstrate healing navicular fracture and dorsal bridge plate unchanged from previous imaging.  Mild lucency about talar screw.    10/15/24 - x-rays nonweight bearing obtained due to patients dizziness and lightheadedness. These reveal hardware in place. No changes in alignment. There is a dorsal os that was visible previously. The plate appears intact, there appears to be compression and fusion at elements of the TN joint and NC joint. I reviewed her images with the radiologist and the patient and her .    ASSESSMENT:   1. S/p revision navicular ORIF and bridge plate      PLAN:  Injection of the right shoulder performed.  We discussed the risks benefits and alternatives to getting an MRI of the shoulder.  We would like to hold off at this time and just do injections and home exercises with physical  therapy.  Regarding her right foot, she can continue normal shoewear, continue weightbear as tolerated.   We again discussed again screw lucency in Talus, dorsal bridge plate, os superiorly. If screws or plate should break and/or become painful we can remove them. If they break and plate is stable or maintains osseous or fibrous stability then can likely leave in place as she has had multiple surgeries and is a high risk patient given her GBM and need for steroid tapers, etc. She would like to avoid surgery as she is able to at this time.      Geoffrey Goodson MD  [facility]  Orthopedic Surgery      This note was done with voice recognition software. Please excuse any errors missed in proof reading.

## 2025-02-24 NOTE — PROCEDURES
Large Joint Aspiration/Injection: R glenohumeral    Date/Time: 2/24/2025 11:45 AM    Performed by: Geoffrey Goodson MD  Authorized by: Geoffrey Goodson MD    Consent Done?:  Yes (Verbal)  Indications:  Arthritis and pain    Local anesthesia used?: Yes    Anesthesia:  Local infiltration    Details:  Needle Size:  21 G  Approach:  Posterior  Location:  Shoulder  Site:  R glenohumeral  Medications:  40 mg methylPREDNISolone acetate 40 mg/mL  Patient tolerance:  Patient tolerated the procedure well with no immediate complications

## 2025-03-05 DIAGNOSIS — M25.511 ACUTE PAIN OF RIGHT SHOULDER: Primary | ICD-10-CM

## 2025-03-05 PROBLEM — Z78.9 IMPAIRED ACTIVITIES OF DAILY LIVING: Status: ACTIVE | Noted: 2025-03-05

## 2025-03-05 PROBLEM — R29.898 IMPAIRED STRENGTH OF UPPER EXTREMITY: Status: ACTIVE | Noted: 2025-03-05

## 2025-03-05 PROBLEM — M25.611 DECREASED RANGE OF MOTION OF RIGHT SHOULDER: Status: ACTIVE | Noted: 2025-03-05

## 2025-03-06 DIAGNOSIS — E23.0 PANHYPOPITUITARISM: ICD-10-CM

## 2025-03-06 RX ORDER — LEVOTHYROXINE SODIUM 112 UG/1
112 TABLET ORAL
Qty: 90 TABLET | Refills: 3 | Status: SHIPPED | OUTPATIENT
Start: 2025-03-06

## 2025-03-06 RX ORDER — FERROUS SULFATE 325(65) MG
325 TABLET ORAL 2 TIMES DAILY
Qty: 180 TABLET | Refills: 2 | Status: SHIPPED | OUTPATIENT
Start: 2025-03-06

## 2025-03-06 RX ORDER — ERGOCALCIFEROL 1.25 MG/1
50000 CAPSULE ORAL
Qty: 7 CAPSULE | Refills: 0 | Status: SHIPPED | OUTPATIENT
Start: 2025-03-06

## 2025-03-06 NOTE — TELEPHONE ENCOUNTER
No care due was identified.  Health [facility] Embedded Care Due Messages. Reference number: [id number].   3/06/2025 1:29:35 PM CST

## 2025-03-06 NOTE — TELEPHONE ENCOUNTER
Refill Routing Note   Medication(s) are not appropriate for processing by [facility] for the following reason(s):        No active prescription written by provider    [facility] action(s):  Defer             Appointments  past 12m or future 3m with PCP    Date Provider   Last Visit   2/18/2025 Zachary Bender MD   Next Visit   5/20/2025 Zachary Bender MD   ED visits in past 90 days: 0        Note composed:1:45 PM 03/06/2025

## 2025-03-17 ENCOUNTER — RESULTS FOLLOW-UP (OUTPATIENT)
Dept: ENDOCRINOLOGY | Facility: HOSPITAL | Age: 60
End: 2025-03-17

## 2025-03-18 ENCOUNTER — PATIENT MESSAGE (OUTPATIENT)
Dept: INTERNAL MEDICINE | Facility: CLINIC | Age: 60
End: 2025-03-18
Payer: COMMERCIAL

## 2025-03-18 ENCOUNTER — OFFICE VISIT (OUTPATIENT)
Dept: PLASTIC SURGERY | Facility: CLINIC | Age: 60
End: 2025-03-18
Payer: COMMERCIAL

## 2025-03-18 VITALS
OXYGEN SATURATION: 100 % | DIASTOLIC BLOOD PRESSURE: 68 MMHG | SYSTOLIC BLOOD PRESSURE: 135 MMHG | WEIGHT: 137.38 LBS | HEIGHT: 68 IN | HEART RATE: 100 BPM | BODY MASS INDEX: 20.82 KG/M2

## 2025-03-18 DIAGNOSIS — R22.0 MASS OF FACE: ICD-10-CM

## 2025-03-18 PROCEDURE — 99203 OFFICE O/P NEW LOW 30 MIN: CPT | Mod: S$GLB,,, | Performed by: SURGERY

## 2025-03-18 PROCEDURE — 1159F MED LIST DOCD IN RCRD: CPT | Mod: CPTII,S$GLB,, | Performed by: SURGERY

## 2025-03-18 PROCEDURE — 1160F RVW MEDS BY RX/DR IN RCRD: CPT | Mod: CPTII,S$GLB,, | Performed by: SURGERY

## 2025-03-18 PROCEDURE — 3075F SYST BP GE 130 - 139MM HG: CPT | Mod: CPTII,S$GLB,, | Performed by: SURGERY

## 2025-03-18 PROCEDURE — 3078F DIAST BP <80 MM HG: CPT | Mod: CPTII,S$GLB,, | Performed by: SURGERY

## 2025-03-18 PROCEDURE — 3008F BODY MASS INDEX DOCD: CPT | Mod: CPTII,S$GLB,, | Performed by: SURGERY

## 2025-03-18 PROCEDURE — 99999 PR PBB SHADOW E&M-EST. PATIENT-LVL V: CPT | Mod: PBBFAC,,, | Performed by: SURGERY

## 2025-03-18 NOTE — PROGRESS NOTES
Plastic and Reconstructive Surgery  History and Physical     Patient: Thelma Nguyen  MRN: [id number]   : 1965  Date of Service: 3/18/2025  PCP: Zachary Bender MD  Referring Provider: Self, Aaareferral           Chief Complaint:  Forehead mass     HPI:  Thelma Nguyen is a 59 y.o.  female who presents for forehead mass.     Present since 2024  H/o orthostatic hypotension, tends to fall a lot  Accidentally hit her head, no LOC  No cut  Had a lot of bruising  Since then, left with a bump in the central forehead  +itch  No pain  No drainage  Would like to have it removed.     No tob  No DM  Xarelto - Factor V, h/o extensive DVT in ; no PE     Past Medical History:   Diagnosis Date    Allergic rhinitis     Anemia     Arthritis     Basal cell carcinoma     Broken ankle     Cancer     Clotting disorder     Disorder of kidney and ureter     DVT (deep venous thrombosis)     Factor V deficiency     Fracture of elbow     left    Glioblastoma multiforme of brain     Hypothyroidism     IBS (irritable bowel syndrome)     Osteoporosis     Panhypopituitarism     Peripheral polyneuropathy     Secondary adrenal insufficiency     Seizures     Thyroid disease         Past Surgical History:   Procedure Laterality Date    APPENDECTOMY      BRAIN SURGERY      for glioblastoma      SECTION      CHOLECYSTECTOMY  2007    CLOSURE OF DEFECT OF MOHS PROCEDURE Left 2018    Procedure: CLOSURE, MOHS PROCEDURE DEFECT SCALP  Flap closure;  Surgeon: Yrn Novak MD;  Location: [facility] OR [address] [place] [address];  Service: Plastics;  Laterality: Left;    COLONOSCOPY N/A 2019    Procedure: COLONOSCOPY Suprep;  Surgeon: Tony Mosher MD;  Location: [facility];  Service: Endoscopy;  Laterality: N/A;    ESOPHAGOGASTRODUODENOSCOPY N/A 2019    Procedure: EGD/colon;  Surgeon: Tony Mosher MD;  Location: [facility];  Service: Endoscopy;   Laterality: N/A;    EXCISION OF GANGLION CYST OF HAND Left 01/07/2020    Procedure: EXCISION, GANGLION CYST, HAND;  Surgeon: Ross Drew Jr., MD;  Location: [facility] OR;  Service: Orthopedics;  Laterality: Left;    HYSTERECTOMY  1999    ILIAC CREST BONE GRAFT Right 3/15/2024    Procedure: BONE GRAFT, ILIAC CREST;  Surgeon: Geoffrey Goodson MD;  Location: [facility] OR;  Service: Orthopedics;  Laterality: Right;  acumed harvester    ILIAC CREST BONE GRAFT Right 4/2/2024    Procedure: BONE GRAFT, ILIAC CREST;  Surgeon: Geoffrey Goodson MD;  Location: [facility] OR;  Service: Orthopedics;  Laterality: Right;    MIDFOOT ARTHRODESIS Right 4/2/2024    Procedure: FUSION, JOINT, MIDFOOT;  Surgeon: Geoffrey Goodson MD;  Location: [facility] OR;  Service: Orthopedics;  Laterality: Right;  mini c arm    OPEN REDUCTION AND INTERNAL FIXATION (ORIF) OF FRACTURE OF TARSAL BONE Right 3/15/2024    Procedure: ORIF, FRACTURE, TARSAL BONE;  Surgeon: Geoffrey Goodson MD;  Location: [facility] OR;  Service: Orthopedics;  Laterality: Right;  supine, mini C arm, Styker, block okay    SHOULDER SURGERY Right     TILT TABLE TEST N/A 07/15/2022    Procedure: TILT TABLE TEST;  Surgeon: Amol Kohler MD;  Location: [facility] EP LAB;  Service: Cardiology;  Laterality: N/A;  Orthostatic hypotension, dizziness, Tilt Table Test with EEG, Dr.Tiffany Roca (neuro), DM    TUBAL LIGATION  1998        Family History   Problem Relation Name Age of Onset    Heart disease Mother      Diabetes type II Mother      Diabetes type II Father      Heart attack Maternal Grandfather          Social History     Socioeconomic History    Marital status:     Number of children: 2   Occupational History    Occupation:    Tobacco Use    Smoking status: Never    Smokeless tobacco: Never   Substance and Sexual Activity    Alcohol use: No    Drug use: No    Sexual activity: Not Currently   Social History Narrative     30 years, lives at home with .      Social Drivers  of Health     Financial Resource Strain: Low Risk  (2/3/2025)    Overall Financial Resource Strain (CARDIA)     Difficulty of Paying Living Expenses: Not hard at all   Food Insecurity: No Food Insecurity (2/3/2025)    Hunger Vital Sign     Worried About Running Out of Food in the Last Year: Never true     Ran Out of Food in the Last Year: Never true   Transportation Needs: Unmet Transportation Needs (12/11/2023)    PRAPARE - Transportation     Lack of Transportation (Medical): Yes     Lack of Transportation (Non-Medical): Yes   Physical Activity: Inactive (2/3/2025)    Exercise Vital Sign     Days of Exercise per Week: 0 days     Minutes of Exercise per Session: 0 min   Stress: Stress Concern Present (2/3/2025)    [employer] [place] [employer] - Occupational Stress Questionnaire     Feeling of Stress : To some extent   Housing Stability: Low Risk  (12/11/2023)    Housing Stability Vital Sign     Unable to Pay for Housing in the Last Year: No     Number of Places Lived in the Last Year: 1     Unstable Housing in the Last Year: No         Review of patient's allergies indicates:   Allergen Reactions    Neurontin [gabapentin]     Lactose     Soap Hives     Able to tolerate Dove only         Medications Ordered Prior to Encounter[1]     Review of Systems:    Negative as per HPI     Physical Exam:  Vitals:    03/18/25 1310   BP: 135/68   Pulse: 100        Gen: NAD, A&O x3  Pulm: unlabored  CV: regular rate  Forehead: A 1.1 x 1.1 cm firm mobile mass noted to central forehead, just left of midline and beneath hairline; surrounding area of diminished SQ tissue; CN V and VII intact symmetrically and bilaterally     Assessment and Plan:  59 y.o. female who presents with forehead mass.     Discussed findings at length.  Forehead mass s/p fall.   Possible lipoma vs organized hematoma.   There is surrounding loss of SQ tissue which is accentuating appearance of the mass.   Recommend mass excision, and once healed, can  consider AFG to help recontour the forehead.  Will obtain clearance to come off Xarelto.      I described the risks and rationale of surgical treatment, including, but not limited to, bleeding, infection, pain, scarring, injury to surrounding structures, wound dehiscence, delayed wound healing, recurrence, hematoma, seroma, contour irregularity, and need for further surgery.  All questions were answered.  The patient verbalized understanding and wished to proceed.       We will plan to proceed with excision forehead mass with complex repair.  This will be done in the clinic under local.  We will schedule accordingly.  Pt may call back with any questions or concerns in the interim.     Follow up: clinic procedure  Restrictions: none     I spent 20 minutes on this patient encounter which included review of medical records.  Over half the time was spent with the patient face to face discussing the treatment plan, counseling and coordinating care.     Ariana Winn MD  03/18/2025 1:19 PM     This document was transcribed using voice recognition software without a human . It may contain typographical, grammatical, and/or syntax errors.                 [1]   Current Outpatient Medications on File Prior to Visit   Medication Sig Dispense Refill    acetaminophen (TYLENOL) 500 MG tablet Take 1 tablet (500 mg total) by mouth every 6 (six) hours. 30 tablet 1    amitriptyline (ELAVIL) 10 MG tablet Take 1 tablet (10 mg total) by mouth every evening. 30 tablet 11    BIOTIN ORAL Take by mouth.      cholecalciferol, vitamin D3, (VITAMIN D3) 50 mcg (2,000 unit) Cap capsule Take 2,000 Units by mouth every evening.      COVID-19 (COMIRNATY 2024-25, 12Y UP,,PF,) 30 mcg/0.3 mL IM vaccine (>/= 13 yo) Inject into the muscle. 0.3 mL 0    cyanocobalamin (VITAMIN B-12) 100 MCG tablet Take 100 mcg by mouth once daily.      DULoxetine (CYMBALTA) 30 MG capsule Take 2 capsules (60 mg total) by mouth every evening. 180  capsule 3    ergocalciferol (ERGOCALCIFEROL) 50,000 unit Cap Take 1 capsule (50,000 Units total) by mouth every 7 days. 7 capsule 0    ferrous sulfate (FEOSOL) 325 mg (65 mg iron) Tab tablet Take 1 tablet (325 mg total) by mouth 2 (two) times daily. 180 tablet 2    ferrous sulfate 324 mg (65 mg iron) TbEC Take 325 mg by mouth nightly.      hydrocortisone (CORTEF) 10 MG Tab TAKE 1& 1/2 TABLETS BY MOUTH IN THE MORNING AND 1/2 TABLET IN THE AFTERNOON. DOUBLE THE DOSE IN CASE OF ILLNESS. 75 tablet 2    hyoscyamine (LEVSIN/SL) 0.125 mg Subl PLACE 1 TABLET (0.125 MG TOTAL) UNDER THE TONGUE EVERY 4 (FOUR) HOURS AS NEEDED. 360 tablet 2    ivabradine (CORLANOR) 7.5 mg Tab Take 1 tablet (7.5 mg total) by mouth 2 (two) times daily. 60 tablet 11    levETIRAcetam (KEPPRA) 500 MG Tab Take 1 tablet (500 mg total) by mouth 2 (two) times daily. 120 tablet 5    levothyroxine (SYNTHROID) 112 MCG tablet TAKE 1 TABLET BY MOUTH BEFORE BREAKFAST. 90 tablet 3    midodrine (PROAMATINE) 10 MG tablet Take 1 tablet (10 mg total) by mouth 4 (four) times daily. Take 4 doses a day 3 hours apart 240 tablet 1    rivaroxaban (XARELTO) 20 mg Tab Take 1 tablet (20 mg total) by mouth once daily. 90 tablet 4    UNABLE TO FIND Take 1 tablet by mouth Daily. medication name: Biotin 2500mcg      calcium carbonate (TUMS) 200 mg calcium (500 mg) chewable tablet Take 1 tablet (500 mg total) by mouth 3 (three) times daily as needed for Heartburn. 60 tablet 0    dexAMETHasone (DECADRON) 4 mg/mL injection Inject 1ml (4mg) into the muscle for symptoms of severe adrenal insufficiency. 3ml syringe w/18 g needle to draw x10 and 21g 1 inch needle to injection x10 (Patient not taking: Reported on 3/18/2025) 1 mL 11    melatonin 5 mg TbDL Take 1 tablet by mouth nightly. (Patient not taking: Reported on 3/18/2025)       Current Facility-Administered Medications on File Prior to Visit   Medication Dose Route Frequency Provider Last Rate Last Admin    [COMPLETED]  romosozumab-aqqg (EVENITY) syringe kit 210 mg  210 mg Subcutaneous 1 time in Clinic/HOD Amol Yañez MD   210 mg at 03/17/25 8603

## 2025-03-19 NOTE — TELEPHONE ENCOUNTER
Patient would like to know if she can do a virtual visit with you to discuss getting a bump removed from her forehead. I asked the patient if she needed a Pre-Op appointment , and was unsure if she needs the Pre-Op.

## 2025-03-20 DIAGNOSIS — K58.9 IRRITABLE BOWEL SYNDROME, UNSPECIFIED TYPE: ICD-10-CM

## 2025-03-20 DIAGNOSIS — Z00.00 ANNUAL PHYSICAL EXAM: ICD-10-CM

## 2025-03-20 RX ORDER — HYOSCYAMINE SULFATE 0.12 MG/1
0.12 TABLET SUBLINGUAL EVERY 4 HOURS PRN
Qty: 540 TABLET | Refills: 1 | Status: SHIPPED | OUTPATIENT
Start: 2025-03-20

## 2025-03-20 NOTE — TELEPHONE ENCOUNTER
Care Due:                  Date            Visit Type   Department     Provider  --------------------------------------------------------------------------------                                EP -                              PRIMARY      [facility] INTERNAL  Last Visit: 02-      CARE ([facility])   ANIL SMILEY                              EP -                              PRIMARY      [facility] INTERNAL  Next Visit: 05-      CARE ([facility])   ANIL SMILYE                                                            Last  Test          Frequency    Reason                     Performed    Due Date  --------------------------------------------------------------------------------    TSH.........  12 months..  levothyroxine............  04- 03-    Health [facility] Embedded Care Due Messages. Reference number: [id number].   3/20/2025 3:12:52 PM CDT

## 2025-03-29 DIAGNOSIS — K58.9 IRRITABLE BOWEL SYNDROME, UNSPECIFIED TYPE: ICD-10-CM

## 2025-03-29 DIAGNOSIS — Z00.00 ANNUAL PHYSICAL EXAM: ICD-10-CM

## 2025-03-30 NOTE — TELEPHONE ENCOUNTER
No care due was identified.  [facility] Embedded Care Due Messages. Reference number: [id number].   3/29/2025 8:36:48 PM CDT

## 2025-03-31 ENCOUNTER — TELEPHONE (OUTPATIENT)
Dept: NEUROLOGY | Facility: CLINIC | Age: 60
End: 2025-03-31
Payer: COMMERCIAL

## 2025-03-31 RX ORDER — HYOSCYAMINE SULFATE 0.12 MG/1
0.12 TABLET SUBLINGUAL EVERY 4 HOURS PRN
Qty: 540 TABLET | Refills: 1 | Status: SHIPPED | OUTPATIENT
Start: 2025-03-31

## 2025-03-31 NOTE — TELEPHONE ENCOUNTER
Refill Routing Note   Medication(s) are not appropriate for processing by [facility] for the following reason(s):        Outside of protocol    [facility] action(s):  Route             Appointments  past 12m or future 3m with PCP    Date Provider   Last Visit   2/18/2025 Zachary Bender MD   Next Visit   5/20/2025 Zachary Bender MD   ED visits in past 90 days: 0        Note composed:9:03 AM 03/31/2025

## 2025-04-09 ENCOUNTER — NURSE TRIAGE (OUTPATIENT)
Dept: ADMINISTRATIVE | Facility: CLINIC | Age: 60
End: 2025-04-09
Payer: COMMERCIAL

## 2025-04-09 ENCOUNTER — PROCEDURE VISIT (OUTPATIENT)
Dept: PLASTIC SURGERY | Facility: CLINIC | Age: 60
End: 2025-04-09
Payer: COMMERCIAL

## 2025-04-09 VITALS
HEIGHT: 68 IN | WEIGHT: 146.38 LBS | HEART RATE: 63 BPM | OXYGEN SATURATION: 100 % | DIASTOLIC BLOOD PRESSURE: 98 MMHG | BODY MASS INDEX: 22.19 KG/M2 | SYSTOLIC BLOOD PRESSURE: 227 MMHG

## 2025-04-09 DIAGNOSIS — R22.0 MASS OF FACE: Primary | ICD-10-CM

## 2025-04-09 NOTE — TELEPHONE ENCOUNTER
Patient is at plastic surgery clinic for procedure and has blood pressure of 227/98. Patient denies  chest pain or shortness of breath but has slight headache. Disposition provided - go to office or video visit now. Able to schedule appointment for 2pm. Instructed to call back with additional questions or worsening of symptoms. Patient verbalized understanding.     Reason for Disposition   Systolic BP >= 200 OR Diastolic >= 120 and having NO cardiac or neurologic symptoms    Additional Information   Negative: Sounds like a life-threatening emergency to the triager   Negative: Systolic BP >= 160 OR Diastolic >= 100, and any cardiac (e.g., breathing difficulty, chest pain) or neurologic symptoms (e.g., new-onset blurred or double vision)   Negative: Pregnant 20 or more weeks (or postpartum < 6 weeks) with new hand or face swelling   Negative: Pregnant 20 or more weeks (or postpartum < 6 weeks) and Systolic BP >= 160 OR Diastolic >= 110   Negative: Patient sounds very sick or weak to the triager    Protocols used: Blood Pressure - High-A-OH

## 2025-04-10 NOTE — PROCEDURES
"Procedure aborted as patient found to be profoundly hypertensive.  Even when standing, blood pressure still remains with a systolic over 180s.  Blood pressure at previous visit 135/68.  She reports mild headaches but otherwise no vision changes or unusual dizziness.  BP (!) 227/98 (BP Location: Left arm, Patient Position: Sitting) Comment: 184/77 when standing  Pulse 63   Ht 5' 8" (1.727 m)   Wt 66.4 kg (146 lb 6.2 oz)   LMP 10/23/1997 (Exact Date)   SpO2 100%   BMI 22.26 kg/m²     Patient declines evaluation in the ER.  We reached out to her PCP to help with blood pressure management.  Patient states she sits most of the day, which likely mean she is consistently hypertensive with systolics in the 200s.    All questions answered. Patient verbalizes understanding and agreement with treatment plan.      Ariana Winn MD  Plastic, Reconstructive, and Hand Surgery  04/10/2025 8:37 AM    "

## 2025-04-14 ENCOUNTER — PATIENT MESSAGE (OUTPATIENT)
Dept: INTERNAL MEDICINE | Facility: CLINIC | Age: 60
End: 2025-04-14
Payer: COMMERCIAL

## 2025-04-15 ENCOUNTER — HOSPITAL ENCOUNTER (INPATIENT)
Facility: HOSPITAL | Age: 60
LOS: 1 days | Discharge: HOME | DRG: 101 | End: 2025-04-16
Attending: PSYCHIATRY & NEUROLOGY | Admitting: PSYCHIATRY & NEUROLOGY
Payer: COMMERCIAL

## 2025-04-15 DIAGNOSIS — R56.9 SEIZURE: ICD-10-CM

## 2025-04-15 DIAGNOSIS — R25.1 TREMOR: ICD-10-CM

## 2025-04-15 DIAGNOSIS — I95.1 ORTHOSTATIC HYPOTENSION: ICD-10-CM

## 2025-04-15 DIAGNOSIS — R55 RECURRENT SYNCOPE: Primary | ICD-10-CM

## 2025-04-15 DIAGNOSIS — G40.209 PARTIAL SYMPTOMATIC EPILEPSY WITH COMPLEX PARTIAL SEIZURES, NOT INTRACTABLE, WITHOUT STATUS EPILEPTICUS: ICD-10-CM

## 2025-04-15 LAB
ABSOLUTE EOSINOPHIL (OHS): 0.2 K/UL
ABSOLUTE MONOCYTE (OHS): 0.48 K/UL (ref 0.3–1)
ABSOLUTE NEUTROPHIL COUNT (OHS): 5.25 K/UL (ref 1.8–7.7)
ALBUMIN SERPL BCP-MCNC: 3.3 G/DL (ref 3.5–5.2)
ALP SERPL-CCNC: 103 UNIT/L (ref 40–150)
ALT SERPL W/O P-5'-P-CCNC: 26 UNIT/L (ref 10–44)
AMPHET UR QL SCN: NEGATIVE
ANION GAP (OHS): 12 MMOL/L (ref 8–16)
AST SERPL-CCNC: 20 UNIT/L (ref 11–45)
B-HCG UR QL: NEGATIVE
BARBITURATE SCN PRESENT UR: NEGATIVE
BASOPHILS # BLD AUTO: 0.04 K/UL
BASOPHILS NFR BLD AUTO: 0.5 %
BENZODIAZ UR QL SCN: NEGATIVE
BILIRUB SERPL-MCNC: 0.2 MG/DL (ref 0.1–1)
BUN SERPL-MCNC: 32 MG/DL (ref 6–20)
CALCIUM SERPL-MCNC: 8.4 MG/DL (ref 8.7–10.5)
CANNABINOIDS UR QL SCN: NEGATIVE
CHLORIDE SERPL-SCNC: 109 MMOL/L (ref 95–110)
CO2 SERPL-SCNC: 19 MMOL/L (ref 23–29)
COCAINE UR QL SCN: NEGATIVE
CREAT SERPL-MCNC: 2.1 MG/DL (ref 0.5–1.4)
CREAT UR-MCNC: 125 MG/DL (ref 15–325)
ERYTHROCYTE [DISTWIDTH] IN BLOOD BY AUTOMATED COUNT: 14.5 % (ref 11.5–14.5)
GFR SERPLBLD CREATININE-BSD FMLA CKD-EPI: 27 ML/MIN/1.73/M2
GLUCOSE SERPL-MCNC: 114 MG/DL (ref 70–110)
HCT VFR BLD AUTO: 28.1 % (ref 37–48.5)
HGB BLD-MCNC: 8.3 GM/DL (ref 12–16)
IMM GRANULOCYTES # BLD AUTO: 0.03 K/UL (ref 0–0.04)
IMM GRANULOCYTES NFR BLD AUTO: 0.4 % (ref 0–0.5)
LYMPHOCYTES # BLD AUTO: 1.41 K/UL (ref 1–4.8)
MCH RBC QN AUTO: 28.4 PG (ref 27–31)
MCHC RBC AUTO-ENTMCNC: 29.5 G/DL (ref 32–36)
MCV RBC AUTO: 96 FL (ref 82–98)
METHADONE UR QL SCN: NEGATIVE
NUCLEATED RBC (/100WBC) (OHS): 0 /100 WBC
OHS QRS DURATION: 82 MS
OHS QTC CALCULATION: 448 MS
OPIATES UR QL SCN: NEGATIVE
PCP UR QL: NEGATIVE
PLATELET # BLD AUTO: 321 K/UL (ref 150–450)
PMV BLD AUTO: 9.2 FL (ref 9.2–12.9)
POTASSIUM SERPL-SCNC: 4.3 MMOL/L (ref 3.5–5.1)
PROT SERPL-MCNC: 6.9 GM/DL (ref 6–8.4)
RBC # BLD AUTO: 2.92 M/UL (ref 4–5.4)
RELATIVE EOSINOPHIL (OHS): 2.7 %
RELATIVE LYMPHOCYTE (OHS): 19 % (ref 18–48)
RELATIVE MONOCYTE (OHS): 6.5 % (ref 4–15)
RELATIVE NEUTROPHIL (OHS): 70.9 % (ref 38–73)
SODIUM SERPL-SCNC: 140 MMOL/L (ref 136–145)
WBC # BLD AUTO: 7.41 K/UL (ref 3.9–12.7)

## 2025-04-15 PROCEDURE — 80177 DRUG SCRN QUAN LEVETIRACETAM: CPT | Performed by: PHYSICIAN ASSISTANT

## 2025-04-15 PROCEDURE — 95714 VEEG EA 12-26 HR UNMNTR: CPT

## 2025-04-15 PROCEDURE — 95700 EEG CONT REC W/VID EEG TECH: CPT

## 2025-04-15 PROCEDURE — 95720 EEG PHY/QHP EA INCR W/VEEG: CPT | Mod: ,,, | Performed by: PSYCHIATRY & NEUROLOGY

## 2025-04-15 PROCEDURE — 93010 ELECTROCARDIOGRAM REPORT: CPT | Mod: ,,, | Performed by: INTERNAL MEDICINE

## 2025-04-15 PROCEDURE — 81025 URINE PREGNANCY TEST: CPT | Performed by: PHYSICIAN ASSISTANT

## 2025-04-15 PROCEDURE — 25000003 PHARM REV CODE 250: Performed by: PHYSICIAN ASSISTANT

## 2025-04-15 PROCEDURE — 80307 DRUG TEST PRSMV CHEM ANLYZR: CPT | Performed by: PHYSICIAN ASSISTANT

## 2025-04-15 PROCEDURE — 99499 UNLISTED E&M SERVICE: CPT | Mod: ,,, | Performed by: PHYSICIAN ASSISTANT

## 2025-04-15 PROCEDURE — 36415 COLL VENOUS BLD VENIPUNCTURE: CPT | Performed by: PHYSICIAN ASSISTANT

## 2025-04-15 PROCEDURE — 93005 ELECTROCARDIOGRAM TRACING: CPT

## 2025-04-15 PROCEDURE — 85025 COMPLETE CBC W/AUTO DIFF WBC: CPT | Performed by: PHYSICIAN ASSISTANT

## 2025-04-15 PROCEDURE — A4216 STERILE WATER/SALINE, 10 ML: HCPCS | Performed by: PHYSICIAN ASSISTANT

## 2025-04-15 PROCEDURE — 11000001 HC ACUTE MED/SURG PRIVATE ROOM

## 2025-04-15 PROCEDURE — 84450 TRANSFERASE (AST) (SGOT): CPT | Performed by: PHYSICIAN ASSISTANT

## 2025-04-15 PROCEDURE — 99223 1ST HOSP IP/OBS HIGH 75: CPT | Mod: ,,, | Performed by: PSYCHIATRY & NEUROLOGY

## 2025-04-15 RX ORDER — AMITRIPTYLINE HYDROCHLORIDE 10 MG/1
10 TABLET, FILM COATED ORAL NIGHTLY
Status: DISCONTINUED | OUTPATIENT
Start: 2025-04-15 | End: 2025-04-16 | Stop reason: HOSPADM

## 2025-04-15 RX ORDER — HYOSCYAMINE SULFATE 0.12 MG/1
0.12 TABLET SUBLINGUAL 2 TIMES DAILY
Status: DISCONTINUED | OUTPATIENT
Start: 2025-04-15 | End: 2025-04-16 | Stop reason: HOSPADM

## 2025-04-15 RX ORDER — PNV NO.95/FERROUS FUM/FOLIC AC 28MG-0.8MG
100 TABLET ORAL DAILY
Status: DISCONTINUED | OUTPATIENT
Start: 2025-04-16 | End: 2025-04-15

## 2025-04-15 RX ORDER — MIDODRINE HYDROCHLORIDE 5 MG/1
10 TABLET ORAL 2 TIMES DAILY
Status: DISCONTINUED | OUTPATIENT
Start: 2025-04-15 | End: 2025-04-16 | Stop reason: HOSPADM

## 2025-04-15 RX ORDER — ONDANSETRON 8 MG/1
8 TABLET, ORALLY DISINTEGRATING ORAL EVERY 8 HOURS PRN
Status: DISCONTINUED | OUTPATIENT
Start: 2025-04-15 | End: 2025-04-16 | Stop reason: HOSPADM

## 2025-04-15 RX ORDER — MIDODRINE HYDROCHLORIDE 5 MG/1
10 TABLET ORAL 4 TIMES DAILY
Status: DISCONTINUED | OUTPATIENT
Start: 2025-04-15 | End: 2025-04-15

## 2025-04-15 RX ORDER — DULOXETIN HYDROCHLORIDE 60 MG/1
60 CAPSULE, DELAYED RELEASE ORAL NIGHTLY
Status: DISCONTINUED | OUTPATIENT
Start: 2025-04-15 | End: 2025-04-16 | Stop reason: HOSPADM

## 2025-04-15 RX ORDER — LANOLIN ALCOHOL/MO/W.PET/CERES
1 CREAM (GRAM) TOPICAL 2 TIMES DAILY
Status: DISCONTINUED | OUTPATIENT
Start: 2025-04-15 | End: 2025-04-16 | Stop reason: HOSPADM

## 2025-04-15 RX ORDER — LEVOTHYROXINE SODIUM 112 UG/1
112 TABLET ORAL
Status: DISCONTINUED | OUTPATIENT
Start: 2025-04-16 | End: 2025-04-16 | Stop reason: HOSPADM

## 2025-04-15 RX ORDER — TALC
6 POWDER (GRAM) TOPICAL NIGHTLY PRN
Status: DISCONTINUED | OUTPATIENT
Start: 2025-04-15 | End: 2025-04-16 | Stop reason: HOSPADM

## 2025-04-15 RX ORDER — DOCUSATE SODIUM 100 MG/1
100 CAPSULE, LIQUID FILLED ORAL DAILY PRN
Status: DISCONTINUED | OUTPATIENT
Start: 2025-04-15 | End: 2025-04-16 | Stop reason: HOSPADM

## 2025-04-15 RX ORDER — SODIUM CHLORIDE 0.9 % (FLUSH) 0.9 %
3 SYRINGE (ML) INJECTION EVERY 8 HOURS
Status: DISCONTINUED | OUTPATIENT
Start: 2025-04-15 | End: 2025-04-16 | Stop reason: HOSPADM

## 2025-04-15 RX ORDER — CETIRIZINE HYDROCHLORIDE 5 MG/1
10 TABLET ORAL DAILY PRN
Status: DISCONTINUED | OUTPATIENT
Start: 2025-04-15 | End: 2025-04-16 | Stop reason: HOSPADM

## 2025-04-15 RX ORDER — HYDROCORTISONE 5 MG/1
5 TABLET ORAL
Status: DISCONTINUED | OUTPATIENT
Start: 2025-04-15 | End: 2025-04-16 | Stop reason: HOSPADM

## 2025-04-15 RX ORDER — CHOLECALCIFEROL (VITAMIN D3) 25 MCG
2000 TABLET ORAL DAILY
Status: DISCONTINUED | OUTPATIENT
Start: 2025-04-16 | End: 2025-04-16 | Stop reason: HOSPADM

## 2025-04-15 RX ORDER — IVABRADINE 5 MG/1
1 TABLET, FILM COATED ORAL 2 TIMES DAILY
Status: DISCONTINUED | OUTPATIENT
Start: 2025-04-16 | End: 2025-04-16 | Stop reason: HOSPADM

## 2025-04-15 RX ORDER — LORAZEPAM 2 MG/ML
2 INJECTION INTRAMUSCULAR EVERY 10 MIN PRN
Status: DISCONTINUED | OUTPATIENT
Start: 2025-04-15 | End: 2025-04-16 | Stop reason: HOSPADM

## 2025-04-15 RX ORDER — CALCIUM CARBONATE 200(500)MG
500 TABLET,CHEWABLE ORAL 3 TIMES DAILY PRN
Status: DISCONTINUED | OUTPATIENT
Start: 2025-04-15 | End: 2025-04-16 | Stop reason: HOSPADM

## 2025-04-15 RX ORDER — HYDROCORTISONE 5 MG/1
15 TABLET ORAL DAILY
Status: DISCONTINUED | OUTPATIENT
Start: 2025-04-16 | End: 2025-04-16 | Stop reason: HOSPADM

## 2025-04-15 RX ORDER — ACETAMINOPHEN 325 MG/1
650 TABLET ORAL EVERY 4 HOURS PRN
Status: DISCONTINUED | OUTPATIENT
Start: 2025-04-15 | End: 2025-04-16 | Stop reason: HOSPADM

## 2025-04-15 RX ORDER — ONDANSETRON HYDROCHLORIDE 2 MG/ML
4 INJECTION, SOLUTION INTRAVENOUS EVERY 8 HOURS PRN
Status: DISCONTINUED | OUTPATIENT
Start: 2025-04-15 | End: 2025-04-16 | Stop reason: HOSPADM

## 2025-04-15 RX ORDER — LEVETIRACETAM 500 MG/1
500 TABLET ORAL 2 TIMES DAILY
Status: DISCONTINUED | OUTPATIENT
Start: 2025-04-15 | End: 2025-04-16 | Stop reason: HOSPADM

## 2025-04-15 RX ADMIN — Medication 6 MG: at 10:04

## 2025-04-15 RX ADMIN — SODIUM CHLORIDE, PRESERVATIVE FREE 3 ML: 5 INJECTION INTRAVENOUS at 02:04

## 2025-04-15 RX ADMIN — RIVAROXABAN 20 MG: 20 TABLET, FILM COATED ORAL at 03:04

## 2025-04-15 RX ADMIN — SODIUM CHLORIDE, PRESERVATIVE FREE 3 ML: 5 INJECTION INTRAVENOUS at 07:04

## 2025-04-15 RX ADMIN — AMITRIPTYLINE HYDROCHLORIDE 10 MG: 10 TABLET, FILM COATED ORAL at 09:04

## 2025-04-15 RX ADMIN — MIDODRINE HYDROCHLORIDE 10 MG: 5 TABLET ORAL at 12:04

## 2025-04-15 RX ADMIN — HYDROCORTISONE 5 MG: 5 TABLET ORAL at 03:04

## 2025-04-15 RX ADMIN — FERROUS SULFATE TAB EC 325 MG (65 MG FE EQUIVALENT) 1 EACH: 325 (65 FE) TABLET DELAYED RESPONSE at 09:04

## 2025-04-15 RX ADMIN — DULOXETINE HYDROCHLORIDE 60 MG: 60 CAPSULE, DELAYED RELEASE ORAL at 09:04

## 2025-04-15 RX ADMIN — LEVETIRACETAM 500 MG: 500 TABLET, FILM COATED ORAL at 09:04

## 2025-04-15 RX ADMIN — HYOSCYAMINE SULFATE 0.12 MG: 0.12 TABLET SUBLINGUAL at 10:04

## 2025-04-15 NOTE — ASSESSMENT & PLAN NOTE
C/b recurrent falls  Of note, patient completed tilt table test with EEG which was diagnostic of orthostatic hypotension wtih reflex sinus tachycardia; EEG with no epileptiform activity  - Decreased Midodrine to BID for event provocation  - Continue home Ivabradine   - Follows with Cardiology as outpatient  - Fall precautions  - Patient must ambulate with assistance   Dr. Farrar notified of 16 pauses and 7 Alexey via text message

## 2025-04-15 NOTE — ASSESSMENT & PLAN NOTE
- Chronic  - Continue home Hydrocortisone 15 mg daily and 5 mg in the afternoon  - Follows with Endocrinology as outpatient

## 2025-04-15 NOTE — ASSESSMENT & PLAN NOTE
59F PMHx of dysautonomia orthostatic hypotension syndrome with recurrent falls on Midodrine and Ivabradine, CKD3b, 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 Kumar for event capture and characterization of staring episodes upon standing or with exertion associated with falls. Of note, patient completed tilt table test with EEG which was diagnostic of orthostatic hypotension with reflex sinus tachycardia; EEG with no epileptiform activity.    - Continue home Levetiracetam on admit, decreased home Midodrine to BID for event provocation  - LEV level pending  - EEG with no epileptiform activity, no seizures  - Typical event captured upon standing with generalized slowing c/w hypoperfusion, no epileptiform activity, BP c/w known orthostasis  - Discharged home in stable condition on continued Levetiracetam 500 mg BID  - Outpatient follow up in Epilepsy clinic with Dr. Kumar as needed

## 2025-04-15 NOTE — NURSING
Patient Transferred to NPU Room 909 @1100      Upon arrival to the floor, patient greeted and oriented to room. Complete head to toe assessment completed per protocol. VSS, see flowsheet for details. Neuro assessment completed. Cardiac monitoring orders reviewed. Patient added to tele monitor in nursing station, rate and rhythm verified. Primary team notified of patient's transfer to floor. All current and transfer orders reviewed/reconciled per protocol. All emergency equipment set up in patient's room. Fall/seizure precautions initiated per providers orders. 4 Eyes skin assessment performed, see flowsheets for details. Reviewed assessment and rounding frequency with patient and family. Questions were encouraged and addressed. Repositioned patient for comfort with bed locked in lowest position, side rails up x 4, bed alarm set, and call light within reach. Instructed patient to call staff for mobility/assistance, verbalized understanding. No acute signs of distress noted at this time.

## 2025-04-15 NOTE — ASSESSMENT & PLAN NOTE
59F PMHx of dysautonomia orthostatic hypotension syndrome with recurrent falls on Midodrine and Ivabradine, CKD3b, 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 Kumar for event capture and characterization of staring episodes upon standing or with exertion associated with falls. Of note, patient completed tilt table test with EEG which was diagnostic of orthostatic hypotension wtih reflex sinus tachycardia; EEG with no epileptiform activity.    - Continuous vEEG   - Continue home Levetiracetam on admit, decreased home Midodrine to BID for event provocation  - LEV level sent  - Activation procedures per protocol- medication adjustments as above  - IV Ativan PRN for GTC greater than 5 min - please call epilepsy on call   - Seizure precautions, suction and oxygen at bedside  - Fall precautions, side rail padding in place  - Visi monitoring with continuous pulse oximetry   - Telemetry

## 2025-04-15 NOTE — NURSING
"EMU NURSING INTERVIEW  Pt admitted to EMU room ---, EMU team notified.  Onset-When did your seizures start? 1985  Aura-Do you experience an aura? sometimes  Symptoms-What symptoms do you experience? "Weird sensation", followed by legs becoming weak and syncope   Triggers-Do you have any Triggers? Heat   Do you bite your tongue? no  Do become incontinent of bladder or bowels? no  Have you been told your BP or oxygen drops during an event? no    Bed locked, bed alarm on and call light in reach. Educated to call staff before ambulating. ducated to use event button when patient feels like they will have an event or when an event is suspected. Pt and family verbalize understanding.   "

## 2025-04-15 NOTE — H&P
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 Kumar for event capture and characterization of staring episodes upon standing or with exertion associated with falls. Seizure history includes episodes of auditory hallucinations that would progress to generalized convulsions which led to diagnosis of glioblastoma in left temporal lobe s/p resection, radiation, and chemotherapy in 1985. Patient was seizure free on Phenytoin, which was recently discontinued due to development of osteoporosis, and changed to Lacosamide. Due to increased dizziness and worsening of orthostatic hypotension, Lacosamide was changed to Levetiracetam. Patient reports being diagnosed with orthostatic hypotension in 2022. However, events has gradually increased in frequency, now occurring almost every day. Patient may go 3 days without any episodes. Patient and  report episodes only occur upon standing or with exertion. No associated convulsions, incontinence, or tongue biting. Patient endorses compliance to Levetiracetam, no side effects reported. Patient denies etoh, tobacco, and drug use.     Of note, patient completed tilt table test with EEG which was diagnostic of orthostatic hypotension with reflex sinus tachycardia; EEG with no  epileptiform activity.    MRI Brain WWO 4024 with Left temporal and occipital lobe encephalomalacia and gliosis; No definite acute intracranial findings.    Past Medical History:   Diagnosis Date    Allergic rhinitis     Anemia     Arthritis     Basal cell carcinoma     Broken ankle     Cancer     Clotting disorder     Disorder of kidney and ureter     DVT (deep venous thrombosis)     Factor V deficiency     Fracture of elbow     left    Glioblastoma multiforme of brain     Hypothyroidism     IBS (irritable bowel syndrome)     Osteoporosis     Panhypopituitarism     Peripheral polyneuropathy     Secondary adrenal insufficiency     Seizures     Thyroid disease        Past Surgical History:   Procedure Laterality Date    APPENDECTOMY      BRAIN SURGERY      for glioblastoma      SECTION      CHOLECYSTECTOMY  2007    CLOSURE OF DEFECT OF MOHS PROCEDURE Left 2018    Procedure: CLOSURE, MOHS PROCEDURE DEFECT SCALP  Flap closure;  Surgeon: Yrn Novak MD;  Location: [address];  Service: Plastics;  Laterality: Left;    COLONOSCOPY N/A 2019    Procedure: COLONOSCOPY Suprep;  Surgeon: Tony Mosher MD;  Location: [facility];  Service: Endoscopy;  Laterality: N/A;    ESOPHAGOGASTRODUODENOSCOPY N/A 2019    Procedure: EGD/colon;  Surgeon: Tony Mosher MD;  Location: [facility];  Service: Endoscopy;  Laterality: N/A;    EXCISION OF GANGLION CYST OF HAND Left 2020    Procedure: EXCISION, GANGLION CYST, HAND;  Surgeon: Ross Drew Jr., MD;  Location: [facility];  Service: Orthopedics;  Laterality: Left;    HYSTERECTOMY      ILIAC CREST BONE GRAFT Right 3/15/2024    Procedure: BONE GRAFT, ILIAC CREST;  Surgeon: Geoffrey Goodson MD;  Location: [facility];  Service: Orthopedics;  Laterality: Right;  acumed harvester    ILIAC CREST BONE GRAFT Right 2024    Procedure: BONE GRAFT, ILIAC CREST;  Surgeon: Geoffrey Goodson MD;  Location: [facility]  OR;  Service: Orthopedics;  Laterality: Right;    MIDFOOT ARTHRODESIS Right 4/2/2024    Procedure: FUSION, JOINT, MIDFOOT;  Surgeon: Geoffrey Goodson MD;  Location: [facility] OR;  Service: Orthopedics;  Laterality: Right;  mini c arm    OPEN REDUCTION AND INTERNAL FIXATION (ORIF) OF FRACTURE OF TARSAL BONE Right 3/15/2024    Procedure: ORIF, FRACTURE, TARSAL BONE;  Surgeon: Geoffrey Goodson MD;  Location: [facility] OR;  Service: Orthopedics;  Laterality: Right;  supine, mini C arm, Styker, block okay    SHOULDER SURGERY Right     TILT TABLE TEST N/A 07/15/2022    Procedure: TILT TABLE TEST;  Surgeon: Amol Kohler MD;  Location: [facility] EP LAB;  Service: Cardiology;  Laterality: N/A;  Orthostatic hypotension, dizziness, Tilt Table Test with EEG, Dr.Tiffany Roca (neuro), DM    TUBAL LIGATION  1998       Review of patient's allergies indicates:   Allergen Reactions    Neurontin [gabapentin]     Lactose     Soap Hives     Able to tolerate Dove only        No current facility-administered medications on file prior to encounter.     Current Outpatient Medications on File Prior to Encounter   Medication Sig    acetaminophen (TYLENOL) 500 MG tablet Take 1 tablet (500 mg total) by mouth every 6 (six) hours.    amitriptyline (ELAVIL) 10 MG tablet Take 1 tablet (10 mg total) by mouth every evening.    BIOTIN ORAL Take by mouth.    calcium carbonate (TUMS) 200 mg calcium (500 mg) chewable tablet Take 1 tablet (500 mg total) by mouth 3 (three) times daily as needed for Heartburn.    cholecalciferol, vitamin D3, (VITAMIN D3) 50 mcg (2,000 unit) Cap capsule Take 2,000 Units by mouth every evening.    COVID-19 (COMIRNATY 2024-25, 12Y UP,,PF,) 30 mcg/0.3 mL IM vaccine (>/= 13 yo) Inject into the muscle.    cyanocobalamin (VITAMIN B-12) 100 MCG tablet Take 100 mcg by mouth once daily.    dexAMETHasone (DECADRON) 4 mg/mL injection Inject 1ml (4mg) into the muscle for symptoms of severe adrenal insufficiency. 3ml syringe w/18 g needle to draw x10  and 21g 1 inch needle to injection x10 (Patient not taking: Reported on 3/18/2025)    DULoxetine (CYMBALTA) 30 MG capsule Take 2 capsules (60 mg total) by mouth every evening.    ferrous sulfate 324 mg (65 mg iron) TbEC Take 325 mg by mouth nightly.    hydrocortisone (CORTEF) 10 MG Tab TAKE 1& 1/2 TABLETS BY MOUTH IN THE MORNING AND 1/2 TABLET IN THE AFTERNOON. DOUBLE THE DOSE IN CASE OF ILLNESS.    ivabradine (CORLANOR) 7.5 mg Tab Take 1 tablet (7.5 mg total) by mouth 2 (two) times daily.    levETIRAcetam (KEPPRA) 500 MG Tab Take 1 tablet (500 mg total) by mouth 2 (two) times daily.    melatonin 5 mg TbDL Take 1 tablet by mouth nightly. (Patient not taking: Reported on 3/18/2025)    midodrine (PROAMATINE) 10 MG tablet Take 1 tablet (10 mg total) by mouth 4 (four) times daily. Take 4 doses a day 3 hours apart    rivaroxaban (XARELTO) 20 mg Tab Take 1 tablet (20 mg total) by mouth once daily.    UNABLE TO FIND Take 1 tablet by mouth Daily. medication name: Biotin 2500mcg     Continuous Infusions:    Family History       Problem Relation (Age of Onset)    Diabetes type II Mother, Father    Heart attack Maternal Grandfather    Heart disease Mother          Tobacco Use    Smoking status: Never    Smokeless tobacco: Never   Substance and Sexual Activity    Alcohol use: No    Drug use: No    Sexual activity: Not Currently     Review of Systems  Objective:     Vital Signs (Most Recent):  Temp: 98.6 °F (37 °C) (04/15/25 1159)  Pulse: 82 (04/15/25 1159)  Resp: 18 (04/15/25 1159)  BP: (!) 168/82 (04/15/25 1159)  SpO2: 98 % (04/15/25 1159) Vital Signs (24h Range):  Temp:  [98.6 °F (37 °C)] 98.6 °F (37 °C)  Pulse:  [82] 82  Resp:  [18] 18  SpO2:  [98 %] 98 %  BP: (168)/(82) 168/82     Weight: 66 kg (145 lb 8.1 oz)  Body mass index is 22.12 kg/m².     Physical Exam  Vitals reviewed.   Constitutional:       General: She is not in acute distress.     Appearance: She is not diaphoretic.      Comments: Frail appearing   Wheelchair  at bedside   HENT:      Head: Normocephalic and atraumatic.   Eyes:      General: No scleral icterus.        Right eye: No discharge.         Left eye: No discharge.      Extraocular Movements: Extraocular movements intact and EOM normal.      Conjunctiva/sclera: Conjunctivae normal.      Pupils: Pupils are equal, round, and reactive to light.   Cardiovascular:      Rate and Rhythm: Normal rate.   Pulmonary:      Effort: Pulmonary effort is normal. No respiratory distress.   Abdominal:      General: There is no distension.      Palpations: Abdomen is soft.      Tenderness: There is no abdominal tenderness.   Musculoskeletal:         General: No swelling or deformity.   Skin:     General: Skin is warm and dry.   Neurological:      General: No focal deficit present.      Mental Status: She is alert and oriented to person, place, and time. Mental status is at baseline.      Motor: Tremor present.   Psychiatric:         Mood and Affect: Mood normal.         Speech: Speech normal.         Behavior: Behavior normal.            NEUROLOGICAL EXAMINATION:     MENTAL STATUS   Oriented to person, place, and time.   Attention: normal. Concentration: normal.   Speech: speech is normal   Level of consciousness: alert    CRANIAL NERVES     CN II   Visual fields full to confrontation.     CN III, IV, VI   Pupils are equal, round, and reactive to light.  Extraocular motions are normal.     CN V   Right corneal reflex: normal  Left corneal reflex: normal    CN VII   Facial expression full, symmetric.     CN VIII   Hearing: intact    CN XI   CN XI normal.     CN XII   CN XII normal.       Significant Labs: All pertinent lab results from the past 24 hours have been reviewed.    Significant Studies: I have reviewed all pertinent imaging results/findings within the past 24 hours.  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 Kumar for event capture and characterization of staring episodes upon standing or with exertion associated with falls. Of note, patient completed tilt table test with EEG which was diagnostic of orthostatic hypotension wtih reflex sinus tachycardia; EEG with no epileptiform activity.    - Continuous vEEG   - Continue home Levetiracetam on admit, decreased home Midodrine to BID for event provocation  - LEV level sent  - Activation procedures per protocol- medication adjustments as above  - IV Ativan PRN for GTC greater than 5 min - please call epilepsy on call   - Seizure precautions, suction and oxygen at bedside  - Fall precautions, side rail padding in place  - Visi monitoring with continuous pulse oximetry   - Telemetry    History of adrenal insufficiency  - Chronic  - Continue home Hydrocortisone 15 mg daily and 5 mg in the afternoon  - Follows with Endocrinology as outpatient    Orthostatic hypotension  C/b recurrent falls  Of note, patient completed tilt table test with EEG which was diagnostic of orthostatic hypotension wtih reflex sinus tachycardia; EEG with no epileptiform activity  - Decreased Midodrine to BID for event provocation  - Continue home Ivabradine   - Follows with Cardiology as outpatient  - Fall precautions  - Patient must ambulate with assistance    Stage 3b chronic kidney disease  - Chronic, at baseline  - Avoid nephrotoxic agents    Factor V deficiency with DVT x 3  - Chronic  - Continue home Xarelto    Secondary hypothyroidism  - Chronic  - Continue home Synthroid        VTE Risk Mitigation (From admission, onward)           Ordered     rivaroxaban tablet 20 mg  with dinner         04/15/25 1151     IP VTE HIGH RISK PATIENT   Once         04/15/25 1144     Reason for No Pharmacological VTE Prophylaxis  Once        Question:  Reasons:  Answer:  Already adequately anticoagulated on oral Anticoagulants    04/15/25 1144     Place IRMA hose  Until discontinued         04/15/25 1144     Place sequential compression device  Until discontinued         04/15/25 1144                    Armida Singletary PA-C  Neurology-Epilepsy  [facility]  Staff: Dr. Campoverde

## 2025-04-15 NOTE — SUBJECTIVE & OBJECTIVE
Past Medical History:   Diagnosis Date    Allergic rhinitis     Anemia     Arthritis     Basal cell carcinoma     Broken ankle     Cancer 1985    Clotting disorder     Disorder of kidney and ureter     DVT (deep venous thrombosis)     Factor V deficiency     Fracture of elbow     left    Glioblastoma multiforme of brain     Hypothyroidism     IBS (irritable bowel syndrome)     Osteoporosis     Panhypopituitarism     Peripheral polyneuropathy     Secondary adrenal insufficiency     Seizures     Thyroid disease        Past Surgical History:   Procedure Laterality Date    APPENDECTOMY      BRAIN SURGERY      for glioblastoma      SECTION      CHOLECYSTECTOMY      CLOSURE OF DEFECT OF MOHS PROCEDURE Left 2018    Procedure: CLOSURE, MOHS PROCEDURE DEFECT SCALP  Flap closure;  Surgeon: Yrn Novak MD;  Location: [address];  Service: Plastics;  Laterality: Left;    COLONOSCOPY N/A 2019    Procedure: COLONOSCOPY Suprep;  Surgeon: Tony Mosher MD;  Location: [facility];  Service: Endoscopy;  Laterality: N/A;    ESOPHAGOGASTRODUODENOSCOPY N/A 2019    Procedure: EGD/colon;  Surgeon: Tony Mosher MD;  Location: [facility];  Service: Endoscopy;  Laterality: N/A;    EXCISION OF GANGLION CYST OF HAND Left 2020    Procedure: EXCISION, GANGLION CYST, HAND;  Surgeon: Ross Drew Jr., MD;  Location: [facility];  Service: Orthopedics;  Laterality: Left;    HYSTERECTOMY      ILIAC CREST BONE GRAFT Right 3/15/2024    Procedure: BONE GRAFT, ILIAC CREST;  Surgeon: Geoffrey Goodson MD;  Location: [facility];  Service: Orthopedics;  Laterality: Right;  acumed harvester    ILIAC CREST BONE GRAFT Right 2024    Procedure: BONE GRAFT, ILIAC CREST;  Surgeon: Geoffrey Goodson MD;  Location: [facility];  Service: Orthopedics;  Laterality: Right;    MIDFOOT ARTHRODESIS Right 2024    Procedure: FUSION, JOINT, MIDFOOT;  Surgeon: Geoffrey Goodson MD;   Location: [facility] OR;  Service: Orthopedics;  Laterality: Right;  mini c arm    OPEN REDUCTION AND INTERNAL FIXATION (ORIF) OF FRACTURE OF TARSAL BONE Right 3/15/2024    Procedure: ORIF, FRACTURE, TARSAL BONE;  Surgeon: Geoffrey Goodson MD;  Location: [facility] OR;  Service: Orthopedics;  Laterality: Right;  supine, mini C arm, Styker, block okay    SHOULDER SURGERY Right     TILT TABLE TEST N/A 07/15/2022    Procedure: TILT TABLE TEST;  Surgeon: Amol Kohler MD;  Location: [facility] EP LAB;  Service: Cardiology;  Laterality: N/A;  Orthostatic hypotension, dizziness, Tilt Table Test with EEG, Dr.Tiffany Roca (neuro), DM    TUBAL LIGATION  1998       Review of patient's allergies indicates:   Allergen Reactions    Neurontin [gabapentin]     Lactose     Soap Hives     Able to tolerate Dove only        No current facility-administered medications on file prior to encounter.     Current Outpatient Medications on File Prior to Encounter   Medication Sig    acetaminophen (TYLENOL) 500 MG tablet Take 1 tablet (500 mg total) by mouth every 6 (six) hours.    amitriptyline (ELAVIL) 10 MG tablet Take 1 tablet (10 mg total) by mouth every evening.    BIOTIN ORAL Take by mouth.    calcium carbonate (TUMS) 200 mg calcium (500 mg) chewable tablet Take 1 tablet (500 mg total) by mouth 3 (three) times daily as needed for Heartburn.    cholecalciferol, vitamin D3, (VITAMIN D3) 50 mcg (2,000 unit) Cap capsule Take 2,000 Units by mouth every evening.    COVID-19 (COMIRNATY 2024-25, 12Y UP,,PF,) 30 mcg/0.3 mL IM vaccine (>/= 11 yo) Inject into the muscle.    cyanocobalamin (VITAMIN B-12) 100 MCG tablet Take 100 mcg by mouth once daily.    dexAMETHasone (DECADRON) 4 mg/mL injection Inject 1ml (4mg) into the muscle for symptoms of severe adrenal insufficiency. 3ml syringe w/18 g needle to draw x10 and 21g 1 inch needle to injection x10 (Patient not taking: Reported on 3/18/2025)    DULoxetine (CYMBALTA) 30 MG capsule Take 2 capsules (60 mg total)  by mouth every evening.    ferrous sulfate 324 mg (65 mg iron) TbEC Take 325 mg by mouth nightly.    hydrocortisone (CORTEF) 10 MG Tab TAKE 1& 1/2 TABLETS BY MOUTH IN THE MORNING AND 1/2 TABLET IN THE AFTERNOON. DOUBLE THE DOSE IN CASE OF ILLNESS.    ivabradine (CORLANOR) 7.5 mg Tab Take 1 tablet (7.5 mg total) by mouth 2 (two) times daily.    levETIRAcetam (KEPPRA) 500 MG Tab Take 1 tablet (500 mg total) by mouth 2 (two) times daily.    melatonin 5 mg TbDL Take 1 tablet by mouth nightly. (Patient not taking: Reported on 3/18/2025)    midodrine (PROAMATINE) 10 MG tablet Take 1 tablet (10 mg total) by mouth 4 (four) times daily. Take 4 doses a day 3 hours apart    rivaroxaban (XARELTO) 20 mg Tab Take 1 tablet (20 mg total) by mouth once daily.    UNABLE TO FIND Take 1 tablet by mouth Daily. medication name: Biotin 2500mcg     Continuous Infusions:    Family History       Problem Relation (Age of Onset)    Diabetes type II Mother, Father    Heart attack Maternal Grandfather    Heart disease Mother          Tobacco Use    Smoking status: Never    Smokeless tobacco: Never   Substance and Sexual Activity    Alcohol use: No    Drug use: No    Sexual activity: Not Currently     Review of Systems  Objective:     Vital Signs (Most Recent):  Temp: 98.6 °F (37 °C) (04/15/25 1159)  Pulse: 82 (04/15/25 1159)  Resp: 18 (04/15/25 1159)  BP: (!) 168/82 (04/15/25 1159)  SpO2: 98 % (04/15/25 1159) Vital Signs (24h Range):  Temp:  [98.6 °F (37 °C)] 98.6 °F (37 °C)  Pulse:  [82] 82  Resp:  [18] 18  SpO2:  [98 %] 98 %  BP: (168)/(82) 168/82     Weight: 66 kg (145 lb 8.1 oz)  Body mass index is 22.12 kg/m².     Physical Exam  Vitals reviewed.   Constitutional:       General: She is not in acute distress.     Appearance: She is not diaphoretic.      Comments: Frail appearing   Wheelchair at bedside   HENT:      Head: Normocephalic and atraumatic.   Eyes:      General: No scleral icterus.        Right eye: No discharge.         Left eye:  No discharge.      Extraocular Movements: Extraocular movements intact and EOM normal.      Conjunctiva/sclera: Conjunctivae normal.      Pupils: Pupils are equal, round, and reactive to light.   Cardiovascular:      Rate and Rhythm: Normal rate.   Pulmonary:      Effort: Pulmonary effort is normal. No respiratory distress.   Abdominal:      General: There is no distension.      Palpations: Abdomen is soft.      Tenderness: There is no abdominal tenderness.   Musculoskeletal:         General: No swelling or deformity.   Skin:     General: Skin is warm and dry.   Neurological:      General: No focal deficit present.      Mental Status: She is alert and oriented to person, place, and time. Mental status is at baseline.      Motor: Tremor present.   Psychiatric:         Mood and Affect: Mood normal.         Speech: Speech normal.         Behavior: Behavior normal.            NEUROLOGICAL EXAMINATION:     MENTAL STATUS   Oriented to person, place, and time.   Attention: normal. Concentration: normal.   Speech: speech is normal   Level of consciousness: alert    CRANIAL NERVES     CN II   Visual fields full to confrontation.     CN III, IV, VI   Pupils are equal, round, and reactive to light.  Extraocular motions are normal.     CN V   Right corneal reflex: normal  Left corneal reflex: normal    CN VII   Facial expression full, symmetric.     CN VIII   Hearing: intact    CN XI   CN XI normal.     CN XII   CN XII normal.       Significant Labs: All pertinent lab results from the past 24 hours have been reviewed.    Significant Studies: I have reviewed all pertinent imaging results/findings within the past 24 hours.

## 2025-04-15 NOTE — HPI
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 Kumar for event capture and characterization of staring episodes upon standing or with exertion associated with falls. Seizure history includes episodes of auditory hallucinations that would progress to generalized convulsions which led to diagnosis of glioblastoma in left temporal lobe s/p resection, radiation, and chemotherapy in 1985. Patient was seizure free on Phenytoin, which was recently discontinued due to development of osteoporosis, and changed to Lacosamide. Due to increased dizziness and worsening of orthostatic hypotension, Lacosamide was changed to Levetiracetam. Patient reports being diagnosed with orthostatic hypotension in 2022. However, events has gradually increased in frequency, now occurring almost every day. Patient may go 3 days without any episodes. Patient and  report episodes only occur upon standing or with exertion. No associated convulsions, incontinence, or tongue biting. Patient endorses compliance to Levetiracetam, no side effects reported. Patient denies etoh, tobacco, and drug use.     Of note, patient completed tilt table test with EEG which was diagnostic of orthostatic hypotension with reflex sinus tachycardia; EEG with no epileptiform activity.    MRI Brain O 4/4024 with Left temporal and occipital lobe encephalomalacia and gliosis; No definite acute intracranial findings.

## 2025-04-16 VITALS
RESPIRATION RATE: 18 BRPM | DIASTOLIC BLOOD PRESSURE: 94 MMHG | BODY MASS INDEX: 22.05 KG/M2 | HEIGHT: 68 IN | OXYGEN SATURATION: 91 % | TEMPERATURE: 99 F | HEART RATE: 110 BPM | WEIGHT: 145.5 LBS | SYSTOLIC BLOOD PRESSURE: 190 MMHG

## 2025-04-16 PROBLEM — R25.1 TREMOR: Status: ACTIVE | Noted: 2025-04-16

## 2025-04-16 PROCEDURE — 99499 UNLISTED E&M SERVICE: CPT | Mod: ,,, | Performed by: PHYSICIAN ASSISTANT

## 2025-04-16 PROCEDURE — 99239 HOSP IP/OBS DSCHRG MGMT >30: CPT | Mod: FS,,, | Performed by: PSYCHIATRY & NEUROLOGY

## 2025-04-16 PROCEDURE — A4216 STERILE WATER/SALINE, 10 ML: HCPCS | Performed by: PHYSICIAN ASSISTANT

## 2025-04-16 PROCEDURE — 25000003 PHARM REV CODE 250: Performed by: PHYSICIAN ASSISTANT

## 2025-04-16 RX ADMIN — HYOSCYAMINE SULFATE 0.12 MG: 0.12 TABLET SUBLINGUAL at 09:04

## 2025-04-16 RX ADMIN — LEVETIRACETAM 500 MG: 500 TABLET, FILM COATED ORAL at 09:04

## 2025-04-16 RX ADMIN — SODIUM CHLORIDE, PRESERVATIVE FREE 3 ML: 5 INJECTION INTRAVENOUS at 05:04

## 2025-04-16 RX ADMIN — HYDROCORTISONE 15 MG: 5 TABLET ORAL at 09:04

## 2025-04-16 RX ADMIN — CHOLECALCIFEROL TAB 25 MCG (1000 UNIT) 2000 UNITS: 25 TAB at 09:04

## 2025-04-16 RX ADMIN — LEVOTHYROXINE SODIUM 112 MCG: 112 TABLET ORAL at 05:04

## 2025-04-16 RX ADMIN — FERROUS SULFATE TAB EC 325 MG (65 MG FE EQUIVALENT) 1 EACH: 325 (65 FE) TABLET DELAYED RESPONSE at 09:04

## 2025-04-16 NOTE — DISCHARGE SUMMARY
[facility] ([facility])  Neurology-Epilepsy  Discharge Summary      Patient Name: Thelma Nguyen  MRN: [id number]  Admission Date: 4/15/2025  Hospital Length of Stay: 1 days  Discharge Date and Time: 4/16/2025  Attending Physician: No att. 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 Kumar for event capture and characterization of staring episodes upon standing or with exertion associated with falls. Seizure history includes episodes of auditory hallucinations that would progress to generalized convulsions which led to diagnosis of glioblastoma in left temporal lobe s/p resection, radiation, and chemotherapy in 1985. Patient was seizure free on Phenytoin, which was recently discontinued due to development of osteoporosis, and changed to Lacosamide. Due to increased dizziness and worsening of orthostatic hypotension, Lacosamide was changed to Levetiracetam. Patient reports being diagnosed with orthostatic hypotension in 2022. However, events has gradually increased in frequency, now occurring almost every day. Patient may go 3 days without any episodes. Patient and  report episodes only occur upon standing or with exertion. No associated convulsions, incontinence, or tongue biting. Patient endorses compliance to Levetiracetam, no side effects reported. Patient denies etoh, tobacco, and drug use.     Of note, patient completed tilt table test with EEG which was diagnostic of orthostatic hypotension with reflex sinus tachycardia; EEG with no epileptiform  activity.    MRI Brain WWO 4/4024 with Left temporal and occipital lobe encephalomalacia and gliosis; No definite acute intracranial findings.    * No surgery found *     Indwelling Lines/Drains at time of discharge:   Lines/Drains/Airways       None                 Hospital Course:   4/15>4/16: Admit to EMU. EEG with no epileptiform activity and no seizures. Typical event captured upon standing with generalized slowing c/w hypoperfusion, no epileptiform activity, BP c/w known orthostasis. Discharged home in stable condition on continued Levetiracetam 500 mg BID. Outpatient follow up in Epilepsy clinic with Dr. Kumar as needed. Ambulatory referral to Neurology Movement Disorders for resting tremor, hypomimia, and micrographia.     See EEG reports for details.    Goals of Care Treatment Preferences:  Code Status: Full Code      Significant Labs: All pertinent lab results from the past 24 hours have been reviewed.    Significant Studies: I have reviewed all pertinent imaging results/findings within the past 24 hours.    Pending Diagnostic Studies:       Procedure Component Value Units Date/Time    Levetiracetam Level [[id number]] Collected: 04/15/25 1206    Order Status: Sent Lab Status: In process Updated: 04/15/25 1241    Specimen: Blood           Final Active Diagnoses:    Diagnosis Date Noted POA    PRINCIPAL PROBLEM:  Nonintractable epilepsy without status epilepticus [G40.909] 03/25/2015 Yes    Tremor [R25.1] 04/16/2025 Yes    History of adrenal insufficiency [Z86.39] 04/02/2024 Yes    Orthostatic hypotension [I95.1] 02/15/2022 Yes    Recurrent falls [R29.6] 01/14/2022 Not Applicable    Stage 3b chronic kidney disease [N18.32] 03/28/2019 Yes    Factor V deficiency with DVT x 3 [D68.2] 05/05/2016 Yes    Secondary hypothyroidism [E03.8] 05/05/2016 Yes      Problems Resolved During this Admission:       Neuro  * Nonintractable epilepsy without status epilepticus  59F PMHx of dysautonomia orthostatic hypotension  syndrome with recurrent falls on Midodrine and Ivabradine, CKD3b, 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 Kumar for event capture and characterization of staring episodes upon standing or with exertion associated with falls. Of note, patient completed tilt table test with EEG which was diagnostic of orthostatic hypotension with reflex sinus tachycardia; EEG with no epileptiform activity.    - Continue home Levetiracetam on admit, decreased home Midodrine to BID for event provocation  - LEV level pending  - EEG with no epileptiform activity, no seizures  - Typical event captured upon standing with generalized slowing c/w hypoperfusion, no epileptiform activity, BP c/w known orthostasis  - Discharged home in stable condition on continued Levetiracetam 500 mg BID  - Outpatient follow up in Epilepsy clinic with Dr. Kumar as needed    Tremor  - Ambulatory referral to Neurology Movement Disorders for resting tremor, hypomimia, and micrographia in patient with significant orthostatic hypotension    Cardiac/Vascular  Orthostatic hypotension  C/b recurrent falls  Of note, patient completed tilt table test with EEG which was diagnostic of orthostatic hypotension wtih reflex sinus tachycardia; EEG with no epileptiform activity  - Decreased Midodrine to BID for event provocation; resumed on discharge  - Continue home Ivabradine   - Fall precautions, ambulate with assistance during admit  - Follows with Cardiology as outpatient    Renal/  Stage 3b chronic kidney disease  - Chronic, at baseline  - Avoid nephrotoxic agents  - Follows with Nephrology as outpatient    Hematology  Factor V deficiency with DVT x 3  - Chronic  - Continue home Xarelto    Endocrine  History of adrenal insufficiency  - Chronic  - Continue home Hydrocortisone  15 mg daily and 5 mg in the afternoon  - Follows with Endocrinology as outpatient    Secondary hypothyroidism  - Chronic  - Continue home Synthroid        Discharged Condition: stable    Disposition: Home or Self Care      Patient Instructions:      Ambulatory referral/consult to Neurology   Standing Status: Future   Referral Priority: Routine Referral Type: Consultation   Referral Reason: Specialty Services Required   Requested Specialty: Neurology   Number of Visits Requested: 1       Medications:  Reconciled Home Medications:      Medication List        CHANGE how you take these medications      ferrous sulfate 325 mg (65 mg iron) Tab tablet  Commonly known as: FEOSOL  Take 1 tablet (325 mg total) by mouth 2 (two) times daily.  What changed: Another medication with the same name was removed. Continue taking this medication, and follow the directions you see here.            CONTINUE taking these medications      amitriptyline 10 MG tablet  Commonly known as: ELAVIL  Take 1 tablet (10 mg total) by mouth every evening.     BIOTIN ORAL  Take by mouth.     CALCIUM ANTACID 200 mg calcium (500 mg) chewable tablet  Generic drug: calcium carbonate  Take 1 tablet (500 mg total) by mouth 3 (three) times daily as needed for Heartburn.     cholecalciferol (vitamin D3) 50 mcg (2,000 unit) Cap capsule  Commonly known as: VITAMIN D3  Take 2,000 Units by mouth every evening.     CORLANOR 7.5 mg Tab  Generic drug: ivabradine  Take 1 tablet (7.5 mg total) by mouth 2 (two) times daily.     DULoxetine 30 MG capsule  Commonly known as: CYMBALTA  Take 2 capsules (60 mg total) by mouth every evening.     hydrocortisone 10 MG Tab  Commonly known as: CORTEF  TAKE 1& 1/2 TABLETS BY MOUTH IN THE MORNING AND 1/2 TABLET IN THE AFTERNOON. DOUBLE THE DOSE IN CASE OF ILLNESS.     hyoscyamine 0.125 mg Subl  Place 1 tablet (0.125 mg total) under the tongue every 4 (four) hours as needed.     levETIRAcetam 500 MG Tab  Commonly known as:  KEPPRA  Take 1 tablet (500 mg total) by mouth 2 (two) times daily.     levothyroxine 112 MCG tablet  Commonly known as: SYNTHROID  TAKE 1 TABLET BY MOUTH BEFORE BREAKFAST.     midodrine 10 MG tablet  Commonly known as: PROAMATINE  Take 1 tablet (10 mg total) by mouth 4 (four) times daily. Take 4 doses a day 3 hours apart     rivaroxaban 20 mg Tab  Commonly known as: XARELTO  Take 1 tablet (20 mg total) by mouth once daily.     VITAMIN B-12 100 MCG tablet  Generic drug: cyanocobalamin  Take 100 mcg by mouth once daily.            ASK your doctor about these medications      acetaminophen 500 MG tablet  Commonly known as: TYLENOL  Take 1 tablet (500 mg total) by mouth every 6 (six) hours.     COMIRNATY 2024-25 (12Y UP)(PF) 30 mcg/0.3 mL IM vaccine (>/= 11 yo)  Generic drug: COVID-19  Inject into the muscle.     dexAMETHasone 4 mg/mL injection  Inject 1ml (4mg) into the muscle for symptoms of severe adrenal insufficiency. 3ml syringe w/18 g needle to draw x10 and 21g 1 inch needle to injection x10     ergocalciferol 50,000 unit Cap  Commonly known as: ERGOCALCIFEROL  Take 1 capsule (50,000 Units total) by mouth every 7 days.     melatonin 5 mg Tbdl  Take 1 tablet by mouth nightly.     UNABLE TO FIND  Take 1 tablet by mouth Daily. medication name: Biotin 2500mcg            Time spent on the discharge of patient: 60 minutes    Armida Singletary PA-C  Neurology-Epilepsy  [facility]  Staff: Dr. Campoverde

## 2025-04-16 NOTE — HOSPITAL COURSE
4/15>4/16: Admit to EMU. EEG with no epileptiform activity and no seizures. Typical event captured upon standing with generalized slowing c/w hypoperfusion, no epileptiform activity, BP c/w known orthostasis. Discharged home in stable condition on continued Levetiracetam 500 mg BID. Outpatient follow up in Epilepsy clinic with Dr. Kumar as needed. Ambulatory referral to Neurology Movement Disorders for resting tremor, hypomimia, and micrographia.     See EEG reports for details.

## 2025-04-16 NOTE — ASSESSMENT & PLAN NOTE
- Ambulatory referral to Neurology Movement Disorders for resting tremor, hypomimia, and micrographia in patient with significant orthostatic hypotension

## 2025-04-16 NOTE — NURSING
Dr Campoverde notified and order to hold Midodrine this PM and notify is patient has additional SBP > 200

## 2025-04-16 NOTE — PLAN OF CARE
Problem: Adult Inpatient Plan of Care  Goal: Plan of Care Review  4/16/2025 0506 by Laverne Sunshine RN  Outcome: Progressing  Goal: Patient-Specific Goal (Individualized)  4/16/2025 0506 by Laverne Sunshine RN  Outcome: Progressing  Goal: Absence of Hospital-Acquired Illness or Injury  4/16/2025 0506 by Laverne Sunshine RN  Outcome: Progressing  Goal: Optimal Comfort and Wellbeing  4/16/2025 0506 by Laverne Sunshine RN  Outcome: Progressing  Goal: Readiness for Transition of Care  4/16/2025 0506 by Laverne Sunshine RN  Outcome: Progressing    Problem: Wound  Goal: Optimal Coping  Outcome: Met  Goal: Optimal Functional Ability  Outcome: Met  Goal: Absence of Infection Signs and Symptoms  Outcome: Met  Goal: Improved Oral Intake  Outcome: Met  Goal: Optimal Pain Control and Function  Outcome: Met  Goal: Skin Health and Integrity  Outcome: Met  Goal: Optimal Wound Healing  Outcome: Met     Problem: Fall Injury Risk  Goal: Absence of Fall and Fall-Related Injury  4/16/2025 0506 by Laverne Sunshine RN  Outcome: Progressing     Problem: Seizure, Active Management  Goal: Absence of Seizure/Seizure-Related Injury  4/16/2025 0506 by Laverne Sunshine RN  Outcome: Progressing     Problem: Depression  Goal: Improved Mood  4/16/2025 0506 by Laverne Sunshine RN  Outcome: Progressing  POC reviewed and updated with the patient and family friend. Questions regarding POC were encouraged and addressed with the patient.  VSS, see flow-sheets. Tele and continuous pulse ox maintained per provider's order. Continuous EEG in place. NAEON. Patient is AO X 4 at this time. VS obtained. Interruptions minimized to promote sleep. Seizure, fall, and safety precautions maintained, no signs of injury noted during shift.  Upon exiting room, patient's bed locked in low position, side rails up x 4, bed alarm set, with call light within reach. Instructed patient and family friend to call staff for assistance with ambulation and only  staff to assist with mobility, verbalized understanding.  No acute signs of distress noted at this time.

## 2025-04-16 NOTE — ASSESSMENT & PLAN NOTE
C/b recurrent falls  Of note, patient completed tilt table test with EEG which was diagnostic of orthostatic hypotension wtih reflex sinus tachycardia; EEG with no epileptiform activity  - Decreased Midodrine to BID for event provocation; resumed on discharge  - Continue home Ivabradine   - Follows with Cardiology as outpatient  - Fall precautions  - Patient must ambulate with assistance  - Follows with Cardiology as outpatient

## 2025-04-16 NOTE — PROCEDURES
EEG REPORT      Thelma Nguyen  [id number]  1965    DATE OF SERVICE: 4/15/2025    [facility] -1,2    METHODOLOGY      Extended electroencephalographic recording is made while the patient is ambulatory and continuing normal daily activities.  Electrodes are placed according to the International 10-20 placement system and included T1 and T2 electrode placement.  Twenty four (24) channels of digital signal (sampling rate of 512/sec) was simultaneously recorded from the scalp including EKG and eye monitors.  Recording band pass was 0.1 to 100 hz and all data was stored digitally on the recorder.  The patient is instructed to press an event button when clinical symptoms occur and write the symptoms into a diary. Activation procedures which include photic stimulation, hyperventilation and instructing patients to perform simple task are done in selected patients.        The EEG is displayed on a monitor screen and can be reformatted into different montages for evaluation.  The entire recoding is submitted for computer assisted analysis to detect spike and electrographic seizure activity.  The entire recording is visually reviewed and the times identified by computer analysis as being spikes or seizures are reviewed again.  Compresses spectral analysis (CSA) is also performed on the activity recorded from each individual channel.  This is displayed as a power display of frequencies from 0 to 30 Hz over time.   The CSA analysis is done and displayed continuously.  This is reviewed for asymmetries in power between homologous areas of the scalp and for presence of changes in power which canbe seen when seizures occur.  Sections of suspected abnormalities on the CSA is then compared with the original EEG recording.    [employer] software was also utilized in the review of this study.  This software suite analyzes the EEG recording in multiple domains.  Coherence and rhythmicity is computed to identify EEG sections  which may contain organized seizures.  Each channel undergoes analysis to detect presence of spike and sharp waves which have special and morphological characteristic of epileptic activity.  The routine EEG recording is converted from spacial into frequency domain.  This is then displayed comparing homologous areas to identify areas of significant asymmetry.  Algorithm to identify non-cortically generated artifact is used to separate eye movement, EMG and other artifact from the EEG     Recording Times  Start on 4/15/2025  Stop on 4/16/2025    A total of 18:12:50 and 4:09:46 hours of EEG was recorded.      EEG FINDINGS:  Background activity:   The background rhythm was characterized by alpha and anterior dominant beta activity with a 9-10Hz posterior dominant alpha rhythm at 30-70 microvolts.   Symmetry and continuity: the background was continuous and symmetric     Sleep:   Normal sleep transients including sleep spindles, K complexes, vertex waves and POSTS were seen.    Activation procedures:   NA    Abnormal activity:   No epileptiform discharges, periodic discharges, lateralized rhythmic delta activity or electrographic seizures were seen.    Event 1 at 13:10  Nursing staff assists patient in arising from bedside commode when she becomes less responsive and loses muscle tone requiring nurse to support her holding her under the arms to prevent her falling to the ground before assisting her back into the chair.  When blood pressure is checked approximately three minutes after sitting back down, reading is 110/60 with several checks over the previous day being >180/100.    During the event the EEG shows polymorphic delta and theta consistent with hypoperfusion.    IMPRESSION:     Normal one day video EEG with recording of one typical event which was non-epileptic and related to orthostasis.        Neil Campoverde MD  Neurology-Epilepsy.  [facility].

## 2025-04-16 NOTE — PLAN OF CARE
[facility] - Neurosurgery (Hospital)  Discharge Assessment    Primary Care Provider: Zachary Bender MD     Discharge Assessment (most recent)       BRIEF DISCHARGE ASSESSMENT - 04/16/25 1626          Discharge Planning    Assessment Type Discharge Planning Brief Assessment     Resource/Environmental Concerns none     Support Systems Spouse/significant other     Equipment Currently Used at Home none     Current Living Arrangements home     Patient/Family Anticipates Transition to home with family     Patient/Family Anticipated Services at Transition none     DME Needed Upon Discharge  none     Discharge Plan A Home with family     Discharge Plan B Home with family

## 2025-04-16 NOTE — PLAN OF CARE
[facility] Neurosurgery (Hospital)  Discharge Final Note    Primary Care Provider: Zachary Bender MD    Expected Discharge Date: 4/16/2025    Patient discharged home.  The patient did not have any discharge needs. Family provided transportation home.    Future Appointments   Date Time Provider Department Center   4/17/2025  1:00 PM Willis Cedeno, PT [facility] [place]   4/21/2025  1:00 PM PaoRy reedce, PT [facility] [place]   4/24/2025  1:00 PM Ry Cedenoce, PT [facility] [place]   4/28/2025  1:00 PM Narciso Hudson, PTA [facility] [place]   4/30/2025  2:00 PM Ariana Winn MD [facility]   5/1/2025  1:00 PM Willis Cedeno, PT [facility] [place]   5/2/2025  1:40 PM Tj Armstrong MD [facility] CARDIO [facility]   5/20/2025  3:45 PM Zachary Bender MD [facility] IM [facility]   5/23/2025  1:15 PM Geoffrey Goodson MD [facility] ORTHO [facility]   5/26/2025  1:00 PM Buddy Kumar MD [facility] NEURO [facility]        Final Discharge Note (most recent)       Final Note - 04/16/25 1337          Final Note    Assessment Type Final Discharge Note     Anticipated Discharge Disposition Home or Self Care        Post-Acute Status    Post-Acute Authorization Other     Other Status No Post-Acute Service Needs     Discharge Delays None known at this time                     Important Message from Medicare

## 2025-04-18 LAB — W LEVETIRACETAM: 46.4 UG/ML

## 2025-04-23 ENCOUNTER — PATIENT MESSAGE (OUTPATIENT)
Dept: INTERNAL MEDICINE | Facility: CLINIC | Age: 60
End: 2025-04-23
Payer: COMMERCIAL

## 2025-04-25 ENCOUNTER — OFFICE VISIT (OUTPATIENT)
Dept: INTERNAL MEDICINE | Facility: CLINIC | Age: 60
End: 2025-04-25
Payer: COMMERCIAL

## 2025-04-25 VITALS
HEART RATE: 76 BPM | SYSTOLIC BLOOD PRESSURE: 160 MMHG | HEIGHT: 68 IN | WEIGHT: 149.06 LBS | DIASTOLIC BLOOD PRESSURE: 86 MMHG | BODY MASS INDEX: 22.59 KG/M2 | OXYGEN SATURATION: 97 %

## 2025-04-25 DIAGNOSIS — G62.9 PERIPHERAL POLYNEUROPATHY: ICD-10-CM

## 2025-04-25 DIAGNOSIS — I95.1 ORTHOSTATIC HYPOTENSION: Primary | ICD-10-CM

## 2025-04-25 DIAGNOSIS — I10 HYPERTENSION, UNSPECIFIED TYPE: ICD-10-CM

## 2025-04-25 PROCEDURE — 99999 PR PBB SHADOW E&M-EST. PATIENT-LVL V: CPT | Mod: PBBFAC,,, | Performed by: INTERNAL MEDICINE

## 2025-04-25 RX ORDER — DULOXETIN HYDROCHLORIDE 30 MG/1
30 CAPSULE, DELAYED RELEASE ORAL NIGHTLY
Start: 2025-04-25 | End: 2026-04-20

## 2025-04-25 NOTE — PROGRESS NOTES
"    CHIEF COMPLAINT     Chief Complaint   Patient presents with    Follow-up     Elevated Blood Pressure       HPI     Thelma Nguyen is a 59 y.o. female history of glioblastoma s/p resection, complicated by panhypopituitarism and neuropathy, IBS, CKD 3, depression, hx of skin cancer and factor 5 leiden deficiency here today for here today for     Bps have been elevated.  Has been holding midodrine when BP is elevated.  BP elevated when sitting, drops quickly with standing.    Personally Reviewed Patient's Medical, surgical, family and social hx. Changes updated in [facility].  Care Team updated in Epic    Review of Systems:  Review of Systems   Constitutional:  Negative for fatigue and fever.   Musculoskeletal:  Positive for gait problem.   Neurological:  Positive for weakness and light-headedness.       Health Maintenance:   Reviewed with patient  Due for the following:      PHYSICAL EXAM     BP (!) 160/86   Pulse 76   Ht 5' 8" (1.727 m)   Wt 67.6 kg (149 lb 0.5 oz)   LMP 10/23/1997 (Exact Date)   SpO2 97%   BMI 22.66 kg/m²     Gen: Well Appearing, NAD  HEENT: PERR, EOMI  Neck: FROM, no thyromegaly, no cervical adenopathy  CVD: RRR, no M/R/G  Pulm: Normal work of breathing, CTAB, no wheezing  Abd:  Soft, NT, ND non TTP, no mass  MSK: no LE edema  Neuro: A&Ox3, gait normal, speech normal  Mood; Mood normal, behavior normal, thought process linear       LABS     Labs reviewed;   Lab Results   Component Value Date    WBC 7.41 04/15/2025    HGB 8.3 (L) 04/15/2025    HCT 28.1 (L) 04/15/2025    MCV 96 04/15/2025     04/15/2025         Chemistry        Component Value Date/Time     04/15/2025 1206     03/17/2025 1405    K 4.3 04/15/2025 1206    K 4.7 03/17/2025 1405     04/15/2025 1206     (H) 03/17/2025 1405    CO2 19 (L) 04/15/2025 1206    CO2 23 03/17/2025 1405    BUN 32 (H) 04/15/2025 1206    BUN 25 (H) 12/01/2014 1325    CREATININE 2.1 (H) 04/15/2025 1206     " 03/17/2025 1405        Component Value Date/Time    CALCIUM 8.4 (L) 04/15/2025 1206    CALCIUM 8.8 03/17/2025 1405    ALKPHOS 103 04/15/2025 1206    ALKPHOS 98 03/17/2025 1405    ALKPHOS 124 12/01/2014 1325    AST 20 04/15/2025 1206    AST 19 03/17/2025 1405    AST 31 03/16/2016 1535    ALT 26 04/15/2025 1206    ALT 18 03/17/2025 1405    BILITOT 0.2 04/15/2025 1206    BILITOT 0.2 03/17/2025 1405    ESTGFRAFRICA 44.5 (A) 03/28/2022 1311    EGFRNONAA 38.6 (A) 03/28/2022 1311        Lab Results   Component Value Date    FERRITIN 448 (H) 03/29/2022       ASSESSMENT     1. Orthostatic hypotension        2. Hypertension, unspecified type        3. Peripheral polyneuropathy  DULoxetine (CYMBALTA) 30 MG capsule              Plan     Thelma Nguyen is a 59 y.o. female with history of glioblastoma s/p resection, complicated by panhypopituitarism and neuropathy, IBS, CKD 3, depression, hx of skin cancer and factor 5 leiden deficiency here today for     Having issues with elevated blood pressures while sitting and low blood pressures upon standing.    1. Orthostatic hypotension  Continue midodrine 10 mg every 3 hours while awake  Continue compression stocks  Has appointment with new electrophysiologist in a couple weeks    2. Hypertension, unspecified type  Positional come to try and reduce Cymbalta dose  Could consider switching Corlanor 2 metoprolol  3. Peripheral polyneuropathy  - DULoxetine (CYMBALTA) 30 MG capsule; Take 1 capsule (30 mg total) by mouth every evening.      Zachary Bender MD

## 2025-04-29 DIAGNOSIS — E27.49 OTHER ADRENOCORTICAL INSUFFICIENCY: ICD-10-CM

## 2025-04-29 RX ORDER — HYDROCORTISONE 10 MG/1
TABLET ORAL
Qty: 75 TABLET | Refills: 2 | Status: SHIPPED | OUTPATIENT
Start: 2025-04-29

## 2025-04-29 NOTE — TELEPHONE ENCOUNTER
Refill Routing Note   Medication(s) are not appropriate for processing by [facility] for the following reason(s):        Outside of protocol    [facility] action(s):  Route               Appointments  past 12m or future 3m with PCP    Date Provider   Last Visit   4/25/2025 Zachary Bender MD   Next Visit   5/20/2025 Zachary Bender MD   ED visits in past 90 days: 0        Note composed:8:39 AM 04/29/2025

## 2025-04-30 ENCOUNTER — PROCEDURE VISIT (OUTPATIENT)
Dept: PLASTIC SURGERY | Facility: CLINIC | Age: 60
End: 2025-04-30
Payer: COMMERCIAL

## 2025-04-30 VITALS
SYSTOLIC BLOOD PRESSURE: 166 MMHG | DIASTOLIC BLOOD PRESSURE: 77 MMHG | HEART RATE: 93 BPM | BODY MASS INDEX: 22.59 KG/M2 | HEIGHT: 68 IN | OXYGEN SATURATION: 99 % | WEIGHT: 149.06 LBS

## 2025-04-30 DIAGNOSIS — M79.89 MASS OF SOFT TISSUE OF FACE: Primary | ICD-10-CM

## 2025-04-30 NOTE — PROCEDURES
Plastic, Reconstructive, and Hand Surgery  Operative Note     Patient: Thelma Nguyen  MRN: [id number]   : 1965     Date of Surgery: 2025      Preoperative Diagnosis:  Left forehead mass       Postoperative Diagnosis:  Same     Procedure Performed:   Excision left forehead mass, 1.1 cm   Intermediate repair of left forehead, 1.3 cm     Attending Surgeon: Ariana Winn MD     Anesthesia: Local     Key Findings:  Cystic mass excised without issue     Estimated Blood Loss:  3 mL     Drains:  none     Implants:  none     Complications:  none     Specimens:  forehead mass     Indications for Procedure: Thelma Nguyen is a 59 y.o. female who presented to my clinic 6 weeks ago with left forehead mass.  This was present since November of last year.  It was somewhat tender and she wished to have it excised to further characterize it.. I recommended excision. Risks and rationale for this procedure were explained to her at length and she agreed to proceed.     Procedure in Detail:  The patient was seen in the procedure room.  The procedure site was verified with the patient.  Informed consent was obtained.  The patient was then positioned, prepped, and draped in sterile fashion.  A time-out procedure was then performed, where the patient was properly identified and there was verification of the procedure to be performed.     Attention was turned to the face.  The mass in question was identified within the left forehead just lateral to the midline.  A small punctum was identified and this was marked out in elliptical fashion.  The area was then infiltrated with 1% lidocaine with epinephrine solution.  Fifteen blade scalpel was incise over the previous marking.  Dissection was carried down in subcutaneous tissue and the mass in question was identified.  This was consistent with a cyst.  It was carefully dissected from the surrounding soft tissue and passed off the field for permanent  sectioning.  The wound was inspected.  There was no other gross pathology noted.  Meticulous hemostasis was achieved with direct pressure.  The wound was irrigated copiously until the effluent was clear.  Hemostasis was excellent.  The skin edges then reapproximated using 4-0 Monocryl suture in deep dermal fashion followed by 4-0 Monocryl suture in subcuticular fashion.  Total length of the repair was 1.3 cm.     Needle, instrument, and sponge count was correct at this time.  Sterile dressings were applied, consisting of Dermabond.  Patient tolerated the procedure in its entirety without complication.  The patient is to be discharged home in stable condition when criteria met.  The patient was given specific wound care instructions and precautions.  All questions were answered.     Post-operative Plan:  Stable, to home.    Follow up:  One-week  Restrictions:  Light activity     Attestation of Presence:  I was present for the entirety of the procedure.     Ariana Winn MD  04/30/2025 3:16 PM     This document was transcribed using voice recognition software without a human . It may contain typographical, grammatical, and/or syntax errors.

## 2025-05-01 ENCOUNTER — PATIENT MESSAGE (OUTPATIENT)
Dept: ORTHOPEDICS | Facility: CLINIC | Age: 60
End: 2025-05-01
Payer: COMMERCIAL

## 2025-05-01 ENCOUNTER — PATIENT MESSAGE (OUTPATIENT)
Dept: INTERNAL MEDICINE | Facility: CLINIC | Age: 60
End: 2025-05-01
Payer: COMMERCIAL

## 2025-05-02 ENCOUNTER — OFFICE VISIT (OUTPATIENT)
Dept: CARDIOLOGY | Facility: CLINIC | Age: 60
End: 2025-05-02
Payer: COMMERCIAL

## 2025-05-02 VITALS
BODY MASS INDEX: 22.44 KG/M2 | HEIGHT: 68 IN | SYSTOLIC BLOOD PRESSURE: 122 MMHG | WEIGHT: 148.06 LBS | OXYGEN SATURATION: 99 % | DIASTOLIC BLOOD PRESSURE: 72 MMHG | HEART RATE: 87 BPM

## 2025-05-02 DIAGNOSIS — I95.1 ORTHOSTATIC HYPOTENSION: ICD-10-CM

## 2025-05-02 DIAGNOSIS — D68.2 FACTOR V DEFICIENCY: ICD-10-CM

## 2025-05-02 DIAGNOSIS — R06.02 SHORTNESS OF BREATH: Primary | ICD-10-CM

## 2025-05-02 DIAGNOSIS — R55 RECURRENT SYNCOPE: ICD-10-CM

## 2025-05-02 PROCEDURE — 99999 PR PBB SHADOW E&M-EST. PATIENT-LVL V: CPT | Mod: PBBFAC,,, | Performed by: INTERNAL MEDICINE

## 2025-05-02 NOTE — PROGRESS NOTES
Cardiology Clinic Visit    History of Present Illness:       Thelma Nguyen is a pleasant 59 y.o. female with PMH noted below in assessment/plan    5/2/25  Ms. Nguyen presents today for follow up of multiple medical conditions. She reports continued falls but with decreased frequency. She has learned to sit down when experiencing heavy breathing as a precautionary measure. Her  monitors her for warning signs of impending falls and attempts to catch her when possible. A recent fall occurred while returning tea to the refrigerator when her  was not present.  BP is consistently elevated, especially in the afternoons, with readings in the 180s largely tied to midodrine. . She maintains a home BP log. She reports indicators suggestive of Parkinson's disease, though no definitive diagnosis has been made. She underwent removal of a suspected cyst on Wednesday, pending pathology results. Recent echo showed good EF. She takes Midodrine 4 times daily (wake up, 10:00, 13:00, 16:00, and 19:00) and Coralynor. Is seeing Dr. Cleemnt at [facility] for orthostatic hypotension.       12/2/25  Who presents for evaluation of dysautonomia/orthostatic hypotension.  Previously seen by Curly Cohen does not have a new electrophysiologist.  Since last visit with them, was switched from Dilantin to Vimpat.  Feels that her orthostasis/number syncopal episodes have worsened since the switch.  During Thanksgiving dinner had a collapse leading to fall and head strike.  Did not go to UC/ED.  Still with bruising around her left eye left side of her face however no other neurological deficits at this time.    History obtained by patient interview and supplemented by nursing documentation and chart review.   Objective   PMHx:  has a past medical history of Allergic rhinitis, Anemia (1987), Arthritis (2000), Basal cell carcinoma, Broken ankle, Cancer (1985), Clotting disorder (1993), Disorder of kidney and ureter, DVT  (deep venous thrombosis), Factor V deficiency, Fracture of elbow, Glioblastoma multiforme of brain, Hypothyroidism, IBS (irritable bowel syndrome) (), Osteoporosis, Panhypopituitarism, Peripheral polyneuropathy, Secondary adrenal insufficiency, Seizures (), and Thyroid disease ().   SurgHx:  has a past surgical history that includes Hysterectomy (); Brain surgery (); Appendectomy;  section (); Cholecystectomy (); Tubal ligation (); Closure of defect of Mohs procedure (Left, 2018); Esophagogastroduodenoscopy (N/A, 2019); Colonoscopy (N/A, 2019); Excision of ganglion cyst of hand (Left, 2020); Tilt table test (N/A, 07/15/2022); Shoulder surgery (Right); Open reduction and internal fixation (ORIF) of fracture of tarsal bone (Right, 3/15/2024); Iliac crest bone graft (Right, 3/15/2024); Midfoot arthrodesis (Right, 2024); and Iliac crest bone graft (Right, 2024).   FamHx: family history includes Diabetes type II in her father and mother; Heart attack in her maternal grandfather; Heart disease in her mother.   SocialHx:  reports that she has never smoked. She has never used smokeless tobacco. She reports that she does not drink alcohol and does not use drugs.  Home Meds:  Current Outpatient Medications   Medication Instructions    acetaminophen (TYLENOL) 500 mg, Oral, Every 6 hours    amitriptyline (ELAVIL) 10 mg, Oral, Nightly    BIOTIN ORAL Take by mouth.    CALCIUM ANTACID 500 mg, Oral, 3 times daily PRN    cholecalciferol (vitamin D3) (VITAMIN D3) 2,000 Units, Nightly    CORLANOR 7.5 mg, Oral, 2 times daily    COVID-19 (COMIRNATY , 12Y UP,,PF,) 30 mcg/0.3 mL IM vaccine (>/= 11 yo) Intramuscular    cyanocobalamin (VITAMIN B-12) 100 mcg, Daily    dexAMETHasone (DECADRON) 4 mg/mL injection Inject 1ml (4mg) into the muscle for symptoms of severe adrenal insufficiency. 3ml syringe w/18 g needle to draw x10 and 21g 1 inch needle to injection x10  "   DULoxetine (CYMBALTA) 30 mg, Oral, Nightly    ergocalciferol (ERGOCALCIFEROL) 50,000 Units, Oral, Every 7 days    ferrous sulfate (FEOSOL) 325 mg, Oral, 2 times daily    hydrocortisone (CORTEF) 10 MG Tab TAKE 1& 1/2 TABLETS BY MOUTH IN THE MORNING AND 1/2 TABLET IN THE AFTERNOON. DOUBLE THE DOSE IN CASE OF ILLNESS.    hyoscyamine 0.125 mg, Sublingual, Every 4 hours PRN    levETIRAcetam (KEPPRA) 500 mg, Oral, 2 times daily    levothyroxine (SYNTHROID) 112 mcg, Oral, Before breakfast    melatonin 5 mg TbDL 1 tablet, Nightly    midodrine (PROAMATINE) 10 mg, Oral, 4 times daily, Take 4 doses a day 3 hours apart    rivaroxaban (XARELTO) 20 mg, Oral, Daily    UNABLE TO FIND 1 tablet, Daily     Objective/Exam:   /72 (BP Location: Left arm)   Pulse 87   Ht 5' 8" (1.727 m)   Wt 67.1 kg (148 lb 0.6 oz)   LMP 10/23/1997 (Exact Date)   SpO2 99%   BMI 22.51 kg/m²    Wt Readings from Last 4 Encounters:   05/02/25 67.1 kg (148 lb 0.6 oz)   04/30/25 67.6 kg (149 lb 0.5 oz)   04/25/25 67.6 kg (149 lb 0.5 oz)   04/23/25 62.1 kg (137 lb)      Constitutional: NAD, Atraumatic, Conversant   HEENT: MMM, Sclera anicteric, No JVD   Cardiovascular: RRR, no murmurs noted, no chest tenderness to palpation, 2+ radial pulses b/l  Pulmonary: normal respiratory effort, CTAB, no crackles, wheezes  Abdominal: S/NT/ND  Musculoskeletal: No lower extremity edema noted b/l  Neurological: No gross neurological deficits  Skin: W/D/I  Psych: Appropriate affect, normal mood  Labs/Imaging/Procedures   Personally reviewed  Lab Results   Component Value Date     04/15/2025     03/17/2025    K 4.3 04/15/2025    K 4.7 03/17/2025     04/15/2025     (H) 03/17/2025    CO2 19 (L) 04/15/2025    CO2 23 03/17/2025    BUN 32 (H) 04/15/2025    BUN 25 (H) 12/01/2014    CREATININE 2.1 (H) 04/15/2025    ANIONGAP 12 04/15/2025     Lab Results   Component Value Date    HGBA1C 5.9 (H) 04/09/2024     No results found for: "BNP", " ""BNPTRIAGEBLO"   Lab Results   Component Value Date    WBC 7.41 04/15/2025    HGB 8.3 (L) 04/15/2025    HGB 9.7 (L) 12/10/2024    HCT 28.1 (L) 04/15/2025    HCT 30.3 (L) 12/10/2024     04/15/2025     12/10/2024    GRAN 3.4 12/10/2024    GRAN 44.6 12/10/2024     Lab Results   Component Value Date    CHOL 204 (H) 03/28/2022    HDL 59 03/28/2022    LDLCALC 116.0 03/28/2022    LDLCALC 117.0 09/27/2018    LDLCALC 154.2 12/01/2014    TRIG 145 03/28/2022     Lab Results   Component Value Date    TSH 1.716 04/01/2024     No results found for: "APOLIPOPROTE"  No results found for: "LIPOA"       TTE:  No results found for this or any previous visit.    Stress Testing:   No results found for this or any previous visit.     Coronary Angiogram:  No results found for this or any previous visit.      Personal:   is retired      minutes were spent on this visit including time personally:  -Reviewing Medical records & lab results  -Independently reviewing and interpreting (if not documented by myself) EKGs, echocardiograms, catherizations   -Obtaining a history, performing a relevant exam, counseling/educating the patient/family  -Documenting clinical information in the EMR including ordering of tests  -Care coordination and communicating with other health care providers       Problem List:     1. Shortness of breath    2. Orthostatic hypotension    3. Factor V deficiency with DVT x 3    4. Recurrent syncope        Assessment/Plan:   Thelma Nguyen is a 59 y.o. female with a PMHx of Glioblastoma (s/p removal), Panhypoparathyroidism, Adrenal insufficiency,Factor V Leiden (DVT x3 on Xarelto), CKDIV, orthostatic hypotension w/ VV leading to recurrent falls with likely Autonomic dysfunction previously followed by Viktor Cohen.   Mildly improved compared to last visit. Continues to to have loss of consciousness episode but able to control most of the time when she feels " them.    -Continues to wear compression stockings including thigh-high   -Compliant with fluid salt intake  -On dexamethasone per endo  -On ivabradine 7.5mg daily  -attempted decrease midodrine to 3 times a day from 4 times a day in light of elevated blood pressures   -care  for orthostatic hypotension to be taken over by Dr. Clement at [facility]  -did not tolerate pyridostigmine  -Compliant with xarelto for DVT (no reoccurrance since 1990s)-advised fall precautions  -TTE reviewed  -f/u prn      Thank you for the opportunity to care for this patient. Please don't hesitate to reach out with any questions/concerns        jT Armstrong MD  Interventional Cardiology  [facility]  [place]

## 2025-05-08 ENCOUNTER — OFFICE VISIT (OUTPATIENT)
Dept: PLASTIC SURGERY | Facility: CLINIC | Age: 60
End: 2025-05-08
Payer: COMMERCIAL

## 2025-05-08 DIAGNOSIS — R22.0 MASS OF FACE: Primary | ICD-10-CM

## 2025-05-08 PROCEDURE — 99999 PR PBB SHADOW E&M-EST. PATIENT-LVL III: CPT | Mod: PBBFAC,,, | Performed by: SURGERY

## 2025-05-08 NOTE — PROGRESS NOTES
Plastic & Reconstructive Surgery  Progress Note     S:  1 wk s/p exc L forehead mass.      Overall doing well.   No pain  No f/c  No drainage    Has new mass to R FA.   No pain.   No recent growth.      O: LMP 10/23/1997 (Exact Date)    Gen:  NAD, A&O x3  Face: incision c/d/I, no erythema / exudate. No hematoma / seroma. CN V and VII intact.   R FA: 1 cm soft tissue mass in volar proximal FA, in line with cutaneous vein     Path: fat necrosis     A/P: 59 y.o.  female 1 wk s/p exc L forehead mass.      Overall the patient is healing well.  I reviewed the pathology with the patient and they are pleased to hear of the benign findings.  The incision is healing well.  There is no evidence of infection.  we discussed the dermabond will fall off on its own.  We discussed that once the swelling settles down, we can further assess the overall contour of her forehead.  She will notify us if there was any concerns here.  I discussed the stages of wound healing and explained the scar will take 6 months to 1 year to fully mature.  I recommended to start using Vaseline or Aquaphor, performing scar massage, and applying sun screen if outside.  her FA mass appears benign - likely lipoma vs valve. Recommend observation at this time. If any change, can start with US of the area. The patient verbalized understanding and agreement with this plan. All questions were answered.       Follow up:  prn  Restriction: as lui     I spent 15 minutes on this patient encounter which included review of medical records.  Over half the time was spent with the patient face to face discussing the treatment plan, counseling and coordinating care.     Ariana Winn MD  05/08/2025 9:58 AM     This document was transcribed using voice recognition software without a human . It may contain typographical, grammatical, and/or syntax errors.

## 2025-05-18 ENCOUNTER — PATIENT MESSAGE (OUTPATIENT)
Dept: INTERNAL MEDICINE | Facility: CLINIC | Age: 60
End: 2025-05-18
Payer: COMMERCIAL

## 2025-05-19 ENCOUNTER — TELEPHONE (OUTPATIENT)
Dept: INTERNAL MEDICINE | Facility: CLINIC | Age: 60
End: 2025-05-19
Payer: COMMERCIAL

## 2025-05-20 ENCOUNTER — OFFICE VISIT (OUTPATIENT)
Dept: INTERNAL MEDICINE | Facility: CLINIC | Age: 60
End: 2025-05-20
Payer: COMMERCIAL

## 2025-05-20 VITALS
WEIGHT: 147.94 LBS | OXYGEN SATURATION: 97 % | SYSTOLIC BLOOD PRESSURE: 130 MMHG | BODY MASS INDEX: 22.49 KG/M2 | DIASTOLIC BLOOD PRESSURE: 70 MMHG | HEART RATE: 94 BPM

## 2025-05-20 DIAGNOSIS — R29.6 RECURRENT FALLS: ICD-10-CM

## 2025-05-20 DIAGNOSIS — I95.1 ORTHOSTATIC HYPOTENSION: Primary | ICD-10-CM

## 2025-05-20 PROCEDURE — 1159F MED LIST DOCD IN RCRD: CPT | Mod: CPTII,S$GLB,, | Performed by: INTERNAL MEDICINE

## 2025-05-20 PROCEDURE — 99999 PR PBB SHADOW E&M-EST. PATIENT-LVL IV: CPT | Mod: PBBFAC,,, | Performed by: INTERNAL MEDICINE

## 2025-05-20 PROCEDURE — 1160F RVW MEDS BY RX/DR IN RCRD: CPT | Mod: CPTII,S$GLB,, | Performed by: INTERNAL MEDICINE

## 2025-05-20 PROCEDURE — 99215 OFFICE O/P EST HI 40 MIN: CPT | Mod: S$GLB,,, | Performed by: INTERNAL MEDICINE

## 2025-05-20 PROCEDURE — 3078F DIAST BP <80 MM HG: CPT | Mod: CPTII,S$GLB,, | Performed by: INTERNAL MEDICINE

## 2025-05-20 PROCEDURE — 3008F BODY MASS INDEX DOCD: CPT | Mod: CPTII,S$GLB,, | Performed by: INTERNAL MEDICINE

## 2025-05-20 PROCEDURE — 3075F SYST BP GE 130 - 139MM HG: CPT | Mod: CPTII,S$GLB,, | Performed by: INTERNAL MEDICINE

## 2025-05-20 NOTE — PROGRESS NOTES
CHIEF COMPLAINT     Chief Complaint   Patient presents with    Follow-up       HPI     Thelma Nguyen is a 59 y.o. female  history of glioblastoma s/p resection, complicated by panhypopituitarism and neuropathy, IBS, CKD 3, depression, hx of skin cancer and factor 5 leiden deficiency  here today for     Seen by me 1 month ago decreased her duloxetine 30 mg to see if it was contributing to her labile blood pressures. Still having significant supine hTN    Saw new cardiologist today. Recommend autonomic testing which is echeduled for early juen.    Personally Reviewed Patient's Medical, surgical, family and social hx. Changes updated in [facility].  Care Team updated in Epic    Review of Systems:  Review of Systems   Respiratory:  Negative for shortness of breath.    Cardiovascular:  Positive for palpitations.   Neurological:  Positive for light-headedness.       Health Maintenance:   Reviewed with patient  Due for the following:      PHYSICAL EXAM     /70   Pulse 94   Wt 67.1 kg (147 lb 14.9 oz)   LMP 10/23/1997 (Exact Date)   SpO2 97%   BMI 22.49 kg/m²     Gen: Well Appearing, NAD  HEENT: PERR, EOMI  Neck: FROM, no thyromegaly, no cervical adenopathy  CVD: RRR, no M/R/G  Pulm: Normal work of breathing, CTAB, no wheezing  Abd:  Soft, NT, ND non TTP, no mass  MSK: no LE edema  Neuro: A&Ox3, in wheelchair speech normal  Mood; Mood normal, behavior normal, thought process linear       LABS     Labs reviewed; Notable for    ASSESSMENT     1. Orthostatic hypotension        2. Recurrent falls                Plan     Thelma Nguyen is a 59 y.o. female with  history of glioblastoma s/p resection, complicated by panhypopituitarism and neuropathy, IBS, CKD 3, depression, hx of skin cancer and factor 5 leiden deficiency    1. Orthostatic hypotension  Think we need to focus on non-pharmacointerventions\  Would like to decrease midodrine to reduce supine hypertension, however having incomplete  therapeutic response to orthostatic hypotension while standing  Increase head of bed to 30-45' to avoid supine HTN  tense the legs and contract abdominal and buttock muscles while actively standing to alleviate symptoms   Continue current dose of corlanor 7.5mg BID  Continue midodrine 10mg every 4 hours when awake    2. Recurrent falls  Continue to use walker  Continue use of midodrine during the day  Needs to continue PT to avoid deconditioning which exacerbates he       41 minutes of professional services as part of today's visit    Zachary Bender MD

## 2025-05-23 ENCOUNTER — OFFICE VISIT (OUTPATIENT)
Dept: ORTHOPEDICS | Facility: CLINIC | Age: 60
End: 2025-05-23
Payer: COMMERCIAL

## 2025-05-23 ENCOUNTER — HOSPITAL ENCOUNTER (OUTPATIENT)
Facility: HOSPITAL | Age: 60
Discharge: HOME | End: 2025-05-23
Attending: SURGERY
Payer: COMMERCIAL

## 2025-05-23 VITALS — HEIGHT: 68 IN | BODY MASS INDEX: 22.42 KG/M2 | WEIGHT: 147.94 LBS

## 2025-05-23 DIAGNOSIS — S92.251G: ICD-10-CM

## 2025-05-23 DIAGNOSIS — R52 PAIN: ICD-10-CM

## 2025-05-23 DIAGNOSIS — S92.251A CLOSED DISPLACED FRACTURE OF NAVICULAR BONE OF RIGHT FOOT, INITIAL ENCOUNTER: Primary | ICD-10-CM

## 2025-05-23 DIAGNOSIS — R52 PAIN: Primary | ICD-10-CM

## 2025-05-23 PROCEDURE — 99999 PR PBB SHADOW E&M-EST. PATIENT-LVL IV: CPT | Mod: PBBFAC,,, | Performed by: SURGERY

## 2025-05-23 PROCEDURE — 73630 X-RAY EXAM OF FOOT: CPT | Mod: 26,RT,, | Performed by: RADIOLOGY

## 2025-05-23 PROCEDURE — 73630 X-RAY EXAM OF FOOT: CPT | Mod: TC,PN,RT

## 2025-05-23 PROCEDURE — 3008F BODY MASS INDEX DOCD: CPT | Mod: CPTII,S$GLB,, | Performed by: SURGERY

## 2025-05-23 PROCEDURE — 99214 OFFICE O/P EST MOD 30 MIN: CPT | Mod: S$GLB,,, | Performed by: SURGERY

## 2025-05-23 NOTE — PROGRESS NOTES
[facility]  Clinic Note    Date of Service: 5/26/2025  Patient seen at the request of: Zachary Bender MD    Reason for Consultation  Epilepsy    Assessment:  Thelma Nguyen is a 59 y.o. female who presents with epilepsy.    Patient presents with a history of epilepsy that started in 1984 secondary to glioblastoma in the left temporal lobe.  The glioblastoma was resected in 1985.  Patient was initially started on phenytoin for her seizures with an attempt to wean off the medication after being 4 years seizure-free.  Patient subsequently had another convulsion in 1995.  She has had no other seizures since being back on Dilantin.    Although the patient has been seizure-free on phenytoin, she has since developed osteoporosis.  Due to the significant side effect profile of Dilantin, tried switching to lacosamide first.  Lacosamide lead to increased dizziness, orthostatic hypotension, and an increase in falls.  Will transition to levetiracetam.    After switching from Dilantin, patient reports new potential hallucinations (hearing crickets and sometimes a duck sound).  Patient had hallucinations of voices prior to discovering the GBM.  Query focal seizure.  During EMU monitoring stay in 4/2025, patient's episodes of falls were found to not be epileptic on EEG.      EMU 4/2025: EEG with no epileptiform activity and no seizures. Typical event captured upon standing with generalized slowing c/w hypoperfusion, no epileptiform activity, BP c/w known orthostasis.     Patient has concurrent kidney disease and we will dose accordingly.  Creatinine 2.2 (12/13/2024)  CrCl is 28 mL/min    Cr 1.7 (2/14/2025)  CrCl is now 37 mL/min    Family history of migraine in mother and daughter.  Patient reports daily headache.  Migraines improved with amitriptyline.  She reports some persistent migraines.  Will prescribe Ubrelvy due to triptans being contraindicated with patient's comorbid history of DVTs and Factor V  deficiency.      Plan:    - increase Keppra to 500mg twice daily (based on CrCl)  - keppra level adequate at this dose    - Ubrelvy 50mg as needed  - amitriptyline 10 mg nightly      Transfer to more permanent neurologist    SEIZURE/EVENT PRECAUTIONS INCLUDE:  NO DRIVING until approximately 3-6 months have passed WITHOUT a seizure.   If you have a seizure, you will need to wait another 3 months to be eligible to drive again.  Avoid bathing or swimming alone or unsupervised.  Avoid cooking over large ranges or open flames.    Avoid climbing up heights unsupervised.  Avoid operating heavy machinery.     SEIZURE SAFETY:  When an epileptic seizure occurs, the following should be done to prevent injuries:  Do not hold or tie the person down.  Do not place anything in the person's mouth or try to force the teeth apart. The person is not in danger of swallowing his tongue.  Do not pour any liquid into the persons mouth or offer food or medicines until he is fully awake.  If possible, turn the person on his side during the seizure.  Place something soft under the person's head, loosen tight clothing, and clear the area of sharp or hard objects.  Stay with the person until the seizure ends. Let the person rest until he is fully awake.  Use a watch to time how long the seizure lasts.   Watch the type of movement and position of the person's head or eyes during the seizure.      Signed:    Buddy Kumar MD  Neurology/Epilepsy  05/26/2025 9:20 AM      Interval Events:  - they report episodes of staring when standing up from sitting position associated with falls        HPI:  Thelma Nguyen is a 59 y.o. female with   Past Medical History:   Diagnosis Date    Allergic rhinitis     Anemia 1987    Arthritis 2000    Basal cell carcinoma     Broken ankle     Cancer 1985    Clotting disorder 1993    Disorder of kidney and ureter     DVT (deep venous thrombosis)     Factor V deficiency     Fracture of elbow     left     Glioblastoma multiforme of brain     Hypothyroidism     IBS (irritable bowel syndrome) 1975    Osteoporosis     Panhypopituitarism     Peripheral polyneuropathy     Secondary adrenal insufficiency     Seizures 1995    Thyroid disease 1987     58 y.o. 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    First seizure: in the 1984 due to glioblastoma.  Surgery was in 1985.    Semiology: First seizures were generalized convulsions.  Patient was then on Dilantin for 4 years and then was discontinued.  Patient then had another convulsion in 1995.  Patient has also had some staring episodes that have resolved.    1st type:   - Aura: none  - Triggers: none identified  - Frequency: last seizure was in the 1990s  - Maximum time seizure free: currently over 25 years    Seizure Risk Factors:   Temporal lobe GBM s/p resection    Previous Anti-Epileptic Medication:    Current medications: Dilantin  Past medications: gabapentin, phenobarbital  Driving:       Patient was diagnosed in February with a broken right foot.  She got surgery and then subsequently re-broke the foot and got surgery again.  DXA scan in 8/2024 was consistent with osteoporosis.      This is the extent of the patient's complaints at this time.    TSH   Date Value Ref Range Status   04/01/2024 1.716 0.400 - 4.000 uIU/mL Final   11/29/2021 <0.026 (L) 0.400 - 4.000 uIU/mL Final     Comment:     Warning:  Heterophilic antibodies in serum or plasma of   certain individuals are known to cause interference with   immunoassays. These antibodies may be present in blood samples   from individuals regularly exposed to animal or who have been   treated with animal products.     Patients taking high doses of supplemental biotin may have  negatively biased results.        Vitamin B-12   Date Value Ref Range Status   04/05/2024 >2000 (H) 210 - 950 pg/mL Final    2023 >2000 (H) 210 - 950 pg/mL Final         Review of Systems:  ROS negative unless noted in HPI    Past Surgical History:  Past Surgical History:   Procedure Laterality Date    APPENDECTOMY      BRAIN SURGERY      for glioblastoma      SECTION      CHOLECYSTECTOMY  2007    CLOSURE OF DEFECT OF MOHS PROCEDURE Left 2018    Procedure: CLOSURE, MOHS PROCEDURE DEFECT SCALP  Flap closure;  Surgeon: Yrn Novak MD;  Location: [address];  Service: Plastics;  Laterality: Left;    COLONOSCOPY N/A 2019    Procedure: COLONOSCOPY Suprep;  Surgeon: Tony Mosher MD;  Location: [facility] ENDO;  Service: Endoscopy;  Laterality: N/A;    ESOPHAGOGASTRODUODENOSCOPY N/A 2019    Procedure: EGD/colon;  Surgeon: Tony Mosher MD;  Location: [facility];  Service: Endoscopy;  Laterality: N/A;    EXCISION OF GANGLION CYST OF HAND Left 2020    Procedure: EXCISION, GANGLION CYST, HAND;  Surgeon: Ross Drew Jr., MD;  Location: [facility];  Service: Orthopedics;  Laterality: Left;    HYSTERECTOMY  1999    ILIAC CREST BONE GRAFT Right 3/15/2024    Procedure: BONE GRAFT, ILIAC CREST;  Surgeon: Geoffrey Goodson MD;  Location: [facility] OR;  Service: Orthopedics;  Laterality: Right;  acumed harvester    ILIAC CREST BONE GRAFT Right 2024    Procedure: BONE GRAFT, ILIAC CREST;  Surgeon: Geoffrey Goodson MD;  Location: [facility];  Service: Orthopedics;  Laterality: Right;    MIDFOOT ARTHRODESIS Right 2024    Procedure: FUSION, JOINT, MIDFOOT;  Surgeon: Geoffrey Goodson MD;  Location: [facility] OR;  Service: Orthopedics;  Laterality: Right;  mini c arm    OPEN REDUCTION AND INTERNAL FIXATION (ORIF) OF FRACTURE OF TARSAL BONE Right 3/15/2024    Procedure: ORIF, FRACTURE, TARSAL BONE;  Surgeon: Geoffrey Goodson MD;  Location: [facility];  Service: Orthopedics;  Laterality: Right;  supine, mini C arm, Styker, block okay    SHOULDER SURGERY Right     TILT TABLE TEST N/A 07/15/2022    Procedure:  TILT TABLE TEST;  Surgeon: Amol Kohler MD;  Location: [facility] EP LAB;  Service: Cardiology;  Laterality: N/A;  Orthostatic hypotension, dizziness, Tilt Table Test with EEG, Dr.Tiffany Roca (neuro), DM    TUBAL LIGATION  1998       Family History:  Family History   Problem Relation Name Age of Onset    Heart disease Mother      Diabetes type II Mother      Diabetes type II Father      Heart attack Maternal Grandfather         Social History:  Social History     Tobacco Use    Smoking status: Never    Smokeless tobacco: Never   Substance Use Topics    Alcohol use: No    Drug use: No       Allergies:  Neurontin [gabapentin], Lactose, and Soap    Outpatient Medications:  Prior to Admission medications    Medication Sig Start Date End Date Taking? Authorizing Provider   acetaminophen (TYLENOL) 500 MG tablet Take 1 tablet (500 mg total) by mouth every 6 (six) hours. 3/21/24   Geoffrey Goodson MD   BIOTIN ORAL Take by mouth.    Provider, Historical   calcitRIOL (ROCALTROL) 0.25 MCG Cap Take 1 capsule (0.25 mcg total) by mouth once daily. 6/3/24 10/1/24  Hanna Tinajero MD   calcitRIOL (ROCALTROL) 0.25 MCG Cap Take 1 capsule (0.25 mcg total) by mouth once daily. for 90 doses 9/4/24 12/3/24  Swetha Bay MD   calcium carbonate (TUMS) 200 mg calcium (500 mg) chewable tablet Take 1 tablet (500 mg total) by mouth 3 (three) times daily as needed for Heartburn. 4/11/24 6/13/24  Rajni Lobo, NP   cholecalciferol, vitamin D3, (VITAMIN D3) 50 mcg (2,000 unit) Cap capsule Take 2,000 Units by mouth every evening.    Provider, Historical   cyanocobalamin (VITAMIN B-12) 100 MCG tablet Take 100 mcg by mouth once daily.    Provider, Historical   dexAMETHasone (DECADRON) 4 mg/mL injection Inject 1ml (4mg) into the muscle for symptoms of severe adrenal insufficiency. 3ml syringe w/18 g needle to draw x10 and 21g 1 inch needle to injection x10 6/11/24   Zachary Bender MD   DULoxetine (CYMBALTA) 30 MG capsule TAKE 2  CAPSULES (60 MG TOTAL) BY MOUTH ONCE DAILY.  Patient taking differently: Take 60 mg by mouth every evening. 3/15/24   Sagar Taylor MD   ergocalciferol (ERGOCALCIFEROL) 50,000 unit Cap Take 1 capsule (50,000 Units total) by mouth every 7 days. 6/13/24   Hanna Tinajero MD   ferrous sulfate (IRON, FERROUS SULFATE,) 325 mg (65 mg iron) Tab tablet Take 1 tablet (325 mg total) by mouth 2 (two) times daily. 6/13/24   Hanna Tinajero MD   ferrous sulfate 324 mg (65 mg iron) TbEC Take 325 mg by mouth nightly.     Provider, Historical   hydrocortisone (CORTEF) 10 MG Tab Take 1 and 1/2 tablet by mouth every morning and 1/2 tablet in the afternoon    Provider, Historical   hyoscyamine (LEVSIN/SL) 0.125 mg Subl PLACE 1 TABLET (0.125 MG TOTAL) UNDER THE TONGUE EVERY 4 (FOUR) HOURS AS NEEDED. 8/28/23   Zachary Bender MD   ivabradine (CORLANOR) 5 mg Tab Take 1 tablet (5 mg total) by mouth 2 (two) times daily. 6/14/24   Viktor Cohen MD   ivabradine (CORLANOR) 5 mg Tab Take 1 tablet (5 mg total) by mouth 2 (two) times daily. 6/17/24   Viktor Cohen MD   ivabradine (CORLANOR) 7.5 mg Tab Take 1 tablet (7.5 mg total) by mouth 2 (two) times daily. 7/8/24   May, DEE YanceyP-C   levothyroxine (SYNTHROID) 112 MCG tablet Take 1 tablet (112 mcg total) by mouth before breakfast. 2/27/24 2/26/25  Joan Serrano MD   melatonin 5 mg TbDL Take 1 tablet by mouth nightly.    Provider, Historical   midodrine (PROAMATINE) 5 MG Tab Take 4 doses a day 3 hours apart 6/14/24   Viktor Cohen MD   phenytoin (DILANTIN) 100 MG ER capsule Take 2 capsules (200 mg total) by mouth nightly. 12/28/23 12/27/24  Cecile Walton, BETTY   rivaroxaban (XARELTO) 20 mg Tab Take 1 tablet (20 mg total) by mouth once daily. 6/17/24   Zachary Bender MD   UNABLE TO FIND Take 1 tablet by mouth Daily. medication name: Biotin 2500mcg    Provider, Historical       Physical exam:    Vitals: /67 (BP Location: Left arm,  Patient Position: Sitting)   Pulse 96   Wt 67.6 kg (149 lb)   LMP 10/23/1997 (Exact Date)   BMI 22.66 kg/m²     General:   Sitting in chair, in no distress, well-nourished, well-developed, appears stated age.  Head/Neck:   Normocephalic,atraumatic  Pulm:  Non-labored breathing     Mental Status: Alert and oriented to person, time, place, situation. Speech spontaneous and fluent without paraphasias; no dysarthria  CN:  II: visual fields full  III, IV, VI: EOM intact without nystagmus or diplopia.   V: Sensation to light touch full and symmetric in V1-3. Masseter contraction full bilaterally.   VII: Facial movement full and symmetric.   VIII: Hearing grossly normal to conversation.  IX, X: Palate midline with symmetric elevation.    XI: SCM and trapezius: 5/5 bilaterally.   XII: Tongue midline without fasciculations.  Motor: Normal bulk and tone throughout all four extremities.   Finger tapping worse on the left  Fist open and close normal bilaterally  Toe tapping worse on the left  Heel tapping worse on the left    RUE: D: 5/5; B: 5/5; T:  5/5; WF:5/5; WE:  5/5; IO: 5/5   LUE: D: 5/5; B: 5/5; T:  5/5; WF: 5/5; WE:  5/5; IO: 5/5   RLE: HF: 5/5, KE: 5/5, KF: 5/5, DF: 5/5, PF: 5/5  LLE: HF: 5/5, KE: 5/5, KF: 5/5, DF: 5/5, PF: 5/5  No tremors   Sensory: Intact and symmetric to light touch throughout.  Reflexes: RUE: Triceps 2+, biceps 2+, brachioradialis 2+  LUE: Triceps 2+, biceps 2+ brachioradialis 2+  RLE: Knee 2+, ankle 2+  LLE: Knee 2+, ankle 2+  Coordination:  Intact and symmetric to finger-to-nose and heel-to-shin.  Gait:  Intact to casual gait.    Imaging:  All pertinent imaging was personally reviewed.    Results for orders placed during the hospital encounter of 05/19/16    MRI Brain Without Contrast    Narrative  MRI BRAIN WITHOUT CONTRAST    Date: 05/19/16 09:57:28    History:  Seizures., epilepsy and history of brain tumor    Technique:  Standard multiplanar noncontrast sequences of the brain no previous  for comparison.    Findings:  The ventricles are nonenlarged.  There is minor periventricular and pontine white matter hyperintensity.    There are postoperative changes consistent with a left craniotomy site.    There is a small cystic area of encephalomalacia involving the posterior left temporal lobe cortex grossly measuring approximately 1.5 x 3.0 cm in size.  The marginal signal is hyperintense.  No restricted diffusion is seen.  Without contrast difficult to assess for recurrent or residual tumor mass.  Please correlate with surgical history.    No additional findings.  IMPRESSION:  No acute intracranial process.  Evidence of left craniotomy with focal area of postoperative encephalomalacia left posterior temporal lobe convexity.  Minor residual marginal T2 signal hyperintensity.    Minor periventricular and pontine white matter hyperintensity, usually related to small vessel ischemic degeneration.  ______________________________________    Electronically signed by: FRANSICO GRANT MD  Date:     05/19/16  Time:    11:08    No results found for this or any previous visit.    Results for orders placed during the hospital encounter of 04/01/24    MRI Brain W WO Contrast    Narrative  EXAMINATION:  MRI BRAIN W WO CONTRAST    CLINICAL HISTORY:  Syncope, recurrent;    TECHNIQUE:  Multiplanar multisequence MR imaging of the brain was performed before and after the administration of 7.5 mL Gadavist intravenous contrast.    COMPARISON:  CT head 04/01/2024.  MRI brain 05/28/2021 and 05/19/2016.    FINDINGS:  Parenchyma: There is no restricted diffusion to suggest acute or subacute ischemic infarct.Few nonspecific foci of T2/FLAIR hyperintense signal in the frontal parietal white matter and lisandro may reflect sequela of chronic small vessel ischemic disease.Left temporal and occipital lobe encephalomalacia and gliosis, suggested small volume remote blood products, as before.    Additional comments: There is no midline  shift, abnormal extra-axial fluid collection, or acute intracranial hemorrhage. The basal cisterns are patent.    Ventricles: Normal.    Flow voids: The normal major intracranial arterial flow voids are visualized.    Enhancement: No abnormal intra-axial or extra-axial enhancement is identified.  Question developmental venous anomaly in the left upper lobe.    Sinuses and mastoid air cells: Relatively modest paranasal sinus mucosal thickening with small retention cysts.    Orbits: Normal    Midline structures: The pituitary and craniocervical junction are normal.    Marrow: Normal.  Redemonstrated remote left craniotomy sequela.    Impression  1. No definite acute intracranial findings identified to account for the patient's reported symptoms.  2. Overall, no significant change relative to prior MRI performed 05/28/2021.      Electronically signed by: Humza Mckeon  Date:    04/06/2024  Time:    09:01      Results for orders placed during the hospital encounter of 05/28/21    MRI Brain W WO Contrast    Narrative  EXAMINATION:  MRI BRAIN W WO CONTRAST    CLINICAL HISTORY:  Brain/CNS neoplasm, surveillance;  Malignant neoplasm of brain, unspecified    FINDINGS:  The diffusion sequence is normal without evidence of acute ischemia or infarct.  Pituitary craniocervical junction and midline structures are unremarkable.  There is some blood products lining the left resection cavity likely hemosiderin.  There is minimal small vessel ischemic change.  All skull base flow voids are accounted for posterior fossa structures are unremarkable.  Post-contrast images demonstrate no significant abnormal enhancement.    Impression  Stable postsurgical change.  There is no evidence of recurrent or residual neoplasm.      Electronically signed by: Juan Guthrie MD  Date:    05/28/2021  Time:    14:07      Results for orders placed during the hospital encounter of 11/20/17    MRI Brain W WO Contrast    Narrative  MRI BRAIN W WO  CONTRAST,    Date: 11/20/17 14:35:29    History:   C71.9 Malignant neoplasm of brain, unspecified;, G40.909 Epilepsy, unspecified, not intractable, without status epilepticus;    Technique:  Standard multiplanar pre and post contrast sequences of the brain performed with 6 cc Gadavist.    Comparison: 05/19/2016 noncontrast MR    Findings:  The ventricles are nondilated.  The corpus callosum is intact.    There is a focal area of cystic encephalomalacia involving the superficial cortex of the posterior left temporal lobe.  This area measures approximately 1.6 x 2.6 cm transaxially.  There is marginal increased T2 signal.  No abnormal enhancement is seen.  No associated restricted diffusion is seen either.    No significant hemosiderin deposition is seen.    Central pontine white matter hyperintensity is present.  IMPRESSION:  Postsurgical encephalomalacia left temporal lobe with marginal hyperintense T2 signal.  No abnormal enhancement or mass effect.  No detrimental interval change.      Electronically signed by: FRANSICO GRANT MD  Date:     11/20/17  Time:    16:10

## 2025-05-26 ENCOUNTER — OFFICE VISIT (OUTPATIENT)
Dept: NEUROLOGY | Facility: CLINIC | Age: 60
End: 2025-05-26
Payer: COMMERCIAL

## 2025-05-26 VITALS
SYSTOLIC BLOOD PRESSURE: 110 MMHG | BODY MASS INDEX: 22.66 KG/M2 | WEIGHT: 149 LBS | DIASTOLIC BLOOD PRESSURE: 67 MMHG | HEART RATE: 96 BPM

## 2025-05-26 DIAGNOSIS — G40.209 PARTIAL SYMPTOMATIC EPILEPSY WITH COMPLEX PARTIAL SEIZURES, NOT INTRACTABLE, WITHOUT STATUS EPILEPTICUS: Primary | ICD-10-CM

## 2025-05-26 PROCEDURE — 99213 OFFICE O/P EST LOW 20 MIN: CPT | Mod: S$GLB,,, | Performed by: STUDENT IN AN ORGANIZED HEALTH CARE EDUCATION/TRAINING PROGRAM

## 2025-05-26 PROCEDURE — 3008F BODY MASS INDEX DOCD: CPT | Mod: CPTII,S$GLB,, | Performed by: STUDENT IN AN ORGANIZED HEALTH CARE EDUCATION/TRAINING PROGRAM

## 2025-05-26 PROCEDURE — 1159F MED LIST DOCD IN RCRD: CPT | Mod: CPTII,S$GLB,, | Performed by: STUDENT IN AN ORGANIZED HEALTH CARE EDUCATION/TRAINING PROGRAM

## 2025-05-26 PROCEDURE — 3078F DIAST BP <80 MM HG: CPT | Mod: CPTII,S$GLB,, | Performed by: STUDENT IN AN ORGANIZED HEALTH CARE EDUCATION/TRAINING PROGRAM

## 2025-05-26 PROCEDURE — 99999 PR PBB SHADOW E&M-EST. PATIENT-LVL IV: CPT | Mod: PBBFAC,,, | Performed by: STUDENT IN AN ORGANIZED HEALTH CARE EDUCATION/TRAINING PROGRAM

## 2025-05-26 PROCEDURE — 3074F SYST BP LT 130 MM HG: CPT | Mod: CPTII,S$GLB,, | Performed by: STUDENT IN AN ORGANIZED HEALTH CARE EDUCATION/TRAINING PROGRAM

## 2025-05-26 RX ORDER — LEVETIRACETAM 500 MG/1
500 TABLET ORAL 2 TIMES DAILY
Qty: 180 TABLET | Refills: 3 | Status: SHIPPED | OUTPATIENT
Start: 2025-05-26 | End: 2026-05-26

## 2025-05-26 RX ORDER — UBROGEPANT 50 MG/1
50 TABLET ORAL
Qty: 16 TABLET | Refills: 2 | Status: SHIPPED | OUTPATIENT
Start: 2025-05-26

## 2025-05-27 NOTE — PROGRESS NOTES
Name: Thelma Nguyen  MRN: [id number]   CSN: [id number]      Date: 05/28/2025    Referring physician:  No referring provider defined for this encounter.    Chief Complaint: tremor     History of Present Illness (HPI): Thelma Nguyen is a R handed 59 y.o. female with a medical issues significant for IBS, history of GBM rescetion, seizures who presents for tremors. Here with spouse. Tremors in hands and legs for about a year, worse in hands. Able to suppress for a few seconds. Tremor is at rest. Diagnosed with OH in 2022, on hydrocortisone and midodrine. Endorses micrographia. Falls whenever she is lightheaded and has to sit down. Some falls without lightheaded feeling. Feels like she moves slower. Shuffles her feet. Feels like L hand is weak. Has trouble with sewing because she can't get on the floor and cut. Has trouble getting up once she is on the floor. Difficulty with earrings.   Midodrine 10 mg up to three times per day. Checks her BP before. If systolic over 180, she skips the dose.     History of GBM, on hydrocortisone due to damage to pituitary.     Scheduled to see Dr. Braulio Aggarwal at [facility] -- autonomic specialist. Scheduled for testing in June. Previously followed with Dr. Annemarie Cuello.     Scheduled to establish with Dr. Singletary for seizures.     Family History: mother has RA    Neuroleptic Exposure:     From 5/26/2025  Thelma Nguyen is a 59 y.o. female who presents with epilepsy.     Patient presents with a history of epilepsy that started in 1984 secondary to glioblastoma in the left temporal lobe.  The glioblastoma was resected in 1985.  Patient was initially started on phenytoin for her seizures with an attempt to wean off the medication after being 4 years seizure-free.  Patient subsequently had another convulsion in 1995.  She has had no other seizures since being back on Dilantin.     Although the patient has been seizure-free on phenytoin, she has since developed  osteoporosis.  Due to the significant side effect profile of Dilantin, tried switching to lacosamide first.  Lacosamide lead to increased dizziness, orthostatic hypotension, and an increase in falls.  Will transition to levetiracetam.     After switching from Dilantin, patient reports new potential hallucinations (hearing crickets and sometimes a duck sound).  Patient had hallucinations of voices prior to discovering the GBM.  Query focal seizure.  During EMU monitoring stay in 4/2025, patient's episodes of falls were found to not be epileptic on EEG.       EMU 4/2025: EEG with no epileptiform activity and no seizures. Typical event captured upon standing with generalized slowing c/w hypoperfusion, no epileptiform activity, BP c/w known orthostasis.      Patient has concurrent kidney disease and we will dose accordingly.  Creatinine 2.2 (12/13/2024)  CrCl is 28 mL/min     Cr 1.7 (2/14/2025)  CrCl is now 37 mL/min     Family history of migraine in mother and daughter.  Patient reports daily headache.  Migraines improved with amitriptyline.  She reports some persistent migraines.  Will prescribe Ubrelvy due to triptans being contraindicated with patient's comorbid history of DVTs and Factor V deficiency.        Nonmotor/Premotor ROS:  Anosmia: poor sense of smell   Dysarthria/Hypophonia: softer speech   Dysphagia/Sialorrhea: some sialorrhea   Depression: cannot do things she would like to due to physical ability   Urinary changes: frequency, urgency -- nocturia   Constipation: yes  Orthostasis: yes, on midodrine and hydrocortisone   Falls: yes, some due to orthostasis   Micrographia: yes  Sleep issues:  -RBD: more vocalizations in her sleep     Review of Systems:   Review of Systems   Constitutional:  Negative for chills, fever and malaise/fatigue.   HENT:  Negative for hearing loss.    Eyes:  Negative for blurred vision and double vision.   Respiratory:  Negative for cough, shortness of breath and stridor.   "  Cardiovascular:  Negative for chest pain and leg swelling.   Gastrointestinal:  Positive for constipation. Negative for diarrhea and nausea.   Genitourinary:  Positive for frequency and urgency.   Musculoskeletal:  Positive for falls.   Skin:  Negative for itching and rash.   Neurological:  Positive for dizziness and tremors. Negative for loss of consciousness and weakness.   Psychiatric/Behavioral:  Negative for hallucinations and memory loss.            Past Medical History: The patient  has a past medical history of Allergic rhinitis, Anemia (1987), Arthritis (2000), Basal cell carcinoma, Broken ankle, Cancer (1985), Clotting disorder (1993), Disorder of kidney and ureter, DVT (deep venous thrombosis), Factor V deficiency, Fracture of elbow, Glioblastoma multiforme of brain, Hypothyroidism, IBS (irritable bowel syndrome) (1975), Osteoporosis, Panhypopituitarism, Peripheral polyneuropathy, Secondary adrenal insufficiency, Seizures (1995), and Thyroid disease (1987).    Social History: The patient  reports that she has never smoked. She has never used smokeless tobacco. She reports that she does not drink alcohol and does not use drugs.    Family History: Their family history includes Diabetes type II in her father and mother; Heart attack in her maternal grandfather; Heart disease in her mother.    Allergies: Neurontin [gabapentin], Lactose, and Soap     Meds: Medications Ordered Prior to Encounter[1]    Exam:  BP (!) 164/81   Pulse 97   Ht 5' 8" (1.727 m)   Wt 68 kg (150 lb)   LMP 10/23/1997 (Exact Date)   BMI 22.81 kg/m²     Constitutional  Well-developed, well-nourished, appears stated age   Ophthalmoscopic  No papilledema with no hemorrhages or exudates bilaterally   Cardiovascular  Radial pulses 2+ and symmetric, no LE edema bilaterally   Neurological    * Mental status  MOCA =      - Orientation  Oriented to person, place, time, and situation     - Memory   Intact recent and remote     - " Attention/concentration  Attentive, vigilant during exam     - Language  Naming & repetition intact, +2-step commands     - Fund of knowledge  Aware of current events     - Executive  Well-organized thoughts     - Other     * Cranial nerves       - CN II  PERRL, visual fields full to confrontation     - CN III, IV, VI  Extraocular movements full, normal pursuits and saccades     - CN V  Sensation V1 - V3 intact     - CN VII  Face strong and symmetric bilaterally     - CN VIII  Hearing intact bilaterally     - CN IX, X  Palate raises midline and symmetric     - CN XI  SCM and trapezius 5/5 bilaterally     - CN XII  Tongue midline   * Motor  Muscle bulk normal, strength 5/5 throughout   * Sensory   Intact to temperature and vibration throughout   * Coordination  No dysmetria with finger-to-nose or heel-to-shin   * Gait  See below.   * Deep tendon reflexes  2+ and symmetric throughout   Babinski downgoing bilaterally   * Specialized movement exam  + hypophonic speech.    +  facial masking.   L > R cogwheel rigidity.     L > R bradykinesia.   No tremor with rest, posture, kinesis, or intention.    Dystonic posturing of L hand    No other dystonia, chorea, athetosis, myoclonus, or tics.   No motor impersistence.   Shuffling gait    Decreased arm swing bilaterally    L > R      Laboratory/Radiological:  - Results:  Procedure visit on 04/30/2025   Component Date Value Ref Range Status    Case Report 04/30/2025    Final                    Value:[facility]                           Case: [id number]                                Authorizing Provider:  Ariana Winn MD    Collected:           04/30/2025 02:36 PM          Ordering Location:     [facility]  Received:            04/30/2025 05:59 PM                                 [place]                                                                           Pathologist:           Karen Pimentel MD                                                  "           Specimen:    Forehead                                                                                   Final Diagnosis 04/30/2025    Final                    Value:1. Skin, forehead, excision:    - COMPATIBLE WITH ENCAPSULATED FAT NECROSIS (see comment)     Comment:  The patient's history of prior trauma to this area is noted.  Encapsulated fat necrosis is a benign posttraumatic change.    This lesion is benign.       Microscopic Description 04/30/2025    Final                    Value:1. Sections show a multiloculated cystic space involving the deep dermis and subcutis.  The cystic space is lined by a dense fibrous pseudo wall.  The inner surface of the fibrous wall is lined by an irregular layer of acellular gray material with a pattern reminiscent of lipomembranous fat necrosis.  Foci of conventional pauci inflammatory fat necrosis are also present.        Gross Description 04/30/2025    Final                    Value:1. Forehead: Forehead  MRN # on Epic Label:  [id number]  MRN # on Pathology Label:  [id number]    The specimen is received in formalin labeled forehead. The specimen consists of 1 tan-yellow to red-brown soft tissue fragment measuring 1.2 x 0.8 x 0.6 cm. The specimen is serially sectioned to reveal a white-gray to tan-yellow, smooth, semi-firm cut surface with multiple pink-red areas of discoloration covering approximately 25-30% of the cut surfaces. The specimen is submitted entirely in cassette 1A.    Grossing was completed by Brittany Bansal on 4/30/2025.        Report Footnotes 04/30/2025    Final                    Value:Unless the case is a "gross only" or additional testing only, the final diagnosis for each specimen is based on a microscopic examination of appropriate tissue sections.      Performing Location 04/30/2025    Final                    Value:Grossing performed at [facility], [address] [place], [address]    Sign out performed at [facility], " [address] [place], [address]     Admission on 04/15/2025, Discharged on 04/16/2025   Component Date Value Ref Range Status    Sodium 04/15/2025 140  136 - 145 mmol/L Final    Potassium 04/15/2025 4.3  3.5 - 5.1 mmol/L Final    Chloride 04/15/2025 109  95 - 110 mmol/L Final    CO2 04/15/2025 19 (L)  23 - 29 mmol/L Final    Glucose 04/15/2025 114 (H)  70 - 110 mg/dL Final    BUN 04/15/2025 32 (H)  6 - 20 mg/dL Final    Creatinine 04/15/2025 2.1 (H)  0.5 - 1.4 mg/dL Final    Calcium 04/15/2025 8.4 (L)  8.7 - 10.5 mg/dL Final    Protein Total 04/15/2025 6.9  6.0 - 8.4 gm/dL Final    Albumin 04/15/2025 3.3 (L)  3.5 - 5.2 g/dL Final    Bilirubin Total 04/15/2025 0.2  0.1 - 1.0 mg/dL Final    ALP 04/15/2025 103  40 - 150 unit/L Final    AST 04/15/2025 20  11 - 45 unit/L Final    ALT 04/15/2025 26  10 - 44 unit/L Final    Anion Gap 04/15/2025 12  8 - 16 mmol/L Final    eGFR 04/15/2025 27 (L)  >60 mL/min/1.73/m2 Final    hCG Qualitative, Urine 04/15/2025 Negative  Negative Final    Benzodiazepine, Urine 04/15/2025 Negative  Negative Final    Methadone, Urine 04/15/2025 Negative  Negative Final    Cocaine, Urine 04/15/2025 Negative  Negative Final    Opiates, Urine 04/15/2025 Negative  Negative Final    Barbiturates, Urine 04/15/2025 Negative  Negative Final    Amphetamines, Urine 04/15/2025 Negative  Negative Final    THC 04/15/2025 Negative  Negative Final    Phencyclidine, Urine 04/15/2025 Negative  Negative Final    Urine Creatinine 04/15/2025 125.0  15.0 - 325.0 mg/dL Final    QRS Duration 04/15/2025 82  ms Final    [facility] QTC Calculation 04/15/2025 448  ms Final    Levetiracetam 04/15/2025 46.4  3.0 - 60.0 ug/mL Final    WBC 04/15/2025 7.41  3.90 - 12.70 K/uL Final    RBC 04/15/2025 2.92 (L)  4.00 - 5.40 M/uL Final    HGB 04/15/2025 8.3 (L)  12.0 - 16.0 gm/dL Final    HCT 04/15/2025 28.1 (L)  37.0 - 48.5 % Final    MCV 04/15/2025 96  82 - 98 fL Final    MCH 04/15/2025 28.4  27.0 - 31.0 pg Final    MCHC  04/15/2025 29.5 (L)  32.0 - 36.0 g/dL Final    RDW 04/15/2025 14.5  11.5 - 14.5 % Final    Platelet Count 04/15/2025 321  150 - 450 K/uL Final    MPV 04/15/2025 9.2  9.2 - 12.9 fL Final    Nucleated RBC 04/15/2025 0  <=0 /100 WBC Final    Neut % 04/15/2025 70.9  38 - 73 % Final    Lymph % 04/15/2025 19.0  18 - 48 % Final    Mono % 04/15/2025 6.5  4 - 15 % Final    Eos % 04/15/2025 2.7  <=8 % Final    Basophil % 04/15/2025 0.5  <=1.9 % Final    Imm Grans % 04/15/2025 0.4  0.0 - 0.5 % Final    Neut # 04/15/2025 5.25  1.8 - 7.7 K/uL Final    Lymph # 04/15/2025 1.41  1 - 4.8 K/uL Final    Mono # 04/15/2025 0.48  0.3 - 1 K/uL Final    Eos # 04/15/2025 0.20  <=0.5 K/uL Final    Baso # 04/15/2025 0.04  <=0.2 K/uL Final    Imm Grans # 04/15/2025 0.03  0.00 - 0.04 K/uL Final   Infusion on 03/17/2025   Component Date Value Ref Range Status    Sodium 03/17/2025 143  136 - 145 mmol/L Final    Potassium 03/17/2025 4.7  3.5 - 5.1 mmol/L Final    Chloride 03/17/2025 112 (H)  95 - 110 mmol/L Final    CO2 03/17/2025 23  23 - 29 mmol/L Final    Glucose 03/17/2025 105  70 - 110 mg/dL Final    BUN 03/17/2025 56 (H)  6 - 20 mg/dL Final    Creatinine 03/17/2025 1.8 (H)  0.5 - 1.4 mg/dL Final    Calcium 03/17/2025 8.8  8.7 - 10.5 mg/dL Final    Total Protein 03/17/2025 7.0  6.0 - 8.4 g/dL Final    Albumin 03/17/2025 3.3 (L)  3.5 - 5.2 g/dL Final    Total Bilirubin 03/17/2025 0.2  0.1 - 1.0 mg/dL Final    Alkaline Phosphatase 03/17/2025 98  40 - 150 U/L Final    AST 03/17/2025 19  10 - 40 U/L Final    ALT 03/17/2025 18  10 - 44 U/L Final    eGFR 03/17/2025 32.3 (A)  >60 mL/min/1.73 m^2 Final    Anion Gap 03/17/2025 8  8 - 16 mmol/L Final       - Independent review of images:      - Independent review of consultant's notes: José Bender     ASSESSMENT/PLAN:  Parkinson's disease   - L > R parkinsonism  - anosmia, constipation, possible RBD  - orthostatic hypotension   - scheduled for autonomic testing at [facility] June 10th -- hold off on  levodopa trial until after testing      2. Orthostatic hypotension   2/2 PD  Currently on midodrine 10 mg TID   Failed florinef   - scheduled to see Dr. Aggarwal for autonomic testing  - hold off on levodopa trial until after testing   - consider switching midodrine to droxidopa     3. Seizures  - stable, scheduled to establish with Dr. Singletary     CKDIIIb  - follows with nephrology, may be worsened by poor perfusion 2/2 orthostatic hypotension   - continue to monitor              Follow up: 3 months    This is a patient with a complex neurologic diagnosis whose overall, ongoing care is being managed and monitored by me and our Neurology clinic.   As such, since 2024,  is the appropriate add-on code to accompany the other E/M billing for this visit.      Collaborating Physician, Dr. Dhaliwal, was available during today's encounter. Any change to plan along with cosign to appear in the EMR.       Total time spent with the patient: 49 minutes.  36 minutes of face-to-face consultation and 13 minutes of chart review and coordination of care, on the day of the visit. This includes face to face time and non-face to face time preparing to see the patient (eg, review of tests), obtaining and/or reviewing separately obtained history, documenting clinical information in the electronic or other health record, independently interpreting resultsand communicating results to the patient/family/caregiver, or care coordination.         Lori Verdin PA-C   [facility]  Department of Neurology  Movement Disorders             [1]   Current Outpatient Medications on File Prior to Visit   Medication Sig Dispense Refill    acetaminophen (TYLENOL) 500 MG tablet Take 1 tablet (500 mg total) by mouth every 6 (six) hours. 30 tablet 1    amitriptyline (ELAVIL) 10 MG tablet Take 1 tablet (10 mg total) by mouth every evening. 30 tablet 11    BIOTIN ORAL Take by mouth.      cholecalciferol, vitamin D3, (VITAMIN D3) 50 mcg (2,000  unit) Cap capsule Take 2,000 Units by mouth every evening.      COVID-19 (COMIRNATY 2024-25, 12Y UP,,PF,) 30 mcg/0.3 mL IM vaccine (>/= 11 yo) Inject into the muscle. 0.3 mL 0    cyanocobalamin (VITAMIN B-12) 100 MCG tablet Take 100 mcg by mouth once daily.      dexAMETHasone (DECADRON) 4 mg/mL injection Inject 1ml (4mg) into the muscle for symptoms of severe adrenal insufficiency. 3ml syringe w/18 g needle to draw x10 and 21g 1 inch needle to injection x10 1 mL 11    ferrous sulfate (FEOSOL) 325 mg (65 mg iron) Tab tablet Take 1 tablet (325 mg total) by mouth 2 (two) times daily. 180 tablet 2    hydrocortisone (CORTEF) 10 MG Tab TAKE 1& 1/2 TABLETS BY MOUTH IN THE MORNING AND 1/2 TABLET IN THE AFTERNOON. DOUBLE THE DOSE IN CASE OF ILLNESS. 75 tablet 2    hyoscyamine 0.125 mg Subl Place 1 tablet (0.125 mg total) under the tongue every 4 (four) hours as needed. 540 tablet 1    ivabradine (CORLANOR) 7.5 mg Tab Take 1 tablet (7.5 mg total) by mouth 2 (two) times daily. 60 tablet 11    levETIRAcetam (KEPPRA) 500 MG Tab Take 1 tablet (500 mg total) by mouth 2 (two) times daily. 180 tablet 3    levothyroxine (SYNTHROID) 112 MCG tablet TAKE 1 TABLET BY MOUTH BEFORE BREAKFAST. 90 tablet 3    melatonin 5 mg TbDL Take 1 tablet by mouth nightly.      midodrine (PROAMATINE) 10 MG tablet Take 1 tablet (10 mg total) by mouth 4 (four) times daily. Take 4 doses a day 3 hours apart 240 tablet 1    rivaroxaban (XARELTO) 20 mg Tab Take 1 tablet (20 mg total) by mouth once daily. 90 tablet 4    ubrogepant (UBRELVY) 50 mg tablet Take 1 tablet (50 mg total) by mouth as needed for Migraine. If symptoms persist or return, may repeat dose after 2 hours. Maximum: 200 mg per 24 hours 16 tablet 2    UNABLE TO FIND Take 1 tablet by mouth Daily. medication name: Biotin 2500mcg      calcium carbonate (TUMS) 200 mg calcium (500 mg) chewable tablet Take 1 tablet (500 mg total) by mouth 3 (three) times daily as needed for Heartburn. 60 tablet 0     [DISCONTINUED] ergocalciferol (ERGOCALCIFEROL) 50,000 unit Cap Take 1 capsule (50,000 Units total) by mouth every 7 days. 7 capsule 0     No current facility-administered medications on file prior to visit.

## 2025-05-27 NOTE — PROGRESS NOTES
SUBJECTIVE:    Ms. Nguyen is here today for a follow up visit.  She presents for right shoulder pain and follow up of her left right talonavicular and naviculocunieiform fusion.    Regarding the shoulder, she has been having pain since a few months.  It is mostly with lifting objects.  She notes she is still having frequent falls secondary to her postural hypotension.  At her last visit we gave her an injection.  She feels this is much better.  Regarding her foot she is doing well. She denies any pain at this time.  Able to ambulate normally within the confines of her home, unfortunately her hypotension and frequent falls have left her with minimal ambulation and requiring significant levels of aid when not in the house.    OBJECTIVE:    Upper extremity:  Nontender to palpation about the shoulder today. Full range of motion today, negative neer's, negative belly press, internal rotation is reduced, unable to perform a lift-off test, positive AC joint tenderness.  Neurovascularly intact.    Lower Extremity Exam  Alert, oriented  Incisions well healed about the dorsum of the foot  30 plantarflexion, 20 dorsiflexion, equal to contralateral side  Denies pain, nontender to palpation  Stands without pain, Corrects with heel rise.  Uses an assistance device for her gait secondary to her frequent falls, however does not need 1 for short distances.  Ambulates with normal gait and without pain.  Her neurovascular status is at baseline    Incision about the iliac crest well healed, nontender to palpation, no numbness or tingling.     DIAGNOSTIC STUDIES:  New standing weightbearing films obtained. X-rays demonstrate healing navicular fracture and dorsal bridge plate unchanged from previous imaging.       ASSESSMENT:   1. S/p talonavicular, naviculocuneiform arthrodesis and revision of navicular ORIF.  Iliac crest bone grafting.      PLAN:  She has responded well to the shoulder injection.  We discussed obtaining an MRI in the  future versus home exercises in the future if her shoulder pain should return.  List of home exercises given.   Discussed over the counter pain medicine for the shoulder as needed.    Regarding the foot, the hardware appears stable, the dorsal bridge plate intact, and she does not have any tenderness to palpation or issues with ambulation secondary to the foot.  She continues to have dizziness and frequent episodes of syncope, which are being worked up by Cardiology, Neurology, and endocrinology.  She also continues to have Oncology appointments regarding her glioblastoma multiforme.   She is happy with her progress, as is her .    She can follow up with us in 3 months to check in on the shoulder, versus on as-needed basis.  Continue weight-bearing as tolerated.      Geoffrey Goodson MD  [facility]  Orthopedic Surgery      This note was done with voice recognition software. Please excuse any errors missed in proof reading.

## 2025-05-28 ENCOUNTER — OFFICE VISIT (OUTPATIENT)
Facility: CLINIC | Age: 60
End: 2025-05-28
Payer: COMMERCIAL

## 2025-05-28 VITALS
HEIGHT: 68 IN | HEART RATE: 97 BPM | BODY MASS INDEX: 22.73 KG/M2 | SYSTOLIC BLOOD PRESSURE: 164 MMHG | WEIGHT: 150 LBS | DIASTOLIC BLOOD PRESSURE: 81 MMHG

## 2025-05-28 DIAGNOSIS — G90.3 ORTHOSTATIC HYPOTENSION DUE TO PARKINSON DISEASE: ICD-10-CM

## 2025-05-28 DIAGNOSIS — G20.A1 PARKINSON'S DISEASE WITHOUT DYSKINESIA OR FLUCTUATING MANIFESTATIONS: Primary | ICD-10-CM

## 2025-05-28 DIAGNOSIS — G40.209 PARTIAL SYMPTOMATIC EPILEPSY WITH COMPLEX PARTIAL SEIZURES, NOT INTRACTABLE, WITHOUT STATUS EPILEPTICUS: ICD-10-CM

## 2025-05-28 DIAGNOSIS — Z71.89 COUNSELING REGARDING GOALS OF CARE: ICD-10-CM

## 2025-05-28 DIAGNOSIS — N18.32 STAGE 3B CHRONIC KIDNEY DISEASE: ICD-10-CM

## 2025-05-28 DIAGNOSIS — R26.81 GAIT INSTABILITY: ICD-10-CM

## 2025-05-28 PROCEDURE — G2211 COMPLEX E/M VISIT ADD ON: HCPCS | Mod: S$GLB,,, | Performed by: PHYSICIAN ASSISTANT

## 2025-05-28 PROCEDURE — 3008F BODY MASS INDEX DOCD: CPT | Mod: CPTII,S$GLB,, | Performed by: PHYSICIAN ASSISTANT

## 2025-05-28 PROCEDURE — 99215 OFFICE O/P EST HI 40 MIN: CPT | Mod: S$GLB,,, | Performed by: PHYSICIAN ASSISTANT

## 2025-05-28 PROCEDURE — 3077F SYST BP >= 140 MM HG: CPT | Mod: CPTII,S$GLB,, | Performed by: PHYSICIAN ASSISTANT

## 2025-05-28 PROCEDURE — 99999 PR PBB SHADOW E&M-EST. PATIENT-LVL IV: CPT | Mod: PBBFAC,,, | Performed by: PHYSICIAN ASSISTANT

## 2025-05-28 PROCEDURE — 3079F DIAST BP 80-89 MM HG: CPT | Mod: CPTII,S$GLB,, | Performed by: PHYSICIAN ASSISTANT

## 2025-05-28 PROCEDURE — 1159F MED LIST DOCD IN RCRD: CPT | Mod: CPTII,S$GLB,, | Performed by: PHYSICIAN ASSISTANT

## 2025-05-29 ENCOUNTER — PATIENT MESSAGE (OUTPATIENT)
Facility: CLINIC | Age: 60
End: 2025-05-29
Payer: COMMERCIAL

## 2025-05-30 ENCOUNTER — PATIENT MESSAGE (OUTPATIENT)
Dept: INTERNAL MEDICINE | Facility: CLINIC | Age: 60
End: 2025-05-30
Payer: COMMERCIAL

## 2025-05-30 DIAGNOSIS — I95.1 ORTHOSTATIC HYPOTENSION: ICD-10-CM

## 2025-05-30 NOTE — TELEPHONE ENCOUNTER
LOV with Yulia, Zachary MENESES MD , 5/20/2025     Patient reports difficulty sleeping, unable to get comfortable, wakes up every hour to use restroom. Would like to know if she should resume Cymbalta?    I was able to schedule patient for Virtual Visit on Tues 6/3/25 in case additional visit is needed.

## 2025-05-30 NOTE — TELEPHONE ENCOUNTER
Spoke with patient by phone. She reports her blood pressure readings remain variable, similar to prior patterns. Per cardiology instructions, she is holding Midodrine if her systolic blood pressure exceeds 180 at 10 am, 2 pm, and 7 pm. She also reports that neurology is deferring medication changes, including initiation of levodopa , until after autonomic testing scheduled for [facility].   Patient additionally reports difficulty sleeping since discontinuing Cymbalta. She is currently sleeping only a few hours at a time and waking frequently. She has a virtual appointment with Dr. Sauceda on Tuesday to discuss alternative sleep aids that will not interfere with her current medications or diagnoses.

## 2025-05-30 NOTE — TELEPHONE ENCOUNTER
Pt sent BP log after recent appt. Reviewed PCP clinic notes which read: Would like to decrease midodrine to reduce supine hypertension, however having incomplete therapeutic response to orthostatic hypotension while standing  Increase head of bed to 30-45' to avoid supine HTN  tense the legs and contract abdominal and buttock muscles while actively standing to alleviate symptoms   Continue current dose of corlanor 7.5mg BID  Continue midodrine 10mg every 4 hours when awake

## 2025-06-02 RX ORDER — MIDODRINE HYDROCHLORIDE 10 MG/1
10 TABLET ORAL 4 TIMES DAILY
Qty: 240 TABLET | Refills: 1 | Status: SHIPPED | OUTPATIENT
Start: 2025-06-02

## 2025-06-03 ENCOUNTER — OFFICE VISIT (OUTPATIENT)
Dept: INTERNAL MEDICINE | Facility: CLINIC | Age: 60
End: 2025-06-03
Payer: COMMERCIAL

## 2025-06-03 DIAGNOSIS — I95.1 ORTHOSTATIC HYPOTENSION: ICD-10-CM

## 2025-06-03 DIAGNOSIS — R35.1 NOCTURIA: Primary | ICD-10-CM

## 2025-06-03 PROCEDURE — 98006 SYNCH AUDIO-VIDEO EST MOD 30: CPT | Mod: 95,,, | Performed by: INTERNAL MEDICINE

## 2025-06-03 PROCEDURE — 1159F MED LIST DOCD IN RCRD: CPT | Mod: CPTII,95,, | Performed by: INTERNAL MEDICINE

## 2025-06-03 PROCEDURE — 1160F RVW MEDS BY RX/DR IN RCRD: CPT | Mod: CPTII,95,, | Performed by: INTERNAL MEDICINE

## 2025-06-10 ENCOUNTER — PATIENT MESSAGE (OUTPATIENT)
Dept: NEPHROLOGY | Facility: CLINIC | Age: 60
End: 2025-06-10
Payer: COMMERCIAL

## 2025-06-10 ENCOUNTER — TELEPHONE (OUTPATIENT)
Dept: NEPHROLOGY | Facility: CLINIC | Age: 60
End: 2025-06-10
Payer: COMMERCIAL

## 2025-06-10 DIAGNOSIS — N18.9 ANEMIA IN CHRONIC KIDNEY DISEASE, UNSPECIFIED CKD STAGE: ICD-10-CM

## 2025-06-10 DIAGNOSIS — D63.1 ANEMIA IN CHRONIC KIDNEY DISEASE, UNSPECIFIED CKD STAGE: ICD-10-CM

## 2025-06-10 DIAGNOSIS — N18.32 STAGE 3B CHRONIC KIDNEY DISEASE: ICD-10-CM

## 2025-06-10 DIAGNOSIS — N39.0 UTI (URINARY TRACT INFECTION), UNCOMPLICATED: Primary | ICD-10-CM

## 2025-06-10 RX ORDER — CEFPODOXIME PROXETIL 100 MG/1
100 TABLET, FILM COATED ORAL EVERY 12 HOURS
Qty: 14 TABLET | Refills: 0 | Status: SHIPPED | OUTPATIENT
Start: 2025-06-10 | End: 2025-06-17

## 2025-06-10 NOTE — TELEPHONE ENCOUNTER
Phone Encounter Note:    I spoke with the patient today regarding her recent urine studies, which showed evidence of a urinary tract infection (UTI). She reported new-onset urinary frequency, urgency, and backache. She denied fever, flank pain, or costovertebral angle tenderness.  I informed her that I would prescribe Cefpodoxime 100 mg twice daily for 7 days to treat the UTI. I also advised her to submit a urine sample for culture today, prior to starting the antibiotic, and she agreed to this plan.    In addition, we discussed her low hemoglobin level of 7.2 g/dL, which is decreased from her previous baseline. The patient denied any signs of overt gastrointestinal bleeding, including melena, hematochezia, hematemesis, bloody diarrhea, or changes in stool color. I advised that we would repeat her hemoglobin level on Thursday, prior to her upcoming clinic visit, to assess for stability and determine if urgent intervention is needed.    I will also order Cystatin C to obtain a more accurate estimate of her eGFR, and to evaluate for any discrepancy between her creatinine-based and actual renal function.

## 2025-06-10 NOTE — TELEPHONE ENCOUNTER
Informed pt that Dr. Bay ordered an antibiotic and he wants her to give a urine culture before starting the antibiotic. Lab appt. Made for Thursday -in 2 days for  CBC AND c-STATIN SERUM WITH PT.she said okay.

## 2025-06-11 ENCOUNTER — PATIENT MESSAGE (OUTPATIENT)
Facility: CLINIC | Age: 60
End: 2025-06-11
Payer: COMMERCIAL

## 2025-06-11 ENCOUNTER — HOSPITAL ENCOUNTER (OUTPATIENT)
Facility: HOSPITAL | Age: 60
Discharge: HOME | End: 2025-06-13
Attending: HOSPITALIST | Admitting: HOSPITALIST
Payer: COMMERCIAL

## 2025-06-11 ENCOUNTER — TELEPHONE (OUTPATIENT)
Dept: NEPHROLOGY | Facility: CLINIC | Age: 60
End: 2025-06-11
Payer: COMMERCIAL

## 2025-06-11 DIAGNOSIS — N30.00 ACUTE CYSTITIS WITHOUT HEMATURIA: ICD-10-CM

## 2025-06-11 DIAGNOSIS — D62 ABLA (ACUTE BLOOD LOSS ANEMIA): Primary | ICD-10-CM

## 2025-06-11 DIAGNOSIS — R07.9 CHEST PAIN: ICD-10-CM

## 2025-06-11 DIAGNOSIS — I95.1 ORTHOSTATIC HYPOTENSION: ICD-10-CM

## 2025-06-11 DIAGNOSIS — S99.921A TOE INJURY, RIGHT, INITIAL ENCOUNTER: ICD-10-CM

## 2025-06-11 DIAGNOSIS — D64.9 ANEMIA REQUIRING TRANSFUSIONS: ICD-10-CM

## 2025-06-11 DIAGNOSIS — E27.40 ADRENAL INSUFFICIENCY: ICD-10-CM

## 2025-06-11 DIAGNOSIS — G90.3 ORTHOSTATIC HYPOTENSION DUE TO PARKINSON DISEASE: ICD-10-CM

## 2025-06-11 LAB
ABO + RH BLD: NORMAL
ABSOLUTE EOSINOPHIL (OHS): 0.07 K/UL
ABSOLUTE MONOCYTE (OHS): 0.4 K/UL (ref 0.3–1)
ABSOLUTE NEUTROPHIL COUNT (OHS): 3.38 K/UL (ref 1.8–7.7)
ALBUMIN SERPL BCP-MCNC: 3.1 G/DL (ref 3.5–5.2)
ALP SERPL-CCNC: 77 UNIT/L (ref 40–150)
ALT SERPL W/O P-5'-P-CCNC: 15 UNIT/L (ref 10–44)
ANION GAP (OHS): 9 MMOL/L (ref 8–16)
AST SERPL-CCNC: 18 UNIT/L (ref 11–45)
BASOPHILS # BLD AUTO: 0.01 K/UL
BASOPHILS NFR BLD AUTO: 0.2 %
BILIRUB SERPL-MCNC: 0.2 MG/DL (ref 0.1–1)
BLD PROD TYP BPU: NORMAL
BLOOD UNIT EXPIRATION DATE: NORMAL
BLOOD UNIT TYPE CODE: 6200
BUN SERPL-MCNC: 38 MG/DL (ref 6–20)
CALCIUM SERPL-MCNC: 8.1 MG/DL (ref 8.7–10.5)
CHLORIDE SERPL-SCNC: 110 MMOL/L (ref 95–110)
CO2 SERPL-SCNC: 22 MMOL/L (ref 23–29)
CREAT SERPL-MCNC: 2 MG/DL (ref 0.5–1.4)
CROSSMATCH INTERPRETATION: NORMAL
DISPENSE STATUS: NORMAL
ERYTHROCYTE [DISTWIDTH] IN BLOOD BY AUTOMATED COUNT: 15.7 % (ref 11.5–14.5)
GFR SERPLBLD CREATININE-BSD FMLA CKD-EPI: 28 ML/MIN/1.73/M2
GLUCOSE SERPL-MCNC: 93 MG/DL (ref 70–110)
HCT VFR BLD AUTO: 19.7 % (ref 37–48.5)
HGB BLD-MCNC: 6.3 GM/DL (ref 12–16)
IMM GRANULOCYTES # BLD AUTO: 0.02 K/UL (ref 0–0.04)
IMM GRANULOCYTES NFR BLD AUTO: 0.4 % (ref 0–0.5)
INDIRECT COOMBS: NORMAL
LYMPHOCYTES # BLD AUTO: 1.3 K/UL (ref 1–4.8)
MAGNESIUM SERPL-MCNC: 2.3 MG/DL (ref 1.6–2.6)
MCH RBC QN AUTO: 30.4 PG (ref 27–31)
MCHC RBC AUTO-ENTMCNC: 32 G/DL (ref 32–36)
MCV RBC AUTO: 95 FL (ref 82–98)
NUCLEATED RBC (/100WBC) (OHS): 0 /100 WBC
PLATELET # BLD AUTO: 290 K/UL (ref 150–450)
PMV BLD AUTO: 9.9 FL (ref 9.2–12.9)
POTASSIUM SERPL-SCNC: 4.4 MMOL/L (ref 3.5–5.1)
PROT SERPL-MCNC: 6.6 GM/DL (ref 6–8.4)
RBC # BLD AUTO: 2.07 M/UL (ref 4–5.4)
RELATIVE EOSINOPHIL (OHS): 1.4 %
RELATIVE LYMPHOCYTE (OHS): 25.1 % (ref 18–48)
RELATIVE MONOCYTE (OHS): 7.7 % (ref 4–15)
RELATIVE NEUTROPHIL (OHS): 65.2 % (ref 38–73)
RH BLD: NORMAL
SODIUM SERPL-SCNC: 141 MMOL/L (ref 136–145)
SPECIMEN OUTDATE: NORMAL
TSH SERPL-ACNC: 0.15 UIU/ML (ref 0.4–4)
UNIT NUMBER: NORMAL
WBC # BLD AUTO: 5.18 K/UL (ref 3.9–12.7)

## 2025-06-11 PROCEDURE — 87086 URINE CULTURE/COLONY COUNT: CPT

## 2025-06-11 PROCEDURE — 99285 EMERGENCY DEPT VISIT HI MDM: CPT | Mod: 25

## 2025-06-11 PROCEDURE — G0378 HOSPITAL OBSERVATION PER HR: HCPCS

## 2025-06-11 PROCEDURE — P9016 RBC LEUKOCYTES REDUCED: HCPCS

## 2025-06-11 PROCEDURE — 86920 COMPATIBILITY TEST SPIN: CPT

## 2025-06-11 PROCEDURE — 84443 ASSAY THYROID STIM HORMONE: CPT

## 2025-06-11 PROCEDURE — 25000003 PHARM REV CODE 250

## 2025-06-11 PROCEDURE — 80053 COMPREHEN METABOLIC PANEL: CPT

## 2025-06-11 PROCEDURE — 83735 ASSAY OF MAGNESIUM: CPT

## 2025-06-11 PROCEDURE — 85025 COMPLETE CBC W/AUTO DIFF WBC: CPT

## 2025-06-11 PROCEDURE — 86850 RBC ANTIBODY SCREEN: CPT

## 2025-06-11 PROCEDURE — 63600175 PHARM REV CODE 636 W HCPCS

## 2025-06-11 PROCEDURE — 96374 THER/PROPH/DIAG INJ IV PUSH: CPT

## 2025-06-11 RX ORDER — HYDROCODONE BITARTRATE AND ACETAMINOPHEN 500; 5 MG/1; MG/1
TABLET ORAL
Status: DISCONTINUED | OUTPATIENT
Start: 2025-06-11 | End: 2025-06-13 | Stop reason: HOSPADM

## 2025-06-11 RX ORDER — AMITRIPTYLINE HYDROCHLORIDE 10 MG/1
10 TABLET, FILM COATED ORAL
Status: COMPLETED | OUTPATIENT
Start: 2025-06-11 | End: 2025-06-12

## 2025-06-11 RX ORDER — LEVETIRACETAM 500 MG/5ML
500 INJECTION, SOLUTION, CONCENTRATE INTRAVENOUS
Status: COMPLETED | OUTPATIENT
Start: 2025-06-11 | End: 2025-06-11

## 2025-06-11 RX ORDER — LANOLIN ALCOHOL/MO/W.PET/CERES
1 CREAM (GRAM) TOPICAL DAILY
Status: DISCONTINUED | OUTPATIENT
Start: 2025-06-12 | End: 2025-06-12

## 2025-06-11 RX ADMIN — RIVAROXABAN 20 MG: 20 TABLET, FILM COATED ORAL at 10:06

## 2025-06-11 RX ADMIN — LEVETIRACETAM 500 MG: 100 INJECTION, SOLUTION INTRAVENOUS at 10:06

## 2025-06-12 ENCOUNTER — PATIENT MESSAGE (OUTPATIENT)
Dept: NEPHROLOGY | Facility: CLINIC | Age: 60
End: 2025-06-12
Payer: COMMERCIAL

## 2025-06-12 PROBLEM — N39.0 UTI (URINARY TRACT INFECTION): Status: ACTIVE | Noted: 2025-06-12

## 2025-06-12 PROBLEM — R03.0 ELEVATED BLOOD PRESSURE READING WITHOUT DIAGNOSIS OF HYPERTENSION: Status: ACTIVE | Noted: 2025-06-12

## 2025-06-12 PROBLEM — D62 ABLA (ACUTE BLOOD LOSS ANEMIA): Status: ACTIVE | Noted: 2025-06-12

## 2025-06-12 LAB — T4 FREE SERPL-MCNC: 0.95 NG/DL (ref 0.71–1.51)

## 2025-06-12 PROCEDURE — 25000003 PHARM REV CODE 250: Performed by: REGISTERED NURSE

## 2025-06-12 PROCEDURE — G0378 HOSPITAL OBSERVATION PER HR: HCPCS

## 2025-06-12 PROCEDURE — 36415 COLL VENOUS BLD VENIPUNCTURE: CPT | Performed by: REGISTERED NURSE

## 2025-06-12 PROCEDURE — 63600175 PHARM REV CODE 636 W HCPCS: Performed by: FAMILY MEDICINE

## 2025-06-12 PROCEDURE — 25000003 PHARM REV CODE 250

## 2025-06-12 PROCEDURE — 63600175 PHARM REV CODE 636 W HCPCS: Performed by: REGISTERED NURSE

## 2025-06-12 PROCEDURE — 36430 TRANSFUSION BLD/BLD COMPNT: CPT

## 2025-06-12 PROCEDURE — 96375 TX/PRO/DX INJ NEW DRUG ADDON: CPT

## 2025-06-12 PROCEDURE — 84439 ASSAY OF FREE THYROXINE: CPT | Performed by: REGISTERED NURSE

## 2025-06-12 PROCEDURE — 25000003 PHARM REV CODE 250: Performed by: FAMILY MEDICINE

## 2025-06-12 RX ORDER — IBUPROFEN 200 MG
24 TABLET ORAL
Status: DISCONTINUED | OUTPATIENT
Start: 2025-06-12 | End: 2025-06-13 | Stop reason: HOSPADM

## 2025-06-12 RX ORDER — DIPHENOXYLATE HYDROCHLORIDE AND ATROPINE SULFATE 2.5; .025 MG/1; MG/1
1 TABLET ORAL 4 TIMES DAILY PRN
COMMUNITY

## 2025-06-12 RX ORDER — GLUCOSAMINE/CHONDR SU A SOD 750-600 MG
2500 TABLET ORAL DAILY
COMMUNITY

## 2025-06-12 RX ORDER — LEVOTHYROXINE SODIUM 112 UG/1
112 TABLET ORAL
Status: DISCONTINUED | OUTPATIENT
Start: 2025-06-12 | End: 2025-06-12

## 2025-06-12 RX ORDER — GLUCAGON 1 MG
1 KIT INJECTION
Status: DISCONTINUED | OUTPATIENT
Start: 2025-06-12 | End: 2025-06-13 | Stop reason: HOSPADM

## 2025-06-12 RX ORDER — CALCIUM CARBONATE 200(500)MG
1 TABLET,CHEWABLE ORAL 3 TIMES DAILY PRN
COMMUNITY

## 2025-06-12 RX ORDER — HYDRALAZINE HYDROCHLORIDE 20 MG/ML
2 INJECTION INTRAMUSCULAR; INTRAVENOUS EVERY 6 HOURS PRN
Status: DISCONTINUED | OUTPATIENT
Start: 2025-06-12 | End: 2025-06-13 | Stop reason: HOSPADM

## 2025-06-12 RX ORDER — CEFTRIAXONE 1 G/1
1 INJECTION, POWDER, FOR SOLUTION INTRAMUSCULAR; INTRAVENOUS
Status: DISCONTINUED | OUTPATIENT
Start: 2025-06-12 | End: 2025-06-13 | Stop reason: HOSPADM

## 2025-06-12 RX ORDER — LEVOTHYROXINE SODIUM 125 UG/1
125 TABLET ORAL
Status: DISCONTINUED | OUTPATIENT
Start: 2025-06-13 | End: 2025-06-13 | Stop reason: HOSPADM

## 2025-06-12 RX ORDER — LANOLIN ALCOHOL/MO/W.PET/CERES
1 CREAM (GRAM) TOPICAL 2 TIMES DAILY
Status: DISCONTINUED | OUTPATIENT
Start: 2025-06-12 | End: 2025-06-13 | Stop reason: HOSPADM

## 2025-06-12 RX ORDER — HYDRALAZINE HYDROCHLORIDE 20 MG/ML
10 INJECTION INTRAMUSCULAR; INTRAVENOUS EVERY 6 HOURS PRN
Status: DISCONTINUED | OUTPATIENT
Start: 2025-06-12 | End: 2025-06-12

## 2025-06-12 RX ORDER — LORATADINE 10 MG/1
10 TABLET ORAL DAILY PRN
COMMUNITY

## 2025-06-12 RX ORDER — AMITRIPTYLINE HYDROCHLORIDE 10 MG/1
10 TABLET, FILM COATED ORAL NIGHTLY
Status: DISCONTINUED | OUTPATIENT
Start: 2025-06-13 | End: 2025-06-13 | Stop reason: HOSPADM

## 2025-06-12 RX ORDER — HYDRALAZINE HYDROCHLORIDE 20 MG/ML
5 INJECTION INTRAMUSCULAR; INTRAVENOUS EVERY 6 HOURS PRN
Status: DISCONTINUED | OUTPATIENT
Start: 2025-06-12 | End: 2025-06-12

## 2025-06-12 RX ORDER — HYDROCODONE BITARTRATE AND ACETAMINOPHEN 5; 325 MG/1; MG/1
1 TABLET ORAL EVERY 8 HOURS PRN
Refills: 0 | Status: DISCONTINUED | OUTPATIENT
Start: 2025-06-12 | End: 2025-06-13 | Stop reason: HOSPADM

## 2025-06-12 RX ORDER — HYDROCORTISONE 5 MG/1
20 TABLET ORAL DAILY
Status: DISCONTINUED | OUTPATIENT
Start: 2025-06-12 | End: 2025-06-13 | Stop reason: HOSPADM

## 2025-06-12 RX ORDER — DIPHENHYDRAMINE HYDROCHLORIDE 50 MG/ML
25 INJECTION, SOLUTION INTRAMUSCULAR; INTRAVENOUS ONCE
Status: COMPLETED | OUTPATIENT
Start: 2025-06-12 | End: 2025-06-12

## 2025-06-12 RX ORDER — MIDODRINE HYDROCHLORIDE 5 MG/1
10 TABLET ORAL 4 TIMES DAILY
Status: DISCONTINUED | OUTPATIENT
Start: 2025-06-12 | End: 2025-06-12

## 2025-06-12 RX ORDER — ONDANSETRON HYDROCHLORIDE 2 MG/ML
4 INJECTION, SOLUTION INTRAVENOUS EVERY 8 HOURS PRN
Status: DISCONTINUED | OUTPATIENT
Start: 2025-06-12 | End: 2025-06-13 | Stop reason: HOSPADM

## 2025-06-12 RX ORDER — SODIUM CHLORIDE 0.9 % (FLUSH) 0.9 %
10 SYRINGE (ML) INJECTION EVERY 12 HOURS PRN
Status: DISCONTINUED | OUTPATIENT
Start: 2025-06-12 | End: 2025-06-13 | Stop reason: HOSPADM

## 2025-06-12 RX ORDER — ACETAMINOPHEN 500 MG
500 TABLET ORAL EVERY 6 HOURS
Status: DISCONTINUED | OUTPATIENT
Start: 2025-06-12 | End: 2025-06-13 | Stop reason: HOSPADM

## 2025-06-12 RX ORDER — CLONIDINE HYDROCHLORIDE 0.1 MG/1
0.2 TABLET ORAL EVERY 6 HOURS PRN
Status: DISCONTINUED | OUTPATIENT
Start: 2025-06-12 | End: 2025-06-12

## 2025-06-12 RX ORDER — NALOXONE HCL 0.4 MG/ML
0.02 VIAL (ML) INJECTION
Status: DISCONTINUED | OUTPATIENT
Start: 2025-06-12 | End: 2025-06-13 | Stop reason: HOSPADM

## 2025-06-12 RX ORDER — IBUPROFEN 200 MG
16 TABLET ORAL
Status: DISCONTINUED | OUTPATIENT
Start: 2025-06-12 | End: 2025-06-13 | Stop reason: HOSPADM

## 2025-06-12 RX ORDER — IVABRADINE 5 MG/1
1 TABLET, FILM COATED ORAL 2 TIMES DAILY
Status: ON HOLD | COMMUNITY
Start: 2025-05-14 | End: 2025-06-13 | Stop reason: HOSPADM

## 2025-06-12 RX ORDER — LEVETIRACETAM 500 MG/1
500 TABLET ORAL 2 TIMES DAILY
Status: DISCONTINUED | OUTPATIENT
Start: 2025-06-12 | End: 2025-06-13 | Stop reason: HOSPADM

## 2025-06-12 RX ORDER — PNV NO.95/FERROUS FUM/FOLIC AC 28MG-0.8MG
100 TABLET ORAL DAILY
Status: DISCONTINUED | OUTPATIENT
Start: 2025-06-12 | End: 2025-06-13 | Stop reason: HOSPADM

## 2025-06-12 RX ORDER — MORPHINE SULFATE 2 MG/ML
2 INJECTION, SOLUTION INTRAMUSCULAR; INTRAVENOUS ONCE
Status: DISCONTINUED | OUTPATIENT
Start: 2025-06-12 | End: 2025-06-12

## 2025-06-12 RX ORDER — DEXAMETHASONE SODIUM PHOSPHATE 4 MG/ML
4 INJECTION, SOLUTION INTRA-ARTICULAR; INTRALESIONAL; INTRAMUSCULAR; INTRAVENOUS; SOFT TISSUE ONCE
Status: COMPLETED | OUTPATIENT
Start: 2025-06-12 | End: 2025-06-12

## 2025-06-12 RX ORDER — HYOSCYAMINE SULFATE 0.12 MG/1
0.12 TABLET SUBLINGUAL EVERY 4 HOURS PRN
Status: DISCONTINUED | OUTPATIENT
Start: 2025-06-12 | End: 2025-06-13 | Stop reason: HOSPADM

## 2025-06-12 RX ORDER — MIDODRINE HYDROCHLORIDE 5 MG/1
10 TABLET ORAL EVERY 8 HOURS
Status: DISCONTINUED | OUTPATIENT
Start: 2025-06-12 | End: 2025-06-13 | Stop reason: HOSPADM

## 2025-06-12 RX ORDER — FAMOTIDINE 10 MG/ML
20 INJECTION, SOLUTION INTRAVENOUS ONCE
Status: COMPLETED | OUTPATIENT
Start: 2025-06-12 | End: 2025-06-12

## 2025-06-12 RX ORDER — TIZANIDINE 2 MG/1
4 TABLET ORAL ONCE
Status: COMPLETED | OUTPATIENT
Start: 2025-06-12 | End: 2025-06-12

## 2025-06-12 RX ADMIN — VITAM B12 100 MCG: 100 TAB at 08:06

## 2025-06-12 RX ADMIN — MIDODRINE HYDROCHLORIDE 10 MG: 5 TABLET ORAL at 01:06

## 2025-06-12 RX ADMIN — LEVETIRACETAM 500 MG: 500 TABLET, FILM COATED ORAL at 08:06

## 2025-06-12 RX ADMIN — CEFTRIAXONE SODIUM 1 G: 1 INJECTION, POWDER, FOR SOLUTION INTRAMUSCULAR; INTRAVENOUS at 08:06

## 2025-06-12 RX ADMIN — ACETAMINOPHEN 500 MG: 500 TABLET ORAL at 12:06

## 2025-06-12 RX ADMIN — HYDROCORTISONE 20 MG: 10 TABLET ORAL at 08:06

## 2025-06-12 RX ADMIN — HYDRALAZINE HYDROCHLORIDE 5 MG: 20 INJECTION INTRAMUSCULAR; INTRAVENOUS at 12:06

## 2025-06-12 RX ADMIN — ACETAMINOPHEN 500 MG: 500 TABLET ORAL at 05:06

## 2025-06-12 RX ADMIN — DIPHENHYDRAMINE HYDROCHLORIDE 25 MG: 50 INJECTION INTRAMUSCULAR; INTRAVENOUS at 12:06

## 2025-06-12 RX ADMIN — HYDROCODONE BITARTRATE AND ACETAMINOPHEN 1 TABLET: 5; 325 TABLET ORAL at 01:06

## 2025-06-12 RX ADMIN — FAMOTIDINE 20 MG: 10 INJECTION, SOLUTION INTRAVENOUS at 12:06

## 2025-06-12 RX ADMIN — TIZANIDINE 4 MG: 2 TABLET ORAL at 01:06

## 2025-06-12 RX ADMIN — FERROUS SULFATE TAB 325 MG (65 MG ELEMENTAL FE) 1 EACH: 325 (65 FE) TAB at 08:06

## 2025-06-12 RX ADMIN — AMITRIPTYLINE HYDROCHLORIDE 10 MG: 10 TABLET, FILM COATED ORAL at 12:06

## 2025-06-12 RX ADMIN — DEXAMETHASONE SODIUM PHOSPHATE 4 MG: 4 INJECTION, SOLUTION INTRA-ARTICULAR; INTRALESIONAL; INTRAMUSCULAR; INTRAVENOUS; SOFT TISSUE at 12:06

## 2025-06-12 NOTE — ED NOTES
Pharmacy states that they tubed down medication   Checked both tube stations and was unable to find medication  Will call pharmacy

## 2025-06-12 NOTE — ED NOTES
PA at bedside getting blood consent from pt  This nurse witness blood consent being signed  Blood consent signed and scanned into chart

## 2025-06-12 NOTE — ASSESSMENT & PLAN NOTE
Anemia is likely due to chronic disease due to Chronic Kidney Disease. Most recent hemoglobin and hematocrit are listed below.  Recent Labs     06/09/25  1001 06/11/25  2131   HGB 7.2* 6.3*   HCT 23.6* 19.7*     Plan  - Monitor serial CBC: Daily  - Transfuse PRBC if patient becomes hemodynamically unstable, symptomatic or H/H drops below 7/21.  - Patient has received 1 units of PRBCs on 6/11/2025  - Patient's anemia is currently worsening. Will continue current treatment  - there was concern for reaction to blood transfusion as patient developed hives which I saw to small red raised spots 1 on her leg and 1 on the back of her neck.  Patient denied any shortness a breath, she was not hypotensive nor tachycardic.  She was treated with Decadron, Benadryl, Pepcid in the blood transfusion was resumed without any further complications.  Patient is currently on Xarelto which will be continued as she does have factor 5 Leiden and history of DVTs x3.  -patient without evidence of bleeding

## 2025-06-12 NOTE — H&P
[facility] Emergency Dept  [facility]  History & Physical    Patient Name: Thelma Nguyen  MRN: [id number]  Patient Class: OP- Observation  Admission Date: 6/11/2025  Attending Physician: Taurus Mueller,*   Primary Care Provider: Zachary Bender MD         Patient information was obtained from patient and ER records.     Subjective:     Principal Problem:<principal problem not specified>    Chief Complaint:   Chief Complaint   Patient presents with    Stiffness     Pt presents to the ED c/o increased stiffness and hip pain on the L side. Pt reports being dx with Parkinson's 2 weeks ago.  reports the pt not having started anti parkinson's medication yet d/t testing. Pt AA&OX4 in triage.         HPI: Patient is a 59-year-old female who presented to ED with multiple complaints.  Patient has a history of brain tumor, seizures, factor 5 Leiden, DVT x3, Parkinson's,  Patient reports she was recently diagnosed with Parkinson's disease however due to her having labwork for autonomic workup per  she did not start levodopa.  Patient reports being stiff with arthralgias. she denies any abdominal pain, change in color of stool, any melena or hematochezia.  In ED labwork remarkable for significant anemia with H&H of 6.3 & 19.7.  Two days prior patient's H&H of 7.2 & 23.  Patient was typed and crossmatch and ordered 1 unit of packed red blood cells.  She also has CKD with chemistry showing BUN of 38 and serum creatinine 2.0.     Past Medical History:   Diagnosis Date    Allergic rhinitis     Anemia 1987    Arthritis 2000    Basal cell carcinoma     Broken ankle     Cancer 1985    Clotting disorder 1993    Disorder of kidney and ureter     DVT (deep venous thrombosis)     Factor V deficiency     Fracture of elbow     left    Glioblastoma multiforme of brain     Hypothyroidism     IBS (irritable bowel syndrome) 1975    Osteoporosis     Panhypopituitarism     Peripheral polyneuropathy      Secondary adrenal insufficiency     Seizures     Thyroid disease        Past Surgical History:   Procedure Laterality Date    APPENDECTOMY      BRAIN SURGERY      for glioblastoma      SECTION      CHOLECYSTECTOMY  2007    CLOSURE OF DEFECT OF MOHS PROCEDURE Left 2018    Procedure: CLOSURE, MOHS PROCEDURE DEFECT SCALP  Flap closure;  Surgeon: Yrn Novak MD;  Location: [address];  Service: Plastics;  Laterality: Left;    COLONOSCOPY N/A 2019    Procedure: COLONOSCOPY Suprep;  Surgeon: Tony Mosher MD;  Location: [facility];  Service: Endoscopy;  Laterality: N/A;    ESOPHAGOGASTRODUODENOSCOPY N/A 2019    Procedure: EGD/colon;  Surgeon: Tony Mosher MD;  Location: [facility];  Service: Endoscopy;  Laterality: N/A;    EXCISION OF GANGLION CYST OF HAND Left 2020    Procedure: EXCISION, GANGLION CYST, HAND;  Surgeon: Ross Drew Jr., MD;  Location: [facility];  Service: Orthopedics;  Laterality: Left;    HYSTERECTOMY      ILIAC CREST BONE GRAFT Right 3/15/2024    Procedure: BONE GRAFT, ILIAC CREST;  Surgeon: Geoffrey Goodson MD;  Location: [facility];  Service: Orthopedics;  Laterality: Right;  acumed harvester    ILIAC CREST BONE GRAFT Right 2024    Procedure: BONE GRAFT, ILIAC CREST;  Surgeon: Geoffrey Goodson MD;  Location: [facility];  Service: Orthopedics;  Laterality: Right;    MIDFOOT ARTHRODESIS Right 2024    Procedure: FUSION, JOINT, MIDFOOT;  Surgeon: Geoffrey Goodson MD;  Location: [facility];  Service: Orthopedics;  Laterality: Right;  mini c arm    OPEN REDUCTION AND INTERNAL FIXATION (ORIF) OF FRACTURE OF TARSAL BONE Right 3/15/2024    Procedure: ORIF, FRACTURE, TARSAL BONE;  Surgeon: Geoffrey Goodson MD;  Location: [facility];  Service: Orthopedics;  Laterality: Right;  supine, mini C arm, Styker, block okay    SHOULDER SURGERY Right     TILT TABLE TEST N/A 07/15/2022    Procedure: TILT TABLE TEST;  Surgeon: Amol Kohler MD;   Location: [facility];  Service: Cardiology;  Laterality: N/A;  Orthostatic hypotension, dizziness, Tilt Table Test with EEG, Dr.Tiffany Roca (neuro), DM    TUBAL LIGATION  1998       Review of patient's allergies indicates:   Allergen Reactions    Neurontin [gabapentin]     Lactose     Soap Hives     Able to tolerate Dove only        No current facility-administered medications on file prior to encounter.     Current Outpatient Medications on File Prior to Encounter   Medication Sig    acetaminophen (TYLENOL) 500 MG tablet Take 1 tablet (500 mg total) by mouth every 6 (six) hours.    amitriptyline (ELAVIL) 10 MG tablet Take 1 tablet (10 mg total) by mouth every evening.    BIOTIN ORAL Take by mouth.    calcium carbonate (TUMS) 200 mg calcium (500 mg) chewable tablet Take 1 tablet (500 mg total) by mouth 3 (three) times daily as needed for Heartburn.    cefpodoxime (VANTIN) 100 MG tablet Take 1 tablet (100 mg total) by mouth every 12 (twelve) hours. for 7 days    cholecalciferol, vitamin D3, (VITAMIN D3) 50 mcg (2,000 unit) Cap capsule Take 2,000 Units by mouth every evening.    COVID-19 (COMIRNATY 2024-25, 12Y UP,,PF,) 30 mcg/0.3 mL IM vaccine (>/= 11 yo) Inject into the muscle.    cyanocobalamin (VITAMIN B-12) 100 MCG tablet Take 100 mcg by mouth once daily.    dexAMETHasone (DECADRON) 4 mg/mL injection Inject 1ml (4mg) into the muscle for symptoms of severe adrenal insufficiency. 3ml syringe w/18 g needle to draw x10 and 21g 1 inch needle to injection x10    ferrous sulfate (FEOSOL) 325 mg (65 mg iron) Tab tablet Take 1 tablet (325 mg total) by mouth 2 (two) times daily.    hydrocortisone (CORTEF) 10 MG Tab TAKE 1& 1/2 TABLETS BY MOUTH IN THE MORNING AND 1/2 TABLET IN THE AFTERNOON. DOUBLE THE DOSE IN CASE OF ILLNESS.    hyoscyamine 0.125 mg Subl Place 1 tablet (0.125 mg total) under the tongue every 4 (four) hours as needed.    ivabradine (CORLANOR) 7.5 mg Tab Take 1 tablet (7.5 mg total) by mouth 2 (two) times  daily.    levETIRAcetam (KEPPRA) 500 MG Tab Take 1 tablet (500 mg total) by mouth 2 (two) times daily.    levothyroxine (SYNTHROID) 112 MCG tablet TAKE 1 TABLET BY MOUTH BEFORE BREAKFAST.    melatonin 5 mg TbDL Take 1 tablet by mouth nightly.    midodrine (PROAMATINE) 10 MG tablet Take 1 tablet (10 mg total) by mouth 4 (four) times daily. Take 4 doses a day 3 hours apart    rivaroxaban (XARELTO) 20 mg Tab Take 1 tablet (20 mg total) by mouth once daily.    ubrogepant (UBRELVY) 50 mg tablet Take 1 tablet (50 mg total) by mouth as needed for Migraine. If symptoms persist or return, may repeat dose after 2 hours. Maximum: 200 mg per 24 hours    UNABLE TO FIND Take 1 tablet by mouth Daily. medication name: Biotin 2500mcg    vibegron 75 mg Tab Take 1 tablet (75 mg total) by mouth every evening.     Family History       Problem Relation (Age of Onset)    Diabetes type II Mother, Father    Heart attack Maternal Grandfather    Heart disease Mother          Tobacco Use    Smoking status: Never    Smokeless tobacco: Never   Substance and Sexual Activity    Alcohol use: No    Drug use: No    Sexual activity: Not Currently     Review of Systems   Musculoskeletal:  Positive for arthralgias and gait problem.   All other systems reviewed and are negative.    Objective:     Vital Signs (Most Recent):  Temp: 97.9 °F (36.6 °C) (06/12/25 0231)  Pulse: 68 (06/12/25 0232)  Resp: 17 (06/12/25 0232)  BP: 137/61 (06/12/25 0232)  SpO2: (!) 92 % (06/12/25 0232) Vital Signs (24h Range):  Temp:  [97.9 °F (36.6 °C)-98.4 °F (36.9 °C)] 97.9 °F (36.6 °C)  Pulse:  [] 68  Resp:  [14-23] 17  SpO2:  [92 %-98 %] 92 %  BP: (137-222)/() 137/61     Weight: 68 kg (150 lb)  Body mass index is 22.81 kg/m².     Physical Exam  Vitals reviewed.   Constitutional:       Appearance: She is ill-appearing.   HENT:      Head: Normocephalic.      Nose: Nose normal.      Mouth/Throat:      Mouth: Mucous membranes are moist.      Pharynx: Oropharynx is  clear.   Eyes:      Pupils: Pupils are equal, round, and reactive to light.   Cardiovascular:      Rate and Rhythm: Normal rate and regular rhythm.      Pulses: Normal pulses.      Heart sounds: Normal heart sounds.   Pulmonary:      Effort: Pulmonary effort is normal.      Breath sounds: Normal breath sounds.   Abdominal:      General: Bowel sounds are normal.      Palpations: Abdomen is soft.   Musculoskeletal:         General: Normal range of motion.      Cervical back: Normal range of motion.   Skin:     General: Skin is warm and dry.      Capillary Refill: Capillary refill takes less than 2 seconds.   Neurological:      General: No focal deficit present.      Mental Status: She is oriented to person, place, and time. Mental status is at baseline.   Psychiatric:         Mood and Affect: Mood normal.         Behavior: Behavior normal.              CRANIAL NERVES     CN III, IV, VI   Pupils are equal, round, and reactive to light.       Significant Labs: All pertinent labs within the past 24 hours have been reviewed.  Recent Lab Results         06/11/25  2212 06/11/25  2131   06/11/25  1050        Unit Blood Type Code 6200           Unit Expiration [id number]           Albumin   3.1         ALP   77         ALT   15         Anion Gap   9         Appearance, UA     Hazy       AST   18         Bacteria, UA     Many       Baso #   0.01         Basophil %   0.2         Bilirubin (UA)     Negative       BILIRUBIN TOTAL   0.2  Comment: For infants and newborns, interpretation of results should be based   on gestational age, weight and in agreement with clinical   observations.    Premature Infant recommended reference ranges:   0-24 hours:  <8.0 mg/dL   24-48 hours: <12.0 mg/dL   3-5 days:    <15.0 mg/dL   6-29 days:   <15.0 mg/dL         BUN   38         Calcium   8.1         Chloride   110         CO2   22         Color, UA     Yellow       Creatinine   2.0         Crossmatch Interpretation Compatible            DISPENSE STATUS Transfused           eGFR   28  Comment: Estimated GFR calculated using the CKD-EPI creatinine (2021) equation.         Eos #   0.07         Eos %   1.4         Glucose   93         Glucose, UA     Negative       Gran # (ANC)   3.38         Group & Rh A POS           Hematocrit   19.7         Hemoglobin   6.3         Hyaline Casts, UA     0       Immature Grans (Abs)   0.02  Comment: Mild elevation in immature granulocytes is non specific and can be seen in a variety of conditions including stress response, acute inflammation, trauma and pregnancy. Correlation with other laboratory and clinical findings is essential.         Immature Granulocytes   0.4         INDIRECT VIVIANA NEG           Ketones, UA     Negative       Leukocyte Esterase, UA     1+       Lymph #   1.30         Lymph %   25.1         Magnesium    2.3         MCH   30.4         MCHC   32.0         MCV   95         Microscopic Comment       Comment: Other formed elements not mentioned in the report are not present in the microscopic examination.       Mono #   0.40         Mono %   7.7         MPV   9.9         Neut %   65.2         NITRITE UA     Positive       nRBC   0         Blood, UA     1+       pH, UA     6.0       Platelet Count   290         Potassium   4.4         Product Code [id number]           PROTEIN TOTAL   6.6         Protein, UA     Negative  Comment: Recommend a 24 hour urine protein or a urine protein/creatinine ratio if globulin induced proteinuria is clinically suspected.       RBC   2.07         RBC, UA     0       RDW   15.7         Sodium   141         Spec Grav UA     1.010       Specimen Outdate 06/14/2025 23:59           Squam Epithel, UA     0       TSH   0.149         Unit ABO/Rh A POS           UNIT NUMBER [id number]           Urobilinogen, UA     Negative       WBC, UA     15       WBC Clumps, UA     Moderate       WBC   5.18         Yeast, UA     None               Significant Imaging: I have reviewed  all pertinent imaging results/findings within the past 24 hours.  I have reviewed and interpreted all pertinent imaging results/findings within the past 24 hours.  Assessment/Plan:     Assessment & Plan  Secondary adrenal insufficiency  Continue home hydrocortisone daily.  Total of 20 mg daily  Patient was off of steroids for the last week as she was prepping for labwork for autonomic workup per  with [facility]  Secondary hypothyroidism  Resume Synthroid      Factor V deficiency with DVT x 3  Continue Xarelto    Stage 3b chronic kidney disease  Creatine stable for now. BMP reviewed- noted Estimated Creatinine Clearance: 30.9 mL/min (A) (based on SCr of 2 mg/dL (H)). according to latest data. Based on current GFR, CKD stage is stage 3 - GFR 30-59.  Monitor UOP and serial BMP and adjust therapy as needed. Renally dose meds. Avoid nephrotoxic medications and procedures.  Orthostatic hypotension due to Parkinson disease  Continue midodrine    Right foot pain  X-ray without acute fracture  Postop changes of the medial hindfoot as above, with continued perihardware lucency, concerning for hardware infection or loosening.     ABLA (acute blood loss anemia)  Anemia is likely due to chronic disease due to Chronic Kidney Disease. Most recent hemoglobin and hematocrit are listed below.  Recent Labs     06/09/25  1001 06/11/25  2131   HGB 7.2* 6.3*   HCT 23.6* 19.7*     Plan  - Monitor serial CBC: Daily  - Transfuse PRBC if patient becomes hemodynamically unstable, symptomatic or H/H drops below 7/21.  - Patient has received 1 units of PRBCs on 6/11/2025  - Patient's anemia is currently worsening. Will continue current treatment  - there was concern for reaction to blood transfusion as patient developed hives which I saw to small red raised spots 1 on her leg and 1 on the back of her neck.  Patient denied any shortness a breath, she was not hypotensive nor tachycardic.  She was treated with Decadron, Benadryl, Pepcid in  the blood transfusion was resumed without any further complications.  Patient is currently on Xarelto which will be continued as she does have factor 5 Leiden and history of DVTs x3.  -patient without evidence of bleeding  UTI (urinary tract infection)  Was diagnosed with UTI day prior and started on cefpodoxime  UA remains infected here with a positive nitrite and bacteria  Started on IV Rocephin  VTE Risk Mitigation (From admission, onward)           Ordered     rivaroxaban tablet 20 mg  Daily         06/12/25 0641     IP VTE LOW RISK PATIENT  Once         06/12/25 0601     Place sequential compression device  Until discontinued         06/12/25 0601                         On 06/12/2025, patient should be placed in hospital observation services under my care in collaboration with Dr. Abrams.           Roby Weaver NP  Department of Hospital Medicine  [place] - Emergency Dept

## 2025-06-12 NOTE — SUBJECTIVE & OBJECTIVE
Past Medical History:   Diagnosis Date    Allergic rhinitis     Anemia     Arthritis     Basal cell carcinoma     Broken ankle     Cancer 1985    Clotting disorder     Disorder of kidney and ureter     DVT (deep venous thrombosis)     Factor V deficiency     Fracture of elbow     left    Glioblastoma multiforme of brain     Hypothyroidism     IBS (irritable bowel syndrome)     Osteoporosis     Panhypopituitarism     Peripheral polyneuropathy     Secondary adrenal insufficiency     Seizures     Thyroid disease        Past Surgical History:   Procedure Laterality Date    APPENDECTOMY      BRAIN SURGERY      for glioblastoma      SECTION      CHOLECYSTECTOMY      CLOSURE OF DEFECT OF MOHS PROCEDURE Left 2018    Procedure: CLOSURE, MOHS PROCEDURE DEFECT SCALP  Flap closure;  Surgeon: Yrn Novak MD;  Location: [address];  Service: Plastics;  Laterality: Left;    COLONOSCOPY N/A 2019    Procedure: COLONOSCOPY Suprep;  Surgeon: Tony Mosher MD;  Location: [facility];  Service: Endoscopy;  Laterality: N/A;    ESOPHAGOGASTRODUODENOSCOPY N/A 2019    Procedure: EGD/colon;  Surgeon: Tony Mosher MD;  Location: [facility];  Service: Endoscopy;  Laterality: N/A;    EXCISION OF GANGLION CYST OF HAND Left 2020    Procedure: EXCISION, GANGLION CYST, HAND;  Surgeon: Ross Drew Jr., MD;  Location: [facility];  Service: Orthopedics;  Laterality: Left;    HYSTERECTOMY      ILIAC CREST BONE GRAFT Right 3/15/2024    Procedure: BONE GRAFT, ILIAC CREST;  Surgeon: Geoffrey Goodson MD;  Location: [facility];  Service: Orthopedics;  Laterality: Right;  acumed harvester    ILIAC CREST BONE GRAFT Right 2024    Procedure: BONE GRAFT, ILIAC CREST;  Surgeon: Geoffrey Goodson MD;  Location: [facility];  Service: Orthopedics;  Laterality: Right;    MIDFOOT ARTHRODESIS Right 2024    Procedure: FUSION, JOINT, MIDFOOT;  Surgeon: Geoffrey Goodson MD;   Location: [facility] OR;  Service: Orthopedics;  Laterality: Right;  mini c arm    OPEN REDUCTION AND INTERNAL FIXATION (ORIF) OF FRACTURE OF TARSAL BONE Right 3/15/2024    Procedure: ORIF, FRACTURE, TARSAL BONE;  Surgeon: Geoffrey Goodson MD;  Location: [facility] OR;  Service: Orthopedics;  Laterality: Right;  supine, mini C arm, Styker, block okay    SHOULDER SURGERY Right     TILT TABLE TEST N/A 07/15/2022    Procedure: TILT TABLE TEST;  Surgeon: Amol Kohler MD;  Location: [facility];  Service: Cardiology;  Laterality: N/A;  Orthostatic hypotension, dizziness, Tilt Table Test with EEG, Dr.Tiffany Roca (neuro), DM    TUBAL LIGATION  1998       Review of patient's allergies indicates:   Allergen Reactions    Neurontin [gabapentin]     Lactose     Soap Hives     Able to tolerate Dove only        No current facility-administered medications on file prior to encounter.     Current Outpatient Medications on File Prior to Encounter   Medication Sig    acetaminophen (TYLENOL) 500 MG tablet Take 1 tablet (500 mg total) by mouth every 6 (six) hours.    amitriptyline (ELAVIL) 10 MG tablet Take 1 tablet (10 mg total) by mouth every evening.    BIOTIN ORAL Take by mouth.    calcium carbonate (TUMS) 200 mg calcium (500 mg) chewable tablet Take 1 tablet (500 mg total) by mouth 3 (three) times daily as needed for Heartburn.    cefpodoxime (VANTIN) 100 MG tablet Take 1 tablet (100 mg total) by mouth every 12 (twelve) hours. for 7 days    cholecalciferol, vitamin D3, (VITAMIN D3) 50 mcg (2,000 unit) Cap capsule Take 2,000 Units by mouth every evening.    COVID-19 (COMIRNATY 2024-25, 12Y UP,,PF,) 30 mcg/0.3 mL IM vaccine (>/= 13 yo) Inject into the muscle.    cyanocobalamin (VITAMIN B-12) 100 MCG tablet Take 100 mcg by mouth once daily.    dexAMETHasone (DECADRON) 4 mg/mL injection Inject 1ml (4mg) into the muscle for symptoms of severe adrenal insufficiency. 3ml syringe w/18 g needle to draw x10 and 21g 1 inch needle to injection x10     ferrous sulfate (FEOSOL) 325 mg (65 mg iron) Tab tablet Take 1 tablet (325 mg total) by mouth 2 (two) times daily.    hydrocortisone (CORTEF) 10 MG Tab TAKE 1& 1/2 TABLETS BY MOUTH IN THE MORNING AND 1/2 TABLET IN THE AFTERNOON. DOUBLE THE DOSE IN CASE OF ILLNESS.    hyoscyamine 0.125 mg Subl Place 1 tablet (0.125 mg total) under the tongue every 4 (four) hours as needed.    ivabradine (CORLANOR) 7.5 mg Tab Take 1 tablet (7.5 mg total) by mouth 2 (two) times daily.    levETIRAcetam (KEPPRA) 500 MG Tab Take 1 tablet (500 mg total) by mouth 2 (two) times daily.    levothyroxine (SYNTHROID) 112 MCG tablet TAKE 1 TABLET BY MOUTH BEFORE BREAKFAST.    melatonin 5 mg TbDL Take 1 tablet by mouth nightly.    midodrine (PROAMATINE) 10 MG tablet Take 1 tablet (10 mg total) by mouth 4 (four) times daily. Take 4 doses a day 3 hours apart    rivaroxaban (XARELTO) 20 mg Tab Take 1 tablet (20 mg total) by mouth once daily.    ubrogepant (UBRELVY) 50 mg tablet Take 1 tablet (50 mg total) by mouth as needed for Migraine. If symptoms persist or return, may repeat dose after 2 hours. Maximum: 200 mg per 24 hours    UNABLE TO FIND Take 1 tablet by mouth Daily. medication name: Biotin 2500mcg    vibegron 75 mg Tab Take 1 tablet (75 mg total) by mouth every evening.     Family History       Problem Relation (Age of Onset)    Diabetes type II Mother, Father    Heart attack Maternal Grandfather    Heart disease Mother          Tobacco Use    Smoking status: Never    Smokeless tobacco: Never   Substance and Sexual Activity    Alcohol use: No    Drug use: No    Sexual activity: Not Currently     Review of Systems   Musculoskeletal:  Positive for arthralgias and gait problem.   All other systems reviewed and are negative.    Objective:     Vital Signs (Most Recent):  Temp: 97.9 °F (36.6 °C) (06/12/25 0231)  Pulse: 68 (06/12/25 0232)  Resp: 17 (06/12/25 0232)  BP: 137/61 (06/12/25 0232)  SpO2: (!) 92 % (06/12/25 0232) Vital Signs (24h  Range):  Temp:  [97.9 °F (36.6 °C)-98.4 °F (36.9 °C)] 97.9 °F (36.6 °C)  Pulse:  [] 68  Resp:  [14-23] 17  SpO2:  [92 %-98 %] 92 %  BP: (137-222)/() 137/61     Weight: 68 kg (150 lb)  Body mass index is 22.81 kg/m².     Physical Exam  Vitals reviewed.   Constitutional:       Appearance: She is ill-appearing.   HENT:      Head: Normocephalic.      Nose: Nose normal.      Mouth/Throat:      Mouth: Mucous membranes are moist.      Pharynx: Oropharynx is clear.   Eyes:      Pupils: Pupils are equal, round, and reactive to light.   Cardiovascular:      Rate and Rhythm: Normal rate and regular rhythm.      Pulses: Normal pulses.      Heart sounds: Normal heart sounds.   Pulmonary:      Effort: Pulmonary effort is normal.      Breath sounds: Normal breath sounds.   Abdominal:      General: Bowel sounds are normal.      Palpations: Abdomen is soft.   Musculoskeletal:         General: Normal range of motion.      Cervical back: Normal range of motion.   Skin:     General: Skin is warm and dry.      Capillary Refill: Capillary refill takes less than 2 seconds.   Neurological:      General: No focal deficit present.      Mental Status: She is oriented to person, place, and time. Mental status is at baseline.   Psychiatric:         Mood and Affect: Mood normal.         Behavior: Behavior normal.              CRANIAL NERVES     CN III, IV, VI   Pupils are equal, round, and reactive to light.       Significant Labs: All pertinent labs within the past 24 hours have been reviewed.  Recent Lab Results         06/11/25 2212 06/11/25  2131 06/11/25  1050        Unit Blood Type Code 6200           Unit Expiration [id number]           Albumin   3.1         ALP   77         ALT   15         Anion Gap   9         Appearance, UA     Hazy       AST   18         Bacteria, UA     Many       Baso #   0.01         Basophil %   0.2         Bilirubin (UA)     Negative       BILIRUBIN TOTAL   0.2  Comment: For infants and  newborns, interpretation of results should be based   on gestational age, weight and in agreement with clinical   observations.    Premature Infant recommended reference ranges:   0-24 hours:  <8.0 mg/dL   24-48 hours: <12.0 mg/dL   3-5 days:    <15.0 mg/dL   6-29 days:   <15.0 mg/dL         BUN   38         Calcium   8.1         Chloride   110         CO2   22         Color, UA     Yellow       Creatinine   2.0         Crossmatch Interpretation Compatible           DISPENSE STATUS Transfused           eGFR   28  Comment: Estimated GFR calculated using the CKD-EPI creatinine (2021) equation.         Eos #   0.07         Eos %   1.4         Glucose   93         Glucose, UA     Negative       Gran # (ANC)   3.38         Group & Rh A POS           Hematocrit   19.7         Hemoglobin   6.3         Hyaline Casts, UA     0       Immature Grans (Abs)   0.02  Comment: Mild elevation in immature granulocytes is non specific and can be seen in a variety of conditions including stress response, acute inflammation, trauma and pregnancy. Correlation with other laboratory and clinical findings is essential.         Immature Granulocytes   0.4         INDIRECT VIVIANA NEG           Ketones, UA     Negative       Leukocyte Esterase, UA     1+       Lymph #   1.30         Lymph %   25.1         Magnesium    2.3         MCH   30.4         MCHC   32.0         MCV   95         Microscopic Comment       Comment: Other formed elements not mentioned in the report are not present in the microscopic examination.       Mono #   0.40         Mono %   7.7         MPV   9.9         Neut %   65.2         NITRITE UA     Positive       nRBC   0         Blood, UA     1+       pH, UA     6.0       Platelet Count   290         Potassium   4.4         Product Code L5316J56           PROTEIN TOTAL   6.6         Protein, UA     Negative  Comment: Recommend a 24 hour urine protein or a urine protein/creatinine ratio if globulin induced proteinuria is  clinically suspected.       RBC   2.07         RBC, UA     0       RDW   15.7         Sodium   141         Spec Grav UA     1.010       Specimen Outdate 06/14/2025 23:59           Squam Epithel, UA     0       TSH   0.149         Unit ABO/Rh A POS           UNIT NUMBER [id number]           Urobilinogen, UA     Negative       WBC, UA     15       WBC Clumps, UA     Moderate       WBC   5.18         Yeast, UA     None               Significant Imaging: I have reviewed all pertinent imaging results/findings within the past 24 hours.  I have reviewed and interpreted all pertinent imaging results/findings within the past 24 hours.

## 2025-06-12 NOTE — HPI
Patient is a 59-year-old female who presented to ED with multiple complaints.  Patient has a history of brain tumor, seizures, factor 5 Leiden, DVT x3, Parkinson's,  Patient reports she was recently diagnosed with Parkinson's disease however due to her having labwork for autonomic workup per  she did not start levodopa.  Patient reports being stiff with arthralgias. she denies any abdominal pain, change in color of stool, any melena or hematochezia.  In ED labwork remarkable for significant anemia with H&H of 6.3 & 19.7.  Two days prior patient's H&H of 7.2 & 23.  Patient was typed and crossmatch and ordered 1 unit of packed red blood cells.  She also has CKD with chemistry showing BUN of 38 and serum creatinine 2.0.

## 2025-06-12 NOTE — ED NOTES
Blood paused due to pt stating that she is itchy and small hives appear on body   Small hives noticed on pts inner thigh, abdomen, and arm  MD and admission team notified

## 2025-06-12 NOTE — PHARMACY MED REC
"    [facility]           Pharmacy  Admission Medication History     The home medication history was taken by Judy Gonzalez.      Medication history obtained from Medications listed below were obtained from: Patient/family    Based on information gathered for medication list, you may go to "Admission" then "Reconcile Home Medications" tabs to review and/or act upon those items.     The home medication list has been updated by the Pharmacy department.   Please read ALL comments highlighted in yellow.   Please address this information as you see fit.    Feel free to contact us if you have any questions or require assistance.        No current facility-administered medications on file prior to encounter.     Current Outpatient Medications on File Prior to Encounter   Medication Sig Dispense Refill    acetaminophen (TYLENOL) 500 MG tablet Take 1 tablet (500 mg total) by mouth every 6 (six) hours. (Patient taking differently: Take 500 mg by mouth as needed.) 30 tablet 1    amitriptyline (ELAVIL) 10 MG tablet Take 1 tablet (10 mg total) by mouth every evening. 30 tablet 11    biotin 2,500 mcg Cap Take 2,500 mcg by mouth once daily.      calcium carbonate (TUMS) 200 mg calcium (500 mg) chewable tablet Take 1 tablet by mouth 3 (three) times daily as needed for Heartburn.      cholecalciferol, vitamin D3, (VITAMIN D3) 50 mcg (2,000 unit) Cap capsule Take 2,000 Units by mouth every evening.      cyanocobalamin (VITAMIN B-12) 100 MCG tablet Take 100 mcg by mouth once daily.      ferrous sulfate (FEOSOL) 325 mg (65 mg iron) Tab tablet Take 1 tablet (325 mg total) by mouth 2 (two) times daily. 180 tablet 2    hydrocortisone (CORTEF) 10 MG Tab TAKE 1& 1/2 TABLETS BY MOUTH IN THE MORNING AND 1/2 TABLET IN THE AFTERNOON. DOUBLE THE DOSE IN CASE OF ILLNESS. (Patient taking differently: Take by mouth 2 (two) times daily. 15mg in the morning and 5mg in the afternoon) 75 tablet 2    hyoscyamine 0.125 mg Subl Place " 1 tablet (0.125 mg total) under the tongue every 4 (four) hours as needed. 540 tablet 1    ivabradine (CORLANOR) 7.5 mg Tab Take 1 tablet (7.5 mg total) by mouth 2 (two) times daily. 60 tablet 11    levETIRAcetam (KEPPRA) 500 MG Tab Take 1 tablet (500 mg total) by mouth 2 (two) times daily. 180 tablet 3    levothyroxine (SYNTHROID) 112 MCG tablet TAKE 1 TABLET BY MOUTH BEFORE BREAKFAST. 90 tablet 3    melatonin 5 mg TbDL Take 1 tablet by mouth nightly as needed.      midodrine (PROAMATINE) 10 MG tablet Take 1 tablet (10 mg total) by mouth 4 (four) times daily. Take 4 doses a day 3 hours apart (Patient taking differently: Take 10 mg by mouth 3 (three) times daily as needed (bp 180 or higher). Take 4 doses a day 3 hours apart) 240 tablet 1    rivaroxaban (XARELTO) 20 mg Tab Take 1 tablet (20 mg total) by mouth once daily. 90 tablet 4    ubrogepant (UBRELVY) 50 mg tablet Take 1 tablet (50 mg total) by mouth as needed for Migraine. If symptoms persist or return, may repeat dose after 2 hours. Maximum: 200 mg per 24 hours 16 tablet 2    vibegron 75 mg Tab Take 1 tablet (75 mg total) by mouth every evening. 90 tablet 0    cefpodoxime (VANTIN) 100 MG tablet Take 1 tablet (100 mg total) by mouth every 12 (twelve) hours. for 7 days 14 tablet 0       Please address this information as you see fit.  Feel free to contact us if you have any questions or require assistance.    Judy Gonzalez  [phone]              .

## 2025-06-12 NOTE — ASSESSMENT & PLAN NOTE
Continue home hydrocortisone daily.  Total of 20 mg daily  Patient was off of steroids for the last week as she was prepping for labwork for autonomic workup per  with [facility]

## 2025-06-12 NOTE — ED NOTES
at bedside.  Patient took her Ivabradine.   Alert and oriented to person, place and time/Awake/Patient baseline mental status

## 2025-06-12 NOTE — ED NOTES
Patient is currently taking Ivabradine but was not on list for morning meds.  Patient ask was it okay to take her own, MD said yes.  Patient states will take meds when  arrives.

## 2025-06-12 NOTE — ASSESSMENT & PLAN NOTE
Creatine stable for now. BMP reviewed- noted Estimated Creatinine Clearance: 30.9 mL/min (A) (based on SCr of 2 mg/dL (H)). according to latest data. Based on current GFR, CKD stage is stage 3 - GFR 30-59.  Monitor UOP and serial BMP and adjust therapy as needed. Renally dose meds. Avoid nephrotoxic medications and procedures.

## 2025-06-12 NOTE — NURSING
06/12/25 1700   Admission   Initial VN Admission Questions Complete   Communication Issues? Patient Hearing;Patient Vision  (wears glasses and hearing aids)   Shift   Pain Management Interventions care clustered;diversional activity provided;pain management plan reviewed with patient/caregiver;quiet environment facilitated;relaxation techniques promoted   Virtual Nurse - Patient Verbalized Approval Of Camera Use;VN Rounding   Type of Frequent Check   Type Patient Rounds   Safety/Activity   Patient Rounds bed in low position;ID band on;placement of personal items at bedside;bed wheels locked;call light in patient/parent reach;visualized patient;clutter free environment maintained   Safety Promotion/Fall Prevention assistive device/personal item within reach;bed alarm set;diversional activities provided;Fall Risk reviewed with patient/family;Fall Risk signage in place;family expresses understanding of fall risk and prevention;high risk medications identified;instructed to call staff for mobility;medications reviewed;patient expresses understanding of fall risk and prevention;side rails raised x 2   Positioning   Body Position position changed independently;supine   Head of Bed (HOB) Positioning HOB elevated   Pain/Comfort/Sleep   Preferred Pain Scale number (Numeric Rating Pain Scale)       VIRTUAL NURSE:  Cued into patient's room.  Permission received per patient to turn camera.  Introduced as VN for admission that will be working with floor nurse and nursing assistant.  Educated on VN's role in patient care.  Plan of care reviewed.  Education per flowsheet.   Informed that staff will round on patient every 2 hours but to use call light for any other needs they may have.  Informed of fall risk and fall precautions; verbalized understanding.  Call light within reach; bed siderails up x2.  Opportunity given for questions and questions answered.  Admission assessment questions answered.  Denies complaints or any  needs at this time.  Instructed to call for assistance. Labs, notes, and orders reviewed. Careplan initiated.

## 2025-06-12 NOTE — ED PROVIDER NOTES
"Encounter Date: 6/11/2025       History     Chief Complaint   Patient presents with    Stiffness     Pt presents to the ED c/o increased stiffness and hip pain on the L side. Pt reports being dx with Parkinson's 2 weeks ago.  reports the pt not having started anti parkinson's medication yet d/t testing. Pt AA&OX4 in triage.      Patient is a 59 y.o. female who presents to the ED for evaluation of multiple complaints.  PMH as below.    Patient says that she was recently diagnosed with a Parkinson's, not currently on any Parkinson's medication. Patient is having an "autonomic workup" and was instructed by neurology to hold off on starting Parkinson's medications until this workup is complete.  States that she has been having generalized stiffness, particularly in her thighs and legs.  States that it is very difficult for her to get comfortable and that she has been having difficulty sleeping secondary to the muscle stiffness.    Secondly, patient says that her she had lab work done a few days ago and that she was noted to be anemic.  Patient says that she is supposed to have repeat lab work tomorrow.  Reports that she has had intermittent lightheadedness.  Patient says she has a history of IBS, states that she has not noticed any significant change the color of her stool, but that she also does not check her stool color very regularly.  Denies any significant change in bowel habits.  Denies any significant abdominal pain.    Thirdly, patient noticed a wound on the tip of her right middle toe.  Patient unsure how she injured her toe. Patient says that she broke her foot approximately 6 months ago and had ORIF. Patient unsure if her toe wound is related to her ORIF.     Fourthly, patient says she was diagnosed with a UTI yesterday.  Was started on cefpodoxime.  States that she feels as her symptoms are improving, denies dysuria, hematuria, flank pain.    Denies fever, chills, chest pain, shortness of breath, " wheezing, stridor, drooling, NVD, abdominal pain, constipation, urinary problems, joint problems, rashes, or any other complaints at this time.      The history is provided by the patient and a relative.     Review of patient's allergies indicates:   Allergen Reactions    Neurontin [gabapentin]     Lactose     Soap Hives     Able to tolerate Dove only      Past Medical History:   Diagnosis Date    Allergic rhinitis     Anemia     Arthritis     Basal cell carcinoma     Broken ankle     Cancer     Clotting disorder     Disorder of kidney and ureter     DVT (deep venous thrombosis)     Factor V deficiency     Fracture of elbow     left    Glioblastoma multiforme of brain     Hypothyroidism     IBS (irritable bowel syndrome)     Osteoporosis     Panhypopituitarism     Peripheral polyneuropathy     Secondary adrenal insufficiency     Seizures     Thyroid disease      Past Surgical History:   Procedure Laterality Date    APPENDECTOMY      BRAIN SURGERY      for glioblastoma      SECTION      CHOLECYSTECTOMY      CLOSURE OF DEFECT OF MOHS PROCEDURE Left 2018    Procedure: CLOSURE, MOHS PROCEDURE DEFECT SCALP  Flap closure;  Surgeon: Yrn Novak MD;  Location: [address];  Service: Plastics;  Laterality: Left;    COLONOSCOPY N/A 2019    Procedure: COLONOSCOPY Suprep;  Surgeon: Tony Mosher MD;  Location: [facility];  Service: Endoscopy;  Laterality: N/A;    ESOPHAGOGASTRODUODENOSCOPY N/A 2019    Procedure: EGD/colon;  Surgeon: Tony Mosher MD;  Location: [facility];  Service: Endoscopy;  Laterality: N/A;    EXCISION OF GANGLION CYST OF HAND Left 2020    Procedure: EXCISION, GANGLION CYST, HAND;  Surgeon: Ross Drew Jr., MD;  Location: [facility];  Service: Orthopedics;  Laterality: Left;    HYSTERECTOMY      ILIAC CREST BONE GRAFT Right 3/15/2024    Procedure: BONE GRAFT, ILIAC CREST;  Surgeon: Geoffrey Goodson MD;   Location: [facility] OR;  Service: Orthopedics;  Laterality: Right;  acumed harvester    ILIAC CREST BONE GRAFT Right 4/2/2024    Procedure: BONE GRAFT, ILIAC CREST;  Surgeon: Geoffrey Goodson MD;  Location: [facility] OR;  Service: Orthopedics;  Laterality: Right;    MIDFOOT ARTHRODESIS Right 4/2/2024    Procedure: FUSION, JOINT, MIDFOOT;  Surgeon: Geoffrey Goodson MD;  Location: [facility] OR;  Service: Orthopedics;  Laterality: Right;  mini c arm    OPEN REDUCTION AND INTERNAL FIXATION (ORIF) OF FRACTURE OF TARSAL BONE Right 3/15/2024    Procedure: ORIF, FRACTURE, TARSAL BONE;  Surgeon: Geoffrey Goodson MD;  Location: [facility] OR;  Service: Orthopedics;  Laterality: Right;  supine, mini C arm, Styker, block okay    SHOULDER SURGERY Right     TILT TABLE TEST N/A 07/15/2022    Procedure: TILT TABLE TEST;  Surgeon: Amol Kohler MD;  Location: [facility] EP LAB;  Service: Cardiology;  Laterality: N/A;  Orthostatic hypotension, dizziness, Tilt Table Test with EEG, Dr.Tiffany Roca (neuro), DM    TUBAL LIGATION  1998     Family History   Problem Relation Name Age of Onset    Heart disease Mother      Diabetes type II Mother      Diabetes type II Father      Heart attack Maternal Grandfather       Social History[1]  Review of Systems   Constitutional:  Negative for chills and fever.   Respiratory:  Negative for cough, shortness of breath, wheezing and stridor.    Cardiovascular:  Negative for chest pain, palpitations and leg swelling.   Gastrointestinal:  Negative for abdominal distention, abdominal pain, anal bleeding, blood in stool, diarrhea, nausea and vomiting.   Musculoskeletal:  Negative for arthralgias, gait problem and joint swelling.        Muscle stiffness   Skin:  Positive for pallor and wound. Negative for color change.   Neurological:  Positive for tremors, weakness (Generalized weakness) and light-headedness. Negative for dizziness, seizures, syncope, facial asymmetry and numbness.       Physical Exam     Initial Vitals [06/11/25  1958]   BP Pulse Resp Temp SpO2   (!) 178/76 88 15 98.4 °F (36.9 °C) 98 %      MAP       --         Physical Exam    Constitutional: She appears well-developed and well-nourished. She appears ill.   HENT:   Head: Normocephalic and atraumatic.   Cardiovascular:  Normal rate.           Abdominal: Abdomen is soft. Bowel sounds are normal. She exhibits no distension. There is no abdominal tenderness. There is no rebound and no guarding.   Musculoskeletal:      Right lower leg: No swelling. No edema.      Left lower leg: No swelling. No edema.        Feet:       Comments: Decreased active ROM legs bilaterally.  Full passive range of motion legs.    Superficial wound to tip of right 3rd toe.  Cap refill normal.  Full ROM of toe.  No warmth, swelling, erythema.         Neurological: She is alert and oriented to person, place, and time. She displays tremor. No cranial nerve deficit or sensory deficit. Coordination abnormal. GCS eye subscore is 4. GCS verbal subscore is 5. GCS motor subscore is 6.         ED Course   Critical Care    Date/Time: 6/11/2025 11:00 PM    Performed by: Flakita Foote PA-C  Authorized by: Shanda Brown MD  Direct patient critical care time: 5 minutes  Additional history critical care time: 5 minutes  Ordering / reviewing critical care time: 5 minutes  Documentation critical care time: 5 minutes  Consulting other physicians critical care time: 5 minutes  Consult with family critical care time: 5 minutes  Total critical care time (exclusive of procedural time) : 30 minutes  Critical care was necessary to treat or prevent imminent or life-threatening deterioration of the following conditions: circulatory failure.  Critical care was time spent personally by me on the following activities: blood draw for specimens, development of treatment plan with patient or surrogate, discussions with consultants, discussions with primary provider, interpretation of cardiac output measurements, evaluation of  patient's response to treatment, examination of patient, obtaining history from patient or surrogate, ordering and performing treatments and interventions, ordering and review of laboratory studies, ordering and review of radiographic studies, pulse oximetry, re-evaluation of patient's condition and review of old charts.        Labs Reviewed   COMPREHENSIVE METABOLIC PANEL - Abnormal       Result Value    Sodium 141      Potassium 4.4      Chloride 110      CO2 22 (*)     Glucose 93      BUN 38 (*)     Creatinine 2.0 (*)     Calcium 8.1 (*)     Protein Total 6.6      Albumin 3.1 (*)     Bilirubin Total 0.2      ALP 77      AST 18      ALT 15      Anion Gap 9      eGFR 28 (*)    CBC WITH DIFFERENTIAL - Abnormal    WBC 5.18      RBC 2.07 (*)     HGB 6.3 (*)     HCT 19.7 (*)     MCV 95      MCH 30.4      MCHC 32.0      RDW 15.7 (*)     Platelet Count 290      MPV 9.9      Nucleated RBC 0      Neut % 65.2      Lymph % 25.1      Mono % 7.7      Eos % 1.4      Basophil % 0.2      Imm Grans % 0.4      Neut # 3.38      Lymph # 1.30      Mono # 0.40      Eos # 0.07      Baso # 0.01      Imm Grans # 0.02     TSH - Abnormal    TSH 0.149 (*)    MAGNESIUM - Normal    Magnesium  2.3     T4, FREE - Normal    Free T4 0.95     CBC W/ AUTO DIFFERENTIAL    Narrative:     The following orders were created for panel order CBC auto differential.  Procedure                               Abnormality         Status                     ---------                               -----------         ------                     CBC with Differential[[id number]]       Abnormal            Final result                 Please view results for these tests on the individual orders.   OCCULT BLOOD X 1, STOOL   TYPE & SCREEN    Specimen Outdate 06/14/2025 23:59      Group & Rh A POS      Indirect [place] NEG     PREPARE RBC SOFT    UNIT NUMBER [id number]      UNIT ABO/RH A POS      DISPENSE STATUS Transfused      Unit Expiration [id number]      Product  Code [id number]      Unit Blood Type Code 6200      CROSSMATCH INTERPRETATION Compatible            Imaging Results              X-Ray Foot Complete Right (Final result)  Result time 06/11/25 22:11:07      Final result by Vince Darling DO (06/11/25 22:11:07)                   Impression:      No acute fracture or dislocation.    Postop changes of the medial hindfoot as above, with continued perihardware lucency, concerning for hardware infection or loosening.      Electronically signed by: Vince Darling  Date:    06/11/2025  Time:    22:11               Narrative:    EXAMINATION:  XR FOOT COMPLETE 3 VIEW RIGHT    CLINICAL HISTORY:  . Unspecified injury of right foot, initial encounter    TECHNIQUE:  AP, lateral, and oblique views of the right foot were performed.    COMPARISON:  05/23/2025.    FINDINGS:  There are postop changes of fixation of the cuboid, navicular, and talus.  Hardware is intact.  Continued perihardware lucency about the talar screws, concerning for hardware loosening or infection, unchanged.  There is no evidence of acute fracture or dislocation.  Alignment is normal.  Joint spaces are preserved.                                       Medications   0.9%  NaCl infusion (for blood administration) (has no administration in time range)   HYDROcodone-acetaminophen 5-325 mg per tablet 1 tablet (1 tablet Oral Given 6/12/25 0127)   hydrALAZINE injection 10 mg (has no administration in time range)   sodium chloride 0.9% flush 10 mL (has no administration in time range)   naloxone 0.4 mg/mL injection 0.02 mg (has no administration in time range)   glucose chewable tablet 16 g (has no administration in time range)   glucose chewable tablet 24 g (has no administration in time range)   dextrose 50% injection 12.5 g (has no administration in time range)   dextrose 50% injection 25 g (has no administration in time range)   glucagon (human recombinant) injection 1 mg (has no administration in time range)    ondansetron injection 4 mg (has no administration in time range)   acetaminophen tablet 500 mg (has no administration in time range)   amitriptyline tablet 10 mg (has no administration in time range)   cefTRIAXone injection 1 g (1 g Intravenous Given 6/12/25 0823)   cyanocobalamin tablet 100 mcg (100 mcg Oral Given 6/12/25 0827)   ferrous sulfate tablet 1 each (1 each Oral Given 6/12/25 0827)   hydrocortisone tablet 20 mg (20 mg Oral Given 6/12/25 0855)   hyoscyamine SL tablet 0.125 mg (has no administration in time range)   levETIRAcetam tablet 500 mg (500 mg Oral Given 6/12/25 0827)   midodrine tablet 10 mg (0 mg Oral Hold 6/12/25 0900)   rivaroxaban tablet 20 mg (20 mg Oral Not Given 6/12/25 0827)   vibegron Tab 75 mg (has no administration in time range)   ubrogepant 50 mg tablet 50 mg (has no administration in time range)   levothyroxine tablet 125 mcg (has no administration in time range)   rivaroxaban tablet 20 mg (20 mg Oral Given 6/11/25 2215)   levETIRAcetam injection 500 mg (500 mg Intravenous Given 6/11/25 2216)   amitriptyline tablet 10 mg (10 mg Oral Given 6/12/25 0027)   famotidine (PF) injection 20 mg (20 mg Intravenous Given 6/12/25 0026)   diphenhydrAMINE injection 25 mg (25 mg Intravenous Given 6/12/25 0025)   dexAMETHasone injection 4 mg (4 mg Intravenous Given 6/12/25 0026)   tiZANidine tablet 4 mg (4 mg Oral Given 6/12/25 0127)     Medical Decision Making  Patient is an afebrile 59 y.o. female who presents for evaluation of multiple complaints. VSS and do not suggest sepsis.  A&Ox4. The patient remained comfortable and stable during their visit in the ED. Details of ED course documented in ED workup.     Differential diagnosis includes, but is not limited to:  Parkinson's, anemia, GI bleed, electrolyte abnormality, adrenal crisis, thyroid abnormality, UTI, sepsis, infection, Polymyalgia rheumatica, polymyositis, Neuroleptic malignant syndrome,  Serotonin syndrome, Tetanus     After review of  the patients physical exam, ED testing, and history/symptoms, the patient will be admitted. Patient has acute drop in H/H, received 1 unit of blood while in ED. Patient also has hx of adrenal insufficiency, currently had UTI. Will need to be monitored for signs of adrenal crisis. Hospital Medicine called and case was discussed. Dr. Brown will accept the admission. Admit orders will be completed. The diagnosis, treatment and plan for admission were discussed with the patient and understanding was verbalized. All questions or concerns have been addressed.     Amount and/or Complexity of Data Reviewed  Labs: ordered. Decision-making details documented in ED Course.  Radiology: ordered and independent interpretation performed.    Risk  OTC drugs.  Prescription drug management.               ED Course as of 06/12/25 1147   Wed Jun 11, 2025 2154 Hemoglobin(!): 6.3  Dropped from 7.2 two days ago [OB]   2155 Hematocrit(!!): 19.7  Dropped from  23.6 from two days ago   [OB]   Thu Jun 12, 2025   1140 X-ray foot independently reviewed:    No acute fracture or dislocation.     Postop changes of the medial hindfoot as above, with continued perihardware lucency, concerning for hardware infection or loosening.   [OB]      ED Course User Index  [OB] Flakita Foote PA-C                           Clinical Impression:  Final diagnoses:  [S99.921A] Toe injury, right, initial encounter  [G90.3] Orthostatic hypotension due to Parkinson disease (Primary)  [E27.40] Adrenal insufficiency  [N30.00] Acute cystitis without hematuria  [D64.9] Anemia requiring transfusions          ED Disposition Condition    Observation                     Flakita Foote PA-C  06/12/25 1146         [1]   Social History  Tobacco Use    Smoking status: Never    Smokeless tobacco: Never   Substance Use Topics    Alcohol use: No    Drug use: No        Flakita Foote PA-C  06/12/25 1147

## 2025-06-12 NOTE — ED NOTES
PT came to ED with CC of stiffness. Pt has been diagnosed with Parkinson's about two weeks ago but have not been taking her medication per her PCP. Pt was supposed to go do some neurological testing, and they wanted to hold off on giving pt parkinson's medication.  . Today Pt states that she has been more stiff and can't move. Pt also has been having difficulty getting comfortable when sleeping , Pt states she experiences headaches and dizziness whenever she gets up to walk around. Pt also has a UTI and was prescribed antibiotics.

## 2025-06-12 NOTE — ED NOTES
Patient reports improvement to itching. Hives improving. Admit team ok for patient to continue with blood administration.

## 2025-06-12 NOTE — ASSESSMENT & PLAN NOTE
X-ray without acute fracture  Postop changes of the medial hindfoot as above, with continued perihardware lucency, concerning for hardware infection or loosening.

## 2025-06-12 NOTE — ASSESSMENT & PLAN NOTE
Was diagnosed with UTI day prior and started on cefpodoxime  UA remains infected here with a positive nitrite and bacteria  Started on IV Rocephin

## 2025-06-12 NOTE — ASSESSMENT & PLAN NOTE
"Likely due to steroids/ midodrine  She is not on anti hypertensive's    Per recent neuro clinic note,   "Midodrine 10 mg up to three times per day. Checks her BP before. If systolic over 180, she skips the dose."     Continue chronic outpatient management of her orthostatic hypotension     "

## 2025-06-12 NOTE — ED NOTES
Receive report from Shannon and assume care of patient.  Patient is in bed resting, call bell within reach, and VSS.

## 2025-06-13 ENCOUNTER — PATIENT MESSAGE (OUTPATIENT)
Dept: INTERNAL MEDICINE | Facility: CLINIC | Age: 60
End: 2025-06-13
Payer: COMMERCIAL

## 2025-06-13 VITALS
DIASTOLIC BLOOD PRESSURE: 78 MMHG | RESPIRATION RATE: 18 BRPM | BODY MASS INDEX: 22.73 KG/M2 | HEIGHT: 68 IN | SYSTOLIC BLOOD PRESSURE: 162 MMHG | WEIGHT: 150 LBS | TEMPERATURE: 98 F | HEART RATE: 96 BPM | OXYGEN SATURATION: 95 %

## 2025-06-13 LAB
ABSOLUTE EOSINOPHIL (OHS): 0.01 K/UL
ABSOLUTE MONOCYTE (OHS): 0.67 K/UL (ref 0.3–1)
ABSOLUTE NEUTROPHIL COUNT (OHS): 8.22 K/UL (ref 1.8–7.7)
ALBUMIN SERPL BCP-MCNC: 3.2 G/DL (ref 3.5–5.2)
ALP SERPL-CCNC: 77 UNIT/L (ref 40–150)
ALT SERPL W/O P-5'-P-CCNC: 13 UNIT/L (ref 10–44)
ANION GAP (OHS): 13 MMOL/L (ref 8–16)
AST SERPL-CCNC: 19 UNIT/L (ref 11–45)
BASOPHILS # BLD AUTO: 0.03 K/UL
BASOPHILS NFR BLD AUTO: 0.3 %
BILIRUB SERPL-MCNC: 0.2 MG/DL (ref 0.1–1)
BUN SERPL-MCNC: 42 MG/DL (ref 6–20)
CALCIUM SERPL-MCNC: 9.2 MG/DL (ref 8.7–10.5)
CHLORIDE SERPL-SCNC: 104 MMOL/L (ref 95–110)
CO2 SERPL-SCNC: 20 MMOL/L (ref 23–29)
CREAT SERPL-MCNC: 2.1 MG/DL (ref 0.5–1.4)
ERYTHROCYTE [DISTWIDTH] IN BLOOD BY AUTOMATED COUNT: 15.7 % (ref 11.5–14.5)
GFR SERPLBLD CREATININE-BSD FMLA CKD-EPI: 27 ML/MIN/1.73/M2
GLUCOSE SERPL-MCNC: 119 MG/DL (ref 70–110)
HCT VFR BLD AUTO: 29.1 % (ref 37–48.5)
HGB BLD-MCNC: 9.3 GM/DL (ref 12–16)
IMM GRANULOCYTES # BLD AUTO: 0.04 K/UL (ref 0–0.04)
IMM GRANULOCYTES NFR BLD AUTO: 0.4 % (ref 0–0.5)
LYMPHOCYTES # BLD AUTO: 1.67 K/UL (ref 1–4.8)
MCH RBC QN AUTO: 29.7 PG (ref 27–31)
MCHC RBC AUTO-ENTMCNC: 32 G/DL (ref 32–36)
MCV RBC AUTO: 93 FL (ref 82–98)
NUCLEATED RBC (/100WBC) (OHS): 0 /100 WBC
PLATELET # BLD AUTO: 333 K/UL (ref 150–450)
PMV BLD AUTO: 9.3 FL (ref 9.2–12.9)
POTASSIUM SERPL-SCNC: 3.9 MMOL/L (ref 3.5–5.1)
PROT SERPL-MCNC: 7.2 GM/DL (ref 6–8.4)
RBC # BLD AUTO: 3.13 M/UL (ref 4–5.4)
RELATIVE EOSINOPHIL (OHS): 0.1 %
RELATIVE LYMPHOCYTE (OHS): 15.7 % (ref 18–48)
RELATIVE MONOCYTE (OHS): 6.3 % (ref 4–15)
RELATIVE NEUTROPHIL (OHS): 77.2 % (ref 38–73)
SODIUM SERPL-SCNC: 137 MMOL/L (ref 136–145)
WBC # BLD AUTO: 10.64 K/UL (ref 3.9–12.7)

## 2025-06-13 PROCEDURE — 80053 COMPREHEN METABOLIC PANEL: CPT | Performed by: REGISTERED NURSE

## 2025-06-13 PROCEDURE — 63600175 PHARM REV CODE 636 W HCPCS: Performed by: FAMILY MEDICINE

## 2025-06-13 PROCEDURE — G0378 HOSPITAL OBSERVATION PER HR: HCPCS

## 2025-06-13 PROCEDURE — 96376 TX/PRO/DX INJ SAME DRUG ADON: CPT

## 2025-06-13 PROCEDURE — 63600175 PHARM REV CODE 636 W HCPCS: Performed by: REGISTERED NURSE

## 2025-06-13 PROCEDURE — 36415 COLL VENOUS BLD VENIPUNCTURE: CPT | Performed by: REGISTERED NURSE

## 2025-06-13 PROCEDURE — 85025 COMPLETE CBC W/AUTO DIFF WBC: CPT | Performed by: REGISTERED NURSE

## 2025-06-13 PROCEDURE — 25000003 PHARM REV CODE 250: Performed by: FAMILY MEDICINE

## 2025-06-13 PROCEDURE — 25000003 PHARM REV CODE 250: Performed by: REGISTERED NURSE

## 2025-06-13 RX ORDER — ACETAMINOPHEN 500 MG
500 TABLET ORAL EVERY 6 HOURS PRN
COMMUNITY
Start: 2025-06-13

## 2025-06-13 RX ORDER — LEVOTHYROXINE SODIUM 125 UG/1
125 TABLET ORAL
Qty: 30 TABLET | Refills: 0 | Status: SHIPPED | OUTPATIENT
Start: 2025-06-14 | End: 2026-06-14

## 2025-06-13 RX ORDER — MIDODRINE HYDROCHLORIDE 10 MG/1
10 TABLET ORAL 3 TIMES DAILY PRN
COMMUNITY
Start: 2025-06-13

## 2025-06-13 RX ORDER — HYDRALAZINE HYDROCHLORIDE 20 MG/ML
10 INJECTION INTRAMUSCULAR; INTRAVENOUS ONCE
Status: COMPLETED | OUTPATIENT
Start: 2025-06-13 | End: 2025-06-13

## 2025-06-13 RX ADMIN — ACETAMINOPHEN 500 MG: 500 TABLET ORAL at 03:06

## 2025-06-13 RX ADMIN — HYDROCODONE BITARTRATE AND ACETAMINOPHEN 1 TABLET: 5; 325 TABLET ORAL at 01:06

## 2025-06-13 RX ADMIN — LEVOTHYROXINE SODIUM 125 MCG: 0.12 TABLET ORAL at 06:06

## 2025-06-13 RX ADMIN — VITAM B12 100 MCG: 100 TAB at 09:06

## 2025-06-13 RX ADMIN — FERROUS SULFATE TAB 325 MG (65 MG ELEMENTAL FE) 1 EACH: 325 (65 FE) TAB at 09:06

## 2025-06-13 RX ADMIN — CEFTRIAXONE SODIUM 1 G: 1 INJECTION, POWDER, FOR SOLUTION INTRAMUSCULAR; INTRAVENOUS at 09:06

## 2025-06-13 RX ADMIN — MIDODRINE HYDROCHLORIDE 10 MG: 5 TABLET ORAL at 06:06

## 2025-06-13 RX ADMIN — HYDRALAZINE HYDROCHLORIDE 2 MG: 20 INJECTION INTRAMUSCULAR; INTRAVENOUS at 01:06

## 2025-06-13 RX ADMIN — LEVETIRACETAM 500 MG: 500 TABLET, FILM COATED ORAL at 09:06

## 2025-06-13 RX ADMIN — HYDROCORTISONE 20 MG: 10 TABLET ORAL at 09:06

## 2025-06-13 RX ADMIN — ACETAMINOPHEN 500 MG: 500 TABLET ORAL at 06:06

## 2025-06-13 RX ADMIN — HYDRALAZINE HYDROCHLORIDE 10 MG: 20 INJECTION INTRAMUSCULAR; INTRAVENOUS at 06:06

## 2025-06-13 NOTE — PLAN OF CARE
06/13/25 0956   Final Note   Assessment Type Final Discharge Note   Anticipated Discharge Disposition Home   Hospital Resources/Appts/Education Provided Appointments scheduled and added to AVS   Post-Acute Status   Discharge Delays None known at this time     Pt will dc home today.  will be here to  pt at 11am. Clear by CM.    Future Appointments   Date Time Provider Department Center   6/26/2025  2:00 PM [address] [facility]   8/19/2025  1:00 PM Juan F Singletary MD [facility]     Samantha Villagran, RN, [facility]  Case Management- [place]  [phone]

## 2025-06-13 NOTE — ASSESSMENT & PLAN NOTE
X-ray without acute fracture  Postop changes of the medial hindfoot as above, with continued perihardware lucency, concerning for hardware infection or loosening.   Follow-up with PCP

## 2025-06-13 NOTE — PROGRESS NOTES
AVS virtually reviewed with Mr and Mrs Nguyen in its entirety with emphasis on diet, medications, follow-up appointments and reasons to return to the ED. Patient also encouraged to utilize their patient portal. Ease and convenience of use reiterated. Education complete and patient voiced understanding. All questions answered. Discharge teaching complete. Transport requested.

## 2025-06-13 NOTE — ASSESSMENT & PLAN NOTE
Was diagnosed with UTI day prior and started on cefpodoxime  UA remains infected here with a positive nitrite and bacteria  Given IV Rocephin while inpatient

## 2025-06-13 NOTE — PLAN OF CARE
Problem: Adult Inpatient Plan of Care  Goal: Plan of Care Review  Outcome: Adequate for Care Transition  Goal: Patient-Specific Goal (Individualized)  Outcome: Adequate for Care Transition  Goal: Absence of Hospital-Acquired Illness or Injury  Outcome: Adequate for Care Transition  Goal: Optimal Comfort and Wellbeing  Outcome: Adequate for Care Transition  Goal: Readiness for Transition of Care  Outcome: Adequate for Care Transition     Problem: Anemia  Goal: Anemia Symptom Improvement  Outcome: Adequate for Care Transition     Problem: Comorbidity Management  Goal: Maintenance of Seizure Control  Outcome: Adequate for Care Transition

## 2025-06-13 NOTE — ASSESSMENT & PLAN NOTE
Anemia is likely due to chronic disease due to Chronic Kidney Disease. Most recent hemoglobin and hematocrit are listed below.  Recent Labs     06/11/25  2131 06/13/25  0434   HGB 6.3* 9.3*   HCT 19.7* 29.1*     - Patient has received 1 units of PRBCs on 6/11/2025  Abnormal rise in hemoglobin-- possible lab error.  They say they have upcoming blood work and can get her blood count rechecked outpatient.    - there was concern for reaction to blood transfusion as patient developed hives in which she had two small red raised spots 1 on her leg and 1 on the back of her neck.  Patient denied any shortness a breath, she was not hypotensive nor tachycardic.  She was treated with Decadron, Benadryl, Pepcid in the blood transfusion was resumed without any further complications.

## 2025-06-13 NOTE — PLAN OF CARE
06/13/25 0955   Discharge Planning   Assessment Type Discharge Planning Brief Assessment   Resource/Environmental Concerns none   Support Systems Spouse/significant other   Equipment Currently Used at Home walker, rolling;wheelchair;shower chair   Current Living Arrangements home   Patient/Family Anticipates Transition to home with family   Patient/Family Anticipated Services at Transition none   DME Needed Upon Discharge  none   Discharge Plan A Home with family     Pt lives with spouse and he will provide transport upon discharge. DME listed above.    Future Appointments   Date Time Provider Department Center   6/26/2025  2:00 PM CHAIR [address] [place] [address] [facility]   8/19/2025  1:00 PM Juan F Singletary MD [facility]     Samantha Villagran RN, [facility]  Case Management- [place]  [phone]

## 2025-06-13 NOTE — DISCHARGE SUMMARY
[facility] Medicine  Discharge Summary      Patient Name: Thelma Nguyen  MRN: [id number]  DARA: [id number]  Patient Class: OP- Observation  Admission Date: 6/11/2025  Hospital Length of Stay: 0 days  Discharge Date and Time: 6/13/2025  1:00 PM  Discharging Provider: Shanda Brown MD  Primary Care Provider: Zachary Bender MD      Primary Care Team: Networked reference to record PCT     HPI:   Patient is a 59-year-old female who presented to ED with multiple complaints.  Patient has a history of brain tumor, seizures, factor 5 Leiden, DVT x3, Parkinson's,  Patient reports she was recently diagnosed with Parkinson's disease however due to her having labwork for autonomic workup per  she did not start levodopa.  Patient reports being stiff with arthralgias. she denies any abdominal pain, change in color of stool, any melena or hematochezia.  In ED labwork remarkable for significant anemia with H&H of 6.3 & 19.7.  Two days prior patient's H&H of 7.2 & 23.  Patient was typed and crossmatch and ordered 1 unit of packed red blood cells.  She also has CKD with chemistry showing BUN of 38 and serum creatinine 2.0.       Hospital Course:   Patient was transfused 1 unit of packed red blood cell.  This was complicated by a rash.  Transfusion was paused and she was treated with steroids and Benadryl.  She was able to complete the reaction without further incident.  Repeat hemoglobin was stable.  She was cleared for discharge.     Goals of Care Treatment Preferences:  Code Status: Full Code      SDOH Screening:  The patient was screened for utility difficulties, food insecurity, transport difficulties, housing insecurity, and interpersonal safety and there were no concerns identified this admission.     Consults:     Assessment & Plan  Secondary adrenal insufficiency  Continue chronic home meds   Secondary hypothyroidism  TSH low, increased Synthroid  Will need repeat labs in 4-6  "weeks      Factor V deficiency with DVT x 3  Continue Xarelto    Stage 3b chronic kidney disease  At baseline   Orthostatic hypotension due to Parkinson disease  Continue chronic home meds  Right foot pain  X-ray without acute fracture  Postop changes of the medial hindfoot as above, with continued perihardware lucency, concerning for hardware infection or loosening.   Follow-up with PCP    ABLA (acute blood loss anemia)  Anemia is likely due to chronic disease due to Chronic Kidney Disease. Most recent hemoglobin and hematocrit are listed below.  Recent Labs     06/11/25  2131 06/13/25  0434   HGB 6.3* 9.3*   HCT 19.7* 29.1*     - Patient has received 1 units of PRBCs on 6/11/2025  Abnormal rise in hemoglobin-- possible lab error.  They say they have upcoming blood work and can get her blood count rechecked outpatient.    - there was concern for reaction to blood transfusion as patient developed hives in which she had two small red raised spots 1 on her leg and 1 on the back of her neck.  Patient denied any shortness a breath, she was not hypotensive nor tachycardic.  She was treated with Decadron, Benadryl, Pepcid in the blood transfusion was resumed without any further complications.   UTI (urinary tract infection)  Was diagnosed with UTI day prior and started on cefpodoxime  UA remains infected here with a positive nitrite and bacteria  Given IV Rocephin while inpatient  Elevated blood pressure reading without diagnosis of hypertension  Likely due to steroids/ midodrine  She is not on anti hypertensive's    Per recent neuro clinic note,   "Midodrine 10 mg up to three times per day. Checks her BP before. If systolic over 180, she skips the dose."     Continue chronic outpatient management of her orthostatic hypotension     Final Active Diagnoses:    Diagnosis Date Noted POA    PRINCIPAL PROBLEM:  ABLA (acute blood loss anemia) [D62] 06/12/2025 Yes    UTI (urinary tract infection) [N39.0] 06/12/2025 Yes    " Elevated blood pressure reading without diagnosis of hypertension [R03.0] 06/12/2025 Yes    Right foot pain [M79.671] 06/06/2024 Yes    Orthostatic hypotension due to Parkinson disease [G90.3] 02/15/2022 Yes    Stage 3b chronic kidney disease [N18.32] 03/28/2019 Yes    Factor V deficiency with DVT x 3 [D68.2] 05/05/2016 Yes    Secondary hypothyroidism [E03.8] 05/05/2016 Yes    Secondary adrenal insufficiency [E27.49] 05/05/2016 Yes      Problems Resolved During this Admission:       Discharged Condition: stable    Disposition: Home or Self Care    Follow Up:    Patient Instructions:      Diet Cardiac     Activity as tolerated       Significant Diagnostic Studies: Labs: CBC   Recent Labs   Lab 06/11/25  2131 06/13/25  0434   WBC 5.18 10.64   HGB 6.3* 9.3*   HCT 19.7* 29.1*    333       Pending Diagnostic Studies:       None           Medications:  Reconciled Home Medications:      Medication List        CHANGE how you take these medications      acetaminophen 500 MG tablet  Commonly known as: TYLENOL  Take 1 tablet (500 mg total) by mouth every 6 (six) hours as needed for Pain.  What changed:   when to take this  reasons to take this     CORLANOR 7.5 mg Tab  Generic drug: ivabradine  Take 1 tablet (7.5 mg total) by mouth 2 (two) times daily.  What changed: Another medication with the same name was removed. Continue taking this medication, and follow the directions you see here.     hydrocortisone 10 MG Tab  Commonly known as: CORTEF  TAKE 1& 1/2 TABLETS BY MOUTH IN THE MORNING AND 1/2 TABLET IN THE AFTERNOON. DOUBLE THE DOSE IN CASE OF ILLNESS.  What changed: See the new instructions.     levothyroxine 125 MCG tablet  Commonly known as: SYNTHROID  Take 1 tablet (125 mcg total) by mouth before breakfast.  Start taking on: June 14, 2025  What changed:   medication strength  how much to take     midodrine 10 MG tablet  Commonly known as: PROAMATINE  Take 1 tablet (10 mg total) by mouth 3 (three) times daily as  needed. Hold for bp 180 or higher  What changed:   when to take this  reasons to take this  additional instructions            CONTINUE taking these medications      amitriptyline 10 MG tablet  Commonly known as: ELAVIL  Take 1 tablet (10 mg total) by mouth every evening.     biotin 2,500 mcg Cap  Take 2,500 mcg by mouth once daily.     calcium carbonate 200 mg calcium (500 mg) chewable tablet  Commonly known as: TUMS  Take 1 tablet by mouth 3 (three) times daily as needed for Heartburn.     cefpodoxime 100 MG tablet  Commonly known as: VANTIN  Take 1 tablet (100 mg total) by mouth every 12 (twelve) hours. for 7 days     cholecalciferol (vitamin D3) 50 mcg (2,000 unit) Cap capsule  Commonly known as: VITAMIN D3  Take 2,000 Units by mouth every evening.     diphenoxylate-atropine 2.5-0.025 mg 2.5-0.025 mg per tablet  Commonly known as: LOMOTIL  Take 1 tablet by mouth 4 (four) times daily as needed for Diarrhea.     ferrous sulfate 325 mg (65 mg iron) Tab tablet  Commonly known as: FEOSOL  Take 1 tablet (325 mg total) by mouth 2 (two) times daily.     hyoscyamine 0.125 mg Subl  Place 1 tablet (0.125 mg total) under the tongue every 4 (four) hours as needed.     levETIRAcetam 500 MG Tab  Commonly known as: KEPPRA  Take 1 tablet (500 mg total) by mouth 2 (two) times daily.     loratadine 10 mg tablet  Commonly known as: CLARITIN  Take 10 mg by mouth daily as needed for Allergies.     melatonin 5 mg Tbdl  Take 1 tablet by mouth nightly as needed.     rivaroxaban 20 mg Tab  Commonly known as: XARELTO  Take 1 tablet (20 mg total) by mouth once daily.     UBRELVY 50 mg tablet  Generic drug: ubrogepant  Take 1 tablet (50 mg total) by mouth as needed for Migraine. If symptoms persist or return, may repeat dose after 2 hours. Maximum: 200 mg per 24 hours     vibegron 75 mg Tab  Take 1 tablet (75 mg total) by mouth every evening.     VITAMIN B-12 100 MCG tablet  Generic drug: cyanocobalamin  Take 100 mcg by mouth once daily.             ASK your doctor about these medications      dexAMETHasone 4 mg/mL injection  Inject 1ml (4mg) into the muscle for symptoms of severe adrenal insufficiency. 3ml syringe w/18 g needle to draw x10 and 21g 1 inch needle to injection x10              Indwelling Lines/Drains at time of discharge:   Lines/Drains/Airways       None                   Time spent on the discharge of patient: 31 minutes  Physical exam stable       Shanda Brown MD  Department of Hospital Medicine  [place] [facility]

## 2025-06-13 NOTE — HOSPITAL COURSE
Patient was transfused 1 unit of packed red blood cell.  This was complicated by a rash.  Transfusion was paused and she was treated with steroids and Benadryl.  She was able to complete the reaction without further incident.  Repeat hemoglobin was stable.  She was cleared for discharge.

## 2025-06-16 ENCOUNTER — PATIENT OUTREACH (OUTPATIENT)
Dept: ADMINISTRATIVE | Facility: CLINIC | Age: 60
End: 2025-06-16
Payer: COMMERCIAL

## 2025-06-16 ENCOUNTER — TELEPHONE (OUTPATIENT)
Dept: NEPHROLOGY | Facility: CLINIC | Age: 60
End: 2025-06-16
Payer: COMMERCIAL

## 2025-06-16 NOTE — PROGRESS NOTES
C3 nurse spoke with Thelma Nguyen for a TCC post hospital discharge follow up call. The patient has a scheduled HOSFU 6/20/25 virtual with Dr Bender @[phone]

## 2025-06-20 ENCOUNTER — PATIENT OUTREACH (OUTPATIENT)
Dept: ADMINISTRATIVE | Facility: HOSPITAL | Age: 60
End: 2025-06-20
Payer: COMMERCIAL

## 2025-06-20 ENCOUNTER — OFFICE VISIT (OUTPATIENT)
Dept: INTERNAL MEDICINE | Facility: CLINIC | Age: 60
End: 2025-06-20
Payer: COMMERCIAL

## 2025-06-20 DIAGNOSIS — D64.9 SYMPTOMATIC ANEMIA: ICD-10-CM

## 2025-06-20 DIAGNOSIS — I95.1 DYSAUTONOMIA ORTHOSTATIC HYPOTENSION SYNDROME: ICD-10-CM

## 2025-06-20 DIAGNOSIS — Z09 HOSPITAL DISCHARGE FOLLOW-UP: Primary | ICD-10-CM

## 2025-06-20 DIAGNOSIS — G47.00 INSOMNIA, UNSPECIFIED TYPE: ICD-10-CM

## 2025-06-20 DIAGNOSIS — N18.4 CHRONIC KIDNEY DISEASE (CKD), STAGE IV (SEVERE): ICD-10-CM

## 2025-06-20 RX ORDER — ZALEPLON 5 MG/1
5 CAPSULE ORAL NIGHTLY PRN
Qty: 30 CAPSULE | Refills: 0 | Status: SHIPPED | OUTPATIENT
Start: 2025-06-20 | End: 2025-10-18

## 2025-06-20 NOTE — LETTER
AUTHORIZATION FOR RELEASE OF   CONFIDENTIAL INFORMATION    Dear Braulio Aggarwal MD,    We are seeing Thelma Nguyen, date of birth 1965, in the clinic at [facility]. Zachary Bender MD is the patient's PCP. Thelma Nguyen has an outstanding lab/procedure at the time we reviewed her chart. In order to help keep her health information updated, she has authorized us to request the following medical record(s):        (  )  MAMMOGRAM                                      (  )  COLONOSCOPY      (  )  PAP SMEAR                                          (  )  OUTSIDE LAB RESULTS     (  )  DEXA SCAN                                          (  )  EYE EXAM            (  )  FOOT EXAM                                          (  )  ENTIRE RECORD     (  )  OUTSIDE IMMUNIZATIONS                 (  X)  Autonomic testing         Please fax records to [facility], Plost, Samuel T., MD,  at [phone] or email to [email or web address].       If you have any questions, please contact [facility] at [phone].           Patient Name: Thelma Nguyen  : 1965  Patient Phone #: [phone]

## 2025-06-20 NOTE — PROGRESS NOTES
Chart reviewed and updated. Reconciled immunizations. Requested records from , per DR.Plost Sonia Moulton LPN   Clinical Care Coordinator  Primary Care and Wellness

## 2025-06-20 NOTE — LETTER
AUTHORIZATION FOR RELEASE OF   CONFIDENTIAL INFORMATION    Dear Braulio Aggarwal MD  ,  We are seeing Thelma Nguyen, date of birth 1965, in the clinic at [facility]. Zachary Bender MD is the patient's PCP. Thelma Nguyen has an outstanding lab/procedure at the time we reviewed her chart. In order to help keep her health information updated, she has authorized us to request the following medical record(s):        (  )  MAMMOGRAM                                      (  )  COLONOSCOPY      (  )  PAP SMEAR                                          ( X )  OUTSIDE LAB RESULTS     (  )  DEXA SCAN                                          ( X )  IMAGING        (  )  FOOT EXAM                                          (  )  ENTIRE RECORD     (  )  OUTSIDE IMMUNIZATIONS                 ( X )  Progress Notes         Please fax records to [facility], Plost, Samuel T., MD,  at [phone] or email to [email or web address].       If you have any questions, please contact [facility] at [phone].           Patient Name: Thelma Nguyen  : 1965  Patient Phone #: [phone]

## 2025-06-20 NOTE — PROGRESS NOTES
Autonomic testing requested from  per Dr.Plost Sonia Moulton LPN   Clinical Care Coordinator  Primary Care and Wellness

## 2025-06-20 NOTE — PROGRESS NOTES
The patient location is: [place]  The chief complaint leading to consultation is:   Visit type: Virtual visit with synchronous audio and video  Total time spent with patient: 15 minutes  Each patient to whom he or she provides medical services by telemedicine is:  (1) informed of the relationship between the physician and patient and the respective role of any other health care provider with respect to management of the patient; and (2) notified that he or she may decline to receive medical services by telemedicine and may withdraw from such care at any time.      CHIEF COMPLAINT     No chief complaint on file.  TELEMEDICINE VISIT    HPI     Thelma Nguyen is 59 y.o. female history of glioblastoma s/p resection, complicated by panhypopituitarism and neuropathy, IBS, CKD 3, depression, hx of skin cancer and factor 5 leiden deficiency  here today for     Hospitalized from 6/11-6/13.  brought her for stiffness. Found to be symptomatically anemic.  S/p 1unit PRBC      Went to sewing class.   Personally Reviewed Patient's Medical, surgical, family and social hx. Changes updated in [facility].  Care Team updated in Epic    Review of Systems:  Review of Systems   Cardiovascular:  Positive for orthopnea.   Psychiatric/Behavioral:  The patient has insomnia.        Health Maintenance:   Reviewed with patient  Due for the following:      PHYSICAL EXAM       Gen: Well Appearing, NAD  HEENT: PERR, EOMI  Chest: Normal work of breathing,   Neuro: A&Ox3,  speech normal  Mood; Mood normal, behavior normal, thought process linear       LABS     Labs reviewed; Notable for    ASSESSMENT     1. Hospital discharge follow-up        2. Symptomatic anemia  CBC Without Differential    Comprehensive Metabolic Panel      3. Dysautonomia orthostatic hypotension syndrome        4. Insomnia, unspecified type  zaleplon (SONATA) 5 MG Cap      5. Chronic kidney disease (CKD), stage IV (severe)                  Plan     Thelma Kirkland  Patrick is a 59 y.o. female history of glioblastoma s/p resection, complicated by panhypopituitarism and neuropathy, IBS, CKD 3, depression, hx of skin cancer and factor 5 leiden deficiency    1. Hospital discharge follow-up  Patient contacted within 48 hours of discharge,  Symptoms required hospitalization improving,   medications reconciled   follow up appts appear scheduled and verified with patient.      2. Symptomatic anemia  Will repeat CBC today  Patient with dark stool on iron. May benefit from endoscopy but not sure how she will tolerate Prep  - CBC Without Differential; Future  - Comprehensive Metabolic Panel; Future    3. Dysautonomia orthostatic hypotension syndrome  Still symptomatic but no more passing out episodes after stopping duloxetine   Continue midodrine  Will request results from Dr. Aggarwal's office so we can work with neurology to develop treatment plan with hopes of improving functional status.    4. Insomnia, unspecified type  Trial of sonata for terminal insomnia  - zaleplon (SONATA) 5 MG Cap; Take 1 capsule (5 mg total) by mouth nightly as needed (insomnia).  Dispense: 30 capsule; Refill: 0    5. Chronic kidney disease (CKD), stage IV (severe)  Slight dip in GFR during recent hospitalization.  Will repeat GFR      Patient with complex multi organ system syndrome requiring input from multiple specialties across multiple hospital systems requiring extra support to coordinate care.  Zachary Bender MD

## 2025-06-23 ENCOUNTER — RESULTS FOLLOW-UP (OUTPATIENT)
Dept: INTERNAL MEDICINE | Facility: CLINIC | Age: 60
End: 2025-06-23
Payer: COMMERCIAL

## 2025-06-23 NOTE — TELEPHONE ENCOUNTER
BP     Friday 6/13  156/83 109  7:10pm     Saturday 6/14  138/70 109  10:26am  take med     203/95 79  2:32pm  no med     182/97 89  7:02pm no med     [place] 6/15  174/91 97  9:44am  take med     155/84 76  1:58pm. Take. Med     226/96 74  5:03pm     186/97 88  7:15pm no med     Monday 6/16  157/87 94  9:48am take med     170/80 79  2:34pm take med     180/83 90  7:00pm no med     Tuesday 6/17  200/96 93  2:44pm no med     195/93 97  7:03pm no med     Wednesday 6/18  164/77  109  9:48am take med     164/88 111  3:59pm take med     184/85 94  7:03pm no med     Thursday 6/19  196/91 105  7:09pm no med     Friday 6/20  147/70 111  9:44am take med     185/85 113  3:30pm no med     192/90 107  6:54pm no med     Sat 6/21  174/75 108  9:15am in take med     188/91 106  6:47pm no med     Sunday 6/22  167/89 106  9:28am take med

## 2025-06-26 ENCOUNTER — PATIENT MESSAGE (OUTPATIENT)
Dept: INTERNAL MEDICINE | Facility: CLINIC | Age: 60
End: 2025-06-26
Payer: COMMERCIAL

## 2025-06-26 NOTE — TELEPHONE ENCOUNTER
LOV with Yulia, Zachary MENESES MD , 6/20/2025    Patient inquiring about communication with Dr. Reilly. Does not want to start the Parkinson's med until everyone is on the same page    Note dated 6/20/25:  3. Dysautonomia orthostatic hypotension syndrome  Still symptomatic but no more passing out episodes after stopping duloxetine   Continue midodrine  Will request results from Dr. Aggarwal's office so we can work with neurology to develop treatment plan with hopes of improving functional status.

## 2025-06-27 ENCOUNTER — OFFICE VISIT (OUTPATIENT)
Dept: NEPHROLOGY | Facility: CLINIC | Age: 60
End: 2025-06-27
Payer: COMMERCIAL

## 2025-06-27 VITALS
SYSTOLIC BLOOD PRESSURE: 183 MMHG | HEIGHT: 68 IN | WEIGHT: 150.38 LBS | DIASTOLIC BLOOD PRESSURE: 100 MMHG | BODY MASS INDEX: 22.79 KG/M2 | HEART RATE: 106 BPM

## 2025-06-27 DIAGNOSIS — N18.4 CHRONIC KIDNEY DISEASE, STAGE 4 (SEVERE): ICD-10-CM

## 2025-06-27 DIAGNOSIS — I95.1 DYSAUTONOMIA ORTHOSTATIC HYPOTENSION SYNDROME: ICD-10-CM

## 2025-06-27 DIAGNOSIS — R73.03 PREDIABETES: ICD-10-CM

## 2025-06-27 DIAGNOSIS — D64.9 ANEMIA, UNSPECIFIED TYPE: ICD-10-CM

## 2025-06-27 DIAGNOSIS — E27.49 SECONDARY ADRENAL INSUFFICIENCY: ICD-10-CM

## 2025-06-27 DIAGNOSIS — Z86.39 HISTORY OF ADRENAL INSUFFICIENCY: ICD-10-CM

## 2025-06-27 DIAGNOSIS — N18.4 CHRONIC KIDNEY DISEASE (CKD), STAGE IV (SEVERE): Primary | ICD-10-CM

## 2025-06-27 PROCEDURE — 99999 PR PBB SHADOW E&M-EST. PATIENT-LVL IV: CPT | Mod: PBBFAC,,, | Performed by: NURSE PRACTITIONER

## 2025-06-27 NOTE — Clinical Note
Hello, [place] pt. I just saw her for the first time in nephrology. I know there are a lot of moving pieces right now. She said that her BPs are maintaining 180s/90s all day after one dose of midodrine in the morning. She says maintaining this high all day is new.  She has gained weight over the past few mos. What are your thoughts on dropping the midodrine dose to 5 mg?

## 2025-06-27 NOTE — PROGRESS NOTES
Name: Thelma Nguyen  : 1965  Date of Service: 2025     Reason for visit: F/U for CKD 3B      HPI;  The patient is a 58-year-old female here for follow-up of CKD 3B (baseline Scr 1.5-1.7) due to NSAID use. She has no history of DM or HTN but has panhypopituitarism following glioblastoma excision. She is on Hydrocortisone, PRN Decadron, and Midodrine 2.5mg Q4h for adrenal insufficiency. The patient denies SOB, LE edema, hematuria, or foamy urine but reports frequent episodes of orthostatic hypotension.     2024;   CKD non progressive, HgB 11.3, , no proteinuria, PO4 3.6  Check Vitamin D levels, Start Calcitriol 0.25mg daily  U/S retroperitoneal (previous: bilateral cysts)  Refer to renal dietician for low potassium diet     2024;  CKD stable, non progressive, at baseline Scr, no proteinuria  Recurrent hypotension could lead to IONA and CKD progression.  Hgb 10.2, start Ferrous Sulfate 325mg (65mg iron) BID.  Switch to Ergocalciferol 50 K units weekly for 7 weeks.  Stop Calcitriol 0.25 mcg.     2024;  Reported high BP, 160/88 today in clinic on sitting  Her Midodrine has been increased to 5 mg QID by her Cardiologist, Hydrocortisone taking per Endocrinology  No edema in legs, chest clear, no issues with urination reported  Scr is 1.8, almost near to baseline, no proteinuria, no new issues reported     2024;  Recent Scr 2.2-->1.8, reported low P/O intake, significant orthostatic drops (184/75 to 121/60), new onset of increased urinary frequency. Recent UPCR 0.4, previously no proteinuria,     25:  Presents for f/u CKD. New to me. Last seen by Dr. Bay.  Baseline sCr: 1.9-2.1 mg/dL since rise in sCr Dec. 2024.      Home BPs: labile. Takes midodrine if SBP < 180  Orthostatic hypotension has improved some with d/c of Cymbalta.  Patient is on Midodrine 10 mg for orthostatic hypotension, taken at 10:00 AM, 2:30 PM (if needed), and 7:00 PM (if needed). Says  "that recently, when she takes morning dose of midodrine, SBP maintains in 180s, DBP in 90s all day.    Has dysautonomia orthostatic hypotension managed by neurology: Per recent neuro clinic note,   "Midodrine 10 mg up to three times per day. Checks her BP before. If systolic over 180, she skips the dose."     Reports weight gain recently.    Reported to ER with muscle stiffness. Hospitalized  c anemia. Transfued 1 unit PRBCs. Had a rash. Received steroids, benadryl. TSH was low, synthroid increased. Anemia contributed to CKD by ER provider.  Also had UTI. Had PO cefpodoxime prior to arrival. Given IV rocephin inpatient. Frequency (only symptom) has resolved.          Medical issues:   Past Medical History:   Diagnosis Date    Allergic rhinitis     Anemia     Arthritis     Basal cell carcinoma     Broken ankle     Cancer     Clotting disorder     Disorder of kidney and ureter     DVT (deep venous thrombosis)     Factor V deficiency     Fracture of elbow     left    Glioblastoma multiforme of brain     Hypothyroidism     IBS (irritable bowel syndrome)     Osteoporosis     Panhypopituitarism     Peripheral polyneuropathy     Secondary adrenal insufficiency     Seizures     Thyroid disease        Surgical History:   Past Surgical History:   Procedure Laterality Date    APPENDECTOMY      BRAIN SURGERY      for glioblastoma      SECTION      CHOLECYSTECTOMY      CLOSURE OF DEFECT OF MOHS PROCEDURE Left 2018    Procedure: CLOSURE, MOHS PROCEDURE DEFECT SCALP  Flap closure;  Surgeon: Yrn Novak MD;  Location: [facility] OR [address] [place] [address];  Service: Plastics;  Laterality: Left;    COLONOSCOPY N/A 2019    Procedure: COLONOSCOPY Suprep;  Surgeon: Tony Mosher MD;  Location: [facility];  Service: Endoscopy;  Laterality: N/A;    ESOPHAGOGASTRODUODENOSCOPY N/A 2019    Procedure: EGD/colon;  Surgeon: Tony Mosher MD;  Location: [facility];  Service: " Endoscopy;  Laterality: N/A;    EXCISION OF GANGLION CYST OF HAND Left 01/07/2020    Procedure: EXCISION, GANGLION CYST, HAND;  Surgeon: Ross Drew Jr., MD;  Location: [facility] OR;  Service: Orthopedics;  Laterality: Left;    HYSTERECTOMY  1999    ILIAC CREST BONE GRAFT Right 3/15/2024    Procedure: BONE GRAFT, ILIAC CREST;  Surgeon: Geoffrey Goodson MD;  Location: [facility] OR;  Service: Orthopedics;  Laterality: Right;  acumed harvester    ILIAC CREST BONE GRAFT Right 4/2/2024    Procedure: BONE GRAFT, ILIAC CREST;  Surgeon: Geoffrey Goodson MD;  Location: [facility] OR;  Service: Orthopedics;  Laterality: Right;    MIDFOOT ARTHRODESIS Right 4/2/2024    Procedure: FUSION, JOINT, MIDFOOT;  Surgeon: Geoffrey Goodson MD;  Location: [facility] OR;  Service: Orthopedics;  Laterality: Right;  mini c arm    OPEN REDUCTION AND INTERNAL FIXATION (ORIF) OF FRACTURE OF TARSAL BONE Right 3/15/2024    Procedure: ORIF, FRACTURE, TARSAL BONE;  Surgeon: Geoffrey Goodson MD;  Location: [facility] OR;  Service: Orthopedics;  Laterality: Right;  supine, mini C arm, Styker, block okay    SHOULDER SURGERY Right     TILT TABLE TEST N/A 07/15/2022    Procedure: TILT TABLE TEST;  Surgeon: Amol Kohler MD;  Location: [facility] EP LAB;  Service: Cardiology;  Laterality: N/A;  Orthostatic hypotension, dizziness, Tilt Table Test with EEG, Dr.Tiffany Roca (neuro), DM    TUBAL LIGATION  1998       Allergies:   Review of patient's allergies indicates:   Allergen Reactions    Neurontin [gabapentin]     Lactose     Soap Hives     Able to tolerate Dove only        Medications:     Current Outpatient Medications:     acetaminophen (TYLENOL) 500 MG tablet, Take 1 tablet (500 mg total) by mouth every 6 (six) hours as needed for Pain., Disp: , Rfl:     amitriptyline (ELAVIL) 10 MG tablet, Take 1 tablet (10 mg total) by mouth every evening., Disp: 30 tablet, Rfl: 11    biotin 2,500 mcg Cap, Take 2,500 mcg by mouth once daily., Disp: , Rfl:     calcium carbonate (TUMS) 200 mg  calcium (500 mg) chewable tablet, Take 1 tablet by mouth 3 (three) times daily as needed for Heartburn., Disp: , Rfl:     cholecalciferol, vitamin D3, (VITAMIN D3) 50 mcg (2,000 unit) Cap capsule, Take 2,000 Units by mouth every evening., Disp: , Rfl:     cyanocobalamin (VITAMIN B-12) 100 MCG tablet, Take 100 mcg by mouth once daily., Disp: , Rfl:     dexAMETHasone (DECADRON) 4 mg/mL injection, Inject 1ml (4mg) into the muscle for symptoms of severe adrenal insufficiency. 3ml syringe w/18 g needle to draw x10 and 21g 1 inch needle to injection x10, Disp: 1 mL, Rfl: 11    diphenoxylate-atropine 2.5-0.025 mg (LOMOTIL) 2.5-0.025 mg per tablet, Take 1 tablet by mouth 4 (four) times daily as needed for Diarrhea., Disp: , Rfl:     ferrous sulfate (FEOSOL) 325 mg (65 mg iron) Tab tablet, Take 1 tablet (325 mg total) by mouth 2 (two) times daily., Disp: 180 tablet, Rfl: 2    hydrocortisone (CORTEF) 10 MG Tab, TAKE 1& 1/2 TABLETS BY MOUTH IN THE MORNING AND 1/2 TABLET IN THE AFTERNOON. DOUBLE THE DOSE IN CASE OF ILLNESS., Disp: 75 tablet, Rfl: 2    hyoscyamine 0.125 mg Subl, Place 1 tablet (0.125 mg total) under the tongue every 4 (four) hours as needed., Disp: 540 tablet, Rfl: 1    ivabradine (CORLANOR) 7.5 mg Tab, Take 1 tablet (7.5 mg total) by mouth 2 (two) times daily., Disp: 60 tablet, Rfl: 11    levETIRAcetam (KEPPRA) 500 MG Tab, Take 1 tablet (500 mg total) by mouth 2 (two) times daily., Disp: 180 tablet, Rfl: 3    levothyroxine (SYNTHROID) 125 MCG tablet, Take 1 tablet (125 mcg total) by mouth before breakfast., Disp: 30 tablet, Rfl: 0    loratadine (CLARITIN) 10 mg tablet, Take 10 mg by mouth daily as needed for Allergies., Disp: , Rfl:     melatonin 5 mg TbDL, Take 1 tablet by mouth nightly as needed., Disp: , Rfl:     midodrine (PROAMATINE) 10 MG tablet, Take 1 tablet (10 mg total) by mouth 3 (three) times daily as needed. Hold for bp 180 or higher, Disp: , Rfl:     rivaroxaban (XARELTO) 20 mg Tab, Take 1 tablet  (20 mg total) by mouth once daily., Disp: 90 tablet, Rfl: 4    ubrogepant (UBRELVY) 50 mg tablet, Take 1 tablet (50 mg total) by mouth as needed for Migraine. If symptoms persist or return, may repeat dose after 2 hours. Maximum: 200 mg per 24 hours, Disp: 16 tablet, Rfl: 2    vibegron 75 mg Tab, Take 1 tablet (75 mg total) by mouth every evening., Disp: 90 tablet, Rfl: 0    zaleplon (SONATA) 5 MG Cap, Take 1 capsule (5 mg total) by mouth nightly as needed (insomnia)., Disp: 30 capsule, Rfl: 0    Family History:   Family History   Problem Relation Name Age of Onset    Heart disease Mother      Diabetes type II Mother      Diabetes type II Father      Heart attack Maternal Grandfather         Social History:   Social History     Socioeconomic History    Marital status:     Number of children: 2   Occupational History    Occupation:    Tobacco Use    Smoking status: Never    Smokeless tobacco: Never   Substance and Sexual Activity    Alcohol use: No    Drug use: No    Sexual activity: Not Currently   Social History Narrative     29 years, lives at home with .      Social Drivers of Health     Financial Resource Strain: Low Risk  (6/12/2025)    Overall Financial Resource Strain (CARDIA)     Difficulty of Paying Living Expenses: Not hard at all   Food Insecurity: No Food Insecurity (6/12/2025)    Hunger Vital Sign     Worried About Running Out of Food in the Last Year: Never true     Ran Out of Food in the Last Year: Never true   Transportation Needs: No Transportation Needs (6/12/2025)    PRAPARE - Transportation     Lack of Transportation (Medical): No     Lack of Transportation (Non-Medical): No   Physical Activity: Inactive (2/3/2025)    Exercise Vital Sign     Days of Exercise per Week: 0 days     Minutes of Exercise per Session: 0 min   Stress: Stress Concern Present (6/12/2025)    [employer] - Occupational Stress Questionnaire     Feeling of  "Stress : To some extent   Housing Stability: Low Risk  (6/12/2025)    Housing Stability Vital Sign     Unable to Pay for Housing in the Last Year: No     Number of Times Moved in the Last Year: 0     Homeless in the Last Year: No       Review of Systems:   Review of Systems   Respiratory:  Positive for shortness of breath (MAGANA).    Cardiovascular:  Negative for palpitations.   Gastrointestinal:  Positive for heartburn and nausea. Negative for diarrhea and vomiting.        Dark stools   Genitourinary:  Negative for dysuria, frequency, hematuria and urgency.       Vitals:   Vitals:    06/27/25 1037   BP: (!) 183/100   Patient Position: Sitting   Pulse: 106   Weight: 68.2 kg (150 lb 5.7 oz)   Height: 5' 8" (1.727 m)       Body mass index is 22.86 kg/m².    Physical Exam:   Physical Exam  Constitutional:       Appearance: Normal appearance.   Pulmonary:      Effort: Pulmonary effort is normal. No respiratory distress.   Musculoskeletal:         General: No swelling.      Right lower leg: No edema.      Left lower leg: No edema.   Skin:     General: Skin is warm.   Neurological:      General: No focal deficit present.      Mental Status: She is alert and oriented to person, place, and time.   Psychiatric:         Mood and Affect: Mood normal.         Behavior: Behavior normal.       Labs:     Imaging:     Assessment/Plan:   1. Chronic kidney disease (CKD), stage IV (severe)  Ambulatory referral/consult to Nephrology    PTH, Intact    Protein/Creatinine Ratio, Urine    Urinalysis    Renal Function Panel    CBC Auto Differential    Microalbumin/Creatinine Ratio, Urine    KIDNEY DISEASE EDUCATION    CANCELED: KIDNEY DISEASE EDUCATION      2. Anemia, unspecified type  Occult blood x 1, stool      3. Chronic kidney disease, stage 4 (severe)        4. Secondary adrenal insufficiency        5. History of adrenal insufficiency        6. Prediabetes        7. Dysautonomia orthostatic hypotension syndrome            SUMMARY;  1); " Chronic Kidney Disease Stage 4 - Difficult to ascertain cause, though suspect labile BPs are contributing. Progressed after Caron in December 2024 that never returned to baseline.   - Agnes Genetic testing negative for renal pathogenic genes. Shared results.      UPCR No protienuria on recent check.   Acid-base WNL   Renal osteodystrophy Ca okay. Off ergo.   Anemia Significant anemia. On PO iron. Concerned that anemia is disproportionate to level of CKD.    Per PCP note: Patient with dark stool on iron. May benefit from endoscopy but not sure how she will tolerate prep.    Plan for stool test for occult blood. Will refer to GI if positive. If negative, will e-consult vs. Refer to hem/onc.    Would want hem/onc opinion about TERENCE c h/o glioblastoma, Factor V deficiency with DVT x 3.   DM Prediabetic in 2024. No repeat check.   Lipid Management Defer   ESRD planning Kidney Failure Risk Equation (Tangri)    Kidney Failure Risk at 2 years: 3.4%    Kidney Failure Risk at 5 years: 10.3%    Lab Results   Component Value Date    MICALBCREAT 34.9 (H) 09/10/2024    CREATININE 2.0 (H) 06/26/2025          Dysautonomia orthostatic hypotension syndrome - just had tilt table test. Being followed by neuro and cardiology.   - Could midodrine be dropped to 5 mg now that she's off cymbalta? Both HTN, hypotension, and labile BPs can contribute to CKD progression. She maintains an SBP 180s/90s on current dose of midodrine.      Cystic Kidney Disease;  -B/L multiple simple renal cysts  - No genes found to contribute on Agnes    Adrenal insufficiency - per endocrine     All questions patient had were answered.  Asked if further questions. None. F/u in clinic in 3 mos  with labs and urine prior to next visit or sooner if needed.  ER for emergency concerns.    Summary of Plan:  Stool study  Discuss midodrine dose with team  CKD basic virtual  RTC in 3 mos - virtual okay    Visit today included increased complexity associated with managing  the longitudinal care of the patient due to the serious and/or complex managed problem(s) CKD.    I spent a total of 66 minutes on the day of the visit.  This includes face to face time and non-face to face time preparing to see the patient (eg, review of tests), obtaining and/or reviewing separately obtained history, documenting clinical information in the electronic or other health record, independently interpreting results and communicating results to the patient/family/caregiver, or care coordinator.      This note was generated with the assistance of ambient listening technology. Verbal consent was obtained by the patient and accompanying visitor(s) for the recording of patient appointment to facilitate this note. I attest to having reviewed and edited the generated note for accuracy, though some syntax or spelling errors may persist. Please contact the author of this note for any clarification.

## 2025-06-30 ENCOUNTER — RESULTS FOLLOW-UP (OUTPATIENT)
Dept: ENDOCRINOLOGY | Facility: HOSPITAL | Age: 60
End: 2025-06-30

## 2025-06-30 ENCOUNTER — PATIENT MESSAGE (OUTPATIENT)
Dept: INTERNAL MEDICINE | Facility: CLINIC | Age: 60
End: 2025-06-30
Payer: COMMERCIAL

## 2025-06-30 NOTE — TELEPHONE ENCOUNTER
LOV with Zachary Bender MD , 6/20/2025    Please see patient's BP log    Message dated 6/27/25:   have gotten the results from testing but haven't gotten care recs based on results.     Unless you hear contradicting information from Dr. Sevilla, lets reduce your midodrine to 5mg to see if we can reduce supine hypertension.     Zachary Bender

## 2025-06-30 NOTE — TELEPHONE ENCOUNTER
LOV with Plost, Zachary MENESES MD , 6/20/2025    Please see patient message in regard to Dr. Reilly response

## 2025-07-01 ENCOUNTER — RESULTS FOLLOW-UP (OUTPATIENT)
Dept: NEPHROLOGY | Facility: CLINIC | Age: 60
End: 2025-07-01

## 2025-07-01 ENCOUNTER — CLINICAL SUPPORT (OUTPATIENT)
Facility: CLINIC | Age: 60
End: 2025-07-01
Payer: COMMERCIAL

## 2025-07-01 DIAGNOSIS — R19.5 OCCULT BLOOD IN STOOLS: ICD-10-CM

## 2025-07-01 DIAGNOSIS — N18.4 CHRONIC KIDNEY DISEASE (CKD), STAGE IV (SEVERE): ICD-10-CM

## 2025-07-01 DIAGNOSIS — D64.9 ANEMIA, UNSPECIFIED TYPE: Primary | ICD-10-CM

## 2025-07-01 NOTE — PROGRESS NOTES
The patient location is: [place]    Visit type: audiovisual webinar    Face to Face time with patient: 80 min    Each patient to whom he or she provides medical services by telemedicine is:  (1) informed of the relationship between the physician and patient and the respective role of any other health care provider with respect to management of the patient; and (2) notified that he or she may decline to receive medical services by telemedicine and may withdraw from such care at any time.    Notes:         Thelma Marita Nguyen was referred to Introduction to CKD education by VIANEY Plummer (collaborating MD: Dr. Schwartz)    The patient attended class in an individual setting unaccompanied.    The following material was covered. Outcomes were assessed via the outcome assessment questions and documented at the end of each lesson.    Chronic Kidney Disease Education (Lesson 1, 2, 3)    Lesson 1 Understanding Kidney Disease  Lesson Objectives  By the end of each session, participants will be able to:  Recognize that fear and grief are usual responses to kidney disease  State causes/risk factors for kidney disease  Explain how GFR reflects kidney function  Explain how urine albumin/protein reflects kidney damage  State two ways the kidneys help maintain health  Describe how kidney disease is progressive but can be slowed down  Topics & Points Covered  Emotional impact of diagnosis  You're not alone  Emotional aspects  Depression, grief, and fear are typical  Physical activity may help  Benefits of education: Why are you here?  There are many causes of CKD. Diabetes and hypertension are the leading causes. Family history, cardiovascular disease, recurrent UTIs, immunological disease and genetics also play a role in CKD  CKD is complicated  The more you understand, the better you'll do. (Stated directly)  Basic anatomy  Location in the body  The nephrons have filters (working units)  Normal kidney  function  Maintain chemical and fluid balance  Produce hormones  Regulate blood pressure  Kidney damage  Major causes of kidney damage  High blood pressure, diabetes  Fewer functioning nephrons  Monitoring kidney function and damage  eGFR (function)  Urine albumin/UACR or Urine protein/creatinine ratio (damage)  eGFR goal: a stable level  Decline means progression  Urine albumin/UACR goal: a stable or lower level  Increase in urine albumin/protein may mean progression  Kidney disease is often irreversible and progressive  You'll likely need the help of a kidney specialist  Overview of treatment modalities  There are things you can do that may slow progression (more on this in next session)  Take your medications  Control blood pressure  Manage diabetes  Eat less salt  Miscellaneous  Early kidney disease has no symptoms  Outcome Assessment Questions  See below    Lesson 2 Managing Your Kidney Disease  Lesson Objectives  By the end of each session, participants will be able to:  Recognize that managing blood pressure is a key part of managing kidney disease  Recognize that managing diabetes is a key part of managing kidney disease  State at least one step to eating right for kidney health  Recognize the importance of being cautious about over-the-counter medicines  Recognize that smoking can worsen kidney disease  Identify which lab values are used to keep track of diabetes, high blood pressure, and other conditions  Topics & Points Covered  There are steps you can take to keep your kidneys working  Weight management  Blood pressure management  Blood pressure goal: < 140/90 mm Hg  Medications - ACEs/ARBs, diuretics  ACEs/ARBS and risk of hyperkalemia (too much potassium in the blood, but help lower urine albumin)  Sodium reduction (<2,000 mg)  Physical activity  Diabetes management  A1C target   Diabetes medications may change because of kidney disease (often takes less medication to control glucose in the later  stages of CKD)  How to treat hypoglycemia appropriately (risk of high potassium with ACEi and ARB use) glucose tablets are preferred [May need to orange juice with high potassium levels if hyperkalemic]  Hyperglycemia  Cardiovascular disease (CVD)  Physical activity  CVD is major cause of mortality  LDL goal  Medications  Nutritional health  Choose and prepare foods with less salt and sodium  Eat the right amount and kind of protein  Choose foods that are healthy for your heart  Choose foods based on phosphorus and potassium content (if restricted)  Make choices that help with diabetes management  Dietitian referral/nutrition therapy is covered benefit  Medication safety  Prescription  Over-the-counter (pain relief)  Tobacco cessation  Outcome Assessment Questions  See below    Lesson 3 What Happens When Kidney Disease Gets Worse  Lesson Objectives  By the end of each session, participants will be able to:  Recognize that managing blood pressure is a key part of managing kidney disease  Recognize that additional medications may be needed to treat complications  Topics & Points Covered  Kidneys have other jobs besides making urine. At this stage, they can't function as well.  Gradual/adaptation into symptoms; you may feel fine  Different for everyone - important to know your lab test results, including eGFR and urine albumin/protein values  Possible symptoms of decreasing kidney function and why they occur later  There is usually no kidney pain  You may have decreased urine output  Fatigue or weakness  Swelling  Bad taste in the mouth/food doesn't taste good (especially red meat)  Feel cold  Poor concentration  Shortness of breath  Itching skin  Cramping in hands and legs  Nausea and vomiting/Loss of appetite   Complications (*May require additional medications) - basic explanation and treatment  Anemia*  May feel tired or weak  Erythropoietin deficiency  May need iron  May need erythropoiesis stimulating agent  "(injection)  LABS: hemoglobin  Mineral and bone disorder*  May get itchy skin  Inadequate active Vitamin D  May take special supplement  Too much phosphorus in the blood  May need phosphorus binding med with meals  LABS: Phosphorus, calcium, VitaminD, iPTH  Malnutrition  May have bad taste in mouth  Poor appetite  May add to swelling  Need adequate calories  LABS: albumin  Changes in functional status  Difficulty walking  Harder to care for self  Fluid overload  May be seen as weight gain, swelling, or shortness of breath  Kidneys may not remove fluid well  Rarely need fluid restriction  Use less salt, take diuretics  Metabolic acidosis  Kidneys not removing acid in the blood  May need medication with bicarbonate  LABS: bicarbonate  Depression  Diabetes can contribute  Fear, grieving with loss of body function  It is OK to talk about it  Sexual complications  Discuss concerns with your doctor  Know how your medications work. Know which medications you can continue taking (including OTC medications). Talk with your pharmacist and provider about:  The more medications you take, the more you need to know about: medication interactions, medication-disease interactions, medication-food interactions, medication-herbal supplement interactions.  Timing of medication use (ie., qAC, etc.)  Cultural consideration: Be sensitive to use of traditional remedies and healers.  Symptoms and complications may increase as kidney function declines to kidney failure (stated directly)  Outcome Assessment Questions  See below    Outcome assessment:  The two main causes of chronic kidney disease (CKD) are hypertension and urinary tract infections. Patient answered, "True." Re-educated  2.    A low eGFR shows your kidneys are working normally. Patient answered, "False."  3.    Protein in the urine means that your kidneys are damaged. Patient answered, "True."  4.    High blood pressure can be lowered through medications and following a low " "salt diet. Patient answered, "True."     5.     Balancing your meals and increasing salt intake can help manage your kidney disease. Patient answered, "False."     6.     When the kidneys gets worse, you may feel worse and need more medications. Patient answered, "True."    Areas of information that primary nephrology provider should reinforce during follow-up visit includes: Discussed how the recommendations for sodium and BP goals may be different for her due to her severe orthostatic HTN    The content of these lessons are adapted from the Kidney Disease Education (KDE) Services benefit as defined by the Centers for Medicare & Medicaid Services.    80 minutes spent educating patient on the above topics. ROSEANN CELESTE, taught 31 minutes of the material and was present for the duration of the full 80-minute class.      "

## 2025-07-03 ENCOUNTER — RESULTS FOLLOW-UP (OUTPATIENT)
Dept: ENDOCRINOLOGY | Facility: CLINIC | Age: 60
End: 2025-07-03

## 2025-07-06 ENCOUNTER — PATIENT MESSAGE (OUTPATIENT)
Dept: INTERNAL MEDICINE | Facility: CLINIC | Age: 60
End: 2025-07-06
Payer: COMMERCIAL

## 2025-07-07 ENCOUNTER — RESULTS FOLLOW-UP (OUTPATIENT)
Dept: GASTROENTEROLOGY | Facility: CLINIC | Age: 60
End: 2025-07-07

## 2025-07-07 ENCOUNTER — OFFICE VISIT (OUTPATIENT)
Dept: GASTROENTEROLOGY | Facility: CLINIC | Age: 60
End: 2025-07-07
Payer: COMMERCIAL

## 2025-07-07 ENCOUNTER — TELEPHONE (OUTPATIENT)
Dept: INTERNAL MEDICINE | Facility: CLINIC | Age: 60
End: 2025-07-07
Payer: COMMERCIAL

## 2025-07-07 VITALS
BODY MASS INDEX: 22.86 KG/M2 | SYSTOLIC BLOOD PRESSURE: 150 MMHG | DIASTOLIC BLOOD PRESSURE: 81 MMHG | HEART RATE: 69 BPM | HEIGHT: 68 IN

## 2025-07-07 DIAGNOSIS — D64.9 SYMPTOMATIC ANEMIA: Primary | ICD-10-CM

## 2025-07-07 DIAGNOSIS — D64.9 ANEMIA, UNSPECIFIED TYPE: ICD-10-CM

## 2025-07-07 DIAGNOSIS — R19.5 OCCULT BLOOD IN STOOLS: ICD-10-CM

## 2025-07-07 PROCEDURE — 99204 OFFICE O/P NEW MOD 45 MIN: CPT | Mod: S$GLB,,, | Performed by: NURSE PRACTITIONER

## 2025-07-07 PROCEDURE — 3066F NEPHROPATHY DOC TX: CPT | Mod: CPTII,S$GLB,, | Performed by: NURSE PRACTITIONER

## 2025-07-07 PROCEDURE — 3077F SYST BP >= 140 MM HG: CPT | Mod: CPTII,S$GLB,, | Performed by: NURSE PRACTITIONER

## 2025-07-07 PROCEDURE — 1159F MED LIST DOCD IN RCRD: CPT | Mod: CPTII,S$GLB,, | Performed by: NURSE PRACTITIONER

## 2025-07-07 PROCEDURE — 3079F DIAST BP 80-89 MM HG: CPT | Mod: CPTII,S$GLB,, | Performed by: NURSE PRACTITIONER

## 2025-07-07 PROCEDURE — 1160F RVW MEDS BY RX/DR IN RCRD: CPT | Mod: CPTII,S$GLB,, | Performed by: NURSE PRACTITIONER

## 2025-07-07 PROCEDURE — 3044F HG A1C LEVEL LT 7.0%: CPT | Mod: CPTII,S$GLB,, | Performed by: NURSE PRACTITIONER

## 2025-07-07 PROCEDURE — 3008F BODY MASS INDEX DOCD: CPT | Mod: CPTII,S$GLB,, | Performed by: NURSE PRACTITIONER

## 2025-07-07 PROCEDURE — 99999 PR PBB SHADOW E&M-EST. PATIENT-LVL V: CPT | Mod: PBBFAC,,, | Performed by: NURSE PRACTITIONER

## 2025-07-07 NOTE — PROGRESS NOTES
Subjective:       Patient ID: Thelma Nguyen is a 59 y.o. female.    Chief Complaint: Anemia    58 y/o female referred by nephrology for positive FOBT. Hx of history of glioblastoma s/p resection, complicated by panhypopituitarism and neuropathy, IBS, CKD 3, depression, hx of skin cancer, factor 5 leiden deficiency, and recently diagnosed Parkinson's. Patient was admitted to hospital  for anemia. Hgb and Hct 6.3 & 19.7 on admit; rcvd 1u PRBCs. Per chart review patient denied hematochezia or melena at time of admit. Today she reports dark, formed stool daily. No abdominal pain, nausea, or vomiting. FOBT ordered by nephrology was positive on . History of upper GIB noted on endoscopy in 2019.      Endoscopy History  - 2019 EGD: Normal esophagus. Hematin (altered blood/coffee-ground-like material) in the gastric body and in the gastric antrum. Gastritis. Biopsied. Normal examined duodenum.   - 2019 Colonoscopy: Tortuous colon.   Diverticulosis in the sigmoid colon. Four 3 to 5 mm polyps in the cecum, removed with a cold snare. Resected and retrieved. Three 4 to 6 mm polyps in the ascending colon, removed with a cold snare. Resected and retrieved. The examination was otherwise normal on direct   and retroflexion views.     Past Medical History:   Diagnosis Date    Allergic rhinitis     Anemia     Arthritis     Basal cell carcinoma     Broken ankle     Cancer     Clotting disorder     Disorder of kidney and ureter     DVT (deep venous thrombosis)     Factor V deficiency     Fracture of elbow     left    Glioblastoma multiforme of brain     Hypothyroidism     IBS (irritable bowel syndrome)     Osteoporosis     Panhypopituitarism     Peripheral polyneuropathy     Secondary adrenal insufficiency     Seizures     Thyroid disease        Past Surgical History:   Procedure Laterality Date    APPENDECTOMY      BRAIN SURGERY      for glioblastoma      SECTION   1998    CHOLECYSTECTOMY  2007    CLOSURE OF DEFECT OF MOHS PROCEDURE Left 11/08/2018    Procedure: CLOSURE, MOHS PROCEDURE DEFECT SCALP  Flap closure;  Surgeon: Yrn Novak MD;  [address];  Service: Plastics;  Laterality: Left;    COLONOSCOPY N/A 04/23/2019    Procedure: COLONOSCOPY Suprep;  Surgeon: Tony Mosher MD;  Location: [facility] ENDO;  Service: Endoscopy;  Laterality: N/A;    ESOPHAGOGASTRODUODENOSCOPY N/A 04/23/2019    Procedure: EGD/colon;  Surgeon: Tony Mosher MD;  Location: [facility] ENDO;  Service: Endoscopy;  Laterality: N/A;    EXCISION OF GANGLION CYST OF HAND Left 01/07/2020    Procedure: EXCISION, GANGLION CYST, HAND;  Surgeon: Ross Drew Jr., MD;  Location: [facility];  Service: Orthopedics;  Laterality: Left;    HYSTERECTOMY  1999    ILIAC CREST BONE GRAFT Right 3/15/2024    Procedure: BONE GRAFT, ILIAC CREST;  Surgeon: Geoffrey Goodson MD;  Location: [facility];  Service: Orthopedics;  Laterality: Right;  acumed harvester    ILIAC CREST BONE GRAFT Right 4/2/2024    Procedure: BONE GRAFT, ILIAC CREST;  Surgeon: Geoffrey Goodson MD;  Location: [facility];  Service: Orthopedics;  Laterality: Right;    MIDFOOT ARTHRODESIS Right 4/2/2024    Procedure: FUSION, JOINT, MIDFOOT;  Surgeon: Geoffrey Goodson MD;  Location: [facility];  Service: Orthopedics;  Laterality: Right;  mini c arm    OPEN REDUCTION AND INTERNAL FIXATION (ORIF) OF FRACTURE OF TARSAL BONE Right 3/15/2024    Procedure: ORIF, FRACTURE, TARSAL BONE;  Surgeon: Geoffrey Goodson MD;  Location: [facility];  Service: Orthopedics;  Laterality: Right;  supine, mini C arm, Styker, block okay    SHOULDER SURGERY Right     TILT TABLE TEST N/A 07/15/2022    Procedure: TILT TABLE TEST;  Surgeon: Amol Kohler MD;  Location: [facility] EP LAB;  Service: Cardiology;  Laterality: N/A;  Orthostatic hypotension, dizziness, Tilt Table Test with EEG, Dr.Tiffany Roca (neuro), DM    TUBAL LIGATION  1998       Family History   Problem Relation  Name Age of Onset    Heart disease Mother      Diabetes type II Mother      Diabetes type II Father      Heart attack Maternal Grandfather         Social History     Socioeconomic History    Marital status:     Number of children: 2   Occupational History    Occupation:    Tobacco Use    Smoking status: Never    Smokeless tobacco: Never   Substance and Sexual Activity    Alcohol use: No    Drug use: No    Sexual activity: Not Currently   Social History Narrative     30 years, lives at home with .      Social Drivers of Health     Financial Resource Strain: Low Risk  (6/12/2025)    Overall Financial Resource Strain (CARDIA)     Difficulty of Paying Living Expenses: Not hard at all   Food Insecurity: No Food Insecurity (6/12/2025)    Hunger Vital Sign     Worried About Running Out of Food in the Last Year: Never true     Ran Out of Food in the Last Year: Never true   Transportation Needs: No Transportation Needs (6/12/2025)    PRAPARE - Transportation     Lack of Transportation (Medical): No     Lack of Transportation (Non-Medical): No   Physical Activity: Inactive (2/3/2025)    Exercise Vital Sign     Days of Exercise per Week: 0 days     Minutes of Exercise per Session: 0 min   Stress: Stress Concern Present (6/12/2025)    [employer] - Occupational Stress Questionnaire     Feeling of Stress : To some extent   Housing Stability: Low Risk  (6/12/2025)    Housing Stability Vital Sign     Unable to Pay for Housing in the Last Year: No     Number of Times Moved in the Last Year: 0     Homeless in the Last Year: No       Review of Systems   Constitutional:  Negative for appetite change and unexpected weight change.   HENT:  Negative for trouble swallowing.    Respiratory:  Negative for shortness of breath.    Cardiovascular:  Negative for chest pain.   Gastrointestinal:  Positive for blood in stool. Negative for abdominal pain, constipation, diarrhea,  "nausea and vomiting.   Neurological:  Positive for tremors.   Hematological:  Negative for adenopathy. Bruises/bleeds easily.   Psychiatric/Behavioral:  Negative for dysphoric mood.          Objective:     Vitals:    07/07/25 1324   BP: (!) 150/81   BP Location: Left arm   Patient Position: Sitting   Pulse: 69   Height: 5' 8" (1.727 m)          Physical Exam  Constitutional:       General: She is not in acute distress.  HENT:      Head: Normocephalic.   Eyes:      Conjunctiva/sclera: Conjunctivae normal.   Pulmonary:      Effort: Pulmonary effort is normal. No respiratory distress.   Musculoskeletal:         General: No swelling.      Cervical back: Normal range of motion.   Skin:     General: Skin is warm and dry.   Neurological:      Mental Status: She is alert and oriented to person, place, and time.   Psychiatric:         Mood and Affect: Mood normal.         Speech: Speech is delayed.         Behavior: Behavior normal.               Assessment:         ICD-10-CM ICD-9-CM   1. Anemia, unspecified type  D64.9 285.9   2. Occult blood in stools  R19.5 792.1       Plan:       Anemia, unspecified type  -     Ambulatory referral/consult to Gastroenterology  -     CBC Auto Differential; Future; Expected date: 07/07/2025    Occult blood in stools  -     Ambulatory referral/consult to Gastroenterology  -     CBC Auto Differential; Future; Expected date: 07/07/2025    Schedule EGD/cscope pending approval to hold Xarelto from prescribing physician.    Follow up if symptoms worsen or fail to improve.     Patient's Medications   New Prescriptions    No medications on file   Previous Medications    ACETAMINOPHEN (TYLENOL) 500 MG TABLET    Take 1 tablet (500 mg total) by mouth every 6 (six) hours as needed for Pain.    AMITRIPTYLINE (ELAVIL) 10 MG TABLET    Take 1 tablet (10 mg total) by mouth every evening.    BIOTIN 2,500 MCG CAP    Take 2,500 mcg by mouth once daily.    CALCIUM CARBONATE (TUMS) 200 MG CALCIUM (500 MG) " CHEWABLE TABLET    Take 1 tablet by mouth 3 (three) times daily as needed for Heartburn.    CHOLECALCIFEROL, VITAMIN D3, (VITAMIN D3) 50 MCG (2,000 UNIT) CAP CAPSULE    Take 2,000 Units by mouth every evening.    CYANOCOBALAMIN (VITAMIN B-12) 100 MCG TABLET    Take 100 mcg by mouth once daily.    DEXAMETHASONE (DECADRON) 4 MG/ML INJECTION    Inject 1ml (4mg) into the muscle for symptoms of severe adrenal insufficiency. 3ml syringe w/18 g needle to draw x10 and 21g 1 inch needle to injection x10    DIPHENOXYLATE-ATROPINE 2.5-0.025 MG (LOMOTIL) 2.5-0.025 MG PER TABLET    Take 1 tablet by mouth 4 (four) times daily as needed for Diarrhea.    FERROUS SULFATE (FEOSOL) 325 MG (65 MG IRON) TAB TABLET    Take 1 tablet (325 mg total) by mouth 2 (two) times daily.    HYDROCORTISONE (CORTEF) 10 MG TAB    TAKE 1& 1/2 TABLETS BY MOUTH IN THE MORNING AND 1/2 TABLET IN THE AFTERNOON. DOUBLE THE DOSE IN CASE OF ILLNESS.    HYOSCYAMINE 0.125 MG SUBL    Place 1 tablet (0.125 mg total) under the tongue every 4 (four) hours as needed.    IVABRADINE (CORLANOR) 7.5 MG TAB    Take 1 tablet (7.5 mg total) by mouth 2 (two) times daily.    LEVETIRACETAM (KEPPRA) 500 MG TAB    Take 1 tablet (500 mg total) by mouth 2 (two) times daily.    LEVOTHYROXINE (SYNTHROID) 125 MCG TABLET    Take 1 tablet (125 mcg total) by mouth before breakfast.    LORATADINE (CLARITIN) 10 MG TABLET    Take 10 mg by mouth daily as needed for Allergies.    MELATONIN 5 MG TBDL    Take 1 tablet by mouth nightly as needed.    MIDODRINE (PROAMATINE) 10 MG TABLET    Take 1 tablet (10 mg total) by mouth 3 (three) times daily as needed. Hold for bp 180 or higher    RIVAROXABAN (XARELTO) 20 MG TAB    Take 1 tablet (20 mg total) by mouth once daily.    UBROGEPANT (UBRELVY) 50 MG TABLET    Take 1 tablet (50 mg total) by mouth as needed for Migraine. If symptoms persist or return, may repeat dose after 2 hours. Maximum: 200 mg per 24 hours    VIBEGRON 75 MG TAB    Take 1 tablet  (75 mg total) by mouth every evening.    ZALEPLON (SONATA) 5 MG CAP    Take 1 capsule (5 mg total) by mouth nightly as needed (insomnia).   Modified Medications    No medications on file   Discontinued Medications    No medications on file

## 2025-07-07 NOTE — PATIENT INSTRUCTIONS
Dear Mrs. Nguyen    Attached are your instructions for your upcoming procedure. Please take some time to carefully review them and write down any questions you may have. A nurse will call you 1-2 weeks prior to your procedure to go over the instructions and answer any questions you may have.       Colonoscopy Instructions    Date of procedure: TBA    Arrival Time: We will call you 1 day prior to your procedure to provide you with an arrival time.    Location of Department:   [facility] [address] [place] [address]   1st Floor Same Day Surgery    The day before your procedure: TBA     You can only drink CLEAR LIQUIDS the whole day before your test. You CAN NOT eat any food for the entire day.    Clear Liquids That Are OK to Drink:   Water  Sports drinks (Gatorade, Power-Aid)  Crystal Light, Lemonade, Limeaid  Snowball, popsicle  Coffee or tea (no cream or nondairy creamer)  Clear juices without pulp (apple, white grape)  Jello (NO fruit or toppings)  Clear soda (sprite, coke, ginger ale)  Chicken broth (until 12 midnight the night before procedure)  Bouillon broth      What You CANNOT do:   Do not EAT solid food or drink milk/dairy products  Do not drink alcohol.  Do not take oral medications within 1 hour of starting   each dose of CLENPIQ.      Notes:   Please disregard the instructions that are in included with your prep from the pharmacy. ONLY FOLLOW THE PREP INSTRUCTIONS BELOW  Clenpiq Bowel Prep Kit is indicated for cleansing of the colon as a preparation for colonoscopy in adults.   Be sure to tell your doctor about all the medicines you take, including prescription and non-prescription medicines, vitamins, and herbal supplements. Clenpiq Bowel Prep Kit may affect how other medicines work.  Medication taken by mouth may not be absorbed properly when taken within 1 hour before the start of each dose of Clenpiq Bowel Prep Kit.    It is not uncommon to experience some  abdominal cramping, nausea and/or vomiting when taking the prep. If you have nausea and/or vomiting while taking the prep, stop drinking for 20 to 30 minutes then continue.    How to take the prep:    DOSE 1 --- Day Before Colonoscopy TBA @ 5:00PM    Step 1- Open 1 bottle of Clenpiq and drink the entire bottle  Step 2- Drink five 8 ounce glasses of water (40 ounces total) over the next 5 hours.    You may continue to drink clear liquids until bedtime to avoid dehydration.         DOSE 2 -- Day of the Colonoscopy- TBA (this dose will be 5 hours prior to your arrival time)    Step 1- Open the other bottle of Clenpiq and drink the entire bottle  Step 2- Drink three 8 ounce glasses of water (24 ounces total) over the next 3 hours.      You may continue drinking water/clear liquids until 2 hours prior to your arrival time or as directed by the scheduling nurse.  NO chewing gum or candy morning of procedure        Medications Instructions below:    If you take HEART, BLOOD PRESSURE, SEIZURE, PAIN, LUNG (including inhalers/nebulizers), ANTI-REJECTION (transplant patients), or PSYCHIATRIC medications, please take at your regular times with a sip of water or as directed by the scheduling nurse.    If you take a blood thinner please follow instructions below:  Your blood thinner Xarelto (rivaroxaban) has been cleared to be held for 3 days. Your last dose will be TBA    If you begin taking any blood thinning medications, injectable weight loss/diabetes medications (other than insulin), or Adipex(Phentermine) prior to your procedure please contact the endoscopy scheduling department listed below AS SOON AS POSSIBLE. If these medications are not held for the appropriate amount of days prior to procedure, your procedure will be canceled.      If you have any health changes prior to your procedure, please contact the endoscopy department to report changes.    On occasion, unforeseen circumstances may cause a delay in your  procedure start time. We respect your time and appreciate your patience during these circumstances.      Important contact information:    [facility] Department - [phone].  Hours of operation are Monday-Friday 8:00-4:30pm.    If you have questions regarding the prep or you need to reschedule, please call [phone].    After hours questions requiring immediate assistance, contact [facility] nurse line at [phone] or [phone].     Questions about insurance or financial obligations call [phone] or [phone].      Comments:                           IMPORTANT INFORMATION TO KNOW BEFORE YOUR PROCEDURE     [facility]      An adult friend/ family member MUST come with you to drive you home. You can not drive, take a taxi, Uber, Lyft, bus, or any form of public transportation without being accompanied by a  responsible adult. The responsible adult CAN NOT be the  of the service.     person must be available to return to pick you up within 15 minutes of being notified of discharge.    Please bring a picture ID, insurance card, & copayment    LEAVE ALL VALUABLES AND JEWELRY AT HOME. [facility] WILL NOT HOLD OR BE RESPONSIBLE FOR ANY LOST VALUABLES, JEWELRY, OR PERSONAL BELONGINGS. YOUR PERSONAL BELONGINGS MUST BE HELD BY YOUR ACCOMPANYING FRIEND/FAMILY MEMBER.    Plan to be at the hospital for 2-4 hours.           Please follow the instructions below:  1. You will see an [place] flag flying in the front of the hospital from [address] as close as you can in that lot.  2. Behind the flag, you will see a covered [place] drive, enter through those automatic double doors.  3. Immediately when you enter, you should be greeted by a team member who can assist you.  4. If not, follow the hallway immediately in front of you to the right towards the cafeteria.  5. Once you reach the cafeteria, there are only two options.  You can either enter the cafeteria or continue down the hallway to your left. You will want to continue down the hallway all the way down to your left until you absolutely cannot go anymore. You will then find yourself in our Same Day Surgery lobby where you will check in behind the glass window.      [facility] Map for Endoscopy Procedures:                   COLONOSCOPY EDUCATION VIDEO:           Colonoscopy Instructional Video                                                        OR    Type link address into your web browser's address bar:  [email or web address]

## 2025-07-07 NOTE — TELEPHONE ENCOUNTER
----- Message from HADLEY Palmer sent at 7/7/2025  2:53 PM CDT -----  Dear Provider,    Patient has a scheduled procedure Colonoscopy/EGD on TBA and is currently taking a blood thinner. In order to ensure patient safety, we would like to confirm that the patient can place their blood thinner medication on hold for the procedure. Can he/she discontinue Xarelto (rivaroxaban) for a minimum of 3 days prior to the procedure?     Thank you for your prompt reply.    Franky Hernández NP  Gastroenterology - [facility]

## 2025-07-08 ENCOUNTER — TELEPHONE (OUTPATIENT)
Dept: GASTROENTEROLOGY | Facility: CLINIC | Age: 60
End: 2025-07-08
Payer: COMMERCIAL

## 2025-07-08 ENCOUNTER — OFFICE VISIT (OUTPATIENT)
Dept: INTERNAL MEDICINE | Facility: CLINIC | Age: 60
End: 2025-07-08
Payer: COMMERCIAL

## 2025-07-08 VITALS
WEIGHT: 152.31 LBS | DIASTOLIC BLOOD PRESSURE: 66 MMHG | SYSTOLIC BLOOD PRESSURE: 138 MMHG | HEIGHT: 68 IN | HEART RATE: 70 BPM | BODY MASS INDEX: 23.08 KG/M2

## 2025-07-08 DIAGNOSIS — G90.3 ORTHOSTATIC HYPOTENSION DUE TO PARKINSON DISEASE: ICD-10-CM

## 2025-07-08 DIAGNOSIS — I95.1 DYSAUTONOMIA ORTHOSTATIC HYPOTENSION SYNDROME: Primary | ICD-10-CM

## 2025-07-08 PROBLEM — D64.9 SYMPTOMATIC ANEMIA: Status: ACTIVE | Noted: 2025-07-08

## 2025-07-08 PROCEDURE — 99999 PR PBB SHADOW E&M-EST. PATIENT-LVL III: CPT | Mod: PBBFAC,,,

## 2025-07-08 RX ORDER — SODIUM PICOSULFATE, MAGNESIUM OXIDE, AND ANHYDROUS CITRIC ACID 12; 3.5; 1 G/175ML; G/175ML; MG/175ML
1 LIQUID ORAL ONCE
Qty: 350 ML | Refills: 0 | Status: SHIPPED | OUTPATIENT
Start: 2025-07-08 | End: 2025-07-08

## 2025-07-08 NOTE — PROGRESS NOTES
INTER[facility]IDENT [facility]IC  CLINIC NOTE    Patient Name: Thelma Nguyen  YOB: 1965    PRESENTING HISTORY       History of Present Illness:  Ms. Thelma Nguyen is a 59 y.o. female with a medical history of brain tumor, seizures, factor 5 Leiden, DVT x3, Parkinson's, hx of multiple falls, dysautonomia orthostatic hypotension syndrome pt of Dr. Bender, presenting to the clinic after she was told to make after some blood pressure reads were elevated. Pt is currently taking midodrine 5 mg TID PRN for orthostatic hypotension. She has been instructed to take it if her SBP is below 180. Some of the read had SBP in the 200's. However, when she does not take the midodrine her BP can vary over 100 points. She also reports improvement on her falls since stopping Cymbalta. She used to have up to 6 fall per week and since stopping the medication in June she reports 2 falls. Denies visual changes, SOB, cough, chest pain, palpitation, nausea, vomiting, abdominal pain, diarrhea, constipation, urinary frequency.     Review of Systems   Constitutional:  Negative for chills, fever, malaise/fatigue and weight loss.   HENT:  Negative for congestion, sinus pain and sore throat.    Eyes:  Negative for blurred vision and double vision.   Respiratory:  Negative for cough, shortness of breath and wheezing.    Cardiovascular:  Negative for chest pain, palpitations and leg swelling.   Gastrointestinal:  Negative for abdominal pain, constipation, diarrhea, nausea and vomiting.   Genitourinary:  Negative for dysuria and frequency.   Musculoskeletal:  Negative for back pain, joint pain and myalgias.   Skin:  Negative for itching and rash.   Neurological:  Negative for dizziness, weakness and headaches.         PAST HISTORY:     Past Medical History:   Diagnosis Date    Allergic rhinitis     Anemia 1987    Arthritis 2000    Basal cell carcinoma     Broken ankle     Cancer 1985    Clotting disorder      Disorder of kidney and ureter     DVT (deep venous thrombosis)     Factor V deficiency     Fracture of elbow     left    Glioblastoma multiforme of brain     Hypothyroidism     IBS (irritable bowel syndrome)     Osteoporosis     Panhypopituitarism     Peripheral polyneuropathy     Secondary adrenal insufficiency     Seizures     Thyroid disease        Past Surgical History:   Procedure Laterality Date    APPENDECTOMY      BRAIN SURGERY      for glioblastoma      SECTION      CHOLECYSTECTOMY  2007    CLOSURE OF DEFECT OF MOHS PROCEDURE Left 2018    Procedure: CLOSURE, MOHS PROCEDURE DEFECT SCALP  Flap closure;  Surgeon: Yrn Novak MD;  Location: [address];  Service: Plastics;  Laterality: Left;    COLONOSCOPY N/A 2019    Procedure: COLONOSCOPY Suprep;  Surgeon: Tony Mosher MD;  Location: [facility];  Service: Endoscopy;  Laterality: N/A;    ESOPHAGOGASTRODUODENOSCOPY N/A 2019    Procedure: EGD/colon;  Surgeon: Tony Mosher MD;  Location: [facility] ENDO;  Service: Endoscopy;  Laterality: N/A;    EXCISION OF GANGLION CYST OF HAND Left 2020    Procedure: EXCISION, GANGLION CYST, HAND;  Surgeon: Ross Drew Jr., MD;  Location: [facility];  Service: Orthopedics;  Laterality: Left;    HYSTERECTOMY      ILIAC CREST BONE GRAFT Right 3/15/2024    Procedure: BONE GRAFT, ILIAC CREST;  Surgeon: Geoffrey Goodson MD;  Location: [facility];  Service: Orthopedics;  Laterality: Right;  acumed harvester    ILIAC CREST BONE GRAFT Right 2024    Procedure: BONE GRAFT, ILIAC CREST;  Surgeon: Geoffrey Goodson MD;  Location: [facility];  Service: Orthopedics;  Laterality: Right;    MIDFOOT ARTHRODESIS Right 2024    Procedure: FUSION, JOINT, MIDFOOT;  Surgeon: Geoffrey Goodson MD;  Location: [facility] OR;  Service: Orthopedics;  Laterality: Right;  mini c arm    OPEN REDUCTION AND INTERNAL FIXATION (ORIF) OF FRACTURE OF TARSAL BONE Right 3/15/2024    Procedure:  ORIF, FRACTURE, TARSAL BONE;  Surgeon: Geoffrey Goodson MD;  Location: [facility] OR;  Service: Orthopedics;  Laterality: Right;  supine, mini C arm, Styker, block okay    SHOULDER SURGERY Right     TILT TABLE TEST N/A 07/15/2022    Procedure: TILT TABLE TEST;  Surgeon: Amol Kohler MD;  Location: [facility] EP LAB;  Service: Cardiology;  Laterality: N/A;  Orthostatic hypotension, dizziness, Tilt Table Test with EEG, Dr.Tiffany Roca (neuro), DM    TUBAL LIGATION  1998       Family History   Problem Relation Name Age of Onset    Heart disease Mother      Diabetes type II Mother      Diabetes type II Father      Heart attack Maternal Grandfather         Social History     Socioeconomic History    Marital status:     Number of children: 2   Occupational History    Occupation:    Tobacco Use    Smoking status: Never    Smokeless tobacco: Never   Substance and Sexual Activity    Alcohol use: No    Drug use: No    Sexual activity: Not Currently   Social History Narrative     30 years, lives at home with .      Social Drivers of Health     Financial Resource Strain: Low Risk  (6/12/2025)    Overall Financial Resource Strain (CARDIA)     Difficulty of Paying Living Expenses: Not hard at all   Food Insecurity: No Food Insecurity (6/12/2025)    Hunger Vital Sign     Worried About Running Out of Food in the Last Year: Never true     Ran Out of Food in the Last Year: Never true   Transportation Needs: No Transportation Needs (6/12/2025)    PRAPARE - Transportation     Lack of Transportation (Medical): No     Lack of Transportation (Non-Medical): No   Physical Activity: Inactive (2/3/2025)    Exercise Vital Sign     Days of Exercise per Week: 0 days     Minutes of Exercise per Session: 0 min   Stress: Stress Concern Present (6/12/2025)    [employer] [place] [employer] - Occupational Stress Questionnaire     Feeling of Stress : To some extent   Housing Stability: Low Risk  (6/12/2025)     Housing Stability Vital Sign     Unable to Pay for Housing in the Last Year: No     Number of Times Moved in the Last Year: 0     Homeless in the Last Year: No       MEDICATIONS & ALLERGIES:     Current Outpatient Medications on File Prior to Visit   Medication Sig    acetaminophen (TYLENOL) 500 MG tablet Take 1 tablet (500 mg total) by mouth every 6 (six) hours as needed for Pain.    amitriptyline (ELAVIL) 10 MG tablet Take 1 tablet (10 mg total) by mouth every evening.    biotin 2,500 mcg Cap Take 2,500 mcg by mouth once daily.    calcium carbonate (TUMS) 200 mg calcium (500 mg) chewable tablet Take 1 tablet by mouth 3 (three) times daily as needed for Heartburn.    cholecalciferol, vitamin D3, (VITAMIN D3) 50 mcg (2,000 unit) Cap capsule Take 2,000 Units by mouth every evening.    cyanocobalamin (VITAMIN B-12) 100 MCG tablet Take 100 mcg by mouth once daily.    dexAMETHasone (DECADRON) 4 mg/mL injection Inject 1ml (4mg) into the muscle for symptoms of severe adrenal insufficiency. 3ml syringe w/18 g needle to draw x10 and 21g 1 inch needle to injection x10    diphenoxylate-atropine 2.5-0.025 mg (LOMOTIL) 2.5-0.025 mg per tablet Take 1 tablet by mouth 4 (four) times daily as needed for Diarrhea.    ferrous sulfate (FEOSOL) 325 mg (65 mg iron) Tab tablet Take 1 tablet (325 mg total) by mouth 2 (two) times daily.    hydrocortisone (CORTEF) 10 MG Tab TAKE 1& 1/2 TABLETS BY MOUTH IN THE MORNING AND 1/2 TABLET IN THE AFTERNOON. DOUBLE THE DOSE IN CASE OF ILLNESS.    hyoscyamine 0.125 mg Subl Place 1 tablet (0.125 mg total) under the tongue every 4 (four) hours as needed.    ivabradine (CORLANOR) 7.5 mg Tab Take 1 tablet (7.5 mg total) by mouth 2 (two) times daily.    levETIRAcetam (KEPPRA) 500 MG Tab Take 1 tablet (500 mg total) by mouth 2 (two) times daily.    levothyroxine (SYNTHROID) 125 MCG tablet Take 1 tablet (125 mcg total) by mouth before breakfast.    loratadine (CLARITIN) 10 mg tablet Take 10 mg by mouth  "daily as needed for Allergies.    melatonin 5 mg TbDL Take 1 tablet by mouth nightly as needed.    midodrine (PROAMATINE) 10 MG tablet Take 1 tablet (10 mg total) by mouth 3 (three) times daily as needed. Hold for bp 180 or higher    rivaroxaban (XARELTO) 20 mg Tab Take 1 tablet (20 mg total) by mouth once daily.    sod picosulf-mag ox-citric ac (CLENPIQ) 10 mg-3.5 gram- 12 gram/175 mL Soln Take 1 Package by mouth once. for 1 dose    ubrogepant (UBRELVY) 50 mg tablet Take 1 tablet (50 mg total) by mouth as needed for Migraine. If symptoms persist or return, may repeat dose after 2 hours. Maximum: 200 mg per 24 hours    vibegron 75 mg Tab Take 1 tablet (75 mg total) by mouth every evening.    zaleplon (SONATA) 5 MG Cap Take 1 capsule (5 mg total) by mouth nightly as needed (insomnia).     No current facility-administered medications on file prior to visit.       Review of patient's allergies indicates:   Allergen Reactions    Neurontin [gabapentin]     Lactose     Soap Hives     Able to tolerate Dove only        OBJECTIVE:   Vital Signs:  Vitals:    07/08/25 1344 07/08/25 1345   BP: (!) 176/84 138/66   Pulse: 70    Weight: 69.1 kg (152 lb 5.4 oz)    Height: 5' 8" (1.727 m)        Recent Results (from the past 24 hours)   CBC with Differential    Collection Time: 07/07/25  2:00 PM   Result Value Ref Range    WBC 9.08 3.90 - 12.70 K/uL    RBC 3.09 (L) 4.00 - 5.40 M/uL    HGB 9.3 (L) 12.0 - 16.0 gm/dL    HCT 29.4 (L) 37.0 - 48.5 %    MCV 95 82 - 98 fL    MCH 30.1 27.0 - 31.0 pg    MCHC 31.6 (L) 32.0 - 36.0 g/dL    RDW 14.2 11.5 - 14.5 %    Platelet Count 293 150 - 450 K/uL    MPV 9.3 9.2 - 12.9 fL    Nucleated RBC 0 <=0 /100 WBC    Neut % 79.4 (H) 38 - 73 %    Lymph % 13.9 (L) 18 - 48 %    Mono % 4.1 4 - 15 %    Eos % 1.8 <=8 %    Basophil % 0.4 <=1.9 %    Imm Grans % 0.4 0.0 - 0.5 %    Neut # 7.21 1.8 - 7.7 K/uL    Lymph # 1.26 1 - 4.8 K/uL    Mono # 0.37 0.3 - 1 K/uL    Eos # 0.16 <=0.5 K/uL    Baso # 0.04 <=0.2 K/uL "    Imm Grans # 0.04 0.00 - 0.04 K/uL         Physical Exam  Constitutional:       General: She is not in acute distress.     Comments: Wheelchair bound   HENT:      Head: Normocephalic and atraumatic.   Eyes:      Extraocular Movements: Extraocular movements intact.      Pupils: Pupils are equal, round, and reactive to light.   Cardiovascular:      Rate and Rhythm: Normal rate and regular rhythm.      Heart sounds: No murmur heard.  Pulmonary:      Effort: Pulmonary effort is normal. No respiratory distress.      Breath sounds: No wheezing or rales.   Abdominal:      General: Abdomen is flat. Bowel sounds are normal. There is no distension.      Palpations: Abdomen is soft.      Tenderness: There is no abdominal tenderness.   Musculoskeletal:         General: Swelling present. No tenderness. Normal range of motion.      Cervical back: Normal range of motion.      Right lower leg: No edema.      Left lower leg: No edema.   Lymphadenopathy:      Cervical: No cervical adenopathy.   Skin:     General: Skin is warm and dry.      Coloration: Skin is not jaundiced.      Findings: No bruising.   Neurological:      General: No focal deficit present.      Mental Status: She is alert and oriented to person, place, and time.      Motor: Weakness present.           ASSESSMENT & PLAN:     Recommend the patient continue current midodrine regimen as per Dr. Bender instructions. Recommended adherence to compression socks.     1. Dysautonomia orthostatic hypotension syndrome    2. Orthostatic hypotension due to Parkinson disease        Discussed with Dr. Jackson    Follow up with PCP      Theo Hendrix MD/MPH  Internal Medicine PGY-III  [facility]  [address] [place] [address]

## 2025-07-08 NOTE — TELEPHONE ENCOUNTER
EGD/Colonoscopy scheduled for 07/15/2025, please place case request. Clenpiq bowel prep to be sent to [facility] in [place]

## 2025-07-08 NOTE — TELEPHONE ENCOUNTER
Dear Mrs. Nguyen    Attached are your instructions for your upcoming procedure. Please take some time to carefully review them and write down any questions you may have. A nurse will call you 1-2 weeks prior to your procedure to go over the instructions and answer any questions you may have.       EGD/Colonoscopy Instructions    Date of procedure: 07/15/2025    Arrival Time: We will call you 1 day prior to your procedure to provide you with an arrival time.    Location of Department:   [facility] [address] [place] [address]   1st Floor Same Day Surgery    The day before your procedure: 07/14/2025     You can only drink CLEAR LIQUIDS the whole day before your test. You CAN NOT eat any food for the entire day.    Clear Liquids That Are OK to Drink:   Water  Sports drinks (Gatorade, Power-Aid)  Crystal Light, Lemonade, Limeaid  Snowball, popsicle  Coffee or tea (no cream or nondairy creamer)  Clear juices without pulp (apple, white grape)  Jello (NO fruit or toppings)  Clear soda (sprite, coke, ginger ale)  Chicken broth (until 12 midnight the night before procedure)  Bouillon broth      What You CANNOT do:   Do not EAT solid food or drink milk/dairy products  Do not drink alcohol.  Do not take oral medications within 1 hour of starting   each dose of CLENPIQ.      Notes:   Please disregard the instructions that are in included with your prep from the pharmacy. ONLY FOLLOW THE PREP INSTRUCTIONS BELOW  Clenpiq Bowel Prep Kit is indicated for cleansing of the colon as a preparation for colonoscopy in adults.   Be sure to tell your doctor about all the medicines you take, including prescription and non-prescription medicines, vitamins, and herbal supplements. Clenpiq Bowel Prep Kit may affect how other medicines work.  Medication taken by mouth may not be absorbed properly when taken within 1 hour before the start of each dose of Clenpiq Bowel Prep Kit.    It is not uncommon  to experience some abdominal cramping, nausea and/or vomiting when taking the prep. If you have nausea and/or vomiting while taking the prep, stop drinking for 20 to 30 minutes then continue.    How to take the prep:    DOSE 1 --- Day Before Colonoscopy 07/14/2025 @ 5:00PM    Step 1- Open 1 bottle of Clenpiq and drink the entire bottle  Step 2- Drink five 8 ounce glasses of water (40 ounces total) over the next 5 hours.    You may continue to drink clear liquids until bedtime to avoid dehydration.         DOSE 2 -- Day of the Colonoscopy- 07/15/2025 (this dose will be 5 hours prior to your arrival time)    Step 1- Open the other bottle of Clenpiq and drink the entire bottle  Step 2- Drink three 8 ounce glasses of water (24 ounces total) over the next 3 hours.      You may continue drinking water/clear liquids until 2 hours prior to your arrival time or as directed by the scheduling nurse.  NO chewing gum or candy morning of procedure        Medications Instructions below:    If you take HEART, BLOOD PRESSURE, SEIZURE, PAIN, LUNG (including inhalers/nebulizers), ANTI-REJECTION (transplant patients), or PSYCHIATRIC medications, please take at your regular times with a sip of water or as directed by the scheduling nurse.      If you take a blood thinner please follow instructions below:  Your blood thinner Xarelto (rivaroxaban) has been cleared to be held for 3 days. Your last dose will be 07/11/2025      If you begin taking any blood thinning medications, injectable weight loss/diabetes medications (other than insulin), or Adipex(Phentermine) prior to your procedure please contact the endoscopy scheduling department listed below AS SOON AS POSSIBLE. If these medications are not held for the appropriate amount of days prior to procedure, your procedure will be canceled.      If you have any health changes prior to your procedure, please contact the endoscopy department to report changes.    On occasion, unforeseen  circumstances may cause a delay in your procedure start time. We respect your time and appreciate your patience during these circumstances.      Important contact information:    [facility] Endoscopy Department - [phone].  Hours of operation are Monday-Friday 8:00-4:30pm.    If you have questions regarding the prep or you need to reschedule, please call [phone].    After hours questions requiring immediate assistance, contact [facility] nurse line at [phone] or [phone].     Questions about insurance or financial obligations call [phone] or [phone].      Comments:                           IMPORTANT INFORMATION TO KNOW BEFORE YOUR PROCEDURE     [facility]      An adult friend/ family member MUST come with you to drive you home. You can not drive, take a taxi, Uber, Lyft, bus, or any form of public transportation without being accompanied by a  responsible adult. The responsible adult CAN NOT be the  of the service.     person must be available to return to pick you up within 15 minutes of being notified of discharge.    Please bring a picture ID, insurance card, & copayment    LEAVE ALL VALUABLES AND JEWELRY AT HOME. [facility] WILL NOT HOLD OR BE RESPONSIBLE FOR ANY LOST VALUABLES, JEWELRY, OR PERSONAL BELONGINGS. YOUR PERSONAL BELONGINGS MUST BE HELD BY YOUR ACCOMPANYING FRIEND/FAMILY MEMBER.    Plan to be at the hospital for 2-4 hours.           Please follow the instructions below:  1. You will see an American flag flying in the front of the hospital from radha Kramer as close as you can in that lot.  2. Behind the flag, you will see a covered [address], enter through those automatic double doors.  3. Immediately when you enter, you should be greeted by a team member who can assist you.  4. If not, follow the hallway immediately in front of you to the right towards the cafeteria.  5. Once you reach the  cafeteria, there are only two options. You can either enter the cafeteria or continue down the hallway to your left. You will want to continue down the hallway all the way down to your left until you absolutely cannot go anymore. You will then find yourself in our Same Day Surgery lobby where you will check in behind the glass window.      [facility] Map for Endoscopy Procedures:                   COLONOSCOPY EDUCATION VIDEO:           Colonoscopy Instructional Video                                                        OR    Type link address into your web browser's address bar:  [email or web address]

## 2025-07-08 NOTE — TELEPHONE ENCOUNTER
Dear Mrs. Nguyen    Attached are your instructions for your upcoming procedure. Please take some time to carefully review them and write down any questions you may have. A nurse will call you 1-2 weeks prior to your procedure to go over the instructions and answer any questions you may have.       EGD/Colonoscopy Instructions    Date of procedure: 07/15/2025    Arrival Time: We will call you 1 day prior to your procedure to provide you with an arrival time.    Location of Department:   [facility] [address] [place] [address]   1st Floor Same Day Surgery    The day before your procedure: 07/14/2025     You can only drink CLEAR LIQUIDS the whole day before your test. You CAN NOT eat any food for the entire day.    Clear Liquids That Are OK to Drink:   Water  Sports drinks (Gatorade, Power-Aid)  Crystal Light, Lemonade, Limeaid  Snowball, popsicle  Coffee or tea (no cream or nondairy creamer)  Clear juices without pulp (apple, white grape)  Jello (NO fruit or toppings)  Clear soda (sprite, coke, ginger ale)  Chicken broth (until 12 midnight the night before procedure)  Bouillon broth      What You CANNOT do:   Do not EAT solid food or drink milk/dairy products  Do not drink alcohol.  Do not take oral medications within 1 hour of starting   each dose of CLENPIQ.      Notes:   Please disregard the instructions that are in included with your prep from the pharmacy. ONLY FOLLOW THE PREP INSTRUCTIONS BELOW  Clenpiq Bowel Prep Kit is indicated for cleansing of the colon as a preparation for colonoscopy in adults.   Be sure to tell your doctor about all the medicines you take, including prescription and non-prescription medicines, vitamins, and herbal supplements. Clenpiq Bowel Prep Kit may affect how other medicines work.  Medication taken by mouth may not be absorbed properly when taken within 1 hour before the start of each dose of Clenpiq Bowel Prep Kit.    It is not uncommon  to experience some abdominal cramping, nausea and/or vomiting when taking the prep. If you have nausea and/or vomiting while taking the prep, stop drinking for 20 to 30 minutes then continue.    How to take the prep:    DOSE 1 --- Day Before Colonoscopy 07/14/2025 @ 5:00PM    Step 1- Open 1 bottle of Clenpiq and drink the entire bottle  Step 2- Drink five 8 ounce glasses of water (40 ounces total) over the next 5 hours.    You may continue to drink clear liquids until bedtime to avoid dehydration.         DOSE 2 -- Day of the Colonoscopy- 07/15/2025 (this dose will be 5 hours prior to your arrival time)    Step 1- Open the other bottle of Clenpiq and drink the entire bottle  Step 2- Drink three 8 ounce glasses of water (24 ounces total) over the next 3 hours.      You may continue drinking water/clear liquids until 2 hours prior to your arrival time or as directed by the scheduling nurse.  NO chewing gum or candy morning of procedure        Medications Instructions below:    If you take HEART, BLOOD PRESSURE, SEIZURE, PAIN, LUNG (including inhalers/nebulizers), ANTI-REJECTION (transplant patients), or PSYCHIATRIC medications, please take at your regular times with a sip of water or as directed by the scheduling nurse.      If you take a blood thinner please follow instructions below:  Your blood thinner Xarelto (rivaroxaban) has been cleared to be held for 3 days. Your last dose will be 07/11/2025      If you begin taking any blood thinning medications, injectable weight loss/diabetes medications (other than insulin), or Adipex(Phentermine) prior to your procedure please contact the endoscopy scheduling department listed below AS SOON AS POSSIBLE. If these medications are not held for the appropriate amount of days prior to procedure, your procedure will be canceled.      If you have any health changes prior to your procedure, please contact the endoscopy department to report changes.    On occasion, unforeseen  circumstances may cause a delay in your procedure start time. We respect your time and appreciate your patience during these circumstances.      Important contact information:    [facility] Endoscopy Department - [phone].  Hours of operation are Monday-Friday 8:00-4:30pm.    If you have questions regarding the prep or you need to reschedule, please call [phone].    After hours questions requiring immediate assistance, contact [facility] nurse line at [phone] or [phone].     Questions about insurance or financial obligations call [phone] or [phone].      Comments:                           IMPORTANT INFORMATION TO KNOW BEFORE YOUR PROCEDURE     [facility]      An adult friend/ family member MUST come with you to drive you home. You can not drive, take a taxi, Uber, Lyft, bus, or any form of public transportation without being accompanied by a  responsible adult. The responsible adult CAN NOT be the  of the service.     person must be available to return to pick you up within 15 minutes of being notified of discharge.    Please bring a picture ID, insurance card, & copayment    LEAVE ALL VALUABLES AND JEWELRY AT HOME. [facility] WILL NOT HOLD OR BE RESPONSIBLE FOR ANY LOST VALUABLES, JEWELRY, OR PERSONAL BELONGINGS. YOUR PERSONAL BELONGINGS MUST BE HELD BY YOUR ACCOMPANYING FRIEND/FAMILY MEMBER.    Plan to be at the hospital for 2-4 hours.           Please follow the instructions below:  1. You will see an American flag flying in the front of the hospital from radha [address] as close as you can in that lot.  2. Behind the flag, you will see a covered [address], enter through those automatic double doors.  3. Immediately when you enter, you should be greeted by a team member who can assist you.  4. If not, follow the hallway immediately in front of you to the right towards the cafeteria.  5. Once you reach the  cafeteria, there are only two options. You can either enter the cafeteria or continue down the hallway to your left. You will want to continue down the hallway all the way down to your left until you absolutely cannot go anymore. You will then find yourself in our Same Day Surgery lobby where you will check in behind the glass window.      [facility] Map for Endoscopy Procedures:                   COLONOSCOPY EDUCATION VIDEO:           Colonoscopy Instructional Video                                                        OR    Type link address into your web browser's address bar:  [email or web address]

## 2025-07-09 ENCOUNTER — OFFICE VISIT (OUTPATIENT)
Dept: PODIATRY | Facility: CLINIC | Age: 60
End: 2025-07-09
Payer: COMMERCIAL

## 2025-07-09 ENCOUNTER — PATIENT MESSAGE (OUTPATIENT)
Dept: PODIATRY | Facility: CLINIC | Age: 60
End: 2025-07-09

## 2025-07-09 VITALS
SYSTOLIC BLOOD PRESSURE: 202 MMHG | HEART RATE: 67 BPM | HEIGHT: 68 IN | DIASTOLIC BLOOD PRESSURE: 72 MMHG | RESPIRATION RATE: 18 BRPM | BODY MASS INDEX: 23.29 KG/M2 | WEIGHT: 153.69 LBS

## 2025-07-09 DIAGNOSIS — S90.414A ABRASION OF TOE OF RIGHT FOOT, INITIAL ENCOUNTER: Primary | ICD-10-CM

## 2025-07-09 DIAGNOSIS — M20.42 HAMMER TOES OF BOTH FEET: ICD-10-CM

## 2025-07-09 DIAGNOSIS — M21.969 DEFORMITY OF FOOT, UNSPECIFIED LATERALITY: ICD-10-CM

## 2025-07-09 DIAGNOSIS — M20.41 HAMMER TOES OF BOTH FEET: ICD-10-CM

## 2025-07-09 PROCEDURE — 3066F NEPHROPATHY DOC TX: CPT | Mod: CPTII,S$GLB,, | Performed by: PODIATRIST

## 2025-07-09 PROCEDURE — 99203 OFFICE O/P NEW LOW 30 MIN: CPT | Mod: S$GLB,,, | Performed by: PODIATRIST

## 2025-07-09 PROCEDURE — 99999 PR PBB SHADOW E&M-EST. PATIENT-LVL IV: CPT | Mod: PBBFAC,,, | Performed by: PODIATRIST

## 2025-07-09 PROCEDURE — 3008F BODY MASS INDEX DOCD: CPT | Mod: CPTII,S$GLB,, | Performed by: PODIATRIST

## 2025-07-09 PROCEDURE — 3078F DIAST BP <80 MM HG: CPT | Mod: CPTII,S$GLB,, | Performed by: PODIATRIST

## 2025-07-09 PROCEDURE — 3044F HG A1C LEVEL LT 7.0%: CPT | Mod: CPTII,S$GLB,, | Performed by: PODIATRIST

## 2025-07-09 PROCEDURE — 3077F SYST BP >= 140 MM HG: CPT | Mod: CPTII,S$GLB,, | Performed by: PODIATRIST

## 2025-07-09 NOTE — PROGRESS NOTES
Subjective:      Patient ID: Thelma Nguyen is a 59 y.o. female.    Chief Complaint: Ankle Injury (Rt ankle pain and swelling since January 2025), Callouses (Rt 4th toe corn ), and Foot Pain    Pt presents today with complaints of right ankle pain. Pt broke her foot/ankle twice and surgery was done by Dr Goodson. Pt is c/o pain in that ankle. Pt is also c/o a sore on her right 4th digit.     Review of Systems   Constitutional: Negative for chills, fever and malaise/fatigue.   HENT:  Negative for hearing loss.    Cardiovascular:  Negative for claudication.   Respiratory:  Negative for shortness of breath.    Skin:  Negative for flushing and rash.   Musculoskeletal:  Positive for joint pain, muscle weakness and stiffness. Negative for myalgias.   Neurological:  Negative for loss of balance, numbness, paresthesias and sensory change.   Psychiatric/Behavioral:  Negative for altered mental status.          Objective:      Physical Exam  Vitals reviewed.   Cardiovascular:      Pulses:           Dorsalis pedis pulses are 2+ on the right side and 2+ on the left side.        Posterior tibial pulses are 2+ on the right side and 2+ on the left side.      Comments: No edema noted b/L  Musculoskeletal:      Comments:        Feet:      Right foot:      Protective Sensation: 5 sites tested.  5 sites sensed.      Left foot:      Protective Sensation: 5 sites tested.  5 sites sensed.   Skin:     Capillary Refill: Capillary refill takes 2 to 3 seconds.      Comments: Normal skin tugor noted.   No open lesion noted b/L  Skin temp is warm to warm from proximal to distal b/L.  Webspaces clean, dry, and intact     Neurological:      Mental Status: She is alert and oriented to person, place, and time.      Sensory: Sensation is intact.      Motor: Weakness present.      Coordination: Coordination abnormal.      Gait: Gait abnormal.      Comments: Gross sensation intact b/L               Assessment:       Encounter Diagnoses  "  Name Primary?    Abrasion of toe of right foot, initial encounter Yes    Hammer toes of both feet     Deformity of foot, unspecified laterality          Plan:       Thelma Reagan" was seen today for ankle injury, callouses and foot pain.    Diagnoses and all orders for this visit:    Abrasion of toe of right foot, initial encounter    Hammer toes of both feet  -     HME - OTHER    Deformity of foot, unspecified laterality  -     HME - OTHER      I counseled the patient on her conditions, their implications and medical management.    Medical records reviewed  Referral sent to ortho for hand pain    Pt advised pain in ankle could be post-traumatic.   HME: ankle brace dispensed    DSD applied to small abrasion on toe, home instructions given. Pt advised to keep clean and dry    RTC in 2 weeks or sooner if any new pedal problems should arise or if condition worsens.     .           "

## 2025-07-10 ENCOUNTER — PATIENT MESSAGE (OUTPATIENT)
Dept: NEPHROLOGY | Facility: CLINIC | Age: 60
End: 2025-07-10
Payer: COMMERCIAL

## 2025-07-13 ENCOUNTER — PATIENT MESSAGE (OUTPATIENT)
Dept: INTERNAL MEDICINE | Facility: CLINIC | Age: 60
End: 2025-07-13
Payer: COMMERCIAL

## 2025-07-13 DIAGNOSIS — E03.8 SECONDARY HYPOTHYROIDISM: Primary | ICD-10-CM

## 2025-07-13 DIAGNOSIS — R55 RECURRENT SYNCOPE: ICD-10-CM

## 2025-07-16 NOTE — PATIENT INSTRUCTIONS
If taking cholecalciferol (vitamin D3) 800 IUs daily, please take two a day    Take on an empty stomach,with 8 ounces of water. 30 minutes before food or drink and do not lie down for 30 mins after taking this drug.    Please make sure you are taking thyroid hormone on an empty stomach with water only, wait thirty to forty five minutes before you take any other pill, vitamin or food.          [de-identified] : Ms. Qureshi is a 27 yo F who presents for initial consultation for the management of gallstones.   Patient was recently seen by GI, Dr. Talavera, at which time she reports RUQ/epigastric pain for the last 6 months with associated N/V. Prior episode was 5/2025 while in [facility] - blood work was unremarkable and US showed stones and sludge.   Abdominal US 5/21/25 ([facility]): report in [place] Gallstones present. Sludge.   PMHx: PSHx: perforated appendicitis, SBO 2/2 adhesions

## 2025-07-18 ENCOUNTER — TELEPHONE (OUTPATIENT)
Dept: GASTROENTEROLOGY | Facility: HOSPITAL | Age: 60
End: 2025-07-18
Payer: COMMERCIAL

## 2025-07-18 DIAGNOSIS — D12.6 SERRATED POLYPOSIS SYNDROME: Primary | ICD-10-CM

## 2025-07-18 NOTE — TELEPHONE ENCOUNTER
Ky and discussed and reviewed    All polyps serrated, some with dysplasia  20 polyps, and stiiill smaller ones not removed    The gastri / duo biopsies were negative    I will refer her to genetic testing    I will refer her to discuss with CRS  - consideration of prophylactic colectomy in setting of severe polyposis syndrome (she meets criteria for serrated polyposis) - prob > 40 total polyps on colonoscopy    Narciso, this nice lady has a polyposis syndrome - can you test her genetics? She also has Factor 5   (  If you can't test her, I will formarlly refer to genetics)    eloisa Horvath meet her to discuss the idea of polypsosis and role of colectomy - she likely will not desire colectomy presently but she has many polyps

## 2025-07-21 ENCOUNTER — TELEPHONE (OUTPATIENT)
Dept: SURGERY | Facility: CLINIC | Age: 60
End: 2025-07-21
Payer: COMMERCIAL

## 2025-07-21 ENCOUNTER — PATIENT MESSAGE (OUTPATIENT)
Dept: GASTROENTEROLOGY | Facility: CLINIC | Age: 60
End: 2025-07-21
Payer: COMMERCIAL

## 2025-07-21 NOTE — TELEPHONE ENCOUNTER
Spoke with patient and confirmed already scheduled appointment with Dr. Birch in [place]. Details confirmed verbally over phone and viewable in portal.

## 2025-07-23 ENCOUNTER — OFFICE VISIT (OUTPATIENT)
Dept: PODIATRY | Facility: CLINIC | Age: 60
End: 2025-07-23
Payer: COMMERCIAL

## 2025-07-23 VITALS
HEART RATE: 101 BPM | SYSTOLIC BLOOD PRESSURE: 116 MMHG | BODY MASS INDEX: 23.36 KG/M2 | DIASTOLIC BLOOD PRESSURE: 70 MMHG | HEIGHT: 68 IN

## 2025-07-23 DIAGNOSIS — L97.511 SKIN ULCER OF TOE OF RIGHT FOOT, LIMITED TO BREAKDOWN OF SKIN: Primary | ICD-10-CM

## 2025-07-23 PROCEDURE — 1159F MED LIST DOCD IN RCRD: CPT | Mod: CPTII,S$GLB,, | Performed by: PODIATRIST

## 2025-07-23 PROCEDURE — 3044F HG A1C LEVEL LT 7.0%: CPT | Mod: CPTII,S$GLB,, | Performed by: PODIATRIST

## 2025-07-23 PROCEDURE — 3074F SYST BP LT 130 MM HG: CPT | Mod: CPTII,S$GLB,, | Performed by: PODIATRIST

## 2025-07-23 PROCEDURE — 99214 OFFICE O/P EST MOD 30 MIN: CPT | Mod: S$GLB,,, | Performed by: PODIATRIST

## 2025-07-23 PROCEDURE — 99999 PR PBB SHADOW E&M-EST. PATIENT-LVL IV: CPT | Mod: PBBFAC,,, | Performed by: PODIATRIST

## 2025-07-23 PROCEDURE — 87075 CULTR BACTERIA EXCEPT BLOOD: CPT | Performed by: PODIATRIST

## 2025-07-23 PROCEDURE — 3008F BODY MASS INDEX DOCD: CPT | Mod: CPTII,S$GLB,, | Performed by: PODIATRIST

## 2025-07-23 PROCEDURE — 3066F NEPHROPATHY DOC TX: CPT | Mod: CPTII,S$GLB,, | Performed by: PODIATRIST

## 2025-07-23 PROCEDURE — 3078F DIAST BP <80 MM HG: CPT | Mod: CPTII,S$GLB,, | Performed by: PODIATRIST

## 2025-07-23 PROCEDURE — 87070 CULTURE OTHR SPECIMN AEROBIC: CPT | Performed by: PODIATRIST

## 2025-07-23 NOTE — PROGRESS NOTES
Subjective:      Patient ID: Thelma Nguyen is a 59 y.o. female.    Chief Complaint: Wound Check (2wk check right 4th digit)    Pt presents today with complaints of right ankle pain. Pt broke her foot/ankle twice and surgery was done by Dr Goodson. Pt is c/o pain in that ankle. Pt is also c/o a sore on her right 4th digit.     7/23/2025 pt presents today for wound f/u. Pt states she did not get a pedicure but got her nails polished. Pt has been wearing brace.     Review of Systems   Constitutional: Negative for chills, fever and malaise/fatigue.   HENT:  Negative for hearing loss.    Cardiovascular:  Negative for claudication.   Respiratory:  Negative for shortness of breath.    Skin:  Negative for flushing and rash.   Musculoskeletal:  Positive for joint pain, muscle weakness and stiffness. Negative for myalgias.   Neurological:  Negative for loss of balance, numbness, paresthesias and sensory change.   Psychiatric/Behavioral:  Negative for altered mental status.            Objective:      Physical Exam  Vitals reviewed.   Cardiovascular:      Pulses:           Dorsalis pedis pulses are 2+ on the right side and 2+ on the left side.        Posterior tibial pulses are 2+ on the right side and 2+ on the left side.      Comments: No edema noted b/L  Musculoskeletal:      Right foot: Decreased range of motion. Deformity and foot drop present.      Left foot: Decreased range of motion.      Comments:        Feet:      Right foot:      Protective Sensation: 5 sites tested.  5 sites sensed.      Left foot:      Protective Sensation: 5 sites tested.  5 sites sensed.   Skin:     General: Skin is warm.      Capillary Refill: Capillary refill takes 2 to 3 seconds.      Comments: Ulceration  Location: right 4th digit  Measurements: 0.5x 0.3x 0.2cm  Drainage: none  Wound base: granular/HKT  SOI: erythema       Neurological:      Mental Status: She is alert and oriented to person, place, and time.      Sensory:  "Sensation is intact.      Motor: Weakness present.      Coordination: Coordination abnormal.      Gait: Gait abnormal.      Comments: Gross sensation intact b/L               Assessment:       Encounter Diagnosis   Name Primary?    Skin ulcer of toe of right foot, limited to breakdown of skin Yes         Plan:       Thelma Reagan" was seen today for wound check.    Diagnoses and all orders for this visit:    Skin ulcer of toe of right foot, limited to breakdown of skin  -     Aerobic culture  -     Culture, Anaerobic      I counseled the patient on her conditions, their implications and medical management.    Ulceration on right toe debrided through dermis/epidermis tissue using an tissue nipper. Ulceration debrided down to healthy tissue. Pt tolerated debridement well.     DSD applied  Pt advised to keep clean and dry    Culture taken of toe    RTC in 1 week or sooner if any new pedal problems arise, any signs of infection occur, or if condition worsens.             "

## 2025-07-25 LAB
BACTERIA SPEC AEROBE CULT: NORMAL
BACTERIA SPEC ANAEROBE CULT: NORMAL

## 2025-07-28 RX ORDER — IVABRADINE 7.5 MG/1
7.5 TABLET, FILM COATED ORAL 2 TIMES DAILY
Qty: 60 TABLET | Refills: 11 | Status: SHIPPED | OUTPATIENT
Start: 2025-07-28

## 2025-07-28 RX ORDER — LEVOTHYROXINE SODIUM 125 UG/1
125 TABLET ORAL
Qty: 90 TABLET | Refills: 0 | Status: SHIPPED | OUTPATIENT
Start: 2025-07-28

## 2025-07-28 NOTE — TELEPHONE ENCOUNTER
No care due was identified.  [facility] Embedded Care Due Messages. Reference number: [id number].   7/28/2025 3:57:13 PM CDT

## 2025-07-28 NOTE — TELEPHONE ENCOUNTER
Patient requesting refill on   Ivabradine 7.5 mg  Levothyroxine 125 mcg  Xarelto 20 mg  Pt's  LOV with Zachary Bender MD , 6/20/2025  Medication pending for your review      Note dated 5/20/25;  1. Orthostatic hypotension  Think we need to focus on non-pharmacointerventions\  Would like to decrease midodrine to reduce supine hypertension, however having incomplete therapeutic response to orthostatic hypotension while standing  Increase head of bed to 30-45' to avoid supine HTN  tense the legs and contract abdominal and buttock muscles while actively standing to alleviate symptoms   Continue current dose of corlanor 7.5mg BID

## 2025-07-29 ENCOUNTER — PATIENT MESSAGE (OUTPATIENT)
Dept: INTERNAL MEDICINE | Facility: CLINIC | Age: 60
End: 2025-07-29
Payer: COMMERCIAL

## 2025-07-29 RX ORDER — METRONIDAZOLE 250 MG/1
500 TABLET ORAL EVERY 12 HOURS
Qty: 14 TABLET | Refills: 0 | Status: SHIPPED | OUTPATIENT
Start: 2025-07-29

## 2025-08-04 ENCOUNTER — OFFICE VISIT (OUTPATIENT)
Dept: PODIATRY | Facility: CLINIC | Age: 60
End: 2025-08-04
Payer: COMMERCIAL

## 2025-08-04 VITALS
DIASTOLIC BLOOD PRESSURE: 82 MMHG | BODY MASS INDEX: 22.81 KG/M2 | HEIGHT: 68 IN | SYSTOLIC BLOOD PRESSURE: 163 MMHG | HEART RATE: 93 BPM

## 2025-08-04 DIAGNOSIS — L97.512 SKIN ULCER OF TOE OF RIGHT FOOT WITH FAT LAYER EXPOSED: Primary | ICD-10-CM

## 2025-08-04 PROCEDURE — 99999 PR PBB SHADOW E&M-EST. PATIENT-LVL IV: CPT | Mod: PBBFAC,,, | Performed by: PODIATRIST

## 2025-08-04 NOTE — PROGRESS NOTES
Subjective:      Patient ID: Thelma Nguyen is a 59 y.o. female.    Chief Complaint: Wound Care (2wk right 4th)    Pt presents today with complaints of right ankle pain. Pt broke her foot/ankle twice and surgery was done by Dr Goodson. Pt is c/o pain in that ankle. Pt is also c/o a sore on her right 4th digit.     7/23/2025 pt presents today for wound f/u. Pt states she did not get a pedicure but got her nails polished. Pt has been wearing brace.     8/4/2025 pt presents for wound care. Pt states she has been taking abx. Pt states she got foot wet when she showered a few days ago.     Review of Systems   Constitutional: Negative for chills, fever and malaise/fatigue.   HENT:  Negative for hearing loss.    Cardiovascular:  Negative for claudication.   Respiratory:  Negative for shortness of breath.    Skin:  Negative for flushing and rash.   Musculoskeletal:  Positive for joint pain, muscle weakness and stiffness. Negative for myalgias.   Neurological:  Negative for loss of balance, numbness, paresthesias and sensory change.   Psychiatric/Behavioral:  Negative for altered mental status.            Objective:      Physical Exam  Vitals reviewed.   Cardiovascular:      Pulses:           Dorsalis pedis pulses are 2+ on the right side and 2+ on the left side.        Posterior tibial pulses are 2+ on the right side and 2+ on the left side.      Comments: No edema noted b/L  Musculoskeletal:      Right foot: Decreased range of motion. Deformity and foot drop present.      Left foot: Decreased range of motion.      Comments:        Feet:      Right foot:      Protective Sensation: 5 sites tested.  5 sites sensed.      Left foot:      Protective Sensation: 5 sites tested.  5 sites sensed.   Skin:     General: Skin is warm.      Capillary Refill: Capillary refill takes 2 to 3 seconds.      Comments: Ulceration  Location: right 4th digit  Measurements: 0.5x 0.3x 0.3cm  Drainage: none  Wound base:  "granular/HKT  SOI: erythema       Neurological:      Mental Status: She is alert and oriented to person, place, and time.      Sensory: Sensation is intact.      Motor: Weakness present.      Coordination: Coordination abnormal.      Gait: Gait abnormal.      Comments: Gross sensation intact b/L               Assessment:       Encounter Diagnosis   Name Primary?    Skin ulcer of toe of right foot with fat layer exposed Yes         Plan:       Thelma Reagan" was seen today for wound care.    Diagnoses and all orders for this visit:    Skin ulcer of toe of right foot with fat layer exposed      I counseled the patient on her conditions, their implications and medical management.    Ulceration on right toe debrided through sub-q tissue using an tissue nipper. Ulcerationdebrided down to healthy tissue. Pt tolerated debridement well.     DSD applied  Pt advised to keep clean and dry    Pt advised since toe is infected, do not get it wet.     Pt advised to complete abx unless adverse reaction occurs    RTC in 1 week or sooner if any new pedal problems arise, any signs of infection occur, or if condition worsens.               "

## 2025-08-05 ENCOUNTER — PATIENT MESSAGE (OUTPATIENT)
Dept: INTERNAL MEDICINE | Facility: CLINIC | Age: 60
End: 2025-08-05
Payer: COMMERCIAL

## 2025-08-07 ENCOUNTER — PATIENT MESSAGE (OUTPATIENT)
Dept: NEPHROLOGY | Facility: CLINIC | Age: 60
End: 2025-08-07
Payer: COMMERCIAL

## 2025-08-18 ENCOUNTER — PATIENT MESSAGE (OUTPATIENT)
Dept: PODIATRY | Facility: CLINIC | Age: 60
End: 2025-08-18
Payer: COMMERCIAL

## 2025-08-20 ENCOUNTER — PATIENT MESSAGE (OUTPATIENT)
Dept: NEPHROLOGY | Facility: CLINIC | Age: 60
End: 2025-08-20
Payer: COMMERCIAL

## 2025-08-21 ENCOUNTER — NURSE TRIAGE (OUTPATIENT)
Dept: ADMINISTRATIVE | Facility: CLINIC | Age: 60
End: 2025-08-21
Payer: COMMERCIAL

## 2025-08-21 ENCOUNTER — HOSPITAL ENCOUNTER (EMERGENCY)
Facility: HOSPITAL | Age: 60
Discharge: HOME | End: 2025-08-21
Attending: EMERGENCY MEDICINE
Payer: COMMERCIAL

## 2025-08-21 VITALS
HEART RATE: 75 BPM | TEMPERATURE: 98 F | HEIGHT: 68 IN | BODY MASS INDEX: 23.19 KG/M2 | DIASTOLIC BLOOD PRESSURE: 93 MMHG | WEIGHT: 153 LBS | SYSTOLIC BLOOD PRESSURE: 198 MMHG | RESPIRATION RATE: 19 BRPM | OXYGEN SATURATION: 98 %

## 2025-08-21 DIAGNOSIS — N30.01 ACUTE CYSTITIS WITH HEMATURIA: ICD-10-CM

## 2025-08-21 DIAGNOSIS — R53.1 WEAKNESS: Primary | ICD-10-CM

## 2025-08-21 LAB
ABSOLUTE EOSINOPHIL (OHS): 0.19 K/UL
ABSOLUTE MONOCYTE (OHS): 0.44 K/UL (ref 0.3–1)
ABSOLUTE NEUTROPHIL COUNT (OHS): 4.17 K/UL (ref 1.8–7.7)
ALBUMIN SERPL BCP-MCNC: 3.7 G/DL (ref 3.5–5.2)
ALP SERPL-CCNC: 90 UNIT/L (ref 40–150)
ALT SERPL W/O P-5'-P-CCNC: 15 UNIT/L (ref 10–44)
ANION GAP (OHS): 10 MMOL/L (ref 8–16)
AST SERPL-CCNC: 33 UNIT/L (ref 11–45)
BACTERIA #/AREA URNS AUTO: ABNORMAL /HPF
BASOPHILS # BLD AUTO: 0.04 K/UL
BASOPHILS NFR BLD AUTO: 0.6 %
BILIRUB SERPL-MCNC: 0.3 MG/DL (ref 0.1–1)
BILIRUB UR QL STRIP.AUTO: NEGATIVE
BUN SERPL-MCNC: 33 MG/DL (ref 6–20)
CALCIUM SERPL-MCNC: 9.2 MG/DL (ref 8.7–10.5)
CHLORIDE SERPL-SCNC: 104 MMOL/L (ref 95–110)
CLARITY UR: ABNORMAL
CO2 SERPL-SCNC: 25 MMOL/L (ref 23–29)
COLOR UR AUTO: ABNORMAL
CREAT SERPL-MCNC: 2.2 MG/DL (ref 0.5–1.4)
ERYTHROCYTE [DISTWIDTH] IN BLOOD BY AUTOMATED COUNT: 13.7 % (ref 11.5–14.5)
GFR SERPLBLD CREATININE-BSD FMLA CKD-EPI: 25 ML/MIN/1.73/M2
GLUCOSE SERPL-MCNC: 131 MG/DL (ref 70–110)
GLUCOSE UR QL STRIP: NEGATIVE
HCT VFR BLD AUTO: 33.9 % (ref 37–48.5)
HGB BLD-MCNC: 10.8 GM/DL (ref 12–16)
HGB UR QL STRIP: ABNORMAL
IMM GRANULOCYTES # BLD AUTO: 0.01 K/UL (ref 0–0.04)
IMM GRANULOCYTES NFR BLD AUTO: 0.2 % (ref 0–0.5)
KETONES UR QL STRIP: NEGATIVE
LEUKOCYTE ESTERASE UR QL STRIP: ABNORMAL
LYMPHOCYTES # BLD AUTO: 1.74 K/UL (ref 1–4.8)
MCH RBC QN AUTO: 29.2 PG (ref 27–31)
MCHC RBC AUTO-ENTMCNC: 31.9 G/DL (ref 32–36)
MCV RBC AUTO: 92 FL (ref 82–98)
MICROSCOPIC COMMENT: ABNORMAL
NITRITE UR QL STRIP: POSITIVE
NUCLEATED RBC (/100WBC) (OHS): 0 /100 WBC
PH UR STRIP: 6 [PH]
PLATELET # BLD AUTO: 283 K/UL (ref 150–450)
PMV BLD AUTO: 9.3 FL (ref 9.2–12.9)
POTASSIUM SERPL-SCNC: 3.6 MMOL/L (ref 3.5–5.1)
PROT SERPL-MCNC: 7.3 GM/DL (ref 6–8.4)
PROT UR QL STRIP: ABNORMAL
RBC # BLD AUTO: 3.7 M/UL (ref 4–5.4)
RBC #/AREA URNS AUTO: 6 /HPF (ref 0–4)
RELATIVE EOSINOPHIL (OHS): 2.9 %
RELATIVE LYMPHOCYTE (OHS): 26.4 % (ref 18–48)
RELATIVE MONOCYTE (OHS): 6.7 % (ref 4–15)
RELATIVE NEUTROPHIL (OHS): 63.2 % (ref 38–73)
SODIUM SERPL-SCNC: 139 MMOL/L (ref 136–145)
SP GR UR STRIP: 1.01
SQUAMOUS #/AREA URNS AUTO: 3 /HPF
TSH SERPL-ACNC: 0.44 UIU/ML (ref 0.4–4)
UROBILINOGEN UR STRIP-ACNC: NEGATIVE EU/DL
WBC # BLD AUTO: 6.59 K/UL (ref 3.9–12.7)
WBC #/AREA URNS AUTO: 75 /HPF (ref 0–5)
WBC CLUMPS UR QL AUTO: ABNORMAL

## 2025-08-21 PROCEDURE — 84443 ASSAY THYROID STIM HORMONE: CPT

## 2025-08-21 PROCEDURE — 87086 URINE CULTURE/COLONY COUNT: CPT

## 2025-08-21 PROCEDURE — 80053 COMPREHEN METABOLIC PANEL: CPT

## 2025-08-21 PROCEDURE — 99283 EMERGENCY DEPT VISIT LOW MDM: CPT

## 2025-08-21 PROCEDURE — 85025 COMPLETE CBC W/AUTO DIFF WBC: CPT

## 2025-08-21 PROCEDURE — 81001 URINALYSIS AUTO W/SCOPE: CPT

## 2025-08-21 RX ORDER — CEPHALEXIN 500 MG/1
500 CAPSULE ORAL 2 TIMES DAILY
Qty: 14 CAPSULE | Refills: 0 | Status: SHIPPED | OUTPATIENT
Start: 2025-08-21 | End: 2025-08-27

## 2025-08-23 LAB — BACTERIA UR CULT: ABNORMAL

## 2025-08-25 ENCOUNTER — PATIENT MESSAGE (OUTPATIENT)
Dept: INTERNAL MEDICINE | Facility: CLINIC | Age: 60
End: 2025-08-25
Payer: COMMERCIAL

## 2025-08-26 ENCOUNTER — OFFICE VISIT (OUTPATIENT)
Dept: SURGERY | Facility: CLINIC | Age: 60
End: 2025-08-26
Payer: COMMERCIAL

## 2025-08-26 ENCOUNTER — PATIENT MESSAGE (OUTPATIENT)
Dept: GASTROENTEROLOGY | Facility: CLINIC | Age: 60
End: 2025-08-26
Payer: COMMERCIAL

## 2025-08-26 ENCOUNTER — OFFICE VISIT (OUTPATIENT)
Dept: PODIATRY | Facility: CLINIC | Age: 60
End: 2025-08-26
Payer: COMMERCIAL

## 2025-08-26 VITALS
SYSTOLIC BLOOD PRESSURE: 169 MMHG | HEIGHT: 68 IN | BODY MASS INDEX: 23.19 KG/M2 | DIASTOLIC BLOOD PRESSURE: 82 MMHG | HEART RATE: 98 BPM | WEIGHT: 153 LBS

## 2025-08-26 VITALS
BODY MASS INDEX: 23.19 KG/M2 | HEIGHT: 68 IN | SYSTOLIC BLOOD PRESSURE: 181 MMHG | HEART RATE: 95 BPM | DIASTOLIC BLOOD PRESSURE: 84 MMHG | WEIGHT: 153 LBS

## 2025-08-26 DIAGNOSIS — Z86.0101 HISTORY OF ADENOMATOUS AND SERRATED COLON POLYPS: Primary | ICD-10-CM

## 2025-08-26 DIAGNOSIS — S90.414A ABRASION OF TOE OF RIGHT FOOT, INITIAL ENCOUNTER: ICD-10-CM

## 2025-08-26 DIAGNOSIS — L97.511 SKIN ULCER OF TOE OF RIGHT FOOT, LIMITED TO BREAKDOWN OF SKIN: Primary | ICD-10-CM

## 2025-08-26 DIAGNOSIS — L97.512 SKIN ULCER OF TOE OF RIGHT FOOT WITH FAT LAYER EXPOSED: ICD-10-CM

## 2025-08-26 PROCEDURE — 3066F NEPHROPATHY DOC TX: CPT | Mod: CPTII,S$GLB,, | Performed by: PODIATRIST

## 2025-08-26 PROCEDURE — 3008F BODY MASS INDEX DOCD: CPT | Mod: CPTII,S$GLB,, | Performed by: PODIATRIST

## 2025-08-26 PROCEDURE — 99999 PR PBB SHADOW E&M-EST. PATIENT-LVL V: CPT | Mod: PBBFAC,,, | Performed by: PODIATRIST

## 2025-08-26 PROCEDURE — 3077F SYST BP >= 140 MM HG: CPT | Mod: CPTII,S$GLB,, | Performed by: PODIATRIST

## 2025-08-26 PROCEDURE — 3044F HG A1C LEVEL LT 7.0%: CPT | Mod: CPTII,S$GLB,, | Performed by: PODIATRIST

## 2025-08-26 PROCEDURE — 99214 OFFICE O/P EST MOD 30 MIN: CPT | Mod: S$GLB,,, | Performed by: PODIATRIST

## 2025-08-26 PROCEDURE — 99999 PR PBB SHADOW E&M-EST. PATIENT-LVL V: CPT | Mod: PBBFAC,,, | Performed by: COLON & RECTAL SURGERY

## 2025-08-26 PROCEDURE — 1159F MED LIST DOCD IN RCRD: CPT | Mod: CPTII,S$GLB,, | Performed by: PODIATRIST

## 2025-08-26 PROCEDURE — 3079F DIAST BP 80-89 MM HG: CPT | Mod: CPTII,S$GLB,, | Performed by: PODIATRIST

## 2025-08-27 ENCOUNTER — PATIENT MESSAGE (OUTPATIENT)
Dept: INTERNAL MEDICINE | Facility: CLINIC | Age: 60
End: 2025-08-27
Payer: COMMERCIAL

## 2025-08-27 ENCOUNTER — ON-DEMAND VIRTUAL (OUTPATIENT)
Dept: URGENT CARE | Facility: CLINIC | Age: 60
End: 2025-08-27
Payer: COMMERCIAL

## 2025-08-27 DIAGNOSIS — N30.00 ACUTE CYSTITIS WITHOUT HEMATURIA: Primary | ICD-10-CM

## 2025-08-27 PROCEDURE — 98006 SYNCH AUDIO-VIDEO EST MOD 30: CPT | Mod: 95,,, | Performed by: NURSE PRACTITIONER

## 2025-08-27 RX ORDER — CIPROFLOXACIN 500 MG/1
500 TABLET, FILM COATED ORAL EVERY 12 HOURS
Qty: 14 TABLET | Refills: 0 | Status: SHIPPED | OUTPATIENT
Start: 2025-08-27 | End: 2025-09-03

## 2025-08-28 ENCOUNTER — TELEPHONE (OUTPATIENT)
Dept: GASTROENTEROLOGY | Facility: CLINIC | Age: 60
End: 2025-08-28
Payer: COMMERCIAL

## 2025-08-29 ENCOUNTER — OFFICE VISIT (OUTPATIENT)
Dept: INTERNAL MEDICINE | Facility: CLINIC | Age: 60
End: 2025-08-29
Payer: COMMERCIAL

## 2025-08-29 VITALS
HEART RATE: 77 BPM | WEIGHT: 153 LBS | BODY MASS INDEX: 23.26 KG/M2 | OXYGEN SATURATION: 98 % | SYSTOLIC BLOOD PRESSURE: 140 MMHG | DIASTOLIC BLOOD PRESSURE: 80 MMHG

## 2025-08-29 DIAGNOSIS — G90.3 ORTHOSTATIC HYPOTENSION DUE TO PARKINSON DISEASE: ICD-10-CM

## 2025-08-29 DIAGNOSIS — Z71.89 COMPLEX CARE COORDINATION: ICD-10-CM

## 2025-08-29 DIAGNOSIS — I95.1 ORTHOSTATIC HYPOTENSION: ICD-10-CM

## 2025-08-29 DIAGNOSIS — R35.1 NOCTURIA: ICD-10-CM

## 2025-08-29 DIAGNOSIS — E27.49 OTHER ADRENOCORTICAL INSUFFICIENCY: ICD-10-CM

## 2025-08-29 DIAGNOSIS — R55 RECURRENT SYNCOPE: ICD-10-CM

## 2025-08-29 DIAGNOSIS — G20.A1 PARKINSON'S DISEASE WITHOUT DYSKINESIA OR FLUCTUATING MANIFESTATIONS: ICD-10-CM

## 2025-08-29 DIAGNOSIS — N30.00 ACUTE CYSTITIS WITHOUT HEMATURIA: Primary | ICD-10-CM

## 2025-08-29 PROCEDURE — 99999 PR PBB SHADOW E&M-EST. PATIENT-LVL V: CPT | Mod: PBBFAC,,, | Performed by: INTERNAL MEDICINE

## 2025-08-29 RX ORDER — VIBEGRON 75 MG/1
75 TABLET, FILM COATED ORAL
Qty: 90 TABLET | Refills: 1 | Status: SHIPPED | OUTPATIENT
Start: 2025-08-29

## 2025-08-29 RX ORDER — HYDROCORTISONE 10 MG/1
TABLET ORAL
Qty: 225 TABLET | Refills: 3 | Status: SHIPPED | OUTPATIENT
Start: 2025-08-29

## 2025-08-29 RX ORDER — IVABRADINE 7.5 MG/1
7.5 TABLET, FILM COATED ORAL 2 TIMES DAILY
Qty: 60 TABLET | Refills: 11 | Status: SHIPPED | OUTPATIENT
Start: 2025-08-29

## 2025-09-03 ENCOUNTER — OFFICE VISIT (OUTPATIENT)
Dept: PODIATRY | Facility: CLINIC | Age: 60
End: 2025-09-03
Payer: COMMERCIAL

## 2025-09-03 VITALS
HEART RATE: 59 BPM | HEIGHT: 68 IN | DIASTOLIC BLOOD PRESSURE: 72 MMHG | BODY MASS INDEX: 23.05 KG/M2 | SYSTOLIC BLOOD PRESSURE: 195 MMHG | WEIGHT: 152.13 LBS | RESPIRATION RATE: 18 BRPM

## 2025-09-03 DIAGNOSIS — L97.512 SKIN ULCER OF TOE OF RIGHT FOOT WITH FAT LAYER EXPOSED: Primary | ICD-10-CM

## 2025-09-03 PROCEDURE — 99999 PR PBB SHADOW E&M-EST. PATIENT-LVL IV: CPT | Mod: PBBFAC,,, | Performed by: PODIATRIST

## 2025-09-03 PROCEDURE — 99499 UNLISTED E&M SERVICE: CPT | Mod: S$GLB,,, | Performed by: PODIATRIST

## 2025-09-03 PROCEDURE — 11042 DBRDMT SUBQ TIS 1ST 20SQCM/<: CPT | Mod: S$GLB,,, | Performed by: PODIATRIST

## 2025-09-05 ENCOUNTER — OFFICE VISIT (OUTPATIENT)
Facility: CLINIC | Age: 60
End: 2025-09-05
Payer: COMMERCIAL

## 2025-09-05 ENCOUNTER — TELEPHONE (OUTPATIENT)
Dept: PODIATRY | Facility: CLINIC | Age: 60
End: 2025-09-05
Payer: COMMERCIAL

## 2025-09-05 VITALS
SYSTOLIC BLOOD PRESSURE: 152 MMHG | HEIGHT: 68 IN | BODY MASS INDEX: 23.34 KG/M2 | WEIGHT: 154 LBS | DIASTOLIC BLOOD PRESSURE: 77 MMHG | HEART RATE: 73 BPM

## 2025-09-05 DIAGNOSIS — G20.A1 PARKINSON'S DISEASE WITHOUT DYSKINESIA OR FLUCTUATING MANIFESTATIONS: Primary | ICD-10-CM

## 2025-09-05 DIAGNOSIS — G90.3 ORTHOSTATIC HYPOTENSION DUE TO PARKINSON DISEASE: ICD-10-CM

## 2025-09-05 DIAGNOSIS — I95.1 ORTHOSTATIC HYPOTENSION: ICD-10-CM

## 2025-09-05 DIAGNOSIS — N18.4 CHRONIC KIDNEY DISEASE (CKD), STAGE IV (SEVERE): ICD-10-CM

## 2025-09-05 DIAGNOSIS — R26.81 GAIT INSTABILITY: ICD-10-CM

## 2025-09-05 DIAGNOSIS — Z71.89 COUNSELING REGARDING GOALS OF CARE: ICD-10-CM

## 2025-09-05 DIAGNOSIS — G40.209 PARTIAL SYMPTOMATIC EPILEPSY WITH COMPLEX PARTIAL SEIZURES, NOT INTRACTABLE, WITHOUT STATUS EPILEPTICUS: ICD-10-CM

## 2025-09-05 DIAGNOSIS — Z87.898 HISTORY OF BRAIN TUMOR: ICD-10-CM

## 2025-09-05 PROBLEM — N18.32 STAGE 3B CHRONIC KIDNEY DISEASE: Status: RESOLVED | Noted: 2019-03-28 | Resolved: 2025-09-05

## 2025-09-05 PROCEDURE — 99999 PR PBB SHADOW E&M-EST. PATIENT-LVL V: CPT | Mod: PBBFAC,,, | Performed by: PHYSICIAN ASSISTANT

## 2025-09-05 RX ORDER — CARBIDOPA AND LEVODOPA 25; 100 MG/1; MG/1
TABLET ORAL
Qty: 90 TABLET | Refills: 11 | Status: SHIPPED | OUTPATIENT
Start: 2025-09-05 | End: 2026-09-19

## (undated) DEVICE — DRAPE U SPLIT SHEET 54X76IN

## (undated) DEVICE — SPLINT WRIST FOAM 8IN MD/LFT

## (undated) DEVICE — ELECTRODE REM PLYHSV RETURN 9

## (undated) DEVICE — DRESSING XEROFORM FOIL PK 1X8

## (undated) DEVICE — SUT VICRYL 3-0 27 SH

## (undated) DEVICE — GLOVE SENSICARE PI GRN 7.5

## (undated) DEVICE — BIT DRILL AO SPEEDGUIDE 2X135M

## (undated) DEVICE — PIN STEINMANN SMOOTH 2.5X100MM
Type: IMPLANTABLE DEVICE | Site: FOOT | Status: NON-FUNCTIONAL
Removed: 2024-04-02

## (undated) DEVICE — SUT MONOCYRL 4-0 PS2 UND

## (undated) DEVICE — BLADE SURGICAL 15C

## (undated) DEVICE — SHEET EENT SPLIT

## (undated) DEVICE — ADHESIVE DERMABOND ADVANCED

## (undated) DEVICE — DARTFIRE EDGE INSTRUMENT PACK

## (undated) DEVICE — SEE MEDLINE ITEM 157173

## (undated) DEVICE — PAD PREP 50/CA

## (undated) DEVICE — SEE L#120831

## (undated) DEVICE — DRAPE INCISE IOBAN 2 23X17IN

## (undated) DEVICE — DRESSING XEROFORM NONADH 1X8IN

## (undated) DEVICE — TRAY MINOR GEN SURG

## (undated) DEVICE — GOWN POLY REINF BRTH SLV LG

## (undated) DEVICE — GAUZE AVANT SPNG 4PLY STRL 4X4

## (undated) DEVICE — GLOVE SENSICARE PI GRN 8.5

## (undated) DEVICE — BANDAGE MATRIX HK LOOP 6IN 5YD

## (undated) DEVICE — PAD PREP CUFFED NS 24X48IN

## (undated) DEVICE — SPONGE COTTON TRAY 4X4IN

## (undated) DEVICE — SPONGE LAP 18X18 PREWASHED

## (undated) DEVICE — SPONGE GAUZE 16PLY 4X4

## (undated) DEVICE — SOL IRR 0.9% NACL 500ML PB

## (undated) DEVICE — DRAPE STERI-DRAPE 1000 17X11IN

## (undated) DEVICE — DRESSING AQUACEL SACRAL 9 X 9

## (undated) DEVICE — DRAPE C-ARM MINI DISP

## (undated) DEVICE — Device

## (undated) DEVICE — APPLICATOR CHLORAPREP ORN 26ML

## (undated) DEVICE — SUT ETHILON 5-0 PS-2 18IN

## (undated) DEVICE — TRAY MINOR ORTHO

## (undated) DEVICE — GLOVE SURG BIOGEL LATEX SZ 7.5

## (undated) DEVICE — PAD CAST 2 IN X 4YDS STERILE

## (undated) DEVICE — BANDAGE ELASTIC 2X5 VELCRO ST

## (undated) DEVICE — DRAPE THREE-QTR REINF 53X77IN

## (undated) DEVICE — BANDAGE ROLL COTTN 4.5INX4.1YD

## (undated) DEVICE — SEE MEDLINE ITEM 146372

## (undated) DEVICE — PAD CAST SPECIALIST STRL 6

## (undated) DEVICE — PIN FIXATION ANCHORAGE
Type: IMPLANTABLE DEVICE | Site: FOOT | Status: NON-FUNCTIONAL
Removed: 2024-04-02

## (undated) DEVICE — PACK BASIC

## (undated) DEVICE — GLOVE SENSICARE PI SURG 7.5

## (undated) DEVICE — COVER CAMERA OPERATING ROOM

## (undated) DEVICE — DRAPE STERI INSTRUMENT 1018

## (undated) DEVICE — BIT DRILL SCALED 2X105MM

## (undated) DEVICE — SEE MEDLINE ITEM 156955

## (undated) DEVICE — SPONGE GAUZE 4X4 12 PLY STRL

## (undated) DEVICE — TUBE SUCTION FRAZIER VENT 12FR

## (undated) DEVICE — BLADE SAG MIC FINE 9.5X25.5MM

## (undated) DEVICE — SEE MEDLINE ITEM 157128

## (undated) DEVICE — COVER LIGHT HANDLE 80/CA

## (undated) DEVICE — ALCOHOL 70% ISOP W/GREEN 16OZ

## (undated) DEVICE — SUT MONOCRYL 2-0 S UND

## (undated) DEVICE — TOURNIQUET SB QC DP 34X4IN

## (undated) DEVICE — SUT ETHILON 3-0 PS2 18 BLK

## (undated) DEVICE — PACK EXTREMITY KENNER

## (undated) DEVICE — SUT PROLENE 4-0 MONO 18IN

## (undated) DEVICE — SEE MEDLINE ITEM 146268

## (undated) DEVICE — SEE MEDLINE ITEM 152622

## (undated) DEVICE — STOCKINET TUBULAR 1 PLY 6X60IN

## (undated) DEVICE — GOWN SURGICAL X-LARGE

## (undated) DEVICE — BANDAGE ESMARK 6X12

## (undated) DEVICE — SOL NACL .9P 500ML

## (undated) DEVICE — ORTHOLOC 2 JOINT PREP INSTRUMENT KIT

## (undated) DEVICE — DRAPE TOP 53X102IN

## (undated) DEVICE — GLOVE SENSICARE PI SURG 8

## (undated) DEVICE — SEE MEDLINE ITEM 157116

## (undated) DEVICE — GAUZE CNFRM STRL 2INX4.1YD

## (undated) DEVICE — PAD CAST SPECIALIST STRL 4

## (undated) DEVICE — STOCKINETTE TUBULAR 2PL 6 X 4

## (undated) DEVICE — SEE MEDLINE ITEM 152522

## (undated) DEVICE — SEE MEDLINE ITEM 157117

## (undated) DEVICE — SUT MONO 2-0 CT-1 UNDYED

## (undated) DEVICE — DRAPE C-ARMOR EQUIPMENT COVER

## (undated) DEVICE — BNDG COFLEX FOAM LF2 ST 4X5YD

## (undated) DEVICE — COVER OVERHEAD SURG LT BLUE

## (undated) DEVICE — DRAPE T EXTRM SURG 121X128X90

## (undated) DEVICE — GAUZE SPONGE 4X4 12PLY

## (undated) DEVICE — DRESSING TRANS 4X4 TEGADERM

## (undated) DEVICE — SEE MEDLINE ITEM 154981

## (undated) DEVICE — BANDAGE ESMARK ELASTIC ST 6X9

## (undated) DEVICE — PAD ABDOMINAL STERILE 8X10IN

## (undated) DEVICE — STOCKINET 4INX48

## (undated) DEVICE — DRAPE EXTREMITY STD 89X128IN

## (undated) DEVICE — GOWN POLY REINF X-LONG XL

## (undated) DEVICE — NDL HYPO REG 25G X 1 1/2

## (undated) DEVICE — STAPLER SKIN ROTATING HEAD

## (undated) DEVICE — BLADE SURG #15 CARBON STEEL

## (undated) DEVICE — DRAPE MINI C-ARM 54 X 64

## (undated) DEVICE — BLADE SCALP OPHTL BEVEL STR

## (undated) DEVICE — DRAPE C-ARM ELAS CLIP 42X120IN

## (undated) DEVICE — GOWN POLY REINF BRTH SLV XL

## (undated) DEVICE — SYR 10CC LUER LOCK

## (undated) DEVICE — SPONGE DERMACEA GAUZE 4X4

## (undated) DEVICE — ALCOHOL 70% ANTISEPTIC ISO 4OZ

## (undated) DEVICE — DRESSING ADH FILM TELFA 2X3IN

## (undated) DEVICE — SUT VICRYL CTD 2-0 GI 27 SH

## (undated) DEVICE — SKINMARKER & RULER REGULAR X-F

## (undated) DEVICE — GOWN AERO CHROME W/ TOWEL XL